# Patient Record
Sex: FEMALE | Race: WHITE | NOT HISPANIC OR LATINO | Employment: OTHER | URBAN - NONMETROPOLITAN AREA
[De-identification: names, ages, dates, MRNs, and addresses within clinical notes are randomized per-mention and may not be internally consistent; named-entity substitution may affect disease eponyms.]

---

## 2017-01-01 DIAGNOSIS — E78.5 HYPERLIPIDEMIA LDL GOAL <100: Primary | ICD-10-CM

## 2017-01-03 NOTE — TELEPHONE ENCOUNTER
Zocor     Last Written Prescription Date: 05/10/16  Last Fill Quantity: 90, # refills: 1  Last Office Visit with [facility], [facility] or [facility] prescribing provider: 12/12/16   Next 5 appointments (look out 90 days)     Jan 16, 2017  8:45 AM   (Arrive by 8:30 AM)   SHORT with Magaly Sosa MD   [facility] ([facility] [place] [facility])    [address] [place] [address]  [place] [address]   [phone]                   CHOL      182   10/19/2015  HDL       46   10/19/2015  LDL       96   10/19/2015  TRIG      200   10/19/2015  CHOLHDLRATIO      4.0   10/19/2015

## 2017-01-04 RX ORDER — SIMVASTATIN 20 MG
TABLET ORAL
Qty: 90 TABLET | Refills: 0 | Status: SHIPPED | OUTPATIENT
Start: 2017-01-04 | End: 2017-04-25

## 2017-01-05 ENCOUNTER — OFFICE VISIT (OUTPATIENT)
Dept: SURGERY | Facility: OTHER | Age: 77
End: 2017-01-05
Attending: FAMILY MEDICINE
Payer: MEDICARE

## 2017-01-05 VITALS
TEMPERATURE: 98.2 F | HEART RATE: 66 BPM | DIASTOLIC BLOOD PRESSURE: 70 MMHG | OXYGEN SATURATION: 95 % | SYSTOLIC BLOOD PRESSURE: 120 MMHG

## 2017-01-05 DIAGNOSIS — L98.9 SKIN LESION: Primary | ICD-10-CM

## 2017-01-05 PROCEDURE — 88305 TISSUE EXAM BY PATHOLOGIST: CPT | Mod: TC | Performed by: SURGERY

## 2017-01-05 PROCEDURE — 99214 OFFICE O/P EST MOD 30 MIN: CPT

## 2017-01-05 PROCEDURE — 11403 EXC TR-EXT B9+MARG 2.1-3CM: CPT | Performed by: SURGERY

## 2017-01-05 PROCEDURE — 11403 EXC TR-EXT B9+MARG 2.1-3CM: CPT

## 2017-01-05 ASSESSMENT — PAIN SCALES - GENERAL: PAINLEVEL: MILD PAIN (2)

## 2017-01-05 NOTE — MR AVS SNAPSHOT
"              After Visit Summary   1/5/2017    Dinorah Lim    MRN: [id number]           Patient Information     Date Of Birth          1940        Visit Information        Provider Department      1/5/2017 1:00 PM Tolu Negron, DO [facility]        Today's Diagnoses     Skin lesion    -  1       Care Instructions    Thank you for allowing Dr. Negron and the surgical team to participate in your care today.Please call with any scheduling questions to our Unit Health Coordinator Nena at [phone] or any nursing questions to Rosi at  [phone].       POST PROCEDURE INSTRUCTIONS      Apply ice to the surgical area to reduce swelling. (no longer than 20 minutes at a time)    Remove your dressing in 24 hours.    Wash incision with soap and water twice daily.    Keep incision clean and dry   Do NOT soak in water such as a tub bath or swimming   Do NOT do dishes or \"dirty work\"   Do NOT put make-up, deodorant, powders, hairspray, lotions, etc on the incision    Apply antibiotic ointment twice daily    Cover with a clean dressing daily or when wet/soiled    If you have steri-strips, these will fall off on their own in 7 days. If they are still adhered after 7 days, you may remove them by pulling gently.     Do NOT use aspirin/NSAIDS (Motrin, Ibuprofen, Aleve, etc..) for 7 days    You can use acetaminophen(Tylenol) or the prescription you received for pain.       If you have any bleeding, cover the wound with clean gauze and hold pressure for 10 Minutes. If the bleeding does not stop or is heavy and profuse, call the clinic or go to the Urgent Care/Emergency Department.      SIGNS OF INFECTION ARE:      Redness, swelling, red streaks, pus, drainage, warmth, fever, increased pain, foul smell.     Contact your primary health care provider if you notice any of the warning signs.     FOLLOW - UP      Follow-up in clinic with Dr. Negron in 7-10 days for suture removal.     Pathology " "results will also be discussed at that time.                 Follow-ups after your visit        Your next 10 appointments already scheduled     2017  8:45 AM   (Arrive by 8:30 AM)   SHORT with Magaly Sosa MD   [facility] ([facility])    [place] [address]   [phone]              Who to contact     If you have questions or need follow up information about today's clinic visit or your schedule please contact [facility] directly at [phone].  Normal or non-critical lab and imaging results will be communicated to you by [employer], letter or phone within 4 business days after the clinic has received the results. If you do not hear from us within 7 days, please contact the clinic through [email or web address] or phone. If you have a critical or abnormal lab result, we will notify you by phone as soon as possible.  Submit refill requests through [facility] or call your pharmacy and they will forward the refill request to us. Please allow 3 business days for your refill to be completed.          Additional Information About Your Visit        [facility] Information     [facility] lets you send messages to your doctor, view your test results, renew your prescriptions, schedule appointments and more. To sign up, go to [email or web address] . Click on \"Log in\" on the left side of the screen, which will take you to the Welcome page. Then click on \"Sign up Now\" on the right side of the page.     You will be asked to enter the access code listed below, as well as some personal information. Please follow the directions to create your username and password.     Your access code is: [email or web address]  Expires: 2017  1:26 PM     Your access code will  in 90 days. If you need help or a new code, please call your [facility] or [phone].        Care EveryWhere ID     This is your Care EveryWhere ID. This could be used by other organizations to access your [facility] medical " records  [id number]        Your Vitals Were     Pulse Temperature Pulse Oximetry             66 98.2  F (36.8  C) (Tympanic) 95%          Blood Pressure from Last 3 Encounters:   01/05/17 120/70   12/12/16 126/84   12/01/16 122/80    Weight from Last 3 Encounters:   12/12/16 230 lb (104.327 kg)   12/01/16 231 lb (104.781 kg)   10/18/16 230 lb (104.327 kg)              We Performed the Following     Surgical pathology exam     SURGICAL TRAY-- MINOR        Primary Care Provider Office Phone # Fax #    Magaly Sosa [phone]       [facility] [place] [address]  [place] [address]        Thank you!     [facility] you for [facility]  for your care. Our goal is always to provide you with excellent care. Hearing back from our patients is one way we can continue to improve our services. Please take a few minutes to complete the written survey that you may receive in the mail after your visit with us. Thank you!             Your Updated Medication List - Protect others around you: Learn how to safely use, store and throw away your medicines at www.disposemymeds.org.          This list is accurate as of: 1/5/17  1:26 PM.  Always use your most recent med list.                   Brand Name Dispense Instructions for use    albuterol (2.5 MG/3ML) 0.083% neb solution     1 Box    Take 1 vial (2.5 mg) by nebulization every 4 hours as needed for shortness of breath / dyspnea or wheezing       BENADRYL ALLERGY PO      Take 25 mg by mouth nightly as needed       clonazePAM 1 MG tablet    klonoPIN    45 tablet    TAKE ONE-FOURTH TO ONE-HALF TABLET BY MOUTH EVERY 8 HOURS AS NEEDED       CRANBERRY      1 capsule daily       esomeprazole 40 MG CR capsule    nexIUM    30 capsule    Take 1 capsule (40 mg) by mouth every morning (before breakfast) Take 30-60 minutes before a eating.       FISH OIL      Take 1 capsule by mouth daily       fluticasone 50 MCG/ACT spray    FLONASE    1 Bottle     Spray 1 spray into both nostrils 2 times daily       Garlic 10 MG Caps      Take 1 capsule by mouth as needed       HYDROcodone-acetaminophen 5-325 MG per tablet    NORCO    60 tablet    TAKE ONE TABLET BY MOUTH EVERY 4 TO 6 HOURS AS NEEDED FOR PAIN       PARoxetine 40 MG tablet    PAXIL    90 tablet    Take 1 tablet (40 mg) by mouth daily       simvastatin 20 MG tablet    ZOCOR    90 tablet    TAKE ONE TABLET BY MOUTH ONCE DAILY AT BEDTIME       VITAMIN D (CHOLECALCIFEROL) PO      Take 2,000 Units by mouth daily       VITAMIN E      Take 1 capsule by mouth daily

## 2017-01-05 NOTE — PROGRESS NOTES
"Surgery Consult Clinic Note      RE: Dinorah Lim  : 1940  RIOS: 2017      Chief Complaint:  Skin lesion    History of Present Illness:  Mrs. Lim is a very pleasant 76 year old year old female who I am seeing at the request of Dr. Magaly Sosa MD for evaluation of skin lesions.  Had for 2 months.  On her back.  Causes pain while wearing bra. Daily 6/10 burning pain.  Has had \"pre-cancerous\" skin lesions removed from her face years ago.  Family history of skin cancer.  Doesn't smoke, doesn't drink alcohol regularly.  Didn't spend a lot of time in the sun as a child or adult.  Doesn't wear sun screen.    Medical history:  Past Medical History   Diagnosis Date     Hyperlipidemia 2001     Idiopathic hives since age 8 2004     Osteoarthritis of right hip 10/07/2004     Cough 2001     Anxiety 2011     Prediabetes 2011     Major depression 10/04/2011     Otitis externa 10/04/2011     Osteoarthrosis 2012     Chronic kidney disease (CKD), stage III (moderate)      Early,unknown eitiology, Dr Vilchis     Cholecystolithiasis 2015     noted on US 2015 --> cholecystectomy     Neoplasm of uncertain behavior of liver 2015     Anterior Segment V 4 cm nodule abutting liver surface by US 2015      Obesity, Class II, BMI 35-39.9 (H) 2015     BMI 35.9 with comorbidities = MORBID obesity     Hiatal hernia 2014     Seen on chest CT     Chronic kidney disease (CKD), stage III (moderate) 2015     Early,unknown eitiology, Dr Vilchis        Surgical history:  Past Surgical History   Procedure Laterality Date     Tonsillectomy       Hc inj epidural lumbar/sacral w/wo contrast Bilateral 2013     facet injections; prev      Closed rx elbow dislocation Right      CR/long cast of fracture     Closed reduction wrist Right 1952 x 2     long arm cast (same time as elbow fx)     Laparoscopic cholecystectomy N/A 2015     Procedure: LAPAROSCOPIC " CHOLECYSTECTOMY;  Surgeon: Brittney العلي MD;  Location: [facility] OR       Family history:  Family History   Problem Relation Age of Onset     HEART DISEASE Brother      atrial fibrillation     Other - See Comments Mother      emphysema     HEART DISEASE Father 92     CHF     CANCER Paternal Grandfather      lung cancer     CANCER Maternal Uncle      lung cancer       Medications:  Current Outpatient Prescriptions   Medication Sig Dispense Refill     simvastatin (ZOCOR) 20 MG tablet TAKE ONE TABLET BY MOUTH ONCE DAILY AT BEDTIME 90 tablet 0     esomeprazole (NEXIUM) 40 MG CR capsule Take 1 capsule (40 mg) by mouth every morning (before breakfast) Take 30-60 minutes before a eating. 30 capsule 3     clonazePAM (KLONOPIN) 1 MG tablet TAKE ONE-FOURTH TO ONE-HALF TABLET BY MOUTH EVERY 8 HOURS AS NEEDED 45 tablet 0     HYDROcodone-acetaminophen (NORCO) 5-325 MG per tablet TAKE ONE TABLET BY MOUTH EVERY 4 TO 6 HOURS AS NEEDED FOR PAIN 60 tablet 0     fluticasone (FLONASE) 50 MCG/ACT nasal spray Spray 1 spray into both nostrils 2 times daily 1 Bottle 0     PARoxetine (PAXIL) 40 MG tablet Take 1 tablet (40 mg) by mouth daily 90 tablet 1     VITAMIN D, CHOLECALCIFEROL, PO Take 2,000 Units by mouth daily       albuterol (2.5 MG/3ML) 0.083% nebulizer solution Take 1 vial (2.5 mg) by nebulization every 4 hours as needed for shortness of breath / dyspnea or wheezing 1 Box 3     Garlic 10 MG CAPS Take 1 capsule by mouth as needed       DiphenhydrAMINE HCl (BENADRYL ALLERGY PO) Take 25 mg by mouth nightly as needed        VITAMIN E Take 1 capsule by mouth daily        CRANBERRY 1 capsule daily        FISH OIL Take 1 capsule by mouth daily        Allergies:  The patientis allergic to chocolate; lemon flavor; lime; orange fruit; strawberry; adhesive tape; codeine; erythromycin; grapefruit; penicillins; sulfa drugs; and tomato.  .  Social history:  Social History   Substance Use Topics     Smoking status: Never Smoker      Smokeless  tobacco: Never Used     Alcohol Use: No     Marital status: single.    Review of Systems:    Constitutional: Negative for fever, chills and weight loss.   HENT: Negative for ear pain, nosebleeds, congestion, sore throat, tinnitus and ear discharge.    Eyes: Negative for blurred vision, double vision, photophobia and pain.   Respiratory: Negative for cough, hemoptysis, shortness of breath, wheezing and stridor.    Cardiovascular: Negative for chest pain, palpitations and orthopnea.   Gastrointestinal: Negative for heartburn, nausea, vomiting, abdominal pain and blood in stool.   Genitourinary: Negative for urgency, frequency and hematuria.   Musculoskeletal: Negative for myalgias, back pain and joint pain.   Neurological: Negative for tingling, speech change and headaches.   Endo/Heme/Allergies: Does not bruise/bleed easily.   Psychiatric/Behavioral: Negative for depression, suicidal ideas and hallucinations. The patient is not nervous/anxious.    Physical Examination:  /70 mmHg  Pulse 66  Temp(Src) 98.2  F (36.8  C) (Tympanic)  SpO2 95%  General: AAOx4, NAD, WN/WD, ambulating without assistance  HEENT:NCAT, EOMI, PERRL Sclerae anicteric; Trachea mideline, no JVD  Chest:   Clear to auscultation bilaterally.  Cardiac: S1S2 , regular rate and rhythm without additional sounds  Abdomen: Obese, soft, ND/NT no rebound, no guarding  Extremities: Cursory exam unremarkable.  Skin: Warm, dry, < 2 sec cap refill, Right paraspinal region 1cm oval hyperpigmented slightly raised patch.  Neuro: CN 2-12 grossly intact, no focal deficit, GCS 15  Psych: happy, calm, asks appropriate questions      Assessment/Plan:  #1 Skin lesion  #2 Family history of skin cancer  #3 History of pre-malignant skin lesions    This is new, symptomatic and concerning history.   I don't  think its unreasonable to remove this mass and confirm diagnosis.  The risks, including but not limited to, bleeding, infection, recurrence, need for further  resection, wound healing, scar formation and chronic pain have all been discussed with the patient. He voiced understanding of all these risks and there were no barriers to patient education.          Dr Negron  [facility]  [address], [address]  [place]    [address]    Referring Provider:  Magaly Sosa MD  [place] [phone]     Primary Care Provider:  Magaly Sosa

## 2017-01-05 NOTE — PATIENT INSTRUCTIONS
"Thank you for allowing Dr. Negron and the surgical team to participate in your care today.Please call with any scheduling questions to our Unit Health Coordinator Nena at [phone] or any nursing questions to Rosi at  [phone].       POST PROCEDURE INSTRUCTIONS      Apply ice to the surgical area to reduce swelling. (no longer than 20 minutes at a time)    Remove your dressing in 24 hours.    Wash incision with soap and water twice daily.    Keep incision clean and dry   Do NOT soak in water such as a tub bath or swimming   Do NOT do dishes or \"dirty work\"   Do NOT put make-up, deodorant, powders, hairspray, lotions, etc on the incision    Apply antibiotic ointment twice daily    Cover with a clean dressing daily or when wet/soiled    If you have steri-strips, these will fall off on their own in 7 days. If they are still adhered after 7 days, you may remove them by pulling gently.     Do NOT use aspirin/NSAIDS (Motrin, Ibuprofen, Aleve, etc..) for 7 days    You can use acetaminophen(Tylenol) or the prescription you received for pain.       If you have any bleeding, cover the wound with clean gauze and hold pressure for 10 Minutes. If the bleeding does not stop or is heavy and profuse, call the clinic or go to the Urgent Care/Emergency Department.      SIGNS OF INFECTION ARE:      Redness, swelling, red streaks, pus, drainage, warmth, fever, increased pain, foul smell.     Contact your primary health care provider if you notice any of the warning signs.     FOLLOW - UP      Follow-up in clinic with Dr. Negron in 7-10 days for suture removal.     Pathology results will also be discussed at that time.           "

## 2017-01-05 NOTE — PROCEDURES
[facility] - Brief Operative Note    Pre-operative diagnosis: Symptomatic skin lesion   Post-operative diagnosis Same   Procedure: Excisional biopsy   Surgeon: Tolu Negron DO   Anesthesia: Local anesthetic    Estimated blood loss: none   Blood transfusion: No transfusion was given during surgery   Drains: 0   Specimens: Right flank skin lesion   Findings: Superficial skin lesion, grossly excised    Complications: None   Condition: Stable   Comments: After a procedural pause was conducted, the area was prepped and draped in the usual sterile fashion.  Local anesthetic was infiltrated around the right paraspinal skin lesion.  After the anesthetic had taken affect, an ellipse measuring 1cm x 3cm was made sharply and the lesion was removed from the subcutaneous fat and sent the pathology.  Hemostasis was ensured using direct pressure and the wound was closed with interrupted 3-0 nylon sutures.  Patient tolerated the procedure well and a sterile dressing was applied.

## 2017-01-05 NOTE — NURSING NOTE
"Chief Complaint   Patient presents with     Derm Problem     Lesion on right upper back.  Patient referred by Dr Magaly Sosa.         Initial /70 mmHg  Pulse 66  Temp(Src) 98.2  F (36.8  C) (Tympanic)  SpO2 95% Estimated body mass index is 39.46 kg/(m^2) as calculated from the following:    Height as of 12/12/16: 5' 4\" (1.626 m).    Weight as of 12/12/16: 230 lb (104.327 kg).  BP completed using cuff size: vaibhav GRULLON      "

## 2017-01-09 LAB — COPATH REPORT: NORMAL

## 2017-01-11 ENCOUNTER — ALLIED HEALTH/NURSE VISIT (OUTPATIENT)
Dept: SURGERY | Facility: OTHER | Age: 77
End: 2017-01-11
Attending: SURGERY
Payer: MEDICARE

## 2017-01-11 DIAGNOSIS — Z48.02 ENCOUNTER FOR REMOVAL OF SUTURES: Primary | ICD-10-CM

## 2017-01-11 NOTE — NURSING NOTE
Patient presented to the clinic to have her sutures removed from her back wound.  Wound looked clean, slightly red around the top two sutures.  Patient complained of burning at the site, which she says has been present for 2 months previous to her biopsy.    Four black sutures were removed easily from her incision site.  No complaints from patient.  One piece of steri-strip was placed over the site and the patient was informed that these fall off within 7-10 days.  Patient left without complaint but wanted the nurse to ask Dr Negron why she would be feeling burning at the site for the past two months.  A note was sent to Dr Negron.

## 2017-01-16 ENCOUNTER — OFFICE VISIT (OUTPATIENT)
Dept: FAMILY MEDICINE | Facility: OTHER | Age: 77
End: 2017-01-16
Attending: FAMILY MEDICINE
Payer: COMMERCIAL

## 2017-01-16 VITALS
OXYGEN SATURATION: 97 % | DIASTOLIC BLOOD PRESSURE: 74 MMHG | RESPIRATION RATE: 19 BRPM | BODY MASS INDEX: 38.6 KG/M2 | WEIGHT: 225 LBS | HEART RATE: 73 BPM | SYSTOLIC BLOOD PRESSURE: 128 MMHG

## 2017-01-16 DIAGNOSIS — M70.61 TROCHANTERIC BURSITIS OF RIGHT HIP: Primary | ICD-10-CM

## 2017-01-16 DIAGNOSIS — Z12.11 ENCOUNTER FOR SCREENING FOR MALIGNANT NEOPLASM OF COLON: ICD-10-CM

## 2017-01-16 DIAGNOSIS — M70.71 BURSITIS OF HIP, RIGHT: Primary | ICD-10-CM

## 2017-01-16 PROCEDURE — 99213 OFFICE O/P EST LOW 20 MIN: CPT | Performed by: FAMILY MEDICINE

## 2017-01-16 PROCEDURE — 73502 X-RAY EXAM HIP UNI 2-3 VIEWS: CPT | Mod: TC,RT

## 2017-01-16 PROCEDURE — 99212 OFFICE O/P EST SF 10 MIN: CPT

## 2017-01-16 RX ORDER — NABUMETONE 500 MG/1
500-1000 TABLET, FILM COATED ORAL 2 TIMES DAILY PRN
Qty: 60 TABLET | Refills: 1 | Status: SHIPPED | OUTPATIENT
Start: 2017-01-16 | End: 2017-06-26

## 2017-01-16 ASSESSMENT — PAIN SCALES - GENERAL: PAINLEVEL: SEVERE PAIN (6)

## 2017-01-16 NOTE — PROGRESS NOTES
[facility]    Dinorah Lim, 76 year old, female presents with   Chief Complaint   Patient presents with     Musculoskeletal Problem     right hip pain, rates pain 6/10 over the last several months. Pain with standing from sitting, lying on this side. Walking helps the pain. No trauma     Derm Problem     had skin lesion removed on 1/5/17 by Dr Negron states it still burns, this was a benign lesion, sutures were removed and glue placed. 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     Anterior Segment V 4 cm nodule abutting liver surface by US 1/4/2015      Obesity, Class II, BMI 35-39.9 (H) 1/4/2015     BMI 35.9 with comorbidities = MORBID obesity     Hiatal hernia 12/28/2014     Seen on chest CT     Chronic kidney disease (CKD), stage III (moderate) 1/4/2015     Early,unknown eitiology, Dr Vilchis        PAST SURGICAL HISTORY:  Past Surgical History   Procedure Laterality Date     Tonsillectomy       Hc inj epidural lumbar/sacral w/wo contrast Bilateral 5/2013     facet injections; prev 2012     Closed rx elbow dislocation Right 1952     CR/long cast of fracture     Closed reduction wrist Right 1952 x 2     long arm cast (same time as elbow fx)     Laparoscopic cholecystectomy N/A 1/4/2015     Procedure: LAPAROSCOPIC CHOLECYSTECTOMY;  Surgeon: Brittney العلي MD;  Location: [facility]       SOCIAL HISTORY  Social History      Social History     Marital Status: Single     Spouse Name: N/A     Number of Children: 4     Years of Education: 12     Occupational History     personal care attendant      Social History Main Topics     Smoking status: Never Smoker      Smokeless tobacco: Never Used     Alcohol Use: No     Drug Use: No     Sexual Activity: No     Other Topics Concern     Blood Transfusions Yes     Caffeine Concern Yes     >32 oz soda/day     Sleep Concern Yes     insomnia     Parent/Sibling W/ Cabg, Mi Or Angioplasty Before 65f 55m? No     Social History Narrative       MEDICATIONS:  Prior to Admission medications    Medication Sig Start Date End Date Taking? Authorizing Provider   nabumetone (RELAFEN) 500 MG tablet Take 1-2 tablets (500-1,000 mg) by mouth 2 times daily as needed for moderate pain 1/16/17  Yes Magaly Sosa MD   simvastatin (ZOCOR) 20 MG tablet TAKE ONE TABLET BY MOUTH ONCE DAILY AT BEDTIME 1/4/17  Yes Magaly Sosa MD   esomeprazole (NEXIUM) 40 MG CR capsule Take 1 capsule (40 mg) by mouth every morning (before breakfast) Take 30-60 minutes before a eating. 12/12/16  Yes Magaly Sosa MD   clonazePAM (KLONOPIN) 1 MG tablet TAKE ONE-FOURTH TO ONE-HALF TABLET BY MOUTH EVERY 8 HOURS AS NEEDED 12/1/16  Yes Magaly Sosa MD   HYDROcodone-acetaminophen (NORCO) 5-325 MG per tablet TAKE ONE TABLET BY MOUTH EVERY 4 TO 6 HOURS AS NEEDED FOR PAIN 12/1/16  Yes Magaly Sosa MD   PARoxetine (PAXIL) 40 MG tablet Take 1 tablet (40 mg) by mouth daily 10/4/16  Yes Magaly Sosa MD   VITAMIN D, CHOLECALCIFEROL, PO Take 2,000 Units by mouth daily   Yes Reported, Patient   albuterol (2.5 MG/3ML) 0.083% nebulizer solution Take 1 vial (2.5 mg) by nebulization every 4 hours as needed for shortness of breath / dyspnea or wheezing 5/12/15  Yes Magaly Sosa MD   Garlic 10 MG CAPS Take 1 capsule by mouth as needed   Yes Reported, Patient   DiphenhydrAMINE HCl (BENADRYL ALLERGY PO) Take 25 mg by mouth nightly as needed     Yes Reported, Patient   VITAMIN E Take 1 capsule by mouth daily    Yes Reported, Patient   CRANBERRY 1 capsule daily    Yes Reported, Patient   FISH OIL Take 1 capsule by mouth daily    Yes Reported, Patient   fluticasone (FLONASE) 50 MCG/ACT nasal spray Spray 1 spray into both nostrils 2 times daily 10/26/16   Oz Ospina, NP       ALLERGIES:     Allergies   Allergen Reactions     Chocolate Hives     Lemon Flavor Hives     Lime [Calcium Oxysulfide] Hives     Orange Fruit [Citrus] Hives     Strawberry Hives     Adhesive Tape      Band-aids     Codeine Hives     Patient can tolerate oxycodone & dilaudid     Erythromycin Hives     ERYTHROMYCIN BASE     Grapefruit [Extra Strength Grapefruit]      GRAPEFRUIT     Penicillins Hives     Sulfa Drugs      SULFONAMIDE ANTIBIOTICS      Tomato        ROS:  C: NEGATIVE for fever, chills, change in weight  I: NEGATIVE for worrisome rashes, moles or lesions  N: NEGATIVE for weakness, dizziness or paresthesias  P: NEGATIVE for changes in mood or affect    EXAM:  /74 mmHg  Pulse 73  Resp 19  Wt 225 lb (102.059 kg)  SpO2 97% Body mass index is 38.6 kg/(m^2).   GENERAL APPEARANCE: healthy, alert and no distress  ORTHO: Hip Exam: Palpation: Tender:   right greater trochanter  Non-tender:  right iliac crest  Range of Motion:  Full ROM, without pain  Strength:  full strength      SKIN: no suspicious lesions or rashes and incision site of the upper mid back is healing well without sign of infection, steri strip in place  NEURO: Normal strength and tone, mentation intact and speech normal  PSYCH: mentation appears normal and affect normal/bright    LABS AND IMAGING:     Results for orders placed or performed in visit on 01/05/17   Surgical pathology exam   Result Value Ref Range    Copath Report       Patient Name: ANAHY BROWNE  MR#: [id number]  Specimen #: [id number]  Collected: 1/5/2017  Received: 1/6/2017  Reported: 1/9/2017 15:40  Ordering Phy(s): RADHA BRISCOE  YADY  Additional Phy(s): MAGALY PARKER    For improved result formatting, select 'View Enhanced Report Format'  under Linked Documents section.    SPECIMEN(S):  Skin, right flank    FINAL DIAGNOSIS:  Skin, right flank, excision  - Intradermal nevus, margins positive    Electronically signed out by:    Jann Ndiaye M.D.    CLINICAL HISTORY:  Skin lesion, nonhealing    GROSS:  The specimen is a 1.5 x 0.6 x 0.5 cm piece of tan skin with a raised  light brown lesion.  After the margin is inked it is serially sectioned.  (5 TE in 1 block). (Dictated by: Jann Ndiaye MD 1/6/2017 02:06 PM)    MICROSCOPIC:  Microscopic sections show an intradermal nevus which extends to the  peripheral margins.    CPT Codes  A: 70538-KG4    TESTING LAB LOCATION:  [address] [address]  [phone]    COLLECTION SITE:  Client: [facility]  Location: [facility] ([facility]           ASSESSMENT/PLAN:  (M70.71) Bursitis of hip, right  (primary encounter diagnosis)  Comment: new  Plan: nabumetone (RELAFEN) 500 MG tablet, ORTHOPEDICS        ADULT REFERRAL, XR HIP LT G/E 2 VW (Clinic         Performed)        Will check x ray today, start Nabumtone and refer to DR Wills as she is interested in an injection. She is working 3 jobs and would like to be more mobile    Colon cancer screening, she was given an IFOB to use at home but hasn't. She is given another to take home    Magaly Parker MD  January 16, 2017

## 2017-01-16 NOTE — NURSING NOTE
"Chief Complaint   Patient presents with     Musculoskeletal Problem     right hip pain, rates pain 6/10.      Derm Problem     had skin lesion removed on 1/5/17 by Dr Negron states it still burns       Initial /74 mmHg  Pulse 73  Resp 19  Wt 225 lb (102.059 kg)  SpO2 97% Estimated body mass index is 38.6 kg/(m^2) as calculated from the following:    Height as of 12/12/16: 5' 4\" (1.626 m).    Weight as of this encounter: 225 lb (102.059 kg).  BP completed using cuff size: galen Gandhi      "

## 2017-01-16 NOTE — MR AVS SNAPSHOT
After Visit Summary   1/16/2017    Dinorah Lim    MRN: [id number]           Patient Information     Date Of Birth          1940        Visit Information        Provider Department      1/16/2017 8:45 AM Magaly Sosa MD [facility]        Today's Diagnoses     Bursitis of hip, right    -  1        Follow-ups after your visit        Additional Services     ORTHOPEDICS ADULT REFERRAL       Your provider has referred you to: Dr Wills for consideration of injection    Please be aware that coverage of these services is subject to the terms and limitations of your health insurance plan.  Call member services at your health plan with any benefit or coverage questions.      Please bring the following to your appointment:    >>   Any x-rays, CTs or MRIs which have been performed.  Contact the facility where they were done to arrange for  prior to your scheduled appointment.    >>   List of current medications   >>   This referral request   >>   Any documents/labs given to you for this referral                  Your next 10 appointments already scheduled     Jan 27, 2017  8:00 AM   (Arrive by 7:45 AM)   New Visit with Esteban Wills MD    ORTHOPEDICS ([facility])    [address] [facility] [address] [phone]              Who to contact     If you have questions or need follow up information about today's clinic visit or your schedule please contact [facility] directly at [phone].  Normal or non-critical lab and imaging results will be communicated to you by MyChart, letter or phone within 4 business days after the clinic has received the results. If you do not hear from us within 7 days, please contact the clinic through MyChart or phone. If you have a critical or abnormal lab result, we will notify you by phone as soon as possible.  Submit refill requests through [employer] or call your pharmacy and they will forward the refill  "request to us. Please allow 3 business days for your refill to be completed.          Additional Information About Your Visit        [employer] Information     [facility] lets you send messages to your doctor, view your test results, renew your prescriptions, schedule appointments and more. To sign up, go to [email or web address] . Click on \"Log in\" on the left side of the screen, which will take you to the Welcome page. Then click on \"Sign up Now\" on the right side of the page.     You will be asked to enter the access code listed below, as well as some personal information. Please follow the directions to create your username and password.     Your access code is: [email or web address]  Expires: 2017  1:26 PM     Your access code will  in 90 days. If you need help or a new code, please call your [facility] clinic or [phone].        Care EveryWhere ID     This is your Care EveryWhere ID. This could be used by other organizations to access your [facility] medical records  [id number]        Your Vitals Were     Pulse Respirations Pulse Oximetry             73 19 97%          Blood Pressure from Last 3 Encounters:   17 128/74   17 120/70   16 126/84    Weight from Last 3 Encounters:   17 225 lb (102.059 kg)   16 230 lb (104.327 kg)   16 231 lb (104.781 kg)              We Performed the Following     ORTHOPEDICS ADULT REFERRAL     XR HIP LT G/E 2 VW (Clinic Performed)          Today's Medication Changes          These changes are accurate as of: 17  9:30 AM.  If you have any questions, ask your nurse or doctor.               Start taking these medicines.        Dose/Directions    nabumetone 500 MG tablet   Commonly known as:  RELAFEN   Used for:  Bursitis of hip, right   Started by:  Magaly Sosa MD        Dose:  500-1000 mg   Take 1-2 tablets (500-1,000 mg) by mouth 2 times daily as needed for moderate pain   Quantity:  60 tablet   Refills:  1            Where to get your " medicines      These medications were sent to [id number] - [place], [place] - [address]   [id number] , [place] [address]     Phone:  [phone]    - nabumetone 500 MG tablet             Primary Care Provider Office Phone # Fax #    Magaly Sosa [phone]       [facility] [place] [address]  \[facility]\""[place] [address]        Thank you!     Thank you for choosing [facility]  for your care. Our goal is always to provide you with excellent care. Hearing back from our patients is one way we can continue to improve our services. Please take a few minutes to complete the written survey that you may receive in the mail after your visit with us. Thank you!             Your Updated Medication List - Protect others around you: Learn how to safely use, store and throw away your medicines at www.disposemymeds.org.          This list is accurate as of: 1/16/17  9:30 AM.  Always use your most recent med list.                   Brand Name Dispense Instructions for use    albuterol (2.5 MG/3ML) 0.083% neb solution     1 Box    Take 1 vial (2.5 mg) by nebulization every 4 hours as needed for shortness of breath / dyspnea or wheezing       BENADRYL ALLERGY PO      Take 25 mg by mouth nightly as needed       clonazePAM 1 MG tablet    klonoPIN    45 tablet    TAKE ONE-FOURTH TO ONE-HALF TABLET BY MOUTH EVERY 8 HOURS AS NEEDED       CRANBERRY      1 capsule daily       esomeprazole 40 MG CR capsule    nexIUM    30 capsule    Take 1 capsule (40 mg) by mouth every morning (before breakfast) Take 30-60 minutes before a eating.       FISH OIL      Take 1 capsule by mouth daily       fluticasone 50 MCG/ACT spray    FLONASE    1 Bottle    Spray 1 spray into both nostrils 2 times daily       Garlic 10 MG Caps      Take 1 capsule by mouth as needed       HYDROcodone-acetaminophen 5-325 MG per tablet    NORCO    60 tablet    TAKE ONE TABLET BY MOUTH EVERY 4 TO 6 HOURS AS NEEDED FOR PAIN        nabumetone 500 MG tablet    RELAFEN    60 tablet    Take 1-2 tablets (500-1,000 mg) by mouth 2 times daily as needed for moderate pain       PARoxetine 40 MG tablet    PAXIL    90 tablet    Take 1 tablet (40 mg) by mouth daily       simvastatin 20 MG tablet    ZOCOR    90 tablet    TAKE ONE TABLET BY MOUTH ONCE DAILY AT BEDTIME       VITAMIN D (CHOLECALCIFEROL) PO      Take 2,000 Units by mouth daily       VITAMIN E      Take 1 capsule by mouth daily

## 2017-01-27 ENCOUNTER — OFFICE VISIT (OUTPATIENT)
Dept: ORTHOPEDICS | Facility: OTHER | Age: 77
End: 2017-01-27
Attending: ORTHOPAEDIC SURGERY
Payer: MEDICARE

## 2017-01-27 VITALS
OXYGEN SATURATION: 95 % | SYSTOLIC BLOOD PRESSURE: 128 MMHG | WEIGHT: 230 LBS | BODY MASS INDEX: 39.27 KG/M2 | HEIGHT: 64 IN | TEMPERATURE: 97.4 F | HEART RATE: 75 BPM | DIASTOLIC BLOOD PRESSURE: 68 MMHG

## 2017-01-27 DIAGNOSIS — M16.11 PRIMARY OSTEOARTHRITIS OF RIGHT HIP: Primary | ICD-10-CM

## 2017-01-27 PROCEDURE — 99214 OFFICE O/P EST MOD 30 MIN: CPT

## 2017-01-27 PROCEDURE — 99213 OFFICE O/P EST LOW 20 MIN: CPT | Performed by: ORTHOPAEDIC SURGERY

## 2017-01-27 ASSESSMENT — PAIN SCALES - GENERAL: PAINLEVEL: SEVERE PAIN (6)

## 2017-01-27 NOTE — NURSING NOTE
"Chief Complaint   Patient presents with     Musculoskeletal Problem     New patient with complaints of right hip pain. Would like injection       Initial /68 mmHg  Pulse 75  Temp(Src) 97.4  F (36.3  C) (Tympanic)  Ht 5' 4\" (1.626 m)  Wt 230 lb (104.327 kg)  BMI 39.46 kg/m2  SpO2 95% Estimated body mass index is 39.46 kg/(m^2) as calculated from the following:    Height as of this encounter: 5' 4\" (1.626 m).    Weight as of this encounter: 230 lb (104.327 kg).  BP completed using cuff size: regular  Stacy Lofton LPN      "

## 2017-01-27 NOTE — MR AVS SNAPSHOT
"              After Visit Summary   2017    Dinorah Lim    MRN: [id number]           Patient Information     Date Of Birth          1940        Visit Information        Provider Department      2017 8:00 AM Esteban Wills MD  ORTHOPEDICS        Today's Diagnoses     Primary osteoarthritis of right hip    -  1        Follow-ups after your visit        Follow-up notes from your care team     Return if symptoms worsen or fail to improve.      Who to contact     If you have questions or need follow up information about today's clinic visit or your schedule please contact  ORTHOPEDICS directly at [phone].  Normal or non-critical lab and imaging results will be communicated to you by MyChart, letter or phone within 4 business days after the clinic has received the results. If you do not hear from us within 7 days, please contact the clinic through [employer] or phone. If you have a critical or abnormal lab result, we will notify you by phone as soon as possible.  Submit refill requests through [employer] or call your pharmacy and they will forward the refill request to us. Please allow 3 business days for your refill to be completed.          Additional Information About Your Visit        MyChart Information     [employer] lets you send messages to your doctor, view your test results, renew your prescriptions, schedule appointments and more. To sign up, go to [email or web address] . Click on \"Log in\" on the left side of the screen, which will take you to the Welcome page. Then click on \"Sign up Now\" on the right side of the page.     You will be asked to enter the access code listed below, as well as some personal information. Please follow the directions to create your username and password.     Your access code is: [email or web address]  Expires: 2017  1:26 PM     Your access code will  in 90 days. If you need help or a new code, please call your [place] clinic or [phone].        Care " "EveryWhere ID     This is your Care EveryWhere ID. This could be used by other organizations to access your [facility] medical records  [id number]        Your Vitals Were     Pulse Temperature Height BMI (Body Mass Index) Pulse Oximetry       75 97.4  F (36.3  C) (Tympanic) 5' 4\" (1.626 m) 39.46 kg/m2 95%        Blood Pressure from Last 3 Encounters:   01/27/17 128/68   01/16/17 128/74   01/05/17 120/70    Weight from Last 3 Encounters:   01/27/17 230 lb (104.327 kg)   01/16/17 225 lb (102.059 kg)   12/12/16 230 lb (104.327 kg)              We Performed the Following     XR Joint Injection Major Right        Primary Care Provider Office Phone # Fax #    Magaly Sosa [phone]       [facility] [place] [address] [facility]  [place] [address]        Thank you!     Thank you for choosing  ORTHOPEDICS  for your care. Our goal is always to provide you with excellent care. Hearing back from our patients is one way we can continue to improve our services. Please take a few minutes to complete the written survey that you may receive in the mail after your visit with us. Thank you!             Your Updated Medication List - Protect others around you: Learn how to safely use, store and throw away your medicines at www.disposemymeds.org.          This list is accurate as of: 1/27/17  8:23 AM.  Always use your most recent med list.                   Brand Name Dispense Instructions for use    albuterol (2.5 MG/3ML) 0.083% neb solution     1 Box    Take 1 vial (2.5 mg) by nebulization every 4 hours as needed for shortness of breath / dyspnea or wheezing       BENADRYL ALLERGY PO      Take 25 mg by mouth nightly as needed       clonazePAM 1 MG tablet    klonoPIN    45 tablet    TAKE ONE-FOURTH TO ONE-HALF TABLET BY MOUTH EVERY 8 HOURS AS NEEDED       CRANBERRY      1 capsule daily       esomeprazole 40 MG CR capsule    nexIUM    30 capsule    Take 1 capsule (40 mg) by mouth every morning (before breakfast) " Take 30-60 minutes before a eating.       FISH OIL      Take 1 capsule by mouth daily       fluticasone 50 MCG/ACT spray    FLONASE    1 Bottle    Spray 1 spray into both nostrils 2 times daily       Garlic 10 MG Caps      Take 1 capsule by mouth as needed       HYDROcodone-acetaminophen 5-325 MG per tablet    NORCO    60 tablet    TAKE ONE TABLET BY MOUTH EVERY 4 TO 6 HOURS AS NEEDED FOR PAIN       nabumetone 500 MG tablet    RELAFEN    60 tablet    Take 1-2 tablets (500-1,000 mg) by mouth 2 times daily as needed for moderate pain       PARoxetine 40 MG tablet    PAXIL    90 tablet    Take 1 tablet (40 mg) by mouth daily       simvastatin 20 MG tablet    ZOCOR    90 tablet    TAKE ONE TABLET BY MOUTH ONCE DAILY AT BEDTIME       VITAMIN D (CHOLECALCIFEROL) PO      Take 2,000 Units by mouth daily       VITAMIN E      Take 1 capsule by mouth daily

## 2017-01-27 NOTE — PROGRESS NOTES
Established Patient, New Problem  Chief Complaint: Right hip pain  PCP: Dr. 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  The pain is located over the posterior and lateral aspects of the right hip and radiates down the lateral aspect of the right thigh and calf to the ankle.  Sometimes she experiences numbness and tingling in the lateral aspect of her right calf.  At present her pain is intermittent sharp grade 8.  It is aggravated by sitting or standing and it is lessened by applying heat, cold, or BenGay.  Right hip X-rays were recently  taken  and she was told she has arthritis.  She sometimes uses a cane for ambulation.  She requests a steroid injection into her right hip, and a handicapped placard.    She has a history of low back pain.  She has had a steroid injection into her back by interventional radiology in the past.  She has had no low back pain recently.    A review of the patient's complete medical/family/social  history, medications, allergies and a two (neurological and musculoskeletal) system review of systems are noncontributory for the presenting problem   other than the information listed above.    Examination: Obese female in no obvious distress.  Her gait is antalgic on the right.  Her right hip is tender to palpation laterally but not anteriorly.  Leg lengths are equal.  Passive range of motion of the right hip is 120  of flexion, 10  of internal rotation at 90  of flexion with pain.  External rotation in flexion is normal as is abduction.  Internal rotation and extension is significantly less on the right than it is on the left.  The Stinchfield test is positive as is the impingement sign.  The neurovascular status of her right foot is intact.    X-ray; 2 views of the right hip taken on 1/16/17 show coxa vera, joint space narrowing,  a surceal sign,  degenerative cysts on both side of the joint,, and a femoral head osteophyte.    Impression:   #1. Femoral acetabular impingement, pincer type  #2.  Primary osteoarthritis right hip    Plan:  #1.  A handicapped placard application was signed.  #2.  She was referred to interventional radiology for a right hip steroid injection. If it helps it can be repeated up to 4 times per year.  Eventually she may need a hip replacement. She should not have a steroid injection into that hip within 6 months of a hip replacement.  #3.  I explained to her that it is possible that some of her leg pain and numbness could be caused by a back problem.  If that is the case her hip injection may only partially relieve her right lower extremity discomfort.  If she has significant discomfort after her hip is injected, she might need to see someone about her back.  In that case she should see her PCP for a referral to a chiropractor, [facility], or to interventional radiology for possible lumbar injections.  I explained to her that I am not a neck or back orthopedist.  #4.  She'll return as needed.

## 2017-01-30 ENCOUNTER — HOSPITAL ENCOUNTER (OUTPATIENT)
Dept: INTERVENTIONAL RADIOLOGY/VASCULAR | Facility: HOSPITAL | Age: 77
Discharge: HOME | End: 2017-01-30
Attending: ORTHOPAEDIC SURGERY | Admitting: ORTHOPAEDIC SURGERY
Payer: MEDICARE

## 2017-01-30 DIAGNOSIS — M16.11 PRIMARY OSTEOARTHRITIS OF RIGHT HIP: Primary | ICD-10-CM

## 2017-01-30 PROCEDURE — 25000125 ZZHC RX 250: Performed by: RADIOLOGY

## 2017-01-30 PROCEDURE — 20610 DRAIN/INJ JOINT/BURSA W/O US: CPT | Mod: TC,RT

## 2017-01-30 PROCEDURE — 77002 NEEDLE LOCALIZATION BY XRAY: CPT | Mod: TC

## 2017-01-30 RX ORDER — HYDROCODONE BITARTRATE AND ACETAMINOPHEN 5; 325 MG/1; MG/1
TABLET ORAL
Qty: 60 TABLET | Refills: 0 | Status: SHIPPED | OUTPATIENT
Start: 2017-01-30 | End: 2017-03-28

## 2017-01-30 RX ORDER — DEXAMETHASONE SODIUM PHOSPHATE 10 MG/ML
INJECTION, SOLUTION INTRAMUSCULAR; INTRAVENOUS
Status: DISPENSED
Start: 2017-01-30 | End: 2017-01-30

## 2017-01-30 RX ORDER — DEXAMETHASONE SODIUM PHOSPHATE 10 MG/ML
10 INJECTION, SOLUTION INTRAMUSCULAR; INTRAVENOUS ONCE
Status: COMPLETED | OUTPATIENT
Start: 2017-01-30 | End: 2017-01-30

## 2017-01-30 RX ORDER — METHYLPREDNISOLONE ACETATE 80 MG/ML
80 INJECTION, SUSPENSION INTRA-ARTICULAR; INTRALESIONAL; INTRAMUSCULAR; SOFT TISSUE ONCE
Status: DISCONTINUED | OUTPATIENT
Start: 2017-01-30 | End: 2017-01-30 | Stop reason: CLARIF

## 2017-01-30 RX ORDER — LIDOCAINE HYDROCHLORIDE 10 MG/ML
INJECTION, SOLUTION EPIDURAL; INFILTRATION; INTRACAUDAL; PERINEURAL
Status: DISPENSED
Start: 2017-01-30 | End: 2017-01-30

## 2017-01-30 RX ORDER — IOPAMIDOL 612 MG/ML
50 INJECTION, SOLUTION INTRAVASCULAR ONCE
Status: COMPLETED | OUTPATIENT
Start: 2017-01-30 | End: 2017-01-30

## 2017-01-30 RX ADMIN — IOPAMIDOL 2 ML: 612 INJECTION, SOLUTION INTRAVASCULAR at 08:56

## 2017-01-30 RX ADMIN — DEXAMETHASONE SODIUM PHOSPHATE 4 MG: 10 INJECTION, SOLUTION INTRAMUSCULAR; INTRAVENOUS at 08:57

## 2017-01-30 RX ADMIN — LIDOCAINE HYDROCHLORIDE 4 ML: 10 INJECTION, SOLUTION EPIDURAL; INFILTRATION; INTRACAUDAL; PERINEURAL at 08:55

## 2017-01-30 NOTE — DISCHARGE INSTRUCTIONS
Cell number on file:    Telephone Information:   Mobile [phone]     Is it ok to leave a message if you are not home no vm    Dr. Baig completed your rt hip injection procedure on 1/30/2017.    Current Pain Level (0-10 Scale): 2/10  Post Pain Level (0-10):  0/10    Patient will be contacted by a diagnostic imaging nurse via telephone for follow up on pain levels in 2 weeks.    Radiology Discharge instructions for Steroid Injection    Activity Level:     Do not do any heavy activity or exercise for 24 hours.   Do not drive for 4 hours after your injection.  Diet:   Return to your normal diet.  Medications:   If you have stopped taking your Aspirin, Coumadin/Warfarin, Ibuprofen, or any   other blood thinner for this procedure you may resume in the morning unless   your primary care provider has given you other instructions.    Diabetics may see an increase in blood sugar after steroid injections. If you are concerned about your blood sugar, please contact your family doctor.    Site Care:  Remove the bandage and bathe or shower the morning after the procedure.      Call your Primary Care Provider if you have the following (if your primary care provider is not available please seek emergency care):   Nausea with vomiting   Severe headache   Drowsiness or confusion   Redness or drainage at the injection or puncture site   Temperature over 101 degrees F   Other concerns   Worsening back pain   Stiff neck    If you have any questions or concerns, please call [phone].

## 2017-01-30 NOTE — PROGRESS NOTES
Fluoro time for this case was 9 seconds, less than 5 min  Was patient held? NO  If yes, by whom? NA

## 2017-01-30 NOTE — TELEPHONE ENCOUNTER
Pt notified that the written RX is ready at the [facility]  registration to be picked up.

## 2017-01-30 NOTE — TELEPHONE ENCOUNTER
norco      Last Written Prescription Date: 12/1/16  Last Fill Quantity: 60,  # refills: 0   Last Office Visit with G, P or [facility] prescribing provider: 1/16/17

## 2017-01-30 NOTE — IP AVS SNAPSHOT
[facility]    [address] [place] [id number]    Phone:  [phone]    Fax:  [phone]                                       After Visit Summary   1/30/2017    Dinorah Lim    MRN: [id number]           After Visit Summary Signature Page     I have received my discharge instructions, and my questions have been answered. I have discussed any challenges I see with this plan with the nurse or doctor.    ..........................................................................................................................................  Patient/Patient Representative Signature      ..........................................................................................................................................  Patient Representative Print Name and Relationship to Patient    ..................................................               ................................................  Date                                            Time    ..........................................................................................................................................  Reviewed by Signature/Title    ...................................................              ..............................................  Date                                                            Time

## 2017-01-30 NOTE — IP AVS SNAPSHOT
MRN:[id number]                      After Visit Summary   1/30/2017    Dinorah Lim    MRN: [id number]           Visit Information        Provider Department      1/30/2017  8:30 AM Radiologist, Need Interventional; [facility]           Review of your medicines      UNREVIEWED medicines. Ask your doctor about these medicines        Dose / Directions    albuterol (2.5 MG/3ML) 0.083% neb solution   Used for:  Acute bronchospasm        Dose:  1 vial   Take 1 vial (2.5 mg) by nebulization every 4 hours as needed for shortness of breath / dyspnea or wheezing   Quantity:  1 Box   Refills:  3       BENADRYL ALLERGY PO   Indication:  takes with simvastatin   Used for:  Hives        Dose:  25 mg   Take 25 mg by mouth nightly as needed   Refills:  0       clonazePAM 1 MG tablet   Commonly known as:  klonoPIN   Used for:  Anxiety        TAKE ONE-FOURTH TO ONE-HALF TABLET BY MOUTH EVERY 8 HOURS AS NEEDED   Quantity:  45 tablet   Refills:  0       CRANBERRY        Dose:  1 capsule   1 capsule daily   Refills:  0       esomeprazole 40 MG CR capsule   Commonly known as:  nexIUM   Used for:  Acute gastritis without hemorrhage, unspecified gastritis type        Dose:  40 mg   Take 1 capsule (40 mg) by mouth every morning (before breakfast) Take 30-60 minutes before a eating.   Quantity:  30 capsule   Refills:  3       FISH OIL        Dose:  1 capsule   Take 1 capsule by mouth daily   Refills:  0       fluticasone 50 MCG/ACT spray   Commonly known as:  FLONASE        Dose:  1 spray   Spray 1 spray into both nostrils 2 times daily   Quantity:  1 Bottle   Refills:  0       Garlic 10 MG Caps        Dose:  1 capsule   Take 1 capsule by mouth as needed   Refills:  0       HYDROcodone-acetaminophen 5-325 MG per tablet   Commonly known as:  NORCO   Used for:  Primary osteoarthritis of right hip        TAKE ONE TABLET BY MOUTH EVERY 4 TO 6 HOURS AS NEEDED FOR PAIN   Quantity:  60  tablet   Refills:  0       nabumetone 500 MG tablet   Commonly known as:  RELAFEN   Used for:  Bursitis of hip, right        Dose:  500-1000 mg   Take 1-2 tablets (500-1,000 mg) by mouth 2 times daily as needed for moderate pain   Quantity:  60 tablet   Refills:  1       PARoxetine 40 MG tablet   Commonly known as:  PAXIL   Used for:  Anxiety        Dose:  40 mg   Take 1 tablet (40 mg) by mouth daily   Quantity:  90 tablet   Refills:  1       simvastatin 20 MG tablet   Commonly known as:  ZOCOR   Used for:  Hyperlipidemia LDL goal <100        TAKE ONE TABLET BY MOUTH ONCE DAILY AT BEDTIME   Quantity:  90 tablet   Refills:  0       VITAMIN D (CHOLECALCIFEROL) PO        Dose:  2000 Units   Take 2,000 Units by mouth daily   Refills:  0       VITAMIN E        Dose:  1 capsule   Take 1 capsule by mouth daily   Refills:  0                Protect others around you: Learn how to safely use, store and throw away your medicines at www.disposemymeds.org.         Follow-ups after your visit         Care Instructions        Further instructions from your care team       Cell number on file:    Telephone Information:   Mobile [phone]     Is it ok to leave a message if you are not home no     Dr. Baig completed your rt hip injection procedure on 1/30/2017.    Current Pain Level (0-10 Scale): 2/10  Post Pain Level (0-10):  0/10    Patient will be contacted by a diagnostic imaging nurse via telephone for follow up on pain levels in 2 weeks.    Radiology Discharge instructions for Steroid Injection    Activity Level:     Do not do any heavy activity or exercise for 24 hours.   Do not drive for 4 hours after your injection.  Diet:   Return to your normal diet.  Medications:   If you have stopped taking your Aspirin, Coumadin/Warfarin, Ibuprofen, or any   other blood thinner for this procedure you may resume in the morning unless   your primary care provider has given you other instructions.    Diabetics may see an increase  "in blood sugar after steroid injections. If you are concerned about your blood sugar, please contact your family doctor.    Site Care:  Remove the bandage and bathe or shower the morning after the procedure.      Call your Primary Care Provider if you have the following (if your primary care provider is not available please seek emergency care):   Nausea with vomiting   Severe headache   Drowsiness or confusion   Redness or drainage at the injection or puncture site   Temperature over 101 degrees F   Other concerns   Worsening back pain   Stiff neck    If you have any questions or concerns, please call [phone].        Additional Information About Your Visit        [employer] Information     [employer] lets you send messages to your doctor, view your test results, renew your prescriptions, schedule appointments and more. To sign up, go to [email or web address] . Click on \"Log in\" on the left side of the screen, which will take you to the Welcome page. Then click on \"Sign up Now\" on the right side of the page.     You will be asked to enter the access code listed below, as well as some personal information. Please follow the directions to create your username and password.     Your access code is: [email or web address]  Expires: 2017  1:26 PM     Your access code will  in 90 days. If you need help or a new code, please call your [facility] clinic or [phone].        Care EveryWhere ID     This is your Care EveryWhere ID. This could be used by other organizations to access your [facility] medical records  [id number]         Primary Care Provider Office Phone # Fax #    Magaly Sosa [phone]      Thank you!     Thank you for choosing [facility] for your care. Our goal is always to provide you with excellent care. Hearing back from our patients is one way we can continue to improve our services. Please take a few minutes to complete the written survey that you may receive in the mail after you " visit with us. Thank you!             Medication List: This is a list of all your medications and when to take them. Check marks below indicate your daily home schedule. Keep this list as a reference.      Medications           Morning Afternoon Evening Bedtime As Needed    albuterol (2.5 MG/3ML) 0.083% neb solution   Take 1 vial (2.5 mg) by nebulization every 4 hours as needed for shortness of breath / dyspnea or wheezing                                BENADRYL ALLERGY PO   Take 25 mg by mouth nightly as needed                                clonazePAM 1 MG tablet   Commonly known as:  klonoPIN   TAKE ONE-FOURTH TO ONE-HALF TABLET BY MOUTH EVERY 8 HOURS AS NEEDED                                CRANBERRY   1 capsule daily                                esomeprazole 40 MG CR capsule   Commonly known as:  nexIUM   Take 1 capsule (40 mg) by mouth every morning (before breakfast) Take 30-60 minutes before a eating.                                FISH OIL   Take 1 capsule by mouth daily                                fluticasone 50 MCG/ACT spray   Commonly known as:  FLONASE   Spray 1 spray into both nostrils 2 times daily                                Garlic 10 MG Caps   Take 1 capsule by mouth as needed                                HYDROcodone-acetaminophen 5-325 MG per tablet   Commonly known as:  NORCO   TAKE ONE TABLET BY MOUTH EVERY 4 TO 6 HOURS AS NEEDED FOR PAIN                                nabumetone 500 MG tablet   Commonly known as:  RELAFEN   Take 1-2 tablets (500-1,000 mg) by mouth 2 times daily as needed for moderate pain                                PARoxetine 40 MG tablet   Commonly known as:  PAXIL   Take 1 tablet (40 mg) by mouth daily                                simvastatin 20 MG tablet   Commonly known as:  ZOCOR   TAKE ONE TABLET BY MOUTH ONCE DAILY AT BEDTIME                                VITAMIN D (CHOLECALCIFEROL) PO   Take 2,000 Units by mouth daily                                 VITAMIN E   Take 1 capsule by mouth daily

## 2017-02-13 ENCOUNTER — TELEPHONE (OUTPATIENT)
Dept: INTERVENTIONAL RADIOLOGY/VASCULAR | Facility: HOSPITAL | Age: 77
End: 2017-02-13

## 2017-02-13 NOTE — TELEPHONE ENCOUNTER
IR Management Follow up      Patient Anatomy/region of concern:  Right hip injection    Is there continued regional pain complaint? Yes    Evaluation and description of pain   o Severity:  Pain Scale:2/10  o Patient states injection went well but she slipped on the ice the next day so she only got 30% better and still hurts. Will call her PCP to see what the next step will be. TALA IBARRA

## 2017-02-15 DIAGNOSIS — J98.01 ACUTE BRONCHOSPASM: ICD-10-CM

## 2017-02-15 RX ORDER — ALBUTEROL SULFATE 0.83 MG/ML
1 SOLUTION RESPIRATORY (INHALATION) EVERY 4 HOURS PRN
Qty: 1 BOX | Refills: 0 | Status: SHIPPED | OUTPATIENT
Start: 2017-02-15 | End: 2018-10-01

## 2017-02-16 ENCOUNTER — HOSPITAL ENCOUNTER (EMERGENCY)
Facility: HOSPITAL | Age: 77
Discharge: HOME | End: 2017-02-16
Attending: PHYSICIAN ASSISTANT | Admitting: PHYSICIAN ASSISTANT
Payer: MEDICARE

## 2017-02-16 VITALS
SYSTOLIC BLOOD PRESSURE: 116 MMHG | TEMPERATURE: 98.3 F | RESPIRATION RATE: 18 BRPM | DIASTOLIC BLOOD PRESSURE: 62 MMHG | HEART RATE: 74 BPM | OXYGEN SATURATION: 95 %

## 2017-02-16 DIAGNOSIS — J20.8 ACUTE BRONCHITIS DUE TO OTHER SPECIFIED ORGANISMS: ICD-10-CM

## 2017-02-16 PROCEDURE — 71020 ZZHC CHEST TWO VIEWS, FRONT/LAT: CPT | Mod: TC

## 2017-02-16 PROCEDURE — 99214 OFFICE O/P EST MOD 30 MIN: CPT | Performed by: PHYSICIAN ASSISTANT

## 2017-02-16 PROCEDURE — 99213 OFFICE O/P EST LOW 20 MIN: CPT | Mod: 25

## 2017-02-16 RX ORDER — AZITHROMYCIN 250 MG/1
TABLET, FILM COATED ORAL
Qty: 6 TABLET | Refills: 0 | Status: SHIPPED | OUTPATIENT
Start: 2017-02-16 | End: 2017-03-02

## 2017-02-16 ASSESSMENT — ENCOUNTER SYMPTOMS
TROUBLE SWALLOWING: 0
SHORTNESS OF BREATH: 1
EYE REDNESS: 0
SORE THROAT: 0
CARDIOVASCULAR NEGATIVE: 1
DIZZINESS: 0
NAUSEA: 0
EYE DISCHARGE: 0
DIARRHEA: 0
SINUS PRESSURE: 0
APPETITE CHANGE: 0
PSYCHIATRIC NEGATIVE: 1
HEADACHES: 0
FEVER: 0
FATIGUE: 1
LIGHT-HEADEDNESS: 0
NECK PAIN: 0
ABDOMINAL PAIN: 0
VOICE CHANGE: 0
NECK STIFFNESS: 0
COUGH: 0
VOMITING: 0

## 2017-02-16 NOTE — ED AVS SNAPSHOT
[facility]    [address] [place] [address]    Phone:  [phone]                                       Dinorah Lim   MRN: [id number]    Department:  [facility]   Date of Visit:  2/16/2017           After Visit Summary Signature Page     I have received my discharge instructions, and my questions have been answered. I have discussed any challenges I see with this plan with the nurse or doctor.    ..........................................................................................................................................  Patient/Patient Representative Signature      ..........................................................................................................................................  Patient Representative Print Name and Relationship to Patient    ..................................................               ................................................  Date                                            Time    ..........................................................................................................................................  Reviewed by Signature/Title    ...................................................              ..............................................  Date                                                            Time

## 2017-02-16 NOTE — ED AVS SNAPSHOT
[facility]    [address]    [place] [address]    Phone:  [phone]                                       Dinorah Lim   MRN: [id number]    Department:  [facility]   Date of Visit:  2/16/2017           Patient Information     Date Of Birth          1940        Your diagnoses for this visit were:     Acute bronchitis due to other specified organisms        You were seen by Arminda Huddleston PA.      Follow-up Information     Follow up with Magaly Sosa MD.    Specialty:  Family Practice    Why:  If symptoms worsen    Contact information:    [facility] [place]  [address]  [place] [address] [phone]          Follow up with [facility].    Specialty:  EMERGENCY MEDICINE    Why:  If further concerns develop    Contact information:    [address]  [place] [address] [phone]    Additional information:    From [facility]: Take [address]. Turn left at [address]. Turn left at the first stoplight on [address] [place] [address]. At the first stop sign, take a right onto [address]. Take a left into the parking lot and continue through until you reach the North enterance of the building.       From [place]: Take [address]. Take the [address] ramp towards [place]. Turn left onto [address]. Take a slight right onto [address]. Turn left at the first stoplight on [address]. At the first stop sign, take a right onto [address]. Take a left into the parking lot and continue through until you reach the North enterance of the building.       From [place]: Take [address]. Take a right at [address]. At the first stop sign, take a right onto [address]. Take a left into the parking lot and continue through until you reach the North enterance of the building.       Discharge References/Attachments     BRONCHITIS, ANTIOBIOTIC TREATMENT (ADULT) (ENGLISH)       Future Appointments        Provider Department Dept Phone Center    2/20/2017 8:15 AM Magaly Sosa MD [facility] [phone] [place]         Review of your medicines      START taking        Dose / Directions Last dose taken    azithromycin 250 MG tablet   Commonly known as:  ZITHROMAX   Quantity:  6 tablet        Take 2 tablets today then one daily for the next 4 days   Refills:  0          Our records show that you are taking the medicines listed below. If these are incorrect, please call your family doctor or clinic.        Dose / Directions Last dose taken    albuterol (2.5 MG/3ML) 0.083% neb solution   Dose:  1 vial   Quantity:  1 Box        Take 1 vial (2.5 mg) by nebulization every 4 hours as needed for shortness of breath / dyspnea or wheezing   Refills:  0        BENADRYL ALLERGY PO   Dose:  25 mg   Indication:  takes with simvastatin        Take 25 mg by mouth nightly as needed   Refills:  0        clonazePAM 1 MG tablet   Commonly known as:  klonoPIN   Quantity:  45 tablet        TAKE ONE-FOURTH TO ONE-HALF TABLET BY MOUTH EVERY 8 HOURS AS NEEDED   Refills:  0        esomeprazole 40 MG CR capsule   Commonly known as:  nexIUM   Dose:  40 mg   Quantity:  30 capsule        Take 1 capsule (40 mg) by mouth every morning (before breakfast) Take 30-60 minutes before a eating.   Refills:  3        FISH OIL   Dose:  1 capsule        Take 1 capsule by mouth daily   Refills:  0        Garlic 10 MG Caps   Dose:  1 capsule        Take 1 capsule by mouth as needed   Refills:  0        HYDROcodone-acetaminophen 5-325 MG per tablet   Commonly known as:  NORCO   Quantity:  60 tablet        TAKE ONE TABLET BY MOUTH EVERY 4 TO 6 HOURS AS NEEDED FOR PAIN   Refills:  0        nabumetone 500 MG tablet   Commonly known as:  RELAFEN   Dose:  500-1000 mg   Quantity:  60 tablet        Take 1-2 tablets (500-1,000 mg) by mouth 2 times daily as needed for moderate pain   Refills:  1        PARoxetine 40 MG  "tablet   Commonly known as:  PAXIL   Dose:  40 mg   Quantity:  90 tablet        Take 1 tablet (40 mg) by mouth daily   Refills:  1        simvastatin 20 MG tablet   Commonly known as:  ZOCOR   Quantity:  90 tablet        TAKE ONE TABLET BY MOUTH ONCE DAILY AT BEDTIME   Refills:  0        VITAMIN D (CHOLECALCIFEROL) PO   Dose:  2000 Units        Take 2,000 Units by mouth daily   Refills:  0        VITAMIN E   Dose:  1 capsule        Take 1 capsule by mouth daily   Refills:  0                Prescriptions were sent or printed at these locations (1 Prescription)                   [facility] [address] [facility] [place], [place] - [address] [facility] [address]   [address] [facility] [address], [place] [address]    Telephone:  [phone]   Fax:  [phone]   Hours:                  E-Prescribed (1 of 1)         azithromycin (ZITHROMAX) 250 MG tablet                Procedures and tests performed during your visit     Chest XR,  PA & LAT      Orders Needing Specimen Collection     None      Pending Results     Date and Time Order Name Status Description    2/16/2017 1856 Chest XR,  PA & LAT In process             Pending Culture Results     No orders found from 2/14/2017 to 2/17/2017.            Thank you for choosing [facility]       Thank you for choosing [facility] for your care. Our goal is always to provide you with excellent care. Hearing back from our patients is one way we can continue to improve our services. Please take a few minutes to complete the written survey that you may receive in the mail after you visit with us. Thank you!        [employer] Information     [employer] lets you send messages to your doctor, view your test results, renew your prescriptions, schedule appointments and more. To sign up, go to [email or web address] . Click on \"Log in\" on the left side of the screen, which will take you to the Welcome page. Then click on \"Sign up Now\" on the right side of the page.     You will be asked to enter the access code listed below, as well as some " personal information. Please follow the directions to create your username and password.     Your access code is: [email or web address]  Expires: 2017  1:26 PM     Your access code will  in 90 days. If you need help or a new code, please call your [facility] clinic or [phone].        Care EveryWhere ID     This is your Care EveryWhere ID. This could be used by other organizations to access your [facility] medical records  [id number]        After Visit Summary       This is your record. Keep this with you and show to your community pharmacist(s) and doctor(s) at your next visit.

## 2017-02-17 NOTE — ED PROVIDER NOTES
"  History     Chief Complaint   Patient presents with     Shortness of Breath     for 3-4 days     Cough     for 3-4 days     The history is provided by the patient. No  was used.     Dinorah Lim is a 76 year old female who has 3 days of developing SOB and cough, \"Like my lungs aren't filling all the way.\" no chest pain or pressure.  Has decreased energy. No n/v/d/f/c. No ear pain.   Pt called her PCP yesterday and got a refill of her albuterol nebulizer medication.    Allergies as of 02/16/2017 - Review Complete 02/16/2017   Allergen Reaction Noted     Chocolate Hives 09/11/2014     Lemon flavor Hives 09/11/2014     Lime [calcium oxysulfide] Hives 09/11/2014     Orange fruit [citrus] Hives 09/11/2014     Edgar Hives 09/11/2014     Adhesive tape       Codeine Hives      Erythromycin Hives      Grapefruit [extra strength grapefruit]       Penicillins Hives      Sulfa drugs       Tomato       Discharge Medication List as of 2/16/2017  7:11 PM      START taking these medications    Details   azithromycin (ZITHROMAX) 250 MG tablet Take 2 tablets today then one daily for the next 4 days, Disp-6 tablet, R-0, E-Prescribe         CONTINUE these medications which have NOT CHANGED    Details   albuterol (2.5 MG/3ML) 0.083% neb solution Take 1 vial (2.5 mg) by nebulization every 4 hours as needed for shortness of breath / dyspnea or wheezing, Disp-1 Box, R-0, E-Prescribe      clonazePAM (KLONOPIN) 1 MG tablet TAKE ONE-FOURTH TO ONE-HALF TABLET BY MOUTH EVERY 8 HOURS AS NEEDED, Disp-45 tablet, R-0, Local Print      HYDROcodone-acetaminophen (NORCO) 5-325 MG per tablet TAKE ONE TABLET BY MOUTH EVERY 4 TO 6 HOURS AS NEEDED FOR PAIN, Disp-60 tablet, R-0, Local Print      simvastatin (ZOCOR) 20 MG tablet TAKE ONE TABLET BY MOUTH ONCE DAILY AT BEDTIME, Disp-90 tablet, R-0, E-Prescribe      PARoxetine (PAXIL) 40 MG tablet Take 1 tablet (40 mg) by mouth daily, Disp-90 tablet, R-1, E-Prescribe      VITAMIN " D, CHOLECALCIFEROL, PO Take 2,000 Units by mouth daily, Historical      Garlic 10 MG CAPS Take 1 capsule by mouth as needed, Historical      DiphenhydrAMINE HCl (BENADRYL ALLERGY PO) Take 25 mg by mouth nightly as needed , Historical      VITAMIN E Take 1 capsule by mouth daily , Historical      FISH OIL Take 1 capsule by mouth daily , Historical      nabumetone (RELAFEN) 500 MG tablet Take 1-2 tablets (500-1,000 mg) by mouth 2 times daily as needed for moderate pain, Disp-60 tablet, R-1, E-Prescribe      esomeprazole (NEXIUM) 40 MG CR capsule Take 1 capsule (40 mg) by mouth every morning (before breakfast) Take 30-60 minutes before a eating., Disp-30 capsule, R-3, E-Prescribe           Past Medical History   Diagnosis Date     Anxiety 08/01/2011     Cholecystolithiasis 1/4/2015     noted on US 1/4/2015 --> cholecystectomy     Chronic kidney disease (CKD), stage III (moderate) 2013     Early,unknown eitiology, Dr Vilchis     Chronic kidney disease (CKD), stage III (moderate) 1/4/2015     Early,unknown eitiology, Dr Vilchis      Cough 07/02/2001     Hiatal hernia 12/28/2014     Seen on chest CT     Hyperlipidemia 02/23/2001     Idiopathic hives since age 8 06/01/2004     Major depression 10/04/2011     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     Anterior Segment V 4 cm nodule abutting liver surface by US 1/4/2015      Obesity, Class II, BMI 35-39.9 (H) 1/4/2015     BMI 35.9 with comorbidities = MORBID obesity     Osteoarthritis of right hip 10/07/2004     Osteoarthrosis 06/14/2012     Otitis externa 10/04/2011     Prediabetes 08/01/2011     Past Surgical History   Procedure Laterality Date     Tonsillectomy       Hc inj epidural lumbar/sacral w/wo contrast Bilateral 5/2013     facet injections; prev 2012     Closed rx elbow dislocation Right 1952     CR/long cast of fracture     Closed reduction wrist Right 1952 x 2     long arm cast (same time as elbow fx)     Laparoscopic cholecystectomy N/A 1/4/2015      Procedure: LAPAROSCOPIC CHOLECYSTECTOMY;  Surgeon: Brittney العلي MD;  Location: [facility] OR     Family History   Problem Relation Age of Onset     HEART DISEASE Brother      atrial fibrillation     Other - See Comments Mother      emphysema     HEART DISEASE Father 92     CHF     CANCER Paternal Grandfather      lung cancer     CANCER Maternal Uncle      lung cancer     Social History     Social History     Marital status: Single     Spouse name: N/A     Number of children: 4     Years of education: 12     Occupational History     personal care attendant      Social History Main Topics     Smoking status: Never Smoker     Smokeless tobacco: Never Used     Alcohol use No     Drug use: No     Sexual activity: No     Other Topics Concern     Blood Transfusions Yes     Caffeine Concern Yes     >32 oz soda/day     Sleep Concern Yes     insomnia     Parent/Sibling W/ Cabg, Mi Or Angioplasty Before 65f 55m? No     Social History Narrative         I have reviewed the Medications, Allergies, Past Medical and Surgical History, and Social History in the Epic system.    Review of Systems   Constitutional: Positive for fatigue. Negative for appetite change and fever.   HENT: Negative for congestion, ear pain, sinus pressure, sore throat, trouble swallowing and voice change.    Eyes: Negative for discharge and redness.   Respiratory: Positive for shortness of breath. Negative for cough.    Cardiovascular: Negative.    Gastrointestinal: Negative for abdominal pain, diarrhea, nausea and vomiting.   Genitourinary: Negative.    Musculoskeletal: Negative for neck pain and neck stiffness.   Skin: Negative for rash.   Neurological: Negative for dizziness, light-headedness and headaches.   Psychiatric/Behavioral: Negative.        Physical Exam   BP: 116/62  Pulse: 74  Temp: 98.3  F (36.8  C)  Resp: 18  SpO2: 95 % 97%  Physical Exam   Constitutional: She is oriented to person, place, and time. She appears well-developed and  well-nourished. No distress.   HENT:   Head: Normocephalic and atraumatic.   Right Ear: External ear normal.   Left Ear: External ear normal.   Mouth/Throat: Oropharynx is clear and moist.   Bilateral TMs/canals clear/wnl  No sinus TTP     Eyes: Conjunctivae and EOM are normal. Right eye exhibits no discharge. Left eye exhibits no discharge.   Neck: Normal range of motion. Neck supple.   Cardiovascular: Normal rate, regular rhythm and normal heart sounds.    Pulmonary/Chest: Effort normal. No respiratory distress.   Mild Distant BS throughout   Abdominal: Soft. Bowel sounds are normal. She exhibits no distension. There is no tenderness.   Neurological: She is alert and oriented to person, place, and time.   Skin: Skin is warm and dry. No rash noted. She is not diaphoretic.   Psychiatric: She has a normal mood and affect.   Nursing note and vitals reviewed.      ED Course     ED Course     Procedures      CXR: possible RML early infiltrate, pending official rad results    Assessments & Plan (with Medical Decision Making)     I have reviewed the nursing notes.    I have reviewed the findings, diagnosis, plan and need for follow up with the patient.    Discharge Medication List as of 2/16/2017  7:11 PM      START taking these medications    Details   azithromycin (ZITHROMAX) 250 MG tablet Take 2 tablets today then one daily for the next 4 days, Disp-6 tablet, R-0, E-Prescribe             Final diagnoses:   Acute bronchitis due to other specified organisms         Patient verbally educated and given appropriate education sheets for each of the diagnoses and has no questions.  Take OTC motrin or tylenol as directed on the bottle as needed.  Take prescription medications as directed.  Increase fluids, wash hands often.  Sleep in a recliner or with multiple pillows until this has resolved.  Follow up with your provider if symptoms increase or if concerns develop, return to the ER.  Arminda Rainey   Physician  Assistant  2/16/2017  8:32 PM  URGENT CARE CLINIC    2/16/2017   [facility]     Arminda Huddleston PA  02/16/17 [id number]

## 2017-03-02 ENCOUNTER — OFFICE VISIT (OUTPATIENT)
Dept: FAMILY MEDICINE | Facility: OTHER | Age: 77
End: 2017-03-02
Attending: FAMILY MEDICINE
Payer: COMMERCIAL

## 2017-03-02 VITALS
HEIGHT: 64 IN | OXYGEN SATURATION: 97 % | RESPIRATION RATE: 16 BRPM | TEMPERATURE: 97.5 F | HEART RATE: 68 BPM | SYSTOLIC BLOOD PRESSURE: 124 MMHG | BODY MASS INDEX: 37.56 KG/M2 | DIASTOLIC BLOOD PRESSURE: 64 MMHG | WEIGHT: 220 LBS

## 2017-03-02 DIAGNOSIS — Z12.11 SCREENING FOR MALIGNANT NEOPLASM OF COLON: ICD-10-CM

## 2017-03-02 DIAGNOSIS — M25.522 LEFT ELBOW PAIN: ICD-10-CM

## 2017-03-02 DIAGNOSIS — J20.9 ACUTE BRONCHITIS, UNSPECIFIED ORGANISM: Primary | ICD-10-CM

## 2017-03-02 PROCEDURE — 99213 OFFICE O/P EST LOW 20 MIN: CPT | Performed by: FAMILY MEDICINE

## 2017-03-02 PROCEDURE — 73070 X-RAY EXAM OF ELBOW: CPT | Mod: TC,LT

## 2017-03-02 PROCEDURE — 99212 OFFICE O/P EST SF 10 MIN: CPT

## 2017-03-02 ASSESSMENT — PAIN SCALES - GENERAL: PAINLEVEL: NO PAIN (0)

## 2017-03-02 NOTE — NURSING NOTE
"Chief Complaint   Patient presents with     Cough     follow up pneumonia     Referral     to orthopedics for left elbow pain       Initial /64 (BP Location: Left arm, Patient Position: Chair, Cuff Size: Adult Regular)  Pulse 68  Temp 97.5  F (36.4  C)  Resp 16  Ht 5' 4\" (1.626 m)  Wt 220 lb (99.8 kg)  SpO2 97%  BMI 37.76 kg/m2 Estimated body mass index is 37.76 kg/(m^2) as calculated from the following:    Height as of this encounter: 5' 4\" (1.626 m).    Weight as of this encounter: 220 lb (99.8 kg).  Medication Reconciliation: complete     Erin Leija      "

## 2017-03-02 NOTE — MR AVS SNAPSHOT
After Visit Summary   3/2/2017    Dinorah Lim    MRN: [id number]           Patient Information     Date Of Birth          1940        Visit Information        Provider Department      3/2/2017 8:45 AM Magaly Sosa MD [facility]        Today's Diagnoses     Acute bronchitis, unspecified organism    -  1    Left elbow pain           Follow-ups after your visit        Additional Services     ORTHOPEDICS ADULT REFERRAL       Your provider has referred you to: Dr Wills      Please be aware that coverage of these services is subject to the terms and limitations of your health insurance plan.  Call member services at your health plan with any benefit or coverage questions.      Please bring the following to your appointment:    >>   Any x-rays, CTs or MRIs which have been performed.  Contact the facility where they were done to arrange for  prior to your scheduled appointment.    >>   List of current medications   >>   This referral request   >>   Any documents/labs given to you for this referral                  Who to contact     If you have questions or need follow up information about today's clinic visit or your schedule please contact [facility] directly at [phone].  Normal or non-critical lab and imaging results will be communicated to you by MyChart, letter or phone within 4 business days after the clinic has received the results. If you do not hear from us within 7 days, please contact the clinic through [employer] or phone. If you have a critical or abnormal lab result, we will notify you by phone as soon as possible.  Submit refill requests through [employer] or call your pharmacy and they will forward the refill request to us. Please allow 3 business days for your refill to be completed.          Additional Information About Your Visit        MyChart Information     SDI lets you send messages to your doctor, view your test results, renew your  "prescriptions, schedule appointments and more. To sign up, go to [email or web address] . Click on \"Log in\" on the left side of the screen, which will take you to the Welcome page. Then click on \"Sign up Now\" on the right side of the page.     You will be asked to enter the access code listed below, as well as some personal information. Please follow the directions to create your username and password.     Your access code is: [email or web address]  Expires: 2017  1:26 PM     Your access code will  in 90 days. If you need help or a new code, please call your [place] clinic or [phone].        Care EveryWhere ID     This is your Care EveryWhere ID. This could be used by other organizations to access your [place] medical records  [id number]        Your Vitals Were     Pulse Temperature Respirations Height Pulse Oximetry BMI (Body Mass Index)    68 97.5  F (36.4  C) 16 5' 4\" (1.626 m) 97% 37.76 kg/m2       Blood Pressure from Last 3 Encounters:   17 124/64   17 116/62   17 128/68    Weight from Last 3 Encounters:   17 220 lb (99.8 kg)   17 230 lb (104.3 kg)   17 225 lb (102.1 kg)              We Performed the Following     ORTHOPEDICS ADULT REFERRAL     XR ELBOW LT 2 VW (Clinic Performed)          Today's Medication Changes          These changes are accurate as of: 3/2/17  9:03 AM.  If you have any questions, ask your nurse or doctor.               Stop taking these medicines if you haven't already. Please contact your care team if you have questions.     azithromycin 250 MG tablet   Commonly known as:  ZITHROMAX   Stopped by:  Magaly Sosa MD                    Primary Care Provider Office Phone # Fax #    Magaly Sosa [phone]       [facility] [place] [address]  [place] [address]        Thank you!     [facility] you [facility]  [facility]. Our goal is always to provide you with excellent care. Hearing back from " our patients is one way we can continue to improve our services. Please take a few minutes to complete the written survey that you may receive in the mail after your visit with us. Thank you!             Your Updated Medication List - Protect others around you: Learn how to safely use, store and throw away your medicines at www.disposemymeds.org.          This list is accurate as of: 3/2/17  9:03 AM.  Always use your most recent med list.                   Brand Name Dispense Instructions for use    albuterol (2.5 MG/3ML) 0.083% neb solution     1 Box    Take 1 vial (2.5 mg) by nebulization every 4 hours as needed for shortness of breath / dyspnea or wheezing       BENADRYL ALLERGY PO      Take 25 mg by mouth nightly as needed       clonazePAM 1 MG tablet    klonoPIN    45 tablet    TAKE ONE-FOURTH TO ONE-HALF TABLET BY MOUTH EVERY 8 HOURS AS NEEDED       esomeprazole 40 MG CR capsule    nexIUM    30 capsule    Take 1 capsule (40 mg) by mouth every morning (before breakfast) Take 30-60 minutes before a eating.       FISH OIL      Take 1 capsule by mouth daily       Garlic 10 MG Caps      Take 1 capsule by mouth as needed       HYDROcodone-acetaminophen 5-325 MG per tablet    NORCO    60 tablet    TAKE ONE TABLET BY MOUTH EVERY 4 TO 6 HOURS AS NEEDED FOR PAIN       nabumetone 500 MG tablet    RELAFEN    60 tablet    Take 1-2 tablets (500-1,000 mg) by mouth 2 times daily as needed for moderate pain       PARoxetine 40 MG tablet    PAXIL    90 tablet    Take 1 tablet (40 mg) by mouth daily       simvastatin 20 MG tablet    ZOCOR    90 tablet    TAKE ONE TABLET BY MOUTH ONCE DAILY AT BEDTIME       VITAMIN D (CHOLECALCIFEROL) PO      Take 2,000 Units by mouth daily       VITAMIN E      Take 1 capsule by mouth daily

## 2017-03-02 NOTE — PROGRESS NOTES
[facility]    Dinorah Lim, 76 year old, female presents with   Chief Complaint   Patient presents with     Cough     follow up bronchtitis, treated with Zithromax, feeling much better. She also had vomiting and diarrhea and lost 10 pounds. She is back to normal. Chest x ray was read as normal     Referral     to orthopedics for left elbow pain. She would like an injection for this. Pain is aching and of the medial epicondyle. 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     Anterior Segment V 4 cm nodule abutting liver surface by US 1/4/2015      Obesity, Class II, BMI 35-39.9 (H) 1/4/2015     BMI 35.9 with comorbidities = MORBID obesity     Osteoarthritis of right hip 10/07/2004     Osteoarthrosis 06/14/2012     Otitis externa 10/04/2011     Prediabetes 08/01/2011       PAST SURGICAL HISTORY:  Past Surgical History   Procedure Laterality Date     Tonsillectomy       Hc inj epidural lumbar/sacral w/wo contrast Bilateral 5/2013     facet injections; prev 2012     Closed rx elbow dislocation Right 1952     CR/long cast of fracture     Closed reduction wrist Right 1952 x 2     long arm cast (same time as elbow fx)     Laparoscopic cholecystectomy N/A 1/4/2015     Procedure: LAPAROSCOPIC CHOLECYSTECTOMY;  Surgeon: Brittney العلي MD;  Location: [facility] OR       SOCIAL HISTORY  Social History     Social History     Marital status: Single      Spouse name: N/A     Number of children: 4     Years of education: 12     Occupational History     personal care attendant      Social History Main Topics     Smoking status: Never Smoker     Smokeless tobacco: Never Used     Alcohol use No     Drug use: No     Sexual activity: No     Other Topics Concern     Blood Transfusions Yes     Caffeine Concern Yes     >32 oz soda/day     Sleep Concern Yes     insomnia     Parent/Sibling W/ Cabg, Mi Or Angioplasty Before 65f 55m? No     Social History Narrative       MEDICATIONS:  Prior to Admission medications    Medication Sig Start Date End Date Taking? Authorizing Provider   albuterol (2.5 MG/3ML) 0.083% neb solution Take 1 vial (2.5 mg) by nebulization every 4 hours as needed for shortness of breath / dyspnea or wheezing 2/15/17  Yes Magaly Sosa MD   clonazePAM (KLONOPIN) 1 MG tablet TAKE ONE-FOURTH TO ONE-HALF TABLET BY MOUTH EVERY 8 HOURS AS NEEDED 1/31/17  Yes Magaly Sosa MD   HYDROcodone-acetaminophen (NORCO) 5-325 MG per tablet TAKE ONE TABLET BY MOUTH EVERY 4 TO 6 HOURS AS NEEDED FOR PAIN 1/30/17  Yes Magaly Sosa MD   nabumetone (RELAFEN) 500 MG tablet Take 1-2 tablets (500-1,000 mg) by mouth 2 times daily as needed for moderate pain 1/16/17  Yes Magaly Sosa MD   simvastatin (ZOCOR) 20 MG tablet TAKE ONE TABLET BY MOUTH ONCE DAILY AT BEDTIME 1/4/17  Yes Magaly Sosa MD   esomeprazole (NEXIUM) 40 MG CR capsule Take 1 capsule (40 mg) by mouth every morning (before breakfast) Take 30-60 minutes before a eating. 12/12/16  Yes Magaly Sosa MD   PARoxetine (PAXIL) 40 MG tablet Take 1 tablet (40 mg) by mouth daily 10/4/16  Yes Magaly Sosa MD   VITAMIN D, CHOLECALCIFEROL, PO Take 2,000 Units by mouth daily   Yes Reported, Patient   Garlic 10 MG CAPS Take 1 capsule by mouth as needed   Yes Reported, Patient   DiphenhydrAMINE HCl (BENADRYL ALLERGY PO) Take 25 mg by mouth nightly as needed    Yes Reported, Patient   VITAMIN E Take 1 capsule by mouth  "daily    Yes Reported, Patient   FISH OIL Take 1 capsule by mouth daily    Yes Reported, Patient       ALLERGIES:     Allergies   Allergen Reactions     Chocolate Hives     Lemon Flavor Hives     Lime [Calcium Oxysulfide] Hives     Orange Fruit [Citrus] Hives     Strawberry Hives     Adhesive Tape      Band-aids     Codeine Hives     Patient can tolerate oxycodone & dilaudid     Erythromycin Hives     ERYTHROMYCIN BASE     Grapefruit [Extra Strength Grapefruit]      GRAPEFRUIT     Penicillins Hives     Sulfa Drugs      SULFONAMIDE ANTIBIOTICS      Tomato        ROS:  C: NEGATIVE for fever, chills, change in weight  R: NEGATIVE for significant cough or SOB  CV: NEGATIVE for chest pain, palpitations or peripheral edema  N: NEGATIVE for weakness, dizziness or paresthesias  P: NEGATIVE for changes in mood or affect    EXAM:  /64 (BP Location: Left arm, Patient Position: Chair, Cuff Size: Adult Regular)  Pulse 68  Temp 97.5  F (36.4  C)  Resp 16  Ht 5' 4\" (1.626 m)  Wt 220 lb (99.8 kg)  SpO2 97%  BMI 37.76 kg/m2 Body mass index is 37.76 kg/(m^2).   GENERAL APPEARANCE: healthy, alert and no distress  RESP: lungs clear to auscultation - no rales, rhonchi or wheezes  CV: regular rates and rhythm, normal S1 S2, no S3 or S4 and no murmur, click or rub  MS: extremities normal- no gross deformities noted and tenderness over the medical epycondyle,full ROM  SKIN: no suspicious lesions or rashes  NEURO: Normal strength and tone, mentation intact and speech normal  PSYCH: mentation appears normal and affect normal/bright    LABS AND IMAGING:     Results for orders placed or performed during the hospital encounter of 02/16/17   Chest XR,  PA & LAT    Narrative    CHEST TWO VIEWS    REPORT: There is scoliosis of the thoracic spine.  Lungs are clear.  Heart and pulmonary vessels are within normal limits.    IMPRESSION: THORACIC SCOLIOSIS.  NO EVIDENCE OF ACTIVE PULMONARY  DISEASE.  Exam Date: Feb 16, 2017 07:04:00 PM  Author: " RAFIA SIEGEL  This report is final and signed           ASSESSMENT/PLAN:  (J20.9) Acute bronchitis, unspecified organism  (primary encounter diagnosis)  Comment:   Plan: resolved. Follow up as needed    (M25.522) Left elbow pain  Comment:   Plan: XR ELBOW LT 2 VW (Clinic Performed),         ORTHOPEDICS ADULT REFERRAL        Epicondylitis. She would like to see Dr Wills, so will obtain x ray and refer    (Z12.11) Screening for malignant neoplasm of colon  Comment:   Plan: reminded to complete IFOB testing      Magaly Sosa MD  March 2, 2017

## 2017-03-07 ENCOUNTER — OFFICE VISIT (OUTPATIENT)
Dept: ORTHOPEDICS | Facility: OTHER | Age: 77
End: 2017-03-07
Attending: FAMILY MEDICINE
Payer: COMMERCIAL

## 2017-03-07 VITALS
HEART RATE: 64 BPM | TEMPERATURE: 97.7 F | WEIGHT: 226 LBS | HEIGHT: 64 IN | BODY MASS INDEX: 38.58 KG/M2 | OXYGEN SATURATION: 96 % | DIASTOLIC BLOOD PRESSURE: 60 MMHG | SYSTOLIC BLOOD PRESSURE: 120 MMHG

## 2017-03-07 DIAGNOSIS — M25.551 HIP PAIN, RIGHT: ICD-10-CM

## 2017-03-07 DIAGNOSIS — M25.522 LEFT ELBOW PAIN: ICD-10-CM

## 2017-03-07 DIAGNOSIS — M16.11 PRIMARY OSTEOARTHRITIS OF RIGHT HIP: Primary | ICD-10-CM

## 2017-03-07 PROCEDURE — 99212 OFFICE O/P EST SF 10 MIN: CPT

## 2017-03-07 PROCEDURE — 99214 OFFICE O/P EST MOD 30 MIN: CPT | Performed by: PHYSICIAN ASSISTANT

## 2017-03-07 ASSESSMENT — PAIN SCALES - GENERAL: PAINLEVEL: EXTREME PAIN (9)

## 2017-03-07 NOTE — NURSING NOTE
"Chief Complaint   Patient presents with     Musculoskeletal Problem     Patient complaints of left elbow pain. Would like injection. Also would like to have another hip injection at the hospital.       Initial /60 (BP Location: Left arm, Patient Position: Chair, Cuff Size: Adult Large)  Pulse 64  Temp 97.7  F (36.5  C) (Tympanic)  Ht 5' 4\" (1.626 m)  Wt 226 lb (102.5 kg)  SpO2 96%  BMI 38.79 kg/m2 Estimated body mass index is 38.79 kg/(m^2) as calculated from the following:    Height as of this encounter: 5' 4\" (1.626 m).    Weight as of this encounter: 226 lb (102.5 kg).  Medication Reconciliation: complete   Stacy Lofton LPN      "

## 2017-03-07 NOTE — PATIENT INSTRUCTIONS
Begin PT for the elbow.  Someone will contact you within the next 1-2 business days to schedule your first appointment with physical therapy.  If you have not heard from someone within 2 days, please contact our office.    Also, obtain an elbow pad at pharmacy.  Use as needed.    For left elbow as well as right hip, begin using Relafen as previously prescribed by Dr. Sosa.      Follow up in 6 weeks with Dr. Wills for right hip.      Follow up as needed for left elbow.

## 2017-03-07 NOTE — PROGRESS NOTES
Chief complaint:  1.  Left elbow pain  2.  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 Magaly Sosa.  She also complains today of recurrent right hip pain.  With regard to the left elbow, she describes insidious onset of pain approximately 2-3 months back.  She denies specific injury or event.  She locates her pain to be directly over the tip of the olecranon.  Her pain is worse with leaning on the left elbow or bumping it, or pushing off with left arm.  Her pain is improved with a half tab of hydrocodone daily.  She denies any associated swelling or warmth.  She denies any associated numbness or tingling.    With regard to her right hip, she has a history of underlying moderate degenerative joint disease.  She was seen by Dr. Wills in mid January and an intra-articular corticosteroid injection was ordered and then performed by IR in late January.  She is less than 6 weeks out from that injection.  Immediately following the injection, she had complete resolution of her pain.  The relief lasted in to the next day as well, but unfortunately she then experienced a fall on some ice landing on her right hip.  Her pain then recurred.  She describes her pain as exactly the same as it was prior to the injection.  She locates the pain to be primarily over the lateral aspect of her hip with radiation into her groin.  Her pain is worse with prolonged sitting or increased standing and weightbearing activity.  Her pain is improved with the hydrocodone.  She was also prescribed Relafen which she has not been taking.  Since it was prescribed, she states she is taking only 1 pill.  She was not sure that she could take that along with the hydrocodone.  She is wondering if a repeat injection at this time is indicated.  She denies associated  "numbness, tingling, or radicular type symptoms today.    Past medical, past surgical, social history, family history, medications, and allergies reviewed and updated as appropriate today in EPIC.    ROS:  See HPI    Current Outpatient Prescriptions   Medication Sig Dispense Refill     albuterol (2.5 MG/3ML) 0.083% neb solution Take 1 vial (2.5 mg) by nebulization every 4 hours as needed for shortness of breath / dyspnea or wheezing 1 Box 0     clonazePAM (KLONOPIN) 1 MG tablet TAKE ONE-FOURTH TO ONE-HALF TABLET BY MOUTH EVERY 8 HOURS AS NEEDED 45 tablet 0     HYDROcodone-acetaminophen (NORCO) 5-325 MG per tablet TAKE ONE TABLET BY MOUTH EVERY 4 TO 6 HOURS AS NEEDED FOR PAIN 60 tablet 0     nabumetone (RELAFEN) 500 MG tablet Take 1-2 tablets (500-1,000 mg) by mouth 2 times daily as needed for moderate pain 60 tablet 1     simvastatin (ZOCOR) 20 MG tablet TAKE ONE TABLET BY MOUTH ONCE DAILY AT BEDTIME 90 tablet 0     esomeprazole (NEXIUM) 40 MG CR capsule Take 1 capsule (40 mg) by mouth every morning (before breakfast) Take 30-60 minutes before a eating. 30 capsule 3     PARoxetine (PAXIL) 40 MG tablet Take 1 tablet (40 mg) by mouth daily 90 tablet 1     VITAMIN D, CHOLECALCIFEROL, PO Take 2,000 Units by mouth daily       Garlic 10 MG CAPS Take 1 capsule by mouth as needed       DiphenhydrAMINE HCl (BENADRYL ALLERGY PO) Take 25 mg by mouth nightly as needed        VITAMIN E Take 1 capsule by mouth daily        FISH OIL Take 1 capsule by mouth daily          Physical exam:   /60 (BP Location: Left arm, Patient Position: Chair, Cuff Size: Adult Large)  Pulse 64  Temp 97.7  F (36.5  C) (Tympanic)  Ht 5' 4\" (1.626 m)  Wt 226 lb (102.5 kg)  SpO2 96%  BMI 38.79 kg/m2  Elderly, otherwise healthy-appearing 76-year-old female in no acute distress.  She is alert and oriented ×3.  She is pleasant and cooperative.      Exam the left elbow today reveals no gross deformity.  Skin appears clean dry and intact throughout the " left elbow and upper extremity.  There is no appreciable erythema, edema, ecchymosis, or warmth.  On palpation, she is mildly tender directly over the tip of the olecranon and triceps insertion.  She is nontender over the medial and lateral epicondyles.  She is nontender along the cubital tunnel and ulnar nerve.  She is nontender elsewhere about elbow.  She has negative Tinel's.  She demonstrates full elbow flexion and extension, forearm supination and pronation.  She has increased discomfort with resisted elbow extension.  No pain with resisted wrist flexion or forearm pronation.  She does demonstrate good strength of her biceps and triceps in general.  She has intact sensation to soft touch in the radial, ulnar, and median nerve distribution distally.  She has 2+ radial pulse and good capillary refill.    She ambulates in the room today with slight antalgic gait.  Leg lengths appear grossly equal.  No gross deformity of the right hip.  On palpation, she has generalized tenderness along the lateral aspect of the hip.  No real focal point of tenderness.  She is nontender elsewhere about the right hip.  Hip range of motion today reveals flexion to 100, internal rotation to 10 with pain, and external rotation within normal limits.  She has positive FADIR with lateral and groin pain.  Positive Stinchfield exam with lateral and groin pain.  Strength of her hip flexors, abductor's, and adductor's within normal limits.  Compartment soft and nontender throughout the right lower extremity.  Distal neurovascular status grossly intact right lower extremity.    Imaging studies: X-rays of the left elbow dated 3/2/17 were interpreted and reviewed.  No evidence of fracture or dislocation.  No evidence of a fat pad sign.  Joint spaces appear well preserved.  No other obvious bony or soft tissue abnormalities.    No new x-rays of the right hip were obtained today despite her history of fall.  She has been weightbearing on the right  leg essentially without troubles since her fall and her pain is no worse than what it was prior to the injection.    Assessment:  1.  Left elbow pain and triceps enthesopathy.  Possible minor element of olecranon bursitis.  2.  Recurrent right hip pain and underlying moderate primary hip osteoarthritis    Plan: I had a lengthy discussion today with the patient in regards to her diagnoses as well as her prognoses.  We also discussed treatment options.  With regard to the left elbow, I recommended some physical therapy along with use of an elbow pad as needed.  I offered a prescription for the elbow pad but she indicated she will obtain one at the pharmacy OTC.  We also discussed increasing her use of the Relafen as prescribed previously by Dr. Sosa.  As mentioned above, today's discussion revealed she has not been using it and was concerned she could not use it if she was also using the hydrocodone.  She did have her pills with her today and we discussed the dosing.  I felt as long as we were going to increase the Relafen, we will see how her hip responds to that as well.  I felt it was too early to repeat injection.  I will have her follow-up with Dr. Wills in 6 weeks for recheck of her right hip.  With regard to the left elbow, she may follow up as needed.  She appears to understand and is in agreement with this plan.  All questions were answered.    Alireza Jacques PA-C

## 2017-03-07 NOTE — MR AVS SNAPSHOT
After Visit Summary   3/7/2017    Dinorah Lim    MRN: [id number]           Patient Information     Date Of Birth          1940        Visit Information        Provider Department      3/7/2017 8:00 AM Alireza Jacques PA-C  ORTHOPEDICS        Today's Diagnoses     Primary osteoarthritis of right hip    -  1    Left elbow pain        Hip pain, right          Care Instructions    Begin PT for the elbow.  Someone will contact you within the next 1-2 business days to schedule your first appointment with physical therapy.  If you have not heard from someone within 2 days, please contact our office.    Also, obtain an elbow pad at pharmacy.  Use as needed.    For left elbow as well as right hip, begin using Relafen as previously prescribed by Dr. Sosa.      Follow up in 6 weeks with Dr. Wills for right hip.      Follow up as needed for left elbow.        Follow-ups after your visit        Additional Services     PHYSICAL THERAPY REFERRAL       *This therapy referral will be filtered to a centralized scheduling office at [facility] and the patient will receive a call to schedule an appointment at a [place] location most convenient for them. *     [facility] provides Physical Therapy evaluation and treatment and many specialty services across the [place] system.  If requesting a specialty program, please choose from the list below.    If you have not heard from the scheduling office within 2 business days, please call [phone] for all locations, with the exception of [place], please call [phone].  Treatment: Evaluation & Treatment  Special Instructions/Modalities: Eval and treat left elbow.  Appears to be possible triceps enthesopathy.  Modalities as indicated at therapist's discretion.  1-2 visits/wk x 4 weeks.  Call with questions or concerns.  Special Programs: None    Please be aware that coverage of these services is subject to  "the terms and limitations of your health insurance plan.  Call member services at your health plan with any benefit or coverage questions.      **Note to Provider:  If you are referring outside of [place] for the therapy appointment, please list the name of the location in the  special instructions  above, print the referral and give to the patient to schedule the appointment.                  Follow-up notes from your care team     Return in about 6 weeks (around 2017) for recheck right hip with Dr. Wills.      Who to contact     If you have questions or need follow up information about today's clinic visit or your schedule please contact  ORTHOPEDICS directly at [phone].  Normal or non-critical lab and imaging results will be communicated to you by [employer], letter or phone within 4 business days after the clinic has received the results. If you do not hear from us within 7 days, please contact the clinic through [employer] or phone. If you have a critical or abnormal lab result, we will notify you by phone as soon as possible.  Submit refill requests through [employer] or call your pharmacy and they will forward the refill request to us. Please allow 3 business days for your refill to be completed.          Additional Information About Your Visit        [employer] Information     [employer] lets you send messages to your doctor, view your test results, renew your prescriptions, schedule appointments and more. To sign up, go to [email or web address] . Click on \"Log in\" on the left side of the screen, which will take you to the Welcome page. Then click on \"Sign up Now\" on the right side of the page.     You will be asked to enter the access code listed below, as well as some personal information. Please follow the directions to create your username and password.     Your access code is: [email or web address]  Expires: 2017  1:26 PM     Your access code will  in 90 days. If you need help or a new code, " "please call your [facility] or [phone].        Care EveryWhere ID     This is your Care EveryWhere ID. This could be used by other organizations to access your [place] medical records  [id number]        Your Vitals Were     Pulse Temperature Height Pulse Oximetry BMI (Body Mass Index)       64 97.7  F (36.5  C) (Tympanic) 5' 4\" (1.626 m) 96% 38.79 kg/m2        Blood Pressure from Last 3 Encounters:   03/07/17 120/60   03/02/17 124/64   02/16/17 116/62    Weight from Last 3 Encounters:   03/07/17 226 lb (102.5 kg)   03/02/17 220 lb (99.8 kg)   01/27/17 230 lb (104.3 kg)              We Performed the Following     PHYSICAL THERAPY REFERRAL        Primary Care Provider Office Phone # Fax #    Magaly Sosa [phone]       [facility] [address] [place] [address]        Thank you!     Thank you for choosing  ORTHOPEDICS  for your care. Our goal is always to provide you with excellent care. Hearing back from our patients is one way we can continue to improve our services. Please take a few minutes to complete the written survey that you may receive in the mail after your visit with us. Thank you!             Your Updated Medication List - Protect others around you: Learn how to safely use, store and throw away your medicines at www.disposemymeds.org.          This list is accurate as of: 3/7/17  8:40 AM.  Always use your most recent med list.                   Brand Name Dispense Instructions for use    albuterol (2.5 MG/3ML) 0.083% neb solution     1 Box    Take 1 vial (2.5 mg) by nebulization every 4 hours as needed for shortness of breath / dyspnea or wheezing       BENADRYL ALLERGY PO      Take 25 mg by mouth nightly as needed       clonazePAM 1 MG tablet    klonoPIN    45 tablet    TAKE ONE-FOURTH TO ONE-HALF TABLET BY MOUTH EVERY 8 HOURS AS NEEDED       esomeprazole 40 MG CR capsule    nexIUM    30 capsule    Take 1 capsule (40 mg) by mouth every morning (before " breakfast) Take 30-60 minutes before a eating.       FISH OIL      Take 1 capsule by mouth daily       Garlic 10 MG Caps      Take 1 capsule by mouth as needed       HYDROcodone-acetaminophen 5-325 MG per tablet    NORCO    60 tablet    TAKE ONE TABLET BY MOUTH EVERY 4 TO 6 HOURS AS NEEDED FOR PAIN       nabumetone 500 MG tablet    RELAFEN    60 tablet    Take 1-2 tablets (500-1,000 mg) by mouth 2 times daily as needed for moderate pain       PARoxetine 40 MG tablet    PAXIL    90 tablet    Take 1 tablet (40 mg) by mouth daily       simvastatin 20 MG tablet    ZOCOR    90 tablet    TAKE ONE TABLET BY MOUTH ONCE DAILY AT BEDTIME       VITAMIN D (CHOLECALCIFEROL) PO      Take 2,000 Units by mouth daily       VITAMIN E      Take 1 capsule by mouth daily

## 2017-03-10 ENCOUNTER — HOSPITAL ENCOUNTER (OUTPATIENT)
Dept: PHYSICAL THERAPY | Facility: HOSPITAL | Age: 77
Setting detail: THERAPIES SERIES
End: 2017-03-10
Attending: PHYSICIAN ASSISTANT
Payer: MEDICARE

## 2017-03-10 PROCEDURE — G8984 CARRY CURRENT STATUS: HCPCS | Mod: GP,CI | Performed by: PHYSICAL THERAPIST

## 2017-03-10 PROCEDURE — 97161 PT EVAL LOW COMPLEX 20 MIN: CPT | Mod: GP | Performed by: PHYSICAL THERAPIST

## 2017-03-10 PROCEDURE — 97140 MANUAL THERAPY 1/> REGIONS: CPT | Mod: GP | Performed by: PHYSICAL THERAPIST

## 2017-03-10 PROCEDURE — 97035 APP MDLTY 1+ULTRASOUND EA 15: CPT | Mod: GP | Performed by: PHYSICAL THERAPIST

## 2017-03-10 PROCEDURE — G8985 CARRY GOAL STATUS: HCPCS | Mod: GP,CH | Performed by: PHYSICAL THERAPIST

## 2017-03-10 NOTE — PROGRESS NOTES
03/10/17 0700   General Information   Type of Visit Initial OP Ortho PT Evaluation   Start of Care Date 03/10/17   Referring Physician Alireza Jacques PA-C   Orders Evaluate and Treat   Date of Order 03/07/17   Insurance Type Medicare   Medical Diagnosis L elbow pain   Surgical/Medical history reviewed Yes   Precautions/Limitations no known precautions/limitations   General Information Comments PMH: depression, OA   Body Part(s)   Body Part(s) Elbow/Wrist   Presentation and Etiology   Pertinent history of current problem (include personal factors and/or comorbidities that impact the POC) Patient presents to PT with complaints of L elbow pain that is right on the tip of her elbow. She gets pain when she leans on her elbow and when she straightens it all the way out.  Reports a history of arthritis throughout her body and she had an xray of her elbow that was normal, so she is thinking thisis more soft tissue related. Reports no specific injury, but has had pain for 3-4 months.  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 Pain;F. Decreased strength and endurance   Functional Limitations perform required work activities   Symptom Location L elbow, olecranon   How/Where did it occur From insidious onset   Onset date of current episode/exacerbation 03/07/17  (physician order date)   Chronicity Chronic   Pain rating (0-10 point scale) Best (/10);Worst (/10)   Best (/10) 2   Worst (/10) 7   Pain quality C. Aching   Frequency of pain/symptoms B. Intermittent   Pain/symptoms are: The same all the time   Pain/symptoms exacerbated by (putting weight on elbow, straightening arm)   Pain/symptoms eased by E. Changing positions;G. Heat  (Bengay)   Progression of symptoms since onset: Worsened   Current / Previous Interventions   Diagnostic Tests: X-ray   X-ray  Results unremarkable   Prior Level of Function   Prior Level of Function-Mobility IND   Prior Level of Function-ADLs IND   Current Level of Function   Patient role/employment history A. Employed   Employment Comments PCA   Fall Risk Screen   Fall screen completed by PT   Per patient - Fall 2 or more times in past year? No   Per patient - Fall with injury in past year? No   Is patient a fall risk? No   Elbow/Wrist Objective Findings   Side (if bilateral, select both right and left) Left   Observation no acute distress   Integumentary  WNL   Posture rounded shoulders, slightly forward head   Cervical Screen ROM WNL   Lateral Epicondylitis Test neg   Medial Epicondylitis Test neg   Varus Stress Test neg   Valgus Stress Test neg   Palpation L TTP tricep insertion, olecranon, bursa, sfot tissue extensor bundle through getting to radial head   Accessory Motion/Joint Mobility L hypomobile radial head with associated tenderness   Left Elbow Flexion/Extension AROM WNL painful at EROM EXT on L   Left Wrist Flexion AROM WNL   Left Wrist Extension AROM WNL   Left Wrist Radial Deviation AROM WNL   Left Wrist Ulnar Deviation AROM WNL   Left Wrist Supination AROM WNL   Left Wrist Pronation AROM WNL   Left Biceps Brachii Strength 5/5    Left Brachioradialis Strength 5-/5   Left Triceps Brachii Strength 4/5 + pain   Left  Strength (lbs) 29 L, 28 R   Planned Therapy Interventions   Planned Therapy Interventions joint mobilization;manual therapy;ROM;strengthening;stretching;neuromuscular re-education   Planned Modality Interventions   Planned Modality Interventions Electrical stimulation;Iontophoresis;Ultrasound   Clinical Impression   Criteria for Skilled Therapeutic Interventions Met yes, treatment indicated   PT Diagnosis L tricep insertion tendinopathy, possible olecranon bursitis   Influenced by the following impairments pain and weakness   Functional limitations due to impairments difficulty resting on L elbow and extending  arm   Clinical Presentation Stable/Uncomplicated   Clinical Presentation Rationale due to lack of additional confounding factors/variables   Clinical Decision Making (Complexity) Low complexity   Therapy Frequency 2 times/Week   Predicted Duration of Therapy Intervention (days/wks) up to 8 visits   Risk & Benefits of therapy have been explained Yes   Patient, Family & other staff in agreement with plan of care Yes   Clinical Impression Comments Presentation is consistent with L elbow tricep tendinopathy and possible olecranon bursitis   Education Assessment   Preferred Learning Style Demonstration   Barriers to Learning No barriers   ORTHO GOALS   PT Ortho Eval Goals 1;2;3   Ortho Goal 1   Goal Identifier STG 1   Goal Description Patient will report decreased worst L elbow PL to 5/10 or less in order to perform work tasks with L arm   Target Date 03/31/17   Ortho Goal 2   Goal Identifier LTG 1   Goal Description Patient will report decreased worst PL to 3/10 or less in L elbow in order to perform work tasks without difficulty   Target Date 04/21/17   Ortho Goal 3   Goal Identifier LTG 2   Goal Description Patient will demonstrate full L elbow ROM without pain in order to perform all work tasks without difficulty   Target Date 04/21/17   Total Evaluation Time   Total Evaluation Time    Certification dates: 22 eval    3/10/2017 - 5/7/2017, L elbow pain, tricep tendinopathy with possible bursitis     Patient was evaluated and treated under the direct supervision and guidance of Jessenia RAPHAELT, OCS    I certify the need for these services furnished under this plan of treatment and while under my care. (Physician co-signature of this document indicates review and certification of the therapy plan).

## 2017-03-13 ENCOUNTER — HOSPITAL ENCOUNTER (OUTPATIENT)
Dept: PHYSICAL THERAPY | Facility: HOSPITAL | Age: 77
Setting detail: THERAPIES SERIES
End: 2017-03-13
Attending: FAMILY MEDICINE
Payer: MEDICARE

## 2017-03-13 PROCEDURE — 97035 APP MDLTY 1+ULTRASOUND EA 15: CPT | Mod: GP | Performed by: PHYSICAL THERAPIST

## 2017-03-13 PROCEDURE — 97140 MANUAL THERAPY 1/> REGIONS: CPT | Mod: GP | Performed by: PHYSICAL THERAPIST

## 2017-03-15 ENCOUNTER — HOSPITAL ENCOUNTER (OUTPATIENT)
Dept: PHYSICAL THERAPY | Facility: HOSPITAL | Age: 77
Setting detail: THERAPIES SERIES
End: 2017-03-15
Attending: FAMILY MEDICINE
Payer: MEDICARE

## 2017-03-15 PROCEDURE — 97140 MANUAL THERAPY 1/> REGIONS: CPT | Mod: GP

## 2017-03-15 PROCEDURE — 40000718 ZZHC STATISTIC PT DEPARTMENT ORTHO VISIT

## 2017-03-15 PROCEDURE — 97035 APP MDLTY 1+ULTRASOUND EA 15: CPT | Mod: GP

## 2017-03-18 ENCOUNTER — HOSPITAL ENCOUNTER (EMERGENCY)
Facility: HOSPITAL | Age: 77
Discharge: HOME | End: 2017-03-18
Attending: NURSE PRACTITIONER | Admitting: NURSE PRACTITIONER
Payer: MEDICARE

## 2017-03-18 VITALS
RESPIRATION RATE: 18 BRPM | HEART RATE: 66 BPM | OXYGEN SATURATION: 96 % | TEMPERATURE: 98.5 F | SYSTOLIC BLOOD PRESSURE: 142 MMHG | DIASTOLIC BLOOD PRESSURE: 75 MMHG

## 2017-03-18 DIAGNOSIS — R06.02 SHORTNESS OF BREATH: ICD-10-CM

## 2017-03-18 DIAGNOSIS — J45.20 REACTIVE AIRWAY DISEASE, MILD INTERMITTENT, UNCOMPLICATED: ICD-10-CM

## 2017-03-18 LAB
ANION GAP SERPL CALCULATED.3IONS-SCNC: 6 MMOL/L (ref 3–14)
BASOPHILS # BLD AUTO: 0 10E9/L (ref 0–0.2)
BASOPHILS NFR BLD AUTO: 0.5 %
BUN SERPL-MCNC: 15 MG/DL (ref 7–30)
CALCIUM SERPL-MCNC: 9.1 MG/DL (ref 8.5–10.1)
CHLORIDE SERPL-SCNC: 108 MMOL/L (ref 94–109)
CO2 SERPL-SCNC: 27 MMOL/L (ref 20–32)
CREAT SERPL-MCNC: 1.04 MG/DL (ref 0.52–1.04)
D DIMER PPP DDU-MCNC: <200 NG/ML D-DU (ref 0–300)
DIFFERENTIAL METHOD BLD: ABNORMAL
EOSINOPHIL # BLD AUTO: 0.1 10E9/L (ref 0–0.7)
EOSINOPHIL NFR BLD AUTO: 1.3 %
ERYTHROCYTE [DISTWIDTH] IN BLOOD BY AUTOMATED COUNT: 13.6 % (ref 10–15)
GFR SERPL CREATININE-BSD FRML MDRD: 52 ML/MIN/1.7M2
GLUCOSE SERPL-MCNC: 94 MG/DL (ref 70–99)
HCT VFR BLD AUTO: 38.5 % (ref 35–47)
HGB BLD-MCNC: 12.7 G/DL (ref 11.7–15.7)
IMM GRANULOCYTES # BLD: 0 10E9/L (ref 0–0.4)
IMM GRANULOCYTES NFR BLD: 0 %
LYMPHOCYTES # BLD AUTO: 1.6 10E9/L (ref 0.8–5.3)
LYMPHOCYTES NFR BLD AUTO: 39.9 %
MCH RBC QN AUTO: 29.3 PG (ref 26.5–33)
MCHC RBC AUTO-ENTMCNC: 33 G/DL (ref 31.5–36.5)
MCV RBC AUTO: 89 FL (ref 78–100)
MONOCYTES # BLD AUTO: 0.3 10E9/L (ref 0–1.3)
MONOCYTES NFR BLD AUTO: 8 %
NEUTROPHILS # BLD AUTO: 2 10E9/L (ref 1.6–8.3)
NEUTROPHILS NFR BLD AUTO: 50.3 %
NRBC # BLD AUTO: 0 10*3/UL
NRBC BLD AUTO-RTO: 0 /100
NT-PROBNP SERPL-MCNC: 233 PG/ML (ref 0–900)
PLATELET # BLD AUTO: 166 10E9/L (ref 150–450)
POTASSIUM SERPL-SCNC: 4.1 MMOL/L (ref 3.4–5.3)
RBC # BLD AUTO: 4.33 10E12/L (ref 3.8–5.2)
SODIUM SERPL-SCNC: 141 MMOL/L (ref 133–144)
TROPONIN I SERPL-MCNC: NORMAL UG/L (ref 0–0.04)
WBC # BLD AUTO: 3.9 10E9/L (ref 4–11)

## 2017-03-18 PROCEDURE — 94640 AIRWAY INHALATION TREATMENT: CPT

## 2017-03-18 PROCEDURE — 84484 ASSAY OF TROPONIN QUANT: CPT | Performed by: NURSE PRACTITIONER

## 2017-03-18 PROCEDURE — 80048 BASIC METABOLIC PNL TOTAL CA: CPT | Performed by: NURSE PRACTITIONER

## 2017-03-18 PROCEDURE — 71020 ZZHC CHEST TWO VIEWS, FRONT/LAT: CPT | Mod: TC

## 2017-03-18 PROCEDURE — 36415 COLL VENOUS BLD VENIPUNCTURE: CPT | Performed by: NURSE PRACTITIONER

## 2017-03-18 PROCEDURE — 25000125 ZZHC RX 250: Performed by: NURSE PRACTITIONER

## 2017-03-18 PROCEDURE — 99214 OFFICE O/P EST MOD 30 MIN: CPT | Mod: 25

## 2017-03-18 PROCEDURE — 99215 OFFICE O/P EST HI 40 MIN: CPT | Performed by: NURSE PRACTITIONER

## 2017-03-18 PROCEDURE — 83880 ASSAY OF NATRIURETIC PEPTIDE: CPT | Performed by: NURSE PRACTITIONER

## 2017-03-18 PROCEDURE — 93005 ELECTROCARDIOGRAM TRACING: CPT

## 2017-03-18 PROCEDURE — 85025 COMPLETE CBC W/AUTO DIFF WBC: CPT | Performed by: NURSE PRACTITIONER

## 2017-03-18 PROCEDURE — 85379 FIBRIN DEGRADATION QUANT: CPT | Performed by: NURSE PRACTITIONER

## 2017-03-18 RX ORDER — ALBUTEROL SULFATE 0.83 MG/ML
2.5 SOLUTION RESPIRATORY (INHALATION) ONCE
Status: COMPLETED | OUTPATIENT
Start: 2017-03-18 | End: 2017-03-18

## 2017-03-18 RX ADMIN — ALBUTEROL SULFATE 2.5 MG: 2.5 SOLUTION RESPIRATORY (INHALATION) at 21:43

## 2017-03-18 NOTE — ED AVS SNAPSHOT
[facility]    [address] [address]    Phone:  [phone]                                       Dinorah Lim   MRN: [id number]    Department:  [facility]   Date of Visit:  3/18/2017           Patient Information     Date Of Birth          1940        Your diagnoses for this visit were:     Reactive airway disease, mild intermittent, uncomplicated     Shortness of breath improved with albuterol neb treatment       You were seen by Alma Magana NP.      Follow-up Information     Follow up with [facility].    Specialty:  EMERGENCY MEDICINE    Why:  As needed, If symptoms worsen, or concerns develop    Contact information:    [address] [address] [phone]    Additional information:    From [facility]: Take [address]. Turn left at [address]. Turn left at the first stoplight on [address]. At the first stop sign, take a right onto [address]. Take a left into the parking lot and continue through until you reach the North enterance of the building.       From [place]: Take [address]. Take the [address] ramp towards [place]. Turn left onto [address]. Take a slight right onto [address]. Turn left at the first stoplight on [address]. At the first stop sign, take a right onto [address]. Take a left into the parking lot and continue through until you reach the North enterance of the building.       From [place]: Take [address]. Take a right at [address]. At the first stop sign, take a right onto [address]. Take a left into the parking lot and continue through until you reach the North enterance of the building.         Follow up with Magaly Sosa MD. Call on 3/20/2017.    Specialty:  Family Practice    Why:  to schedule an appointment for follow next week    Contact information:    [facility]  [address]  [facility] [address]  [phone]        Future Appointments        Provider Department Dept Phone Center    3/20/2017 7:30 AM Dinorah Pena PTA [facility] [phone] [facility]    3/27/2017 7:30 AM Dinorah Pena PTA [facility] [phone] [facility]    4/19/2017 8:00 AM Esteban Wills MD  ORTHOPEDICS [phone] [facility]         Review of your medicines      Our records show that you are taking the medicines listed below. If these are incorrect, please call your family doctor or clinic.        Dose / Directions Last dose taken    albuterol (2.5 MG/3ML) 0.083% neb solution   Dose:  1 vial   Quantity:  1 Box        Take 1 vial (2.5 mg) by nebulization every 4 hours as needed for shortness of breath / dyspnea or wheezing   Refills:  0        BENADRYL ALLERGY PO   Dose:  25 mg   Indication:  takes with simvastatin        Take 25 mg by mouth nightly as needed   Refills:  0        clonazePAM 1 MG tablet   Commonly known as:  klonoPIN   Quantity:  45 tablet        TAKE ONE-FOURTH TO ONE-HALF TABLET BY MOUTH EVERY 8 HOURS AS NEEDED   Refills:  0        esomeprazole 40 MG CR capsule   Commonly known as:  nexIUM   Dose:  40 mg   Quantity:  30 capsule        Take 1 capsule (40 mg) by mouth every morning (before breakfast) Take 30-60 minutes before a eating.   Refills:  3        FISH OIL   Dose:  1 capsule        Take 1 capsule by mouth daily   Refills:  0        Garlic 10 MG Caps   Dose:  1 capsule        Take 1 capsule by mouth as needed   Refills:  0        HYDROcodone-acetaminophen 5-325 MG per tablet   Commonly known as:  NORCO   Quantity:  60 tablet        TAKE ONE TABLET BY MOUTH EVERY 4 TO 6 HOURS AS NEEDED FOR PAIN   Refills:  0        nabumetone 500 MG tablet   Commonly known as:  RELAFEN   Dose:  500-1000 mg   Quantity:  60 tablet        Take 1-2 tablets (500-1,000 mg) by mouth 2 times daily as needed for moderate pain   Refills:  1        PARoxetine 40 MG tablet  "  Commonly known as:  PAXIL   Dose:  40 mg   Quantity:  90 tablet        Take 1 tablet (40 mg) by mouth daily   Refills:  1        simvastatin 20 MG tablet   Commonly known as:  ZOCOR   Quantity:  90 tablet        TAKE ONE TABLET BY MOUTH ONCE DAILY AT BEDTIME   Refills:  0        VITAMIN D (CHOLECALCIFEROL) PO   Dose:  2000 Units        Take 2,000 Units by mouth daily   Refills:  0        VITAMIN E   Dose:  1 capsule        Take 1 capsule by mouth daily   Refills:  0                Procedures and tests performed during your visit     Basic metabolic panel    CBC with platelets differential    Chest XR,  PA & LAT    D-Dimer (FV Range)    NT pro BNP    Troponin I      Orders Needing Specimen Collection     None      Pending Results     Date and Time Order Name Status Description    3/18/2017 2026 Chest XR,  PA & LAT In process             Pending Culture Results     No orders found from 3/16/2017 to 3/19/2017.            Thank you for choosing [place]       Thank you for choosing [place] for your care. Our goal is always to provide you with excellent care. Hearing back from our patients is one way we can continue to improve our services. Please take a few minutes to complete the written survey that you may receive in the mail after you visit with us. Thank you!        [facility] Information     [facility] lets you send messages to your doctor, view your test results, renew your prescriptions, schedule appointments and more. To sign up, go to [email or web address] . Click on \"Log in\" on the left side of the screen, which will take you to the Welcome page. Then click on \"Sign up Now\" on the right side of the page.     You will be asked to enter the access code listed below, as well as some personal information. Please follow the directions to create your username and password.     Your access code is: [email or web address]  Expires: 2017  2:26 PM     Your access code will  in 90 days. If you need help or a new code, " please call your [place] clinic or [phone].        Care EveryWhere ID     This is your Care EveryWhere ID. This could be used by other organizations to access your [place] medical records  [id number]        After Visit Summary       This is your record. Keep this with you and show to your community pharmacist(s) and doctor(s) at your next visit.

## 2017-03-18 NOTE — ED AVS SNAPSHOT
[facility]    [address]    [place] [address]    Phone:  [phone]                                       Dinorah Lim   MRN: [id number]    Department:  [facility]   Date of Visit:  3/18/2017           After Visit Summary Signature Page     I have received my discharge instructions, and my questions have been answered. I have discussed any challenges I see with this plan with the nurse or doctor.    ..........................................................................................................................................  Patient/Patient Representative Signature      ..........................................................................................................................................  Patient Representative Print Name and Relationship to Patient    ..................................................               ................................................  Date                                            Time    ..........................................................................................................................................  Reviewed by Signature/Title    ...................................................              ..............................................  Date                                                            Time

## 2017-03-19 NOTE — ED PROVIDER NOTES
History     Chief Complaint   Patient presents with     URI     HPI  Dinorah Lim is a 76 year old female who presents today with a CC of chest tightness and a feeling that it is difficult to take a deep breath.  She notes that this has been going on for about 1 week.  She denies any chest pain but does report intermittent right sided back pain.  She denies exposures.  She denies history of cardiac disease.  She has a history of reactive airway disease and uses nebs occasionally.  She has not used the nebs since she has been feeling these symptoms.  No fevers or cough.  No nasal congestion.  She does report right sided ear pain.  She was treated for a URI about 1 month ago, chest x-ray was normal.      I have reviewed the Medications, Allergies, Past Medical and Surgical History, and Social History in the Epic system.    Review of Systems   Constitutional: Negative for appetite change, chills, fatigue and fever.   HENT: Positive for ear pain.    Respiratory: Positive for chest tightness and shortness of breath. Negative for cough and wheezing.    Cardiovascular: Negative for chest pain, palpitations and leg swelling.   Gastrointestinal: Negative for abdominal pain, diarrhea, nausea and vomiting.   Musculoskeletal: Negative for arthralgias and myalgias.   Skin: Negative for rash.   Neurological: Negative for weakness and numbness.       Physical Exam   BP: 142/75  Pulse: 66  Temp: 98.5  F (36.9  C)  Resp: 18  SpO2: 96 %    Physical Exam   Constitutional: She is oriented to person, place, and time. She appears well-developed and well-nourished. She is cooperative.  Non-toxic appearance. She does not appear ill.   Sitting in exam room chair, calm, no obvious respiratory distress or facial grimace   HENT:   Head: Normocephalic and atraumatic.   Right Ear: Tympanic membrane, external ear and ear canal normal.   Left Ear: Tympanic membrane, external ear and ear canal normal.   Nose: Right sinus exhibits no maxillary  sinus tenderness and no frontal sinus tenderness. Left sinus exhibits no maxillary sinus tenderness and no frontal sinus tenderness.   Mouth/Throat: Uvula is midline and oropharynx is clear and moist.   Eyes: Conjunctivae are normal.   Neck: Normal range of motion. Neck supple.   Cardiovascular: Normal rate and regular rhythm.    Pulmonary/Chest: Effort normal and breath sounds normal.   Musculoskeletal:   Able to move self on and off exam room table independently   Lymphadenopathy:     She has no cervical adenopathy.   Neurological: She is alert and oriented to person, place, and time.   Skin: Skin is warm and dry.   Psychiatric: She has a normal mood and affect. Her behavior is normal.   Nursing note and vitals reviewed.      ED Course     ED Course     Procedures     Medications   albuterol neb solution 2.5 mg (2.5 mg Nebulization Given 3/18/17 2143)     Tolerated medications well, no adverse efects noted, reports chest tightness is resolved post nebs    Results for orders placed or performed during the hospital encounter of 03/18/17   Chest XR,  PA & LAT    Narrative    CHEST TWO VIEWS    FINDINGS:  Lungs are clear.  Heart and pulmonary vessels are within  normal limits.    IMPRESSION:  NO EVIDENCE OF ACTIVE CARDIOPULMONARY DISEASE.  Exam Date: Mar 18, 2017 08:45:00 PM  Author: RAFIA SIEGEL  This report is final and signed     CBC with platelets differential   Result Value Ref Range    WBC 3.9 (L) 4.0 - 11.0 10e9/L    RBC Count 4.33 3.8 - 5.2 10e12/L    Hemoglobin 12.7 11.7 - 15.7 g/dL    Hematocrit 38.5 35.0 - 47.0 %    MCV 89 78 - 100 fl    MCH 29.3 26.5 - 33.0 pg    MCHC 33.0 31.5 - 36.5 g/dL    RDW 13.6 10.0 - 15.0 %    Platelet Count 166 150 - 450 10e9/L    Diff Method Automated Method     % Neutrophils 50.3 %    % Lymphocytes 39.9 %    % Monocytes 8.0 %    % Eosinophils 1.3 %    % Basophils 0.5 %    % Immature Granulocytes 0.0 %    Nucleated RBCs 0 0 /100    Absolute Neutrophil 2.0 1.6 - 8.3 10e9/L     Absolute Lymphocytes 1.6 0.8 - 5.3 10e9/L    Absolute Monocytes 0.3 0.0 - 1.3 10e9/L    Absolute Eosinophils 0.1 0.0 - 0.7 10e9/L    Absolute Basophils 0.0 0.0 - 0.2 10e9/L    Abs Immature Granulocytes 0.0 0 - 0.4 10e9/L    Absolute Nucleated RBC 0.0    Troponin I   Result Value Ref Range    Troponin I ES  0.000 - 0.045 ug/L     <0.015  The 99th percentile for upper reference range is 0.045 ug/L.  Troponin values in   the range of 0.045 - 0.120 ug/L may be associated with risks of adverse   clinical events.     Basic metabolic panel   Result Value Ref Range    Sodium 141 133 - 144 mmol/L    Potassium 4.1 3.4 - 5.3 mmol/L    Chloride 108 94 - 109 mmol/L    Carbon Dioxide 27 20 - 32 mmol/L    Anion Gap 6 3 - 14 mmol/L    Glucose 94 70 - 99 mg/dL    Urea Nitrogen 15 7 - 30 mg/dL    Creatinine 1.04 0.52 - 1.04 mg/dL    GFR Estimate 52 (L) >60 mL/min/1.7m2    GFR Estimate If Black 63 >60 mL/min/1.7m2    Calcium 9.1 8.5 - 10.1 mg/dL   NT pro BNP   Result Value Ref Range    N-Terminal Pro BNP Inpatient 233 0 - 900 pg/mL   D-Dimer (FV Range)   Result Value Ref Range    D-Dimer ng/mL <200 0 - 300 ng/ml D-DU         EKG - NSR, no changes from 12/12/16 EKG    PERC Rule for risk stratifying PE to low risk (calculator)  Background  Risk stratifies patients to low risk of PE if all 8 criteria are present including age <50, heart rate <100, O2 Sat >94%, no unilateral leg edema, no hemoptysis, no recent surgery or trauma, no prior VTE, and no hormone use.  Data  76 year old  has Osteoarthritis of right hip; Abnormal glucose; Major depressive disorder, single episode; Osteoarthritis; Lung density on x-ray; Hyperlipidemia LDL goal <130; Advanced care planning/counseling discussion; Anxiety; COPD (chronic obstructive pulmonary disease) (H); Urticaria; and ACP (advance care planning) on her problem list.   has a past surgical history that includes tonsillectomy; INJ EPIDURAL LUMBAR/SACRAL W/WO CONTRAST (Bilateral, 5/2013); closed rx  elbow dislocation (Right, 1952); Closed reduction wrist (Right, 1952 x 2); and Laparoscopic cholecystectomy (N/A, 1/4/2015).  Pulse: 66  SpO2: 96 %  Criteria   Of  8 possible items (all criteria must be present):  Heart rate <100 bpm  Oxygen Saturation >94%  No unilateral leg swelling  No hemoptysis  No surgery or trauma within 4 weeks  No prior DVT or PE  No hormone use (oral, transdermal and intravaginal estrogens)  Interpretation  NOT all criteria are met (or PE suspicion remains): further PE evaluation may be warranted    D-Dimer WNL, no further testing warranted    Assessments & Plan (with Medical Decision Making)     I have reviewed the nursing notes.    I have reviewed the findings, diagnosis, plan and need for follow up with the patient.  ASSESSMENT / PLAN:  (J45.20) Reactive airway disease, mild intermittent, uncomplicated  Comment: acute on chronic  Plan:  See below    (R06.02) Shortness of breath  Comment: improved with albuterol neb treatment, D-dimer WNL, EKG NSR, no acute changes, troponin WNL - symptoms have been present 1 week  Plan:  Patient has nebs at home   Albuterol neb every 4 hours prn shortness of breath, wheezing, difficulty taking deep breath   Patient verbally educated and given appropriate education sheets for their diagnoses and has no questions.   Take medications as directed.    Return to ED/UC if symptoms increase or concerns develop: red flag symptoms as discussed and per discharge instructions   Follow up with your Primary Care provider if symptoms do not improve      Discharge Medication List as of 3/18/2017 10:08 PM          Final diagnoses:   Reactive airway disease, mild intermittent, uncomplicated   Shortness of breath - improved with albuterol neb treatment       3/18/2017   [facility]     Alma Magana NP  03/22/17 0837

## 2017-03-19 NOTE — ED NOTES
Presents today with feelings of SOB states she was seen and treated for a URI 2-3 weeks ago and never got better, states she feels she may need a chest xray

## 2017-03-22 ASSESSMENT — ENCOUNTER SYMPTOMS
APPETITE CHANGE: 0
CHILLS: 0
ARTHRALGIAS: 0
NUMBNESS: 0
NAUSEA: 0
MYALGIAS: 0
WEAKNESS: 0
PALPITATIONS: 0
ABDOMINAL PAIN: 0
SHORTNESS OF BREATH: 1
DIARRHEA: 0
FATIGUE: 0
COUGH: 0
WHEEZING: 0
VOMITING: 0
CHEST TIGHTNESS: 1
FEVER: 0

## 2017-03-27 ENCOUNTER — HOSPITAL ENCOUNTER (OUTPATIENT)
Dept: PHYSICAL THERAPY | Facility: HOSPITAL | Age: 77
Setting detail: THERAPIES SERIES
End: 2017-03-27
Attending: PHYSICIAN ASSISTANT
Payer: MEDICARE

## 2017-03-27 PROCEDURE — G8986 CARRY D/C STATUS: HCPCS | Mod: GP,CH | Performed by: PHYSICAL THERAPIST

## 2017-03-27 PROCEDURE — 97140 MANUAL THERAPY 1/> REGIONS: CPT | Mod: GP

## 2017-03-27 PROCEDURE — 40000718 ZZHC STATISTIC PT DEPARTMENT ORTHO VISIT

## 2017-03-27 PROCEDURE — 97110 THERAPEUTIC EXERCISES: CPT | Mod: GP

## 2017-03-27 PROCEDURE — G8985 CARRY GOAL STATUS: HCPCS | Mod: GP,CH | Performed by: PHYSICAL THERAPIST

## 2017-03-28 ENCOUNTER — OFFICE VISIT (OUTPATIENT)
Dept: FAMILY MEDICINE | Facility: OTHER | Age: 77
End: 2017-03-28
Attending: FAMILY MEDICINE
Payer: COMMERCIAL

## 2017-03-28 VITALS
BODY MASS INDEX: 38.79 KG/M2 | WEIGHT: 226 LBS | DIASTOLIC BLOOD PRESSURE: 72 MMHG | OXYGEN SATURATION: 98 % | SYSTOLIC BLOOD PRESSURE: 128 MMHG | RESPIRATION RATE: 20 BRPM | HEART RATE: 74 BPM

## 2017-03-28 DIAGNOSIS — M25.551 HIP PAIN, RIGHT: ICD-10-CM

## 2017-03-28 DIAGNOSIS — J43.9 PULMONARY EMPHYSEMA, UNSPECIFIED EMPHYSEMA TYPE (H): Primary | ICD-10-CM

## 2017-03-28 DIAGNOSIS — F41.9 ANXIETY: Chronic | ICD-10-CM

## 2017-03-28 DIAGNOSIS — M16.11 PRIMARY OSTEOARTHRITIS OF RIGHT HIP: ICD-10-CM

## 2017-03-28 PROCEDURE — 99212 OFFICE O/P EST SF 10 MIN: CPT

## 2017-03-28 PROCEDURE — 99213 OFFICE O/P EST LOW 20 MIN: CPT | Performed by: FAMILY MEDICINE

## 2017-03-28 RX ORDER — HYDROCODONE BITARTRATE AND ACETAMINOPHEN 5; 325 MG/1; MG/1
TABLET ORAL
Qty: 60 TABLET | Refills: 0 | Status: SHIPPED | OUTPATIENT
Start: 2017-03-28 | End: 2017-08-31

## 2017-03-28 RX ORDER — CLONAZEPAM 1 MG/1
TABLET ORAL
Qty: 45 TABLET | Refills: 0 | Status: SHIPPED | OUTPATIENT
Start: 2017-03-28 | End: 2017-06-20

## 2017-03-28 ASSESSMENT — PAIN SCALES - GENERAL: PAINLEVEL: NO PAIN (0)

## 2017-03-28 NOTE — MR AVS SNAPSHOT
After Visit Summary   3/28/2017    Dinorah Lim    MRN: [id number]           Patient Information     Date Of Birth          1940        Visit Information        Provider Department      3/28/2017 2:00 PM Magaly Sosa MD [facility]        Today's Diagnoses     Pulmonary emphysema, unspecified emphysema type (H)    -  1    Primary osteoarthritis of right hip        Acute gastritis without hemorrhage, unspecified gastritis type        Anxiety        Hip pain, right           Follow-ups after your visit        Additional Services     ORTHOPEDICS ADULT REFERRAL       Your provider has referred you to: Alireza Jacques or Dr iWlls for injection for hip pain    Please be aware that coverage of these services is subject to the terms and limitations of your health insurance plan.  Call member services at your health plan with any benefit or coverage questions.      Please bring the following to your appointment:    >>   Any x-rays, CTs or MRIs which have been performed.  Contact the facility where they were done to arrange for  prior to your scheduled appointment.    >>   List of current medications   >>   This referral request   >>   Any documents/labs given to you for this referral                  Your next 10 appointments already scheduled     Apr 19, 2017  8:00 AM CDT   (Arrive by 7:45 AM)   Return Visit with Esteban Wills MD    ORTHOPEDICS ([facility])    [address] [facility] [address] [phone]              Who to contact     If you have questions or need follow up information about today's clinic visit or your schedule please contact [facility] directly at [phone].  Normal or non-critical lab and imaging results will be communicated to you by MyChart, letter or phone within 4 business days after the clinic has received the results. If you do not hear from us within 7 days, please contact the clinic through MyChart or phone.  "If you have a critical or abnormal lab result, we will notify you by phone as soon as possible.  Submit refill requests through [employer] or call your pharmacy and they will forward the refill request to us. Please allow 3 business days for your refill to be completed.          Additional Information About Your Visit        [employer] Information     [employer] lets you send messages to your doctor, view your test results, renew your prescriptions, schedule appointments and more. To sign up, go to [email or web address] . Click on \"Log in\" on the left side of the screen, which will take you to the Welcome page. Then click on \"Sign up Now\" on the right side of the page.     You will be asked to enter the access code listed below, as well as some personal information. Please follow the directions to create your username and password.     Your access code is: [email or web address]  Expires: 2017  2:26 PM     Your access code will  in 90 days. If you need help or a new code, please call your [place] clinic or [phone].        Care EveryWhere ID     This is your Care EveryWhere ID. This could be used by other organizations to access your [place] medical records  [id number]        Your Vitals Were     Pulse Respirations Pulse Oximetry BMI (Body Mass Index)          74 20 98% 38.79 kg/m2         Blood Pressure from Last 3 Encounters:   17 128/72   17 142/75   17 120/60    Weight from Last 3 Encounters:   17 226 lb (102.5 kg)   17 226 lb (102.5 kg)   17 220 lb (99.8 kg)              We Performed the Following     ORTHOPEDICS ADULT REFERRAL          Today's Medication Changes          These changes are accurate as of: 3/28/17  3:35 PM.  If you have any questions, ask your nurse or doctor.               These medicines have changed or have updated prescriptions.        Dose/Directions    clonazePAM 1 MG tablet   Commonly known as:  klonoPIN   This may have changed:  See the new " instructions.   Used for:  Anxiety   Changed by:  Magaly Sosa MD        TAKE ONE-FOURTH TO ONE-HALF TABLET BY MOUTH EVERY 8 HOURS AS NEEDED   Quantity:  45 tablet   Refills:  0            Where to get your medicines      Some of these will need a paper prescription and others can be bought over the counter.  Ask your nurse if you have questions.     Bring a paper prescription for each of these medications     clonazePAM 1 MG tablet    HYDROcodone-acetaminophen 5-325 MG per tablet                Primary Care Provider Office Phone # Fax #    Magaly Sosa [phone]       [facility] [place] [address] [facility]  [place] [address]        Thank you!     Thank you for choosing [facility] [place]  for your care. Our goal is always to provide you with excellent care. Hearing back from our patients is one way we can continue to improve our services. Please take a few minutes to complete the written survey that you may receive in the mail after your visit with us. Thank you!             Your Updated Medication List - Protect others around you: Learn how to safely use, store and throw away your medicines at www.disposemymeds.org.          This list is accurate as of: 3/28/17  3:35 PM.  Always use your most recent med list.                   Brand Name Dispense Instructions for use    albuterol (2.5 MG/3ML) 0.083% neb solution     1 Box    Take 1 vial (2.5 mg) by nebulization every 4 hours as needed for shortness of breath / dyspnea or wheezing       BENADRYL ALLERGY PO      Take 25 mg by mouth nightly as needed       clonazePAM 1 MG tablet    klonoPIN    45 tablet    TAKE ONE-FOURTH TO ONE-HALF TABLET BY MOUTH EVERY 8 HOURS AS NEEDED       esomeprazole 40 MG CR capsule    nexIUM    30 capsule    Take 1 capsule (40 mg) by mouth every morning (before breakfast) Take 30-60 minutes before a eating.       FISH OIL      Take 1 capsule by mouth daily       Garlic 10 MG Caps      Take 1 capsule by mouth  as needed       HYDROcodone-acetaminophen 5-325 MG per tablet    NORCO    60 tablet    TAKE ONE TABLET BY MOUTH EVERY 4 TO 6 HOURS AS NEEDED FOR PAIN       nabumetone 500 MG tablet    RELAFEN    60 tablet    Take 1-2 tablets (500-1,000 mg) by mouth 2 times daily as needed for moderate pain       PARoxetine 40 MG tablet    PAXIL    90 tablet    Take 1 tablet (40 mg) by mouth daily       simvastatin 20 MG tablet    ZOCOR    90 tablet    TAKE ONE TABLET BY MOUTH ONCE DAILY AT BEDTIME       VITAMIN D (CHOLECALCIFEROL) PO      Take 2,000 Units by mouth daily       VITAMIN E      Take 1 capsule by mouth daily

## 2017-03-28 NOTE — NURSING NOTE
"Chief Complaint   Patient presents with     Urgent Care     follow up 3/18/17 for reactive airway disease. Has great improvement       Initial /72  Pulse 74  Resp 20  Wt 226 lb (102.5 kg)  SpO2 98%  BMI 38.79 kg/m2 Estimated body mass index is 38.79 kg/(m^2) as calculated from the following:    Height as of 3/7/17: 5' 4\" (1.626 m).    Weight as of this encounter: 226 lb (102.5 kg).  Medication Reconciliation: complete   Aditi Saleh      "

## 2017-03-29 NOTE — PROGRESS NOTES
[facility]    Dinorah Lim, 76 year old, female presents with   Chief Complaint   Patient presents with     Urgent Care     follow up 3/18/17 for reactive airway disease. Has great improvement treated with nebs. She has underlying COPD based on PFTs done in 2014. She is back to baseline and feeling well.      Colonoscopy     due for colonoscopy, or I fob test. She was given the IFOB 2 weeks ago and is reminded to complete it.        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    Anterior Segment V 4 cm nodule abutting liver surface by US 1/4/2015      Obesity, Class II, BMI 35-39.9 (H) 1/4/2015    BMI 35.9 with comorbidities = MORBID obesity     Osteoarthritis of right hip 10/07/2004     Osteoarthrosis 06/14/2012     Otitis externa 10/04/2011     Prediabetes 08/01/2011       PAST SURGICAL HISTORY:  Past Surgical History:   Procedure Laterality Date     CLOSED REDUCTION WRIST Right 1952 x 2    long arm cast (same time as elbow fx)     CLOSED RX ELBOW DISLOCATION Right 1952    CR/long cast of fracture     HC INJ EPIDURAL LUMBAR/SACRAL W/WO CONTRAST Bilateral 5/2013    facet injections; prev 2012     LAPAROSCOPIC CHOLECYSTECTOMY N/A 1/4/2015    Procedure: LAPAROSCOPIC CHOLECYSTECTOMY;  Surgeon: Brittney العلي MD;  Location: [facility]     TONSILLECTOMY         SOCIAL HISTORY  Social History     Social History     Marital status: Single     Spouse name: N/A      Number of children: 4     Years of education: 12     Occupational History     personal care attendant      Social History Main Topics     Smoking status: Never Smoker     Smokeless tobacco: Never Used     Alcohol use No     Drug use: No     Sexual activity: No     Other Topics Concern     Blood Transfusions Yes     Caffeine Concern Yes     >32 oz soda/day     Sleep Concern Yes     insomnia     Parent/Sibling W/ Cabg, Mi Or Angioplasty Before 65f 55m? No     Social History Narrative       MEDICATIONS:  Prior to Admission medications    Medication Sig Start Date End Date Taking? Authorizing Provider   HYDROcodone-acetaminophen (NORCO) 5-325 MG per tablet TAKE ONE TABLET BY MOUTH EVERY 4 TO 6 HOURS AS NEEDED FOR PAIN 3/28/17  Yes Magaly Sosa MD   clonazePAM (KLONOPIN) 1 MG tablet TAKE ONE-FOURTH TO ONE-HALF TABLET BY MOUTH EVERY 8 HOURS AS NEEDED 3/28/17  Yes Magaly Sosa MD   albuterol (2.5 MG/3ML) 0.083% neb solution Take 1 vial (2.5 mg) by nebulization every 4 hours as needed for shortness of breath / dyspnea or wheezing 2/15/17  Yes Magaly Sosa MD   nabumetone (RELAFEN) 500 MG tablet Take 1-2 tablets (500-1,000 mg) by mouth 2 times daily as needed for moderate pain 1/16/17  Yes Magaly Sosa MD   simvastatin (ZOCOR) 20 MG tablet TAKE ONE TABLET BY MOUTH ONCE DAILY AT BEDTIME 1/4/17  Yes Magaly Sosa MD   esomeprazole (NEXIUM) 40 MG CR capsule Take 1 capsule (40 mg) by mouth every morning (before breakfast) Take 30-60 minutes before a eating. 12/12/16  Yes Magaly Sosa MD   PARoxetine (PAXIL) 40 MG tablet Take 1 tablet (40 mg) by mouth daily 10/4/16  Yes Magaly Sosa MD   VITAMIN D, CHOLECALCIFEROL, PO Take 2,000 Units by mouth daily   Yes Reported, Patient   Garlic 10 MG CAPS Take 1 capsule by mouth as needed   Yes Reported, Patient   DiphenhydrAMINE HCl (BENADRYL ALLERGY PO) Take 25 mg by mouth nightly as needed    Yes Reported, Patient   VITAMIN E Take 1 capsule by mouth daily    Yes  Reported, Patient   FISH OIL Take 1 capsule by mouth daily    Yes Reported, Patient       ALLERGIES:     Allergies   Allergen Reactions     Chocolate Hives     Lemon Flavor Hives     Lime [Calcium Oxysulfide] Hives     Orange Fruit [Citrus] Hives     Strawberry Hives     Adhesive Tape      Band-aids     Codeine Hives     Patient can tolerate oxycodone & dilaudid     Erythromycin Hives     ERYTHROMYCIN BASE     Grapefruit [Extra Strength Grapefruit]      GRAPEFRUIT     Penicillins Hives     Sulfa Drugs      SULFONAMIDE ANTIBIOTICS      Tomato        ROS:  C: NEGATIVE for fever, chills, change in weight  E/M: NEGATIVE for ear, mouth and throat problems  R: NEGATIVE for significant cough or SOB  CV: NEGATIVE for chest pain, palpitations or peripheral edema  GI: NEGATIVE for nausea, abdominal pain, heartburn, or change in bowel habits  N: NEGATIVE for weakness, dizziness or paresthesias  P: NEGATIVE for changes in mood or affect    EXAM:  /72  Pulse 74  Resp 20  Wt 226 lb (102.5 kg)  SpO2 98%  BMI 38.79 kg/m2 Body mass index is 38.79 kg/(m^2).   GENERAL APPEARANCE: healthy, alert and no distress  NECK: no adenopathy, no asymmetry, masses, or scars and thyroid normal to palpation  RESP: lungs clear to auscultation - no rales, rhonchi or wheezes  CV: regular rates and rhythm, normal S1 S2, no S3 or S4 and no murmur, click or rub  NEURO: Normal strength and tone, mentation intact and speech normal  PSYCH: mentation appears normal and affect normal/bright    LABS AND IMAGING:     Results for orders placed or performed during the hospital encounter of 03/18/17   Chest XR,  PA & LAT    Narrative    CHEST TWO VIEWS    FINDINGS:  Lungs are clear.  Heart and pulmonary vessels are within  normal limits.    IMPRESSION:  NO EVIDENCE OF ACTIVE CARDIOPULMONARY DISEASE.  Exam Date: Mar 18, 2017 08:45:00 PM  Author: ARFIA SIEGEL  This report is final and signed     CBC with platelets differential   Result Value Ref Range     WBC 3.9 (L) 4.0 - 11.0 10e9/L    RBC Count 4.33 3.8 - 5.2 10e12/L    Hemoglobin 12.7 11.7 - 15.7 g/dL    Hematocrit 38.5 35.0 - 47.0 %    MCV 89 78 - 100 fl    MCH 29.3 26.5 - 33.0 pg    MCHC 33.0 31.5 - 36.5 g/dL    RDW 13.6 10.0 - 15.0 %    Platelet Count 166 150 - 450 10e9/L    Diff Method Automated Method     % Neutrophils 50.3 %    % Lymphocytes 39.9 %    % Monocytes 8.0 %    % Eosinophils 1.3 %    % Basophils 0.5 %    % Immature Granulocytes 0.0 %    Nucleated RBCs 0 0 /100    Absolute Neutrophil 2.0 1.6 - 8.3 10e9/L    Absolute Lymphocytes 1.6 0.8 - 5.3 10e9/L    Absolute Monocytes 0.3 0.0 - 1.3 10e9/L    Absolute Eosinophils 0.1 0.0 - 0.7 10e9/L    Absolute Basophils 0.0 0.0 - 0.2 10e9/L    Abs Immature Granulocytes 0.0 0 - 0.4 10e9/L    Absolute Nucleated RBC 0.0    Troponin I   Result Value Ref Range    Troponin I ES  0.000 - 0.045 ug/L     <0.015  The 99th percentile for upper reference range is 0.045 ug/L.  Troponin values in   the range of 0.045 - 0.120 ug/L may be associated with risks of adverse   clinical events.     Basic metabolic panel   Result Value Ref Range    Sodium 141 133 - 144 mmol/L    Potassium 4.1 3.4 - 5.3 mmol/L    Chloride 108 94 - 109 mmol/L    Carbon Dioxide 27 20 - 32 mmol/L    Anion Gap 6 3 - 14 mmol/L    Glucose 94 70 - 99 mg/dL    Urea Nitrogen 15 7 - 30 mg/dL    Creatinine 1.04 0.52 - 1.04 mg/dL    GFR Estimate 52 (L) >60 mL/min/1.7m2    GFR Estimate If Black 63 >60 mL/min/1.7m2    Calcium 9.1 8.5 - 10.1 mg/dL   NT pro BNP   Result Value Ref Range    N-Terminal Pro BNP Inpatient 233 0 - 900 pg/mL   D-Dimer (FV Range)   Result Value Ref Range    D-Dimer ng/mL <200 0 - 300 ng/ml D-DU         ASSESSMENT/PLAN:  (J43.9) Pulmonary emphysema, unspecified emphysema type (H)  (primary encounter diagnosis)  Comment: with acute exacerbation  Plan: resolved    (M16.11) Primary osteoarthritis of right hip  Comment:   Plan: HYDROcodone-acetaminophen (NORCO) 5-325 MG per         tablet         Renewed for her today        (F41.9) Anxiety  Comment:   Plan: clonazePAM (KLONOPIN) 1 MG tablet        Renewed, she uses this rarely    (M25.551) Hip pain, right  Comment:   Plan: ORTHOPEDICS ADULT REFERRAL        She is due for an injection and will be referred for this. She is having recurrent right hip pain  Follow up as needed    Magaly Sosa MD  March 29, 2017

## 2017-04-03 ENCOUNTER — HOSPITAL ENCOUNTER (EMERGENCY)
Facility: HOSPITAL | Age: 77
Discharge: HOME | End: 2017-04-03
Attending: PHYSICIAN ASSISTANT | Admitting: PHYSICIAN ASSISTANT
Payer: MEDICARE

## 2017-04-03 VITALS
DIASTOLIC BLOOD PRESSURE: 69 MMHG | SYSTOLIC BLOOD PRESSURE: 153 MMHG | TEMPERATURE: 97.4 F | RESPIRATION RATE: 16 BRPM | OXYGEN SATURATION: 96 %

## 2017-04-03 DIAGNOSIS — N30.01 ACUTE CYSTITIS WITH HEMATURIA: ICD-10-CM

## 2017-04-03 DIAGNOSIS — A59.9 TRICHIMONIASIS: ICD-10-CM

## 2017-04-03 DIAGNOSIS — Z11.3 SCREENING EXAMINATION FOR VENEREAL DISEASE: ICD-10-CM

## 2017-04-03 LAB
ALBUMIN UR-MCNC: 30 MG/DL
APPEARANCE UR: ABNORMAL
BACTERIA #/AREA URNS HPF: ABNORMAL /HPF
BILIRUB UR QL STRIP: NEGATIVE
COLOR UR AUTO: YELLOW
GLUCOSE UR STRIP-MCNC: NEGATIVE MG/DL
HGB UR QL STRIP: ABNORMAL
KETONES UR STRIP-MCNC: NEGATIVE MG/DL
LEUKOCYTE ESTERASE UR QL STRIP: ABNORMAL
MICRO REPORT STATUS: ABNORMAL
MUCOUS THREADS #/AREA URNS LPF: PRESENT /LPF
NITRATE UR QL: NEGATIVE
PH UR STRIP: 5 PH (ref 4.7–8)
RBC #/AREA URNS AUTO: 44 /HPF (ref 0–2)
SP GR UR STRIP: 1.01 (ref 1–1.03)
SPECIMEN SOURCE: ABNORMAL
SQUAMOUS #/AREA URNS AUTO: 5 /HPF (ref 0–1)
TRANS CELLS #/AREA URNS HPF: <1 /HPF (ref 0–1)
TRICHOMONAS #/AREA URNS HPF: PRESENT /HPF
URN SPEC COLLECT METH UR: ABNORMAL
UROBILINOGEN UR STRIP-MCNC: NORMAL MG/DL (ref 0–2)
WBC #/AREA URNS AUTO: >182 /HPF (ref 0–2)
WBC CLUMPS #/AREA URNS HPF: PRESENT /HPF
WET PREP SPEC: ABNORMAL

## 2017-04-03 PROCEDURE — 87086 URINE CULTURE/COLONY COUNT: CPT | Performed by: PHYSICIAN ASSISTANT

## 2017-04-03 PROCEDURE — 87491 CHLMYD TRACH DNA AMP PROBE: CPT | Performed by: PHYSICIAN ASSISTANT

## 2017-04-03 PROCEDURE — 99214 OFFICE O/P EST MOD 30 MIN: CPT

## 2017-04-03 PROCEDURE — 25000132 ZZH RX MED GY IP 250 OP 250 PS 637: Mod: GY | Performed by: PHYSICIAN ASSISTANT

## 2017-04-03 PROCEDURE — A9270 NON-COVERED ITEM OR SERVICE: HCPCS | Mod: GY | Performed by: PHYSICIAN ASSISTANT

## 2017-04-03 PROCEDURE — 99213 OFFICE O/P EST LOW 20 MIN: CPT | Performed by: PHYSICIAN ASSISTANT

## 2017-04-03 PROCEDURE — 87210 SMEAR WET MOUNT SALINE/INK: CPT | Performed by: PHYSICIAN ASSISTANT

## 2017-04-03 PROCEDURE — 81001 URINALYSIS AUTO W/SCOPE: CPT | Performed by: PHYSICIAN ASSISTANT

## 2017-04-03 PROCEDURE — 87591 N.GONORRHOEAE DNA AMP PROB: CPT | Performed by: PHYSICIAN ASSISTANT

## 2017-04-03 RX ORDER — CIPROFLOXACIN 500 MG/1
500 TABLET, FILM COATED ORAL ONCE
Status: DISCONTINUED | OUTPATIENT
Start: 2017-04-03 | End: 2017-04-04 | Stop reason: HOSPADM

## 2017-04-03 RX ORDER — CIPROFLOXACIN 500 MG/1
500 TABLET, FILM COATED ORAL 2 TIMES DAILY
Qty: 10 TABLET | Refills: 0 | Status: SHIPPED | OUTPATIENT
Start: 2017-04-03 | End: 2017-04-08

## 2017-04-03 RX ORDER — METRONIDAZOLE 500 MG/1
2000 TABLET ORAL ONCE
Status: COMPLETED | OUTPATIENT
Start: 2017-04-03 | End: 2017-04-03

## 2017-04-03 RX ADMIN — METRONIDAZOLE 2000 MG: 500 TABLET ORAL at 22:53

## 2017-04-03 ASSESSMENT — ENCOUNTER SYMPTOMS
RESPIRATORY NEGATIVE: 1
FLANK PAIN: 0
FATIGUE: 0
DYSURIA: 0
FEVER: 0
FREQUENCY: 0

## 2017-04-03 NOTE — ED AVS SNAPSHOT
[facility]    [address] [place] [address]    Phone:  [phone]                                       Dinorah Lim   MRN: [id number]    Department:  [facility]   Date of Visit:  4/3/2017           After Visit Summary Signature Page     I have received my discharge instructions, and my questions have been answered. I have discussed any challenges I see with this plan with the nurse or doctor.    ..........................................................................................................................................  Patient/Patient Representative Signature      ..........................................................................................................................................  Patient Representative Print Name and Relationship to Patient    ..................................................               ................................................  Date                                            Time    ..........................................................................................................................................  Reviewed by Signature/Title    ...................................................              ..............................................  Date                                                            Time

## 2017-04-03 NOTE — ED AVS SNAPSHOT
[facility]    [address]    [place] [address]    Phone:  [phone]                                       Dinorah Lim   MRN: [id number]    Department:  [facility]   Date of Visit:  4/3/2017           Patient Information     Date Of Birth          1940        Your diagnoses for this visit were:     Trichimoniasis     Acute cystitis with hematuria     Screening examination for venereal disease G/C pending       You were seen by Arminda Huddleston PA.      Follow-up Information     Follow up with Magaly Sosa MD.    Specialty:  Family Practice    Why:  In 3-4 days for reevaluation. Sooner,, If symptoms worsen    Contact information:    [facility] [place]  [address]  [place] [address]  [phone]          Follow up with [facility].    Specialty:  EMERGENCY MEDICINE    Why:  If fever/concerns develop    Contact information:    [address]  [place] [address] [phone]    Additional information:    From [place]: Take [address]. Turn left at [address]. Turn left at the first stoplight on [address]. At the first stop sign, take a right onto [address]. Take a left into the parking lot and continue through until you reach the North enterance of the building.       From [place]: Take [address]. Take the [address] ramp towards [place]. Turn left onto [address]. Take a slight right onto [address]. Turn left at the first stoplight on [address]. At the first stop sign, take a right onto [address]. Take a left into the parking lot and continue through until you reach the North enterance of the building.       From [place]: Take [address]. Take a right at [address]. At the first stop sign, take a right onto [address]. Take a left into the parking lot and continue through until you reach the North enterance of the building.        Discharge References/Attachments     STDS, OLDER ADULTS AND  (ENGLISH)    URINARY TRACT INFECTIONS (UTIS), UNDERSTANDING (ENGLISH)    HEMATURIA (ENGLISH)    TRICHOMONAS VAGINAL INFECTION (TRICHOMONIASIS) (ENGLISH)      Future Appointments        Provider Department Dept Phone Center    4/19/2017 8:00 AM Esteban Wills MD  ORTHOPEDICS [phone] [place]         Review of your medicines      START taking        Dose / Directions Last dose taken    ciprofloxacin 500 MG tablet   Commonly known as:  CIPRO   Dose:  500 mg   Quantity:  10 tablet        Take 1 tablet (500 mg) by mouth 2 times daily for 5 days   Refills:  0          Our records show that you are taking the medicines listed below. If these are incorrect, please call your family doctor or clinic.        Dose / Directions Last dose taken    albuterol (2.5 MG/3ML) 0.083% neb solution   Dose:  1 vial   Quantity:  1 Box        Take 1 vial (2.5 mg) by nebulization every 4 hours as needed for shortness of breath / dyspnea or wheezing   Refills:  0        BENADRYL ALLERGY PO   Dose:  25 mg   Indication:  takes with simvastatin        Take 25 mg by mouth nightly as needed   Refills:  0        clonazePAM 1 MG tablet   Commonly known as:  klonoPIN   Quantity:  45 tablet        TAKE ONE-FOURTH TO ONE-HALF TABLET BY MOUTH EVERY 8 HOURS AS NEEDED   Refills:  0        FISH OIL   Dose:  1 capsule        Take 1 capsule by mouth daily   Refills:  0        Garlic 10 MG Caps   Dose:  1 capsule        Take 1 capsule by mouth as needed   Refills:  0        HYDROcodone-acetaminophen 5-325 MG per tablet   Commonly known as:  NORCO   Quantity:  60 tablet        TAKE ONE TABLET BY MOUTH EVERY 4 TO 6 HOURS AS NEEDED FOR PAIN   Refills:  0        nabumetone 500 MG tablet   Commonly known as:  RELAFEN   Dose:  500-1000 mg   Quantity:  60 tablet        Take 1-2 tablets (500-1,000 mg) by mouth 2 times daily as needed for moderate pain   Refills:  1        PARoxetine 40 MG  "tablet   Commonly known as:  PAXIL   Dose:  40 mg   Quantity:  90 tablet        Take 1 tablet (40 mg) by mouth daily   Refills:  1        simvastatin 20 MG tablet   Commonly known as:  ZOCOR   Quantity:  90 tablet        TAKE ONE TABLET BY MOUTH ONCE DAILY AT BEDTIME   Refills:  0        VITAMIN D (CHOLECALCIFEROL) PO   Dose:  2000 Units        Take 2,000 Units by mouth daily   Refills:  0        VITAMIN E   Dose:  1 capsule        Take 1 capsule by mouth daily   Refills:  0                Prescriptions were sent or printed at these locations (1 Prescription)                   [facility] [address] [place], [place] - [address] [address]   [address] [address], [place] [address]    Telephone:  [phone]   Fax:  [phone]   Hours:                  E-Prescribed (1 of 1)         ciprofloxacin (CIPRO) 500 MG tablet                Procedures and tests performed during your visit     CHLAMYDIA TRACHOMATIS PCR    NEISSERIA GONORRHOEA PCR    UA reflex to Microscopic and Culture    Wet prep      Orders Needing Specimen Collection     None      Pending Results     No orders found from 4/1/2017 to 4/4/2017.            Pending Culture Results     No orders found from 4/1/2017 to 4/4/2017.            Thank you for choosing [facility]       Thank you for choosing [facility] for your care. Our goal is always to provide you with excellent care. Hearing back from our patients is one way we can continue to improve our services. Please take a few minutes to complete the written survey that you may receive in the mail after you visit with us. Thank you!        [employer] Information     [email or web address] lets you send messages to your doctor, view your test results, renew your prescriptions, schedule appointments and more. To sign up, go to [email or web address] . Click on \"Log in\" on the left side of the screen, which will take you to the Welcome page. Then click on \"Sign up Now\" on the right side of the page.     You will be asked to enter the access code " listed below, as well as some personal information. Please follow the directions to create your username and password.     Your access code is: [email or web address]  Expires: 2017  2:26 PM     Your access code will  in 90 days. If you need help or a new code, please call your [facility] clinic or [phone].        Care EveryWhere ID     This is your Care EveryWhere ID. This could be used by other organizations to access your [facility] medical records  [id number]        After Visit Summary       This is your record. Keep this with you and show to your community pharmacist(s) and doctor(s) at your next visit.

## 2017-04-04 ASSESSMENT — ENCOUNTER SYMPTOMS
CONSTIPATION: 0
NAUSEA: 0
ABDOMINAL PAIN: 0
VOMITING: 0
DIARRHEA: 0
ABDOMINAL DISTENTION: 0

## 2017-04-04 NOTE — ED PROVIDER NOTES
History     Chief Complaint   Patient presents with     Vaginitis     c/o possible yeast infection, notes burning and itchy     The history is provided by the patient. No  was used.     Dinorah Lim is a 76 year old female who has 2 days of vaginal irritation/itching. Denies sexual contact for over 20 years.  States she has a brown yellow discharge.No burning with urination. No abdominal pain.  Pt states she has never had a vaginal yeast infection and has never had issues with driness    Allergies as of 04/03/2017 - Terry as Reviewed 04/03/2017   Allergen Reaction Noted     Chocolate Hives 09/11/2014     Lemon flavor Hives 09/11/2014     Lime [calcium oxysulfide] Hives 09/11/2014     Orange fruit [citrus] Hives 09/11/2014     Lucan Hives 09/11/2014     Adhesive tape       Codeine Hives      Erythromycin Hives      Grapefruit [extra strength grapefruit]       Penicillins Hives      Sulfa drugs       Tomato       Discharge Medication List as of 4/3/2017 10:53 PM      START taking these medications    Details   ciprofloxacin (CIPRO) 500 MG tablet Take 1 tablet (500 mg) by mouth 2 times daily for 5 days, Disp-10 tablet, R-0, E-Prescribe         CONTINUE these medications which have NOT CHANGED    Details   HYDROcodone-acetaminophen (NORCO) 5-325 MG per tablet TAKE ONE TABLET BY MOUTH EVERY 4 TO 6 HOURS AS NEEDED FOR PAIN, Disp-60 tablet, R-0, Local Print      clonazePAM (KLONOPIN) 1 MG tablet TAKE ONE-FOURTH TO ONE-HALF TABLET BY MOUTH EVERY 8 HOURS AS NEEDED, Disp-45 tablet, R-0, Local Print      albuterol (2.5 MG/3ML) 0.083% neb solution Take 1 vial (2.5 mg) by nebulization every 4 hours as needed for shortness of breath / dyspnea or wheezing, Disp-1 Box, R-0, E-Prescribe      simvastatin (ZOCOR) 20 MG tablet TAKE ONE TABLET BY MOUTH ONCE DAILY AT BEDTIME, Disp-90 tablet, R-0, E-Prescribe      PARoxetine (PAXIL) 40 MG tablet Take 1 tablet (40 mg) by mouth daily, Disp-90 tablet, R-1,  E-Prescribe      nabumetone (RELAFEN) 500 MG tablet Take 1-2 tablets (500-1,000 mg) by mouth 2 times daily as needed for moderate pain, Disp-60 tablet, R-1, E-Prescribe      VITAMIN D, CHOLECALCIFEROL, PO Take 2,000 Units by mouth daily, Historical      Garlic 10 MG CAPS Take 1 capsule by mouth as needed, Historical      DiphenhydrAMINE HCl (BENADRYL ALLERGY PO) Take 25 mg by mouth nightly as needed , Historical      VITAMIN E Take 1 capsule by mouth daily , Historical      FISH OIL Take 1 capsule by mouth daily , Historical           Past Medical History:   Diagnosis Date     Anxiety 08/01/2011     Cholecystolithiasis 1/4/2015    noted on US 1/4/2015 --> cholecystectomy     Chronic kidney disease (CKD), stage III (moderate) 2013    Early,unknown eitiology, Dr Vilchis     Chronic kidney disease (CKD), stage III (moderate) 1/4/2015    Early,unknown eitiology, Dr Vilchis      Cough 07/02/2001     Hiatal hernia 12/28/2014    Seen on chest CT     Hyperlipidemia 02/23/2001     Idiopathic hives since age 8 06/01/2004     Major depression 10/04/2011     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    Anterior Segment V 4 cm nodule abutting liver surface by US 1/4/2015      Obesity, Class II, BMI 35-39.9 (H) 1/4/2015    BMI 35.9 with comorbidities = MORBID obesity     Osteoarthritis of right hip 10/07/2004     Osteoarthrosis 06/14/2012     Otitis externa 10/04/2011     Prediabetes 08/01/2011     Past Surgical History:   Procedure Laterality Date     CLOSED REDUCTION WRIST Right 1952 x 2    long arm cast (same time as elbow fx)     CLOSED RX ELBOW DISLOCATION Right 1952    CR/long cast of fracture     HC INJ EPIDURAL LUMBAR/SACRAL W/WO CONTRAST Bilateral 5/2013    facet injections; prev 2012     LAPAROSCOPIC CHOLECYSTECTOMY N/A 1/4/2015    Procedure: LAPAROSCOPIC CHOLECYSTECTOMY;  Surgeon: Brittney العلي MD;  Location: [facility]     TONSILLECTOMY       Family History   Problem Relation Age of Onset     HEART DISEASE Brother       atrial fibrillation     Other - See Comments Mother      emphysema     HEART DISEASE Father 92     CHF     CANCER Paternal Grandfather      lung cancer     CANCER Maternal Uncle      lung cancer     Social History     Social History     Marital status: Single     Spouse name: N/A     Number of children: 4     Years of education: 12     Occupational History     personal care attendant      Social History Main Topics     Smoking status: Never Smoker     Smokeless tobacco: Never Used     Alcohol use No     Drug use: No     Sexual activity: No     Other Topics Concern     Blood Transfusions Yes     Caffeine Concern Yes     >32 oz soda/day     Sleep Concern Yes     insomnia     Parent/Sibling W/ Cabg, Mi Or Angioplasty Before 65f 55m? No     Social History Narrative         I have reviewed the Medications, Allergies, Past Medical and Surgical History, and Social History in the Epic system.    Review of Systems   Constitutional: Negative for fatigue and fever.   HENT: Negative.    Respiratory: Negative.    Gastrointestinal: Negative for abdominal distention, abdominal pain, constipation, diarrhea, nausea and vomiting.   Genitourinary: Positive for vaginal discharge. Negative for dysuria, flank pain, frequency, genital sores, vaginal bleeding and vaginal pain.   Skin: Negative for rash.       Physical Exam   BP: 153/69  Heart Rate: 68  Temp: 97.4  F (36.3  C)  Resp: 16  SpO2: 96 %  Physical Exam   Constitutional: She is oriented to person, place, and time. She appears well-developed and well-nourished. No distress.   Cardiovascular: Normal rate.    Pulmonary/Chest: Effort normal.   Genitourinary: There is no tenderness, lesion or injury on the right labia. There is no tenderness, lesion or injury on the left labia. No tenderness or bleeding in the vagina. Vaginal discharge found.   Neurological: She is alert and oriented to person, place, and time.   Skin: She is not diaphoretic.   Psychiatric: She has a normal mood  and affect.   Nursing note and vitals reviewed.      ED Course     ED Course     Procedures        12/12/16 12:12 PM [id number]    Component Results   Component Value Flag Ref Range Units Status Collected Lab   Sodium 141  133 - 144 mmol/L Final 12/12/2016 12:12 PM [facility]   Potassium 4.4  3.4 - 5.3 mmol/L Final 12/12/2016 12:12 PM [facility]   Chloride 107  94 - 109 mmol/L Final 12/12/2016 12:12 PM [facility]   Carbon Dioxide 30  20 - 32 mmol/L Final 12/12/2016 12:12 PM [facility]   Anion Gap 4  3 - 14 mmol/L Final 12/12/2016 12:12 PM [facility]   Glucose 99  70 - 99 mg/dL Final 12/12/2016 12:12 PM [facility]   Urea Nitrogen 14  7 - 30 mg/dL Final 12/12/2016 12:12 PM [facility]   Creatinine 0.91  0.52 - 1.04 mg/dL Final 12/12/2016 12:12 PM [facility]   GFR Estimate 60 (L) >60 mL/min/1.7m2 Final 12/12/2016 12:12 PM [facility]   Comment:   Non  GFR Calc   GFR Estimate If Black 73  >60 mL/min/1.7m2 Final 12/12/2016 12:12 PM [facility]   Comment:   African American GFR Calc   Calcium 9.0  8.5 - 10.1 mg/dL Final 12/12/2016 12:12 PM [facility]   Bilirubin Total 0.5  0.2 - 1.3 mg/dL Final 12/12/2016 12:12 PM [facility]   Albumin 3.6  3.4 - 5.0 g/dL Final 12/12/2016 12:12 PM [facility]   Protein Total 7.1  6.8 - 8.8 g/dL Final 12/12/2016 12:12 PM [facility]   Alkaline Phosphatase 110  40 - 150 U/L Final 12/12/2016 12:12 PM [facility]   ALT 22  0 - 50 U/L Final 12/12/2016 12:12 PM [facility]   AST 14  0 - 45 U/L Final 12/12/2016 12:12 PM [facility]            Labs Ordered and Resulted from Time of ED Arrival Up to the Time of Departure from the ED   UA MACROSCOPIC WITH REFLEX TO MICRO AND CULTURE - Abnormal; Notable for the following:        Result Value    Blood Urine Moderate (*)     Protein Albumin Urine 30 (*)     Leukocyte Esterase Urine Large (*)     RBC Urine 44 (*)     WBC Urine >182 (*)     WBC Clumps Present (*)     Bacteria Urine Few (*)     Squamous Epithelial /HPF Urine 5 (*)     Mucous Urine Present (*)     Trichomonas Present (*)     All other components within normal limits   WET PREP - Abnormal; Notable for the following:      Wet Prep   (*)     Value: Many WBC'S seen  Trichomonas seen  No clue cells seen  No yeast seen      All other components within normal limits       Medications   metroNIDAZOLE (FLAGYL) tablet 2,000 mg (2,000 mg Oral Given 4/3/17 7832)   TH Cipro 500 mg 1 tab po BID 2 tabs 0RF    Assessments & Plan (with Medical Decision Making)     I have reviewed the nursing notes.    I have reviewed the findings, diagnosis, plan and need for follow up with the patient.    Discharge Medication List as of 4/3/2017 10:53 PM      START taking these medications    Details   ciprofloxacin (CIPRO) 500 MG tablet Take 1 tablet (500 mg) by mouth 2 times daily for 5 days, Disp-10 tablet, R-0, E-Prescribe             Final diagnoses:   Trichimoniasis   Acute cystitis with hematuria   Screening examination for venereal disease - G/C pending         After discussing the Wet Prep results with the pt, she then said her last sexual contact was 9 years ago, which later changed to approx 1 year ago.    F/U with PCP in 3-4 days for reevaluation and to go over further test results.  Pt thinks at this f/u she will have further blood STD labs done.    Patient verbally educated and given appropriate education sheets for the diagnoses and has no questions.  Take medications as directed.   if fever concerns develop, return to the ER  Use condoms for all sexual contact!    Arminda Huddleston Certified  Physician Assistant  4/4/2017  10:57 AM  URGENT CARE CLINIC      4/3/2017   [facility]     Arminda Huddleston PA  04/04/17 [id number]

## 2017-04-05 LAB
BACTERIA SPEC CULT: ABNORMAL
C TRACH DNA SPEC QL NAA+PROBE: NORMAL
MICRO REPORT STATUS: ABNORMAL
N GONORRHOEA DNA SPEC QL NAA+PROBE: NORMAL
SPECIMEN SOURCE: ABNORMAL
SPECIMEN SOURCE: NORMAL
SPECIMEN SOURCE: NORMAL

## 2017-04-07 ENCOUNTER — HOSPITAL ENCOUNTER (EMERGENCY)
Facility: HOSPITAL | Age: 77
Discharge: HOME | End: 2017-04-07
Attending: NURSE PRACTITIONER | Admitting: NURSE PRACTITIONER
Payer: MEDICARE

## 2017-04-07 VITALS — OXYGEN SATURATION: 96 % | TEMPERATURE: 97.9 F | SYSTOLIC BLOOD PRESSURE: 126 MMHG | DIASTOLIC BLOOD PRESSURE: 63 MMHG

## 2017-04-07 DIAGNOSIS — Z11.3 SCREEN FOR STD (SEXUALLY TRANSMITTED DISEASE): ICD-10-CM

## 2017-04-07 LAB
ALBUMIN UR-MCNC: NEGATIVE MG/DL
APPEARANCE UR: ABNORMAL
BACTERIA #/AREA URNS HPF: ABNORMAL /HPF
BILIRUB UR QL STRIP: NEGATIVE
COLOR UR AUTO: ABNORMAL
GLUCOSE UR STRIP-MCNC: NEGATIVE MG/DL
HGB UR QL STRIP: NEGATIVE
KETONES UR STRIP-MCNC: NEGATIVE MG/DL
LEUKOCYTE ESTERASE UR QL STRIP: ABNORMAL
MUCOUS THREADS #/AREA URNS LPF: PRESENT /LPF
NITRATE UR QL: NEGATIVE
PH UR STRIP: 5.5 PH (ref 4.7–8)
RBC #/AREA URNS AUTO: 0 /HPF (ref 0–2)
SP GR UR STRIP: 1.01 (ref 1–1.03)
SQUAMOUS #/AREA URNS AUTO: 14 /HPF (ref 0–1)
URN SPEC COLLECT METH UR: ABNORMAL
UROBILINOGEN UR STRIP-MCNC: NORMAL MG/DL (ref 0–2)
WBC #/AREA URNS AUTO: 2 /HPF (ref 0–2)

## 2017-04-07 PROCEDURE — 99212 OFFICE O/P EST SF 10 MIN: CPT

## 2017-04-07 PROCEDURE — 81001 URINALYSIS AUTO W/SCOPE: CPT | Performed by: FAMILY MEDICINE

## 2017-04-07 PROCEDURE — 99212 OFFICE O/P EST SF 10 MIN: CPT | Performed by: NURSE PRACTITIONER

## 2017-04-07 NOTE — ED NOTES
Pt presents today alone for f/u after a UTI and is upset that she was checked for STD's previously andsays she hasn't been sexually active in at least 20 years.

## 2017-04-07 NOTE — ED AVS SNAPSHOT
[facility]    [address] [address]    Phone:  [phone]                                       Dinorah Lim   MRN: [id number]    Department:  [facility]   Date of Visit:  4/7/2017           Patient Information     Date Of Birth          1940        Your diagnoses for this visit were:     Screen for STD (sexually transmitted disease)        You were seen by Alma Magana NP.      Follow-up Information     Follow up with [facility].    Specialty:  EMERGENCY MEDICINE    Why:  As needed, If symptoms worsen, or concerns develop    Contact information:    [address]  [place] [id number] [phone]    Additional information:    From [facility]: Take [address]. Turn left at [address]. Turn left at the first stoplight on [address] [place] [address]. At the first stop sign, take a right onto [address]. Take a left into the parking lot and continue through until you reach the North enterance of the building.       From [place]: Take [address]. Take the [address] ramp towards [place]. Turn left onto [address]. Take a slight right onto [address]. Turn left at the first stoplight on [address]. At the first stop sign, take a right onto [address]. Take a left into the parking lot and continue through until you reach the North enterance of the building.       From [place]: Take [address]. Take a right at [address]. At the first stop sign, take a right onto [address]. Take a left into the parking lot and continue through until you reach the North enterance of the building.         Follow up with Magaly Sosa MD On 4/17/2017.    Specialty:  Family Practice    Why:  for follow up trichomonas testing    Contact information:    [facility] [place]  [address]  [place] [address]  [phone]        Future Appointments        Provider Department  [facility] Phone [place]    4/19/2017 8:00 AM Esteban Wills MD  ORTHOPEDICS [phone] [place]         Review of your medicines      Our records show that you are taking the medicines listed below. If these are incorrect, please call your family doctor or clinic.        Dose / Directions Last dose taken    albuterol (2.5 MG/3ML) 0.083% neb solution   Dose:  1 vial   Quantity:  1 Box        Take 1 vial (2.5 mg) by nebulization every 4 hours as needed for shortness of breath / dyspnea or wheezing   Refills:  0        BENADRYL ALLERGY PO   Dose:  25 mg   Indication:  takes with simvastatin        Take 25 mg by mouth nightly as needed   Refills:  0        ciprofloxacin 500 MG tablet   Commonly known as:  CIPRO   Dose:  500 mg   Quantity:  10 tablet        Take 1 tablet (500 mg) by mouth 2 times daily for 5 days   Refills:  0        clonazePAM 1 MG tablet   Commonly known as:  klonoPIN   Quantity:  45 tablet        TAKE ONE-FOURTH TO ONE-HALF TABLET BY MOUTH EVERY 8 HOURS AS NEEDED   Refills:  0        FISH OIL   Dose:  1 capsule        Take 1 capsule by mouth daily   Refills:  0        Garlic 10 MG Caps   Dose:  1 capsule        Take 1 capsule by mouth as needed   Refills:  0        HYDROcodone-acetaminophen 5-325 MG per tablet   Commonly known as:  NORCO   Quantity:  60 tablet        TAKE ONE TABLET BY MOUTH EVERY 4 TO 6 HOURS AS NEEDED FOR PAIN   Refills:  0        nabumetone 500 MG tablet   Commonly known as:  RELAFEN   Dose:  500-1000 mg   Quantity:  60 tablet        Take 1-2 tablets (500-1,000 mg) by mouth 2 times daily as needed for moderate pain   Refills:  1        PARoxetine 40 MG tablet   Commonly known as:  PAXIL   Dose:  40 mg   Quantity:  90 tablet        Take 1 tablet (40 mg) by mouth daily   Refills:  1        simvastatin 20 MG tablet   Commonly known as:  ZOCOR   Quantity:  90 tablet        TAKE ONE TABLET BY MOUTH ONCE DAILY AT BEDTIME   Refills:  0        VITAMIN D (CHOLECALCIFEROL) PO   Dose:   "2000 Units        Take 2,000 Units by mouth daily   Refills:  0        VITAMIN E   Dose:  1 capsule        Take 1 capsule by mouth daily   Refills:  0                Procedures and tests performed during your visit     UA with Microscopic reflex to Culture      Orders Needing Specimen Collection     None      Pending Results     No orders found from 2017 to 2017.            Pending Culture Results     No orders found from 2017 to 2017.            Thank you for choosing [facility]       Thank you for choosing [facility] for your care. Our goal is always to provide you with excellent care. Hearing back from our patients is one way we can continue to improve our services. Please take a few minutes to complete the written survey that you may receive in the mail after you visit with us. Thank you!        [facility] Information     [facility] lets you send messages to your doctor, view your test results, renew your prescriptions, schedule appointments and more. To sign up, go to [email or web address] . Click on \"Log in\" on the left side of the screen, which will take you to the Welcome page. Then click on \"Sign up Now\" on the right side of the page.     You will be asked to enter the access code listed below, as well as some personal information. Please follow the directions to create your username and password.     Your access code is: [email or web address]  Expires: 2017  8:54 PM     Your access code will  in 90 days. If you need help or a new code, please call your [facility] clinic or [phone].        Care EveryWhere ID     This is your Care EveryWhere ID. This could be used by other organizations to access your [facility] medical records  [id number]        After Visit Summary       This is your record. Keep this with you and show to your community pharmacist(s) and doctor(s) at your next visit.                  "

## 2017-04-07 NOTE — ED AVS SNAPSHOT
[facility]    [address] [place] [address]    Phone:  [phone]                                       Dinorah Lim   MRN: [id number]    Department:  [facility]   Date of Visit:  4/7/2017           After Visit Summary Signature Page     I have received my discharge instructions, and my questions have been answered. I have discussed any challenges I see with this plan with the nurse or doctor.    ..........................................................................................................................................  Patient/Patient Representative Signature      ..........................................................................................................................................  Patient Representative Print Name and Relationship to Patient    ..................................................               ................................................  Date                                            Time    ..........................................................................................................................................  Reviewed by Signature/Title    ...................................................              ..............................................  Date                                                            Time

## 2017-04-08 ASSESSMENT — ENCOUNTER SYMPTOMS
APPETITE CHANGE: 0
CHILLS: 0
DYSURIA: 0
FREQUENCY: 0
FEVER: 0
HEMATURIA: 0
FATIGUE: 0
NERVOUS/ANXIOUS: 1

## 2017-04-08 NOTE — ED PROVIDER NOTES
History     Chief Complaint   Patient presents with     UTI     Advised by Arminda to come and be followed up on UTI, symptoms resolved.     HPI  Dinorah Lim is a 76 year old female who presents today for a follow up from a visit on 4/3/17 where she was diagnosed with a UTI and trichomoniasis. She states that she never really had UTI symptoms, she just had vaginal discharge.  The vaginal discharge has resolved.  She notes that she was appalled and is still overwhelmed that she was diagnosed with a STI as she has not had any kind of sexual activity since 1999.  After questioning this includes all types of sexual activity.  We had a lengthy discussion about trichomonas.  She was advised to return to test of cure.  Up to Date recommends test of cure to be done 2 weeks after treatment and should be done with a Nucleic Acid Amplification Test.  This is a send out lab.       I have reviewed the Medications, Allergies, Past Medical and Surgical History, and Social History in the Epic system.    Review of Systems   Constitutional: Negative for appetite change, chills, fatigue and fever.   Genitourinary: Negative for dysuria, frequency, genital sores, hematuria, pelvic pain, urgency, vaginal bleeding, vaginal discharge and vaginal pain.   Psychiatric/Behavioral: The patient is nervous/anxious.        Physical Exam   BP: 126/63  Heart Rate: 80  Temp: 97.9  F (36.6  C)  SpO2: 96 %    Physical Exam   Constitutional: She is oriented to person, place, and time. She appears well-developed and well-nourished.   Cardiovascular: Normal rate.    Pulmonary/Chest: Effort normal.   Neurological: She is alert and oriented to person, place, and time.   Skin: Skin is warm and dry.   Psychiatric: Her mood appears anxious.   Upset about diagnosis of trichomoniasis.     Nursing note and vitals reviewed.      ED Course     ED Course     Procedures      Labs Ordered and Resulted from Time of ED Arrival Up to the Time of Departure from the  ED   ROUTINE UA WITH MICROSCOPIC REFLEX TO CULTURE - Abnormal; Notable for the following:        Result Value    Leukocyte Esterase Urine Small (*)     Bacteria Urine Few (*)     Squamous Epithelial /HPF Urine 14 (*)     Mucous Urine Present (*)     All other components within normal limits       Assessments & Plan (with Medical Decision Making)     I have reviewed the nursing notes.    I have reviewed the findings, diagnosis, plan and need for follow up with the patient.  ASSESSMENT / PLAN:  (Z11.3) Screen for STD (sexually transmitted disease)  Comment: advised patient that Up to Date recommends repeat testing for test of cure to be done 2 weeks post treatment with Nucleic Acid Amplification Test - this is a send out test and requires a specific culture swab to be obtained from our Microbiology department in lab  Plan: return to PCP or here 2 weeks post treatment (on 4/17/17 or after) for test of cure   UTI is resolving, patient has 2 more days of abx    May recheck ua at when returns for test of cure if symptoms continue or concerns      Discharge Medication List as of 4/7/2017  8:54 PM          Final diagnoses:   Screen for STD (sexually transmitted disease)       4/7/2017   [facility]     Alma Magana NP  04/08/17 0959

## 2017-04-19 ENCOUNTER — OFFICE VISIT (OUTPATIENT)
Dept: ORTHOPEDICS | Facility: OTHER | Age: 77
End: 2017-04-19
Attending: ORTHOPAEDIC SURGERY
Payer: MEDICARE

## 2017-04-19 ENCOUNTER — HOSPITAL ENCOUNTER (EMERGENCY)
Facility: HOSPITAL | Age: 77
Discharge: HOME | End: 2017-04-19
Attending: NURSE PRACTITIONER | Admitting: NURSE PRACTITIONER
Payer: MEDICARE

## 2017-04-19 VITALS
SYSTOLIC BLOOD PRESSURE: 119 MMHG | TEMPERATURE: 97.8 F | DIASTOLIC BLOOD PRESSURE: 61 MMHG | HEART RATE: 67 BPM | RESPIRATION RATE: 20 BRPM | OXYGEN SATURATION: 97 %

## 2017-04-19 VITALS
TEMPERATURE: 97.4 F | OXYGEN SATURATION: 95 % | WEIGHT: 230 LBS | BODY MASS INDEX: 39.27 KG/M2 | HEART RATE: 79 BPM | HEIGHT: 64 IN | SYSTOLIC BLOOD PRESSURE: 120 MMHG | DIASTOLIC BLOOD PRESSURE: 80 MMHG

## 2017-04-19 DIAGNOSIS — I89.0 LYMPHEDEMA OF RIGHT LOWER EXTREMITY: ICD-10-CM

## 2017-04-19 DIAGNOSIS — Z11.3 SCREEN FOR STD (SEXUALLY TRANSMITTED DISEASE): ICD-10-CM

## 2017-04-19 DIAGNOSIS — M77.8 LEFT ELBOW TENDINITIS: ICD-10-CM

## 2017-04-19 DIAGNOSIS — M16.11 PRIMARY OSTEOARTHRITIS OF RIGHT HIP: Primary | ICD-10-CM

## 2017-04-19 LAB
ALBUMIN UR-MCNC: 10 MG/DL
APPEARANCE UR: CLEAR
BACTERIA #/AREA URNS HPF: ABNORMAL /HPF
BILIRUB UR QL STRIP: NEGATIVE
COLOR UR AUTO: YELLOW
GLUCOSE UR STRIP-MCNC: NEGATIVE MG/DL
HGB UR QL STRIP: NEGATIVE
KETONES UR STRIP-MCNC: NEGATIVE MG/DL
LEUKOCYTE ESTERASE UR QL STRIP: ABNORMAL
MUCOUS THREADS #/AREA URNS LPF: PRESENT /LPF
NITRATE UR QL: NEGATIVE
PH UR STRIP: 5 PH (ref 4.7–8)
RBC #/AREA URNS AUTO: 1 /HPF (ref 0–2)
SP GR UR STRIP: 1.02 (ref 1–1.03)
SQUAMOUS #/AREA URNS AUTO: 4 /HPF (ref 0–1)
URN SPEC COLLECT METH UR: ABNORMAL
UROBILINOGEN UR STRIP-MCNC: NORMAL MG/DL (ref 0–2)
WBC #/AREA URNS AUTO: 3 /HPF (ref 0–2)

## 2017-04-19 PROCEDURE — 99213 OFFICE O/P EST LOW 20 MIN: CPT | Performed by: NURSE PRACTITIONER

## 2017-04-19 PROCEDURE — 87661 TRICHOMONAS VAGINALIS AMPLIF: CPT | Performed by: NURSE PRACTITIONER

## 2017-04-19 PROCEDURE — 87086 URINE CULTURE/COLONY COUNT: CPT | Performed by: NURSE PRACTITIONER

## 2017-04-19 PROCEDURE — 99213 OFFICE O/P EST LOW 20 MIN: CPT | Performed by: ORTHOPAEDIC SURGERY

## 2017-04-19 PROCEDURE — 81001 URINALYSIS AUTO W/SCOPE: CPT | Performed by: NURSE PRACTITIONER

## 2017-04-19 RX ORDER — SULINDAC 200 MG/1
200 TABLET ORAL 2 TIMES DAILY WITH MEALS
Qty: 60 TABLET | Refills: 2 | Status: SHIPPED | OUTPATIENT
Start: 2017-04-19 | End: 2017-05-24

## 2017-04-19 ASSESSMENT — ENCOUNTER SYMPTOMS
FREQUENCY: 0
PSYCHIATRIC NEGATIVE: 1
DIARRHEA: 0
HEMATURIA: 0
APPETITE CHANGE: 0
NAUSEA: 0
ACTIVITY CHANGE: 0
VOMITING: 0
CHILLS: 0
FEVER: 0
ABDOMINAL PAIN: 0
FLANK PAIN: 0
DYSURIA: 0
TROUBLE SWALLOWING: 0

## 2017-04-19 ASSESSMENT — PAIN SCALES - GENERAL: PAINLEVEL: SEVERE PAIN (7)

## 2017-04-19 NOTE — ED AVS SNAPSHOT
[facility]    [address] [address]    Phone:  [phone]                                       Dinorah Lim   MRN: [id number]    Department:  [facility]   Date of Visit:  4/19/2017           After Visit Summary Signature Page     I have received my discharge instructions, and my questions have been answered. I have discussed any challenges I see with this plan with the nurse or doctor.    ..........................................................................................................................................  Patient/Patient Representative Signature      ..........................................................................................................................................  Patient Representative Print Name and Relationship to Patient    ..................................................               ................................................  Date                                            Time    ..........................................................................................................................................  Reviewed by Signature/Title    ...................................................              ..............................................  Date                                                            Time

## 2017-04-19 NOTE — ED PROVIDER NOTES
History     Chief Complaint   Patient presents with     UTI     The history is provided by the patient. No  was used.     Dinorah Lim is a 76 year old female who presents for follow up from  on 4-7-17. She reports she was directed to report to urgent care in 2 weeks for a swab to be sure trichomoniasis has cleared. She is not currently having vaginal discharge or urine symptoms. She was positive for trichomoniasis on wet prep on 4-3-17 and treated with Metronidazole 2 grams orally. She reports that she hasn't been sexually active for 10 years since her  passed away. She feels this is a false finding. Denies fever, chills, or night sweats. Eating and drinking well. Bowel and bladder are working well.     I have reviewed the Medications, Allergies, Past Medical and Surgical History, and Social History in the Epic system.    Review of Systems   Constitutional: Negative for activity change, appetite change, chills and fever.   HENT: Negative for trouble swallowing.    Gastrointestinal: Negative for abdominal pain, diarrhea, nausea and vomiting.   Genitourinary: Negative for dysuria, flank pain, frequency, hematuria, urgency, vaginal bleeding, vaginal discharge and vaginal pain.   Skin: Negative for rash.   Psychiatric/Behavioral: Negative.        Physical Exam   BP: 119/61  Pulse: 67  Temp: 97.8  F (36.6  C)  Resp: 20  SpO2: 97 %  Physical Exam   Constitutional: She is oriented to person, place, and time. She appears well-developed and well-nourished. No distress.   HENT:   Head: Normocephalic.   Mouth/Throat: Oropharynx is clear and moist. No oropharyngeal exudate.   Neck: Normal range of motion. Neck supple.   Cardiovascular: Normal rate, regular rhythm and normal heart sounds.    No murmur heard.  Pulmonary/Chest: Effort normal. No respiratory distress. She has no wheezes. She has no rales.   Abdominal: Soft. She exhibits no distension. There is no tenderness. There is no rebound  and no guarding.   Genitourinary: Vagina normal. No vaginal discharge found.   Genitourinary Comments: Vaginal exam completed with a swab obtained per lab for nucleic acid amplification testing. No cervical wall tenderness. Observer Belem SINCLAIR LPN in room. Negative bilateral CVA tenderness.    Musculoskeletal: Normal range of motion.   Lymphadenopathy:     She has no cervical adenopathy.   Neurological: She is alert and oriented to person, place, and time.   Skin: Skin is warm and dry. No rash noted. She is not diaphoretic.   Psychiatric: She has a normal mood and affect. Her behavior is normal.   She expresses frustration as she doesn't feel previous testing with positive trichomoniasis result is right.    Nursing note and vitals reviewed.      ED Course     ED Course     Procedures  Results for orders placed or performed during the hospital encounter of 04/19/17   UA reflex to Microscopic and Culture   Result Value Ref Range    Color Urine Yellow     Appearance Urine Clear     Glucose Urine Negative NEG mg/dL    Bilirubin Urine Negative NEG    Ketones Urine Negative NEG mg/dL    Specific Gravity Urine 1.020 1.003 - 1.035    Blood Urine Negative NEG    pH Urine 5.0 4.7 - 8.0 pH    Protein Albumin Urine 10 (A) NEG mg/dL    Urobilinogen mg/dL Normal 0.0 - 2.0 mg/dL    Nitrite Urine Negative NEG    Leukocyte Esterase Urine Small (A) NEG    Source Midstream Urine     RBC Urine 1 0 - 2 /HPF    WBC Urine 3 (H) 0 - 2 /HPF    Bacteria Urine None (A) NEG /HPF    Squamous Epithelial /HPF Urine 4 (H) 0 - 1 /HPF    Mucous Urine Present (A) NEG /LPF   Urine Culture Aerobic Bacterial   Result Value Ref Range    Specimen Description Midstream Urine     Culture Micro (A)      50,000 to 100,000 colonies/mL Mixed bacterial melissa No further identification or   sensitivity done      Micro Report Status FINAL 04/20/2017          Assessments & Plan (with Medical Decision Making)     Per chart review from 4-7-17, advised patient that Up  to Date recommends repeat testing for test of cure to be done 2 weeks post treatment with Nucleic Acid Amplification Test - this is a send out test and requires a specific culture swab to be obtained from our Microbiology department in lab.    Discussed plan of care. She is aware that swab testing completed today is a send out and she will be notified when results are available. She verbalized understanding. All questions answered.     I have reviewed the nursing notes.    I have reviewed the findings, diagnosis, plan and need for follow up with the patient.  Discharged in stable condition.     Discharge Medication List as of 4/19/2017 12:42 PM          Final diagnoses:   Screen for STD (sexually transmitted disease)     Swab obtain for 2 weeks post treatment for trichomoniasis.   Swab is a send out and we will notify you of results.   Take tylenol and or ibuprofen for discomfort. Follow dosing on package.   Increase fluid intake.   Follow up with PCP with any increase in symptoms or concerns.   Return to urgent care or emergency department with any increase in symptoms or concerns.     JEFERSON Mcleod  4/19/2017  10:44 AM  [facility]       Kenia Goodrich NP  04/20/17 [id number]

## 2017-04-19 NOTE — ED AVS SNAPSHOT
[facility]    [address]    [place] [address]    Phone:  [phone]                                       Dinorah Lim   MRN: [id number]    Department:  [facility]   Date of Visit:  4/19/2017           Patient Information     Date Of Birth          1940        Your diagnoses for this visit were:     Screen for STD (sexually transmitted disease)        You were seen by Kenia Goodrich NP.      Follow-up Information     Follow up with Magaly Sosa MD.    Specialty:  Family Practice    Why:  As needed, If symptoms worsen    Contact information:    [facility] [place]  [address]  [place] [address] [phone]          Follow up with [facility].    Specialty:  EMERGENCY MEDICINE    Why:  As needed, If symptoms worsen    Contact information:    [address]  [place] [address] [phone]    Additional information:    From [place]: Take [address]. Turn left at [address]. Turn left at the first stoplight on [address]. At the first stop sign, take a right onto [address]. Take a left into the parking lot and continue through until you reach the North enterance of the building.       From [place]: Take [address]. Take the [address] ramp towards [place]. Turn left onto [address]. Take a slight right onto [address]. Turn left at the first stoplight on [address]. At the first stop sign, take a right onto [address]. Take a left into the parking lot and continue through until you reach the North enterance of the building.       From [place]: Take [address]. Take a right at [address]. At the first stop sign, take a right onto [address]. Take a left into the parking lot and continue through until you reach the North enterance of the building.         Discharge Instructions       Swab obtain for 2 weeks post treatment for  trichomoniasis.   Swab is a send out and we will notify you of results.   Take tylenol and or ibuprofen for discomfort. Follow dosing on package.   Increase fluid intake.   Follow up with PCP with any increase in symptoms or concerns.   Return to urgent care or emergency department with any increase in symptoms or concerns.     Discharge References/Attachments     VAGINAL INFECTION: TRICHOMONIASIS (ENGLISH)      Future Appointments        Provider Department Dept Phone Center    4/28/2017 8:00 AM [facility] [phone] [facility]    4/28/2017 8:20 AM Esteban Wills MD  ORTHOPEDICS [phone] [place]    5/24/2017 9:00 AM Esteban Wills MD  ORTHOPEDICS [phone] [facility]         Review of your medicines      Our records show that you are taking the medicines listed below. If these are incorrect, please call your family doctor or clinic.        Dose / Directions Last dose taken    albuterol (2.5 MG/3ML) 0.083% neb solution   Dose:  1 vial   Quantity:  1 Box        Take 1 vial (2.5 mg) by nebulization every 4 hours as needed for shortness of breath / dyspnea or wheezing   Refills:  0        BENADRYL ALLERGY PO   Dose:  25 mg   Indication:  takes with simvastatin        Take 25 mg by mouth nightly as needed   Refills:  0        clonazePAM 1 MG tablet   Commonly known as:  klonoPIN   Quantity:  45 tablet        TAKE ONE-FOURTH TO ONE-HALF TABLET BY MOUTH EVERY 8 HOURS AS NEEDED   Refills:  0        FISH OIL   Dose:  1 capsule        Take 1 capsule by mouth daily   Refills:  0        Garlic 10 MG Caps   Dose:  1 capsule        Take 1 capsule by mouth as needed   Refills:  0        HYDROcodone-acetaminophen 5-325 MG per tablet   Commonly known as:  NORCO   Quantity:  60 tablet        TAKE ONE TABLET BY MOUTH EVERY 4 TO 6 HOURS AS NEEDED FOR PAIN   Refills:  0        nabumetone 500 MG tablet   Commonly known as:  RELAFEN   Dose:  500-1000 mg   Quantity:  60 tablet         "Take 1-2 tablets (500-1,000 mg) by mouth 2 times daily as needed for moderate pain   Refills:  1        PARoxetine 40 MG tablet   Commonly known as:  PAXIL   Dose:  40 mg   Quantity:  90 tablet        Take 1 tablet (40 mg) by mouth daily   Refills:  1        simvastatin 20 MG tablet   Commonly known as:  ZOCOR   Quantity:  90 tablet        TAKE ONE TABLET BY MOUTH ONCE DAILY AT BEDTIME   Refills:  0        sulindac 200 MG tablet   Commonly known as:  CLINORIL   Dose:  200 mg   Quantity:  60 tablet        Take 1 tablet (200 mg) by mouth 2 times daily (with meals)   Refills:  2        VITAMIN D (CHOLECALCIFEROL) PO   Dose:  2000 Units        Take 2,000 Units by mouth daily   Refills:  0        VITAMIN E   Dose:  1 capsule        Take 1 capsule by mouth daily   Refills:  0                Procedures and tests performed during your visit     Procedure/Test Number of Times Performed    Trichomonas vaginalis DNA PCR 1    UA reflex to Microscopic and Culture 2    Urine Culture Aerobic Bacterial 1      Orders Needing Specimen Collection     None      Pending Results     No orders found from 4/17/2017 to 4/20/2017.            Pending Culture Results     No orders found from 4/17/2017 to 4/20/2017.            Thank you for choosing [facility]       Thank you for choosing [facility] for your care. Our goal is always to provide you with excellent care. Hearing back from our patients is one way we can continue to improve our services. Please take a few minutes to complete the written survey that you may receive in the mail after you visit with us. Thank you!        [employer] Information     [employer] lets you send messages to your doctor, view your test results, renew your prescriptions, schedule appointments and more. To sign up, go to [email or web address]hart . Click on \"Log in\" on the left side of the screen, which will take you to the Welcome page. Then click on \"Sign up Now\" on the right side of the page.     You will be asked to " enter the access code listed below, as well as some personal information. Please follow the directions to create your username and password.     Your access code is: [email or web address]  Expires: 2017  8:54 PM     Your access code will  in 90 days. If you need help or a new code, please call your [facility] clinic or [phone].        Care EveryWhere ID     This is your Care EveryWhere ID. This could be used by other organizations to access your [facility] medical records  [id number]        After Visit Summary       This is your record. Keep this with you and show to your community pharmacist(s) and doctor(s) at your next visit.

## 2017-04-19 NOTE — PROGRESS NOTES
"Chief complaints:  #1.  DJD right hip  #2.  Left elbow triceps insertion tendinopathy    PCP: Magaly Sosa M.D.    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    Right hip: She began having right hip pain of insidious onset in very early 2015.  She presented here for the 1st time for this problem on 1/27/17.  At that time she was ambulating with a cane.  The Stinchfield and impingement tests were both positive on the right.  X-rays taken on 1/16/17 showed moderate DJD consisting of coxa vara, joint space narrowing, coxa profunda, a surceal sign, osteophytes on both sides of the joint, and degenerative cysts on both sides of the joint.  She was prescribed Relafen and referred to interventional radiology for an intra-articular steroid injection.  She had 100% pain relief for 24 hours after the injection, but then fell on ice landing on her right hip bringing her pain back.  In a follow-up visit on 3/7/17 it was discovered she was only taking the Relafen once a day when she could've been taking it twice a day.  She was therefore encouraged to take it twice a day.  Today she reports that the increased Relafen dose only lessen her pain \"a little\".  At present she has lateral and anterolateral right hip pain provoked by weightbearing activities and hip rotation, and lessened by inactivity.  She is supplementing her Relafen with hydrocodone prescribed by her PCP.  She states she is not interested in surgical intervention on her hip.  She states she would rather live with the pain than have surgery.  She also adds that she is beginning to have similar symptoms in her left hip.    Left elbow: She began having left posterior elbow pain of insidious onset at the end of 2016.  She was evaluated here on 3/7/17 by Alireza PINA.  He referred her to physical therapy and encouraged her to get an elbow pad.  She states that her elbow is much better but not cured. "  She states she can get by with it as it is.    She claims she has chronic lymphedema in her lower extremities.  She states the edema is worse in the right leg than it has been in quite a while.    She also has been having problems with right knee pain in giving way.  Time did not permit addressing this new problem.  We will schedule an appointment to do that in the future.    Nothing else has changed with respect to her nonspinal musculoskeletal and neurologic review of systems since seen last. She has a back condition being followed elsewhere.    Examination: Healthy-appearing obese female in no obvious distress. She was not using a cane today.  Her right hip was tender to palpation over the anterolateral aspect of the joint as well as over the tip of the greater trochanter.  Leg lengths were equal.  The right hip had a positive impingement test and positive Stinchfield test.  She had mild edema in the right lower extremity.  Otherwise the neurovascular status of that limb was intact.    Her left hip also had a positive impingement test and positive Stinchfield test.    Her right elbow had no deformity or effusion.  Its range of motion was full and pain-free.  The neurovascular status of that limb was intact.    X-rays: 3 views of the right hip taken today show no changes compared with the films taken on 1/16/17, before her fall on the ice.  The degenerative changes are consistent with femoral acetabular impingement, pincer type  The AP pelvis view shows that she has identical degenerative changes in the left hip.  Plain films of the left elbow taken on 3/2/17 were reviewed.  They are negative.    Impression:  #1.  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  Triceps insertional tendinopathy left elbow, now minimally symptomatic  #3.  Chronic edema lower extremities  #4.  Left knee pain and giving way    Plan:  #1.  Right and Left hips: I recommended changing her NSAID from Relafen to  Sulindac.  We Discussed the possible risks of the medication. She agreed.  She was also referred to physical therapy. She'll return in 5 weeks for recheck. If she is not improved she would be interested in another right hip steroid injection.  #2.  Left elbow: No further intervention is needed at this time.  #3.  Chronic lymphedema: She asked if she is going to therapy for her hips that they include an edema evaluation and management of her legs.  #4.  Left knee: An appointment was scheduled to address this new problem with x-rays on arrival.

## 2017-04-19 NOTE — PATIENT INSTRUCTIONS
Take Sulindac (Clinoril) as prescribed, with meals.  Discontinue any other NSAIDs that you may be taking such as Advil, Aleve, Ibuprofen, Naproxen, etc..  It is ok to take over the counter Tylenol in an alternating pattern with this medication if needed.    Potential side effects to taking Sulindac include upset stomach, stomach pain, and heartburn (reflux).  Discontinue this medication should you experience these side effects and contact Dr. Wills.  Also, if you should notice any blood in your stool or black, tarry stools or decreased urination, please discontinue medication and contact our office.      Someone will contact you within the next 1-2 business days to schedule your first appointment with physical therapy.  If you have not heard from someone within 2 days, please contact our office.

## 2017-04-19 NOTE — NURSING NOTE
"Chief Complaint   Patient presents with     RECHECK     Follow up right hip pain.       Initial /80 (BP Location: Right arm, Patient Position: Chair, Cuff Size: Adult Regular)  Pulse 79  Temp 97.4  F (36.3  C) (Tympanic)  Ht 5' 4\" (1.626 m)  Wt 230 lb (104.3 kg)  SpO2 95%  BMI 39.48 kg/m2 Estimated body mass index is 39.48 kg/(m^2) as calculated from the following:    Height as of this encounter: 5' 4\" (1.626 m).    Weight as of this encounter: 230 lb (104.3 kg).  Medication Reconciliation: complete   Stacy Lofton LPN      "

## 2017-04-19 NOTE — MR AVS SNAPSHOT
After Visit Summary   4/19/2017    Dinorah Lim    MRN: [id number]           Patient Information     Date Of Birth          1940        Visit Information        Provider Department      4/19/2017 8:00 AM Esteban Wills MD  ORTHOPEDICS        Today's Diagnoses     Primary osteoarthritis of right hip    -  1    Left elbow tendinitis        Lymphedema of right lower extremity          Care Instructions    Take Sulindac (Clinoril) as prescribed, with meals.  Discontinue any other NSAIDs that you may be taking such as Advil, Aleve, Ibuprofen, Naproxen, etc..  It is ok to take over the counter Tylenol in an alternating pattern with this medication if needed.    Potential side effects to taking Sulindac include upset stomach, stomach pain, and heartburn (reflux).  Discontinue this medication should you experience these side effects and contact Dr. Wills.  Also, if you should notice any blood in your stool or black, tarry stools or decreased urination, please discontinue medication and contact our office.      Someone will contact you within the next 1-2 business days to schedule your first appointment with physical therapy.  If you have not heard from someone within 2 days, please contact our office.          Follow-ups after your visit        Additional Services     PHYSICAL THERAPY REFERRAL       *This therapy referral will be filtered to a centralized scheduling office at [facility] and the patient will receive a call to schedule an appointment at a [facility] location most convenient for them. *     [facility] provides Physical Therapy evaluation and treatment and many specialty services across the [facility] system.  If requesting a specialty program, please choose from the list below.    If you have not heard from the scheduling office within 2 business days, please call [phone] for all locations, with the exception of [place], please call  [phone].  Treatment: Evaluation & Treatment  Special Instructions/Modalities: evaluate and treat both hips and lower extremity lymphedema  Special Programs:     Please be aware that coverage of these services is subject to the terms and limitations of your health insurance plan.  Call member services at your health plan with any benefit or coverage questions.      **Note to Provider:  If you are referring outside of [employer] for the therapy appointment, please list the name of the location in the  special instructions  above, print the referral and give to the patient to schedule the appointment.                  Follow-up notes from your care team     Return in about 5 weeks (around 5/24/2017).      Your next 10 appointments already scheduled     Apr 28, 2017  8:00 AM CDT   [facility]    [address] [facility] [address]   [phone]            Apr 28, 2017  8:20 AM CDT   (Arrive by 8:05 AM)   New Visit with Esteban Wills MD    ORTHOPEDICS ([facility])    [address]  [facility] [address] [phone]            May 24, 2017  9:00 AM CDT   (Arrive by 8:45 AM)   Return Visit with Esteban Wills MD    ORTHOPEDICS ([facility])    [address] [facility] [address] [phone]              Who to contact     If you have questions or need follow up information about today's clinic visit or your schedule please contact  ORTHOPEDICS directly at [phone].  Normal or non-critical lab and imaging results will be communicated to you by MyChart, letter or phone within 4 business days after the clinic has received the results. If you do not hear from us within 7 days, please contact the clinic through "[employer]hart or phone. If you have a critical or abnormal lab result, we will notify you by phone as soon as possible.  Submit refill requests through [employer] or call your pharmacy and they will forward the refill request  "to us. Please allow 3 business days for your refill to be completed.          Additional Information About Your Visit        aDeal[employer]hart Information     [employer] lets you send messages to your doctor, view your test results, renew your prescriptions, schedule appointments and more. To sign up, go to [email or web address] . Click on \"Log in\" on the left side of the screen, which will take you to the Welcome page. Then click on \"Sign up Now\" on the right side of the page.     You will be asked to enter the access code listed below, as well as some personal information. Please follow the directions to create your username and password.     Your access code is: [email or web address]  Expires: 2017  8:54 PM     Your access code will  in 90 days. If you need help or a new code, please call your [facility] clinic or [phone].        Care EveryWhere ID     This is your Care EveryWhere ID. This could be used by other organizations to access your [facility] medical records  [id number]        Your Vitals Were     Pulse Temperature Height Pulse Oximetry BMI (Body Mass Index)       79 97.4  F (36.3  C) (Tympanic) 5' 4\" (1.626 m) 95% 39.48 kg/m2        Blood Pressure from Last 3 Encounters:   17 120/80   17 126/63   17 153/69    Weight from Last 3 Encounters:   17 230 lb (104.3 kg)   17 226 lb (102.5 kg)   17 226 lb (102.5 kg)              We Performed the Following     PHYSICAL THERAPY REFERRAL     XR Hip Right G/E 2 Views     XR Pelvis 1/2 Views     XR Pelvis including Hip Right 2-3 Views(Clinic Performed)          Today's Medication Changes          These changes are accurate as of: 17  8:53 AM.  If you have any questions, ask your nurse or doctor.               Start taking these medicines.        Dose/Directions    sulindac 200 MG tablet   Commonly known as:  CLINORIL   Used for:  Primary osteoarthritis of right hip   Started by:  Esteban Wills MD        Dose:  200 mg   Take 1 " tablet (200 mg) by mouth 2 times daily (with meals)   Quantity:  60 tablet   Refills:  2            Where to get your medicines      These medications were sent to [id number] - [place], [place] - [address]   [address] , [place] [address]     Phone:  [phone]     sulindac 200 MG tablet                Primary Care Provider Office Phone # Fax #    Magaly Sosa [phone]       [facility] [place] [address] [place] [address]        Thank you!     Thank you for choosing  ORTHOPEDICS  for your care. Our goal is always to provide you with excellent care. Hearing back from our patients is one way we can continue to improve our services. Please take a few minutes to complete the written survey that you may receive in the mail after your visit with us. Thank you!             Your Updated Medication List - Protect others around you: Learn how to safely use, store and throw away your medicines at www.disposemymeds.org.          This list is accurate as of: 4/19/17  8:53 AM.  Always use your most recent med list.                   Brand Name Dispense Instructions for use    albuterol (2.5 MG/3ML) 0.083% neb solution     1 Box    Take 1 vial (2.5 mg) by nebulization every 4 hours as needed for shortness of breath / dyspnea or wheezing       BENADRYL ALLERGY PO      Take 25 mg by mouth nightly as needed       clonazePAM 1 MG tablet    klonoPIN    45 tablet    TAKE ONE-FOURTH TO ONE-HALF TABLET BY MOUTH EVERY 8 HOURS AS NEEDED       FISH OIL      Take 1 capsule by mouth daily       Garlic 10 MG Caps      Take 1 capsule by mouth as needed       HYDROcodone-acetaminophen 5-325 MG per tablet    NORCO    60 tablet    TAKE ONE TABLET BY MOUTH EVERY 4 TO 6 HOURS AS NEEDED FOR PAIN       nabumetone 500 MG tablet    RELAFEN    60 tablet    Take 1-2 tablets (500-1,000 mg) by mouth 2 times daily as needed for moderate pain       PARoxetine 40 MG tablet    PAXIL    90 tablet    Take 1 tablet  (40 mg) by mouth daily       simvastatin 20 MG tablet    ZOCOR    90 tablet    TAKE ONE TABLET BY MOUTH ONCE DAILY AT BEDTIME       sulindac 200 MG tablet    CLINORIL    60 tablet    Take 1 tablet (200 mg) by mouth 2 times daily (with meals)       VITAMIN D (CHOLECALCIFEROL) PO      Take 2,000 Units by mouth daily       VITAMIN E      Take 1 capsule by mouth daily

## 2017-04-19 NOTE — DISCHARGE INSTRUCTIONS
Swab obtain for 2 weeks post treatment for trichomoniasis.   Swab is a send out and we will notify you of results.   Take tylenol and or ibuprofen for discomfort. Follow dosing on package.   Increase fluid intake.   Follow up with PCP with any increase in symptoms or concerns.   Return to urgent care or emergency department with any increase in symptoms or concerns.

## 2017-04-20 LAB
BACTERIA SPEC CULT: ABNORMAL
MICRO REPORT STATUS: ABNORMAL
SPECIMEN SOURCE: ABNORMAL
SPECIMEN SOURCE: NORMAL
T VAGINALIS DNA SPEC QL NAA+PROBE: NORMAL

## 2017-04-24 ENCOUNTER — HOSPITAL ENCOUNTER (OUTPATIENT)
Dept: OCCUPATIONAL THERAPY | Facility: HOSPITAL | Age: 77
Setting detail: THERAPIES SERIES
End: 2017-04-24
Attending: ORTHOPAEDIC SURGERY
Payer: MEDICARE

## 2017-04-24 PROCEDURE — G8990 OTHER PT/OT CURRENT STATUS: HCPCS | Mod: GO,CH

## 2017-04-24 PROCEDURE — 40000118 ZZH STATISTIC OT DEPT VISIT

## 2017-04-24 PROCEDURE — G8991 OTHER PT/OT GOAL STATUS: HCPCS | Mod: GO,CH

## 2017-04-24 PROCEDURE — G8992 OTHER PT/OT  D/C STATUS: HCPCS | Mod: GO,CH

## 2017-04-24 PROCEDURE — 97165 OT EVAL LOW COMPLEX 30 MIN: CPT | Mod: GO

## 2017-04-24 NOTE — PROGRESS NOTES
04/24/17 0800   Quick Adds   Quick Adds Certification   Rehab Discipline   Discipline OT   Type of Visit   Type of visit Initial Edema Evaluation   General Information   Start of care 04/24/17   Referring physician Dr. Wills   Orders Evaluate and treat as indicated   Order date 04/20/17   Medical diagnosis Bilateral Lower Extremity primary Lymphedema   Edema onset 01/28/16   Affected body parts LLE;RLE   Edema etiology Congenital   Edema etiology comments Pt's mother had lymphedema she thinks but she is not sure.  She doesn't know why her own legs became swollen last year.     Pertinent history of current problem (PT: include personal factors and/or comorbidities that impact the POC; OT: include additional occupational profile info) Pt is unaware of why her swelling began.  She is unaware of any problems with her kidnies although remembers seeing Dr. Vilchis.  Pt came for OT evaluation  of lymphedmea last year, but did not return.  She reports she had purchased all the bandages, couldn't get into OT and returned the bandages.  Chart review shows she did have an appointment for follow up in OT but did not come.     Surgical / medical history reviewed Yes   Prior level of functional mobility independent   Prior treatment PAUL hose   Community support Family / friend caregiver  (lives with her grandson)   Patient role / employment history Employed   Psychosocial concerns Depression;Labile;Impaired body image   Living environment [facility]   Fall Screening   Fall screen completed by OT   Per patient, fall 2 or more times in past year? Yes   Per patient, fall with injury in past year? Yes  (fell on right hip and had pain, no fx)   Timed Up and Go score (seconds) 9   Is patient a fall risk? No   Subjective Report   Patient report of symptoms pain in legs, swelling   Precautions   Precautions comments no precautions   Patient / Family Goals   Patient / family goals statement To get the pain out of my legs,  especially in my hip   Pain   Patient currently in pain Yes   Pain location throughout legs   Pain rating 7/10   Pain description Ache   Pain comments Advised pt that her pain is more likely d/t her OA than lymphedmea, and the likelihood of reducing her pain with CDT is poor   Cognitive Status   Orientation Orientation to person, place and time   Level of consciousness Alert   Follows commands and answers questions 100% of the time   Cognitive status comments formal OT cognitive eval not completed, but no obvious problems   Edema Exam / Assessment   Skin condition Intact   Girth Measurements   Girth Measurements Refer to separate girth measurement flowsheet   Volume LE   Right LE (mL) 10,946,55,  improved 988.8 ml since 2016   Left LE (mL) 11,718.58, improved 381.92 ml since 2016   LE volume comparison LLE volume greater than RLE volume   Clinical Impression   Criteria for skilled therapeutic intervention met No problems identified which require skilled intervention   Therapy diagnosis pain in bilateral legs   Clinical impression comments Last year, pt had an 11# weight gain and c/o swelling in her legs.  This year she has had a 9# weight loss and a significan improvement in the girth of her right leg and a fair improvement in the girth of her left leg.  At this point, I no longer believe pt has lymphedema, but obsity.  She has not used any compression or done any treatment for lymphedema in the last year.  If it were lymphedema, would suspect her girth measurements would have worsened or remained the same.   1x Eval Only-Oupatient/Observation Medicare G-Code   G-code Other PT/OT Primary Functional Limitation   Other PT/OT Primary Functional Limitation   Other PT/OT Primary Functional Limitation: Current Status , Goal , Discharge -[place] Only- Modifier the same for all G-codes CH: 0% impairment   Other PT/OT Primary Functional Limitation: Current & Discharge Modifier Rationale- Eval Only measurement of  leg girth   Certification   Certification date from 04/24/17   Certification date to 04/24/17   Medical Diagnosis l/e pain   Certification I certify the need for these services furnished under this plan of treatment and while under my care.  (Physician co-signature of this document indicates review and certification of the therapy plan).

## 2017-04-28 ENCOUNTER — OFFICE VISIT (OUTPATIENT)
Dept: ORTHOPEDICS | Facility: OTHER | Age: 77
End: 2017-04-28
Attending: ORTHOPAEDIC SURGERY
Payer: MEDICARE

## 2017-04-28 ENCOUNTER — APPOINTMENT (OUTPATIENT)
Dept: GENERAL RADIOLOGY | Facility: OTHER | Age: 77
End: 2017-04-28
Attending: ORTHOPAEDIC SURGERY
Payer: MEDICARE

## 2017-04-28 VITALS
SYSTOLIC BLOOD PRESSURE: 120 MMHG | OXYGEN SATURATION: 97 % | DIASTOLIC BLOOD PRESSURE: 64 MMHG | BODY MASS INDEX: 38.65 KG/M2 | WEIGHT: 226.4 LBS | HEART RATE: 67 BPM | TEMPERATURE: 97.6 F | HEIGHT: 64 IN

## 2017-04-28 DIAGNOSIS — M23.91 LOCKING OF RIGHT KNEE: ICD-10-CM

## 2017-04-28 DIAGNOSIS — M25.561 ACUTE PAIN OF RIGHT KNEE: Primary | ICD-10-CM

## 2017-04-28 PROCEDURE — 73564 X-RAY EXAM KNEE 4 OR MORE: CPT | Mod: TC,RT

## 2017-04-28 PROCEDURE — 99213 OFFICE O/P EST LOW 20 MIN: CPT | Performed by: ORTHOPAEDIC SURGERY

## 2017-04-28 PROCEDURE — 99213 OFFICE O/P EST LOW 20 MIN: CPT

## 2017-04-28 ASSESSMENT — PAIN SCALES - GENERAL: PAINLEVEL: EXTREME PAIN (9)

## 2017-04-28 NOTE — NURSING NOTE
"Chief Complaint   Patient presents with     Musculoskeletal Problem     New problem of right knee pain.       Initial /64 (BP Location: Right arm, Patient Position: Chair, Cuff Size: Adult Regular)  Pulse 67  Temp 97.6  F (36.4  C) (Tympanic)  Ht 5' 4\" (1.626 m)  Wt 226 lb 6.4 oz (102.7 kg)  SpO2 97%  BMI 38.86 kg/m2 Estimated body mass index is 38.86 kg/(m^2) as calculated from the following:    Height as of this encounter: 5' 4\" (1.626 m).    Weight as of this encounter: 226 lb 6.4 oz (102.7 kg).  Medication Reconciliation: complete   Stacy Lofton LPN      "

## 2017-04-28 NOTE — PATIENT INSTRUCTIONS
The X-ray Department of this [facility] will call you within 2 business days to schedule an MRI.  If you do not hear from them by the 3rd business day, call our office at [phone].

## 2017-04-28 NOTE — MR AVS SNAPSHOT
After Visit Summary   4/28/2017    Dinorah Lim    MRN: [id number]           Patient Information     Date Of Birth          1940        Visit Information        Provider Department      4/28/2017 8:20 AM Esteban Wills MD  ORTHOPEDICS        Today's Diagnoses     Acute pain of right knee    -  1    Locking of right knee          Care Instructions    The X-ray Department of this [facility] will call you within 2 business days to schedule an MRI.  If you do not hear from them by the 3rd business day, call our office at [phone].          Follow-ups after your visit        Follow-up notes from your care team     Return in about 2 weeks (around 5/12/2017).      Your next 10 appointments already scheduled     May 01, 2017  7:00 AM CDT   Evaluation with Jessenia Garcia PT   [facility] ([facility] )    [address] [place] [id number] [phone]            May 10, 2017  8:00 AM CDT   (Arrive by 7:45 AM)   Return Visit with Esteban Wills MD    ORTHOPEDICS ([facility])    [address] [place] [address] [phone]            May 24, 2017  9:00 AM CDT   (Arrive by 8:45 AM)   Return Visit with Esteban Wills MD    ORTHOPEDICS ([facility])    [address] [place] [address]   [phone]              Who to contact     If you have questions or need follow up information about today's clinic visit or your schedule please contact  ORTHOPEDICS directly at [phone].  Normal or non-critical lab and imaging results will be communicated to you by MyChart, letter or phone within 4 business days after the clinic has received the results. If you do not hear from us within 7 days, please contact the clinic through [employer] or phone. If you have a critical or abnormal lab result, we will notify you by phone as soon as possible.  Submit refill requests through [employer] or call your pharmacy and they will forward the  "refill request to us. Please allow 3 business days for your refill to be completed.          Additional Information About Your Visit        MyChart Information     [employer] lets you send messages to your doctor, view your test results, renew your prescriptions, schedule appointments and more. To sign up, go to [email or web address] . Click on \"Log in\" on the left side of the screen, which will take you to the Welcome page. Then click on \"Sign up Now\" on the right side of the page.     You will be asked to enter the access code listed below, as well as some personal information. Please follow the directions to create your username and password.     Your access code is: [email or web address]  Expires: 2017  8:54 PM     Your access code will  in 90 days. If you need help or a new code, please call your [place] clinic or [phone].        Care EveryWhere ID     This is your [facility] EveryWhere ID. This could be used by other organizations to access your [place] medical records  [id number]        Your Vitals Were     Pulse Temperature Height Pulse Oximetry BMI (Body Mass Index)       67 97.6  F (36.4  C) (Tympanic) 5' 4\" (1.626 m) 97% 38.86 kg/m2        Blood Pressure from Last 3 Encounters:   17 120/64   17 119/61   17 120/80    Weight from Last 3 Encounters:   17 226 lb 6.4 oz (102.7 kg)   17 230 lb (104.3 kg)   17 226 lb (102.5 kg)              We Performed the Following     MR Knee Right w/o Contrast     XR KNEE RIGHT G/E 4 VIEWS (Clinic Performed)        Primary Care Provider Office Phone # Fax #    Magaly Sosa [phone]       [facility] [place] [address]  [place] [address]        Thank you!     Thank you for choosing  ORTHOPEDICS  for your care. Our goal is always to provide you with excellent care. Hearing back from our patients is one way we can continue to improve our services. Please take a few minutes to complete the written survey " that you may receive in the mail after your visit with us. Thank you!             Your Updated Medication List - Protect others around you: Learn how to safely use, store and throw away your medicines at www.disposemymeds.org.          This list is accurate as of: 4/28/17  8:51 AM.  Always use your most recent med list.                   Brand Name Dispense Instructions for use    albuterol (2.5 MG/3ML) 0.083% neb solution     1 Box    Take 1 vial (2.5 mg) by nebulization every 4 hours as needed for shortness of breath / dyspnea or wheezing       BENADRYL ALLERGY PO      Take 25 mg by mouth nightly as needed       clonazePAM 1 MG tablet    klonoPIN    45 tablet    TAKE ONE-FOURTH TO ONE-HALF TABLET BY MOUTH EVERY 8 HOURS AS NEEDED       FISH OIL      Take 1 capsule by mouth daily       Garlic 10 MG Caps      Take 1 capsule by mouth as needed       HYDROcodone-acetaminophen 5-325 MG per tablet    NORCO    60 tablet    TAKE ONE TABLET BY MOUTH EVERY 4 TO 6 HOURS AS NEEDED FOR PAIN       nabumetone 500 MG tablet    RELAFEN    60 tablet    Take 1-2 tablets (500-1,000 mg) by mouth 2 times daily as needed for moderate pain       PARoxetine 40 MG tablet    PAXIL    90 tablet    Take 1 tablet (40 mg) by mouth daily       simvastatin 20 MG tablet    ZOCOR    90 tablet    TAKE ONE TABLET BY MOUTH ONCE DAILY AT BEDTIME       sulindac 200 MG tablet    CLINORIL    60 tablet    Take 1 tablet (200 mg) by mouth 2 times daily (with meals)       VITAMIN D (CHOLECALCIFEROL) PO      Take 2,000 Units by mouth daily       VITAMIN E      Take 1 capsule by mouth daily

## 2017-04-28 NOTE — PROGRESS NOTES
Established Patient, New Problem  Chief Complaint: Right knee pain and locking  PCP: Magaly Sosa M.D.    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  She presents today with a one-year history of intermittent painful locking of the right knee.  She denies injury to the knee.  She is able to unlock the knee by applying a valgus stress to that joint.  After the locking episodes the knee will swell a little bit and will hurt (sharp grade 7) for a while.  The locking episodes are unpredictable and she has found nothing that prevents them.  She does not use an ambulatory appliance.    She has a history of osteoarthritis.  I have seen her in the past for that disease in the right hip.  She is not diabetic but is obese.  With respect to her musculoskeletal and neurologic review of systems since seen last, she admits to chronic burning discomfort and numbness in the plantar aspect of the right foot.  She has chronic right ankle pain.    Examination: Overweight female in no obvious distress.  Vital signs stable and afebrile. Normocephalic.  Eyes clear.  Respiratory effort normal.  Stance and gait normal without an appliance.  The skin around the right knee is intact and free of ecchymosis or erythema.  The knee has no effusion or deformity.  It is tender to palpation over the posterior lateral and mid medial joint lines.  Its range of motion is 0-125 .  It is stable to ligamentous stressing.  Laura's maneuvers are negative.  Extension strength is normal.  The dorsalis pedis and posterior tibial pulses on the right are barely palpable due to her obesity.  Light touch sensation is diminished in the plantar aspect of the right foot.    X-ray; plain films of the right knee taken today show mild to moderate lateral joint space narrowing with lateral osteophyte formation and a mild valgus deformity.    Impression: Degenerative meniscal tear right knee    Plan: We will get  an MRI of the right knee and I'll see her afterwards to discuss the results.

## 2017-05-01 ENCOUNTER — HOSPITAL ENCOUNTER (OUTPATIENT)
Dept: PHYSICAL THERAPY | Facility: HOSPITAL | Age: 77
Setting detail: THERAPIES SERIES
End: 2017-05-01
Attending: ORTHOPAEDIC SURGERY
Payer: MEDICARE

## 2017-05-01 DIAGNOSIS — M25.571 BILATERAL ANKLE PAIN, UNSPECIFIED CHRONICITY: Primary | ICD-10-CM

## 2017-05-01 DIAGNOSIS — M25.572 BILATERAL ANKLE PAIN, UNSPECIFIED CHRONICITY: Primary | ICD-10-CM

## 2017-05-01 PROCEDURE — 97110 THERAPEUTIC EXERCISES: CPT | Mod: GP | Performed by: PHYSICAL THERAPIST

## 2017-05-01 PROCEDURE — G8978 MOBILITY CURRENT STATUS: HCPCS | Mod: GP,CK | Performed by: PHYSICAL THERAPIST

## 2017-05-01 PROCEDURE — 97140 MANUAL THERAPY 1/> REGIONS: CPT | Mod: GP | Performed by: PHYSICAL THERAPIST

## 2017-05-01 PROCEDURE — 97161 PT EVAL LOW COMPLEX 20 MIN: CPT | Mod: GP | Performed by: PHYSICAL THERAPIST

## 2017-05-01 PROCEDURE — G8979 MOBILITY GOAL STATUS: HCPCS | Mod: GP,CI | Performed by: PHYSICAL THERAPIST

## 2017-05-01 ASSESSMENT — ACTIVITIES OF DAILY LIVING (ADL)
ROLLING_OVER_IN_BED: MODERATE DIFFICULTY
RECREATIONAL_ACTIVITIES: EXTREME DIFFICULTY
WALKING_UP_STEEP_HILLS: EXTREME DIFFICULTY
STEPPING_UP_AND_DOWN_CURBS: MODERATE DIFFICULTY
WALKING_INITIALLY: MODERATE DIFFICULTY
HOS_ADL_HIGHEST_POTENTIAL_SCORE: 64
DEEP_SQUATTING: EXTREME DIFFICULTY
HEAVY_WORK: MODERATE DIFFICULTY
GOING_DOWN_1_FLIGHT_OF_STAIRS: MODERATE DIFFICULTY
HOW_WOULD_YOU_RATE_YOUR_CURRENT_LEVEL_OF_FUNCTION_DURING_YOUR_USUAL_ACTIVITIES_OF_DAILY_LIVING_FROM_0_TO_100_WITH_100_BEING_YOUR_LEVEL_OF_FUNCTION_PRIOR_TO_YOUR_HIP_PROBLEM_AND_0_BEING_THE_INABILITY_TO_PERFORM_ANY_OF_YOUR_USUAL_DAILY_ACTIVITIES?: 50
HOS_ADL_SCORE(%): 45.31
PUTTING_ON_SOCKS_AND_SHOES: MODERATE DIFFICULTY
TWISTING/PIVOTING_ON_INVOLVED_LEG: MODERATE DIFFICULTY
STANDING_FOR_15_MINUTES: MODERATE DIFFICULTY
WALKING_APPROXIMATELY_10_MINUTES: MODERATE DIFFICULTY
GETTING_INTO_AND_OUT_OF_AN_AVERAGE_CAR: MODERATE DIFFICULTY
HOS_ADL_COUNT: 16
HOS_ADL_ITEM_SCORE_TOTAL: 29
GOING_UP_1_FLIGHT_OF_STAIRS: EXTREME DIFFICULTY
WALKING_15_MINUTES_OR_GREATER: MODERATE DIFFICULTY
WALKING_DOWN_STEEP_HILLS: SLIGHT DIFFICULTY
LIGHT_TO_MODERATE_WORK: MODERATE DIFFICULTY
SITTING_FOR_15_MINUTES: MODERATE DIFFICULTY

## 2017-05-01 NOTE — PROGRESS NOTES
05/01/17 1200   General Information   Type of Visit Initial OP Ortho PT Evaluation   Start of Care Date 05/01/17   Referring Physician Dr Wills   Patient/Family Goals Statement Decrease pain   Orders Evaluate and Treat   Date of Order 04/19/17   Insurance Type Medicare   Surgical/Medical history reviewed Yes   Precautions/Limitations no known precautions/limitations   Body Part(s)   Body Part(s) Hip   Presentation and Etiology   Pertinent history of current problem (include personal factors and/or comorbidities that impact the POC) Patient reports chronic history of B hip pain R>L that she attributes to arthritis. She also has B ankle/foot pain that she also attributes to arthritis. She was previously seen for elbow pain that has improved significantly   Impairments A. Pain;D. Decreased ROM;E. Decreased flexibility;F. Decreased strength and endurance;H. Impaired gait   Functional Limitations perform activities of daily living   Symptom Location B hips R>L, B ankles   How/Where did it occur From Degenerative Joint Disease;From insidious onset   Onset date of current episode/exacerbation 04/19/17   Chronicity Chronic   Pain rating (0-10 point scale) Best (/10);Worst (/10)   Best (/10) 1   Worst (/10) 8   Pain quality C. Aching   Frequency of pain/symptoms B. Intermittent   Pain/symptoms are: Worse during the day   Pain/symptoms exacerbated by A. Sitting;B. Walking  (stairs)   Pain/symptoms eased by C. Rest;G. Heat   Progression of symptoms since onset: Worsened   Current / Previous Interventions   Diagnostic Tests: X-ray   X-ray Results (normal DJD)   Prior Level of Function   Prior Level of Function-Mobility IND   Prior Level of Function-ADLs IND   Current Level of Function   Patient role/employment history A. Employed   Employment Comments [employer]   Fall Risk Screen   Fall screen completed by PT   Per patient - Fall 2 or more times in past year? No   Per patient - Fall with injury in past year? No   Is patient a  fall risk? No   System Outcome Measures   Outcome Measures (Hip)   Hip Objective Findings   Side (if bilateral, select both right and left) Right   Observation no acute distress, slight trendelenburg walking in   Integumentary  WNL   Posture Rounded shoulders, obese female   Gait/Locomotion slightly antalgic   Balance/Proprioception (Single Leg Stance) impaired B   Lumbar ROM WNL + stretch in all planes   Pelvic Screen WNL   Gluteal Tendopathy Test +   Resisted Hip Adduction Test neg   Straight Leg Raise Test neg   Scour Test neg   KATY Test + limited motion and pain   FADIR Test neg   Hip Special Tests Comments Also screened B ankles and joint stiffness noted with slightly decreased motion all planes B equally likely due to arthritis - will put in order with physicain to determine if we can address that pain as well   Palpation TTP B hip trochanteric bursae, itbands and tfls and piriformis   Accessory Motion/Joint Mobility Limited capsular mobility likely due to arthritic changes lateral and posterior most noteable B   Right Hip Flexion PROM WNL + pinch at top   Right Hip Abduction PROM limited to 28 degrees + pull   Right Hip Adduction PROM WNL   Right Hip ER PROM limited to 35 degrees + pull   Right Hip IR PROM min almaguer + pull   Right Hip Ext PROM lakcs 5 degrees   Right Hip Flexion Strength 5/5   Right Hip Abduction Strength 4-/5   Right Hip Extension Strength 4-/5   Right Hip IR Strength 5-/5   Right Hip ER Strength 5-/5   Right Knee Flexion Strength 5/5   Right Knee Extension Strength 5-/5   Obers/ITB Flexibility hypo   Right Hamstring Flexibility hypo   Right Piriformis Flexibility hypo   Right Prone Quad Flexibility hypo   Planned Therapy Interventions   Planned Therapy Interventions joint mobilization;manual therapy;neuromuscular re-education;ROM;strengthening;stretching   Planned Modality Interventions   Planned Modality Interventions Comments as needed   Clinical Impression   Criteria for Skilled  Therapeutic Interventions Met yes, treatment indicated   PT Diagnosis B hip pain, DJD and trochanteric bursitis   Influenced by the following impairments pain and weakness   Functional limitations due to impairments difficulty walking, performing stairs and sitting for prolonged periods of time   Clinical Presentation Stable/Uncomplicated   Clinical Presentation Rationale due to lack of additional confounding factors/variables   Clinical Decision Making (Complexity) Low complexity   Therapy Frequency 2 times/Week   Predicted Duration of Therapy Intervention (days/wks) up to 8 visits over the course of 8 weeks   Risk & Benefits of therapy have been explained Yes   Patient, Family & other staff in agreement with plan of care Yes   Clinical Impression Comments Presentation is conssitent with B hip pain R>L associated with DJD and trochanteric bursitis   Education Assessment   Preferred Learning Style Demonstration   Barriers to Learning No barriers   ORTHO GOALS   PT Ortho Eval Goals 1;2;3   Ortho Goal 1   Goal Identifier STG 1   Goal Description Patient will report decreased worst PL in R hip to 6/10 or less in order to do stairs more safely   Target Date 05/29/17   Ortho Goal 2   Goal Identifier LTG 1   Goal Description Patient will report decreased worst PL in R hip to 4/10 or less in order to improve her gait and mobility   Target Date 06/26/17   Ortho Goal 3   Goal Identifier LTG 2   Goal Description Patient will demonstrate proper form with HEP in order to continue making gains IND at home to prevent future injury   Target Date 06/26/17   Total Evaluation Time   Total Evaluation Time 20 eval, 25 treat   Therapy Certification   Certification date from 05/01/17   Certification date to 06/26/17   Medical Diagnosis B hip pain R>L   I certify the need for these services furnished under this plan of treatment and while under my care. (Physician co-signature of this document indicates review and certification of the  therapy plan).

## 2017-05-02 ENCOUNTER — TELEPHONE (OUTPATIENT)
Dept: MRI IMAGING | Facility: HOSPITAL | Age: 77
End: 2017-05-02

## 2017-05-04 ENCOUNTER — TELEPHONE (OUTPATIENT)
Dept: PHYSICAL THERAPY | Facility: HOSPITAL | Age: 77
End: 2017-05-04

## 2017-05-04 NOTE — TELEPHONE ENCOUNTER
Hi Dr Wills,   I evaluated Dinorah earlier this week for hip pain and she was wondering if we could work on her B ankle pain as well - I briefly screened her ankles and it appears to be due to arthritis - likely not serious underlying pathology. I put in a PT order for you to sign if you feel it would be appropriate.    Thank you,   Jessenia Garcia DPT, OCS

## 2017-05-19 ENCOUNTER — HOSPITAL ENCOUNTER (OUTPATIENT)
Dept: PHYSICAL THERAPY | Facility: HOSPITAL | Age: 77
Setting detail: THERAPIES SERIES
End: 2017-05-19
Attending: ORTHOPAEDIC SURGERY
Payer: MEDICARE

## 2017-05-19 PROCEDURE — 97140 MANUAL THERAPY 1/> REGIONS: CPT | Mod: GP | Performed by: PHYSICAL THERAPIST

## 2017-05-22 ENCOUNTER — HOSPITAL ENCOUNTER (OUTPATIENT)
Dept: PHYSICAL THERAPY | Facility: HOSPITAL | Age: 77
Setting detail: THERAPIES SERIES
End: 2017-05-22
Attending: ORTHOPAEDIC SURGERY
Payer: MEDICARE

## 2017-05-22 PROCEDURE — 97140 MANUAL THERAPY 1/> REGIONS: CPT | Mod: GP

## 2017-05-22 PROCEDURE — 40000718 ZZHC STATISTIC PT DEPARTMENT ORTHO VISIT

## 2017-05-22 PROCEDURE — 97110 THERAPEUTIC EXERCISES: CPT | Mod: GP

## 2017-05-23 NOTE — PROGRESS NOTES
Outpatient Physical Therapy Progress Note     Patient: Dinorah Lim  : 1940    Beginning/End Dates of Reporting Period:  2017 to 2017    Referring Provider: Dr Wills    Therapy Diagnosis: R hip pain, B feet/ankle pain     Client Self Report: Pt reports she is sore in the ankles, but the bottoms of her feet feel much better after her first treatment session focusing on her feet/ankles. She was seen from 2:30-3pm.  States that her hips are feeling better and that her main concern is her feet at this time.  She reports she has been doing her hip exercises and that they are going fine.  She follows back up with her physician about her hips on 2017.    Objective Measurements:  Objective Measure: Ankle joint mobility and soft tissue mobility  Details: Patient felt less tightness today in the joint of the great toe and the soft tissue was more mobile. Pain has improved in B hips, but still gets up to roughly 4/10.  Focus has shifted more towards working on her feet/ankles and hopefully as these issues are addressed her hip pain will continue to improve as we are addressing the whole functional chain.  Patient is performing hip strengthening and stretching exercises with emphasis on glutes and core and lengthening her hip flexors. She has also progressed in her ankle mobility and strengthening exercises today       Goals:  Goal Identifier STG 1   Goal Description Patient will report decreased worst PL in R hip to 6/10 or less in order to do stairs more safely   Target Date 17   Date Met  17   Progress:     Goal Identifier LTG 1   Goal Description Patient will report decreased worst PL in R hip to 4/10 or less in order to improve her gait and mobility   Target Date 17   Date Met   (Improving)   Progress:     Goal Identifier LTG 2   Goal Description Patient will demonstrate proper form with HEP in order to continue making gains IND at home to prevent future injury   Target Date  06/26/17   Date Met      Progress:       Progress Toward Goals:   Progress this reporting period: Patient is making progress towards goals      Plan:  Continue therapy per current plan of care. Continue working on B foot/ankle mobility and R hip strengthening and stretching    Discharge:  No

## 2017-05-24 ENCOUNTER — OFFICE VISIT (OUTPATIENT)
Dept: ORTHOPEDICS | Facility: OTHER | Age: 77
End: 2017-05-24
Attending: ORTHOPAEDIC SURGERY
Payer: COMMERCIAL

## 2017-05-24 VITALS
WEIGHT: 227 LBS | SYSTOLIC BLOOD PRESSURE: 134 MMHG | HEART RATE: 63 BPM | TEMPERATURE: 98.4 F | HEIGHT: 64 IN | BODY MASS INDEX: 38.76 KG/M2 | DIASTOLIC BLOOD PRESSURE: 86 MMHG | OXYGEN SATURATION: 97 %

## 2017-05-24 DIAGNOSIS — M16.0 PRIMARY OSTEOARTHRITIS OF BOTH HIPS: Primary | ICD-10-CM

## 2017-05-24 DIAGNOSIS — M25.361 INSTABILITY OF RIGHT KNEE JOINT: ICD-10-CM

## 2017-05-24 DIAGNOSIS — M17.11 PRIMARY OSTEOARTHRITIS OF RIGHT KNEE: ICD-10-CM

## 2017-05-24 PROCEDURE — 99212 OFFICE O/P EST SF 10 MIN: CPT

## 2017-05-24 PROCEDURE — 99213 OFFICE O/P EST LOW 20 MIN: CPT | Performed by: ORTHOPAEDIC SURGERY

## 2017-05-24 ASSESSMENT — PAIN SCALES - GENERAL: PAINLEVEL: MODERATE PAIN (4)

## 2017-05-24 NOTE — NURSING NOTE
"Chief Complaint   Patient presents with     Musculoskeletal Problem     Follow up bilateral hip pain, also states she got her chart records and it states she has a torn meniscus- patient was unaware of this.        Initial /86 (BP Location: Right arm, Patient Position: Chair, Cuff Size: Adult Large)  Pulse 63  Temp 98.4  F (36.9  C) (Tympanic)  Ht 5' 4\" (1.626 m)  Wt 227 lb (103 kg)  SpO2 97%  BMI 38.96 kg/m2 Estimated body mass index is 38.96 kg/(m^2) as calculated from the following:    Height as of this encounter: 5' 4\" (1.626 m).    Weight as of this encounter: 227 lb (103 kg).  Medication Reconciliation: complete   ABIDA HEAD      "

## 2017-05-24 NOTE — MR AVS SNAPSHOT
After Visit Summary   5/24/2017    Dinorah Lim    MRN: [id number]           Patient Information     Date Of Birth          1940        Visit Information        Provider Department      5/24/2017 9:00 AM Esteban Wills MD  ORTHOPEDICS        Today's Diagnoses     Primary osteoarthritis of both hips    -  1    Primary osteoarthritis of right knee        Instability of right knee joint          Care Instructions    The X-ray Department of this [facility] will call you within 2 business days to schedule an MRI.  If you do not hear from them by the 3rd business day, call our office at [phone].            Follow-ups after your visit        Follow-up notes from your care team     Return in about 2 weeks (around 6/7/2017).      Your next 10 appointments already scheduled     May 25, 2017  3:00 PM CDT   Treatment with Dinorah Pena PTA   [facility] ([facility] )    [address] [place] [id number] [phone]              Who to contact     If you have questions or need follow up information about today's clinic visit or your schedule please contact  ORTHOPEDICS directly at [phone].  Normal or non-critical lab and imaging results will be communicated to you by MyChart, letter or phone within 4 business days after the clinic has received the results. If you do not hear from us within 7 days, please contact the clinic through MyChart or phone. If you have a critical or abnormal lab result, we will notify you by phone as soon as possible.  Submit refill requests through [employer]led[employer] or call your pharmacy and they will forward the refill request to us. Please allow 3 business days for your refill to be completed.          Additional Information About Your Visit        MyChart Information     Aledia lets you send messages to your doctor, view your test results, renew your prescriptions, schedule appointments and more. To sign up, go to [email or web address] .  "Click on \"Log in\" on the left side of the screen, which will take you to the Welcome page. Then click on \"Sign up Now\" on the right side of the page.     You will be asked to enter the access code listed below, as well as some personal information. Please follow the directions to create your username and password.     Your access code is: [email or web address]  Expires: 2017  8:54 PM     Your access code will  in 90 days. If you need help or a new code, please call your [facility] or [phone].        Care EveryWhere ID     This is your [facility] EveryWhere ID. This could be used by other organizations to access your [facility] medical records  [id number]        Your Vitals Were     Pulse Temperature Height Pulse Oximetry BMI (Body Mass Index)       63 98.4  F (36.9  C) (Tympanic) 5' 4\" (1.626 m) 97% 38.96 kg/m2        Blood Pressure from Last 3 Encounters:   17 134/86   17 120/64   17 119/61    Weight from Last 3 Encounters:   17 227 lb (103 kg)   17 226 lb 6.4 oz (102.7 kg)   17 230 lb (104.3 kg)                 Today's Medication Changes          These changes are accurate as of: 17  9:20 AM.  If you have any questions, ask your nurse or doctor.               Stop taking these medicines if you haven't already. Please contact your care team if you have questions.     sulindac 200 MG tablet   Commonly known as:  CLINORIL   Stopped by:  Esteban Wills MD                    Primary Care Provider Office Phone # Fax #    Magaly Sosa [phone]       [facility] [place] [facility] [facility] [address]        Thank you!     Thank you for choosing  ORTHOPEDICS  for your care. Our goal is always to provide you with excellent care. Hearing back from our patients is one way we can continue to improve our services. Please take a few minutes to complete the written survey that you may receive in the mail after your visit with us. Thank you!      "        Your Updated Medication List - Protect others around you: Learn how to safely use, store and throw away your medicines at www.disposemymeds.org.          This list is accurate as of: 5/24/17  9:20 AM.  Always use your most recent med list.                   Brand Name Dispense Instructions for use    albuterol (2.5 MG/3ML) 0.083% neb solution     1 Box    Take 1 vial (2.5 mg) by nebulization every 4 hours as needed for shortness of breath / dyspnea or wheezing       BENADRYL ALLERGY PO      Take 25 mg by mouth nightly as needed       clonazePAM 1 MG tablet    klonoPIN    45 tablet    TAKE ONE-FOURTH TO ONE-HALF TABLET BY MOUTH EVERY 8 HOURS AS NEEDED       FISH OIL      Take 1 capsule by mouth daily       Garlic 10 MG Caps      Take 1 capsule by mouth as needed       HYDROcodone-acetaminophen 5-325 MG per tablet    NORCO    60 tablet    TAKE ONE TABLET BY MOUTH EVERY 4 TO 6 HOURS AS NEEDED FOR PAIN       nabumetone 500 MG tablet    RELAFEN    60 tablet    Take 1-2 tablets (500-1,000 mg) by mouth 2 times daily as needed for moderate pain       PARoxetine 40 MG tablet    PAXIL    90 tablet    Take 1 tablet (40 mg) by mouth daily       simvastatin 20 MG tablet    ZOCOR    90 tablet    TAKE ONE TABLET BY MOUTH ONCE DAILY AT BEDTIME       VITAMIN D (CHOLECALCIFEROL) PO      Take 2,000 Units by mouth daily       VITAMIN E      Take 1 capsule by mouth daily

## 2017-05-24 NOTE — PATIENT INSTRUCTIONS
The X-ray Department of this [facility] will call you within 2 business days to schedule an MRI.  If you do not hear from them by the 3rd business day, call our office at [phone].

## 2017-05-24 NOTE — PROGRESS NOTES
"Chief complaints:  #1.  DJD right knee with a locking, question meniscal tear  #2.  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  I ordered an MRI but for some reason it never got scheduled.  She reports no change in her symptoms since seen last.  It momentarily locked again a few days ago.    Today she also presents for evaluation of bilateral hip pain.  The pain has been present for over a year.  It is of equal severity: mild to moderate.  It is provoked by prolonged sitting but also by prolonged weightbearing.  It is located over the anterior and lateral aspects of both hips.  It does not radiate into the low back but does radiate down both legs, sometimes to her ankles.  She does not use an ambulatory appliance.  She has a history of lumbar degenerative disease and sciatica in the past.  She denies numbness or tingling in either leg at this time.  She is supposed to be on Relafen but she states she takes it \"very little\", not even daily. Her stomach tolerates it well.    Nothing else has changed with respect to her neurologic or musculoskeletal review of systems since seen last.    Examination: Obese female in no obvious distress.  Vital signs stable.  She ambulated without an appliance.  Respiration efforts were normal.  Both hips were tender to palpation anteriorly and laterally.  Her leg lengths are equal.  Both hips can be passively flexed to 110 .  At 90  of flexion both hips have excellent external rotation but only have about 20  of internal rotation.  Neither hip has a flexion contracture.  Posterior tibial ankle pulses are palpable bilaterally.  Light touch sensation is intact in both feet.    X-ray; I reviewed plain films of both hips taken on April 19, 2017.  They show bilateral hip DJD in the form of joint space narrowing, and osteophytes on both the femoral " head and the acetabulum.  The disease is moderate in severity bilaterally.    Impression:  #1.  DJD right knee with locking, question degenerative meniscal tear  #2.  Bilateral primary DJD of the hips    Plan:  #1.  MRI right knee and return afterwards to discuss results.  #2.  I suggest she take her Relafen more frequently: at least daily, taking it twice a day on bad days.

## 2017-05-25 ENCOUNTER — HOSPITAL ENCOUNTER (OUTPATIENT)
Dept: PHYSICAL THERAPY | Facility: HOSPITAL | Age: 77
Setting detail: THERAPIES SERIES
End: 2017-05-25
Attending: ORTHOPAEDIC SURGERY
Payer: MEDICARE

## 2017-05-25 PROCEDURE — G8980 MOBILITY D/C STATUS: HCPCS | Mod: GP,CJ | Performed by: PHYSICAL THERAPIST

## 2017-05-25 PROCEDURE — G8979 MOBILITY GOAL STATUS: HCPCS | Mod: GP,CI | Performed by: PHYSICAL THERAPIST

## 2017-05-25 PROCEDURE — 40000718 ZZHC STATISTIC PT DEPARTMENT ORTHO VISIT

## 2017-05-25 PROCEDURE — 97110 THERAPEUTIC EXERCISES: CPT | Mod: GP

## 2017-05-30 ENCOUNTER — HOSPITAL ENCOUNTER (OUTPATIENT)
Dept: MRI IMAGING | Facility: HOSPITAL | Age: 77
Discharge: HOME | End: 2017-05-30
Attending: ORTHOPAEDIC SURGERY | Admitting: ORTHOPAEDIC SURGERY
Payer: MEDICARE

## 2017-05-30 PROCEDURE — 73721 MRI JNT OF LWR EXTRE W/O DYE: CPT | Mod: TC,RT

## 2017-06-01 ENCOUNTER — OFFICE VISIT (OUTPATIENT)
Dept: FAMILY MEDICINE | Facility: OTHER | Age: 77
End: 2017-06-01
Attending: FAMILY MEDICINE
Payer: COMMERCIAL

## 2017-06-01 VITALS
BODY MASS INDEX: 39.31 KG/M2 | DIASTOLIC BLOOD PRESSURE: 74 MMHG | HEART RATE: 73 BPM | OXYGEN SATURATION: 96 % | RESPIRATION RATE: 20 BRPM | WEIGHT: 229 LBS | SYSTOLIC BLOOD PRESSURE: 128 MMHG

## 2017-06-01 DIAGNOSIS — R89.9 ABNORMAL LABORATORY TEST RESULT: Primary | ICD-10-CM

## 2017-06-01 PROBLEM — E66.01 MORBID OBESITY (H): Status: ACTIVE | Noted: 2017-06-01

## 2017-06-01 PROCEDURE — 99212 OFFICE O/P EST SF 10 MIN: CPT

## 2017-06-01 PROCEDURE — 99213 OFFICE O/P EST LOW 20 MIN: CPT | Performed by: FAMILY MEDICINE

## 2017-06-01 ASSESSMENT — PAIN SCALES - GENERAL: PAINLEVEL: NO PAIN (0)

## 2017-06-01 NOTE — MR AVS SNAPSHOT
"              After Visit Summary   6/1/2017    Dinorah Lim    MRN: [id number]           Patient Information     Date Of Birth          1940        Visit Information        Provider Department      6/1/2017 2:30 PM Magaly Sosa MD [facility]         Follow-ups after your visit        Your next 10 appointments already scheduled     Jun 05, 2017  2:30 PM CDT   Treatment with Jessenia Garcia, PT   [facility] ([facility] )    [address] [address]   [phone]            Jun 08, 2017  3:00 PM CDT   Treatment with Dinorah Ammoncarmelowandy, PTA   [facility] ([facility] )    [address] [address]   [phone]            Jun 09, 2017  2:40 PM CDT   (Arrive by 2:20 PM)   Return Visit with Esteban Wills MD    ORTHOPEDICS ([facility])    [address] [place] [phone]              Who to contact     If you have questions or need follow up information about today's clinic visit or your schedule please contact [facility] directly at [phone].  Normal or non-critical lab and imaging results will be communicated to you by MyChart, letter or phone within 4 business days after the clinic has received the results. If you do not hear from us within 7 days, please contact the clinic through MyChart or phone. If you have a critical or abnormal lab result, we will notify you by phone as soon as possible.  Submit refill requests through Sidecar or call your pharmacy and they will forward the refill request to us. Please allow 3 business days for your refill to be completed.          Additional Information About Your Visit        MyChart Information     Sidecar lets you send messages to your doctor, view your test results, renew your prescriptions, schedule appointments and more. To sign up, go to [email or web address] . Click on \"Log in\" on the left side of the screen, which will " "take you to the Welcome page. Then click on \"Sign up Now\" on the right side of the page.     You will be asked to enter the access code listed below, as well as some personal information. Please follow the directions to create your username and password.     Your access code is: [email or web address]  Expires: 2017  8:54 PM     Your access code will  in 90 days. If you need help or a new code, please call your [facility] clinic or [phone].        Care EveryWhere ID     This is your Care EveryWhere ID. This could be used by other organizations to access your [facility] medical records  [id number]        Your Vitals Were     Pulse Respirations Pulse Oximetry Breastfeeding? BMI (Body Mass Index)       73 20 96% No 39.31 kg/m2        Blood Pressure from Last 3 Encounters:   17 128/74   17 134/86   17 120/64    Weight from Last 3 Encounters:   17 229 lb (103.9 kg)   17 227 lb (103 kg)   17 226 lb 6.4 oz (102.7 kg)              Today, you had the following     No orders found for display       Primary Care Provider Office Phone # Fax #    Magaly Sosa [phone]       [facility] [place] [address] [place] [address]  [place] [address]        Thank you!     Thank you for choosing [facility] [place]  for your care. Our goal is always to provide you with excellent care. Hearing back from our patients is one way we can continue to improve our services. Please take a few minutes to complete the written survey that you may receive in the mail after your visit with us. Thank you!             Your Updated Medication List - Protect others around you: Learn how to safely use, store and throw away your medicines at www.disposemymeds.org.          This list is accurate as of: 17  3:23 PM.  Always use your most recent med list.                   Brand Name Dispense Instructions for use    albuterol (2.5 MG/3ML) 0.083% neb solution     1 Box    Take 1 vial (2.5 mg) by " nebulization every 4 hours as needed for shortness of breath / dyspnea or wheezing       BENADRYL ALLERGY PO      Take 25 mg by mouth nightly as needed       clonazePAM 1 MG tablet    klonoPIN    45 tablet    TAKE ONE-FOURTH TO ONE-HALF TABLET BY MOUTH EVERY 8 HOURS AS NEEDED       FISH OIL      Take 1 capsule by mouth daily       Garlic 10 MG Caps      Take 1 capsule by mouth as needed       HYDROcodone-acetaminophen 5-325 MG per tablet    NORCO    60 tablet    TAKE ONE TABLET BY MOUTH EVERY 4 TO 6 HOURS AS NEEDED FOR PAIN       nabumetone 500 MG tablet    RELAFEN    60 tablet    Take 1-2 tablets (500-1,000 mg) by mouth 2 times daily as needed for moderate pain       PARoxetine 40 MG tablet    PAXIL    90 tablet    Take 1 tablet (40 mg) by mouth daily       simvastatin 20 MG tablet    ZOCOR    90 tablet    TAKE ONE TABLET BY MOUTH ONCE DAILY AT BEDTIME       VITAMIN D (CHOLECALCIFEROL) PO      Take 2,000 Units by mouth daily       VITAMIN E      Take 1 capsule by mouth daily

## 2017-06-01 NOTE — NURSING NOTE
"Chief Complaint   Patient presents with     RECHECK     follow up from ER visit on 04/19/2017, very upset with the diagnosis of her wet prep result.        Initial /74  Pulse 73  Resp 20  Wt 229 lb (103.9 kg)  SpO2 96%  Breastfeeding? No  BMI 39.31 kg/m2 Estimated body mass index is 39.31 kg/(m^2) as calculated from the following:    Height as of 5/24/17: 5' 4\" (1.626 m).    Weight as of this encounter: 229 lb (103.9 kg).  Medication Reconciliation: complete   Aditi Saleh      "

## 2017-06-02 ASSESSMENT — PATIENT HEALTH QUESTIONNAIRE - PHQ9: SUM OF ALL RESPONSES TO PHQ QUESTIONS 1-9: 5

## 2017-06-02 NOTE — PROGRESS NOTES
[facility]    Dinorah Lim, 76 year old, female presents with   Chief Complaint   Patient presents with     RECHECK     follow up from ER visit on 04/19/2017, very upset with the diagnosis of her wet prep result. She had not had sexual relations for the last 9 years and prior to that was 1999. She was seen in the ER with vaginal discharge and wet prep showed trichomonas, she was treated and had follow up test of cure done by PCR that was negative. We discussed the possibility that the initial test may have been incorrect. She was treated and isn't having any further symptoms. She also noted that her notes from the ER state that she has had a blood transfusion which she hasn't had.        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    Anterior Segment V 4 cm nodule abutting liver surface by US 1/4/2015      Obesity, Class II, BMI 35-39.9 (H) 1/4/2015    BMI 35.9 with comorbidities = MORBID obesity     Osteoarthritis of right hip 10/07/2004     Osteoarthrosis 06/14/2012     Otitis externa 10/04/2011     Prediabetes 08/01/2011       PAST SURGICAL HISTORY:  Past Surgical History:   Procedure Laterality Date     CLOSED REDUCTION WRIST Right 1952 x 2    long arm cast (same time as elbow fx)     CLOSED RX ELBOW DISLOCATION Right 1952    CR/long cast of fracture     HC INJ EPIDURAL LUMBAR/SACRAL W/WO CONTRAST Bilateral 5/2013    facet injections; prev 2012      LAPAROSCOPIC CHOLECYSTECTOMY N/A 1/4/2015    Procedure: LAPAROSCOPIC CHOLECYSTECTOMY;  Surgeon: Brittney العلي MD;  Location: [facility] OR     TONSILLECTOMY         SOCIAL HISTORY  Social History     Social History     Marital status: Single     Spouse name: N/A     Number of children: 4     Years of education: 12     Occupational History     personal care attendant      Social History Main Topics     Smoking status: Never Smoker     Smokeless tobacco: Never Used     Alcohol use No     Drug use: No     Sexual activity: No     Other Topics Concern     Blood Transfusions Yes     Caffeine Concern Yes     >32 oz soda/day     Sleep Concern Yes     insomnia     Parent/Sibling W/ Cabg, Mi Or Angioplasty Before 65f 55m? No     Social History Narrative       MEDICATIONS:  Prior to Admission medications    Medication Sig Start Date End Date Taking? Authorizing Provider   simvastatin (ZOCOR) 20 MG tablet TAKE ONE TABLET BY MOUTH ONCE DAILY AT BEDTIME 4/27/17  Yes Magaly Sosa MD   HYDROcodone-acetaminophen (NORCO) 5-325 MG per tablet TAKE ONE TABLET BY MOUTH EVERY 4 TO 6 HOURS AS NEEDED FOR PAIN 3/28/17  Yes Magaly Sosa MD   clonazePAM (KLONOPIN) 1 MG tablet TAKE ONE-FOURTH TO ONE-HALF TABLET BY MOUTH EVERY 8 HOURS AS NEEDED 3/28/17  Yes Magaly Sosa MD   albuterol (2.5 MG/3ML) 0.083% neb solution Take 1 vial (2.5 mg) by nebulization every 4 hours as needed for shortness of breath / dyspnea or wheezing 2/15/17  Yes Magaly Sosa MD   nabumetone (RELAFEN) 500 MG tablet Take 1-2 tablets (500-1,000 mg) by mouth 2 times daily as needed for moderate pain 1/16/17  Yes Magaly Sosa MD   PARoxetine (PAXIL) 40 MG tablet Take 1 tablet (40 mg) by mouth daily 10/4/16  Yes Magaly Sosa MD   VITAMIN D, CHOLECALCIFEROL, PO Take 2,000 Units by mouth daily   Yes Reported, Patient   Garlic 10 MG CAPS Take 1 capsule by mouth as needed   Yes Reported, Patient   DiphenhydrAMINE HCl (BENADRYL ALLERGY PO) Take 25 mg by mouth nightly  as needed    Yes Reported, Patient   VITAMIN E Take 1 capsule by mouth daily    Yes Reported, Patient   FISH OIL Take 1 capsule by mouth daily    Yes Reported, Patient       ALLERGIES:     Allergies   Allergen Reactions     Chocolate Hives     Lemon Flavor Hives     Lime [Calcium Oxysulfide] Hives     Orange Fruit [Citrus] Hives     Strawberry Hives     Adhesive Tape      Band-aids     Codeine Hives     Patient can tolerate oxycodone & dilaudid     Erythromycin Hives     ERYTHROMYCIN BASE     Grapefruit [Extra Strength Grapefruit]      GRAPEFRUIT     Penicillins Hives     Sulfa Drugs      SULFONAMIDE ANTIBIOTICS      Tomato        ROS:  C: NEGATIVE for fever, chills, change in weight  : NEGATIVE for frequency, dysuria, or hematuria  N: NEGATIVE for weakness, dizziness or paresthesias  P: NEGATIVE for changes in mood or affect    EXAM:  /74  Pulse 73  Resp 20  Wt 229 lb (103.9 kg)  SpO2 96%  Breastfeeding? No  BMI 39.31 kg/m2 Body mass index is 39.31 kg/(m^2).   GENERAL APPEARANCE: healthy, alert and no distress  NEURO: Normal strength and tone, mentation intact and speech normal  PSYCH: mentation appears normal and affect normal/bright    LABS AND IMAGING:           ASSESSMENT/PLAN:  (R89.9) Abnormal laboratory test result  (primary encounter diagnosis)  Comment:   Plan: long discussion about this, hard to know for sure. She is reassured today. I am unable to find in her history a blood transfusion and wonder if this might have been a question if she ever had one, possibly related to a past preop questionnaire. It is noted for her that she doesn't have a history of blood transfusion noted    Follow up prn.     Magaly Sosa MD  June 1, 2017

## 2017-06-09 ENCOUNTER — OFFICE VISIT (OUTPATIENT)
Dept: ORTHOPEDICS | Facility: OTHER | Age: 77
End: 2017-06-09
Attending: ORTHOPAEDIC SURGERY
Payer: COMMERCIAL

## 2017-06-09 VITALS
DIASTOLIC BLOOD PRESSURE: 64 MMHG | WEIGHT: 227 LBS | HEIGHT: 64 IN | SYSTOLIC BLOOD PRESSURE: 124 MMHG | BODY MASS INDEX: 38.76 KG/M2 | HEART RATE: 70 BPM | OXYGEN SATURATION: 97 % | TEMPERATURE: 99 F

## 2017-06-09 DIAGNOSIS — M16.0 PRIMARY OSTEOARTHRITIS OF BOTH HIPS: Primary | ICD-10-CM

## 2017-06-09 DIAGNOSIS — M17.11 PRIMARY OSTEOARTHRITIS OF RIGHT KNEE: ICD-10-CM

## 2017-06-09 PROCEDURE — 99212 OFFICE O/P EST SF 10 MIN: CPT | Performed by: ORTHOPAEDIC SURGERY

## 2017-06-09 PROCEDURE — 99212 OFFICE O/P EST SF 10 MIN: CPT

## 2017-06-09 ASSESSMENT — PAIN SCALES - GENERAL: PAINLEVEL: NO PAIN (0)

## 2017-06-09 NOTE — MR AVS SNAPSHOT
"              After Visit Summary   2017    Dinorah Lim    MRN: [id number]           Patient Information     Date Of Birth          1940        Visit Information        Provider Department      2017 2:40 PM Esteban Wills MD  [facility]        Today's Diagnoses     Primary osteoarthritis of both hips    -  1    Primary osteoarthritis of right knee           Follow-ups after your visit        Follow-up notes from your care team     Return if symptoms worsen or fail to improve.      Who to contact     If you have questions or need follow up information about today's clinic visit or your schedule please contact  [facility] directly at [phone].  Normal or non-critical lab and imaging results will be communicated to you by REBIScanhart, letter or phone within 4 business days after the clinic has received the results. If you do not hear from us within 7 days, please contact the clinic through REBIScanhart or phone. If you have a critical or abnormal lab result, we will notify you by phone as soon as possible.  Submit refill requests through [employer] or call your pharmacy and they will forward the refill request to us. Please allow 3 business days for your refill to be completed.          Additional Information About Your Visit        MyChart Information     [employer] lets you send messages to your doctor, view your test results, renew your prescriptions, schedule appointments and more. To sign up, go to [email or web address] . Click on \"Log in\" on the left side of the screen, which will take you to the Welcome page. Then click on \"Sign up Now\" on the right side of the page.     You will be asked to enter the access code listed below, as well as some personal information. Please follow the directions to create your username and password.     Your access code is: [email or web address]  Expires: 2017  8:54 PM     Your access code will  in 90 days. If you need help or a new code, please call your " "[facility] or [phone].        Care EveryWhere ID     This is your Care EveryWhere ID. This could be used by other organizations to access your [facility] medical records  [id number]        Your Vitals Were     Pulse Temperature Height Pulse Oximetry BMI (Body Mass Index)       70 99  F (37.2  C) (Tympanic) 5' 4\" (1.626 m) 97% 38.96 kg/m2        Blood Pressure from Last 3 Encounters:   06/09/17 124/64   06/01/17 128/74   05/24/17 134/86    Weight from Last 3 Encounters:   06/09/17 227 lb (103 kg)   06/01/17 229 lb (103.9 kg)   05/24/17 227 lb (103 kg)              Today, you had the following     No orders found for display       Primary Care Provider Office Phone # Fax #    Magaly Sosa [phone]       [facility] [place] [address] [place] [address]        Thank you!     Thank you for choosing  [facility]  for your care. Our goal is always to provide you with excellent care. Hearing back from our patients is one way we can continue to improve our services. Please take a few minutes to complete the written survey that you may receive in the mail after your visit with us. Thank you!             Your Updated Medication List - Protect others around you: Learn how to safely use, store and throw away your medicines at www.disposemymeds.org.          This list is accurate as of: 6/9/17  2:52 PM.  Always use your most recent med list.                   Brand Name Dispense Instructions for use    albuterol (2.5 MG/3ML) 0.083% neb solution     1 Box    Take 1 vial (2.5 mg) by nebulization every 4 hours as needed for shortness of breath / dyspnea or wheezing       BENADRYL ALLERGY PO      Take 25 mg by mouth nightly as needed       clonazePAM 1 MG tablet    klonoPIN    45 tablet    TAKE ONE-FOURTH TO ONE-HALF TABLET BY MOUTH EVERY 8 HOURS AS NEEDED       FISH OIL      Take 1 capsule by mouth daily       Garlic 10 MG Caps      Take 1 capsule by mouth as needed       " HYDROcodone-acetaminophen 5-325 MG per tablet    NORCO    60 tablet    TAKE ONE TABLET BY MOUTH EVERY 4 TO 6 HOURS AS NEEDED FOR PAIN       nabumetone 500 MG tablet    RELAFEN    60 tablet    Take 1-2 tablets (500-1,000 mg) by mouth 2 times daily as needed for moderate pain       PARoxetine 40 MG tablet    PAXIL    90 tablet    Take 1 tablet (40 mg) by mouth daily       simvastatin 20 MG tablet    ZOCOR    90 tablet    TAKE ONE TABLET BY MOUTH ONCE DAILY AT BEDTIME       VITAMIN D (CHOLECALCIFEROL) PO      Take 2,000 Units by mouth daily       VITAMIN E      Take 1 capsule by mouth daily

## 2017-06-09 NOTE — PROGRESS NOTES
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  She presented on 4/28/17 with a one-year history of intermittent painful locking of the right knee.  She denied an injury.  I ordered an MRI and she is here today to discuss the results.  In the meantime, she went on Relafen 500 mg twice a day with food prescribed by Dr. Sosa.  Today she reports that since going on the Relafen, she has had no knee pain or locking.  In addition, the Relafen is helping a bilateral hip pain problem she also had.  Her stomach is tolerating the medication well.  She is pleased with her knee the way it is now and does not feel further intervention is necessary.    Nothing else has changed with respect to her musculoskeletal or neurologic review of systems since seen last.    Examination: Overweight female in no obvious distress.  Stance and gait normal.  No ambulatory appliance.    X-ray: The MRI of her right knee performed on 5/30/17 shows fraying of both menisci, and significant articular cartilage degenerative lesions involving all 3 compartments.    Impression: Moderately severe DJD right knee.  No significant meniscal tear.    Plan: She'll remain on the Relafen indefinitely.  She understands that when she is ready for a renewal of her prescription she may have to have blood work performed 1st.  She will return as needed.

## 2017-06-12 ENCOUNTER — HOSPITAL ENCOUNTER (EMERGENCY)
Facility: HOSPITAL | Age: 77
Discharge: HOME | End: 2017-06-12
Admitting: FAMILY MEDICINE
Payer: MEDICARE

## 2017-06-12 ENCOUNTER — RESULTS ONLY (OUTPATIENT)
Dept: EMERGENCY MEDICINE | Facility: HOSPITAL | Age: 77
End: 2017-06-12

## 2017-06-12 DIAGNOSIS — I95.1 ORTHOSTATIC HYPOTENSION: ICD-10-CM

## 2017-06-12 LAB
ALBUMIN SERPL-MCNC: 3.4 G/DL (ref 3.4–5)
ALBUMIN UR-MCNC: 10 MG/DL
ALP SERPL-CCNC: 100 U/L (ref 40–150)
ALT SERPL W P-5'-P-CCNC: 26 U/L (ref 0–50)
ANION GAP SERPL CALCULATED.3IONS-SCNC: 7 MMOL/L (ref 3–14)
APPEARANCE UR: ABNORMAL
AST SERPL W P-5'-P-CCNC: 12 U/L (ref 0–45)
BACTERIA #/AREA URNS HPF: ABNORMAL /HPF
BASOPHILS # BLD AUTO: 0 10E9/L (ref 0–0.2)
BASOPHILS NFR BLD AUTO: 0.3 %
BILIRUB SERPL-MCNC: 0.5 MG/DL (ref 0.2–1.3)
BILIRUB UR QL STRIP: NEGATIVE
BUN SERPL-MCNC: 15 MG/DL (ref 7–30)
CALCIUM SERPL-MCNC: 9.5 MG/DL (ref 8.5–10.1)
CHLORIDE SERPL-SCNC: 106 MMOL/L (ref 94–109)
CO2 SERPL-SCNC: 28 MMOL/L (ref 20–32)
COLOR UR AUTO: YELLOW
CREAT SERPL-MCNC: 0.94 MG/DL (ref 0.52–1.04)
DIFFERENTIAL METHOD BLD: ABNORMAL
EOSINOPHIL # BLD AUTO: 0 10E9/L (ref 0–0.7)
EOSINOPHIL NFR BLD AUTO: 0.9 %
ERYTHROCYTE [DISTWIDTH] IN BLOOD BY AUTOMATED COUNT: 13.3 % (ref 10–15)
GFR SERPL CREATININE-BSD FRML MDRD: 58 ML/MIN/1.7M2
GLUCOSE SERPL-MCNC: 133 MG/DL (ref 70–99)
GLUCOSE UR STRIP-MCNC: NEGATIVE MG/DL
HCT VFR BLD AUTO: 39.6 % (ref 35–47)
HGB BLD-MCNC: 13.2 G/DL (ref 11.7–15.7)
HGB UR QL STRIP: NEGATIVE
IMM GRANULOCYTES # BLD: 0 10E9/L (ref 0–0.4)
IMM GRANULOCYTES NFR BLD: 0.3 %
KETONES UR STRIP-MCNC: NEGATIVE MG/DL
LEUKOCYTE ESTERASE UR QL STRIP: ABNORMAL
LYMPHOCYTES # BLD AUTO: 1.1 10E9/L (ref 0.8–5.3)
LYMPHOCYTES NFR BLD AUTO: 31 %
MCH RBC QN AUTO: 29.7 PG (ref 26.5–33)
MCHC RBC AUTO-ENTMCNC: 33.3 G/DL (ref 31.5–36.5)
MCV RBC AUTO: 89 FL (ref 78–100)
MONOCYTES # BLD AUTO: 0.3 10E9/L (ref 0–1.3)
MONOCYTES NFR BLD AUTO: 7.5 %
MUCOUS THREADS #/AREA URNS LPF: PRESENT /LPF
NEUTROPHILS # BLD AUTO: 2.1 10E9/L (ref 1.6–8.3)
NEUTROPHILS NFR BLD AUTO: 60 %
NITRATE UR QL: NEGATIVE
NRBC # BLD AUTO: 0 10*3/UL
NRBC BLD AUTO-RTO: 0 /100
PH UR STRIP: 5.5 PH (ref 4.7–8)
PLATELET # BLD AUTO: 183 10E9/L (ref 150–450)
POTASSIUM SERPL-SCNC: 4 MMOL/L (ref 3.4–5.3)
PROT SERPL-MCNC: 6.8 G/DL (ref 6.8–8.8)
RBC # BLD AUTO: 4.44 10E12/L (ref 3.8–5.2)
RBC #/AREA URNS AUTO: 4 /HPF (ref 0–2)
SODIUM SERPL-SCNC: 141 MMOL/L (ref 133–144)
SP GR UR STRIP: 1.02 (ref 1–1.03)
SQUAMOUS #/AREA URNS AUTO: 13 /HPF (ref 0–1)
TROPONIN I SERPL-MCNC: NORMAL UG/L (ref 0–0.04)
URN SPEC COLLECT METH UR: ABNORMAL
UROBILINOGEN UR STRIP-MCNC: NORMAL MG/DL (ref 0–2)
WBC # BLD AUTO: 3.5 10E9/L (ref 4–11)
WBC #/AREA URNS AUTO: 17 /HPF (ref 0–2)

## 2017-06-12 PROCEDURE — 99284 EMERGENCY DEPT VISIT MOD MDM: CPT | Mod: 25

## 2017-06-12 PROCEDURE — 36415 COLL VENOUS BLD VENIPUNCTURE: CPT | Performed by: FAMILY MEDICINE

## 2017-06-12 PROCEDURE — 99285 EMERGENCY DEPT VISIT HI MDM: CPT | Performed by: FAMILY MEDICINE

## 2017-06-12 PROCEDURE — 84484 ASSAY OF TROPONIN QUANT: CPT | Performed by: FAMILY MEDICINE

## 2017-06-12 PROCEDURE — 81001 URINALYSIS AUTO W/SCOPE: CPT | Performed by: FAMILY MEDICINE

## 2017-06-12 PROCEDURE — 93005 ELECTROCARDIOGRAM TRACING: CPT

## 2017-06-12 PROCEDURE — 25000128 H RX IP 250 OP 636: Performed by: FAMILY MEDICINE

## 2017-06-12 PROCEDURE — 96361 HYDRATE IV INFUSION ADD-ON: CPT

## 2017-06-12 PROCEDURE — 96360 HYDRATION IV INFUSION INIT: CPT

## 2017-06-12 PROCEDURE — 80053 COMPREHEN METABOLIC PANEL: CPT | Performed by: FAMILY MEDICINE

## 2017-06-12 PROCEDURE — 85025 COMPLETE CBC W/AUTO DIFF WBC: CPT | Performed by: FAMILY MEDICINE

## 2017-06-12 RX ADMIN — SODIUM CHLORIDE 1000 ML: 9 INJECTION, SOLUTION INTRAVENOUS at 13:40

## 2017-06-12 NOTE — DOWNTIME EVENT NOTE
The EMR was down for 6 hours on 6/12/2017.    Candida NAVARRO  was responsible for completing the paper charting during this time period.     The following information was re-entered into the system by LUIS WASHINGTON: Allergies, Home meds and MAR    The following information will remain in the paper chart: notes, assessment, discharge instructions, triage, orders    LUIS WASHINGTON  6/12/2017

## 2017-06-13 ENCOUNTER — TELEPHONE (OUTPATIENT)
Dept: FAMILY MEDICINE | Facility: OTHER | Age: 77
End: 2017-06-13

## 2017-06-13 NOTE — TELEPHONE ENCOUNTER
Please schedule patient for date/time: unable to see this week, may wait until next available 6/26/17 at 2:45    Have patient go to ER/Urgent Care Center. Urgent Care hours are 9:30 am to 8 pm, open 7 days a week. No.    Provider will call patient.No.    Other:

## 2017-06-15 ASSESSMENT — ENCOUNTER SYMPTOMS
FEVER: 0
SPEECH DIFFICULTY: 0
MUSCULOSKELETAL NEGATIVE: 1
PSYCHIATRIC NEGATIVE: 1
ACTIVITY CHANGE: 1
FATIGUE: 0
WEAKNESS: 0
SHORTNESS OF BREATH: 0
DIAPHORESIS: 0
DIZZINESS: 1
DYSURIA: 0
ABDOMINAL PAIN: 0

## 2017-06-15 NOTE — ED PROVIDER NOTES
History   No chief complaint on file.    HPI  Dinorah Lim is a 76 year old female who came to the ED with complaint of dizziness.  She states she has had it for several days, but it is not constant.  She states it is unsteadiness, not spinning vertigo.  She denies any chest pain or dyspnea, has not had any blurred vision or difficulty speaking and no lateralizing weakness.    I have reviewed the Medications, Allergies, Past Medical and Surgical History, and Social History in the Epic system.    Allergies:   Allergies   Allergen Reactions     Chocolate Hives     Lemon Flavor Hives     Lime [Calcium Oxysulfide] Hives     Orange Fruit [Citrus] Hives     Strawberry Hives     Adhesive Tape      Band-aids     Codeine Hives     Patient can tolerate oxycodone & dilaudid     Erythromycin Hives     ERYTHROMYCIN BASE     Grapefruit [Extra Strength Grapefruit]      GRAPEFRUIT     Penicillins Hives     Sulfa Drugs      SULFONAMIDE ANTIBIOTICS      Tomato          No current facility-administered medications on file prior to encounter.   Current Outpatient Prescriptions on File Prior to Encounter:  HYDROcodone-acetaminophen (NORCO) 5-325 MG per tablet TAKE ONE TABLET BY MOUTH EVERY 4 TO 6 HOURS AS NEEDED FOR PAIN   clonazePAM (KLONOPIN) 1 MG tablet TAKE ONE-FOURTH TO ONE-HALF TABLET BY MOUTH EVERY 8 HOURS AS NEEDED   PARoxetine (PAXIL) 40 MG tablet Take 1 tablet (40 mg) by mouth daily   simvastatin (ZOCOR) 20 MG tablet TAKE ONE TABLET BY MOUTH ONCE DAILY AT BEDTIME   albuterol (2.5 MG/3ML) 0.083% neb solution Take 1 vial (2.5 mg) by nebulization every 4 hours as needed for shortness of breath / dyspnea or wheezing   nabumetone (RELAFEN) 500 MG tablet Take 1-2 tablets (500-1,000 mg) by mouth 2 times daily as needed for moderate pain   VITAMIN D, CHOLECALCIFEROL, PO Take 2,000 Units by mouth daily   Garlic 10 MG CAPS Take 1 capsule by mouth as needed   DiphenhydrAMINE HCl (BENADRYL ALLERGY PO) Take 25 mg by mouth nightly as  "needed    VITAMIN E Take 1 capsule by mouth daily    FISH OIL Take 1 capsule by mouth daily        Patient Active Problem List   Diagnosis     Osteoarthritis of right hip     Abnormal glucose     Major depressive disorder, single episode     Osteoarthritis     Lung density on x-ray     Hyperlipidemia LDL goal <130     Advanced care planning/counseling discussion     Anxiety     COPD (chronic obstructive pulmonary disease) (H)     Urticaria     ACP (advance care planning)     Morbid obesity (H)       Past Surgical History:   Procedure Laterality Date     CLOSED REDUCTION WRIST Right 1952 x 2    long arm cast (same time as elbow fx)     CLOSED RX ELBOW DISLOCATION Right 1952    CR/long cast of fracture     HC INJ EPIDURAL LUMBAR/SACRAL W/WO CONTRAST Bilateral 5/2013    facet injections; prev 2012     LAPAROSCOPIC CHOLECYSTECTOMY N/A 1/4/2015    Procedure: LAPAROSCOPIC CHOLECYSTECTOMY;  Surgeon: Brittney العلي MD;  Location: [facility]     TONSILLECTOMY         Social History   Substance Use Topics     Smoking status: Never Smoker     Smokeless tobacco: Never Used     Alcohol use No       Most Recent Immunizations   Administered Date(s) Administered     Pneumococcal 23 valent 06/02/2014     TD (ADULT, 7+) 08/10/1999     TDAP Vaccine (Boostrix) 12/14/2012       BMI: Estimated body mass index is 38.96 kg/(m^2) as calculated from the following:    Height as of 6/9/17: 1.626 m (5' 4\").    Weight as of 6/9/17: 103 kg (227 lb).      Review of Systems   Constitutional: Positive for activity change. Negative for diaphoresis, fatigue and fever.   HENT: Negative.    Eyes: Negative for visual disturbance.   Respiratory: Negative for shortness of breath.    Cardiovascular: Negative for chest pain.   Gastrointestinal: Negative for abdominal pain.   Genitourinary: Negative for dysuria.   Musculoskeletal: Negative.    Skin: Negative.    Neurological: Positive for dizziness. Negative for syncope, speech difficulty and weakness. "   Psychiatric/Behavioral: Negative.        Physical Exam      Physical Exam   Constitutional: She is oriented to person, place, and time. She appears well-developed and well-nourished. No distress.   HENT:   Head: Normocephalic and atraumatic.   Eyes: EOM are normal. Pupils are equal, round, and reactive to light.   Neck: Normal range of motion. Neck supple.   Pulmonary/Chest: Effort normal and breath sounds normal.   Abdominal: Soft. Bowel sounds are normal.   Musculoskeletal: Normal range of motion. She exhibits no edema.   Neurological: She is alert and oriented to person, place, and time.   Skin: Skin is warm and dry.   Psychiatric: She has a normal mood and affect.   Nursing note and vitals reviewed.      ED Course     ED Course     Procedures             EKG Interpretation:      Interpreted by Justa Salazar  Time reviewed: 1400  Symptoms at time of EKG: dizziness   Rhythm: normal sinus   Rate: normal  Axis: normal  Ectopy: none  Conduction: normal  ST Segments/ T Waves: Nonspecific ST-T wave changes  Q Waves: none  Comparison to prior: Unchanged    Clinical Impression: normal EKG    Labs Ordered and Resulted from Time of ED Arrival Up to the Time of Departure from the ED - No data to display    Assessments & Plan (with Medical Decision Making)   Patient had marked orthostasis on arrival in the ED.  Given 1 liter NS, then complained of a headache, but stated she wanted to go home rather than staying for treatment of the headache.  She was instructed to increase her fluids to 48-64 oz per day, and follow up with primary care if symptoms continue.    I have reviewed the nursing notes.    I have reviewed the findings, diagnosis, plan and need for follow up with the patient.       Discharge Medication List as of 6/12/2017  5:17 PM          Final diagnoses:   Orthostatic hypotension       6/12/2017   [facility]     Justa Rodriguez MD  06/15/17 0437

## 2017-06-20 DIAGNOSIS — F41.9 ANXIETY: Chronic | ICD-10-CM

## 2017-06-22 RX ORDER — CLONAZEPAM 1 MG/1
TABLET ORAL
Qty: 45 TABLET | Refills: 0 | Status: SHIPPED | OUTPATIENT
Start: 2017-06-22 | End: 2017-10-25

## 2017-06-22 NOTE — TELEPHONE ENCOUNTER
Klonopin  Last office visit: 6/11/17  Last refill: 3/28/17 #45, 0 R.  Medication pended.  Thank you.

## 2017-06-26 ENCOUNTER — OFFICE VISIT (OUTPATIENT)
Dept: FAMILY MEDICINE | Facility: OTHER | Age: 77
End: 2017-06-26
Attending: FAMILY MEDICINE
Payer: COMMERCIAL

## 2017-06-26 VITALS
SYSTOLIC BLOOD PRESSURE: 126 MMHG | HEIGHT: 64 IN | WEIGHT: 226 LBS | RESPIRATION RATE: 18 BRPM | BODY MASS INDEX: 38.58 KG/M2 | DIASTOLIC BLOOD PRESSURE: 62 MMHG | TEMPERATURE: 98.5 F | OXYGEN SATURATION: 98 % | HEART RATE: 65 BPM

## 2017-06-26 DIAGNOSIS — L70.0 ACNE VULGARIS: ICD-10-CM

## 2017-06-26 DIAGNOSIS — R73.09 ELEVATED GLUCOSE: ICD-10-CM

## 2017-06-26 DIAGNOSIS — E86.0 DEHYDRATION: Primary | ICD-10-CM

## 2017-06-26 LAB
EST. AVERAGE GLUCOSE BLD GHB EST-MCNC: 103 MG/DL
HBA1C MFR BLD: 5.2 % (ref 4.3–6)

## 2017-06-26 PROCEDURE — 99214 OFFICE O/P EST MOD 30 MIN: CPT | Performed by: FAMILY MEDICINE

## 2017-06-26 PROCEDURE — 99212 OFFICE O/P EST SF 10 MIN: CPT

## 2017-06-26 PROCEDURE — 83036 HEMOGLOBIN GLYCOSYLATED A1C: CPT | Mod: ZL | Performed by: FAMILY MEDICINE

## 2017-06-26 PROCEDURE — 36415 COLL VENOUS BLD VENIPUNCTURE: CPT | Mod: ZL | Performed by: FAMILY MEDICINE

## 2017-06-26 PROCEDURE — 40000788 ZZHCL STATISTIC ESTIMATED AVERAGE GLUCOSE: Mod: ZL | Performed by: FAMILY MEDICINE

## 2017-06-26 RX ORDER — AZITHROMYCIN 250 MG/1
TABLET, FILM COATED ORAL
Qty: 6 TABLET | Refills: 0 | Status: SHIPPED | OUTPATIENT
Start: 2017-06-26 | End: 2017-07-06

## 2017-06-26 ASSESSMENT — PAIN SCALES - GENERAL: PAINLEVEL: NO PAIN (0)

## 2017-06-26 NOTE — NURSING NOTE
"Chief Complaint   Patient presents with     RECHECK     Vertigo / Dehydration       Initial /62 (BP Location: Left arm, Patient Position: Chair, Cuff Size: Adult Large)  Pulse 65  Temp 98.5  F (36.9  C) (Tympanic)  Resp 18  Ht 5' 4\" (1.626 m)  Wt 226 lb (102.5 kg)  SpO2 98%  BMI 38.79 kg/m2 Estimated body mass index is 38.79 kg/(m^2) as calculated from the following:    Height as of this encounter: 5' 4\" (1.626 m).    Weight as of this encounter: 226 lb (102.5 kg).  Medication Reconciliation: unable or not appropriate to perform   Jesusita So LPN      "

## 2017-06-26 NOTE — PROGRESS NOTES
[facility]    Dinorah Lim, 76 year old, female presents with   Chief Complaint   Patient presents with     RECHECK     Vertigo / Dehydration. She was seen in the ER, felt to be dehydrated, given IV fluids and has been better since. Her blood sugar was 130 when she was in the ER and she would like to have this restested     Derm Problem     skin issue blisters under nose and chin which have been present for 2 weeks and are painful. No fluid from them, no exposures that she is aware of       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    Anterior Segment V 4 cm nodule abutting liver surface by US 1/4/2015      Obesity, Class II, BMI 35-39.9 1/4/2015    BMI 35.9 with comorbidities = MORBID obesity     Osteoarthritis of right hip 10/07/2004     Osteoarthrosis 06/14/2012     Otitis externa 10/04/2011     Prediabetes 08/01/2011       PAST SURGICAL HISTORY:  Past Surgical History:   Procedure Laterality Date     CLOSED REDUCTION WRIST Right 1952 x 2    long arm cast (same time as elbow fx)     CLOSED RX ELBOW DISLOCATION Right 1952    CR/long cast of fracture     HC INJ EPIDURAL LUMBAR/SACRAL W/WO CONTRAST Bilateral 5/2013    facet injections; prev 2012     LAPAROSCOPIC CHOLECYSTECTOMY N/A 1/4/2015    Procedure: LAPAROSCOPIC CHOLECYSTECTOMY;  Surgeon: Brittney العلي MD;  Location: [facility]     TONSILLECTOMY         SOCIAL HISTORY  Social History     Social  History     Marital status: Single     Spouse name: N/A     Number of children: 4     Years of education: 12     Occupational History     personal care attendant      Social History Main Topics     Smoking status: Never Smoker     Smokeless tobacco: Never Used     Alcohol use No     Drug use: No     Sexual activity: No     Other Topics Concern     Blood Transfusions Yes     Caffeine Concern Yes     >32 oz soda/day     Sleep Concern Yes     insomnia     Parent/Sibling W/ Cabg, Mi Or Angioplasty Before 65f 55m? No     Social History Narrative       MEDICATIONS:  Prior to Admission medications    Medication Sig Start Date End Date Taking? Authorizing Provider   azithromycin (ZITHROMAX) 250 MG tablet Two tablets first day, then one tablet daily for four days. 6/26/17  Yes Magaly Sosa MD   clonazePAM (KLONOPIN) 1 MG tablet TAKE ONE-FOURTH TO ONE-HALF TABLET BY MOUTH EVERY 8 HOURS AS NEEDED 6/22/17  Yes Magaly Sosa MD   simvastatin (ZOCOR) 20 MG tablet TAKE ONE TABLET BY MOUTH ONCE DAILY AT BEDTIME 4/27/17  Yes Magaly Ssoa MD   HYDROcodone-acetaminophen (NORCO) 5-325 MG per tablet TAKE ONE TABLET BY MOUTH EVERY 4 TO 6 HOURS AS NEEDED FOR PAIN 3/28/17  Yes Magaly Sosa MD   albuterol (2.5 MG/3ML) 0.083% neb solution Take 1 vial (2.5 mg) by nebulization every 4 hours as needed for shortness of breath / dyspnea or wheezing 2/15/17  Yes Magaly Sosa MD   PARoxetine (PAXIL) 40 MG tablet Take 1 tablet (40 mg) by mouth daily 10/4/16  Yes Magaly Sosa MD   VITAMIN D, CHOLECALCIFEROL, PO Take 2,000 Units by mouth daily   Yes Reported, Patient   Garlic 10 MG CAPS Take 1 capsule by mouth as needed   Yes Reported, Patient   DiphenhydrAMINE HCl (BENADRYL ALLERGY PO) Take 25 mg by mouth nightly as needed    Yes Reported, Patient   VITAMIN E Take 1 capsule by mouth daily    Yes Reported, Patient   FISH OIL Take 1 capsule by mouth daily    Yes Reported, Patient       ALLERGIES:     Allergies   Allergen Reactions      "Chocolate Hives     Lemon Flavor Hives     Lime [Calcium Oxysulfide] Hives     Orange Fruit [Citrus] Hives     Strawberry Hives     Adhesive Tape      Band-aids     Codeine Hives     Patient can tolerate oxycodone & dilaudid     Erythromycin Hives     ERYTHROMYCIN BASE     Grapefruit [Extra Strength Grapefruit]      GRAPEFRUIT     Penicillins Hives     Sulfa Drugs      SULFONAMIDE ANTIBIOTICS      Tomato        ROS:  C: NEGATIVE for fever, chills, change in weight  R: NEGATIVE for significant cough or SOB  CV: NEGATIVE for chest pain, palpitations or peripheral edema  GI: NEGATIVE for nausea, abdominal pain, heartburn, or change in bowel habits  N: NEGATIVE for weakness, dizziness or paresthesias  P: NEGATIVE for changes in mood or affect    EXAM:  /62 (BP Location: Left arm, Patient Position: Chair, Cuff Size: Adult Large)  Pulse 65  Temp 98.5  F (36.9  C) (Tympanic)  Resp 18  Ht 5' 4\" (1.626 m)  Wt 226 lb (102.5 kg)  SpO2 98%  BMI 38.79 kg/m2 Body mass index is 38.79 kg/(m^2).   GENERAL APPEARANCE: healthy, alert and no distress  RESP: lungs clear to auscultation - no rales, rhonchi or wheezes  CV: regular rates and rhythm, normal S1 S2, no S3 or S4 and no murmur, click or rub  SKIN: erythematous raised papules at the bilateral base of the nares and excoriated areas of the bilateral chin  NEURO: Normal strength and tone, mentation intact and speech normal  PSYCH: mentation appears normal and affect normal/bright    LABS AND IMAGING:           ASSESSMENT/PLAN:  (E86.0) Dehydration  (primary encounter diagnosis)  Comment:   Plan: resolved, she is drinking 3-4 bottles of water daily, continue this and follow up for concerns    (L70.0) Acne vulgaris  Comment:   Plan: azithromycin (ZITHROMAX) 250 MG tablet        She has used cortizone, peroxide, antibiotic ointment, and a relative's perscription cream without success. These are painful to touch. They don't appear to be consistent with shingles as they are " bilateral. Will try a course of Zithromax. Follow up if not improved or for concerns    (R73.09) Elevated glucose  Comment:   Plan: Hemoglobin A1c        Check lab today      Magaly Sosa MD  June 26, 2017

## 2017-06-26 NOTE — MR AVS SNAPSHOT
"              After Visit Summary   6/26/2017    Dinorah Lim    MRN: [id number]           Patient Information     Date Of Birth          1940        Visit Information        Provider Department      6/26/2017 2:45 PM Magaly Sosa MD [facility] [place]        Today's Diagnoses     Dehydration    -  1    Acne vulgaris        Elevated glucose           Follow-ups after your visit        Your next 10 appointments already scheduled     Jun 26, 2017  2:45 PM CDT   (Arrive by 2:30 PM)   SHORT with Magaly Sosa MD   [facility] [place] ([facility] [place] )    [address] [address]   [phone]              Who to contact     If you have questions or need follow up information about today's clinic visit or your schedule please contact [facility] directly at [phone].  Normal or non-critical lab and imaging results will be communicated to you by MyChart, letter or phone within 4 business days after the clinic has received the results. If you do not hear from us within 7 days, please contact the clinic through MyChart or phone. If you have a critical or abnormal lab result, we will notify you by phone as soon as possible.  Submit refill requests through [employer] or call your pharmacy and they will forward the refill request to us. Please allow 3 business days for your refill to be completed.          Additional Information About Your Visit        MyChart Information     [employer] lets you send messages to your doctor, view your test results, renew your prescriptions, schedule appointments and more. To sign up, go to [email or web address] . Click on \"Log in\" on the left side of the screen, which will take you to the Welcome page. Then click on \"Sign up Now\" on the right side of the page.     You will be asked to enter the access code listed below, as well as some personal information. Please follow the directions to create your username and password.   " "  Your access code is: [email or web address]  Expires: 2017  8:54 PM     Your access code will  in 90 days. If you need help or a new code, please call your [facility] or [phone].        Care EveryWhere ID     This is your Care EveryWhere ID. This could be used by other organizations to access your [facility] medical records  [id number]        Your Vitals Were     Pulse Temperature Respirations Height Pulse Oximetry BMI (Body Mass Index)    65 98.5  F (36.9  C) (Tympanic) 18 5' 4\" (1.626 m) 98% 38.79 kg/m2       Blood Pressure from Last 3 Encounters:   17 126/62   17 124/64   17 128/74    Weight from Last 3 Encounters:   17 226 lb (102.5 kg)   17 227 lb (103 kg)   17 229 lb (103.9 kg)              We Performed the Following     Hemoglobin A1c          Today's Medication Changes          These changes are accurate as of: 17  2:38 PM.  If you have any questions, ask your nurse or doctor.               Start taking these medicines.        Dose/Directions    azithromycin 250 MG tablet   Commonly known as:  ZITHROMAX   Used for:  Acne vulgaris   Started by:  Magaly Sosa MD        Two tablets first day, then one tablet daily for four days.   Quantity:  6 tablet   Refills:  0            Where to get your medicines      These medications were sent to [id number] [address] [id number]   [id number] , [place] [address]     Phone:  [phone]     azithromycin 250 MG tablet                Primary Care Provider Office Phone # Fax #    Magaly Sosa [phone]       [facility] [place] [address]  [place] [address]        Equal Access to Services     EDGARDO SOLANO AH: Jorge Talbot, joaquim overton, qatejata kaalmada grover, olga caballero. So [facility] [phone].    ATENCIÓN: Si habla español, tiene a lazaro disposición servicios gratuitos de asistencia lingüística. Llame al " [phone].    We comply with applicable federal civil rights laws and [place] laws. We do not discriminate on the basis of race, color, national origin, age, disability sex, sexual orientation or gender identity.            Thank you!     Thank you for choosing [facility]  for your care. Our goal is always to provide you with excellent care. Hearing back from our patients is one way we can continue to improve our services. Please take a few minutes to complete the written survey that you may receive in the mail after your visit with us. Thank you!             Your Updated Medication List - Protect others around you: Learn how to safely use, store and throw away your medicines at www.disposemymeds.org.          This list is accurate as of: 6/26/17  2:38 PM.  Always use your most recent med list.                   Brand Name Dispense Instructions for use Diagnosis    albuterol (2.5 MG/3ML) 0.083% neb solution     1 Box    Take 1 vial (2.5 mg) by nebulization every 4 hours as needed for shortness of breath / dyspnea or wheezing    Acute bronchospasm       azithromycin 250 MG tablet    ZITHROMAX    6 tablet    Two tablets first day, then one tablet daily for four days.    Acne vulgaris       BENADRYL ALLERGY PO      Take 25 mg by mouth nightly as needed    Hives       clonazePAM 1 MG tablet    klonoPIN    45 tablet    TAKE ONE-FOURTH TO ONE-HALF TABLET BY MOUTH EVERY 8 HOURS AS NEEDED    Anxiety       FISH OIL      Take 1 capsule by mouth daily        Garlic 10 MG Caps      Take 1 capsule by mouth as needed        HYDROcodone-acetaminophen 5-325 MG per tablet    NORCO    60 tablet    TAKE ONE TABLET BY MOUTH EVERY 4 TO 6 HOURS AS NEEDED FOR PAIN    Primary osteoarthritis of right hip       PARoxetine 40 MG tablet    PAXIL    90 tablet    Take 1 tablet (40 mg) by mouth daily    Anxiety       simvastatin 20 MG tablet    ZOCOR    90 tablet    TAKE ONE TABLET BY MOUTH ONCE DAILY AT BEDTIME     Hyperlipidemia LDL goal <100       VITAMIN D (CHOLECALCIFEROL) PO      Take 2,000 Units by mouth daily        VITAMIN E      Take 1 capsule by mouth daily

## 2017-07-06 ENCOUNTER — HOSPITAL ENCOUNTER (EMERGENCY)
Facility: HOSPITAL | Age: 77
Discharge: HOME | End: 2017-07-06
Attending: NURSE PRACTITIONER | Admitting: NURSE PRACTITIONER
Payer: MEDICARE

## 2017-07-06 VITALS
SYSTOLIC BLOOD PRESSURE: 137 MMHG | OXYGEN SATURATION: 95 % | RESPIRATION RATE: 18 BRPM | DIASTOLIC BLOOD PRESSURE: 80 MMHG | TEMPERATURE: 97.7 F

## 2017-07-06 DIAGNOSIS — T14.8XXA BRUISING: ICD-10-CM

## 2017-07-06 PROCEDURE — 99213 OFFICE O/P EST LOW 20 MIN: CPT | Performed by: NURSE PRACTITIONER

## 2017-07-06 PROCEDURE — 99211 OFF/OP EST MAY X REQ PHY/QHP: CPT

## 2017-07-06 ASSESSMENT — ENCOUNTER SYMPTOMS: CONSTITUTIONAL NEGATIVE: 1

## 2017-07-06 NOTE — ED AVS SNAPSHOT
[facility]    [address]    [place] [address]    Phone:  [phone]                                       Dinorah Lim   MRN: [id number]    Department:  [facility]   Date of Visit:  7/6/2017           Patient Information     Date Of Birth          1940        Your diagnoses for this visit were:     Bruising        You were seen by Ronda Craig NP.      Follow-up Information     Follow up with Magaly Sosa MD.    Specialty:  Family Practice    Why:  As needed, If symptoms worsen    Contact information:    [facility] [place]  [address]  [place] [address] [phone]          Follow up with [facility].    Specialty:  EMERGENCY MEDICINE    Why:  As needed, If symptoms worsen    Contact information:    [address]  [place] [address] [phone]    Additional information:    From [facility]: Take [address]. Turn left at [address]. Turn left at the first stoplight on [address]. At the first stop sign, take a right onto [address]. Take a left into the parking lot and continue through until you reach the North enterance of the building.       From [place]: Take [address]. Take the [address] ramp towards [place]. Turn left onto [address]. Take a slight right onto [address]. Turn left at the first stoplight on [address]. At the first stop sign, take a right onto [address]. Take a left into the parking lot and continue through until you reach the North enterance of the building.       From [place]: Take [address]. Take a right at [address]. At the first stop sign, take a right onto [address]. Take a left into the parking lot and continue through until you reach the North enterance of the building.       Discharge References/Attachments     SOFT TISSUE CONTUSION (ENGLISH)         Review of your medicines      Our records show that  you are taking the medicines listed below. If these are incorrect, please call your family doctor or clinic.        Dose / Directions Last dose taken    albuterol (2.5 MG/3ML) 0.083% neb solution   Dose:  1 vial   Quantity:  1 Box        Take 1 vial (2.5 mg) by nebulization every 4 hours as needed for shortness of breath / dyspnea or wheezing   Refills:  0        BENADRYL ALLERGY PO   Dose:  25 mg   Indication:  takes with simvastatin        Take 25 mg by mouth nightly as needed   Refills:  0        clonazePAM 1 MG tablet   Commonly known as:  klonoPIN   Quantity:  45 tablet        TAKE ONE-FOURTH TO ONE-HALF TABLET BY MOUTH EVERY 8 HOURS AS NEEDED   Refills:  0        FISH OIL   Dose:  1 capsule        Take 1 capsule by mouth daily   Refills:  0        Garlic 10 MG Caps   Dose:  1 capsule        Take 1 capsule by mouth as needed   Refills:  0        HYDROcodone-acetaminophen 5-325 MG per tablet   Commonly known as:  NORCO   Quantity:  60 tablet        TAKE ONE TABLET BY MOUTH EVERY 4 TO 6 HOURS AS NEEDED FOR PAIN   Refills:  0        PARoxetine 40 MG tablet   Commonly known as:  PAXIL   Dose:  40 mg   Quantity:  90 tablet        Take 1 tablet (40 mg) by mouth daily   Refills:  1        simvastatin 20 MG tablet   Commonly known as:  ZOCOR   Quantity:  90 tablet        TAKE ONE TABLET BY MOUTH ONCE DAILY AT BEDTIME   Refills:  0        VITAMIN D (CHOLECALCIFEROL) PO   Dose:  2000 Units        Take 2,000 Units by mouth daily   Refills:  0        VITAMIN E   Dose:  1 capsule        Take 1 capsule by mouth daily   Refills:  0                Orders Needing Specimen Collection     None      Pending Results     No orders found from 7/4/2017 to 7/7/2017.            Pending Culture Results     No orders found from 7/4/2017 to 7/7/2017.            Thank you for choosing [facility]       Thank you for choosing [facility] for your care. Our goal is always to provide you with excellent care. Hearing back from our patients is one way  "we can continue to improve our services. Please take a few minutes to complete the written survey that you may receive in the mail after you visit with us. Thank you!        [employer] Information     [employer] lets you send messages to your doctor, view your test results, renew your prescriptions, schedule appointments and more. To sign up, go to [email or web address] . Click on \"Log in\" on the left side of the screen, which will take you to the Welcome page. Then click on \"Sign up Now\" on the right side of the page.     You will be asked to enter the access code listed below, as well as some personal information. Please follow the directions to create your username and password.     Your access code is: [email or web address]  Expires: 2017  8:54 PM     Your access code will  in 90 days. If you need help or a new code, please call your [facility] clinic or [phone].        Care EveryWhere ID     This is your Care EveryWhere ID. This could be used by other organizations to access your [facility] medical records  [id number]        Equal Access to Services     [facility] Center[facility] : Jorge Talbot, joaquim overton, olga cano. So [facility] [phone].    ATENCIÓN: Si habla español, tiene a lazaro disposición servicios gratuitos de asistencia lingüística. Mario al [phone].    We comply with applicable federal civil rights laws and [place] laws. We do not discriminate on the basis of race, color, national origin, age, disability sex, sexual orientation or gender identity.            After Visit Summary       This is your record. Keep this with you and show to your community pharmacist(s) and doctor(s) at your next visit.                  "

## 2017-07-06 NOTE — ED AVS SNAPSHOT
[facility]    [address]    [place] [address]    Phone:  [phone]                                       Dinorah Lim   MRN: [id number]    Department:  [facility]   Date of Visit:  7/6/2017           After Visit Summary Signature Page     I have received my discharge instructions, and my questions have been answered. I have discussed any challenges I see with this plan with the nurse or doctor.    ..........................................................................................................................................  Patient/Patient Representative Signature      ..........................................................................................................................................  Patient Representative Print Name and Relationship to Patient    ..................................................               ................................................  Date                                            Time    ..........................................................................................................................................  Reviewed by Signature/Title    ...................................................              ..............................................  Date                                                            Time

## 2017-07-07 NOTE — ED NOTES
Pt presents today alone for c/o an area to her left lower leg where there is a bruise now, she did not come in when this happened as she says it didn't break the skin, she doesn't know the dog or the owner but is now having burning pain in the leg and insists its from the dog.

## 2017-07-07 NOTE — ED NOTES
75 y/o presents with c/o L leg pain. States she was bote by a dog a week ago, dog bite did not puncture skin, but patient concerned over pain. Bruising noted to L leg. States pain radiates from knee to ankle - rates 4/10.

## 2017-07-07 NOTE — ED PROVIDER NOTES
"  History     Chief Complaint   Patient presents with     Leg Pain     L leg pain, was bite by a dog a week ago     The history is provided by the patient. No  was used.     Dinorah Lim is a 76 year old female who presents with a bruise on the front of her left leg that is 7 cm x 4 cm she believes that it was from a chihuahua that latched on to her pants 10 days ago.. She states that \"it burns so bad\". She is concerned about a blood clot. She really just has a bruise on her anterior left shin now that is brown and fading yellow. There is no erythema. The pain is less now than it was 10 days ago .  She states there is no possibility of her having bumped into something and hit her anterior shin on something else. 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 She stays active in her [place].  She reports there are many dogs roaming around the neighborhood and that is typical.     I have reviewed the Medications, Allergies, Past Medical and Surgical History, and Social History in the Epic system.    Allergies:   Allergies   Allergen Reactions     Chocolate Hives     Lemon Flavor Hives     Lime [Calcium Oxysulfide] Hives     Orange Fruit [Citrus] Hives     Strawberry Hives     Adhesive Tape      Band-aids     Codeine Hives     Patient can tolerate oxycodone & dilaudid     Erythromycin Hives     ERYTHROMYCIN BASE     Grapefruit [Extra Strength Grapefruit]      GRAPEFRUIT     Penicillins Hives     Sulfa Drugs      SULFONAMIDE ANTIBIOTICS      Tomato          No current facility-administered medications on file prior to encounter.   Current Outpatient Prescriptions on File Prior to Encounter:  simvastatin (ZOCOR) 20 MG tablet TAKE ONE TABLET BY MOUTH ONCE DAILY AT BEDTIME   PARoxetine (PAXIL) 40 MG tablet Take 1 tablet (40 mg) by mouth daily " "  DiphenhydrAMINE HCl (BENADRYL ALLERGY PO) Take 25 mg by mouth nightly as needed    clonazePAM (KLONOPIN) 1 MG tablet TAKE ONE-FOURTH TO ONE-HALF TABLET BY MOUTH EVERY 8 HOURS AS NEEDED   HYDROcodone-acetaminophen (NORCO) 5-325 MG per tablet TAKE ONE TABLET BY MOUTH EVERY 4 TO 6 HOURS AS NEEDED FOR PAIN   albuterol (2.5 MG/3ML) 0.083% neb solution Take 1 vial (2.5 mg) by nebulization every 4 hours as needed for shortness of breath / dyspnea or wheezing   VITAMIN D, CHOLECALCIFEROL, PO Take 2,000 Units by mouth daily   Garlic 10 MG CAPS Take 1 capsule by mouth as needed   VITAMIN E Take 1 capsule by mouth daily    FISH OIL Take 1 capsule by mouth daily        Patient Active Problem List   Diagnosis     Osteoarthritis of right hip     Abnormal glucose     Osteoarthritis     Lung density on x-ray     Hyperlipidemia LDL goal <130     Advanced care planning/counseling discussion     Anxiety     COPD (chronic obstructive pulmonary disease) (H)     Urticaria     ACP (advance care planning)     Morbid obesity (H)       Past Surgical History:   Procedure Laterality Date     CLOSED REDUCTION WRIST Right 1952 x 2    long arm cast (same time as elbow fx)     CLOSED RX ELBOW DISLOCATION Right 1952    CR/long cast of fracture     HC INJ EPIDURAL LUMBAR/SACRAL W/WO CONTRAST Bilateral 5/2013    facet injections; prev 2012     LAPAROSCOPIC CHOLECYSTECTOMY N/A 1/4/2015    Procedure: LAPAROSCOPIC CHOLECYSTECTOMY;  Surgeon: Brittney العلي MD;  Location: [facility]     TONSILLECTOMY         Social History   Substance Use Topics     Smoking status: Never Smoker     Smokeless tobacco: Never Used     Alcohol use No       Most Recent Immunizations   Administered Date(s) Administered     Pneumococcal 23 valent 06/02/2014     TD (ADULT, 7+) 08/10/1999     TDAP Vaccine (Boostrix) 12/14/2012       BMI: Estimated body mass index is 38.79 kg/(m^2) as calculated from the following:    Height as of 6/26/17: 1.626 m (5' 4\").    Weight as of " 6/26/17: 102.5 kg (226 lb).      Review of Systems   Constitutional: Negative.    HENT: Negative.    Eyes: Negative.    Respiratory: Negative.    Cardiovascular: Negative.    Genitourinary: Negative.    Musculoskeletal: Negative.    Skin:        Bruise anterior shin area   Neurological: Negative.  Negative for headaches.   Hematological: Bruises/bleeds easily (she states she does bruise very easily).       Physical Exam   BP: 137/80  Heart Rate: 75  Temp: 97.7  F (36.5  C)  Resp: 18  SpO2: 95 %  Physical Exam   Constitutional: She is oriented to person, place, and time. No distress.   Obese , non ill appearing 75 YO female   HENT:   Head: Normocephalic and atraumatic.   Eyes: Conjunctivae are normal. Right eye exhibits no discharge. Left eye exhibits no discharge.   Neck: Normal range of motion.   Cardiovascular: Normal rate, regular rhythm and normal heart sounds.  Exam reveals no gallop and no friction rub.    No murmur heard.  Pulmonary/Chest: Effort normal and breath sounds normal. She has no wheezes. She has no rales. She exhibits no tenderness.   Musculoskeletal: Normal range of motion. She exhibits no edema or tenderness.   Neurological: She is alert and oriented to person, place, and time.   Skin: Skin is warm and dry. She is not diaphoretic. No erythema.   She has a 7 x 4 irregular circular bruise on her left anterior mid shin area that is fading yellow/ brown and appears to be resolving.  Appears fairly large to be from a chihuahua and there is no erythema or break in skin.  No redness or erythema on her calves .  Sensation and vascular intact distally. Trace peripheral edema bilaterally.     Nursing note and vitals reviewed.      ED Course     ED Course     Procedures               Labs Ordered and Resulted from Time of ED Arrival Up to the Time of Departure from the ED - No data to display    Assessments & Plan (with Medical Decision Making)     I have reviewed the nursing notes.  She was reassured that  she does not have classic sx of a DVT  Bruising should resolve without further intervention  F/u any worsening sx PCP or here  I have reviewed the findings, diagnosis, plan and need for follow up with the patient.      Discharge Medication List as of 7/6/2017  8:30 PM          Final diagnoses:   Bruising       7/6/2017   [facility]     Ronda Craig NP  07/09/17 7268

## 2017-07-08 NOTE — PROGRESS NOTES
DISCHARGE SUMMARY     03/27/17 0728   Signing Clinician's Name / Credentials   Signing clinician's name / credentials park wei PTA   Session Number   Session Number 4   Session Tracking and Supervision   PT Assistant Visit Number 2   Progress Note/Recertification   Progress Note Completed Date 07/08/17   PT Medicare Only G-code   G-code Carry: Carrying, Moving & Handling Objects   Carry: Carrying, Moving & Handling Objects   Carry Current Status,  (eval/re-eval & every progress note) CH: 0% impairment   Current Carry Modifier Rationale clinical judgment   Carry Goal,  (eval/re-eval, every progress note, & discharge) CH: 0% impairment   Carry Discharge Status,  (discharge) CH: 0% impairment   Discharge Carry Modifier Rationale clinical judgment   Adult Goals   PT Ortho Eval Goals 1;2;3   Ortho Goal 1   Goal Identifier STG 1   Goal Description Patient will report decreased worst L elbow PL to 5/10 or less in order to perform work tasks with L arm   Target Date 03/31/17   Date Met 03/27/17   Ortho Goal 2   Goal Identifier LTG 1   Goal Description Patient will report decreased worst PL to 3/10 or less in L elbow in order to perform work tasks without difficulty   Target Date 04/21/17   Date Met 03/27/17   Ortho Goal 3   Goal Identifier LTG 2   Goal Description Patient will demonstrate full L elbow ROM without pain in order to perform all work tasks without difficulty   Target Date 04/21/17   Date Met 03/27/17   Subjective Report   Subjective Report Per patient's last report on 3/27/2017: Pt reports the left tip of the elbow is sore the rest of the arm is fine. Pt states the elbow is  50-60% movement is better. Pt is going to wait to see the doctor about her legs to schedule more for her elbow. 7:30-8:00.   Objective Measure 1   Objective Measure None obtained today       Patient has met goals and is ready to DC with IND performance of HEP    Jessenia Garcia DPT, OCS

## 2017-07-08 NOTE — PROGRESS NOTES
05/25/17 1453   Signing Clinician's Name / Credentials   Signing clinician's name / credentials Landy Pena PTA   Session Number   Session Number 4   Session Tracking and Supervision   PT Assistant Visit Number 4   Progress Note/Recertification   Progress Note Completed Date 07/08/17   PT Medicare Only G-code   G-code Mobility: Walking & Moving Around   Mobility: Walking & Moving Around   Mobility Current Status,  (eval/re-eval & every progress note) CJ: 20-39% impairment   Current Mobility Modifier Rationale clinical judgment   Mobility Goal,  (eval/re-eval, every progress note, & discharge) CI: 1-19% impairment   Mobility Discharge Status,  (discharge) CJ: 20-39% impairment   Discharge Mobility Modifier Rationale clinical judgment   Adult Goals   PT Ortho Eval Goals 1;2;3   Ortho Goal 1   Goal Identifier STG 1   Goal Description Patient will report decreased worst PL in R hip to 6/10 or less in order to do stairs more safely   Target Date 05/29/17   Date Met 05/19/17   Ortho Goal 2   Goal Identifier LTG 1   Goal Description Patient will report decreased worst PL in R hip to 4/10 or less in order to improve her gait and mobility   Target Date 06/26/17   Date Met (DC)   Ortho Goal 3   Goal Identifier LTG 2   Goal Description Patient will demonstrate proper form with HEP in order to continue making gains IND at home to prevent future injury   Target Date 06/26/17   Date Met (DC)   Subjective Report   Subjective Report Patient was last seen on 5/25/2017. States her hip is better and she is ready to DC   Treatment Interventions   Interventions Manual Therapy;Therapeutic Procedure/Exercise   Therapeutic Procedure/exercise   Minutes 27   Skilled Intervention ther ex, stretching   Patient Response good   Treatment Detail Nustep level 4 5 minutes, // bars: 3 way calf raises, step ups on 4 inch step 10 reps bilateral, Airex NBOS SLS, Leg press 2pl 2x10 reps, calf raises toes up toes out 10 reps each,     Assessments Completed   Assessments Completed Pt is improving with strength and balance.   Plan   Homework Continue HEP as prescribed   Plan for next session DC   Total Session Time   Total Session Time 27     Discharge Summary    Patient was comfortable with DC from PT to continue on her own.    Jessenia Garcia DPT, OCS

## 2017-07-09 ASSESSMENT — ENCOUNTER SYMPTOMS
MUSCULOSKELETAL NEGATIVE: 1
CARDIOVASCULAR NEGATIVE: 1
EYES NEGATIVE: 1
NEUROLOGICAL NEGATIVE: 1
BRUISES/BLEEDS EASILY: 1
RESPIRATORY NEGATIVE: 1
HEADACHES: 0

## 2017-07-10 ENCOUNTER — OFFICE VISIT (OUTPATIENT)
Dept: DERMATOLOGY | Facility: OTHER | Age: 77
End: 2017-07-10
Attending: DERMATOLOGY
Payer: COMMERCIAL

## 2017-07-10 VITALS
BODY MASS INDEX: 38.24 KG/M2 | WEIGHT: 224 LBS | TEMPERATURE: 98.8 F | DIASTOLIC BLOOD PRESSURE: 68 MMHG | HEART RATE: 72 BPM | SYSTOLIC BLOOD PRESSURE: 116 MMHG | HEIGHT: 64 IN

## 2017-07-10 DIAGNOSIS — L28.0 LICHEN SIMPLEX CHRONICUS: Primary | ICD-10-CM

## 2017-07-10 PROCEDURE — 99212 OFFICE O/P EST SF 10 MIN: CPT

## 2017-07-10 PROCEDURE — 99213 OFFICE O/P EST LOW 20 MIN: CPT | Performed by: DERMATOLOGY

## 2017-07-10 RX ORDER — CLOBETASOL PROPIONATE 0.5 MG/G
OINTMENT TOPICAL
Qty: 60 G | Refills: 3 | Status: SHIPPED | OUTPATIENT
Start: 2017-07-10 | End: 2018-02-19

## 2017-07-10 NOTE — NURSING NOTE
"No chief complaint on file.      Initial /68  Pulse 72  Temp 98.8  F (37.1  C) (Tympanic)  Ht 1.626 m (5' 4\")  Wt 101.6 kg (224 lb)  BMI 38.45 kg/m2 Estimated body mass index is 38.45 kg/(m^2) as calculated from the following:    Height as of this encounter: 1.626 m (5' 4\").    Weight as of this encounter: 101.6 kg (224 lb).  Medication Reconciliation: complete     JAY HORVATH      "

## 2017-07-10 NOTE — MR AVS SNAPSHOT
"              After Visit Summary   7/10/2017    Dinorah Lim    MRN: [id number]           Patient Information     Date Of Birth          1940        Visit Information        Provider Department      7/10/2017 3:15 PM MALINI Vo MD [facility]        Today's Diagnoses     Lichen simplex chronicus    -  1      Care Instructions    Purchase some Acnomel cream for use on your face.  This should reduce the redness and allow the areas to heal.  Can get it from [employer] and from some [employer].     Use the clobetasol at bedtime on itchy areas - a tiny bit will suffice.     Today we injected your right elbow and nape of neck with triamcinolone suspension.           Follow-ups after your visit        Who to contact     If you have questions or need follow up information about today's clinic visit or your schedule please contact [facility] directly at [phone].  Normal or non-critical lab and imaging results will be communicated to you by Calpianhart, letter or phone within 4 business days after the clinic has received the results. If you do not hear from us within 7 days, please contact the clinic through Calpianhart or phone. If you have a critical or abnormal lab result, we will notify you by phone as soon as possible.  Submit refill requests through CELLFOR or call your pharmacy and they will forward the refill request to us. Please allow 3 business days for your refill to be completed.          Additional Information About Your Visit        MyChart Information     CELLFOR lets you send messages to your doctor, view your test results, renew your prescriptions, schedule appointments and more. To sign up, go to [email or web address] . Click on \"Log in\" on the left side of the screen, which will take you to the Welcome page. Then click on \"Sign up Now\" on the right side of the page.     You will be asked to enter the access code listed below, as well as some personal " "information. Please follow the directions to create your username and password.     Your access code is: [email or web address]  Expires: 10/8/2017  3:41 PM     Your access code will  in 90 days. If you need help or a new code, please call your [facility] or [phone].        Care EveryWhere ID     This is your Care EveryWhere ID. This could be used by other organizations to access your [place] medical records  [id number]        Your Vitals Were     Pulse Temperature Height BMI (Body Mass Index)          72 98.8  F (37.1  C) (Tympanic) 1.626 m (5' 4\") 38.45 kg/m2         Blood Pressure from Last 3 Encounters:   07/10/17 116/68   17 137/80   17 126/62    Weight from Last 3 Encounters:   07/10/17 101.6 kg (224 lb)   17 102.5 kg (226 lb)   17 103 kg (227 lb)              Today, you had the following     No orders found for display         Today's Medication Changes          These changes are accurate as of: 7/10/17  3:41 PM.  If you have any questions, ask your nurse or doctor.               Start taking these medicines.        Dose/Directions    clobetasol 0.05 % ointment   Commonly known as:  TEMOVATE   Used for:  Lichen simplex chronicus   Started by:  MALINI Vo MD        Apply at bedtime to sites of itch   Quantity:  60 g   Refills:  3            Where to get your medicines      These medications were sent to [id number] - [place], [place] - [address]   [id number] , [place] [address]     Phone:  [phone]     clobetasol 0.05 % ointment                Primary Care Provider Office Phone # Fax #    Magaly Sosa [phone]       [facility] [place] [address] [place] [address]        Equal Access to Services     [facility] AH: Jorge guajardo Sodarell, waaxda luqadaha, qaybta kaalmaolga mcginnis. So [facility] [phone].    ATENCIÓN: Si habla alexander, tiene a lazaro disposición servicios gratuitos " de asistencia lingüística. Mario leavitt [phone].    We comply with applicable federal civil rights laws and [place] laws. We do not discriminate on the basis of race, color, national origin, age, disability sex, sexual orientation or gender identity.            Thank you!     Thank you for choosing [facility]  for your care. Our goal is always to provide you with excellent care. Hearing back from our patients is one way we can continue to improve our services. Please take a few minutes to complete the written survey that you may receive in the mail after your visit with us. Thank you!             Your Updated Medication List - Protect others around you: Learn how to safely use, store and throw away your medicines at www.disposemymeds.org.          This list is accurate as of: 7/10/17  3:41 PM.  Always use your most recent med list.                   Brand Name Dispense Instructions for use Diagnosis    albuterol (2.5 MG/3ML) 0.083% neb solution     1 Box    Take 1 vial (2.5 mg) by nebulization every 4 hours as needed for shortness of breath / dyspnea or wheezing    Acute bronchospasm       BENADRYL ALLERGY PO      Take 25 mg by mouth nightly as needed    Hives       clobetasol 0.05 % ointment    TEMOVATE    60 g    Apply at bedtime to sites of itch    Lichen simplex chronicus       clonazePAM 1 MG tablet    klonoPIN    45 tablet    TAKE ONE-FOURTH TO ONE-HALF TABLET BY MOUTH EVERY 8 HOURS AS NEEDED    Anxiety       FISH OIL      Take 1 capsule by mouth daily        Garlic 10 MG Caps      Take 1 capsule by mouth as needed        HYDROcodone-acetaminophen 5-325 MG per tablet    NORCO    60 tablet    TAKE ONE TABLET BY MOUTH EVERY 4 TO 6 HOURS AS NEEDED FOR PAIN    Primary osteoarthritis of right hip       PARoxetine 40 MG tablet    PAXIL    90 tablet    Take 1 tablet (40 mg) by mouth daily    Anxiety       simvastatin 20 MG tablet    ZOCOR    90 tablet    TAKE ONE TABLET BY MOUTH ONCE DAILY AT BEDTIME     Hyperlipidemia LDL goal <100       VITAMIN D (CHOLECALCIFEROL) PO      Take 2,000 Units by mouth daily        VITAMIN E      Take 1 capsule by mouth daily

## 2017-07-10 NOTE — PATIENT INSTRUCTIONS
Purchase some Acnomel cream for use on your face.  This should reduce the redness and allow the areas to heal.  Can get it from [employer] and from some drugstores.     Use the clobetasol at bedtime on itchy areas - a tiny bit will suffice.     Today we injected your right elbow and nape of neck with triamcinolone suspension.

## 2017-07-13 ENCOUNTER — TELEPHONE (OUTPATIENT)
Dept: FAMILY MEDICINE | Facility: OTHER | Age: 77
End: 2017-07-13

## 2017-07-13 NOTE — TELEPHONE ENCOUNTER
Called pt to let her know she doesn't need an appointment with Dr Mckenzie she may just call scheduling to set up with Dr Wills since he has seen her 3 times this year. She was transferred up to scheduling to make a follow up with him for injection.

## 2017-07-13 NOTE — TELEPHONE ENCOUNTER
9:41 AM    Reason for Call: Phone Call    Description: Patient is inquiring if needs to see Dr Sosa or she can just place an order for another L hip injection? If you could call patient back at [phone]    Was an appointment offered for this call? NA would like to speak to nurse first    Preferred method for responding to this message: Telephone Call    If we cannot reach you directly, may we leave a detailed response at the number you provided? Yes    Can this message wait until your PCP/provider returns, if available today? YES      Lainey Nguyen

## 2017-07-13 NOTE — PROGRESS NOTES
DATE OF VISIT:  07/10/2017    SUBJECTIVE:  Dinorah was seen in 2016 for eczema, lichen simplex type.  She continues to have some trouble with her right elbow and nape of the neck with itchy eczema.  She also has developed a process under her nose and on her chin that is red, somewhat pustulated and does suggest some type of a perioral dermatitis.        OBJECTIVE:  Shows eczema changes of the elbows and of the nape of the neck.      ASSESSMENT:  Lichen simplex eczema and perioral dermatitis.    PLAN:  For the perioral dermatitis, Acnomel cream that she can buy over the counter, use that and that should help.  I suggested topical clobetasol for her lichen simplex issues.      Medications, allergies reviewed.      Return p.r.n. failure of the lichen simplex problem to respond to topical care.          MALIIN Vo MD    D:  07/10/2017 18:01 T:  07/13/2017 10:32  Document:  [id number] HH\DK\CB

## 2017-07-31 DIAGNOSIS — F41.9 ANXIETY: ICD-10-CM

## 2017-08-02 RX ORDER — PAROXETINE 40 MG/1
TABLET, FILM COATED ORAL
Qty: 90 TABLET | Refills: 2 | Status: SHIPPED | OUTPATIENT
Start: 2017-08-02 | End: 2018-05-15

## 2017-08-09 ENCOUNTER — OFFICE VISIT (OUTPATIENT)
Dept: ORTHOPEDICS | Facility: OTHER | Age: 77
End: 2017-08-09
Attending: ORTHOPAEDIC SURGERY
Payer: MEDICARE

## 2017-08-09 VITALS
WEIGHT: 225 LBS | RESPIRATION RATE: 18 BRPM | DIASTOLIC BLOOD PRESSURE: 54 MMHG | HEIGHT: 64 IN | SYSTOLIC BLOOD PRESSURE: 120 MMHG | TEMPERATURE: 98.4 F | HEART RATE: 67 BPM | OXYGEN SATURATION: 97 % | BODY MASS INDEX: 38.41 KG/M2

## 2017-08-09 DIAGNOSIS — M16.0 PRIMARY OSTEOARTHRITIS OF BOTH HIPS: ICD-10-CM

## 2017-08-09 DIAGNOSIS — M65.4 RADIAL STYLOID TENOSYNOVITIS: Primary | ICD-10-CM

## 2017-08-09 PROCEDURE — 73100 X-RAY EXAM OF WRIST: CPT | Mod: TC,RT

## 2017-08-09 PROCEDURE — 99213 OFFICE O/P EST LOW 20 MIN: CPT | Mod: 25 | Performed by: ORTHOPAEDIC SURGERY

## 2017-08-09 PROCEDURE — 20605 DRAIN/INJ JOINT/BURSA W/O US: CPT | Mod: RT

## 2017-08-09 PROCEDURE — 20605 DRAIN/INJ JOINT/BURSA W/O US: CPT | Mod: RT | Performed by: ORTHOPAEDIC SURGERY

## 2017-08-09 PROCEDURE — 99212 OFFICE O/P EST SF 10 MIN: CPT | Mod: 25

## 2017-08-09 RX ORDER — DEXAMETHASONE SODIUM PHOSPHATE 4 MG/ML
4 INJECTION, SOLUTION INTRA-ARTICULAR; INTRALESIONAL; INTRAMUSCULAR; INTRAVENOUS; SOFT TISSUE ONCE
Qty: 1 ML | Refills: 0 | OUTPATIENT
Start: 2017-08-09 | End: 2017-08-09

## 2017-08-09 ASSESSMENT — ANXIETY QUESTIONNAIRES
6. BECOMING EASILY ANNOYED OR IRRITABLE: NOT AT ALL
7. FEELING AFRAID AS IF SOMETHING AWFUL MIGHT HAPPEN: NOT AT ALL
5. BEING SO RESTLESS THAT IT IS HARD TO SIT STILL: NOT AT ALL
1. FEELING NERVOUS, ANXIOUS, OR ON EDGE: NOT AT ALL
2. NOT BEING ABLE TO STOP OR CONTROL WORRYING: SEVERAL DAYS
3. WORRYING TOO MUCH ABOUT DIFFERENT THINGS: SEVERAL DAYS
GAD7 TOTAL SCORE: 2
IF YOU CHECKED OFF ANY PROBLEMS ON THIS QUESTIONNAIRE, HOW DIFFICULT HAVE THESE PROBLEMS MADE IT FOR YOU TO DO YOUR WORK, TAKE CARE OF THINGS AT HOME, OR GET ALONG WITH OTHER PEOPLE: NOT DIFFICULT AT ALL

## 2017-08-09 ASSESSMENT — PATIENT HEALTH QUESTIONNAIRE - PHQ9
5. POOR APPETITE OR OVEREATING: NOT AT ALL
SUM OF ALL RESPONSES TO PHQ QUESTIONS 1-9: 1

## 2017-08-09 ASSESSMENT — PAIN SCALES - GENERAL: PAINLEVEL: WORST PAIN (10)

## 2017-08-09 NOTE — NURSING NOTE
Patient fitted with a small Rt. Wrist Brace and stated good comfort patient did request for small stating she had a Medium at home and it was to big, receipt given and signed.  Jesusita So LPN

## 2017-08-09 NOTE — PROGRESS NOTES
Chief complaints:  #1.  New problem: Right wrist pain  #2.  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  The pain is a sharp stabbing intermittent shooting pain that radiates from the radial styloid, to the radial mid forearm.  It is provoked by certain wrist motions that she cannot reproduce.  She denies numbness or tingling in the fingers.    She has a long history of DJD of both hips.  In the past her right has been more symptomatic and has been treated with intra-articular steroid injection and Relafen.  Today she presents with increased pain in the left anterior hip and groin of insidious nature.  She admits that she is no longer taking Relafen but does not remember why.  She did not have any problems while taking it.    Her musculoskeletal and neurologic review of systems were reviewed.  She is having increasing right knee pain for which she will schedule a separate appointment another day as time did not allow her to have 3 problems assessed today (she was only scheduled for one problem today).    Examination: Overweight female in no obvious distress.  Stance and gait are normal.  She did not use an ambulatory appliance.    Right wrist: Subtle swelling on the volar radial aspect.  No palpable tenderness.  Wrist range of motion full and pain-free.  The Finkelstein test was negative.  She identifies the source of her pain by pointing to a 0.1 cm proximal to the tip of the radial styloid on the radial side of the wrist.    Labs: On 6/12/17 her AST, ALT, BUN, and creatinine were all within normal limits.    X-rays: Plain films the right wrist were taken today.  They show no bony abnormalities.  Her last hip films were performed on April 19, 2017.  They showed bilateral joint space narrowing, femoral head and acetabular osteophytes, and coxa vara.    Impression:  #1.  DJD both hips, more  symptomatic on the left  #2.  Right wrist pain, probable early de Quervain's stenosing tenosynovium neuritis    Plan:   Left hip: I recommend she go back on Relafen. She agreed.   A new Relafen prescription was made: 750mg BID #60 2RF. If it does not help enough we will consider an intra-articular steroid injection.  Right wrist: I educated her on the natural history of de Quervain's tenosynovitis.  I provided with a brochure on the topic.  I told her the Relafen may help that but I also recommend a steroid injection and immobilization.  She agreed.    Procedure: After obtaining written informed consent and sterilely prepping the site a timeout was held.  Sterile technique was employed as the right wrist 1st dorsal compartment at the radial styloid was then injected with 4 mg of dexamethasone.  Topical ethyl chloride spray was used as a local anesthetic.  The patient tolerated the procedure well and there were no complications.  A wrist splint was applied which she will wear continuously for 3 days.    Follow-up: 2 weeks.  Extra time will be allotted as she wishes to have her right knee pain addressed at that time. In addition, if her wrist is not improved she wishes to discuss surgery.  If her hip is not improved she wishes to be referred for a steroid injection.

## 2017-08-09 NOTE — MR AVS SNAPSHOT
"              After Visit Summary   8/9/2017    Dinorah Lim    MRN: [id number]           Patient Information     Date Of Birth          1940        Visit Information        Provider Department      8/9/2017 1:00 PM Esteban Wills MD  ORTHOPEDICS        Today's Diagnoses     Radial styloid tenosynovitis    -  1    Primary osteoarthritis of both hips           Follow-ups after your visit        Follow-up notes from your care team     Return in about 2 weeks (around 8/23/2017).      Your next 10 appointments already scheduled     Aug 25, 2017  4:00 PM CDT   (Arrive by 3:45 PM)   Return Visit with Esteban Wills MD    ORTHOPEDICS ([facility] )    [address] [facility] [address] [phone]              Who to contact     If you have questions or need follow up information about today's clinic visit or your schedule please contact  ORTHOPEDICS directly at [phone].  Normal or non-critical lab and imaging results will be communicated to you by MyChart, letter or phone within 4 business days after the clinic has received the results. If you do not hear from us within 7 days, please contact the clinic through Desal[employer]ch[employer] or phone. If you have a critical or abnormal lab result, we will notify you by phone as soon as possible.  Submit refill requests through Aras or call your pharmacy and they will forward the refill request to us. Please allow 3 business days for your refill to be completed.          Additional Information About Your Visit        MyChart Information     Aras lets you send messages to your doctor, view your test results, renew your prescriptions, schedule appointments and more. To sign up, go to [email or web address] . Click on \"Log in\" on the left side of the screen, which will take you to the Welcome page. Then click on \"Sign up Now\" on the right side of the page.     You will be asked to enter the access code listed below, as well as some " "personal information. Please follow the directions to create your username and password.     Your access code is: [email or web address]  Expires: 10/8/2017  3:41 PM     Your access code will  in 90 days. If you need help or a new code, please call your [place] clinic or [phone].        Care EveryWhere ID     This is your Care EveryWhere ID. This could be used by other organizations to access your [place] medical records  [id number]        Your Vitals Were     Pulse Temperature Respirations Height Pulse Oximetry BMI (Body Mass Index)    67 98.4  F (36.9  C) (Tympanic) 18 5' 4\" (1.626 m) 97% 38.62 kg/m2       Blood Pressure from Last 3 Encounters:   17 120/54   07/10/17 116/68   17 137/80    Weight from Last 3 Encounters:   17 225 lb (102.1 kg)   07/10/17 224 lb (101.6 kg)   17 226 lb (102.5 kg)              We Performed the Following     DEXAMETHASONE SODIUM PHOS PER 1 MG     Medium Joint/Bursa injection and/or drainage (Elbow, TM, Wrist, Ankle)     XR WRIST RT 2 VW (Clinic Performed)          Today's Medication Changes          These changes are accurate as of: 17  1:53 PM.  If you have any questions, ask your nurse or doctor.               Start taking these medicines.        Dose/Directions    dexamethasone 4 MG/ML injection   Commonly known as:  DECADRON   Used for:  Radial styloid tenosynovitis   Started by:  Esteban Wills MD        Dose:  4 mg   Inject 1 mL (4 mg) as directed once for 1 dose Use 4 mg or dose determined by provider for iontophoresis.   Quantity:  1 mL   Refills:  0       nabumetone 750 MG tablet   Commonly known as:  RELAFEN   Used for:  Radial styloid tenosynovitis, Primary osteoarthritis of both hips   Started by:  Esteban Wills MD        Dose:  750 mg   Take 1 tablet (750 mg) by mouth 2 times daily as needed for moderate pain   Quantity:  60 tablet   Refills:  2            Where to get your medicines      These medications were sent to [id number] - [place], [place] - [address]   [id number] , [place] [address]     Phone:  [phone]     nabumetone 750 MG tablet         Some of these will need a paper prescription and others can be bought over the counter.  Ask your nurse if you have questions.     You don't need a prescription for these medications     dexamethasone 4 MG/ML injection                Primary Care Provider Office Phone # Fax #    Magaly Sosa [phone]       [facility] [place] [address]  [place] [address]        Equal Access to Services     [facility]: Hadii aad ku hadasho Soomaali, waaxda luqadaha, qaybta kaalmada adeegyadiane, olga juan . So [facility] [phone].    ATENCIÓN: Si habla español, tiene a lazaro disposición servicios gratuitos de asistencia lingüística. [facility] [phone].    We comply with applicable federal civil rights laws and [place] laws. We do not discriminate on the basis of race, color, national origin, age, disability sex, sexual orientation or gender identity.            Thank you!     Thank you for choosing  ORTHOPEDICS  for your care. Our goal is always to provide you with excellent care. Hearing back from our patients is one way we can continue to improve our services. Please take a few minutes to complete the written survey that you may receive in the mail after your visit with us. Thank you!             Your Updated Medication List - Protect others around you: Learn how to safely use, store and throw away your medicines at www.disposemymeds.org.          This list is accurate as of: 8/9/17  1:53 PM.  Always use your most recent med list.                   Brand Name Dispense Instructions for use Diagnosis    albuterol (2.5 MG/3ML) 0.083% neb solution     1 Box    Take 1 vial (2.5 mg) by nebulization every 4 hours as needed for shortness of breath / dyspnea or wheezing    Acute bronchospasm       BENADRYL ALLERGY PO      Take 25 mg by  mouth nightly as needed    Hives       clobetasol 0.05 % ointment    TEMOVATE    60 g    Apply at bedtime to sites of itch    Lichen simplex chronicus       clonazePAM 1 MG tablet    klonoPIN    45 tablet    TAKE ONE-FOURTH TO ONE-HALF TABLET BY MOUTH EVERY 8 HOURS AS NEEDED    Anxiety       dexamethasone 4 MG/ML injection    DECADRON    1 mL    Inject 1 mL (4 mg) as directed once for 1 dose Use 4 mg or dose determined by provider for iontophoresis.    Radial styloid tenosynovitis       FISH OIL      Take 1 capsule by mouth daily        Garlic 10 MG Caps      Take 1 capsule by mouth as needed        HYDROcodone-acetaminophen 5-325 MG per tablet    NORCO    60 tablet    TAKE ONE TABLET BY MOUTH EVERY 4 TO 6 HOURS AS NEEDED FOR PAIN    Primary osteoarthritis of right hip       nabumetone 750 MG tablet    RELAFEN    60 tablet    Take 1 tablet (750 mg) by mouth 2 times daily as needed for moderate pain    Radial styloid tenosynovitis, Primary osteoarthritis of both hips       * PARoxetine 40 MG tablet    PAXIL    90 tablet    Take 1 tablet (40 mg) by mouth daily    Anxiety       * PARoxetine 40 MG tablet    PAXIL    90 tablet    TAKE ONE TABLET BY MOUTH ONCE DAILY    Anxiety       simvastatin 20 MG tablet    ZOCOR    90 tablet    TAKE ONE TABLET BY MOUTH ONCE DAILY AT BEDTIME    Hyperlipidemia LDL goal <100       VITAMIN D (CHOLECALCIFEROL) PO      Take 2,000 Units by mouth daily        VITAMIN E      Take 1 capsule by mouth daily        * Notice:  This list has 2 medication(s) that are the same as other medications prescribed for you. Read the directions carefully, and ask your doctor or other care provider to review them with you.

## 2017-08-09 NOTE — NURSING NOTE
"Chief Complaint   Patient presents with     RECHECK     Complaints of  left hip pain.     Musculoskeletal Problem     right wrist pain     Mass     lump behind right knee       Initial /54 (BP Location: Right arm, Patient Position: Sitting, Cuff Size: Adult Regular)  Pulse 67  Temp 98.4  F (36.9  C) (Tympanic)  Resp 18  Ht 5' 4\" (1.626 m)  Wt 225 lb (102.1 kg)  SpO2 97%  BMI 38.62 kg/m2 Estimated body mass index is 38.62 kg/(m^2) as calculated from the following:    Height as of this encounter: 5' 4\" (1.626 m).    Weight as of this encounter: 225 lb (102.1 kg).  Medication Reconciliation: complete   Immunizations checked and up to date.  Stacy Lofton LPN      "

## 2017-08-10 ASSESSMENT — ANXIETY QUESTIONNAIRES: GAD7 TOTAL SCORE: 2

## 2017-08-25 ENCOUNTER — OFFICE VISIT (OUTPATIENT)
Dept: ORTHOPEDICS | Facility: OTHER | Age: 77
End: 2017-08-25
Attending: ORTHOPAEDIC SURGERY
Payer: MEDICARE

## 2017-08-25 VITALS
DIASTOLIC BLOOD PRESSURE: 58 MMHG | HEIGHT: 64 IN | TEMPERATURE: 98.3 F | OXYGEN SATURATION: 98 % | SYSTOLIC BLOOD PRESSURE: 126 MMHG | WEIGHT: 230 LBS | BODY MASS INDEX: 39.27 KG/M2 | HEART RATE: 69 BPM

## 2017-08-25 DIAGNOSIS — M17.11 PRIMARY OSTEOARTHRITIS OF RIGHT KNEE: ICD-10-CM

## 2017-08-25 DIAGNOSIS — M65.4 RADIAL STYLOID TENOSYNOVITIS: Primary | ICD-10-CM

## 2017-08-25 DIAGNOSIS — M16.0 PRIMARY OSTEOARTHRITIS OF BOTH HIPS: ICD-10-CM

## 2017-08-25 PROCEDURE — 99212 OFFICE O/P EST SF 10 MIN: CPT | Mod: 25

## 2017-08-25 PROCEDURE — 99213 OFFICE O/P EST LOW 20 MIN: CPT | Mod: 25 | Performed by: ORTHOPAEDIC SURGERY

## 2017-08-25 PROCEDURE — 20610 DRAIN/INJ JOINT/BURSA W/O US: CPT | Performed by: ORTHOPAEDIC SURGERY

## 2017-08-25 RX ORDER — DEXAMETHASONE SODIUM PHOSPHATE 4 MG/ML
4 INJECTION, SOLUTION INTRA-ARTICULAR; INTRALESIONAL; INTRAMUSCULAR; INTRAVENOUS; SOFT TISSUE ONCE
Qty: 1 ML | Refills: 0 | OUTPATIENT
Start: 2017-08-25 | End: 2017-09-22

## 2017-08-25 ASSESSMENT — PAIN SCALES - GENERAL: PAINLEVEL: SEVERE PAIN (6)

## 2017-08-25 NOTE — MR AVS SNAPSHOT
"              After Visit Summary   8/25/2017    Dinorah Lim    MRN: [id number]           Patient Information     Date Of Birth          1940        Visit Information        Provider Department      8/25/2017 4:00 PM Esteban Wills MD  ORTHOPEDICS        Today's Diagnoses     Radial styloid tenosynovitis    -  1    Primary osteoarthritis of both hips        Primary osteoarthritis of right knee           Follow-ups after your visit        Your next 10 appointments already scheduled     Sep 12, 2017  4:20 PM CDT   (Arrive by 4:05 PM)   New Visit with Esteban Wills MD    ORTHOPEDICS ([facility] )    [address] [facility] [address] [phone]            Nov 14, 2017  8:45 AM CST   (Arrive by 8:30 AM)   Return Visit with MALINI Vo MD   [facility] ([facility] )    [address] [facility] [id number] [phone]              Who to contact     If you have questions or need follow up information about today's clinic visit or your schedule please contact  ORTHOPEDICS directly at [phone].  Normal or non-critical lab and imaging results will be communicated to you by MyChart, letter or phone within 4 business days after the clinic has received the results. If you do not hear from us within 7 days, please contact the clinic through MyChart or phone. If you have a critical or abnormal lab result, we will notify you by phone as soon as possible.  Submit refill requests through Pathfire or call your pharmacy and they will forward the refill request to us. Please allow 3 business days for your refill to be completed.          Additional Information About Your Visit        MyChart Information     Pathfire lets you send messages to your doctor, view your test results, renew your prescriptions, schedule appointments and more. To sign up, go to [email or web address] . Click on \"Log in\" on the left side of the screen, " "which will take you to the Welcome page. Then click on \"Sign up Now\" on the right side of the page.     You will be asked to enter the access code listed below, as well as some personal information. Please follow the directions to create your username and password.     Your access code is: [email or web address]  Expires: 10/8/2017  3:41 PM     Your access code will  in 90 days. If you need help or a new code, please call your [facility] or [phone].        Care EveryWhere ID     This is your Care EveryWhere ID. This could be used by other organizations to access your [place] medical records  [id number]        Your Vitals Were     Pulse Temperature Height Pulse Oximetry BMI (Body Mass Index)       69 98.3  F (36.8  C) (Tympanic) 5' 4\" (1.626 m) 98% 39.48 kg/m2        Blood Pressure from Last 3 Encounters:   17 126/58   17 120/54   07/10/17 116/68    Weight from Last 3 Encounters:   17 230 lb (104.3 kg)   17 225 lb (102.1 kg)   07/10/17 224 lb (101.6 kg)              We Performed the Following     DEXAMETHASONE SODIUM PHOS PER 1 MG     Large Joint/Bursa injection and/or drainage (Shoulder, Knee)          Today's Medication Changes          These changes are accurate as of: 17  4:09 PM.  If you have any questions, ask your nurse or doctor.               These medicines have changed or have updated prescriptions.        Dose/Directions    * dexamethasone 4 MG/ML injection   Commonly known as:  DECADRON   This may have changed:  Another medication with the same name was added. Make sure you understand how and when to take each.   Used for:  Radial styloid tenosynovitis   Changed by:  Esteban Wills MD        Dose:  4 mg   Inject 1 mL (4 mg) as directed once for 1 dose Use 4 mg or dose determined by provider for iontophoresis.   Quantity:  1 mL   Refills:  0       * dexamethasone 4 MG/ML injection   Commonly known as:  DECADRON   This may have changed:  You were already taking a " medication with the same name, and this prescription was added. Make sure you understand how and when to take each.   Used for:  Primary osteoarthritis of right knee   Changed by:  Esteban Wills MD        Dose:  4 mg   Inject 1 mL (4 mg) as directed once for 1 dose Use 4 mg or dose determined by provider for iontophoresis.   Quantity:  1 mL   Refills:  0       * Notice:  This list has 2 medication(s) that are the same as other medications prescribed for you. Read the directions carefully, and ask your doctor or other care provider to review them with you.         Where to get your medicines      Some of these will need a paper prescription and others can be bought over the counter.  Ask your nurse if you have questions.     You don't need a prescription for these medications     dexamethasone 4 MG/ML injection                Primary Care Provider Office Phone # Fax #    Magaly Sosa [phone]       [facility] [place] [address]  [place] [address]        Equal Access to Services     HAKE[facility] SOLANO AH: Hadii lourdes ku hadasho Soomaali, waaxda luqadaha, qaybta kaalmada adeegyada, waxay elizabethin haylolis juan . So [facility] [phone].    ATENCIÓN: Si yeimila espwinston, tiene a lazaro disposición servicios gratuitos de asistencia lingüística. Llame al [phone].    We comply with applicable federal civil rights laws and [place] laws. We do not discriminate on the basis of race, color, national origin, age, disability sex, sexual orientation or gender identity.            Thank you!     Thank you for choosing  ORTHOPEDICS  for your care. Our goal is always to provide you with excellent care. Hearing back from our patients is one way we can continue to improve our services. Please take a few minutes to complete the written survey that you may receive in the mail after your visit with us. Thank you!             Your Updated Medication List - Protect others around you: Learn how to  safely use, store and throw away your medicines at www.disposemymeds.org.          This list is accurate as of: 8/25/17  4:09 PM.  Always use your most recent med list.                   Brand Name Dispense Instructions for use Diagnosis    albuterol (2.5 MG/3ML) 0.083% neb solution     1 Box    Take 1 vial (2.5 mg) by nebulization every 4 hours as needed for shortness of breath / dyspnea or wheezing    Acute bronchospasm       BENADRYL ALLERGY PO      Take 25 mg by mouth nightly as needed    Hives       clobetasol 0.05 % ointment    TEMOVATE    60 g    Apply at bedtime to sites of itch    Lichen simplex chronicus       clonazePAM 1 MG tablet    klonoPIN    45 tablet    TAKE ONE-FOURTH TO ONE-HALF TABLET BY MOUTH EVERY 8 HOURS AS NEEDED    Anxiety       * dexamethasone 4 MG/ML injection    DECADRON    1 mL    Inject 1 mL (4 mg) as directed once for 1 dose Use 4 mg or dose determined by provider for iontophoresis.    Radial styloid tenosynovitis       * dexamethasone 4 MG/ML injection    DECADRON    1 mL    Inject 1 mL (4 mg) as directed once for 1 dose Use 4 mg or dose determined by provider for iontophoresis.    Primary osteoarthritis of right knee       FISH OIL      Take 1 capsule by mouth daily        Garlic 10 MG Caps      Take 1 capsule by mouth as needed        HYDROcodone-acetaminophen 5-325 MG per tablet    NORCO    60 tablet    TAKE ONE TABLET BY MOUTH EVERY 4 TO 6 HOURS AS NEEDED FOR PAIN    Primary osteoarthritis of right hip       nabumetone 750 MG tablet    RELAFEN    60 tablet    Take 1 tablet (750 mg) by mouth 2 times daily as needed for moderate pain    Radial styloid tenosynovitis, Primary osteoarthritis of both hips       * PARoxetine 40 MG tablet    PAXIL    90 tablet    Take 1 tablet (40 mg) by mouth daily    Anxiety       * PARoxetine 40 MG tablet    PAXIL    90 tablet    TAKE ONE TABLET BY MOUTH ONCE DAILY    Anxiety       simvastatin 20 MG tablet    ZOCOR    90 tablet    TAKE ONE TABLET BY  MOUTH ONCE DAILY AT BEDTIME    Hyperlipidemia LDL goal <100       VITAMIN D (CHOLECALCIFEROL) PO      Take 2,000 Units by mouth daily        VITAMIN E      Take 1 capsule by mouth daily        * Notice:  This list has 4 medication(s) that are the same as other medications prescribed for you. Read the directions carefully, and ask your doctor or other care provider to review them with you.

## 2017-08-25 NOTE — PROGRESS NOTES
"Chief complaints:  #1.  DJD both hips  #2.  De Quervain's tenosynovitis right wrist  #3.  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  In April 2017 I saw her for right knee pain and \"locking\".  X-rays revealed mild DJD.  An MRI showed a moderate effusion, extensive fraying of meniscal and articular cartilage, and a small osteochondral lesion of the lateral trochlear groove.  Relafen was prescribed at that time and her pain and locking resolved.      Today she reports that her stomach tolerates being back on Relafen, well.  Her bilateral hip pain is significantly less on the Relafen.  She also reports that her wrist symptoms have disappeared.  Her right knee pain, however, has worsened over the last month and is now \"locking\" again.  Her definition of \"locking\" is acute sharp posterior pain making it momentarily painful to range the knee.  She denies a recent injury to that joint.    Nothing else has changed with respect to her musculoskeletal or neurologic review of systems since seen last.    Examination: Overweight female in no obvious distress.  The skin around the right knee is intact.  The knee has no deformity or effusion.  Range of motion passively is full and pain-free.  The knee is stable to ligamentous stressing.  Laura's maneuvers are unremarkable.  There is tenderness palpation only along the medial joint line.  Extension strength is normal.  The neurovascular status of that limb is intact.    Impression:  #1.  DJD both hips, symptoms controlled with Relafen  #2.  De Quervain's tenosynovitis right wrist, symptoms resolved with steroid injection  #3.  DJD right knee, possible new degenerative meniscal tear    Plan:  #1.  She will remain on the Relafen " indefinitely.  She has 2 refills yet to use.  #2.  I recommended an intra-articular steroid injection into the right knee.  She agreed.  #3.  She'll return in 3 weeks for recheck on her knee.  We will get new x-rays on arrival.    Procedure: After obtaining written informed consent and sterilely prepping the site a timeout was held.  Sterile technique was employed as the right knee was then injected with 4 mg of dexamethasone mixed with 3mL of Carbocaine.  Topical ethyl chloride spray was used as a local anesthetic.  The patient tolerated the procedure well and there were no complications.

## 2017-08-25 NOTE — NURSING NOTE
"Chief Complaint   Patient presents with     RECHECK     right wrist and left hip follow up.       Initial /58 (BP Location: Right arm, Patient Position: Sitting, Cuff Size: Adult Large)  Pulse 69  Temp 98.3  F (36.8  C) (Tympanic)  Ht 5' 4\" (1.626 m)  Wt 230 lb (104.3 kg)  SpO2 98%  BMI 39.48 kg/m2 Estimated body mass index is 39.48 kg/(m^2) as calculated from the following:    Height as of this encounter: 5' 4\" (1.626 m).    Weight as of this encounter: 230 lb (104.3 kg).  Medication Reconciliation: complete   Stacy Lofton LPN      "

## 2017-08-31 DIAGNOSIS — M16.11 PRIMARY OSTEOARTHRITIS OF RIGHT HIP: ICD-10-CM

## 2017-08-31 DIAGNOSIS — E78.5 HYPERLIPIDEMIA LDL GOAL <100: ICD-10-CM

## 2017-08-31 RX ORDER — HYDROCODONE BITARTRATE AND ACETAMINOPHEN 5; 325 MG/1; MG/1
TABLET ORAL
Qty: 60 TABLET | Refills: 0 | Status: SHIPPED | OUTPATIENT
Start: 2017-08-31 | End: 2017-11-28

## 2017-08-31 RX ORDER — SIMVASTATIN 20 MG
TABLET ORAL
Qty: 90 TABLET | Refills: 0 | Status: SHIPPED | OUTPATIENT
Start: 2017-08-31 | End: 2018-01-02

## 2017-08-31 NOTE — TELEPHONE ENCOUNTER
Controlled Substance Refill Request for Norco  Problem List Complete:  No     PROVIDER TO CONSIDER COMPLETION OF PROBLEM LIST AND OVERVIEW/CONTROLLED SUBSTANCE AGREEMENT    Last Written Prescription Date:  3.28.17  Last Fill Quantity: 60,   # refills: 0    Last Office Visit with [facility] primary care provider: 6.26.17    Future Office visit:   Next 5 appointments (look out 90 days)     Nov 14, 2017  8:45 AM CST   (Arrive by 8:30 AM)   Return Visit with MALINI Vo MD   [facility] [place] ([facility] )    [address] [place] [address] [place] [address]   [phone]                  Controlled substance agreement on file: No.     Processing:  Patient will  in clinic     checked in past 6 months?  No, route to RN

## 2017-08-31 NOTE — TELEPHONE ENCOUNTER
Maureen  Last office visit: 6/26/17.  I can't find any visits specific to Hyperlipidemia.  Last FLP: 10/19/15  Last refill: 4/27/17 #90, 0 R.  Medication pended.  Please advise.  Thank you.

## 2017-08-31 NOTE — TELEPHONE ENCOUNTER
I have attempted to contact this patient by phone with the following results: spoke with patient and let her know Rx for Norco ready to be picked up at [facility] .

## 2017-09-11 DIAGNOSIS — M25.561 CHRONIC PAIN OF RIGHT KNEE: Primary | ICD-10-CM

## 2017-09-11 DIAGNOSIS — G89.29 CHRONIC PAIN OF RIGHT KNEE: Primary | ICD-10-CM

## 2017-09-12 ENCOUNTER — OFFICE VISIT (OUTPATIENT)
Dept: ORTHOPEDICS | Facility: OTHER | Age: 77
End: 2017-09-12
Attending: ORTHOPAEDIC SURGERY
Payer: MEDICARE

## 2017-09-12 ENCOUNTER — APPOINTMENT (OUTPATIENT)
Dept: GENERAL RADIOLOGY | Facility: OTHER | Age: 77
End: 2017-09-12
Attending: ORTHOPAEDIC SURGERY
Payer: MEDICARE

## 2017-09-12 VITALS
SYSTOLIC BLOOD PRESSURE: 118 MMHG | WEIGHT: 227 LBS | BODY MASS INDEX: 44.57 KG/M2 | HEART RATE: 68 BPM | DIASTOLIC BLOOD PRESSURE: 58 MMHG | RESPIRATION RATE: 18 BRPM | HEIGHT: 60 IN | TEMPERATURE: 97.7 F

## 2017-09-12 DIAGNOSIS — M25.561 ACUTE PAIN OF RIGHT KNEE: ICD-10-CM

## 2017-09-12 DIAGNOSIS — M17.11 PRIMARY OSTEOARTHRITIS OF RIGHT KNEE: Primary | ICD-10-CM

## 2017-09-12 PROCEDURE — 99212 OFFICE O/P EST SF 10 MIN: CPT

## 2017-09-12 PROCEDURE — 99213 OFFICE O/P EST LOW 20 MIN: CPT | Performed by: ORTHOPAEDIC SURGERY

## 2017-09-12 PROCEDURE — 73564 X-RAY EXAM KNEE 4 OR MORE: CPT | Mod: TC,RT

## 2017-09-12 ASSESSMENT — PAIN SCALES - GENERAL: PAINLEVEL: NO PAIN (0)

## 2017-09-12 NOTE — PROGRESS NOTES
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  X-rays revealed mild DJD.  An MRI showed extensive meniscal fraying and a small osteochondral lesion of the lateral trochlear groove.  Relafen was prescribed.  Her pain and locking resolved.  She returned on 8/25/17 with a return of right knee pain and locking.  She received a steroid injection.  Today she reports that the injection resolved spell locking and the pain, which was mostly anterior pain.  A new pain has developed posteriorly however.    This new pain occurs when she is sitting in her recliner with her legs elevated.  She is thus nonweightbearing.  It has never occurred during weightbearing.  It is a sudden pain in the back of the knee and it feels like a muscle spasm.  When it occurs shooting pains are also felt from the hip to the knee in the anterior and lateral thigh.  The pains are momentary and probably occur about 5 times per week.  The knee has not swollen.  It does not give way.  She denies an injury to her right lower extremity.    Nothing else is new with respect to her musculoskeletal or neurologic review of systems since seen last.    Examination: Overweight female in no obvious distress.  She is alert and oriented.  She did not use an ambulatory appliance. The skin around the right knee is intact.  The knee has no deformity or effusion.  Range of motion is full.  There is no tenderness palpation, not even posteriorly.  The knee is stable to ligamentous stressing.  There is a palpable mass anteriorly within the lateral portion of the prepatellar bursa consistent with a bursal fibrous band or polyp. Her lymphedema is mild in that extremity today.    X-rays: New plain films the right knee were taken today.  They show tricompartmental osteophyte formation, lateral  compartment joint space narrowing, and mild progression of her degenerative changes since the last set of films taken in April of this year.    Impression:  #1.  DJD right knee  #2.  Intermittent posterior right knee pain, question spasm    Plan: I suggested she accept a referral to physical therapy and she agreed.  She will return in 6 weeks for recheck.  Future treatment might involve Visco supplementation.

## 2017-09-12 NOTE — NURSING NOTE
Chief Complaint   Patient presents with     Musculoskeletal Problem     right knee pain follow from injection       Initial /58 (BP Location: Left arm, Patient Position: Sitting, Cuff Size: Adult Large)  Pulse 68  Temp 97.7  F (36.5  C) (Tympanic)  Resp 18  Ht 5' (1.524 m)  Wt 227 lb (103 kg)  BMI 44.33 kg/m2 Estimated body mass index is 44.33 kg/(m^2) as calculated from the following:    Height as of this encounter: 5' (1.524 m).    Weight as of this encounter: 227 lb (103 kg).  Medication Reconciliation: unable or not appropriate to perform   Jesusita So LPN

## 2017-09-12 NOTE — MR AVS SNAPSHOT
"              After Visit Summary   9/12/2017    Dinorah Lim    MRN: [id number]           Patient Information     Date Of Birth          1940        Visit Information        Provider Department      9/12/2017 4:20 PM Esteban Wills MD  ORTHOPEDICS        Today's Diagnoses     Primary osteoarthritis of right knee    -  1    Acute pain of right knee          Care Instructions    Someone will contact you within the next 1-2 business days to schedule your first appointment with physical therapy.  If you have not heard from someone within 2 days, please contact our office.            Follow-ups after your visit        Additional Services     PHYSICAL THERAPY REFERRAL       *This therapy referral will be filtered to a centralized scheduling office at [facility] and the patient will receive a call to schedule an appointment at a [facility] location most convenient for them. *     [facility] provides Physical Therapy evaluation and treatment and many specialty services across the [facility] system.  If requesting a specialty program, please choose from the list below.    If you have not heard from the scheduling office within 2 business days, please call [phone] for all locations, with the exception of [place], please call [phone].  Treatment: Evaluation & Treatment  Special Instructions/Modalities: Evaluate and treat right knee  Special Programs:     Please be aware that coverage of these services is subject to the terms and limitations of your health insurance plan.  Call member services at your health plan with any benefit or coverage questions.      **Note to Provider:  If you are referring outside of [facility] for the therapy appointment, please list the name of the location in the \"special instructions\" above, print the referral and give to the patient to schedule the appointment.                  Follow-up notes from your care team     Return in about 5 " "weeks (around 10/17/2017).      Your next 10 appointments already scheduled     2017  8:45 AM CST   (Arrive by 8:30 AM)   Return Visit with MALINI Vo MD   [facility] [place] [facility] [place] )    [place] [id number] [phone]              Who to contact     If you have questions or need follow up information about today's clinic visit or your schedule please contact  ORTHOPEDICS directly at [phone].  Normal or non-critical lab and imaging results will be communicated to you by [employer], letter or phone within 4 business days after the clinic has received the results. If you do not hear from us within 7 days, please contact the clinic through [employer] or phone. If you have a critical or abnormal lab result, we will notify you by phone as soon as possible.  Submit refill requests through [employer] or call your pharmacy and they will forward the refill request to us. Please allow 3 business days for your refill to be completed.          Additional Information About Your Visit        [employer] Information     [employer] lets you send messages to your doctor, view your test results, renew your prescriptions, schedule appointments and more. To sign up, go to [email or web address] . Click on \"Log in\" on the left side of the screen, which will take you to the Welcome page. Then click on \"Sign up Now\" on the right side of the page.     You will be asked to enter the access code listed below, as well as some personal information. Please follow the directions to create your username and password.     Your access code is: [email or web address]  Expires: 10/8/2017  3:41 PM     Your access code will  in 90 days. If you need help or a new code, please call your [facility] clinic or [phone].        Care EveryWhere ID     This is your Care EveryWhere ID. This could be used by other organizations to access your [place] medical records  [id number]        Your Vitals " Were     Pulse Temperature Respirations Height BMI (Body Mass Index)       68 97.7  F (36.5  C) (Tympanic) 18 5' (1.524 m) 44.33 kg/m2        Blood Pressure from Last 3 Encounters:   09/12/17 118/58   08/25/17 126/58   08/09/17 120/54    Weight from Last 3 Encounters:   09/12/17 227 lb (103 kg)   08/25/17 230 lb (104.3 kg)   08/09/17 225 lb (102.1 kg)              We Performed the Following     PHYSICAL THERAPY REFERRAL        Primary Care Provider Office Phone # Fax #    Magaly Sosa [phone]       [facility] [place] [address]  [place] [address]        Equal Access to Services     EDGARDO SOLANO : Hadii aad ku hadasho Soomaali, waaxda luqadaha, qaybta kaalmada adeegyada, olga juan . So [facility] [phone].    ATENCIÓN: Si habla español, tiene a lazaro disposición servicios gratuitos de asistencia lingüística. Llame al [phone].    We comply with applicable federal civil rights laws and [place] laws. We do not discriminate on the basis of race, color, national origin, age, disability sex, sexual orientation or gender identity.            Thank you!     Thank you for choosing  ORTHOPEDICS  for your care. Our goal is always to provide you with excellent care. Hearing back from our patients is one way we can continue to improve our services. Please take a few minutes to complete the written survey that you may receive in the mail after your visit with us. Thank you!             Your Updated Medication List - Protect others around you: Learn how to safely use, store and throw away your medicines at www.disposemymeds.org.          This list is accurate as of: 9/12/17  4:48 PM.  Always use your most recent med list.                   Brand Name Dispense Instructions for use Diagnosis    albuterol (2.5 MG/3ML) 0.083% neb solution     1 Box    Take 1 vial (2.5 mg) by nebulization every 4 hours as needed for shortness of breath / dyspnea or wheezing    Acute  bronchospasm       BENADRYL ALLERGY PO      Take 25 mg by mouth nightly as needed    Hives       clobetasol 0.05 % ointment    TEMOVATE    60 g    Apply at bedtime to sites of itch    Lichen simplex chronicus       clonazePAM 1 MG tablet    klonoPIN    45 tablet    TAKE ONE-FOURTH TO ONE-HALF TABLET BY MOUTH EVERY 8 HOURS AS NEEDED    Anxiety       dexamethasone 4 MG/ML injection    DECADRON    1 mL    Inject 1 mL (4 mg) as directed once for 1 dose Use 4 mg or dose determined by provider for iontophoresis.    Primary osteoarthritis of right knee       FISH OIL      Take 1 capsule by mouth daily        Garlic 10 MG Caps      Take 1 capsule by mouth as needed        HYDROcodone-acetaminophen 5-325 MG per tablet    NORCO    60 tablet    TAKE ONE TABLET BY MOUTH EVERY 4 TO 6 HOURS AS NEEDED FOR PAIN    Primary osteoarthritis of right hip       nabumetone 750 MG tablet    RELAFEN    60 tablet    Take 1 tablet (750 mg) by mouth 2 times daily as needed for moderate pain    Radial styloid tenosynovitis, Primary osteoarthritis of both hips       * PARoxetine 40 MG tablet    PAXIL    90 tablet    Take 1 tablet (40 mg) by mouth daily    Anxiety       * PARoxetine 40 MG tablet    PAXIL    90 tablet    TAKE ONE TABLET BY MOUTH ONCE DAILY    Anxiety       simvastatin 20 MG tablet    ZOCOR    90 tablet    TAKE ONE TABLET BY MOUTH AT BEDTIME    Hyperlipidemia LDL goal <100       VITAMIN D (CHOLECALCIFEROL) PO      Take 2,000 Units by mouth daily        VITAMIN E      Take 1 capsule by mouth daily        * Notice:  This list has 2 medication(s) that are the same as other medications prescribed for you. Read the directions carefully, and ask your doctor or other care provider to review them with you.

## 2017-09-22 ENCOUNTER — HOSPITAL ENCOUNTER (EMERGENCY)
Facility: HOSPITAL | Age: 77
Discharge: HOME | End: 2017-09-22
Attending: NURSE PRACTITIONER | Admitting: NURSE PRACTITIONER
Payer: MEDICARE

## 2017-09-22 VITALS
RESPIRATION RATE: 18 BRPM | DIASTOLIC BLOOD PRESSURE: 55 MMHG | HEART RATE: 71 BPM | TEMPERATURE: 98.4 F | SYSTOLIC BLOOD PRESSURE: 138 MMHG | OXYGEN SATURATION: 95 %

## 2017-09-22 DIAGNOSIS — H69.91 DYSFUNCTION OF EUSTACHIAN TUBE, RIGHT: ICD-10-CM

## 2017-09-22 PROCEDURE — 99212 OFFICE O/P EST SF 10 MIN: CPT

## 2017-09-22 PROCEDURE — 99213 OFFICE O/P EST LOW 20 MIN: CPT | Performed by: NURSE PRACTITIONER

## 2017-09-22 ASSESSMENT — ENCOUNTER SYMPTOMS
ABDOMINAL PAIN: 0
FEVER: 0
COUGH: 0
RHINORRHEA: 0

## 2017-09-22 NOTE — ED AVS SNAPSHOT
[facility]    [address] [address]    Phone:  [phone]                                       Dinorah Lim   MRN: [id number]    Department:  [facility]   Date of Visit:  9/22/2017           Patient Information     Date Of Birth          1940        Your diagnoses for this visit were:     Dysfunction of eustachian tube, right        You were seen by Gaby Shore, NP.      Follow-up Information     Follow up with Magaly Sosa MD In 3 days.    Specialty:  Family Practice    Why:  for re-evaluation if still painful    Contact information:    [facility] [place]  [address] [facility] [id number] [phone]          Discharge Instructions       Take benadryl and ibuprofen for symptoms.   Apply warm packs to affected area.   Run a humidifier, drink warm liquids.     See PCP if not improving.     Discharge References/Attachments     EARACHE WITHOUT INFECTION (ADULT) (ENGLISH)      Future Appointments        Provider Department Dept Phone Center    10/17/2017 3:40 PM Esteban Wills MD  OR[facility]OPEDICS [phone] [facility]    11/14/2017 8:45 AM H Jace Vo MD [facility] [phone] [facility]         Review of your medicines      Our records show that you are taking the medicines listed below. If these are incorrect, please call your family doctor or clinic.        Dose / Directions Last dose taken    albuterol (2.5 MG/3ML) 0.083% neb solution   Dose:  1 vial   Quantity:  1 Box        Take 1 vial (2.5 mg) by nebulization every 4 hours as needed for shortness of breath / dyspnea or wheezing   Refills:  0        BENADRYL ALLERGY PO   Dose:  25 mg   Indication:  takes with simvastatin        Take 25 mg by mouth nightly as needed   Refills:  0        clobetasol 0.05 % ointment   Commonly known as:  TEMOVATE   Quantity:  60 g        Apply at bedtime to sites of itch   Refills:  3        clonazePAM 1 MG tablet  "  Commonly known as:  klonoPIN   Quantity:  45 tablet        TAKE ONE-FOURTH TO ONE-HALF TABLET BY MOUTH EVERY 8 HOURS AS NEEDED   Refills:  0        FISH OIL   Dose:  1 capsule        Take 1 capsule by mouth daily   Refills:  0        Garlic 10 MG Caps   Dose:  1 capsule        Take 1 capsule by mouth as needed   Refills:  0        HYDROcodone-acetaminophen 5-325 MG per tablet   Commonly known as:  NORCO   Quantity:  60 tablet        TAKE ONE TABLET BY MOUTH EVERY 4 TO 6 HOURS AS NEEDED FOR PAIN   Refills:  0        PARoxetine 40 MG tablet   Commonly known as:  PAXIL   Quantity:  90 tablet        TAKE ONE TABLET BY MOUTH ONCE DAILY   Refills:  2        simvastatin 20 MG tablet   Commonly known as:  ZOCOR   Quantity:  90 tablet        TAKE ONE TABLET BY MOUTH AT BEDTIME   Refills:  0        VITAMIN D (CHOLECALCIFEROL) PO   Dose:  2000 Units        Take 2,000 Units by mouth daily   Refills:  0        VITAMIN E   Dose:  1 capsule        Take 1 capsule by mouth daily   Refills:  0                Orders Needing Specimen Collection     None      Pending Results     No orders found from 9/20/2017 to 9/23/2017.            Pending Culture Results     No orders found from 9/20/2017 to 9/23/2017.            Thank you for choosing [facility]       Thank you for choosing [facility] for your care. Our goal is always to provide you with excellent care. Hearing back from our patients is one way we can continue to improve our services. Please take a few minutes to complete the written survey that you may receive in the mail after you visit with us. Thank you!        [employer] Information     [employer] lets you send messages to your doctor, view your test results, renew your prescriptions, schedule appointments and more. To sign up, go to [email or web address] . Click on \"Log in\" on the left side of the screen, which will take you to the Welcome page. Then click on \"Sign up Now\" on the right side of the page.     You will be asked to " enter the access code listed below, as well as some personal information. Please follow the directions to create your username and password.     Your access code is: [email or web address]  Expires: 10/8/2017  3:41 PM     Your access code will  in 90 days. If you need help or a new code, please call your [facility] clinic or [phone].        Care EveryWhere ID     This is your Care EveryWhere ID. This could be used by other organizations to access your [facility] medical records  [id number]        Equal Access to Services     [facility]: Jorge Talbot, joaquim overton, abiodun gonzalesalsrikanth ness, olga juan . So [facility] [phone].    ATENCIÓN: Si habla español, tiene a lazaro disposición servicios gratuitos de asistencia lingüística. Llame al [phone].    We comply with applicable federal civil rights laws and [place] laws. We do not discriminate on the basis of race, color, national origin, age, disability sex, sexual orientation or gender identity.            After Visit Summary       This is your record. Keep this with you and show to your community pharmacist(s) and doctor(s) at your next visit.

## 2017-09-22 NOTE — ED PROVIDER NOTES
History     Chief Complaint   Patient presents with     Otalgia     right sided ear pain started today     The history is provided by the patient. No  was used.     Dinorah Lim is a 76 year old female who presents with R ear pain, started today. No fever, cough or runny nose. No Tylenol or ibuprofen. History of allergies.     I have reviewed the Medications, Allergies, Past Medical and Surgical History, and Social History in the Epic system.      Review of Systems   Constitutional: Negative for fever.   HENT: Positive for ear pain. Negative for rhinorrhea.    Respiratory: Negative for cough.    Gastrointestinal: Negative for abdominal pain.       Physical Exam   BP: 138/55  Pulse: 71  Temp: 98.4  F (36.9  C)  Resp: 18  SpO2: 95 %  Physical Exam   Constitutional: She appears well-developed and well-nourished. No distress.   HENT:   Head: Normocephalic and atraumatic.   Right Ear: External ear normal.   Left Ear: External ear normal.   Nose: Nose normal.   Mouth/Throat: Oropharynx is clear and moist. No oropharyngeal exudate.   Eyes: Conjunctivae are normal. Right eye exhibits no discharge. No scleral icterus.   Neck: Normal range of motion. Neck supple.   Cardiovascular: Normal rate.    Pulmonary/Chest: Effort normal.   Musculoskeletal: Normal range of motion.   Lymphadenopathy:     She has no cervical adenopathy.   Neurological: She is alert.   Skin: Skin is warm and dry. She is not diaphoretic.   Nursing note and vitals reviewed.      ED Course     ED Course     Procedures               Labs Ordered and Resulted from Time of ED Arrival Up to the Time of Departure from the ED - No data to display    Assessments & Plan (with Medical Decision Making)     I have reviewed the nursing notes.  I have reviewed the findings, diagnosis, plan and need for follow up with the patient.  Take ibuprofen and benadryl.   Apply warm packs.   Drink warm liquids and rest.   F/u with PCP if not improving.      Final diagnoses:   Dysfunction of eustachian tube, right       9/22/2017   [facility]     Gaby Shore NP  09/22/17 [id number]

## 2017-09-22 NOTE — DISCHARGE INSTRUCTIONS
Take benadryl and ibuprofen for symptoms.   Apply warm packs to affected area.   Run a humidifier, drink warm liquids.     See PCP if not improving.

## 2017-09-22 NOTE — ED NOTES
Pt presents today alone for c/o right ear pain that hurts when she swallows which just started earlier today.

## 2017-09-22 NOTE — ED AVS SNAPSHOT
[facility]    [address] [place] [address]    Phone:  [phone]                                       Dinorah Lim   MRN: [id number]    Department:  [facility]   Date of Visit:  9/22/2017           After Visit Summary Signature Page     I have received my discharge instructions, and my questions have been answered. I have discussed any challenges I see with this plan with the nurse or doctor.    ..........................................................................................................................................  Patient/Patient Representative Signature      ..........................................................................................................................................  Patient Representative Print Name and Relationship to Patient    ..................................................               ................................................  Date                                            Time    ..........................................................................................................................................  Reviewed by Signature/Title    ...................................................              ..............................................  Date                                                            Time

## 2017-09-25 ENCOUNTER — HOSPITAL ENCOUNTER (EMERGENCY)
Facility: HOSPITAL | Age: 77
Discharge: HOME | End: 2017-09-25
Attending: NURSE PRACTITIONER | Admitting: NURSE PRACTITIONER
Payer: MEDICARE

## 2017-09-25 VITALS
TEMPERATURE: 99 F | DIASTOLIC BLOOD PRESSURE: 46 MMHG | RESPIRATION RATE: 20 BRPM | OXYGEN SATURATION: 98 % | HEART RATE: 69 BPM | SYSTOLIC BLOOD PRESSURE: 137 MMHG

## 2017-09-25 DIAGNOSIS — S51.859A OPEN WOUND OF FOREARM DUE TO DOG BITE: ICD-10-CM
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
## 2017-09-25 PROCEDURE — 25000128 H RX IP 250 OP 636: Performed by: NURSE PRACTITIONER

## 2017-09-25 PROCEDURE — 99212 OFFICE O/P EST SF 10 MIN: CPT | Mod: 25

## 2017-09-25 PROCEDURE — 90471 IMMUNIZATION ADMIN: CPT

## 2017-09-25 PROCEDURE — 99213 OFFICE O/P EST LOW 20 MIN: CPT | Performed by: NURSE PRACTITIONER

## 2017-09-25 PROCEDURE — 90715 TDAP VACCINE 7 YRS/> IM: CPT | Performed by: NURSE PRACTITIONER

## 2017-09-25 RX ORDER — CIPROFLOXACIN 500 MG/1
500 TABLET, FILM COATED ORAL 2 TIMES DAILY
Qty: 14 TABLET | Refills: 0 | Status: SHIPPED | OUTPATIENT
Start: 2017-09-25 | End: 2017-10-02

## 2017-09-25 RX ORDER — METRONIDAZOLE 500 MG/1
500 TABLET ORAL 2 TIMES DAILY
Qty: 14 TABLET | Refills: 0 | Status: SHIPPED | OUTPATIENT
Start: 2017-09-25 | End: 2017-10-02

## 2017-09-25 RX ADMIN — CLOSTRIDIUM TETANI TOXOID ANTIGEN (FORMALDEHYDE INACTIVATED), CORYNEBACTERIUM DIPHTHERIAE TOXOID ANTIGEN (FORMALDEHYDE INACTIVATED), BORDETELLA PERTUSSIS TOXOID ANTIGEN (GLUTARALDEHYDE INACTIVATED), BORDETELLA PERTUSSIS FILAMENTOUS HEMAGGLUTININ ANTIGEN (FORMALDEHYDE INACTIVATED), BORDETELLA PERTUSSIS PERTACTIN ANTIGEN, AND BORDETELLA PERTUSSIS FIMBRIAE 2/3 ANTIGEN 0.5 ML: 5; 2; 2.5; 5; 3; 5 INJECTION, SUSPENSION INTRAMUSCULAR at 14:55

## 2017-09-25 ASSESSMENT — ENCOUNTER SYMPTOMS
CONSTITUTIONAL NEGATIVE: 1
JOINT SWELLING: 0
WOUND: 1

## 2017-09-25 NOTE — DISCHARGE INSTRUCTIONS
Monitor closely for infection.   Change dressing at least 1 time daily, more often if dressing gets soiled.   Take antibiotics as ordered after meals.  You will likely need to take Tylenol for pain.   Elevate your arm to decrease swelling. You should expected bruising.   You may also need to apply cold compresses.    Follow up with Dr. Soas as scheduled for re-evaluation.  Return here if concerns develop.       Dog Bite  A dog bite can cause a wound deep enough to break the skin. In such cases, the wound is cleaned and sometimes closed. If the wound is closed, it is usually not completely closed. This is so that fluid can drain if the wound becomes infected. Often, wounds will be left open to heal. In addition to wound care, a tetanus shot may be given, if needed.    Home care    Wash your hands well with soap and warm water before and after caring for the wound. This helps lower the risk of infection.    Care for the wound as directed. If a dressing was applied to the wound, be sure to change it as directed.    If the wound bleeds, place a clean, soft cloth on the wound. Then firmly apply pressure until the bleeding stops. This may take up to 5 minutes. Do not release the pressure and look at the wound during this time.    Most wounds heal within 10 days. But an infection can occur even with proper treatment. So be sure to check the wound daily for signs of infection (see below).    Antibiotics may be prescribed. These help prevent or treat infection. If you re given antibiotics, take them as directed. Also be sure to complete the medicines.  Rabies prevention  Rabies is a virus that can be carried in certain animals. These can include domestic animals such as dogs and cats. Pets fully vaccinated against rabies (2 shots) are at very low risk of infection. But because human rabies is almost always fatal, any biting pet should be confined for 10 days as an extra precaution. In general, if there is a risk for  rabies, the following steps may need to be taken:    If someone s pet dog has bitten you, it should be kept in a secure area for the next 10 days to watch for signs of illness. (If the pet owner won t allow this, contact your local animal control center.) If the dog becomes ill or dies during that time, contact your local animal control center at once so the animal may be tested for rabies. If the dog stays healthy for the next 10 days, there is no danger of rabies in the animal or you.    If a stray dog bit you, contact your local animal control center. They can give information on capture, quarantine, and animal rabies testing.    If you can t find the animal that bit you in the next 2 days, and if rabies exists in your area, you may need to receive the rabies vaccine series. Call your healthcare provider right away. Or, return to the emergency department promptly.    All animal bites should be reported to the local animal control center. If you were not given a form to fill out, you can report this yourself.  Follow-up care  Follow up with your healthcare provider, or as directed.  When to seek medical advice  Call your healthcare provider right away if any of these occur:    Signs of infection:    Spreading redness or warmth from the wound    Increased pain or swelling    Fever of 100.4 F (38 C) or higher, or as directed by your healthcare provider    Colored fluid or pus draining from the wound    Signs of rabies infection:    Headache    Confusion    Strange behavior    Increased salivating and drooling    Seizure    Decreased ability to move any body part near the wound    Bleeding that can't be stopped after 5 minutes of firm pressure  Date Last Reviewed: 3/1/2017    2181-8296 [employer]. [address] [place] [address], [place] [address]. All rights reserved. This information is not intended as a substitute for professional medical care. Always follow your healthcare professional's  instructions.

## 2017-09-25 NOTE — ED PROVIDER NOTES
History     Chief Complaint   Patient presents with     Dog Bite     The history is provided by the patient and a relative. No  was used.     Dinorah Lim is a 76 year old female who presents with a dog bite to her left forearm shortly before arrival. Dog is unknown to her. Dog bit her as she walked into the yard to purchase mice for her snake from the owner. Patient had to hit the dog in the head repeatedly to release her. Patient thinks it was a pit bull or rottwieler mix. The owner confirmed vaccinations are UTD, she did not assist Dinorah in caring for her wound. Police are present here with patient. They returned to the residence and confirmed vaccines.     Bandage is in place at this time, bleeding controlled.     I have reviewed the Medications, Allergies, Past Medical and Surgical History, and Social History in the Epic system.    Allergies:   Allergies   Allergen Reactions     Chocolate Hives     Lemon Flavor Hives     Lime [Calcium Oxysulfide] Hives     Orange Fruit [Citrus] Hives     Strawberry Hives     Adhesive Tape      Band-aids     Codeine Hives     Patient can tolerate oxycodone & dilaudid     Erythromycin Hives     ERYTHROMYCIN BASE     Grapefruit [Extra Strength Grapefruit]      GRAPEFRUIT     Penicillins Hives     Sulfa Drugs      SULFONAMIDE ANTIBIOTICS      Tomato          No current facility-administered medications on file prior to encounter.   Current Outpatient Prescriptions on File Prior to Encounter:  simvastatin (ZOCOR) 20 MG tablet TAKE ONE TABLET BY MOUTH AT BEDTIME   HYDROcodone-acetaminophen (NORCO) 5-325 MG per tablet TAKE ONE TABLET BY MOUTH EVERY 4 TO 6 HOURS AS NEEDED FOR PAIN   PARoxetine (PAXIL) 40 MG tablet TAKE ONE TABLET BY MOUTH ONCE DAILY   clobetasol (TEMOVATE) 0.05 % ointment Apply at bedtime to sites of itch   clonazePAM (KLONOPIN) 1 MG tablet TAKE ONE-FOURTH TO ONE-HALF TABLET BY MOUTH EVERY 8 HOURS AS NEEDED   albuterol (2.5 MG/3ML) 0.083% neb  solution Take 1 vial (2.5 mg) by nebulization every 4 hours as needed for shortness of breath / dyspnea or wheezing   VITAMIN D, CHOLECALCIFEROL, PO Take 2,000 Units by mouth daily   Garlic 10 MG CAPS Take 1 capsule by mouth as needed   DiphenhydrAMINE HCl (BENADRYL ALLERGY PO) Take 25 mg by mouth nightly as needed    VITAMIN E Take 1 capsule by mouth daily    FISH OIL Take 1 capsule by mouth daily        Patient Active Problem List   Diagnosis     Osteoarthritis of right hip     Abnormal glucose     Osteoarthritis     Lung density on x-ray     Hyperlipidemia LDL goal <130     Advanced care planning/counseling discussion     Anxiety     COPD (chronic obstructive pulmonary disease) (H)     Urticaria     ACP (advance care planning)     Morbid obesity (H)       Past Surgical History:   Procedure Laterality Date     CLOSED REDUCTION WRIST Right 1952 x 2    long arm cast (same time as elbow fx)     CLOSED RX ELBOW DISLOCATION Right 1952    CR/long cast of fracture     HC INJ EPIDURAL LUMBAR/SACRAL W/WO CONTRAST Bilateral 5/2013    facet injections; prev 2012     LAPAROSCOPIC CHOLECYSTECTOMY N/A 1/4/2015    Procedure: LAPAROSCOPIC CHOLECYSTECTOMY;  Surgeon: Brittney العلي MD;  Location: [facility]     TONSILLECTOMY         Social History   Substance Use Topics     Smoking status: Never Smoker     Smokeless tobacco: Never Used     Alcohol use No       Most Recent Immunizations   Administered Date(s) Administered     Pneumococcal 23 valent 06/02/2014     TD (ADULT, 7+) 08/10/1999     TDAP Vaccine (Adacel) 09/25/2017     TDAP Vaccine (Boostrix) 12/14/2012       BMI: Estimated body mass index is 44.33 kg/(m^2) as calculated from the following:    Height as of 9/12/17: 1.524 m (5').    Weight as of 9/12/17: 103 kg (227 lb).      Review of Systems   Constitutional: Negative.    Musculoskeletal: Negative for joint swelling.        Puncture wounds to left forearm   Skin: Positive for wound.       Physical Exam   BP: (!)  137/46  Pulse: 69  Temp: 99  F (37.2  C)  Resp: 20  SpO2: 98 %  Physical Exam   Constitutional: She is oriented to person, place, and time. Vital signs are normal. She appears well-developed and well-nourished. She is active and cooperative. She does not appear ill. No distress.   HENT:   Head: Normocephalic and atraumatic.   Cardiovascular: Normal rate and regular rhythm.    Pulmonary/Chest: Effort normal and breath sounds normal.   Musculoskeletal:        Left forearm: She exhibits laceration.   Left forearm: 1 cm puncture wound on anterior wrist, 4 superficial abrasions, 3 x 2 cm open puncture wound with subcutaneous fat protruding out. Bleeding controlled. She has full AROM of the fingers, hand, wrist, forearm and elbow.     Neurological: She is alert and oriented to person, place, and time. Coordination normal.   Skin: She is not diaphoretic.   Nursing note and vitals reviewed.      ED Course     ED Course     Laceration repair  Performed by: JAY WELLS  Authorized by: JAY WELLS   Consent: Verbal consent obtained.  Risks and benefits: risks, benefits and alternatives were discussed  Consent given by: patient  Patient understanding: patient states understanding of the procedure being performed  Required items: required blood products, implants, devices, and special equipment available  Patient identity confirmed: verbally with patient and arm band  Body area: upper extremity  Location details: left lower arm  Wound length (cm): 3 x 2 cm.  Foreign bodies: no foreign bodies  Tendon involvement: none  Nerve involvement: none  Anesthesia: local infiltration    Anesthesia:  Local Anesthetic: lidocaine 1% with epinephrine  Anesthetic total: 4 mL  Preparation: Patient was prepped and draped in the usual sterile fashion (all wounds were extensively flushed with 500 mLs betadine and saline, outer areas were scrubbed with soaked gauze).  Irrigation solution: saline  Irrigation method:  syringe  Amount of cleaning: extensive  Debridement: minimal  Degree of undermining: none  Skin closure: 5-0 nylon  Number of sutures: 3  Technique: simple  Approximation: loose  Approximation difficulty: complex  Dressing: antibiotic ointment (Non-adhesive dressing to entire wrist, secured with coban. )  Patient tolerance: Patient tolerated the procedure well with no immediate complications            Labs Ordered and Resulted from Time of ED Arrival Up to the Time of Departure from the ED - No data to display    2:56 pm - Discussed with Dr. Negron over the phone, clean wound out well and tack wound closed. No debridement needed.   Start on antibiotics.   Consulted with pharmacy for antibiotics d/t patient allergy. See orders.     Medications   Tdap (tetanus-diphtheria-acell pertussis) (ADACEL) injection 0.5 mL (0.5 mLs Intramuscular Given 9/25/17 5475)     Assessments & Plan (with Medical Decision Making)     I have reviewed the nursing notes.  I have reviewed the findings, diagnosis, plan and need for follow up with the patient.  Scheduled f/u visit today with PCP. Written educational materials given to patient.   Advised patient and family:  Monitor closely for infection.   Change dressing at least 1 time daily, more often if dressing gets soiled.   Given directions for this.   Take antibiotics as ordered after meals.   You will likely need to take Tylenol for pain.   Elevate your arm to decrease swelling. You should expected bruising.   You may also need to apply cold compresses.    Follow up with Dr. Sosa as scheduled for re-evaluation.  Return here if concerns develop.       Discharge Medication List as of 9/25/2017  4:02 PM      START taking these medications    Details   ciprofloxacin (CIPRO) 500 MG tablet Take 1 tablet (500 mg) by mouth 2 times daily for 7 days, Disp-14 tablet, R-0, E-Prescribe      metroNIDAZOLE (FLAGYL) 500 MG tablet Take 1 tablet (500 mg) by mouth 2 times daily for 7 days, Disp-14  tablet, R-0, E-PrescribeEat yogurt or cottage cheese daily to prevent diarrhea that can be caused by taking this medication.             Final diagnoses:   Open wound of forearm due to dog bite       9/25/2017   [facility]     Gaby Shore NP  09/27/17 0564

## 2017-09-25 NOTE — ED AVS SNAPSHOT
[facility]    [address] [place] [address]    Phone:  [phone]                                       Dinorah Lim   MRN: [id number]    Department:  [facility]   Date of Visit:  9/25/2017           After Visit Summary Signature Page     I have received my discharge instructions, and my questions have been answered. I have discussed any challenges I see with this plan with the nurse or doctor.    ..........................................................................................................................................  Patient/Patient Representative Signature      ..........................................................................................................................................  Patient Representative Print Name and Relationship to Patient    ..................................................               ................................................  Date                                            Time    ..........................................................................................................................................  Reviewed by Signature/Title    ...................................................              ..............................................  Date                                                            Time

## 2017-09-25 NOTE — ED AVS SNAPSHOT
[facility]    [address]    [place] [address]    Phone:  [phone]                                       Dinorah Lim   MRN: [id number]    Department:  [facility]   Date of Visit:  9/25/2017           Patient Information     Date Of Birth          1940        Your diagnoses for this visit were:     Open wound of forearm due to dog bite        You were seen by Gaby Shroe NP.      Follow-up Information     Follow up with Magaly Sosa MD On 9/28/2017.    Specialty:  Family Practice    Why:  For wound re-check    Contact information:    [facility] [place]  [address]  [place] [address] [phone]          Follow up with [facility].    Specialty:  EMERGENCY MEDICINE    Why:  If symptoms worsen    Contact information:    [address]  [place] [address] [phone]    Additional information:    From [place]: Take [address]. Turn left at [address]. Turn left at the first stoplight on [address]. At the first stop sign, take a right onto [address]. Take a left into the parking lot and continue through until you reach the North enterance of the building.       From [place]: Take [address]. Take the [address] ramp towards [place]. Turn left onto [address]. Take a slight right onto [address]. Turn left at the first stoplight on [address]. At the first stop sign, take a right onto [address]. Take a left into the parking lot and continue through until you reach the North enterance of the building.       From [place]: Take [address]. Take a right at [address]. At the first stop sign, take a right onto [address]. Take a left into the parking lot and continue through until you reach the North enterance of the building.         Discharge Instructions       Monitor closely for infection.   Change dressing at least 1 time  daily, more often if dressing gets soiled.   Take antibiotics as ordered after meals.  You will likely need to take Tylenol for pain.   Elevate your arm to decrease swelling. You should expected bruising.   You may also need to apply cold compresses.    Follow up with Dr. Sosa as scheduled for re-evaluation.  Return here if concerns develop.       Dog Bite  A dog bite can cause a wound deep enough to break the skin. In such cases, the wound is cleaned and sometimes closed. If the wound is closed, it is usually not completely closed. This is so that fluid can drain if the wound becomes infected. Often, wounds will be left open to heal. In addition to wound care, a tetanus shot may be given, if needed.    Home care    Wash your hands well with soap and warm water before and after caring for the wound. This helps lower the risk of infection.    Care for the wound as directed. If a dressing was applied to the wound, be sure to change it as directed.    If the wound bleeds, place a clean, soft cloth on the wound. Then firmly apply pressure until the bleeding stops. This may take up to 5 minutes. Do not release the pressure and look at the wound during this time.    Most wounds heal within 10 days. But an infection can occur even with proper treatment. So be sure to check the wound daily for signs of infection (see below).    Antibiotics may be prescribed. These help prevent or treat infection. If you re given antibiotics, take them as directed. Also be sure to complete the medicines.  Rabies prevention  Rabies is a virus that can be carried in certain animals. These can include domestic animals such as dogs and cats. Pets fully vaccinated against rabies (2 shots) are at very low risk of infection. But because human rabies is almost always fatal, any biting pet should be confined for 10 days as an extra precaution. In general, if there is a risk for rabies, the following steps may need to be taken:    If someone s pet  dog has bitten you, it should be kept in a secure area for the next 10 days to watch for signs of illness. (If the pet owner won t allow this, contact your local animal control center.) If the dog becomes ill or dies during that time, contact your local animal control center at once so the animal may be tested for rabies. If the dog stays healthy for the next 10 days, there is no danger of rabies in the animal or you.    If a stray dog bit you, contact your local animal control center. They can give information on capture, quarantine, and animal rabies testing.    If you can t find the animal that bit you in the next 2 days, and if rabies exists in your area, you may need to receive the rabies vaccine series. Call your healthcare provider right away. Or, return to the emergency department promptly.    All animal bites should be reported to the local animal control center. If you were not given a form to fill out, you can report this yourself.  Follow-up care  Follow up with your healthcare provider, or as directed.  When to seek medical advice  Call your healthcare provider right away if any of these occur:    Signs of infection:    Spreading redness or warmth from the wound    Increased pain or swelling    Fever of 100.4 F (38 C) or higher, or as directed by your healthcare provider    Colored fluid or pus draining from the wound    Signs of rabies infection:    Headache    Confusion    Strange behavior    Increased salivating and drooling    Seizure    Decreased ability to move any body part near the wound    Bleeding that can't be stopped after 5 minutes of firm pressure  Date Last Reviewed: 3/1/2017    4837-9492 [employer]. [address] [place] [address], [place] [address]. All rights reserved. This information is not intended as a substitute for professional medical care. Always follow your healthcare professional's instructions.          Future Appointments        Provider Department Dept Phone Center     9/28/2017 2:00 PM Magaly Sosa MD [facility] [place] [phone] [facility]    10/17/2017 3:40 PM Esteban Wills MD  ORTHOPEDICS [phone] [facility] [place]    11/14/2017 8:45 AM H Jace Vo MD [facility] [phone] [facility]         Review of your medicines      START taking        Dose / Directions Last dose taken    ciprofloxacin 500 MG tablet   Commonly known as:  CIPRO   Dose:  500 mg   Quantity:  14 tablet        Take 1 tablet (500 mg) by mouth 2 times daily for 7 days   Refills:  0        metroNIDAZOLE 500 MG tablet   Commonly known as:  FLAGYL   Dose:  500 mg   Quantity:  14 tablet        Take 1 tablet (500 mg) by mouth 2 times daily for 7 days   Refills:  0          Our records show that you are taking the medicines listed below. If these are incorrect, please call your family doctor or clinic.        Dose / Directions Last dose taken    albuterol (2.5 MG/3ML) 0.083% neb solution   Dose:  1 vial   Quantity:  1 Box        Take 1 vial (2.5 mg) by nebulization every 4 hours as needed for shortness of breath / dyspnea or wheezing   Refills:  0        BENADRYL ALLERGY PO   Dose:  25 mg   Indication:  takes with simvastatin        Take 25 mg by mouth nightly as needed   Refills:  0        clobetasol 0.05 % ointment   Commonly known as:  TEMOVATE   Quantity:  60 g        Apply at bedtime to sites of itch   Refills:  3        clonazePAM 1 MG tablet   Commonly known as:  klonoPIN   Quantity:  45 tablet        TAKE ONE-FOURTH TO ONE-HALF TABLET BY MOUTH EVERY 8 HOURS AS NEEDED   Refills:  0        FISH OIL   Dose:  1 capsule        Take 1 capsule by mouth daily   Refills:  0        Garlic 10 MG Caps   Dose:  1 capsule        Take 1 capsule by mouth as needed   Refills:  0        HYDROcodone-acetaminophen 5-325 MG per tablet   Commonly known as:  NORCO   Quantity:  60 tablet        TAKE ONE TABLET BY MOUTH EVERY 4 TO 6 HOURS AS NEEDED FOR PAIN   Refills:  0        PARoxetine  "40 MG tablet   Commonly known as:  PAXIL   Quantity:  90 tablet        TAKE ONE TABLET BY MOUTH ONCE DAILY   Refills:  2        simvastatin 20 MG tablet   Commonly known as:  ZOCOR   Quantity:  90 tablet        TAKE ONE TABLET BY MOUTH AT BEDTIME   Refills:  0        VITAMIN D (CHOLECALCIFEROL) PO   Dose:  2000 Units        Take 2,000 Units by mouth daily   Refills:  0        VITAMIN E   Dose:  1 capsule        Take 1 capsule by mouth daily   Refills:  0                Prescriptions were sent or printed at these locations (2 Prescriptions)                   [facility] [address] [place], [place] - [address] [address]   [address] [address], [place] [address]    Telephone:  [phone]   Fax:  [phone]   Hours:                  E-Prescribed (2 of 2)         ciprofloxacin (CIPRO) 500 MG tablet               metroNIDAZOLE (FLAGYL) 500 MG tablet                Orders Needing Specimen Collection     None      Pending Results     No orders found from 9/23/2017 to 9/26/2017.            Pending Culture Results     No orders found from 9/23/2017 to 9/26/2017.            Thank you for choosing [facility]       Thank you for choosing [facility] for your care. Our goal is always to provide you with excellent care. Hearing back from our patients is one way we can continue to improve our services. Please take a few minutes to complete the written survey that you may receive in the mail after you visit with us. Thank you!        [facility] Information     [facility] lets you send messages to your doctor, view your test results, renew your prescriptions, schedule appointments and more. To sign up, go to [email or web address] . Click on \"Log in\" on the left side of the screen, which will take you to the Welcome page. Then click on \"Sign up Now\" on the right side of the page.     You will be asked to enter the access code listed below, as well as some personal information. Please follow the directions to create your username and password.     Your " access code is: [email or web address]  Expires: 10/8/2017  3:41 PM     Your access code will  in 90 days. If you need help or a new code, please call your [facility] clinic or [phone].        Care EveryWhere ID     This is your Care EveryWhere ID. This could be used by other organizations to access your [facility] medical records  [id number]        Equal Access to Services     [facility]ARDO [facility] Hospital[facility] : Hadii lourdes guajardo Sodarell, waaxda brooklynn, qatejata kaalmada grover, olga juan . So [facility] [phone].    ATENCIÓN: Si habla español, tiene a lazaro disposición servicios gratuitos de asistencia lingüística. Llame al [phone].    We comply with applicable federal civil rights laws and [place] laws. We do not discriminate on the basis of race, color, national origin, age, disability sex, sexual orientation or gender identity.            After Visit Summary       This is your record. Keep this with you and show to your community pharmacist(s) and doctor(s) at your next visit.

## 2017-09-28 ENCOUNTER — OFFICE VISIT (OUTPATIENT)
Dept: FAMILY MEDICINE | Facility: OTHER | Age: 77
End: 2017-09-28
Attending: FAMILY MEDICINE
Payer: COMMERCIAL

## 2017-09-28 VITALS
SYSTOLIC BLOOD PRESSURE: 128 MMHG | HEIGHT: 60 IN | TEMPERATURE: 98.7 F | OXYGEN SATURATION: 98 % | BODY MASS INDEX: 43.78 KG/M2 | WEIGHT: 223 LBS | DIASTOLIC BLOOD PRESSURE: 74 MMHG | RESPIRATION RATE: 20 BRPM | HEART RATE: 66 BPM

## 2017-09-28 DIAGNOSIS — S41.152D DOG BITE OF ARM, LEFT, SUBSEQUENT ENCOUNTER: Primary | ICD-10-CM
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
## 2017-09-28 PROCEDURE — 99212 OFFICE O/P EST SF 10 MIN: CPT | Performed by: FAMILY MEDICINE

## 2017-09-28 PROCEDURE — 99212 OFFICE O/P EST SF 10 MIN: CPT

## 2017-09-28 ASSESSMENT — ANXIETY QUESTIONNAIRES
5. BEING SO RESTLESS THAT IT IS HARD TO SIT STILL: NOT AT ALL
1. FEELING NERVOUS, ANXIOUS, OR ON EDGE: SEVERAL DAYS
GAD7 TOTAL SCORE: 3
4. TROUBLE RELAXING: NOT AT ALL
6. BECOMING EASILY ANNOYED OR IRRITABLE: NOT AT ALL
IF YOU CHECKED OFF ANY PROBLEMS ON THIS QUESTIONNAIRE, HOW DIFFICULT HAVE THESE PROBLEMS MADE IT FOR YOU TO DO YOUR WORK, TAKE CARE OF THINGS AT HOME, OR GET ALONG WITH OTHER PEOPLE: NOT DIFFICULT AT ALL
2. NOT BEING ABLE TO STOP OR CONTROL WORRYING: SEVERAL DAYS
3. WORRYING TOO MUCH ABOUT DIFFERENT THINGS: SEVERAL DAYS
7. FEELING AFRAID AS IF SOMETHING AWFUL MIGHT HAPPEN: NOT AT ALL

## 2017-09-28 ASSESSMENT — PAIN SCALES - GENERAL: PAINLEVEL: NO PAIN (0)

## 2017-09-28 ASSESSMENT — PATIENT HEALTH QUESTIONNAIRE - PHQ9: SUM OF ALL RESPONSES TO PHQ QUESTIONS 1-9: 1

## 2017-09-28 NOTE — MR AVS SNAPSHOT
After Visit Summary   9/28/2017    Dinorah Lim    MRN: [id number]           Patient Information     Date Of Birth          1940        Visit Information        Provider Department      9/28/2017 2:00 PM Magaly Sosa MD [facility]        Today's Diagnoses     Dog bite of arm, left, subsequent encounter    -  1       Follow-ups after your visit        Follow-up notes from your care team     Return in about 1 week (around 10/5/2017) for sutures out.      Your next 10 appointments already scheduled     Oct 05, 2017  9:45 AM CDT   (Arrive by 9:30 AM)   SHORT with Magaly Sosa MD   [facility] ([facility] )    [address]  [place] [address]   [phone]            Oct 17, 2017  3:40 PM CDT   (Arrive by 3:25 PM)   Return Visit with Esteban iWlls MD    ORTHOPEDICS ([facility] )    [address]  [place] [address] [phone]            Nov 14, 2017  8:45 AM CST   (Arrive by 8:30 AM)   Return Visit with MALINI Vo MD   [facility] ([facility] )    [address]  [place] [address] [phone]              Who to contact     If you have questions or need follow up information about today's clinic visit or your schedule please contact [facility] directly at [phone].  Normal or non-critical lab and imaging results will be communicated to you by MyChart, letter or phone within 4 business days after the clinic has received the results. If you do not hear from us within 7 days, please contact the clinic through MyChart or phone. If you have a critical or abnormal lab result, we will notify you by phone as soon as possible.  Submit refill requests through "[employer] or call your pharmacy and they will forward the refill request to us. Please allow 3 business days for your refill to be completed.          Additional Information About Your Visit    "     MyChart Information     [employer] lets you send messages to your doctor, view your test results, renew your prescriptions, schedule appointments and more. To sign up, go to [email or web address] . Click on \"Log in\" on the left side of the screen, which will take you to the Welcome page. Then click on \"Sign up Now\" on the right side of the page.     You will be asked to enter the access code listed below, as well as some personal information. Please follow the directions to create your username and password.     Your access code is: [email or web address]  Expires: 10/8/2017  3:41 PM     Your access code will  in 90 days. If you need help or a new code, please call your [place] clinic or [phone].        Care EveryWhere ID     This is your Care EveryWhere ID. This could be used by other organizations to access your [facility] medical records  [id number]        Your Vitals Were     Pulse Temperature Respirations Height Pulse Oximetry Breastfeeding?    66 98.7  F (37.1  C) (Tympanic) 20 5' (1.524 m) 98% No    BMI (Body Mass Index)                   43.55 kg/m2            Blood Pressure from Last 3 Encounters:   17 128/74   17 (!) 137/46   17 138/55    Weight from Last 3 Encounters:   17 223 lb (101.2 kg)   17 227 lb (103 kg)   17 230 lb (104.3 kg)              Today, you had the following     No orders found for display       Primary Care Provider Office Phone # Fax #    Magaly Sosa [phone]       [facility] [place] [address]  [place] [address]        Equal Access to Services     EDGARDO SOLANO : Jorge Talbot, joaquim overton, abiodun ness, olga caballero. So [facility] [phone].    ATENCIÓN: Si habla español, tiene a lazaro disposición servicios gratuitos de asistencia lingüística. Anibalame al [phone].    We comply with applicable federal civil rights laws and [place] laws. We do not " discriminate on the basis of race, color, national origin, age, disability sex, sexual orientation or gender identity.            Thank you!     [facility] you [facility]  [facility] your [facility]. Our goal is always to provide you with excellent care. Hearing back from our patients is one way we can continue to improve our services. Please take a few minutes to complete the written survey that you may receive in the mail after your visit with us. Thank you!             Your Updated Medication List - Protect others around you: Learn how to safely use, store and throw away your medicines at www.disposemymeds.org.          This list is accurate as of: 9/28/17  2:28 PM.  Always use your most recent med list.                   Brand Name Dispense Instructions for use Diagnosis    albuterol (2.5 MG/3ML) 0.083% neb solution     1 Box    Take 1 vial (2.5 mg) by nebulization every 4 hours as needed for shortness of breath / dyspnea or wheezing    Acute bronchospasm       BENADRYL ALLERGY PO      Take 25 mg by mouth nightly as needed    Hives       ciprofloxacin 500 MG tablet    CIPRO    14 tablet    Take 1 tablet (500 mg) by mouth 2 times daily for 7 days        clobetasol 0.05 % ointment    TEMOVATE    60 g    Apply at bedtime to sites of itch    Lichen simplex chronicus       clonazePAM 1 MG tablet    klonoPIN    45 tablet    TAKE ONE-FOURTH TO ONE-HALF TABLET BY MOUTH EVERY 8 HOURS AS NEEDED    Anxiety       FISH OIL      Take 1 capsule by mouth daily        Garlic 10 MG Caps      Take 1 capsule by mouth as needed        HYDROcodone-acetaminophen 5-325 MG per tablet    NORCO    60 tablet    TAKE ONE TABLET BY MOUTH EVERY 4 TO 6 HOURS AS NEEDED FOR PAIN    Primary osteoarthritis of right hip       metroNIDAZOLE 500 MG tablet    FLAGYL    14 tablet    Take 1 tablet (500 mg) by mouth 2 times daily for 7 days        PARoxetine 40 MG tablet    PAXIL    90 tablet    TAKE ONE TABLET BY MOUTH ONCE DAILY    Anxiety        simvastatin 20 MG tablet    ZOCOR    90 tablet    TAKE ONE TABLET BY MOUTH AT BEDTIME    Hyperlipidemia LDL goal <100       VITAMIN D (CHOLECALCIFEROL) PO      Take 2,000 Units by mouth daily        VITAMIN E      Take 1 capsule by mouth daily

## 2017-09-28 NOTE — PROGRESS NOTES
[facility]    Dinorah Lim, 76 year old, female presents with   Chief Complaint   Patient presents with     ER F/U     9/25/17 dog bite to left arm, currently on Cipro and Flagyl. She was seen in the ER, sutured and tetanus was updated. She is now 3 days out. No drainage. some pain and loss of sensation around the largest bite on the mid dorsal arm       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    Anterior Segment V 4 cm nodule abutting liver surface by US 1/4/2015      Obesity, Class II, BMI 35-39.9 1/4/2015    BMI 35.9 with comorbidities = MORBID obesity     Osteoarthritis of right hip 10/07/2004     Osteoarthrosis 06/14/2012     Otitis externa 10/04/2011     Prediabetes 08/01/2011       PAST SURGICAL HISTORY:  Past Surgical History:   Procedure Laterality Date     CLOSED REDUCTION WRIST Right 1952 x 2    long arm cast (same time as elbow fx)     CLOSED RX ELBOW DISLOCATION Right 1952    CR/long cast of fracture     HC INJ EPIDURAL LUMBAR/SACRAL W/WO CONTRAST Bilateral 5/2013    facet injections; prev 2012     LAPAROSCOPIC CHOLECYSTECTOMY N/A 1/4/2015    Procedure: LAPAROSCOPIC CHOLECYSTECTOMY;  Surgeon: Brittney العلي MD;  Location: [facility]     TONSILLECTOMY         SOCIAL HISTORY  Social History     Social History     Marital status: Single     Spouse name: N/A     Number of children: 4     Years of education: 12     Occupational History      personal care attendant      Social History Main Topics     Smoking status: Never Smoker     Smokeless tobacco: Never Used     Alcohol use No     Drug use: No     Sexual activity: No     Other Topics Concern     Blood Transfusions Yes     Caffeine Concern Yes     >32 oz soda/day     Sleep Concern Yes     insomnia     Parent/Sibling W/ Cabg, Mi Or Angioplasty Before 65f 55m? No     Social History Narrative       MEDICATIONS:  Prior to Admission medications    Medication Sig Start Date End Date Taking? Authorizing Provider   ciprofloxacin (CIPRO) 500 MG tablet Take 1 tablet (500 mg) by mouth 2 times daily for 7 days 9/25/17 10/2/17 Yes Gaby Shore NP   metroNIDAZOLE (FLAGYL) 500 MG tablet Take 1 tablet (500 mg) by mouth 2 times daily for 7 days 9/25/17 10/2/17 Yes Gaby Shore NP   simvastatin (ZOCOR) 20 MG tablet TAKE ONE TABLET BY MOUTH AT BEDTIME 8/31/17  Yes Ania Deleon PA   HYDROcodone-acetaminophen (NORCO) 5-325 MG per tablet TAKE ONE TABLET BY MOUTH EVERY 4 TO 6 HOURS AS NEEDED FOR PAIN 8/31/17  Yes ANTWAN Sosa MD   PARoxetine (PAXIL) 40 MG tablet TAKE ONE TABLET BY MOUTH ONCE DAILY 8/2/17  Yes Magaly Sosa MD   clobetasol (TEMOVATE) 0.05 % ointment Apply at bedtime to sites of itch 7/10/17  Yes MALINI Vo MD   clonazePAM (KLONOPIN) 1 MG tablet TAKE ONE-FOURTH TO ONE-HALF TABLET BY MOUTH EVERY 8 HOURS AS NEEDED 6/22/17  Yes Magaly Sosa MD   albuterol (2.5 MG/3ML) 0.083% neb solution Take 1 vial (2.5 mg) by nebulization every 4 hours as needed for shortness of breath / dyspnea or wheezing 2/15/17  Yes Magaly Sosa MD   VITAMIN D, CHOLECALCIFEROL, PO Take 2,000 Units by mouth daily   Yes Reported, Patient   Garlic 10 MG CAPS Take 1 capsule by mouth as needed   Yes Reported, Patient   DiphenhydrAMINE HCl (BENADRYL ALLERGY PO) Take 25 mg by mouth nightly as needed    Yes Reported, Patient   VITAMIN E Take 1 capsule by mouth daily    Yes Reported, Patient   FISH OIL  Take 1 capsule by mouth daily    Yes Reported, Patient       ALLERGIES:     Allergies   Allergen Reactions     Chocolate Hives     Lemon Flavor Hives     Lime [Calcium Oxysulfide] Hives     Orange Fruit [Citrus] Hives     Strawberry Hives     Adhesive Tape      Band-aids     Codeine Hives     Patient can tolerate oxycodone & dilaudid     Erythromycin Hives     ERYTHROMYCIN BASE     Grapefruit [Extra Strength Grapefruit]      GRAPEFRUIT     Penicillins Hives     Sulfa Drugs      SULFONAMIDE ANTIBIOTICS      Tomato        ROS:  C: NEGATIVE for fever, chills, change in weight  I: NEGATIVE for worrisome rashes, moles or lesions  N: NEGATIVE for weakness, dizziness   H: NEGATIVE for bleeding problems  P: NEGATIVE for changes in mood or affect    EXAM:  /74  Pulse 66  Temp 98.7  F (37.1  C) (Tympanic)  Resp 20  Ht 5' (1.524 m)  Wt 223 lb (101.2 kg)  SpO2 98%  Breastfeeding? No  BMI 43.55 kg/m2 Body mass index is 43.55 kg/(m^2).   GENERAL APPEARANCE: healthy, alert and no distress  SKIN: L shaped laceration of the mid left dorsal forearm with a puncture wound of the volar aspect of the distal forearm near the wrist and another puncture wound of the distal forearm dorsal aspect. All wounds are healing well without drainage or erythema  NEURO: Normal strength and tone, mentation intact, speech normal and ROM intact and neuro intact throughout the forearm and hands with the exception of numbness around the L shaped laceration  PSYCH: mentation appears normal and affect normal/bright    LABS AND IMAGING:     Results for orders placed or performed during the hospital encounter of 09/25/17   Laceration repair    Narrative    Gaby Wells NP     9/27/2017  2:04 PM  Laceration repair  Performed by: GABY WELLS  Authorized by: GABY WELLS   Consent: Verbal consent obtained.  Risks and benefits: risks, benefits and alternatives were discussed  Consent given by: patient  Patient  understanding: patient states understanding of the procedure being   performed  Required items: required blood products, implants, devices, and special   equipment available  Patient identity confirmed: verbally with patient and arm band  Body area: upper extremity  Location details: left lower arm  Wound length (cm): 3 x 2 cm.  Foreign bodies: no foreign bodies  Tendon involvement: none  Nerve involvement: none  Anesthesia: local infiltration    Anesthesia:  Local Anesthetic: lidocaine 1% with epinephrine  Anesthetic total: 4 mL  Preparation: Patient was prepped and draped in the usual sterile fashion   (all wounds were extensively flushed with 500 mLs betadine and saline,   outer areas were scrubbed with soaked gauze).  Irrigation solution: saline  Irrigation method: syringe  Amount of cleaning: extensive  Debridement: minimal  Degree of undermining: none  Skin closure: 5-0 nylon  Number of sutures: 3  Technique: simple  Approximation: loose  Approximation difficulty: complex  Dressing: antibiotic ointment (Non-adhesive dressing to entire wrist,   secured with coban. )  Patient tolerance: Patient tolerated the procedure well with no immediate   complications           ASSESSMENT/PLAN:  (S41.152D,  W54.0XXD) Dog bite of arm, left, subsequent encounter  (primary encounter diagnosis)  Comment:   Plan: healing well. Recheck in one week for suture removal,sooner for concerns      Magaly Sosa MD  September 28, 2017

## 2017-09-28 NOTE — NURSING NOTE
Chief Complaint   Patient presents with     ER F/U     9/25/17 dog bite to left arm, currently on Cipro       Initial /74  Pulse 66  Temp 98.7  F (37.1  C) (Tympanic)  Resp 20  Ht 5' (1.524 m)  Wt 223 lb (101.2 kg)  SpO2 98%  Breastfeeding? No  BMI 43.55 kg/m2 Estimated body mass index is 43.55 kg/(m^2) as calculated from the following:    Height as of this encounter: 5' (1.524 m).    Weight as of this encounter: 223 lb (101.2 kg).  Medication Reconciliation: complete   Aditi Saleh

## 2017-09-29 ASSESSMENT — ANXIETY QUESTIONNAIRES: GAD7 TOTAL SCORE: 3

## 2017-10-03 ENCOUNTER — TELEPHONE (OUTPATIENT)
Dept: FAMILY MEDICINE | Facility: OTHER | Age: 77
End: 2017-10-03

## 2017-10-03 DIAGNOSIS — R73.09 ELEVATED GLUCOSE: ICD-10-CM

## 2017-10-03 DIAGNOSIS — E78.5 HYPERLIPIDEMIA LDL GOAL <130: Primary | ICD-10-CM

## 2017-10-03 DIAGNOSIS — R73.09 ELEVATED GLUCOSE: Primary | ICD-10-CM

## 2017-10-03 DIAGNOSIS — R00.2 PALPITATIONS: ICD-10-CM

## 2017-10-03 DIAGNOSIS — R73.09 ABNORMAL GLUCOSE: Chronic | ICD-10-CM

## 2017-10-03 LAB
CHOLEST SERPL-MCNC: 171 MG/DL
EST. AVERAGE GLUCOSE BLD GHB EST-MCNC: 108 MG/DL
GLUCOSE SERPL-MCNC: 104 MG/DL (ref 70–99)
HBA1C MFR BLD: 5.4 % (ref 4.3–6)
HDLC SERPL-MCNC: 52 MG/DL
LDLC SERPL CALC-MCNC: 88 MG/DL
NONHDLC SERPL-MCNC: 119 MG/DL
TRIGL SERPL-MCNC: 155 MG/DL
TSH SERPL DL<=0.005 MIU/L-ACNC: 0.74 MU/L (ref 0.4–4)

## 2017-10-03 PROCEDURE — 84443 ASSAY THYROID STIM HORMONE: CPT | Mod: ZL | Performed by: FAMILY MEDICINE

## 2017-10-03 PROCEDURE — 40000788 ZZHCL STATISTIC ESTIMATED AVERAGE GLUCOSE: Mod: ZL | Performed by: FAMILY MEDICINE

## 2017-10-03 PROCEDURE — 80061 LIPID PANEL: CPT | Mod: ZL | Performed by: FAMILY MEDICINE

## 2017-10-03 PROCEDURE — 83036 HEMOGLOBIN GLYCOSYLATED A1C: CPT | Mod: ZL | Performed by: FAMILY MEDICINE

## 2017-10-03 PROCEDURE — 36415 COLL VENOUS BLD VENIPUNCTURE: CPT | Mod: ZL | Performed by: FAMILY MEDICINE

## 2017-10-03 PROCEDURE — 82947 ASSAY GLUCOSE BLOOD QUANT: CPT | Mod: ZL | Performed by: FAMILY MEDICINE

## 2017-10-05 ENCOUNTER — OFFICE VISIT (OUTPATIENT)
Dept: FAMILY MEDICINE | Facility: OTHER | Age: 77
End: 2017-10-05
Attending: FAMILY MEDICINE
Payer: COMMERCIAL

## 2017-10-05 VITALS
RESPIRATION RATE: 19 BRPM | SYSTOLIC BLOOD PRESSURE: 124 MMHG | DIASTOLIC BLOOD PRESSURE: 68 MMHG | WEIGHT: 220 LBS | OXYGEN SATURATION: 97 % | HEIGHT: 60 IN | HEART RATE: 78 BPM | BODY MASS INDEX: 43.19 KG/M2
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
## 2017-10-05 DIAGNOSIS — E78.5 HYPERLIPIDEMIA LDL GOAL <130: ICD-10-CM

## 2017-10-05 DIAGNOSIS — R73.09 ELEVATED GLUCOSE: ICD-10-CM

## 2017-10-05 PROCEDURE — 99212 OFFICE O/P EST SF 10 MIN: CPT

## 2017-10-05 PROCEDURE — 99213 OFFICE O/P EST LOW 20 MIN: CPT | Performed by: FAMILY MEDICINE

## 2017-10-05 RX ORDER — METRONIDAZOLE 500 MG/1
500 TABLET ORAL 3 TIMES DAILY
Qty: 9 TABLET | Refills: 0 | Status: SHIPPED | OUTPATIENT
Start: 2017-10-05 | End: 2017-10-08

## 2017-10-05 RX ORDER — CIPROFLOXACIN 500 MG/1
500 TABLET, FILM COATED ORAL 2 TIMES DAILY
Qty: 3 TABLET | Refills: 0 | Status: SHIPPED | OUTPATIENT
Start: 2017-10-05 | End: 2017-10-10

## 2017-10-05 ASSESSMENT — ANXIETY QUESTIONNAIRES
7. FEELING AFRAID AS IF SOMETHING AWFUL MIGHT HAPPEN: NOT AT ALL
5. BEING SO RESTLESS THAT IT IS HARD TO SIT STILL: NOT AT ALL
IF YOU CHECKED OFF ANY PROBLEMS ON THIS QUESTIONNAIRE, HOW DIFFICULT HAVE THESE PROBLEMS MADE IT FOR YOU TO DO YOUR WORK, TAKE CARE OF THINGS AT HOME, OR GET ALONG WITH OTHER PEOPLE: NOT DIFFICULT AT ALL
6. BECOMING EASILY ANNOYED OR IRRITABLE: NOT AT ALL
1. FEELING NERVOUS, ANXIOUS, OR ON EDGE: SEVERAL DAYS
4. TROUBLE RELAXING: NOT AT ALL
GAD7 TOTAL SCORE: 3
2. NOT BEING ABLE TO STOP OR CONTROL WORRYING: SEVERAL DAYS
3. WORRYING TOO MUCH ABOUT DIFFERENT THINGS: SEVERAL DAYS

## 2017-10-05 ASSESSMENT — PATIENT HEALTH QUESTIONNAIRE - PHQ9: SUM OF ALL RESPONSES TO PHQ QUESTIONS 1-9: 2

## 2017-10-05 ASSESSMENT — PAIN SCALES - GENERAL: PAINLEVEL: NO PAIN (0)

## 2017-10-05 NOTE — NURSING NOTE
Chief Complaint   Patient presents with     Suture Removal     suture removal from left arm      Lipids     had labs done       Initial /68 (BP Location: Right arm, Patient Position: Chair, Cuff Size: Adult Large)  Pulse 78  Resp 19  Ht 5' (1.524 m)  Wt 220 lb (99.8 kg)  SpO2 97%  Breastfeeding? No  BMI 42.97 kg/m2 Estimated body mass index is 42.97 kg/(m^2) as calculated from the following:    Height as of this encounter: 5' (1.524 m).    Weight as of this encounter: 220 lb (99.8 kg).  Medication Reconciliation: complete   Aditi Saleh

## 2017-10-05 NOTE — MR AVS SNAPSHOT
"              After Visit Summary   10/5/2017    Dinorah Lim    MRN: [id number]           Patient Information     Date Of Birth          1940        Visit Information        Provider Department      10/5/2017 9:45 AM Magaly Sosa MD [facility]        Today's Diagnoses     Dog bite, subsequent encounter    -  1    Hyperlipidemia LDL goal <130        Elevated glucose           Follow-ups after your visit        Your next 10 appointments already scheduled     Oct 17, 2017  3:40 PM CDT   (Arrive by 3:25 PM)   Return Visit with Esteban Wilsl MD    ORTHOPEDICS ([facility] )    [address]  [facility] [id number] [phone]            Nov 14, 2017  8:45 AM CST   (Arrive by 8:30 AM)   Return Visit with MALINI Vo MD   [facility] ([facility] )    [address]  [facility] [id number] [phone]              Who to contact     If you have questions or need follow up information about today's clinic visit or your schedule please contact [facility] directly at [phone].  Normal or non-critical lab and imaging results will be communicated to you by MyChart, letter or phone within 4 business days after the clinic has received the results. If you do not hear from us within 7 days, please contact the clinic through MyChart or phone. If you have a critical or abnormal lab result, we will notify you by phone as soon as possible.  Submit refill requests through [employer] or call your pharmacy and they will forward the refill request to us. Please allow 3 business days for your refill to be completed.          Additional Information About Your Visit        MyChart Information     [employer] lets you send messages to your doctor, view your test results, renew your prescriptions, schedule appointments and more. To sign up, go to [email or web address] . Click on \"Log in\" on the left side of the screen, which will " "take you to the Welcome page. Then click on \"Sign up Now\" on the right side of the page.     You will be asked to enter the access code listed below, as well as some personal information. Please follow the directions to create your username and password.     Your access code is: [email or web address]  Expires: 10/8/2017  3:41 PM     Your access code will  in 90 days. If you need help or a new code, please call your [facility] or [phone].        Care EveryWhere ID     This is your Care EveryWhere ID. This could be used by other organizations to access your [facility] medical records  [id number]        Your Vitals Were     Pulse Respirations Height Pulse Oximetry Breastfeeding? BMI (Body Mass Index)    78 19 5' (1.524 m) 97% No 42.97 kg/m2       Blood Pressure from Last 3 Encounters:   10/05/17 124/68   17 128/74   17 (!) 137/46    Weight from Last 3 Encounters:   10/05/17 220 lb (99.8 kg)   17 223 lb (101.2 kg)   17 227 lb (103 kg)              Today, you had the following     No orders found for display         Today's Medication Changes          These changes are accurate as of: 10/5/17 10:29 AM.  If you have any questions, ask your nurse or doctor.               Start taking these medicines.        Dose/Directions    ciprofloxacin 500 MG tablet   Commonly known as:  CIPRO   Used for:  Dog bite, subsequent encounter   Started by:  Magaly Sosa MD        Dose:  500 mg   Take 1 tablet (500 mg) by mouth 2 times daily   Quantity:  3 tablet   Refills:  0       metroNIDAZOLE 500 MG tablet   Commonly known as:  FLAGYL   Used for:  Dog bite, subsequent encounter   Started by:  Magaly Sosa MD        Dose:  500 mg   Take 1 tablet (500 mg) by mouth 3 times daily for 3 days   Quantity:  9 tablet   Refills:  0            Where to get your medicines      These medications were sent to [id number] - [place], [place] - [address]  [id number] , [place] [address]     Phone:  " [phone]     ciprofloxacin 500 MG tablet    metroNIDAZOLE 500 MG tablet                Primary Care Provider Office Phone # Fax #    Magaly Sosa [phone]       [facility] [place] [address]  [facility] [address]        Equal Access to Services     EDGARDO RUSS AH: Hadii aad ku hadasho Soomaali, waaxda luqadaha, qaybta kaalmada adeegyada, waxay elizabethin hayhakeemn aderissa castillo layanickbreezy caballero. So [facility] [phone].    ATENCIÓN: Si habla español, tiene a lazaro disposición servicios gratuitos de asistencia lingüística. Llame al [phone].    We comply with applicable federal civil rights laws and [place] laws. We do not discriminate on the basis of race, color, national origin, age, disability, sex, sexual orientation, or gender identity.            Thank you!     Thank you for choosing [facility]  for your care. Our goal is always to provide you with excellent care. Hearing back from our patients is one way we can continue to improve our services. Please take a few minutes to complete the written survey that you may receive in the mail after your visit with us. Thank you!             Your Updated Medication List - Protect others around you: Learn how to safely use, store and throw away your medicines at www.disposemymeds.org.          This list is accurate as of: 10/5/17 10:29 AM.  Always use your most recent med list.                   Brand Name Dispense Instructions for use Diagnosis    albuterol (2.5 MG/3ML) 0.083% neb solution     1 Box    Take 1 vial (2.5 mg) by nebulization every 4 hours as needed for shortness of breath / dyspnea or wheezing    Acute bronchospasm       BENADRYL ALLERGY PO      Take 25 mg by mouth nightly as needed    Hives       ciprofloxacin 500 MG tablet    CIPRO    3 tablet    Take 1 tablet (500 mg) by mouth 2 times daily    Dog bite, subsequent encounter       clobetasol 0.05 % ointment    TEMOVATE    60 g    Apply at bedtime to sites of itch    Lichen  simplex chronicus       clonazePAM 1 MG tablet    klonoPIN    45 tablet    TAKE ONE-FOURTH TO ONE-HALF TABLET BY MOUTH EVERY 8 HOURS AS NEEDED    Anxiety       FISH OIL      Take 1 capsule by mouth daily        Garlic 10 MG Caps      Take 1 capsule by mouth as needed        HYDROcodone-acetaminophen 5-325 MG per tablet    NORCO    60 tablet    TAKE ONE TABLET BY MOUTH EVERY 4 TO 6 HOURS AS NEEDED FOR PAIN    Primary osteoarthritis of right hip       metroNIDAZOLE 500 MG tablet    FLAGYL    9 tablet    Take 1 tablet (500 mg) by mouth 3 times daily for 3 days    Dog bite, subsequent encounter       PARoxetine 40 MG tablet    PAXIL    90 tablet    TAKE ONE TABLET BY MOUTH ONCE DAILY    Anxiety       simvastatin 20 MG tablet    ZOCOR    90 tablet    TAKE ONE TABLET BY MOUTH AT BEDTIME    Hyperlipidemia LDL goal <100       VITAMIN D (CHOLECALCIFEROL) PO      Take 2,000 Units by mouth daily        VITAMIN E      Take 1 capsule by mouth daily

## 2017-10-06 ASSESSMENT — ANXIETY QUESTIONNAIRES: GAD7 TOTAL SCORE: 3

## 2017-10-06 NOTE — PROGRESS NOTES
[facility]    Dinorah Lim, 76 year old, female presents with   Chief Complaint   Patient presents with     Suture Removal     suture removal from left arm after dog bite. The puncture site of the volar aspect of the the arm is more tender to the touch     Lipids     had labs done       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    Anterior Segment V 4 cm nodule abutting liver surface by US 1/4/2015      Obesity, Class II, BMI 35-39.9 1/4/2015    BMI 35.9 with comorbidities = MORBID obesity     Osteoarthritis of right hip 10/07/2004     Osteoarthrosis 06/14/2012     Otitis externa 10/04/2011     Prediabetes 08/01/2011       PAST SURGICAL HISTORY:  Past Surgical History:   Procedure Laterality Date     CLOSED REDUCTION WRIST Right 1952 x 2    long arm cast (same time as elbow fx)     CLOSED RX ELBOW DISLOCATION Right 1952    CR/long cast of fracture     HC INJ EPIDURAL LUMBAR/SACRAL W/WO CONTRAST Bilateral 5/2013    facet injections; prev 2012     LAPAROSCOPIC CHOLECYSTECTOMY N/A 1/4/2015    Procedure: LAPAROSCOPIC CHOLECYSTECTOMY;  Surgeon: Brittney العلي MD;  Location: [facility]     TONSILLECTOMY         SOCIAL HISTORY  Social History     Social History     Marital status: Single     Spouse name: N/A     Number of children: 4     Years of education: 12     Occupational History     personal care attendant      Social History Main Topics     Smoking status:  Never Smoker     Smokeless tobacco: Never Used     Alcohol use No     Drug use: No     Sexual activity: No     Other Topics Concern     Blood Transfusions Yes     Caffeine Concern Yes     >32 oz soda/day     Sleep Concern Yes     insomnia     Parent/Sibling W/ Cabg, Mi Or Angioplasty Before 65f 55m? No     Social History Narrative       MEDICATIONS:  Prior to Admission medications    Medication Sig Start Date End Date Taking? Authorizing Provider   ciprofloxacin (CIPRO) 500 MG tablet Take 1 tablet (500 mg) by mouth 2 times daily 10/5/17  Yes Magaly Sosa MD   metroNIDAZOLE (FLAGYL) 500 MG tablet Take 1 tablet (500 mg) by mouth 3 times daily for 3 days 10/5/17 10/8/17 Yes Magaly Sosa MD   simvastatin (ZOCOR) 20 MG tablet TAKE ONE TABLET BY MOUTH AT BEDTIME 8/31/17  Yes Ania Deleon PA   HYDROcodone-acetaminophen (NORCO) 5-325 MG per tablet TAKE ONE TABLET BY MOUTH EVERY 4 TO 6 HOURS AS NEEDED FOR PAIN 8/31/17  Yes ANTWAN Sosa MD   PARoxetine (PAXIL) 40 MG tablet TAKE ONE TABLET BY MOUTH ONCE DAILY 8/2/17  Yes Magaly Sosa MD   clobetasol (TEMOVATE) 0.05 % ointment Apply at bedtime to sites of itch 7/10/17  Yes MALINI Vo MD   clonazePAM (KLONOPIN) 1 MG tablet TAKE ONE-FOURTH TO ONE-HALF TABLET BY MOUTH EVERY 8 HOURS AS NEEDED 6/22/17  Yes Magaly Sosa MD   albuterol (2.5 MG/3ML) 0.083% neb solution Take 1 vial (2.5 mg) by nebulization every 4 hours as needed for shortness of breath / dyspnea or wheezing 2/15/17  Yes Magaly Sosa MD   VITAMIN D, CHOLECALCIFEROL, PO Take 2,000 Units by mouth daily   Yes Reported, Patient   Garlic 10 MG CAPS Take 1 capsule by mouth as needed   Yes Reported, Patient   DiphenhydrAMINE HCl (BENADRYL ALLERGY PO) Take 25 mg by mouth nightly as needed    Yes Reported, Patient   VITAMIN E Take 1 capsule by mouth daily    Yes Reported, Patient   FISH OIL Take 1 capsule by mouth daily    Yes Reported, Patient       ALLERGIES:     Allergies   Allergen Reactions      Chocolate Hives     Lemon Flavor Hives     Lime [Calcium Oxysulfide] Hives     Orange Fruit [Citrus] Hives     Strawberry Hives     Adhesive Tape      Band-aids     Codeine Hives     Patient can tolerate oxycodone & dilaudid     Erythromycin Hives     ERYTHROMYCIN BASE     Grapefruit [Extra Strength Grapefruit]      GRAPEFRUIT     Penicillins Hives     Sulfa Drugs      SULFONAMIDE ANTIBIOTICS      Tomato        ROS:  C: NEGATIVE for fever, chills, change in weight  I: NEGATIVE for worrisome rashes, moles or lesions  N: NEGATIVE for weakness, dizziness or paresthesias  P: NEGATIVE for changes in mood or affect    EXAM:  /68 (BP Location: Right arm, Patient Position: Chair, Cuff Size: Adult Large)  Pulse 78  Resp 19  Ht 5' (1.524 m)  Wt 220 lb (99.8 kg)  SpO2 97%  Breastfeeding? No  BMI 42.97 kg/m2 Body mass index is 42.97 kg/(m^2).   GENERAL APPEARANCE: healthy, alert and no distress  MS: extremities normal- no gross deformities noted and full ROM of hand and arm  SKIN: three sutures removed from the left forearm, L shaped wound, with slightly more erythema around this, about 1 cm. This is healing well. steristrip is removed from puncture wound of the lateral distal forearm which is healing well. The puncture wound of the medical inner forearm is slightly tender to the touch without erythema noted  NEURO: Normal strength and tone, mentation intact, speech normal and numbness around the L shaped wound, otherwise sensation is intact  PSYCH: mentation appears normal and affect normal/bright    LABS AND IMAGING:     Results for orders placed or performed in visit on 10/03/17   Lipid Profile (Chol, Trig, HDL, LDL calc)   Result Value Ref Range    Cholesterol 171 <200 mg/dL    Triglycerides 155 (H) <150 mg/dL    HDL Cholesterol 52 >49 mg/dL    LDL Cholesterol Calculated 88 <100 mg/dL    Non HDL Cholesterol 119 <130 mg/dL         ASSESSMENT/PLAN:  (W54.0XXD) Dog bite, subsequent encounter  (primary  encounter diagnosis)  Comment:   Plan: ciprofloxacin (CIPRO) 500 MG tablet,         metroNIDAZOLE (FLAGYL) 500 MG tablet        She was treated for 7 days, will extend this to 10 days. She is asked to follow up if this isn't improving    (E78.5) Hyperlipidemia LDL goal <130  Comment:   Plan: well controlled, continue statin    (R73.09) Elevated glucose  Comment:   Plan: A1C is 5.4, no diabetes at this time. Continue to watch diet      Magaly Sosa MD  October 6, 2017

## 2017-10-10 ENCOUNTER — TELEPHONE (OUTPATIENT)
Dept: FAMILY MEDICINE | Facility: OTHER | Age: 77
End: 2017-10-10

## 2017-10-10 ENCOUNTER — HOSPITAL ENCOUNTER (EMERGENCY)
Facility: HOSPITAL | Age: 77
Discharge: HOME | End: 2017-10-10
Attending: NURSE PRACTITIONER | Admitting: NURSE PRACTITIONER
Payer: MEDICARE

## 2017-10-10 VITALS
TEMPERATURE: 97 F | SYSTOLIC BLOOD PRESSURE: 134 MMHG | RESPIRATION RATE: 16 BRPM | DIASTOLIC BLOOD PRESSURE: 61 MMHG | OXYGEN SATURATION: 95 %

## 2017-10-10 DIAGNOSIS — Z51.89 ENCOUNTER FOR POST-TRAUMATIC WOUND CHECK: ICD-10-CM

## 2017-10-10 DIAGNOSIS — R07.9 ACUTE CHEST PAIN: ICD-10-CM

## 2017-10-10 LAB
ANION GAP SERPL CALCULATED.3IONS-SCNC: 5 MMOL/L (ref 3–14)
BASOPHILS # BLD AUTO: 0 10E9/L (ref 0–0.2)
BASOPHILS NFR BLD AUTO: 0.6 %
BUN SERPL-MCNC: 17 MG/DL (ref 7–30)
CALCIUM SERPL-MCNC: 9.1 MG/DL (ref 8.5–10.1)
CHLORIDE SERPL-SCNC: 106 MMOL/L (ref 94–109)
CO2 SERPL-SCNC: 28 MMOL/L (ref 20–32)
CREAT SERPL-MCNC: 0.94 MG/DL (ref 0.52–1.04)
DIFFERENTIAL METHOD BLD: NORMAL
EOSINOPHIL # BLD AUTO: 0.1 10E9/L (ref 0–0.7)
EOSINOPHIL NFR BLD AUTO: 1.5 %
ERYTHROCYTE [DISTWIDTH] IN BLOOD BY AUTOMATED COUNT: 13.4 % (ref 10–15)
GFR SERPL CREATININE-BSD FRML MDRD: 58 ML/MIN/1.7M2
GLUCOSE SERPL-MCNC: 89 MG/DL (ref 70–99)
HCT VFR BLD AUTO: 39.8 % (ref 35–47)
HGB BLD-MCNC: 13.1 G/DL (ref 11.7–15.7)
IMM GRANULOCYTES # BLD: 0 10E9/L (ref 0–0.4)
IMM GRANULOCYTES NFR BLD: 0.4 %
LYMPHOCYTES # BLD AUTO: 1.5 10E9/L (ref 0.8–5.3)
LYMPHOCYTES NFR BLD AUTO: 28.2 %
MCH RBC QN AUTO: 29.9 PG (ref 26.5–33)
MCHC RBC AUTO-ENTMCNC: 32.9 G/DL (ref 31.5–36.5)
MCV RBC AUTO: 91 FL (ref 78–100)
MONOCYTES # BLD AUTO: 0.5 10E9/L (ref 0–1.3)
MONOCYTES NFR BLD AUTO: 9.1 %
NEUTROPHILS # BLD AUTO: 3.1 10E9/L (ref 1.6–8.3)
NEUTROPHILS NFR BLD AUTO: 60.2 %
NRBC # BLD AUTO: 0 10*3/UL
NRBC BLD AUTO-RTO: 0 /100
PLATELET # BLD AUTO: 180 10E9/L (ref 150–450)
POTASSIUM SERPL-SCNC: 4 MMOL/L (ref 3.4–5.3)
RBC # BLD AUTO: 4.38 10E12/L (ref 3.8–5.2)
SODIUM SERPL-SCNC: 139 MMOL/L (ref 133–144)
TROPONIN I SERPL-MCNC: <0.015 UG/L (ref 0–0.04)
WBC # BLD AUTO: 5.2 10E9/L (ref 4–11)

## 2017-10-10 PROCEDURE — 93005 ELECTROCARDIOGRAM TRACING: CPT

## 2017-10-10 PROCEDURE — 99284 EMERGENCY DEPT VISIT MOD MDM: CPT | Performed by: INTERNAL MEDICINE

## 2017-10-10 PROCEDURE — 36415 COLL VENOUS BLD VENIPUNCTURE: CPT | Performed by: FAMILY MEDICINE

## 2017-10-10 PROCEDURE — 93010 ELECTROCARDIOGRAM REPORT: CPT | Performed by: INTERNAL MEDICINE

## 2017-10-10 PROCEDURE — 84484 ASSAY OF TROPONIN QUANT: CPT | Performed by: FAMILY MEDICINE

## 2017-10-10 PROCEDURE — 85025 COMPLETE CBC W/AUTO DIFF WBC: CPT | Performed by: FAMILY MEDICINE

## 2017-10-10 PROCEDURE — 80048 BASIC METABOLIC PNL TOTAL CA: CPT | Performed by: FAMILY MEDICINE

## 2017-10-10 PROCEDURE — 99284 EMERGENCY DEPT VISIT MOD MDM: CPT

## 2017-10-10 ASSESSMENT — ENCOUNTER SYMPTOMS
ABDOMINAL PAIN: 0
SHORTNESS OF BREATH: 0
CONSTITUTIONAL NEGATIVE: 1
COLOR CHANGE: 0
WOUND: 1
COUGH: 0

## 2017-10-10 NOTE — ED AVS SNAPSHOT
[facility]    [address]    [place] [address]    Phone:  [phone]                                       Dinorah Lmi   MRN: [id number]    Department:  [facility]   Date of Visit:  10/10/2017           Patient Information     Date Of Birth          1940        Your diagnoses for this visit were:     Acute chest pain     Encounter for post-traumatic wound check        You were seen by Gaby Shore NP.      Follow-up Information     Schedule an appointment as soon as possible for a visit with Magaly Sosa MD.    Specialty:  Family Practice    Contact information:    [facility] [place]  [address]  [place] [address] [phone]          Discharge Instructions          *CHEST PAIN, UNCERTAIN CAUSE    Based on your exam today, the exact cause of your chest pain is not certain. Your condition does not seem serious at this time, and your pain does not appear to be coming from your heart. However, sometimes the signs of a serious problem take more time to appear. Therefore, watch for the warning signs listed below.  HOME CARE:  1. Rest today and avoid strenuous activity.  2. Take any prescribed medicine as directed.  FOLLOW UP with your doctor in 1-3 days.   GET PROMPT MEDICAL ATTENTION if any of the following occur:    A change in the type of pain: if it feels different, becomes more severe, lasts longer, or begins to spread into your shoulder, arm, neck, jaw or back    Shortness of breath or increased pain with breathing    Weakness, dizziness, or fainting    Cough with blood or dark colored sputum (phlegm)    Fever over 101  F (38.3  C)    Swelling, pain or redness in one leg    0442-7025 [place] [address]. All rights reserved. This information is not intended as a substitute for professional medical care. Always follow your healthcare professional's instructions.      Future Appointments        Provider  Department Dept Phone Center    10/17/2017 3:40 PM Esteban Wills MD  ORTHOPEDICS [phone] [facility] [place]    11/14/2017 8:45 AM H Jace Vo MD [facility] [phone] [facility]         Review of your medicines      Our records show that you are taking the medicines listed below. If these are incorrect, please call your family doctor or clinic.        Dose / Directions Last dose taken    albuterol (2.5 MG/3ML) 0.083% neb solution   Dose:  1 vial   Quantity:  1 Box        Take 1 vial (2.5 mg) by nebulization every 4 hours as needed for shortness of breath / dyspnea or wheezing   Refills:  0        BENADRYL ALLERGY PO   Dose:  25 mg   Indication:  takes with simvastatin        Take 25 mg by mouth nightly as needed   Refills:  0        clobetasol 0.05 % ointment   Commonly known as:  TEMOVATE   Quantity:  60 g        Apply at bedtime to sites of itch   Refills:  3        clonazePAM 1 MG tablet   Commonly known as:  klonoPIN   Quantity:  45 tablet        TAKE ONE-FOURTH TO ONE-HALF TABLET BY MOUTH EVERY 8 HOURS AS NEEDED   Refills:  0        FISH OIL   Dose:  1 capsule        Take 1 capsule by mouth daily   Refills:  0        Garlic 10 MG Caps   Dose:  1 capsule        Take 1 capsule by mouth as needed   Refills:  0        HYDROcodone-acetaminophen 5-325 MG per tablet   Commonly known as:  NORCO   Quantity:  60 tablet        TAKE ONE TABLET BY MOUTH EVERY 4 TO 6 HOURS AS NEEDED FOR PAIN   Refills:  0        PARoxetine 40 MG tablet   Commonly known as:  PAXIL   Quantity:  90 tablet        TAKE ONE TABLET BY MOUTH ONCE DAILY   Refills:  2        simvastatin 20 MG tablet   Commonly known as:  ZOCOR   Quantity:  90 tablet        TAKE ONE TABLET BY MOUTH AT BEDTIME   Refills:  0        VITAMIN D (CHOLECALCIFEROL) PO   Dose:  2000 Units        Take 2,000 Units by mouth daily   Refills:  0        VITAMIN E   Dose:  1 capsule        Take 1 capsule by mouth daily   Refills:  0               "  Procedures and tests performed during your visit     Basic metabolic panel    CBC with platelets differential    Cardiac Continuous Monitoring    EKG 12-lead, tracing only    Pulse oximetry nursing    Troponin I      Orders Needing Specimen Collection     None      Pending Results     No orders found from 10/8/2017 to 10/11/2017.            Pending Culture Results     No orders found from 10/8/2017 to 10/11/2017.            Thank you for choosing [facility]       Thank you for choosing [facility] for your care. Our goal is always to provide you with excellent care. Hearing back from our patients is one way we can continue to improve our services. Please take a few minutes to complete the written survey that you may receive in the mail after you visit with us. Thank you!        [facility] Information     [facility] [employer] lets you send messages to your doctor, view your test results, renew your prescriptions, schedule appointments and more. To sign up, go to [email or web address] . Click on \"Log in\" on the left side of the screen, which will take you to the Welcome page. Then click on \"Sign up Now\" on the right side of the page.     You will be asked to enter the access code listed below, as well as some personal information. Please follow the directions to create your username and password.     Your access code is: [email or web address]  Expires: 2018 10:01 PM     Your access code will  in 90 days. If you need help or a new code, please call your [facility] clinic or [phone].        Care EveryWhere ID     This is your Care EveryWhere ID. This could be used by other organizations to access your [facility] medical records  [id number]        Equal Access to Services     EDGARDO SOLANO : Jorge Talbot, waaxda luqadaha, qaybta kaalmada olga ness. So [facility] [phone].    ATENCIÓN: Si habla español, tiene a lazaro disposición servicios gratuitos de asistencia lingüística. Llame " al [phone].    We comply with applicable federal civil rights laws and [place] laws. We do not discriminate on the basis of race, color, national origin, age, disability, sex, sexual orientation, or gender identity.            After Visit Summary       This is your record. Keep this with you and show to your community pharmacist(s) and doctor(s) at your next visit.

## 2017-10-10 NOTE — TELEPHONE ENCOUNTER
.9:07 AM    Reason for Call: OVERBOOK    Patient is having the following symptoms: dog bite pain is running up through her neck    The patient is requesting an appointment for today with RUDY Sosa.    Was an appointment offered for this call? No  If yes : Appointment type              Date    Preferred method for responding to this message: Telephone Call    What is your phone number [phone]    If we cannot reach you directly, may we leave a detailed response at the number you provided? Yes    Can this message wait until your PCP/provider returns, if unavailable today? Not applicable, provider in    Nazia Woodward

## 2017-10-10 NOTE — ED AVS SNAPSHOT
[facility]    [address]    [place] [address]    Phone:  [phone]                                       Dinorah Lim   MRN: [id number]    Department:  [facility]   Date of Visit:  10/10/2017           After Visit Summary Signature Page     I have received my discharge instructions, and my questions have been answered. I have discussed any challenges I see with this plan with the nurse or doctor.    ..........................................................................................................................................  Patient/Patient Representative Signature      ..........................................................................................................................................  Patient Representative Print Name and Relationship to Patient    ..................................................               ................................................  Date                                            Time    ..........................................................................................................................................  Reviewed by Signature/Title    ...................................................              ..............................................  Date                                                            Time

## 2017-10-10 NOTE — TELEPHONE ENCOUNTER
Please schedule patient for date/time: unable to see today, may go to urgent care for evaluation    Have patient go to ER/Urgent Care Center. Urgent Care hours are 9:30 am to 8 pm, open 7 days a week. No.    Provider will call patient.No.    Other:

## 2017-10-11 NOTE — ED PROVIDER NOTES
History     Chief Complaint   Patient presents with     Wound Check     wound check lt lower arm, notes dog bite. notes pain from site of bite travels up her arm to her chest off and on at times     The history is provided by the patient. No  was used.     Dinorah Lim is a 76 year old female who presents for a wound check, dog bite occurred on 9/25/17. PCP removed sutures and applied steri-strips. No redness or swelling, area is numb.   Also requesting an EKG, has had intermittent chest pain for 4 days, not related to the arm. Concerned she is having an heart problem.   Denies SOB, palpitations. No triggering factors. Recent labs have been normal.     I have reviewed the Medications, Allergies, Past Medical and Surgical History, and Social History in the Epic system.    Allergies:   Allergies   Allergen Reactions     Chocolate Hives     Lemon Flavor Hives     Lime [Calcium Oxysulfide] Hives     Orange Fruit [Citrus] Hives     Strawberry Hives     Adhesive Tape      Band-aids     Codeine Hives     Patient can tolerate oxycodone & dilaudid     Erythromycin Hives     ERYTHROMYCIN BASE     Grapefruit [Extra Strength Grapefruit]      GRAPEFRUIT     Penicillins Hives     Sulfa Drugs      SULFONAMIDE ANTIBIOTICS      Tomato          No current facility-administered medications on file prior to encounter.   Current Outpatient Prescriptions on File Prior to Encounter:  simvastatin (ZOCOR) 20 MG tablet TAKE ONE TABLET BY MOUTH AT BEDTIME   HYDROcodone-acetaminophen (NORCO) 5-325 MG per tablet TAKE ONE TABLET BY MOUTH EVERY 4 TO 6 HOURS AS NEEDED FOR PAIN   PARoxetine (PAXIL) 40 MG tablet TAKE ONE TABLET BY MOUTH ONCE DAILY   clonazePAM (KLONOPIN) 1 MG tablet TAKE ONE-FOURTH TO ONE-HALF TABLET BY MOUTH EVERY 8 HOURS AS NEEDED   albuterol (2.5 MG/3ML) 0.083% neb solution Take 1 vial (2.5 mg) by nebulization every 4 hours as needed for shortness of breath / dyspnea or wheezing   DiphenhydrAMINE HCl  (BENADRYL ALLERGY PO) Take 25 mg by mouth nightly as needed    clobetasol (TEMOVATE) 0.05 % ointment Apply at bedtime to sites of itch   VITAMIN D, CHOLECALCIFEROL, PO Take 2,000 Units by mouth daily   Garlic 10 MG CAPS Take 1 capsule by mouth as needed   VITAMIN E Take 1 capsule by mouth daily    FISH OIL Take 1 capsule by mouth daily        Patient Active Problem List   Diagnosis     Osteoarthritis of right hip     Abnormal glucose     Osteoarthritis     Lung density on x-ray     Hyperlipidemia LDL goal <130     Advanced care planning/counseling discussion     Anxiety     COPD (chronic obstructive pulmonary disease) (H)     Urticaria     ACP (advance care planning)     Morbid obesity (H)     Elevated glucose       Past Surgical History:   Procedure Laterality Date     CLOSED REDUCTION WRIST Right 1952 x 2    long arm cast (same time as elbow fx)     CLOSED RX ELBOW DISLOCATION Right 1952    CR/long cast of fracture     HC INJ EPIDURAL LUMBAR/SACRAL W/WO CONTRAST Bilateral 5/2013    facet injections; prev 2012     LAPAROSCOPIC CHOLECYSTECTOMY N/A 1/4/2015    Procedure: LAPAROSCOPIC CHOLECYSTECTOMY;  Surgeon: Brittney العلي MD;  Location: [facility]     TONSILLECTOMY         Social History   Substance Use Topics     Smoking status: Never Smoker     Smokeless tobacco: Never Used     Alcohol use No       Most Recent Immunizations   Administered Date(s) Administered     Pneumococcal 23 valent 06/02/2014     TD (ADULT, 7+) 08/10/1999     TDAP Vaccine (Adacel) 09/25/2017     TDAP Vaccine (Boostrix) 12/14/2012       BMI: Estimated body mass index is 42.97 kg/(m^2) as calculated from the following:    Height as of 10/5/17: 1.524 m (5').    Weight as of 10/5/17: 99.8 kg (220 lb).      Review of Systems   Constitutional: Negative.    HENT: Negative.    Respiratory: Negative for cough and shortness of breath.    Cardiovascular: Positive for chest pain.   Gastrointestinal: Negative for abdominal pain.   Musculoskeletal:         Chronic left hip pain, walks with a limp   Skin: Positive for wound. Negative for color change and rash.   Psychiatric/Behavioral:        Stress is high, this is her norm. 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        Physical Exam   BP: 146/64  Heart Rate: 70  Temp: 96.3  F (35.7  C)  Resp: 16  SpO2: 96 %       Physical Exam   Constitutional: She is oriented to person, place, and time. She appears well-developed and well-nourished. No distress.   HENT:   Head: Normocephalic and atraumatic.   Eyes: Conjunctivae are normal.   Neck: Normal range of motion.   Cardiovascular: Normal rate, regular rhythm and normal heart sounds.    Pulmonary/Chest: Effort normal and breath sounds normal. No respiratory distress.   Musculoskeletal: Normal range of motion.   Neurological: She is alert and oriented to person, place, and time.   Skin: Skin is warm and dry. She is not diaphoretic.   Dry, healed wound to left forearm. Nontender.Punture wound to left wrist is well healed, more painful, causes pain to shoot up her arm. AFROM of left upper extremity, wounds cause no restriction.    Psychiatric: She has a normal mood and affect. Her behavior is normal.   Nursing note and vitals reviewed.      ED Course     ED Course     Procedures      Labs Ordered and Resulted from Time of ED Arrival Up to the Time of Departure from the ED   CBC WITH PLATELETS DIFFERENTIAL   BASIC METABOLIC PANEL   TROPONIN I   CARDIAC CONTINUOUS MONITORING   PULSE OXIMETRY NURSING       Assessments & Plan (with Medical Decision Making)     I have reviewed the nursing notes.  I have reviewed the findings, diagnosis, plan and need for follow up with the patient.    Removed steri-strips.   Advised pt we would move her to ED to address her concerns regarding chest pain.   Discussed with Dr. Varghese. Pt transferred to ED room 6.     Final diagnoses:   Acute chest pain   Encounter for post-traumatic wound check       10/10/2017   [facility]      Gaby Shore NP  10/10/17 2049

## 2017-10-11 NOTE — ED NOTES
Pt brought over from  for pain that starts in her left arm puncture site and radiates up into arm and chest past 4 days since after the dog bite.  Pt states she's pretty sure it's not her heart it started after the dog bite.  In gown, call light in reach.

## 2017-10-11 NOTE — DISCHARGE INSTRUCTIONS
*CHEST PAIN, UNCERTAIN CAUSE    Based on your exam today, the exact cause of your chest pain is not certain. Your condition does not seem serious at this time, and your pain does not appear to be coming from your heart. However, sometimes the signs of a serious problem take more time to appear. Therefore, watch for the warning signs listed below.  HOME CARE:  1. Rest today and avoid strenuous activity.  2. Take any prescribed medicine as directed.  FOLLOW UP with your doctor in 1-3 days.   GET PROMPT MEDICAL ATTENTION if any of the following occur:    A change in the type of pain: if it feels different, becomes more severe, lasts longer, or begins to spread into your shoulder, arm, neck, jaw or back    Shortness of breath or increased pain with breathing    Weakness, dizziness, or fainting    Cough with blood or dark colored sputum (phlegm)    Fever over 101  F (38.3  C)    Swelling, pain or redness in one leg    6778-5503 [address], [place] [address]. All rights reserved. This information is not intended as a substitute for professional medical care. Always follow your healthcare professional's instructions.

## 2017-10-11 NOTE — ED NOTES
Pt presents with left forearm with steri strips covering  The wound with a scab. Has pain under left arm that goes all the way up her arm and across her chest. Bite khadijah noted under left forearm.

## 2017-10-25 DIAGNOSIS — F41.9 ANXIETY: Chronic | ICD-10-CM

## 2017-10-26 RX ORDER — CLONAZEPAM 1 MG/1
TABLET ORAL
Qty: 45 TABLET | Refills: 0 | Status: SHIPPED | OUTPATIENT
Start: 2017-10-26 | End: 2018-01-02

## 2017-10-26 NOTE — TELEPHONE ENCOUNTER
Klonopin      Last Written Prescription Date: 6/22/17  Last Fill Quantity: 45,  # refills: 0   Last Office Visit with G, [facility] or [facility] prescribing provider: 10/10/17

## 2017-11-09 ENCOUNTER — OFFICE VISIT (OUTPATIENT)
Dept: ORTHOPEDICS | Facility: OTHER | Age: 77
End: 2017-11-09
Attending: ORTHOPAEDIC SURGERY
Payer: COMMERCIAL

## 2017-11-09 VITALS
DIASTOLIC BLOOD PRESSURE: 72 MMHG | SYSTOLIC BLOOD PRESSURE: 138 MMHG | OXYGEN SATURATION: 97 % | BODY MASS INDEX: 44.53 KG/M2 | HEART RATE: 70 BPM | WEIGHT: 228 LBS | TEMPERATURE: 98.9 F

## 2017-11-09 DIAGNOSIS — M54.31 SCIATICA OF RIGHT SIDE: Primary | ICD-10-CM

## 2017-11-09 PROCEDURE — 99212 OFFICE O/P EST SF 10 MIN: CPT

## 2017-11-09 PROCEDURE — 99213 OFFICE O/P EST LOW 20 MIN: CPT | Performed by: ORTHOPAEDIC SURGERY

## 2017-11-09 ASSESSMENT — PAIN SCALES - GENERAL: PAINLEVEL: WORST PAIN (10)

## 2017-11-09 NOTE — PROGRESS NOTES
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  She presents today because of a several week history of pain from the lumbosacral junction of her back, across her right buttock, down the posterior lateral aspect of the right leg, to the right foot.  The pain came on insidiously.  It is not associated with paresthesias.  She has a history of lumbar degenerative disease.  She has never had sciatica in the past.  Her pain is grade 10 out of 10.  It is not aggravated by coughing or sneezing.  She has had no new bowel or bladder control issues.    Nothing else is new with respect to her musculoskeletal or neurologic review of systems since seen last.    Examination: Overweight female in no obvious distress.  Vital signs stable.  Afebrile.  Head atraumatic.  Respiratory efforts unlabored.  Stance and gait are normal.  Straight leg raising test is negative on the right.  Her knee and ankle reflexes are bilaterally absent.  Pinprick examination is intact in the entire right leg.  EHL, AT, and peroneal strengths are normal to gross manual testing.  The skin is intact in the right lower extremity.  She has varicose veins in the right leg.  Right ankle pulses are palpable.    X-ray; on 3/1/16 she had x-rays of her lumbar spine which showed severe facet degenerative disease at L4-5 and L5-S1 bilaterally.    Impression: Lumbar degenerative disease with sciatica    Plan: We discussed treatment options including NSAIDs, physical therapy, and steroid injections.  She chose physical therapy at [facility].  The referral was made.  She will return in 8 weeks for recheck.

## 2017-11-09 NOTE — NURSING NOTE
Chief Complaint   Patient presents with     Musculoskeletal Problem     Recheck Right Knee       Initial /72 (BP Location: Left arm, Patient Position: Sitting, Cuff Size: Adult Large)  Pulse 70  Temp 98.9  F (37.2  C) (Tympanic)  Wt 228 lb (103.4 kg)  SpO2 97%  BMI 44.53 kg/m2 Estimated body mass index is 44.53 kg/(m^2) as calculated from the following:    Height as of 10/5/17: 5' (1.524 m).    Weight as of this encounter: 228 lb (103.4 kg).  Medication Reconciliation: alex Ruth

## 2017-11-09 NOTE — MR AVS SNAPSHOT
"              After Visit Summary   11/9/2017    Dinorah Lim    MRN: [id number]           Patient Information     Date Of Birth          1940        Visit Information        Provider Department      11/9/2017 4:00 PM Esteban Wills MD  [facility]        Today's Diagnoses     Sciatica of right side    -  1       Follow-ups after your visit        Follow-up notes from your care team     Return in about 8 weeks (around 1/4/2018).      Your next 10 appointments already scheduled     Nov 14, 2017  2:15 PM CST   (Arrive by 2:00 PM)   Return Visit with MALINI Vo MD   [facility] ([facility] )    [address]  [place] [id number] [phone]            Jan 04, 2018  4:00 PM CST   (Arrive by 3:45 PM)   Return Visit with Esteban Wills MD    [facility] ([facility] )    [address]  [place] [address] [phone]              Who to contact     If you have questions or need follow up information about today's clinic visit or your schedule please contact  ORTH[facility]ED[facility] directly at [phone].  Normal or non-critical lab and imaging results will be communicated to you by MyChart, letter or phone within 4 business days after the clinic has received the results. If you do not hear from us within 7 days, please contact the clinic through MyChart or phone. If you have a critical or abnormal lab result, we will notify you by phone as soon as possible.  Submit refill requests through [employer] or call your pharmacy and they will forward the refill request to us. Please allow 3 business days for your refill to be completed.          Additional Information About Your Visit        MyChart Information     [employer] lets you send messages to your doctor, view your test results, renew your prescriptions, schedule appointments and more. To sign up, go to [email or web address] . Click on \"Log in\" on the left side of the screen, which " "will take you to the Welcome page. Then click on \"Sign up Now\" on the right side of the page.     You will be asked to enter the access code listed below, as well as some personal information. Please follow the directions to create your username and password.     Your access code is: [email or web address]  Expires: 2018  9:01 PM     Your access code will  in 90 days. If you need help or a new code, please call your [facility] or [phone].        Care EveryWhere ID     This is your [facility] EveryWhere ID. This could be used by other organizations to access your [facility] medical records  [id number]        Your Vitals Were     Pulse Temperature Pulse Oximetry BMI (Body Mass Index)          70 98.9  F (37.2  C) (Tympanic) 97% 44.53 kg/m2         Blood Pressure from Last 3 Encounters:   17 138/72   10/10/17 134/61   10/05/17 124/68    Weight from Last 3 Encounters:   17 228 lb (103.4 kg)   10/05/17 220 lb (99.8 kg)   17 223 lb (101.2 kg)              Today, you had the following     No orders found for display       Primary Care Provider Office Phone # Fax #    Magaly Sosa [phone]       [facility] [place] [address]  [place] [address]        Equal Access to Services     [facility]: Hadii aad ku hadasho Soomaali, waaxda luqadaha, qaybta kaalmada adeegyada, olga juan . So [facility] [phone].    ATENCIÓN: Si habla español, tiene a lazaro disposición servicios gratuitos de asistencia lingüística. Llame al [phone].    We comply with applicable federal civil rights laws and [place] laws. We do not discriminate on the basis of race, color, national origin, age, disability, sex, sexual orientation, or gender identity.            Thank you!     Thank you for choosing  [facility]  for your care. Our goal is always to provide you with excellent care. Hearing back from our patients is one way we can continue to improve our services. " Please take a few minutes to complete the written survey that you may receive in the mail after your visit with us. Thank you!             Your Updated Medication List - Protect others around you: Learn how to safely use, store and throw away your medicines at www.disposemymeds.org.          This list is accurate as of: 11/9/17  4:10 PM.  Always use your most recent med list.                   Brand Name Dispense Instructions for use Diagnosis    albuterol (2.5 MG/3ML) 0.083% neb solution     1 Box    Take 1 vial (2.5 mg) by nebulization every 4 hours as needed for shortness of breath / dyspnea or wheezing    Acute bronchospasm       BENADRYL ALLERGY PO      Take 25 mg by mouth nightly as needed    Hives       clobetasol 0.05 % ointment    TEMOVATE    60 g    Apply at bedtime to sites of itch    Lichen simplex chronicus       clonazePAM 1 MG tablet    klonoPIN    45 tablet    TAKE ONE-FOURTH TO ONE-HALF TABLET BY MOUTH EVERY 8 HOURS AS NEEDED    Anxiety       FISH OIL      Take 1 capsule by mouth daily        Garlic 10 MG Caps      Take 1 capsule by mouth as needed        HYDROcodone-acetaminophen 5-325 MG per tablet    NORCO    60 tablet    TAKE ONE TABLET BY MOUTH EVERY 4 TO 6 HOURS AS NEEDED FOR PAIN    Primary osteoarthritis of right hip       PARoxetine 40 MG tablet    PAXIL    90 tablet    TAKE ONE TABLET BY MOUTH ONCE DAILY    Anxiety       simvastatin 20 MG tablet    ZOCOR    90 tablet    TAKE ONE TABLET BY MOUTH AT BEDTIME    Hyperlipidemia LDL goal <100       VITAMIN D (CHOLECALCIFEROL) PO      Take 2,000 Units by mouth daily        VITAMIN E      Take 1 capsule by mouth daily

## 2017-11-15 ENCOUNTER — TRANSFERRED RECORDS (OUTPATIENT)
Dept: HEALTH INFORMATION MANAGEMENT | Facility: HOSPITAL | Age: 77
End: 2017-11-15

## 2017-11-28 ENCOUNTER — OFFICE VISIT (OUTPATIENT)
Dept: DERMATOLOGY | Facility: OTHER | Age: 77
End: 2017-11-28
Attending: DERMATOLOGY
Payer: COMMERCIAL

## 2017-11-28 VITALS
DIASTOLIC BLOOD PRESSURE: 70 MMHG | OXYGEN SATURATION: 96 % | HEART RATE: 77 BPM | TEMPERATURE: 97.9 F | HEIGHT: 64 IN | SYSTOLIC BLOOD PRESSURE: 124 MMHG | BODY MASS INDEX: 39.09 KG/M2 | WEIGHT: 229 LBS | RESPIRATION RATE: 20 BRPM

## 2017-11-28 DIAGNOSIS — L30.8 OTHER ECZEMA: Primary | ICD-10-CM

## 2017-11-28 DIAGNOSIS — D22.9 MULTIPLE BENIGN NEVI: ICD-10-CM

## 2017-11-28 DIAGNOSIS — M16.11 PRIMARY OSTEOARTHRITIS OF RIGHT HIP: ICD-10-CM

## 2017-11-28 PROCEDURE — 99213 OFFICE O/P EST LOW 20 MIN: CPT | Performed by: DERMATOLOGY

## 2017-11-28 PROCEDURE — 99212 OFFICE O/P EST SF 10 MIN: CPT

## 2017-11-28 RX ORDER — HYDROCODONE BITARTRATE AND ACETAMINOPHEN 5; 325 MG/1; MG/1
TABLET ORAL
Qty: 60 TABLET | Refills: 0 | Status: SHIPPED | OUTPATIENT
Start: 2017-11-28 | End: 2018-03-15

## 2017-11-28 ASSESSMENT — PAIN SCALES - GENERAL: PAINLEVEL: NO PAIN (0)

## 2017-11-28 NOTE — TELEPHONE ENCOUNTER
norco      Last Written Prescription Date: 8/31/17  Last Fill Quantity: 60,  # refills: 0   [place] [facility] [place] [facility] prescribing provider: 10/5/17                                         Next 5 appointments (look out 90 days)     Nov 28, 2017  2:15 PM CST   (Arrive by 2:00 PM)   Return Visit with MALINI Vo MD   [facility] [place] ([facility] )    [address]  [place] [address]   [phone]            Nov 29, 2017  2:40 PM CST   (Arrive by 2:20 PM)   Return Visit with Esteban Wills MD   [facility] ([facility] )    [address]  [place] [address] [phone]            Jan 04, 2018  4:00 PM CST   (Arrive by 3:45 PM)   Return Visit with Esteban Wills MD   [facility] ([facility] )    [address]  [place] [address]   [phone]

## 2017-11-28 NOTE — MR AVS SNAPSHOT
"              After Visit Summary   11/28/2017    Dinorah Lim    MRN: [id number]           Patient Information     Date Of Birth          1940        Visit Information        Provider Department      11/28/2017 2:15 PM MALINI Vo MD [facility]        Today's Diagnoses     Other eczema    -  1    Multiple benign nevi           Follow-ups after your visit        Your next 10 appointments already scheduled     Dec 26, 2017  2:20 PM CST   (Arrive by 2:00 PM)   New Visit with Esteban Wills MD    ORTHOPEDICS ([facility] )    [address] [facility] [address] [phone]              Who to contact     If you have questions or need follow up information about today's clinic visit or your schedule please contact [facility] directly at [phone].  Normal or non-critical lab and imaging results will be communicated to you by MyChart, letter or phone within 4 business days after the clinic has received the results. If you do not hear from us within 7 days, please contact the clinic through MyChart or phone. If you have a critical or abnormal lab result, we will notify you by phone as soon as possible.  Submit refill requests through [employer] or call your pharmacy and they will forward the refill request to us. Please allow 3 business days for your refill to be completed.          Additional Information About Your Visit        MyChart Information     [employer] lets you send messages to your doctor, view your test results, renew your prescriptions, schedule appointments and more. To sign up, go to [email or web address] . Click on \"Log in\" on the left side of the screen, which will take you to the Welcome page. Then click on \"Sign up Now\" on the right side of the page.     You will be asked to enter the access code listed below, as well as some personal information. Please follow the directions to create your username and password.     Your " "access code is: [email or web address]  Expires: 2018  9:01 PM     Your access code will  in 90 days. If you need help or a new code, please call your [facility] clinic or [phone].        Care EveryWhere ID     This is your Care EveryWhere ID. This could be used by other organizations to access your [facility] medical records  [id number]        Your Vitals Were     Pulse Temperature Respirations Height Pulse Oximetry BMI (Body Mass Index)    77 97.9  F (36.6  C) (Tympanic) 20 1.619 m (5' 3.75\") 96% 39.62 kg/m2       Blood Pressure from Last 3 Encounters:   17 162/69   17 115/68   17 124/70    Weight from Last 3 Encounters:   17 103.9 kg (229 lb)   17 103.9 kg (229 lb)   17 103.4 kg (228 lb)              Today, you had the following     No orders found for display         Where to get your medicines      Some of these will need a paper prescription and others can be bought over the counter.  Ask your nurse if you have questions.     Bring a paper prescription for each of these medications     HYDROcodone-acetaminophen 5-325 MG per tablet          Primary Care Provider Office Phone # Fax #    Magaly Sosa [phone]       [facility] [place] [address] [facility] [address]        Equal Access to Services     [facility]: Hadii aad ku hadasho Soomaali, waaxda luqadaha, qaybta kaalmada adeegyada, olga caballero. So [facility] [phone].    ATENCIÓN: Si habla español, tiene a lazaro disposición servicios gratuitos de asistencia lingüística. Llame al [phone].    We comply with applicable federal civil rights laws and [place] laws. We do not discriminate on the basis of race, color, national origin, age, disability, sex, sexual orientation, or gender identity.            Thank you!     Thank you for choosing [facility] [place]  for your care. Our goal is always to provide you with excellent care. Hearing back from our " patients is one way we can continue to improve our services. Please take a few minutes to complete the written survey that you may receive in the mail after your visit with us. Thank you!             Your Updated Medication List - Protect others around you: Learn how to safely use, store and throw away your medicines at www.disposemymeds.org.          This list is accurate as of: 11/28/17 11:59 PM.  Always use your most recent med list.                   Brand Name Dispense Instructions for use Diagnosis    albuterol (2.5 MG/3ML) 0.083% neb solution     1 Box    Take 1 vial (2.5 mg) by nebulization every 4 hours as needed for shortness of breath / dyspnea or wheezing    Acute bronchospasm       BENADRYL ALLERGY PO      Take 25 mg by mouth nightly as needed    Hives       clobetasol 0.05 % ointment    TEMOVATE    60 g    Apply at bedtime to sites of itch    Lichen simplex chronicus       clonazePAM 1 MG tablet    klonoPIN    45 tablet    TAKE ONE-FOURTH TO ONE-HALF TABLET BY MOUTH EVERY 8 HOURS AS NEEDED    Anxiety       FISH OIL      Take 1 capsule by mouth daily        Garlic 10 MG Caps      Take 1 capsule by mouth as needed        HYDROcodone-acetaminophen 5-325 MG per tablet    NORCO    60 tablet    TAKE ONE TABLET BY MOUTH EVERY 4 TO 6 HOURS AS NEEDED FOR PAIN    Primary osteoarthritis of right hip       PARoxetine 40 MG tablet    PAXIL    90 tablet    TAKE ONE TABLET BY MOUTH ONCE DAILY    Anxiety       simvastatin 20 MG tablet    ZOCOR    90 tablet    TAKE ONE TABLET BY MOUTH AT BEDTIME    Hyperlipidemia LDL goal <100       VITAMIN D (CHOLECALCIFEROL) PO      Take 2,000 Units by mouth daily        VITAMIN E      Take 1 capsule by mouth daily

## 2017-11-28 NOTE — NURSING NOTE
"Chief Complaint   Patient presents with     RECHECK     Follow up facial - lichen simplex chronicus        Initial /70  Pulse 77  Temp 97.9  F (36.6  C) (Tympanic)  Resp 20  Ht 1.619 m (5' 3.75\")  Wt 103.9 kg (229 lb)  SpO2 96%  BMI 39.62 kg/m2 Estimated body mass index is 39.62 kg/(m^2) as calculated from the following:    Height as of this encounter: 1.619 m (5' 3.75\").    Weight as of this encounter: 103.9 kg (229 lb).  Medication Reconciliation: complete   Ally White LPN      "

## 2017-11-28 NOTE — TELEPHONE ENCOUNTER
Pt notified that the written RX is ready at the [facility]  registration to be picked up.   Left message

## 2017-11-29 ENCOUNTER — OFFICE VISIT (OUTPATIENT)
Dept: ORTHOPEDICS | Facility: OTHER | Age: 77
End: 2017-11-29
Attending: ORTHOPAEDIC SURGERY
Payer: MEDICARE

## 2017-11-29 VITALS
WEIGHT: 229 LBS | TEMPERATURE: 98.4 F | RESPIRATION RATE: 18 BRPM | HEIGHT: 64 IN | SYSTOLIC BLOOD PRESSURE: 115 MMHG | DIASTOLIC BLOOD PRESSURE: 68 MMHG | HEART RATE: 75 BPM | BODY MASS INDEX: 39.09 KG/M2 | OXYGEN SATURATION: 96 %

## 2017-11-29 DIAGNOSIS — M17.11 PRIMARY OSTEOARTHRITIS OF RIGHT KNEE: ICD-10-CM

## 2017-11-29 DIAGNOSIS — M54.31 SCIATICA OF RIGHT SIDE: Primary | ICD-10-CM

## 2017-11-29 PROCEDURE — 20610 DRAIN/INJ JOINT/BURSA W/O US: CPT | Performed by: ORTHOPAEDIC SURGERY

## 2017-11-29 PROCEDURE — 99212 OFFICE O/P EST SF 10 MIN: CPT

## 2017-11-29 PROCEDURE — 99213 OFFICE O/P EST LOW 20 MIN: CPT | Mod: 25 | Performed by: ORTHOPAEDIC SURGERY

## 2017-11-29 RX ORDER — DEXAMETHASONE SODIUM PHOSPHATE 4 MG/ML
4 INJECTION, SOLUTION INTRA-ARTICULAR; INTRALESIONAL; INTRAMUSCULAR; INTRAVENOUS; SOFT TISSUE ONCE
Qty: 1 ML | Refills: 0 | OUTPATIENT
Start: 2017-11-29 | End: 2018-01-14

## 2017-11-29 ASSESSMENT — PAIN SCALES - GENERAL: PAINLEVEL: WORST PAIN (10)

## 2017-11-29 NOTE — PROGRESS NOTES
Chief complaints:   #1.  Lumbar DJD with sciatica  #2.  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  Plain films on 4/20/17 showed tricompartmental mild to moderate degenerative changes.  An MRI of 5/30/17 showed fraying of the lateral meniscus, a linear free edge tear of the posterior horn the medial meniscus, and an osteochondral defect in the lateral trochlear groove.  Symptoms disappeared with a course of Relafen.    Earlier this month I saw her for her right sided sciatica.  X-rays of her lumbar spine on 3/1/16 showed severe facet degenerative disease at L4-5 and L5-S1 bilaterally.  She is referred to physical therapy.    Subjective: Today she reports that she went to physical therapy 1 time.  At that session the therapist manipulated her right leg and she felt excruciating right medial knee pain.  She did not return to therapy.  The pain is persisted.  In the meantime her sciatica has resolved.    Nothing else has changed with respect to her musculoskeletal or neurologic review of systems since seen last.    Examination: Obese female in no obvious distress.  The right knee has no effusion or deformity.  It is exquisitely tender over the anterior half of the medial joint line.  It is stable to ligamentous stressing.  Laura's maneuvers are unremarkable.    Impression:  #1.  Resolution of right sided sciatica  #2.  Recurrent right knee pain associate with DJD and degenerative meniscal tears.    Plan: Since Relafen worked in the past, we will try it again.  To decrease the severity of her pain we'll also add a steroid injection.  She will return in 4 weeks for recheck.    Procedure: After obtaining written informed consent and sterilely prepping the site a timeout was held.  Sterile technique was employed as the right knee was then injected with 4 mg of dexamethasone mixed  with 3mL of Carbocaine.  Topical ethyl chloride spray was used as a local anesthetic.  The patient tolerated the procedure well and there were no complications.

## 2017-11-29 NOTE — NURSING NOTE
"Chief Complaint   Patient presents with     Musculoskeletal Problem     Right knee pain       Initial /68 (BP Location: Right arm, Patient Position: Sitting, Cuff Size: Adult Large)  Pulse 75  Temp 98.4  F (36.9  C) (Tympanic)  Resp 18  Ht 5' 4\" (1.626 m)  Wt 229 lb (103.9 kg)  SpO2 96%  BMI 39.31 kg/m2 Estimated body mass index is 39.31 kg/(m^2) as calculated from the following:    Height as of this encounter: 5' 4\" (1.626 m).    Weight as of this encounter: 229 lb (103.9 kg).  Medication Reconciliation: complete   Jesusita So LPN      "

## 2017-11-29 NOTE — PROGRESS NOTES
DATE OF SERVICE:  2017       HISTORY OF PRESENT ILLNESS:  Dinorah Browne returns for followup of her lichen simplex chronicus.  She states that with the use of topical steroids things have been clear.      She wished to have a few lesions checked today.  We checked her face, neck and back and there were no lesions there of any type of concern.  She had no other complaints and was pleased with the lichen simplex issue being taken gone.       ASSESSMENT:  No worrisome lesions, resolved lichen simplex type of eczema.      PLAN:  Reassured.  Return on a p.r.n. basis.          DAREN CARDENAS MD             D: 2017 16:00   T: 2017 21:15   MT: ANDREE      Name:     DINORAH BROWNE   MRN:      [id number]        Account:      [id number]   :      1940           Visit Date:   2017      Document: [id number]

## 2017-11-29 NOTE — MR AVS SNAPSHOT
"              After Visit Summary   11/29/2017    Dinorah Lim    MRN: [id number]           Patient Information     Date Of Birth          1940        Visit Information        Provider Department      11/29/2017 2:40 PM Esteban Wills MD  ORTHOPEDICS        Today's Diagnoses     Sciatica of right side    -  1    Primary osteoarthritis of right knee           Follow-ups after your visit        Follow-up notes from your care team     Return in about 4 weeks (around 12/27/2017).      Your next 10 appointments already scheduled     Dec 26, 2017  2:20 PM CST   (Arrive by 2:00 PM)   New Visit with Esteban Wills MD    ORTHOPEDICS ([facility] )    [address] [facility] [address] [phone]              Who to contact     If you have questions or need follow up information about today's clinic visit or your schedule please contact  ORTHOPEDICS directly at [phone].  Normal or non-critical lab and imaging results will be communicated to you by MyChart, letter or phone within 4 business days after the clinic has received the results. If you do not hear from us within 7 days, please contact the clinic through [employer] or phone. If you have a critical or abnormal lab result, we will notify you by phone as soon as possible.  Submit refill requests through ibeatyou or call your pharmacy and they will forward the refill request to us. Please allow 3 business days for your refill to be completed.          Additional Information About Your Visit        MyChart Information     ibeatyou lets you send messages to your doctor, view your test results, renew your prescriptions, schedule appointments and more. To sign up, go to [email or web address] . Click on \"Log in\" on the left side of the screen, which will take you to the Welcome page. Then click on \"Sign up Now\" on the right side of the page.     You will be asked to enter the access code listed below, as well as some " "personal information. Please follow the directions to create your username and password.     Your access code is: [email or web address]  Expires: 2018  9:01 PM     Your access code will  in 90 days. If you need help or a new code, please call your [facility] clinic or [phone].        Care EveryWhere ID     This is your Care EveryWhere ID. This could be used by other organizations to access your [facility] medical records  [id number]        Your Vitals Were     Pulse Temperature Respirations Height Pulse Oximetry BMI (Body Mass Index)    75 98.4  F (36.9  C) (Tympanic) 18 5' 4\" (1.626 m) 96% 39.31 kg/m2       Blood Pressure from Last 3 Encounters:   17 115/68   17 124/70   17 138/72    Weight from Last 3 Encounters:   17 229 lb (103.9 kg)   17 229 lb (103.9 kg)   17 228 lb (103.4 kg)              We Performed the Following     DEXAMETHASONE SODIUM PHOS PER 1 MG     Large Joint/Bursa injection and/or drainage (Shoulder, Knee)          Today's Medication Changes          These changes are accurate as of: 17  3:43 PM.  If you have any questions, ask your nurse or doctor.               Start taking these medicines.        Dose/Directions    dexamethasone 4 MG/ML injection   Commonly known as:  DECADRON   Used for:  Primary osteoarthritis of right knee   Started by:  Esteban Wills MD        Dose:  4 mg   Inject 1 mL (4 mg) as directed once for 1 dose Use 4 mg or dose determined by provider for iontophoresis.   Quantity:  1 mL   Refills:  0       nabumetone 750 MG tablet   Commonly known as:  RELAFEN   Used for:  Primary osteoarthritis of right knee   Started by:  Esteban Wills MD        Dose:  750 mg   Take 1 tablet (750 mg) by mouth 2 times daily as needed for moderate pain   Quantity:  60 tablet   Refills:  2            Where to get your medicines      These medications were sent to [id number] - [place] - [id number]   [id number] , [place] " [id number]     Phone:  [phone]     nabumetone 750 MG tablet         Some of these will need a paper prescription and others can be bought over the counter.  Ask your nurse if you have questions.     You don't need a prescription for these medications     dexamethasone 4 MG/ML injection                Primary Care Provider Office Phone # Fax #    Magaly Sosa [phone]       [facility] [place] [address]  [place] [address]        Equal Access to Services     HAKEEM SOLANO AH: Hadii aad ku hadasho Soomaali, waaxda luqadaha, qaybta kaalmada adeegyada, waxay idiin hayaan adeeg kharash lalyly . So [facility] [phone].    ATENCIÓN: Si habla español, tiene a lazaro disposición servicios gratuitos de asistencia lingüística. [facility] [phone].    We comply with applicable federal civil rights laws and [place] laws. We do not discriminate on the basis of race, color, national origin, age, disability, sex, sexual orientation, or gender identity.            Thank you!     Thank you for choosing  ORTHOPEDICS  for your care. Our goal is always to provide you with excellent care. Hearing back from our patients is one way we can continue to improve our services. Please take a few minutes to complete the written survey that you may receive in the mail after your visit with us. Thank you!             Your Updated Medication List - Protect others around you: Learn how to safely use, store and throw away your medicines at www.disposemymeds.org.          This list is accurate as of: 11/29/17  3:43 PM.  Always use your most recent med list.                   Brand Name Dispense Instructions for use Diagnosis    albuterol (2.5 MG/3ML) 0.083% neb solution     1 Box    Take 1 vial (2.5 mg) by nebulization every 4 hours as needed for shortness of breath / dyspnea or wheezing    Acute bronchospasm       BENADRYL ALLERGY PO      Take 25 mg by mouth nightly as needed    Hives       clobetasol 0.05 % ointment     TEMOVATE    60 g    Apply at bedtime to sites of itch    Lichen simplex chronicus       clonazePAM 1 MG tablet    klonoPIN    45 tablet    TAKE ONE-FOURTH TO ONE-HALF TABLET BY MOUTH EVERY 8 HOURS AS NEEDED    Anxiety       dexamethasone 4 MG/ML injection    DECADRON    1 mL    Inject 1 mL (4 mg) as directed once for 1 dose Use 4 mg or dose determined by provider for iontophoresis.    Primary osteoarthritis of right knee       FISH OIL      Take 1 capsule by mouth daily        Garlic 10 MG Caps      Take 1 capsule by mouth as needed        HYDROcodone-acetaminophen 5-325 MG per tablet    NORCO    60 tablet    TAKE ONE TABLET BY MOUTH EVERY 4 TO 6 HOURS AS NEEDED FOR PAIN    Primary osteoarthritis of right hip       nabumetone 750 MG tablet    RELAFEN    60 tablet    Take 1 tablet (750 mg) by mouth 2 times daily as needed for moderate pain    Primary osteoarthritis of right knee       PARoxetine 40 MG tablet    PAXIL    90 tablet    TAKE ONE TABLET BY MOUTH ONCE DAILY    Anxiety       simvastatin 20 MG tablet    ZOCOR    90 tablet    TAKE ONE TABLET BY MOUTH AT BEDTIME    Hyperlipidemia LDL goal <100       VITAMIN D (CHOLECALCIFEROL) PO      Take 2,000 Units by mouth daily        VITAMIN E      Take 1 capsule by mouth daily

## 2017-12-03 ENCOUNTER — HOSPITAL ENCOUNTER (EMERGENCY)
Facility: HOSPITAL | Age: 77
Discharge: HOME | End: 2017-12-03
Attending: NURSE PRACTITIONER | Admitting: NURSE PRACTITIONER
Payer: MEDICARE

## 2017-12-03 VITALS
RESPIRATION RATE: 18 BRPM | TEMPERATURE: 97.9 F | SYSTOLIC BLOOD PRESSURE: 162 MMHG | OXYGEN SATURATION: 97 % | HEART RATE: 69 BPM | DIASTOLIC BLOOD PRESSURE: 69 MMHG

## 2017-12-03 DIAGNOSIS — L50.9 HIVES: ICD-10-CM

## 2017-12-03 PROCEDURE — 99213 OFFICE O/P EST LOW 20 MIN: CPT | Performed by: NURSE PRACTITIONER

## 2017-12-03 PROCEDURE — 99213 OFFICE O/P EST LOW 20 MIN: CPT

## 2017-12-03 RX ORDER — PREDNISONE 20 MG/1
TABLET ORAL
Qty: 10 TABLET | Refills: 0 | Status: SHIPPED | OUTPATIENT
Start: 2017-12-03 | End: 2018-02-19

## 2017-12-03 RX ORDER — CETIRIZINE HYDROCHLORIDE 10 MG/1
10 TABLET ORAL ONCE
Status: DISCONTINUED | OUTPATIENT
Start: 2017-12-03 | End: 2017-12-03

## 2017-12-03 ASSESSMENT — ENCOUNTER SYMPTOMS
COUGH: 0
APPETITE CHANGE: 0
NUMBNESS: 0
TROUBLE SWALLOWING: 0
CHILLS: 0
DIARRHEA: 0
SORE THROAT: 0
FEVER: 0
DYSURIA: 0
VOMITING: 0
NAUSEA: 0
FACIAL SWELLING: 0
ACTIVITY CHANGE: 0
PSYCHIATRIC NEGATIVE: 1

## 2017-12-03 NOTE — ED AVS SNAPSHOT
[facility]    [address]    [place] [address]    Phone:  [phone]                                       Dinorah Lim   MRN: [id number]    Department:  [facility]   Date of Visit:  12/3/2017           Patient Information     Date Of Birth          1940        Your diagnoses for this visit were:     Hives        You were seen by Kenia Goodrich NP.      Follow-up Information     Follow up with Magaly Sosa MD.    Specialty:  Family Practice    Why:  As needed, If symptoms worsen    Contact information:    [facility] [place]  [address]  [place] [address] [phone]          Follow up with [facility].    Specialty:  EMERGENCY MEDICINE    Why:  As needed, If symptoms worsen    Contact information:    [address]  [place] [address] [phone]    Additional information:    From [facility]: Take [address]. Turn left at [address]. Turn left at the first stoplight on [address]. At the first stop sign, take a right onto [address]. Take a left into the parking lot and continue through until you reach the North enterance of the building.       From [place]: Take [address]. Take the [address] ramp towards [place]. Turn left onto [address]. Take a slight right onto [address]. Turn left at the first stoplight on [address]. At the first stop sign, take a right onto [address]. Take a left into the parking lot and continue through until you reach the North enterance of the building.       From [place]: Take [address]. Take a right at [address]. At the first stop sign, take a right onto [address]. Take a left into the parking lot and continue through until you reach the North enterance of the building.         Discharge Instructions       Take prednisone as directed. Take in the morning.   Take Zyrtec 10 mg daily. Over the  counter. OK to take Benadryl at night.    Take Zantac 300 mg daily for 10 days. Over the counter.   Follow up with PCP with any increase in symptoms or concerns.   Return to urgent care or emergency department with any increase in symptoms or concerns.     Discharge References/Attachments     HIVES (ADULT) (ENGLISH)      Future Appointments        Provider Department Dept Phone Center    12/26/2017 2:20 PM Esteban Wills MD  ORTHOPEDICS [phone] [place]         Review of your medicines      START taking        Dose / Directions Last dose taken    predniSONE 20 MG tablet   Commonly known as:  DELTASONE   Quantity:  10 tablet        Take two tablets (= 40mg) each day for 5 (five) days   Refills:  0          Our records show that you are taking the medicines listed below. If these are incorrect, please call your family doctor or clinic.        Dose / Directions Last dose taken    albuterol (2.5 MG/3ML) 0.083% neb solution   Dose:  1 vial   Quantity:  1 Box        Take 1 vial (2.5 mg) by nebulization every 4 hours as needed for shortness of breath / dyspnea or wheezing   Refills:  0        BENADRYL ALLERGY PO   Dose:  25 mg   Indication:  takes with simvastatin        Take 25 mg by mouth nightly as needed   Refills:  0        clobetasol 0.05 % ointment   Commonly known as:  TEMOVATE   Quantity:  60 g        Apply at bedtime to sites of itch   Refills:  3        clonazePAM 1 MG tablet   Commonly known as:  klonoPIN   Quantity:  45 tablet        TAKE ONE-FOURTH TO ONE-HALF TABLET BY MOUTH EVERY 8 HOURS AS NEEDED   Refills:  0        dexamethasone 4 MG/ML injection   Commonly known as:  DECADRON   Dose:  4 mg   Quantity:  1 mL        Inject 1 mL (4 mg) as directed once for 1 dose Use 4 mg or dose determined by provider for iontophoresis.   Refills:  0        FISH OIL   Dose:  1 capsule        Take 1 capsule by mouth daily   Refills:  0        Garlic 10 MG Caps   Dose:  1 capsule        Take 1 capsule by  "mouth as needed   Refills:  0        HYDROcodone-acetaminophen 5-325 MG per tablet   Commonly known as:  NORCO   Quantity:  60 tablet        TAKE ONE TABLET BY MOUTH EVERY 4 TO 6 HOURS AS NEEDED FOR PAIN   Refills:  0        PARoxetine 40 MG tablet   Commonly known as:  PAXIL   Quantity:  90 tablet        TAKE ONE TABLET BY MOUTH ONCE DAILY   Refills:  2        RELAFEN PO   Dose:  750 mg        Take 750 mg by mouth   Refills:  0        simvastatin 20 MG tablet   Commonly known as:  ZOCOR   Quantity:  90 tablet        TAKE ONE TABLET BY MOUTH AT BEDTIME   Refills:  0        VITAMIN D (CHOLECALCIFEROL) PO   Dose:  2000 Units        Take 2,000 Units by mouth daily   Refills:  0        VITAMIN E   Dose:  1 capsule        Take 1 capsule by mouth daily   Refills:  0                Prescriptions were sent or printed at these locations (1 Prescription)                   [facility]  [place], [place] - [address] [facility] [address]   [address] [facility] [address], [place] [address]    Telephone:  [phone]   Fax:  [phone]   Hours:                  E-Prescribed (1 of 1)         predniSONE (DELTASONE) 20 MG tablet                Orders Needing Specimen Collection     None      Pending Results     No orders found from 12/1/2017 to 12/4/2017.            Pending Culture Results     No orders found from 12/1/2017 to 12/4/2017.            Thank you for choosing [facility]       Thank you for choosing [facility] for your care. Our goal is always to provide you with excellent care. Hearing back from our patients is one way we can continue to improve our services. Please take a few minutes to complete the written survey that you may receive in the mail after you visit with us. Thank you!        Tyfonehart Information     Familio lets you send messages to your doctor, view your test results, renew your prescriptions, schedule appointments and more. To sign up, go to [email or web address] . Click on \"Log in\" on the left side of the screen, which will take " "you to the Welcome page. Then click on \"Sign up Now\" on the right side of the page.     You will be asked to enter the access code listed below, as well as some personal information. Please follow the directions to create your username and password.     Your access code is: [email or web address]  Expires: 2018  9:01 PM     Your access code will  in 90 days. If you need help or a new code, please call your [facility] clinic or [phone].        Care EveryWhere ID     This is your Care EveryWhere ID. This could be used by other organizations to access your [facility] medical records  [id number]        Equal Access to Services     HAKEEM SOLANO : Jorge Talbot, joaquim overton, abiodun ness, olga caballero. So [facility] [phone].    ATENCIÓN: Si habla español, tiene a lazaro disposición servicios gratuitos de asistencia lingüística. Llame al [phone].    We comply with applicable federal civil rights laws and [place] laws. We do not discriminate on the basis of race, color, national origin, age, disability, sex, sexual orientation, or gender identity.            After Visit Summary       This is your record. Keep this with you and show to your community pharmacist(s) and doctor(s) at your next visit.                  "

## 2017-12-03 NOTE — ED NOTES
Pt preseents with a reddened,raised rash to all extremities and torso and head since 11-29-17 when pt rec'd a decadron shot.

## 2017-12-03 NOTE — DISCHARGE INSTRUCTIONS
Take prednisone as directed. Take in the morning.   Take Zyrtec 10 mg daily. Over the counter. OK to take Benadryl at night.    Take Zantac 300 mg daily for 10 days. Over the counter.   Follow up with PCP with any increase in symptoms or concerns.   Return to urgent care or emergency department with any increase in symptoms or concerns.

## 2017-12-03 NOTE — ED PROVIDER NOTES
History     Chief Complaint   Patient presents with     Rash     Since Thursday night, head, legs, back, arms.     The history is provided by the patient. No  was used.     Dinorah Lim is a 77 year old female who presents with hives that started 5 days ago after she got a right knee injection. She hasn't gotten the type of injection medication before. She's taken benadryl with mild effectiveness. Denies respiratory symptoms. Denies new products, clothing, or medication. Denies fever, chills, or night sweats. Eating and drinking well. Bowel and bladder are working well. She usually gets hives once a year.       Problem List:    Patient Active Problem List    Diagnosis Date Noted     Elevated glucose 10/05/2017     Priority: Medium     Morbid obesity (H) 06/01/2017     Priority: Medium     ACP (advance care planning) 04/28/2016     Priority: Medium     Advance Care Planning 4/28/2016: ACP Review of Chart / Resources Provided:  Reviewed chart for advance care plan.  Dinorah Lim has no plan or code status on file. Discussed available resources and provided with information. Confirmed code status reflects current choices pending further ACP discussions.  Confirmed/documented legally designated decision makers.  Added by Aditi Gandhi             COPD (chronic obstructive pulmonary disease) (H) 09/11/2014     Priority: Medium     mild, on PFTs       Anxiety 06/23/2014     Priority: Medium     Lung density on x-ray 07/23/2013     Priority: Medium     Hyperlipidemia LDL goal <130 07/23/2013     Priority: Medium     Osteoarthritis 06/14/2012     Priority: Medium     Problem list name updated by automated process. Provider to review       Advanced care planning/counseling discussion 01/13/2012     Priority: Medium     Abnormal glucose 08/01/2011     Priority: Medium     Hgb A1c 5.7 on 1/4/2015  Problem list name updated by automated process. Provider to review       Osteoarthritis of right hip  10/07/2004     Priority: Medium     Urticaria 06/01/2004     Priority: Medium     Problem list name updated by automated process. 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     Obesity, Class II, BMI 35-39.9 1/4/2015     Osteoarthritis of right hip 10/07/2004     Osteoarthrosis 06/14/2012     Otitis externa 10/04/2011     Prediabetes 08/01/2011       Past Surgical History:    Past Surgical History:   Procedure Laterality Date     CLOSED REDUCTION WRIST Right 1952 x 2    long arm cast (same time as elbow fx)     CLOSED RX ELBOW DISLOCATION Right 1952    CR/long cast of fracture     HC INJ EPIDURAL LUMBAR/SACRAL W/WO CONTRAST Bilateral 5/2013    facet injections; prev 2012     LAPAROSCOPIC CHOLECYSTECTOMY N/A 1/4/2015    Procedure: LAPAROSCOPIC CHOLECYSTECTOMY;  Surgeon: Brittney العلي MD;  Location: [facility]     TONSILLECTOMY         Family History:    Family History   Problem Relation Age of Onset     HEART DISEASE Brother      atrial fibrillation     Other - See Comments Mother      emphysema     HEART DISEASE Father 92     CHF     CANCER Paternal Grandfather      lung cancer     CANCER Maternal Uncle      lung cancer       Social History:  Marital Status:  Single [1]  Social History   Substance Use Topics     Smoking status: Never Smoker     Smokeless tobacco: Never Used     Alcohol use No        Medications:      predniSONE (DELTASONE) 20 MG tablet   HYDROcodone-acetaminophen (NORCO) 5-325 MG per tablet   clonazePAM (KLONOPIN) 1 MG tablet   simvastatin (ZOCOR) 20 MG tablet    PARoxetine (PAXIL) 40 MG tablet   albuterol (2.5 MG/3ML) 0.083% neb solution   VITAMIN D, CHOLECALCIFEROL, PO   Garlic 10 MG CAPS   DiphenhydrAMINE HCl (BENADRYL ALLERGY PO)   VITAMIN E   FISH OIL   Nabumetone (RELAFEN PO)   dexamethasone (DECADRON) 4 MG/ML injection   clobetasol (TEMOVATE) 0.05 % ointment         Review of Systems   Constitutional: Negative for activity change, appetite change, chills and fever.   HENT: Negative for congestion, drooling, facial swelling, mouth sores, rhinorrhea, sore throat, trouble swallowing and voice change.         Denies tongue or throat tightness or swelling.   Respiratory: Negative for cough, shortness of breath, wheezing and stridor.    Cardiovascular: Negative for chest pain.   Gastrointestinal: Negative for diarrhea, nausea and vomiting.   Genitourinary: Negative for dysuria.   Skin: Positive for rash.        Hives to legs and back.    Neurological: Negative for numbness.   Psychiatric/Behavioral: Negative.        Physical Exam   BP: 162/69  Pulse: 69  Temp: 97.9  F (36.6  C)  Resp: 18  SpO2: 97 %      Physical Exam   Constitutional: She is oriented to person, place, and time. She appears well-developed and well-nourished. No distress.   HENT:   Head: Normocephalic.   Mouth/Throat: Oropharynx is clear and moist. No oropharyngeal exudate.   Neck: Normal range of motion. Neck supple.   Cardiovascular: Normal rate, normal heart sounds and intact distal pulses.    No murmur heard.  Pulmonary/Chest: Effort normal. No respiratory distress. She has no wheezes. She has no rales.   Abdominal: Soft. She exhibits no distension.   Musculoskeletal: Normal range of motion.   Lymphadenopathy:     She has no cervical adenopathy.   Neurological: She is alert and oriented to person, place, and time.   Skin: Skin is warm and dry. Rash noted. She is not diaphoretic.   Raised hives to anterior/posterior legs and posterior back. No drainage.    Psychiatric: She has a normal mood and affect.  Her behavior is normal.   Nursing note and vitals reviewed.      ED Course     ED Course     Procedures      Assessments & Plan (with Medical Decision Making)     Discussed plan of care. She verbalized understanding. All questions answered.     I have reviewed the nursing notes.    I have reviewed the findings, diagnosis, plan and need for follow up with the patient.  Discharged in stable condition.     New Prescriptions    PREDNISONE (DELTASONE) 20 MG TABLET    Take two tablets (= 40mg) each day for 5 (five) days       Final diagnoses:   Hives     Take prednisone as directed. Take in the morning.   Take Zyrtec 10 mg daily. Over the counter. OK to take Benadryl at night.    Take Zantac 300 mg daily for 10 days. Over the counter.   Follow up with PCP with any increase in symptoms or concerns.   Return to urgent care or emergency department with any increase in symptoms or concerns.     JEFERSON Mcleod  12/3/2017  2:21 PM  [facility]       Kenia Goodrich NP  12/04/17 [phone]

## 2017-12-03 NOTE — ED AVS SNAPSHOT
[facility]    [address]    [place] [address]    Phone:  [phone]                                       Dinorah Lim   MRN: [id number]    Department:  [facility]   Date of Visit:  12/3/2017           After Visit Summary Signature Page     I have received my discharge instructions, and my questions have been answered. I have discussed any challenges I see with this plan with the nurse or doctor.    ..........................................................................................................................................  Patient/Patient Representative Signature      ..........................................................................................................................................  Patient Representative Print Name and Relationship to Patient    ..................................................               ................................................  Date                                            Time    ..........................................................................................................................................  Reviewed by Signature/Title    ...................................................              ..............................................  Date                                                            Time

## 2017-12-04 ASSESSMENT — ENCOUNTER SYMPTOMS
WHEEZING: 0
SHORTNESS OF BREATH: 0
RHINORRHEA: 0
VOICE CHANGE: 0
STRIDOR: 0

## 2017-12-22 DIAGNOSIS — M25.562 ACUTE PAIN OF LEFT KNEE: Primary | ICD-10-CM

## 2018-01-02 DIAGNOSIS — F41.9 ANXIETY: Chronic | ICD-10-CM

## 2018-01-02 DIAGNOSIS — E78.5 HYPERLIPIDEMIA LDL GOAL <100: ICD-10-CM

## 2018-01-03 RX ORDER — SIMVASTATIN 20 MG
TABLET ORAL
Qty: 90 TABLET | Refills: 2 | Status: SHIPPED | OUTPATIENT
Start: 2018-01-03 | End: 2018-10-24

## 2018-01-05 RX ORDER — CLONAZEPAM 1 MG/1
TABLET ORAL
Qty: 45 TABLET | Refills: 0 | Status: SHIPPED | OUTPATIENT
Start: 2018-01-05 | End: 2018-04-08

## 2018-01-14 ENCOUNTER — HOSPITAL ENCOUNTER (EMERGENCY)
Facility: HOSPITAL | Age: 78
Discharge: HOME | End: 2018-01-14
Attending: NURSE PRACTITIONER | Admitting: NURSE PRACTITIONER
Payer: MEDICARE

## 2018-01-14 ENCOUNTER — APPOINTMENT (OUTPATIENT)
Dept: GENERAL RADIOLOGY | Facility: HOSPITAL | Age: 78
End: 2018-01-14
Attending: NURSE PRACTITIONER
Payer: MEDICARE

## 2018-01-14 VITALS
TEMPERATURE: 99.7 F | DIASTOLIC BLOOD PRESSURE: 69 MMHG | RESPIRATION RATE: 16 BRPM | SYSTOLIC BLOOD PRESSURE: 146 MMHG | OXYGEN SATURATION: 96 %

## 2018-01-14 DIAGNOSIS — D70.9 NEUTROPENIA, UNSPECIFIED TYPE (H): ICD-10-CM

## 2018-01-14 DIAGNOSIS — J06.9 UPPER RESPIRATORY TRACT INFECTION, UNSPECIFIED TYPE: ICD-10-CM

## 2018-01-14 LAB
ANION GAP SERPL CALCULATED.3IONS-SCNC: 7 MMOL/L (ref 3–14)
BASOPHILS # BLD AUTO: 0 10E9/L (ref 0–0.2)
BASOPHILS NFR BLD AUTO: 0.4 %
BUN SERPL-MCNC: 10 MG/DL (ref 7–30)
CALCIUM SERPL-MCNC: 9.2 MG/DL (ref 8.5–10.1)
CHLORIDE SERPL-SCNC: 106 MMOL/L (ref 94–109)
CO2 SERPL-SCNC: 26 MMOL/L (ref 20–32)
CREAT SERPL-MCNC: 0.96 MG/DL (ref 0.52–1.04)
DIFFERENTIAL METHOD BLD: ABNORMAL
EOSINOPHIL # BLD AUTO: 0 10E9/L (ref 0–0.7)
EOSINOPHIL NFR BLD AUTO: 0.4 %
ERYTHROCYTE [DISTWIDTH] IN BLOOD BY AUTOMATED COUNT: 14 % (ref 10–15)
FLUAV+FLUBV AG SPEC QL: NEGATIVE
FLUAV+FLUBV AG SPEC QL: NEGATIVE
GFR SERPL CREATININE-BSD FRML MDRD: 57 ML/MIN/1.7M2
GLUCOSE SERPL-MCNC: 95 MG/DL (ref 70–99)
HCT VFR BLD AUTO: 40.3 % (ref 35–47)
HGB BLD-MCNC: 13.1 G/DL (ref 11.7–15.7)
IMM GRANULOCYTES # BLD: 0 10E9/L (ref 0–0.4)
IMM GRANULOCYTES NFR BLD: 0.4 %
LYMPHOCYTES # BLD AUTO: 0.5 10E9/L (ref 0.8–5.3)
LYMPHOCYTES NFR BLD AUTO: 21.1 %
MCH RBC QN AUTO: 29.3 PG (ref 26.5–33)
MCHC RBC AUTO-ENTMCNC: 32.5 G/DL (ref 31.5–36.5)
MCV RBC AUTO: 90 FL (ref 78–100)
MONOCYTES # BLD AUTO: 0.4 10E9/L (ref 0–1.3)
MONOCYTES NFR BLD AUTO: 15.9 %
NEUTROPHILS # BLD AUTO: 1.4 10E9/L (ref 1.6–8.3)
NEUTROPHILS NFR BLD AUTO: 61.8 %
NRBC # BLD AUTO: 0 10*3/UL
NRBC BLD AUTO-RTO: 0 /100
PLATELET # BLD AUTO: 152 10E9/L (ref 150–450)
POTASSIUM SERPL-SCNC: 4.3 MMOL/L (ref 3.4–5.3)
RBC # BLD AUTO: 4.47 10E12/L (ref 3.8–5.2)
RETICS # AUTO: 80.5 10E9/L (ref 25–95)
RETICS/RBC NFR AUTO: 1.8 % (ref 0.5–2)
SODIUM SERPL-SCNC: 139 MMOL/L (ref 133–144)
SPECIMEN SOURCE: NORMAL
WBC # BLD AUTO: 2.3 10E9/L (ref 4–11)

## 2018-01-14 PROCEDURE — 40000847 ZZHCL STATISTIC MORPHOLOGY W/INTERP HISTOLOGY TC 85060: Performed by: NURSE PRACTITIONER

## 2018-01-14 PROCEDURE — 85045 AUTOMATED RETICULOCYTE COUNT: CPT | Performed by: NURSE PRACTITIONER

## 2018-01-14 PROCEDURE — 85025 COMPLETE CBC W/AUTO DIFF WBC: CPT | Performed by: NURSE PRACTITIONER

## 2018-01-14 PROCEDURE — G0463 HOSPITAL OUTPT CLINIC VISIT: HCPCS | Mod: 25

## 2018-01-14 PROCEDURE — 71046 X-RAY EXAM CHEST 2 VIEWS: CPT | Mod: TC

## 2018-01-14 PROCEDURE — 80048 BASIC METABOLIC PNL TOTAL CA: CPT | Performed by: NURSE PRACTITIONER

## 2018-01-14 PROCEDURE — 36415 COLL VENOUS BLD VENIPUNCTURE: CPT | Performed by: NURSE PRACTITIONER

## 2018-01-14 PROCEDURE — 99214 OFFICE O/P EST MOD 30 MIN: CPT | Performed by: NURSE PRACTITIONER

## 2018-01-14 PROCEDURE — 87804 INFLUENZA ASSAY W/OPTIC: CPT | Mod: 59 | Performed by: FAMILY MEDICINE

## 2018-01-14 ASSESSMENT — ENCOUNTER SYMPTOMS
CHEST TIGHTNESS: 1
DIARRHEA: 1
FEVER: 1
FATIGUE: 1
VOMITING: 0
APPETITE CHANGE: 0
PALPITATIONS: 0
SINUS PRESSURE: 0
COUGH: 1
SORE THROAT: 0
HEADACHES: 1
SINUS PAIN: 0
NAUSEA: 0
CHILLS: 0

## 2018-01-14 NOTE — ED NOTES
Pt presents with occasional productive cough with whitish/yellow phlegm for 3 days,fever of 101 at the highest,body aches since yesterday.

## 2018-01-14 NOTE — ED PROVIDER NOTES
History     Chief Complaint   Patient presents with     Cough     c/o cough, fever, and body aches     The history is provided by the patient. No  was used.     Dinorah Lim is a 77 year old female who presents today with a CC of productive cough, fever, body aches x 3 days.  She denies shortness of breath.  She has been using rebekah hess for symptoms.  She states she gets hives from so many OTC products that she avoid them.  She has a history of asthma and has had pneumonia in the past.  She has a nebulizer that she has been using with improvement.      Problem List:    Patient Active Problem List    Diagnosis Date Noted     Elevated glucose 10/05/2017     Priority: Medium     Morbid obesity (H) 06/01/2017     Priority: Medium     ACP (advance care planning) 04/28/2016     Priority: Medium     Advance Care Planning 4/28/2016: ACP Review of Chart / Resources Provided:  Reviewed chart for advance care plan.  Dinorah Lim has no plan or code status on file. Discussed available resources and provided with information. Confirmed code status reflects current choices pending further ACP discussions.  Confirmed/documented legally designated decision makers.  Added by Aditi Gandhi             COPD (chronic obstructive pulmonary disease) (H) 09/11/2014     Priority: Medium     mild, on PFTs       Anxiety 06/23/2014     Priority: Medium     Lung density on x-ray 07/23/2013     Priority: Medium     Hyperlipidemia LDL goal <130 07/23/2013     Priority: Medium     Osteoarthritis 06/14/2012     Priority: Medium     Problem list name updated by automated process. Provider to review       Advanced care planning/counseling discussion 01/13/2012     Priority: Medium     Abnormal glucose 08/01/2011     Priority: Medium     Hgb A1c 5.7 on 1/4/2015  Problem list name updated by automated process. Provider to review       Osteoarthritis of right hip 10/07/2004     Priority: Medium     Urticaria 06/01/2004      Priority: Medium     Problem list name updated by automated process. 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     Obesity, Class II, BMI 35-39.9 1/4/2015     Osteoarthritis of right hip 10/07/2004     Osteoarthrosis 06/14/2012     Otitis externa 10/04/2011     Prediabetes 08/01/2011       Past Surgical History:    Past Surgical History:   Procedure Laterality Date     CLOSED REDUCTION WRIST Right 1952 x 2    long arm cast (same time as elbow fx)     CLOSED RX ELBOW DISLOCATION Right 1952    CR/long cast of fracture     HC INJ EPIDURAL LUMBAR/SACRAL W/WO CONTRAST Bilateral 5/2013    facet injections; prev 2012     LAPAROSCOPIC CHOLECYSTECTOMY N/A 1/4/2015    Procedure: LAPAROSCOPIC CHOLECYSTECTOMY;  Surgeon: Brittney العلي MD;  Location: [facility]     TONSILLECTOMY         Family History:    Family History   Problem Relation Age of Onset     HEART DISEASE Brother      atrial fibrillation     Other - See Comments Mother      emphysema     HEART DISEASE Father 92     CHF     CANCER Paternal Grandfather      lung cancer     CANCER Maternal Uncle      lung cancer       Social History:  Marital Status:  Single [1]  Social History   Substance Use Topics     Smoking status: Never Smoker     Smokeless tobacco: Never Used     Alcohol use No        Medications:      clonazePAM (KLONOPIN) 1 MG tablet   simvastatin (ZOCOR) 20 MG tablet   HYDROcodone-acetaminophen (NORCO) 5-325 MG per tablet   PARoxetine (PAXIL) 40 MG tablet   albuterol (2.5 MG/3ML) 0.083% neb solution   DiphenhydrAMINE HCl  (BENADRYL ALLERGY PO)   FISH OIL   Nabumetone (RELAFEN PO)   predniSONE (DELTASONE) 20 MG tablet   clobetasol (TEMOVATE) 0.05 % ointment   VITAMIN D, CHOLECALCIFEROL, PO   Garlic 10 MG CAPS   VITAMIN E         Review of Systems   Constitutional: Positive for fatigue and fever. Negative for appetite change and chills.   HENT: Positive for congestion. Negative for ear pain, sinus pain, sinus pressure and sore throat.    Respiratory: Positive for cough and chest tightness.    Cardiovascular: Negative for chest pain and palpitations.   Gastrointestinal: Positive for diarrhea. Negative for nausea and vomiting.   Skin: Negative for rash.   Neurological: Positive for headaches.       Physical Exam   BP: 146/69  Heart Rate: 79  Temp: 99.7  F (37.6  C)  Resp: 16  SpO2: 96 %      Physical Exam   Constitutional: She is oriented to person, place, and time. She appears well-developed. She is cooperative.   HENT:   Head: Normocephalic and atraumatic.   Right Ear: Tympanic membrane, external ear and ear canal normal.   Left Ear: Tympanic membrane, external ear and ear canal normal.   Mouth/Throat: Uvula is midline and oropharynx is clear and moist.   Eyes: Conjunctivae are normal.   Neck: Normal range of motion. Neck supple.   Cardiovascular: Normal rate and regular rhythm.    Pulmonary/Chest: Effort normal and breath sounds normal.   Neurological: She is alert and oriented to person, place, and time.   Skin: Skin is warm and dry.   Psychiatric: She has a normal mood and affect. Her behavior is normal.   Nursing note and vitals reviewed.      ED Course     ED Course     Procedures    Results for orders placed or performed during the hospital encounter of 01/14/18   Chest XR,  PA & LAT    Narrative    PROCEDURE:  XR CHEST 2 VW    HISTORY:  cough, fever; .     COMPARISON:  March 2017    FINDINGS:   The cardiac silhouette is normal in size. The pulmonary vasculature is  normal. There Is some linear atelectasis or scarring seen at the  left  lung base. The right lung is clear. No pleural effusion or  pneumothorax. There is a moderate size hiatal hernia.      Impression    IMPRESSION:  Hiatal hernia. No pulmonary infiltrates are seen.      RAFIA SIEGEL MD   CBC with platelets differential   Result Value Ref Range    WBC 2.3 (L) 4.0 - 11.0 10e9/L    RBC Count 4.47 3.8 - 5.2 10e12/L    Hemoglobin 13.1 11.7 - 15.7 g/dL    Hematocrit 40.3 35.0 - 47.0 %    MCV 90 78 - 100 fl    MCH 29.3 26.5 - 33.0 pg    MCHC 32.5 31.5 - 36.5 g/dL    RDW 14.0 10.0 - 15.0 %    Platelet Count 152 150 - 450 10e9/L    Diff Method Automated Method     % Neutrophils 61.8 %    % Lymphocytes 21.1 %    % Monocytes 15.9 %    % Eosinophils 0.4 %    % Basophils 0.4 %    % Immature Granulocytes 0.4 %    Nucleated RBCs 0 0 /100    Absolute Neutrophil 1.4 (L) 1.6 - 8.3 10e9/L    Absolute Lymphocytes 0.5 (L) 0.8 - 5.3 10e9/L    Absolute Monocytes 0.4 0.0 - 1.3 10e9/L    Absolute Eosinophils 0.0 0.0 - 0.7 10e9/L    Absolute Basophils 0.0 0.0 - 0.2 10e9/L    Abs Immature Granulocytes 0.0 0 - 0.4 10e9/L    Absolute Nucleated RBC 0.0    Basic metabolic panel   Result Value Ref Range    Sodium 139 133 - 144 mmol/L    Potassium 4.3 3.4 - 5.3 mmol/L    Chloride 106 94 - 109 mmol/L    Carbon Dioxide 26 20 - 32 mmol/L    Anion Gap 7 3 - 14 mmol/L    Glucose 95 70 - 99 mg/dL    Urea Nitrogen 10 7 - 30 mg/dL    Creatinine 0.96 0.52 - 1.04 mg/dL    GFR Estimate 57 (L) >60 mL/min/1.7m2    GFR Estimate If Black 69 >60 mL/min/1.7m2    Calcium 9.2 8.5 - 10.1 mg/dL   Blood Morphology Pathologist Review   Result Value Ref Range    Copath Report       Patient Name: ANAHY BROWNE  MR#: [id number]  Specimen #:   Collected: 1/14/2018  Received: 1/15/2018  Reported: 1/15/2018 14:04  Ordering Phy(s): SUZI REEVES  Additional Phy(s): CARIN WOLFE    For improved result formatting, select 'View Enhanced Report Format' under   Linked Documents section.    TEST(S):  Peripheral Blood  Morphology    FINAL DIAGNOSIS:  Peripheral morphology  - Neutropenia, mild  - Reactive lymphocytes  - Toxic neutrophils    COMMENT:  Mild neutropenia is nonspecific and may have many etiologies including   viral disorders, autoimmune disorders,  and the use of drugs or chemotherapeutic agents.  Electronically signed out by:    Jann Ndiaye M.D.    PERIPHERAL BLOOD DATA:  Red cells: The red cells are normal in number and morphology.    Polychromasia is not increased.  Rouleaux  formation is within normal limits.  Basophilic stippling is not a feature.    Platelets: The platelets are normal in number and morphology.    Leukocytes: There is a mild  neutropenia and the neutrophils have toxic   granulation.  There are reactive  lymphocytes.    CLINICAL LAB RESULTS:  Battery Order No. Lab Test Code Clinical Result Ref. Range Units Result   Date  Hemogram/Diff/PLT F17393 [facility] WBC Count L 2.3 4.0-11.0 10e9/L 1/14/2018   14:15       RBC Count 4.47 3.8-5.2 10e12/L 1/14/2018 14:15       Hemoglobin 13.1 11.7-15.7 g/dL 1/14/2018 14:15       Hematocrit 40.3 35.0-47.0 % 1/14/2018 14:15       MCV 90  fl 1/14/2018 14:15       MCH 29.3 26.5-33.0 pg 1/14/2018 14:15       MCHC 32.5 31.5-36.5 g/dL 1/14/2018 14:15       RDW 14.0 10.0-15.0 % 1/14/2018 14:15       Platelet Count 152 150-450 10e9/L 1/14/2018 14:15        SEE TEXT   1/14/2018 14:15       Text/Comments:  Automated Method       % Neutrophils 61.8  % 1/14/2018 14:15       % Lymphocytes 21.1  % 1/14/2018 14:15       % Monocytes 15.9  % 1/14/2018 14:15       % Eosinophils 0.4  % 1/14/2018 14:15       % Basophils 0.4  % 1/14/2018 14:15       % Immature Grans 0.4  % 1/14/2018 14:15        Nucleated RBCs 0 0 /100 1/14/2018 14:15       abs Neutrophils L 1.4 1.6-8.3 10e9/L 1/14/2018 14:15       abs Lymphocytes L 0.5 0.8-5.3 10e9/L 1/14/2018 14:15       abs Monocytes 0.4 0.0-1.3 10e9/L 1/14/2018 14:15       abs Eosinophils 0.0 0.0-0.7 10e9/L 1/14/2018 14:15       abs Basophils  0.0 0.0-0.2 10e9/L 1/14/2018 14:15       abs Imm Granulocytes 0.0 0-0.4 10e9/L 1/14/2018 14:15       abs NRBC 0.0   1/14/2018 14:15    Retic Q22620 [facility] Retic % 1.8 0.5-2.0 % 1/14/2018 14:41    CPT Codes:  A: 81844-KQOM    TESTING LAB LOCATION:  [address] [address]  [phone]    COLLECTION SITE:  Client:  [facility]  Location:  [facility] (B)     Reticulocyte Count   Result Value Ref Range    % Retic 1.8 0.5 - 2.0 %    Absolute Retic 80.5 25 - 95 10e9/L   Influenza A/B antigen   Result Value Ref Range    Influenza A/B Agn Specimen Nares     Influenza A Negative NEG^Negative    Influenza B Negative NEG^Negative       Assessments & Plan (with Medical Decision Making)   Discussed case with Dr Katelyn Gonzalez, ED Physician who recommended a peripheral smear and close follow up with PCP in the next week    I have reviewed the nursing notes.    I have reviewed the findings, diagnosis, plan and need for follow up with the patient.  ASSESSMENT / PLAN:  (D70.9) Neutropenia, unspecified type (H)  Comment: peripheral smear was pending at the end of the visit  Plan:  Discussed with patient, unknown etiology, close follow up with PCP encouraged    (J06.9) Upper respiratory tract infection, unspecified type  Comment: Influenza negative  Plan:  Patient verbally educated and given appropriate education sheets for each of their diagnoses and has no questions.   Symptomatic treatments such as those listed below are recommended as needed:   Take OTC motrin or tylenol as directed on the bottle as needed.   Cool mist humidifier at bedside    May try safely elevating HOB or sleeping in recliner   Take OTC cold medicine as directed on bottle - check ingredients, many are multi symptom and may contain tylenol or motrin   Frequent sips of non-caffeine fluids, rest, wash hands often   Sinus rinses such as a netti pot may help with sinus symptoms   Return to ED/UC if symptoms worsen  or concerns develop: shortness of breath,     chest pain, unable to control fever < 103 with medications, persistent vomiting, signs/symptoms of dehydration.   If symptoms persist follow up with PCP for re-evaluation.    Discharge Medication List as of 1/14/2018  2:38 PM          Final diagnoses:   Neutropenia, unspecified type (H)   Upper respiratory tract infection, unspecified type       1/14/2018   [facility]     Alma Magana NP  01/16/18 1459

## 2018-01-14 NOTE — ED AVS SNAPSHOT
[facility]    [address] [address]    Phone:  [phone]                                       Dinorah Lim   MRN: [id number]    Department:  [facility]   Date of Visit:  1/14/2018           After Visit Summary Signature Page     I have received my discharge instructions, and my questions have been answered. I have discussed any challenges I see with this plan with the nurse or doctor.    ..........................................................................................................................................  Patient/Patient Representative Signature      ..........................................................................................................................................  Patient Representative Print Name and Relationship to Patient    ..................................................               ................................................  Date                                            Time    ..........................................................................................................................................  Reviewed by Signature/Title    ...................................................              ..............................................  Date                                                            Time

## 2018-01-14 NOTE — DISCHARGE INSTRUCTIONS
Viral Upper Respiratory Illness (Adult)  You have a viral upper respiratory illness (URI), which is another term for the common cold. This illness is contagious during the first few days. It is spread through the air by coughing and sneezing. It may also be spread by direct contact (touching the sick person and then touching your own eyes, nose, or mouth). Frequent handwashing will decrease risk of spread. Most viral illnesses go away within 7 to 10 days with rest and simple home remedies. Sometimes the illness may last for several weeks. Antibiotics will not kill a virus, and they are generally not prescribed for this condition.    Home care    If symptoms are severe, rest at home for the first 2 to 3 days. When you resume activity, don't let yourself get too tired.    Avoid being exposed to cigarette smoke (yours or others ).    You may use acetaminophen or ibuprofen to control pain and fever, unless another medicine was prescribed. (Note: If you have chronic liver or kidney disease, have ever had a stomach ulcer or gastrointestinal bleeding, or are taking blood-thinning medicines, talk with your healthcare provider before using these medicines.) Aspirin should never be given to anyone under 18 years of age who is ill with a viral infection or fever. It may cause severe liver or brain damage.    Your appetite may be poor, so a light diet is fine. Avoid dehydration by drinking 6 to 8 glasses of fluids per day (water, soft drinks, juices, tea, or soup). Extra fluids will help loosen secretions in the nose and lungs.    Over-the-counter cold medicines will not shorten the length of time you re sick, but they may be helpful for the following symptoms: cough, sore throat, and nasal and sinus congestion. (Note: Do not use decongestants if you have high blood pressure.)  Follow-up care  Follow up with your healthcare provider, or as advised.  When to seek medical advice  Call your healthcare provider right away if any  of these occur:    Cough with lots of colored sputum (mucus)    Severe headache; face, neck, or ear pain    Difficulty swallowing due to throat pain    Fever of 100.4 F (38 C)  Call 911, or get immediate medical care  Call emergency services right away if any of these occur:    Chest pain, shortness of breath, wheezing, or difficulty breathing    Coughing up blood    Inability to swallow due to throat pain  Date Last Reviewed: 9/13/2015 2000-2017 [employer]. [address] [place] [address]. All rights reserved. This information is not intended as a substitute for professional medical care. Always follow your healthcare professional's instructions.          Neutropenia  White blood cells (WBCs) help protect the body from infection. Neutrophils are a type of white blood cell. Their main job is to help the body fight bacterial and fungal infections. Neutropenia occurs when there are fewer neutrophils in the blood than normal. It can range from mild to severe. This depends on the number of neutrophils in the blood. Severe neutropenia puts a person at higher risk for having more infections. Bacterial and fungal infections are most common. Your doctor can tell you more about your condition and whether it needs to be treated.    What causes neutropenia?  There are 2 main types of neutropenia: congenital and acquired. Each type has many causes:    Congenital neutropenia. These are the types that are present at birth. They are caused by certain rare genetic conditions, such as Kostmann s syndrome. Most often the neurtropenia is mild and normal for certain ethnic groups.    Acquired neutropenia. This type is not present at birth. Causes include:    Certain medicines, such as antibiotics and chemotherapy drugs    Certain autoimmune conditions    Certain viral, bacterial, or parasitic infections    Too little folate or vitamin B12 in the diet    Underlying bone marrow problem, such as leukemia or  myelodysplastic syndrome (MDS)    Other causes  How is neutropenia diagnosed?  Your healthcare provider may check for neutropenia if you have frequent infections. Your provider may also check for neutropenia if you re having certain treatments, such as chemotherapy, which is known to cause a lower neutrophil count. Tests will be done to confirm the problem. These may include:    A complete blood cell count (CBC). This test measures the amounts of the different types of cells in your blood. This includes the WBCs. The WBC count can be broken down further to find the number of neutrophils and immature neutrophils (bands) in your blood. This is called an absolute neutrophil count (ANC).    A blood smear. This test checks for the different types of blood cells in your blood and how they appear. A sample of your blood is spread on a glass slide and viewed under a microscope. A stain is used so the blood cells can be seen.    A bone marrow aspiration and biopsy. This test checks for problems with how your bone marrow makes blood cells. A needle is used to remove a sample of the bone marrow in your hipbone. The sample is then sent to a lab to be tested for problems.  How is neutropenia treated?    If there is a clear cause of neutropenia, it is addressed. For instance, if a medicine is the cause, it may be stopped or changed.    For mild cases, often no treatment is needed.    For moderate to severe cases, treatment is likely needed. This may include:    G-CSF (granulocyte-colony stimulating factor). This is a special type of protein. It helps promote the growth and activity of neutrophils. G-CSF is given by injection.    Bone marrow transplant. This treatment replaces diseased bone marrow cells with healthy cells from a matched donor. This treatment is only done in specific severe cases.  What is the long-term outcome of neutropenia?  The outcome of neutropenia varies for each person. For some people, neutropenia may  resolve after a few weeks or months. For other people, it may be long-lasting. In these cases, ongoing care and treatment is needed. Your healthcare provider will talk to you more about what to expect from your condition.  When to call your healthcare provider  Call your healthcare provider right away if you have any of the following:    Fever of 100.4 F (38 C) or higher (call 911 or 24-hour urgent care -- this is especially important if you have severe neutropenia, which puts you at higher risk for life-threatening infection)    Cold sweat or chills    Chest pain or trouble breathing    Sore throat    Extreme tiredness or fatigue    Nausea and vomiting    Redness, warmth, or drainage from any open cuts or wounds    Pain or burning with urination; frequent urination    Pain, burning, or bleeding in the rectum    Severe constipation or diarrhea    Bloody stool or urine    How can I prevent infections?  With neutropenia, you need to take extra care to protect yourself from infection. Be sure to:    Wash your hands often, especially before eating and after using the bathroom. Use warm water and soap. Or use a hand gel that contains at least 60% alcohol.    Avoid close contact with others who may be ill.    Clean items you use often with disinfectant wipes. This includes phones and computer keyboards.    Avoid touching your eyes, nose, and mouth, especially if your hands are not clean.    Practice good oral hygiene. Use a soft toothbrush. Also, brush and floss your teeth gently.    Always wipe from front to back after a bowel movement.    Keep cuts and scrapes clean and covered until they heal.    Avoid sharing items such as towels, toothbrushes, razors, clothing, and sports equipment.    Store and handle foods safely to prevent food-borne illness.    Ask your healthcare provider if you need to take antibiotics before and after having any dental or medical procedures.    Ask your healthcare provider if you need to wear  a special mask near construction sites or farm areas.  Date Last Reviewed: 12/1/2016 2000-2017 [employer]. [address] [facility], [place] [address]. All rights reserved. This information is not intended as a substitute for professional medical care. Always follow your healthcare professional's instructions.

## 2018-01-14 NOTE — ED AVS SNAPSHOT
[facility]    [address] [address]    Phone:  [phone]                                       Dinorah Lim   MRN: [id number]    Department:  [facility]   Date of Visit:  1/14/2018           Patient Information     Date Of Birth          1940        Your diagnoses for this visit were:     Neutropenia, unspecified type (H)     Upper respiratory tract infection, unspecified type        You were seen by Alma Magana NP.      Follow-up Information     Follow up with [facility].    Specialty:  EMERGENCY MEDICINE    Why:  As needed, If symptoms worsen, or concerns develop    Contact information:    [address] [place] [address] [phone]    Additional information:    From [facility]: Take [address]. Turn left at [address]. Turn left at the first stoplight on [address]. At the first stop sign, take a right onto [address]. Take a left into the parking lot and continue through until you reach the North enterance of the building.       From [place]: Take [address]. Take the [address] ramp towards [place]. Turn left onto [address]. Take a slight right onto [address]. Turn left at the first stoplight on [address]. At the first stop sign, take a right onto [address]. Take a left into the parking lot and continue through until you reach the North enterance of the building.       From [place]: Take [address]. Take a right at [address]. At the first stop sign, take a right onto [address]. Take a left into the parking lot and continue through until you reach the North enterance of the building.         Follow up with Magaly Sosa MD. Call on 1/15/2018.    Specialty:  Family Practice    Why:  to schedule an appointment for follow up this week    Contact information:    [facility] [place]  [address]  [place]  [address]  [phone]          Discharge Instructions         Viral Upper Respiratory Illness (Adult)  You have a viral upper respiratory illness (URI), which is another term for the common cold. This illness is contagious during the first few days. It is spread through the air by coughing and sneezing. It may also be spread by direct contact (touching the sick person and then touching your own eyes, nose, or mouth). Frequent handwashing will decrease risk of spread. Most viral illnesses go away within 7 to 10 days with rest and simple home remedies. Sometimes the illness may last for several weeks. Antibiotics will not kill a virus, and they are generally not prescribed for this condition.    Home care    If symptoms are severe, rest at home for the first 2 to 3 days. When you resume activity, don't let yourself get too tired.    Avoid being exposed to cigarette smoke (yours or others ).    You may use acetaminophen or ibuprofen to control pain and fever, unless another medicine was prescribed. (Note: If you have chronic liver or kidney disease, have ever had a stomach ulcer or gastrointestinal bleeding, or are taking blood-thinning medicines, talk with your healthcare provider before using these medicines.) Aspirin should never be given to anyone under 18 years of age who is ill with a viral infection or fever. It may cause severe liver or brain damage.    Your appetite may be poor, so a light diet is fine. Avoid dehydration by drinking 6 to 8 glasses of fluids per day (water, soft drinks, juices, tea, or soup). Extra fluids will help loosen secretions in the nose and lungs.    Over-the-counter cold medicines will not shorten the length of time you re sick, but they may be helpful for the following symptoms: cough, sore throat, and nasal and sinus congestion. (Note: Do not use decongestants if you have high blood pressure.)  Follow-up care  Follow up with your healthcare provider, or as advised.  When to seek  medical advice  Call your healthcare provider right away if any of these occur:    Cough with lots of colored sputum (mucus)    Severe headache; face, neck, or ear pain    Difficulty swallowing due to throat pain    Fever of 100.4 F (38 C)  Call 911, or get immediate medical care  Call emergency services right away if any of these occur:    Chest pain, shortness of breath, wheezing, or difficulty breathing    Coughing up blood    Inability to swallow due to throat pain  Date Last Reviewed: 9/13/2015 2000-2017 [employer]. [address] [place] [address]. All rights reserved. This information is not intended as a substitute for professional medical care. Always follow your healthcare professional's instructions.          Neutropenia  White blood cells (WBCs) help protect the body from infection. Neutrophils are a type of white blood cell. Their main job is to help the body fight bacterial and fungal infections. Neutropenia occurs when there are fewer neutrophils in the blood than normal. It can range from mild to severe. This depends on the number of neutrophils in the blood. Severe neutropenia puts a person at higher risk for having more infections. Bacterial and fungal infections are most common. Your doctor can tell you more about your condition and whether it needs to be treated.    What causes neutropenia?  There are 2 main types of neutropenia: congenital and acquired. Each type has many causes:    Congenital neutropenia. These are the types that are present at birth. They are caused by certain rare genetic conditions, such as Kostmann s syndrome. Most often the neurtropenia is mild and normal for certain ethnic groups.    Acquired neutropenia. This type is not present at birth. Causes include:    Certain medicines, such as antibiotics and chemotherapy drugs    Certain autoimmune conditions    Certain viral, bacterial, or parasitic infections    Too little folate or vitamin B12 in the  diet    Underlying bone marrow problem, such as leukemia or myelodysplastic syndrome (MDS)    Other causes  How is neutropenia diagnosed?  Your healthcare provider may check for neutropenia if you have frequent infections. Your provider may also check for neutropenia if you re having certain treatments, such as chemotherapy, which is known to cause a lower neutrophil count. Tests will be done to confirm the problem. These may include:    A complete blood cell count (CBC). This test measures the amounts of the different types of cells in your blood. This includes the WBCs. The WBC count can be broken down further to find the number of neutrophils and immature neutrophils (bands) in your blood. This is called an absolute neutrophil count (ANC).    A blood smear. This test checks for the different types of blood cells in your blood and how they appear. A sample of your blood is spread on a glass slide and viewed under a microscope. A stain is used so the blood cells can be seen.    A bone marrow aspiration and biopsy. This test checks for problems with how your bone marrow makes blood cells. A needle is used to remove a sample of the bone marrow in your hipbone. The sample is then sent to a lab to be tested for problems.  How is neutropenia treated?    If there is a clear cause of neutropenia, it is addressed. For instance, if a medicine is the cause, it may be stopped or changed.    For mild cases, often no treatment is needed.    For moderate to severe cases, treatment is likely needed. This may include:    G-CSF (granulocyte-colony stimulating factor). This is a special type of protein. It helps promote the growth and activity of neutrophils. G-CSF is given by injection.    Bone marrow transplant. This treatment replaces diseased bone marrow cells with healthy cells from a matched donor. This treatment is only done in specific severe cases.  What is the long-term outcome of neutropenia?  The outcome of neutropenia  varies for each person. For some people, neutropenia may resolve after a few weeks or months. For other people, it may be long-lasting. In these cases, ongoing care and treatment is needed. Your healthcare provider will talk to you more about what to expect from your condition.  When to call your healthcare provider  Call your healthcare provider right away if you have any of the following:    Fever of 100.4 F (38 C) or higher (call 911 or 24-hour urgent care -- this is especially important if you have severe neutropenia, which puts you at higher risk for life-threatening infection)    Cold sweat or chills    Chest pain or trouble breathing    Sore throat    Extreme tiredness or fatigue    Nausea and vomiting    Redness, warmth, or drainage from any open cuts or wounds    Pain or burning with urination; frequent urination    Pain, burning, or bleeding in the rectum    Severe constipation or diarrhea    Bloody stool or urine    How can I prevent infections?  With neutropenia, you need to take extra care to protect yourself from infection. Be sure to:    Wash your hands often, especially before eating and after using the bathroom. Use warm water and soap. Or use a hand gel that contains at least 60% alcohol.    Avoid close contact with others who may be ill.    Clean items you use often with disinfectant wipes. This includes phones and computer keyboards.    Avoid touching your eyes, nose, and mouth, especially if your hands are not clean.    Practice good oral hygiene. Use a soft toothbrush. Also, brush and floss your teeth gently.    Always wipe from front to back after a bowel movement.    Keep cuts and scrapes clean and covered until they heal.    Avoid sharing items such as towels, toothbrushes, razors, clothing, and sports equipment.    Store and handle foods safely to prevent food-borne illness.    Ask your healthcare provider if you need to take antibiotics before and after having any dental or medical  procedures.    Ask your healthcare provider if you need to wear a special mask near construction sites or farm areas.  Date Last Reviewed: 12/1/2016 2000-2017 [employer]. [address] [place] [address], [place] [address]. All rights reserved. This information is not intended as a substitute for professional medical care. Always follow your healthcare professional's instructions.             Review of your medicines      Our records show that you are taking the medicines listed below. If these are incorrect, please call your family doctor or clinic.        Dose / Directions Last dose taken    albuterol (2.5 MG/3ML) 0.083% neb solution   Dose:  1 vial   Quantity:  1 Box        Take 1 vial (2.5 mg) by nebulization every 4 hours as needed for shortness of breath / dyspnea or wheezing   Refills:  0        BENADRYL ALLERGY PO   Dose:  25 mg   Indication:  takes with simvastatin        Take 25 mg by mouth nightly as needed   Refills:  0        clobetasol 0.05 % ointment   Commonly known as:  TEMOVATE   Quantity:  60 g        Apply at bedtime to sites of itch   Refills:  3        clonazePAM 1 MG tablet   Commonly known as:  klonoPIN   Quantity:  45 tablet        TAKE ONE-FOURTH TO ONE-HALF TABLET BY MOUTH EVERY 8 HOURS AS NEEDED   Refills:  0        FISH OIL   Dose:  1 capsule        Take 1 capsule by mouth daily   Refills:  0        Garlic 10 MG Caps   Dose:  1 capsule        Take 1 capsule by mouth as needed   Refills:  0        HYDROcodone-acetaminophen 5-325 MG per tablet   Commonly known as:  NORCO   Quantity:  60 tablet        TAKE ONE TABLET BY MOUTH EVERY 4 TO 6 HOURS AS NEEDED FOR PAIN   Refills:  0        PARoxetine 40 MG tablet   Commonly known as:  PAXIL   Quantity:  90 tablet        TAKE ONE TABLET BY MOUTH ONCE DAILY   Refills:  2        predniSONE 20 MG tablet   Commonly known as:  DELTASONE   Quantity:  10 tablet        Take two tablets (= 40mg) each day for 5 (five) days   Refills:  0         "RELAFEN PO   Dose:  750 mg        Take 750 mg by mouth   Refills:  0        simvastatin 20 MG tablet   Commonly known as:  ZOCOR   Quantity:  90 tablet        TAKE ONE TABLET BY MOUTH AT BEDTIME   Refills:  2        VITAMIN D (CHOLECALCIFEROL) PO   Dose:  2000 Units        Take 2,000 Units by mouth daily   Refills:  0        VITAMIN E   Dose:  1 capsule        Take 1 capsule by mouth daily   Refills:  0                Procedures and tests performed during your visit     Basic metabolic panel    CBC with platelets differential    Chest XR,  PA & LAT    Influenza A/B antigen      Orders Needing Specimen Collection     None      Pending Results     Date and Time Order Name Status Description    2018 1400 Chest XR,  PA & LAT In process             Pending Culture Results     No orders found from 2018 to 1/15/2018.            Thank you for choosing [facility]       Thank you for choosing [facility] for your care. Our goal is always to provide you with excellent care. Hearing back from our patients is one way we can continue to improve our services. Please take a few minutes to complete the written survey that you may receive in the mail after you visit with us. Thank you!        Oppa Information     Oppa lets you send messages to your doctor, view your test results, renew your prescriptions, schedule appointments and more. To sign up, go to [email or web address] . Click on \"Log in\" on the left side of the screen, which will take you to the Welcome page. Then click on \"Sign up Now\" on the right side of the page.     You will be asked to enter the access code listed below, as well as some personal information. Please follow the directions to create your username and password.     Your access code is: [email or web address]  Expires: 2018  2:37 PM     Your access code will  in 90 days. If you need help or a new code, please call your [facility] clinic or [phone].        Care EveryWhere ID     This is your " Care EveryWhere ID. This could be used by other organizations to access your [facility] medical records  [id number]        Equal Access to Services     HAKEEM SOLANO : Jorge Talbot, joaquim overton, abiodun ness, olga caballero. So [facility] [place] [phone].    ATENCIÓN: Si habla español, tiene a lazaro disposición servicios gratuitos de asistencia lingüística. Llame al [phone].    We comply with applicable federal civil rights laws and [place] laws. We do not discriminate on the basis of race, color, national origin, age, disability, sex, sexual orientation, or gender identity.            After Visit Summary       This is your record. Keep this with you and show to your community pharmacist(s) and doctor(s) at your next visit.

## 2018-01-15 ENCOUNTER — OFFICE VISIT (OUTPATIENT)
Dept: FAMILY MEDICINE | Facility: OTHER | Age: 78
End: 2018-01-15
Attending: FAMILY MEDICINE
Payer: MEDICARE

## 2018-01-15 VITALS
OXYGEN SATURATION: 97 % | BODY MASS INDEX: 39.09 KG/M2 | TEMPERATURE: 98.4 F | RESPIRATION RATE: 18 BRPM | HEIGHT: 64 IN | HEART RATE: 76 BPM | SYSTOLIC BLOOD PRESSURE: 128 MMHG | WEIGHT: 229 LBS | DIASTOLIC BLOOD PRESSURE: 74 MMHG

## 2018-01-15 DIAGNOSIS — B97.89 VIRAL RESPIRATORY ILLNESS: Primary | ICD-10-CM

## 2018-01-15 DIAGNOSIS — D70.3 NEUTROPENIA ASSOCIATED WITH INFECTION (H): ICD-10-CM

## 2018-01-15 DIAGNOSIS — J98.8 VIRAL RESPIRATORY ILLNESS: Primary | ICD-10-CM

## 2018-01-15 LAB
BASOPHILS # BLD AUTO: 0 10E9/L (ref 0–0.2)
BASOPHILS NFR BLD AUTO: 0 %
COPATH REPORT: NORMAL
DIFFERENTIAL METHOD BLD: ABNORMAL
EOSINOPHIL # BLD AUTO: 0 10E9/L (ref 0–0.7)
EOSINOPHIL NFR BLD AUTO: 0.9 %
ERYTHROCYTE [DISTWIDTH] IN BLOOD BY AUTOMATED COUNT: 14.1 % (ref 10–15)
HCT VFR BLD AUTO: 43.1 % (ref 35–47)
HGB BLD-MCNC: 13.7 G/DL (ref 11.7–15.7)
IMM GRANULOCYTES # BLD: 0 10E9/L (ref 0–0.4)
IMM GRANULOCYTES NFR BLD: 0.4 %
LYMPHOCYTES # BLD AUTO: 0.9 10E9/L (ref 0.8–5.3)
LYMPHOCYTES NFR BLD AUTO: 41.2 %
MCH RBC QN AUTO: 29 PG (ref 26.5–33)
MCHC RBC AUTO-ENTMCNC: 31.8 G/DL (ref 31.5–36.5)
MCV RBC AUTO: 91 FL (ref 78–100)
MONOCYTES # BLD AUTO: 0.4 10E9/L (ref 0–1.3)
MONOCYTES NFR BLD AUTO: 17.3 %
NEUTROPHILS # BLD AUTO: 0.9 10E9/L (ref 1.6–8.3)
NEUTROPHILS NFR BLD AUTO: 40.2 %
NRBC # BLD AUTO: 0 10*3/UL
NRBC BLD AUTO-RTO: 0 /100
PLATELET # BLD AUTO: 147 10E9/L (ref 150–450)
RBC # BLD AUTO: 4.72 10E12/L (ref 3.8–5.2)
WBC # BLD AUTO: 2.3 10E9/L (ref 4–11)

## 2018-01-15 PROCEDURE — 36415 COLL VENOUS BLD VENIPUNCTURE: CPT | Mod: ZL | Performed by: FAMILY MEDICINE

## 2018-01-15 PROCEDURE — G0463 HOSPITAL OUTPT CLINIC VISIT: HCPCS

## 2018-01-15 PROCEDURE — 99213 OFFICE O/P EST LOW 20 MIN: CPT | Performed by: FAMILY MEDICINE

## 2018-01-15 PROCEDURE — 85025 COMPLETE CBC W/AUTO DIFF WBC: CPT | Mod: ZL | Performed by: FAMILY MEDICINE

## 2018-01-15 ASSESSMENT — ANXIETY QUESTIONNAIRES
IF YOU CHECKED OFF ANY PROBLEMS ON THIS QUESTIONNAIRE, HOW DIFFICULT HAVE THESE PROBLEMS MADE IT FOR YOU TO DO YOUR WORK, TAKE CARE OF THINGS AT HOME, OR GET ALONG WITH OTHER PEOPLE: NOT DIFFICULT AT ALL
7. FEELING AFRAID AS IF SOMETHING AWFUL MIGHT HAPPEN: SEVERAL DAYS
2. NOT BEING ABLE TO STOP OR CONTROL WORRYING: SEVERAL DAYS
3. WORRYING TOO MUCH ABOUT DIFFERENT THINGS: SEVERAL DAYS
4. TROUBLE RELAXING: NOT AT ALL
1. FEELING NERVOUS, ANXIOUS, OR ON EDGE: SEVERAL DAYS
6. BECOMING EASILY ANNOYED OR IRRITABLE: SEVERAL DAYS
5. BEING SO RESTLESS THAT IT IS HARD TO SIT STILL: NOT AT ALL
GAD7 TOTAL SCORE: 5

## 2018-01-15 ASSESSMENT — PAIN SCALES - GENERAL: PAINLEVEL: NO PAIN (0)

## 2018-01-15 ASSESSMENT — PATIENT HEALTH QUESTIONNAIRE - PHQ9: SUM OF ALL RESPONSES TO PHQ QUESTIONS 1-9: 3

## 2018-01-15 NOTE — MR AVS SNAPSHOT
"              After Visit Summary   1/15/2018    Dinorah Lim    MRN: [id number]           Patient Information     Date Of Birth          1940        Visit Information        Provider Department      1/15/2018 2:30 PM Magaly Sosa MD [facility]        Today's Diagnoses     Viral respiratory illness    -  1    Neutropenia associated with infection (H)           Follow-ups after your visit        Who to contact     If you have questions or need follow up information about today's clinic visit or your schedule please contact [facility] directly at [phone].  Normal or non-critical lab and imaging results will be communicated to you by [employer], letter or phone within 4 business days after the clinic has received the results. If you do not hear from us within 7 days, please contact the clinic through [employer] or phone. If you have a critical or abnormal lab result, we will notify you by phone as soon as possible.  Submit refill requests through [employer] or call your pharmacy and they will forward the refill request to us. Please allow 3 business days for your refill to be completed.          Additional Information About Your Visit        MyChart Information     [employer] lets you send messages to your doctor, view your test results, renew your prescriptions, schedule appointments and more. To sign up, go to [email or web address] . Click on \"Log in\" on the left side of the screen, which will take you to the Welcome page. Then click on \"Sign up Now\" on the right side of the page.     You will be asked to enter the access code listed below, as well as some personal information. Please follow the directions to create your username and password.     Your access code is: [email or web address]  Expires: 2018  2:37 PM     Your access code will  in 90 days. If you need help or a new code, please call your [facility] or [phone].        Care EveryWhere ID     This is your Care " "EveryWhere ID. This could be used by other organizations to access your [facility] medical records  [id number]        Your Vitals Were     Pulse Temperature Respirations Height Pulse Oximetry BMI (Body Mass Index)    76 98.4  F (36.9  C) (Tympanic) 18 5' 4\" (1.626 m) 97% 39.31 kg/m2       Blood Pressure from Last 3 Encounters:   01/15/18 128/74   01/14/18 146/69   12/03/17 162/69    Weight from Last 3 Encounters:   01/15/18 229 lb (103.9 kg)   11/29/17 229 lb (103.9 kg)   11/28/17 229 lb (103.9 kg)              We Performed the Following     CBC with platelets and differential        Primary Care Provider Office Phone # Fax #    Magaly Sosa [phone]       [facility] [place] [address]  [place] [address]        Equal Access to Services     [facility] : Hadii lourdes ku hadasho Sojonnaali, waaxda luqadaha, qaybta kaalmada adeegyada, olga juan . So [facility] [phone].    ATENCIÓN: Si habla español, tiene a lazaro disposición servicios gratuitos de asistencia lingüística. Llame al [phone].    We comply with applicable federal civil rights laws and [place] laws. We do not discriminate on the basis of race, color, national origin, age, disability, sex, sexual orientation, or gender identity.            Thank you!     Thank you for choosing [facility]  for your care. Our goal is always to provide you with excellent care. Hearing back from our patients is one way we can continue to improve our services. Please take a few minutes to complete the written survey that you may receive in the mail after your visit with us. Thank you!             Your Updated Medication List - Protect others around you: Learn how to safely use, store and throw away your medicines at www.disposemymeds.org.          This list is accurate as of: 1/15/18  2:59 PM.  Always use your most recent med list.                   Brand Name Dispense Instructions for use Diagnosis    " albuterol (2.5 MG/3ML) 0.083% neb solution     1 Box    Take 1 vial (2.5 mg) by nebulization every 4 hours as needed for shortness of breath / dyspnea or wheezing    Acute bronchospasm       BENADRYL ALLERGY PO      Take 25 mg by mouth nightly as needed    Hives       clobetasol 0.05 % ointment    TEMOVATE    60 g    Apply at bedtime to sites of itch    Lichen simplex chronicus       clonazePAM 1 MG tablet    klonoPIN    45 tablet    TAKE ONE-FOURTH TO ONE-HALF TABLET BY MOUTH EVERY 8 HOURS AS NEEDED    Anxiety       FISH OIL      Take 1 capsule by mouth daily        Garlic 10 MG Caps      Take 1 capsule by mouth as needed        HYDROcodone-acetaminophen 5-325 MG per tablet    NORCO    60 tablet    TAKE ONE TABLET BY MOUTH EVERY 4 TO 6 HOURS AS NEEDED FOR PAIN    Primary osteoarthritis of right hip       PARoxetine 40 MG tablet    PAXIL    90 tablet    TAKE ONE TABLET BY MOUTH ONCE DAILY    Anxiety       predniSONE 20 MG tablet    DELTASONE    10 tablet    Take two tablets (= 40mg) each day for 5 (five) days        RELAFEN PO      Take 750 mg by mouth        simvastatin 20 MG tablet    ZOCOR    90 tablet    TAKE ONE TABLET BY MOUTH AT BEDTIME    Hyperlipidemia LDL goal <100       VITAMIN D (CHOLECALCIFEROL) PO      Take 2,000 Units by mouth daily        VITAMIN E      Take 1 capsule by mouth daily

## 2018-01-15 NOTE — NURSING NOTE
"Chief Complaint   Patient presents with     Urgent Care     was in  yesterday for a viral URI and low WBC count.        Initial /74  Pulse 76  Temp 98.4  F (36.9  C) (Tympanic)  Resp 18  Ht 5' 4\" (1.626 m)  Wt 229 lb (103.9 kg)  SpO2 97%  BMI 39.31 kg/m2 Estimated body mass index is 39.31 kg/(m^2) as calculated from the following:    Height as of this encounter: 5' 4\" (1.626 m).    Weight as of this encounter: 229 lb (103.9 kg).  Medication Reconciliation: complete   Aditi Saleh      "

## 2018-01-15 NOTE — PROGRESS NOTES
[facility]    Dinorah Lim, 77 year old, female presents with   Chief Complaint   Patient presents with     Urgent Care     was in UC yesterday for a viral URI and low WBC count. She tested negative for influenza and had a negative chest x ray but her white count was 2.3 with mildly low absolute neutrophil count and low lymphocyte count. Reticulocyte count was normal and peripheral smear was notable for toxic neutrophils and low lymphocyte count. She is feeling better today though continues with a dry cough.        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    Anterior Segment V 4 cm nodule abutting liver surface by US 1/4/2015      Obesity, Class II, BMI 35-39.9 1/4/2015    BMI 35.9 with comorbidities = MORBID obesity     Osteoarthritis of right hip 10/07/2004     Osteoarthrosis 06/14/2012     Otitis externa 10/04/2011     Prediabetes 08/01/2011       PAST SURGICAL HISTORY:  Past Surgical History:   Procedure Laterality Date     CLOSED REDUCTION WRIST Right 1952 x 2    long arm cast (same time as elbow fx)     CLOSED RX ELBOW DISLOCATION Right 1952    CR/long cast of fracture     HC INJ EPIDURAL LUMBAR/SACRAL W/WO CONTRAST Bilateral 5/2013    facet injections; prev 2012     LAPAROSCOPIC CHOLECYSTECTOMY N/A 1/4/2015    Procedure: LAPAROSCOPIC CHOLECYSTECTOMY;  Surgeon: Brittney العلي MD;  Location: [facility]     TONSILLECTOMY         SOCIAL HISTORY  Social  History     Social History     Marital status: Single     Spouse name: N/A     Number of children: 4     Years of education: 12     Occupational History     personal care attendant      Social History Main Topics     Smoking status: Never Smoker     Smokeless tobacco: Never Used     Alcohol use No     Drug use: No     Sexual activity: No     Other Topics Concern     Blood Transfusions Yes     Caffeine Concern Yes     >32 oz soda/day     Sleep Concern Yes     insomnia     Parent/Sibling W/ Cabg, Mi Or Angioplasty Before 65f 55m? No     Social History Narrative       MEDICATIONS:  Prior to Admission medications    Medication Sig Start Date End Date Taking? Authorizing Provider   clonazePAM (KLONOPIN) 1 MG tablet TAKE ONE-FOURTH TO ONE-HALF TABLET BY MOUTH EVERY 8 HOURS AS NEEDED 1/5/18  Yes Ania Deleon PA   simvastatin (ZOCOR) 20 MG tablet TAKE ONE TABLET BY MOUTH AT BEDTIME 1/3/18  Yes Ania Deleon PA   Nabumetone (RELAFEN PO) Take 750 mg by mouth   Yes Reported, Patient   predniSONE (DELTASONE) 20 MG tablet Take two tablets (= 40mg) each day for 5 (five) days 12/3/17  Yes Kenia Goodrich NP   HYDROcodone-acetaminophen (NORCO) 5-325 MG per tablet TAKE ONE TABLET BY MOUTH EVERY 4 TO 6 HOURS AS NEEDED FOR PAIN 11/28/17  Yes Magaly Sosa MD   PARoxetine (PAXIL) 40 MG tablet TAKE ONE TABLET BY MOUTH ONCE DAILY 8/2/17  Yes Magaly Sosa MD   clobetasol (TEMOVATE) 0.05 % ointment Apply at bedtime to sites of itch 7/10/17  Yes MALINI Vo MD   albuterol (2.5 MG/3ML) 0.083% neb solution Take 1 vial (2.5 mg) by nebulization every 4 hours as needed for shortness of breath / dyspnea or wheezing 2/15/17  Yes Magaly Sosa MD   VITAMIN D, CHOLECALCIFEROL, PO Take 2,000 Units by mouth daily   Yes Reported, Patient   Garlic 10 MG CAPS Take 1 capsule by mouth as needed   Yes Reported, Patient   DiphenhydrAMINE HCl (BENADRYL ALLERGY PO) Take 25 mg by mouth nightly as needed    Yes Reported, Patient  "  VITAMIN E Take 1 capsule by mouth daily    Yes Reported, Patient   FISH OIL Take 1 capsule by mouth daily    Yes Reported, Patient       ALLERGIES:     Allergies   Allergen Reactions     Chocolate Hives     Lemon Flavor Hives     Lime [Calcium Oxysulfide] Hives     Orange Fruit [Citrus] Hives     Strawberry Hives     Adhesive Tape      Band-aids     Codeine Hives     Patient can tolerate oxycodone & dilaudid     Erythromycin Hives     ERYTHROMYCIN BASE     Grapefruit [Extra Strength Grapefruit]      GRAPEFRUIT     Penicillins Hives     Sulfa Drugs      SULFONAMIDE ANTIBIOTICS      Tomato        ROS:  CONSTITUTIONAL:NEGATIVE for fever, chills, change in weight  RESP:POSITIVE for cough-non productive  CV: NEGATIVE for chest pain, palpitations or peripheral edema  NEURO: NEGATIVE for weakness, dizziness or paresthesias  PSYCHIATRIC: NEGATIVE for changes in mood or affect    EXAM:  /74  Pulse 76  Temp 98.4  F (36.9  C) (Tympanic)  Resp 18  Ht 5' 4\" (1.626 m)  Wt 229 lb (103.9 kg)  SpO2 97%  BMI 39.31 kg/m2 Body mass index is 39.31 kg/(m^2).   GENERAL APPEARANCE: healthy, alert and no distress  RESP: lungs clear to auscultation - no rales, rhonchi or wheezes  CV: regular rates and rhythm, normal S1 S2, no S3 or S4 and no murmur, click or rub  NEURO: Normal strength and tone, mentation intact and speech normal  PSYCH: mentation appears normal and affect normal/bright    LABS AND IMAGING:     Results for orders placed or performed during the hospital encounter of 01/14/18   Chest XR,  PA & LAT    Narrative    PROCEDURE:  XR CHEST 2 VW    HISTORY:  cough, fever; .     COMPARISON:  March 2017    FINDINGS:   The cardiac silhouette is normal in size. The pulmonary vasculature is  normal. There Is some linear atelectasis or scarring seen at the left  lung base. The right lung is clear. No pleural effusion or  pneumothorax. There is a moderate size hiatal hernia.      Impression    IMPRESSION:  Hiatal hernia. No " pulmonary infiltrates are seen.      RAFIA SIEGEL MD   CBC with platelets differential   Result Value Ref Range    WBC 2.3 (L) 4.0 - 11.0 10e9/L    RBC Count 4.47 3.8 - 5.2 10e12/L    Hemoglobin 13.1 11.7 - 15.7 g/dL    Hematocrit 40.3 35.0 - 47.0 %    MCV 90 78 - 100 fl    MCH 29.3 26.5 - 33.0 pg    MCHC 32.5 31.5 - 36.5 g/dL    RDW 14.0 10.0 - 15.0 %    Platelet Count 152 150 - 450 10e9/L    Diff Method Automated Method     % Neutrophils 61.8 %    % Lymphocytes 21.1 %    % Monocytes 15.9 %    % Eosinophils 0.4 %    % Basophils 0.4 %    % Immature Granulocytes 0.4 %    Nucleated RBCs 0 0 /100    Absolute Neutrophil 1.4 (L) 1.6 - 8.3 10e9/L    Absolute Lymphocytes 0.5 (L) 0.8 - 5.3 10e9/L    Absolute Monocytes 0.4 0.0 - 1.3 10e9/L    Absolute Eosinophils 0.0 0.0 - 0.7 10e9/L    Absolute Basophils 0.0 0.0 - 0.2 10e9/L    Abs Immature Granulocytes 0.0 0 - 0.4 10e9/L    Absolute Nucleated RBC 0.0    Basic metabolic panel   Result Value Ref Range    Sodium 139 133 - 144 mmol/L    Potassium 4.3 3.4 - 5.3 mmol/L    Chloride 106 94 - 109 mmol/L    Carbon Dioxide 26 20 - 32 mmol/L    Anion Gap 7 3 - 14 mmol/L    Glucose 95 70 - 99 mg/dL    Urea Nitrogen 10 7 - 30 mg/dL    Creatinine 0.96 0.52 - 1.04 mg/dL    GFR Estimate 57 (L) >60 mL/min/1.7m2    GFR Estimate If Black 69 >60 mL/min/1.7m2    Calcium 9.2 8.5 - 10.1 mg/dL   Blood Morphology Pathologist Review   Result Value Ref Range    Copath Report       Patient Name: ANAHY BROWNE  MR#: [id number]  Specimen #:   Collected: 1/14/2018  Received: 1/15/2018  Reported: 1/15/2018 14:04  Ordering Phy(s): SUZI E SALMINEN  Additional Phy(s): CARIN WOLFE    For improved result formatting, select 'View Enhanced Report Format' under   Linked Documents section.    TEST(S):  Peripheral Blood Morphology    FINAL DIAGNOSIS:  Peripheral morphology  - Neutropenia, mild  - Reactive lymphocytes  - Toxic neutrophils    COMMENT:  Mild neutropenia is nonspecific and may have  many etiologies including   viral disorders, autoimmune disorders,  and the use of drugs or chemotherapeutic agents.  Electronically signed out by:    Jann Ndiaye M.D.    PERIPHERAL BLOOD DATA:  Red cells: The red cells are normal in number and morphology.    Polychromasia is not increased.  Rouleaux  formation is within normal limits.  Basophilic stippling is not a feature.    Platelets: The platelets are normal in number and morphology.    Leukocytes: There is a mild  neutropenia and the neutrophils have toxic   granulation.  There are reactive  lymphocytes.    CLINICAL LAB RESULTS:  Battery Order No. Lab Test Code Clinical Result Ref. Range Units Result   Date  Hemogram/Diff/PLT I95295 [facility] WBC Count L 2.3 4.0-11.0 10e9/L 1/14/2018   14:15       RBC Count 4.47 3.8-5.2 10e12/L 1/14/2018 14:15       Hemoglobin 13.1 11.7-15.7 g/dL 1/14/2018 14:15       Hematocrit 40.3 35.0-47.0 % 1/14/2018 14:15       MCV 90  fl 1/14/2018 14:15       MCH 29.3 26.5-33.0 pg 1/14/2018 14:15       MCHC 32.5 31.5-36.5 g/dL 1/14/2018 14:15       RDW 14.0 10.0-15.0 % 1/14/2018 14:15       Platelet Count 152 150-450 10e9/L 1/14/2018 14:15        SEE TEXT   1/14/2018 14:15       Text/Comments:  Automated Method       % Neutrophils 61.8  % 1/14/2018 14:15       % Lymphocytes 21.1  % 1/14/2018 14:15       % Monocytes 15.9  % 1/14/2018 14:15       % Eosinophils 0.4  % 1/14/2018 14:15       % Basophils 0.4  % 1/14/2018 14:15       % Immature Grans 0.4  % 1/14/2018 14:15        Nucleated RBCs 0 0 /100 1/14/2018 14:15       abs Neutrophils L 1.4 1.6-8.3 10e9/L 1/14/2018 14:15       abs Lymphocytes L 0.5 0.8-5.3 10e9/L 1/14/2018 14:15       abs Monocytes 0.4 0.0-1.3 10e9/L 1/14/2018 14:15       abs Eosinophils 0.0 0.0-0.7 10e9/L 1/14/2018 14:15       abs Basophils 0.0 0.0-0.2 10e9/L 1/14/2018 14:15       abs Imm Granulocytes 0.0 0-0.4 10e9/L 1/14/2018 14:15       abs NRBC 0.0   1/14/2018 14:15    Retic A03957 [facility] Retic % 1.8 0.5-2.0 %  1/14/2018 14:41    CPT Codes:  A: 10204-EJQZ    TESTING LAB LOCATION:  [address] [address]  [phone]    COLLECTION SITE:  Client:  [facility]  Location:  [facility] (B)     Reticulocyte Count   Result Value Ref Range    % Retic 1.8 0.5 - 2.0 %    Absolute Retic 80.5 25 - 95 10e9/L   Influenza A/B antigen   Result Value Ref Range    Influenza A/B Agn Specimen Nares     Influenza A Negative NEG^Negative    Influenza B Negative NEG^Negative         ASSESSMENT/PLAN:  (J98.8,  B97.89) Viral respiratory illness  (primary encounter diagnosis)  Comment:   Plan: improving    (D70.3) Neutropenia associated with infection (H)  Comment:   Plan: CBC with platelets and differential        Will recheck today. Discussed the most likely cause as her viral infection and this then will increase. Will continue to follow this with her, timing depending on her results today      Magaly Sosa MD  January 15, 2018

## 2018-01-16 ASSESSMENT — ANXIETY QUESTIONNAIRES: GAD7 TOTAL SCORE: 5

## 2018-01-18 DIAGNOSIS — D70.3 NEUTROPENIA ASSOCIATED WITH INFECTION (H): ICD-10-CM

## 2018-01-18 LAB
BASOPHILS # BLD AUTO: 0 10E9/L (ref 0–0.2)
BASOPHILS NFR BLD AUTO: 0.4 %
DIFFERENTIAL METHOD BLD: ABNORMAL
EOSINOPHIL # BLD AUTO: 0 10E9/L (ref 0–0.7)
EOSINOPHIL NFR BLD AUTO: 1.2 %
ERYTHROCYTE [DISTWIDTH] IN BLOOD BY AUTOMATED COUNT: 13.8 % (ref 10–15)
HCT VFR BLD AUTO: 41.5 % (ref 35–47)
HGB BLD-MCNC: 13.6 G/DL (ref 11.7–15.7)
IMM GRANULOCYTES # BLD: 0 10E9/L (ref 0–0.4)
IMM GRANULOCYTES NFR BLD: 0 %
LYMPHOCYTES # BLD AUTO: 1.2 10E9/L (ref 0.8–5.3)
LYMPHOCYTES NFR BLD AUTO: 50.4 %
MCH RBC QN AUTO: 29.5 PG (ref 26.5–33)
MCHC RBC AUTO-ENTMCNC: 32.8 G/DL (ref 31.5–36.5)
MCV RBC AUTO: 90 FL (ref 78–100)
MONOCYTES # BLD AUTO: 0.3 10E9/L (ref 0–1.3)
MONOCYTES NFR BLD AUTO: 11.2 %
NEUTROPHILS # BLD AUTO: 0.9 10E9/L (ref 1.6–8.3)
NEUTROPHILS NFR BLD AUTO: 36.8 %
NRBC # BLD AUTO: 0 10*3/UL
NRBC BLD AUTO-RTO: 0 /100
PLATELET # BLD AUTO: 173 10E9/L (ref 150–450)
RBC # BLD AUTO: 4.61 10E12/L (ref 3.8–5.2)
WBC # BLD AUTO: 2.4 10E9/L (ref 4–11)

## 2018-01-18 PROCEDURE — 36415 COLL VENOUS BLD VENIPUNCTURE: CPT | Mod: ZL | Performed by: FAMILY MEDICINE

## 2018-01-18 PROCEDURE — 85025 COMPLETE CBC W/AUTO DIFF WBC: CPT | Mod: ZL | Performed by: FAMILY MEDICINE

## 2018-01-25 DIAGNOSIS — D70.3 NEUTROPENIA ASSOCIATED WITH INFECTION (H): ICD-10-CM

## 2018-01-25 LAB
BASOPHILS # BLD AUTO: 0 10E9/L (ref 0–0.2)
BASOPHILS NFR BLD AUTO: 0.2 %
DIFFERENTIAL METHOD BLD: NORMAL
EOSINOPHIL # BLD AUTO: 0 10E9/L (ref 0–0.7)
EOSINOPHIL NFR BLD AUTO: 0.9 %
ERYTHROCYTE [DISTWIDTH] IN BLOOD BY AUTOMATED COUNT: 13.5 % (ref 10–15)
HCT VFR BLD AUTO: 39.1 % (ref 35–47)
HGB BLD-MCNC: 12.8 G/DL (ref 11.7–15.7)
IMM GRANULOCYTES # BLD: 0 10E9/L (ref 0–0.4)
IMM GRANULOCYTES NFR BLD: 0.2 %
LYMPHOCYTES # BLD AUTO: 1.7 10E9/L (ref 0.8–5.3)
LYMPHOCYTES NFR BLD AUTO: 37 %
MCH RBC QN AUTO: 29.4 PG (ref 26.5–33)
MCHC RBC AUTO-ENTMCNC: 32.7 G/DL (ref 31.5–36.5)
MCV RBC AUTO: 90 FL (ref 78–100)
MONOCYTES # BLD AUTO: 0.4 10E9/L (ref 0–1.3)
MONOCYTES NFR BLD AUTO: 8.7 %
NEUTROPHILS # BLD AUTO: 2.4 10E9/L (ref 1.6–8.3)
NEUTROPHILS NFR BLD AUTO: 53 %
NRBC # BLD AUTO: 0 10*3/UL
NRBC BLD AUTO-RTO: 0 /100
PLATELET # BLD AUTO: 190 10E9/L (ref 150–450)
RBC # BLD AUTO: 4.35 10E12/L (ref 3.8–5.2)
WBC # BLD AUTO: 4.6 10E9/L (ref 4–11)

## 2018-01-25 PROCEDURE — 36415 COLL VENOUS BLD VENIPUNCTURE: CPT | Mod: ZL | Performed by: FAMILY MEDICINE

## 2018-01-25 PROCEDURE — 85025 COMPLETE CBC W/AUTO DIFF WBC: CPT | Mod: ZL | Performed by: FAMILY MEDICINE

## 2018-02-12 ENCOUNTER — TELEPHONE (OUTPATIENT)
Dept: FAMILY MEDICINE | Facility: OTHER | Age: 78
End: 2018-02-12

## 2018-02-12 DIAGNOSIS — E78.5 HYPERLIPIDEMIA LDL GOAL <130: Primary | ICD-10-CM

## 2018-02-12 NOTE — TELEPHONE ENCOUNTER
4:11 PM    Reason for Call: Phone Call    Description: Patient is requesting for an order for cholesterol labs prior to appointment     Was an appointment offered for this call? Yes, on 2-19-18  If yes : Appointment type              Date    Preferred method for responding to this message: Telephone Call  What is your phone number ? [phone]    If we cannot reach you directly, may we leave a detailed response at the number you provided? Yes    Can this message wait until your PCP/provider returns, if available today? YES    Lainey Nguyen

## 2018-02-15 ENCOUNTER — OFFICE VISIT (OUTPATIENT)
Dept: ORTHOPEDICS | Facility: OTHER | Age: 78
End: 2018-02-15
Attending: ORTHOPAEDIC SURGERY
Payer: MEDICARE

## 2018-02-15 ENCOUNTER — RADIANT APPOINTMENT (OUTPATIENT)
Dept: GENERAL RADIOLOGY | Facility: OTHER | Age: 78
End: 2018-02-15
Attending: ORTHOPAEDIC SURGERY
Payer: MEDICARE

## 2018-02-15 VITALS
HEART RATE: 67 BPM | WEIGHT: 225 LBS | DIASTOLIC BLOOD PRESSURE: 64 MMHG | BODY MASS INDEX: 38.41 KG/M2 | SYSTOLIC BLOOD PRESSURE: 130 MMHG | HEIGHT: 64 IN | OXYGEN SATURATION: 96 % | TEMPERATURE: 97.6 F

## 2018-02-15 DIAGNOSIS — M25.562 ACUTE PAIN OF LEFT KNEE: ICD-10-CM

## 2018-02-15 DIAGNOSIS — M17.12 PRIMARY OSTEOARTHRITIS OF LEFT KNEE: Primary | ICD-10-CM

## 2018-02-15 PROCEDURE — G0463 HOSPITAL OUTPT CLINIC VISIT: HCPCS

## 2018-02-15 PROCEDURE — 99213 OFFICE O/P EST LOW 20 MIN: CPT | Performed by: ORTHOPAEDIC SURGERY

## 2018-02-15 PROCEDURE — 73564 X-RAY EXAM KNEE 4 OR MORE: CPT | Mod: TC,LT

## 2018-02-15 ASSESSMENT — PAIN SCALES - GENERAL: PAINLEVEL: NO PAIN (0)

## 2018-02-15 NOTE — PROGRESS NOTES
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 Magaly Sosa    History of Present Illness/ Injury: I have treated Dinorah in the recent past for DJD of the right knee.  She had been on Relafen (but is not taking it anymore) and on 11/29/17 she had an intra-articular steroid injection into the right knee.  Unfortunately it gave her several days of hives thereafter.  At present her right knee is only mildly bothersome.     Her left knee had been minimally bothersome while we were paying attention to the right knee, but over the last several months the left knee has become increasingly painful medially for no apparent reason.  At present she feels intermittent sharp pains that occur with overuse or with pivoting on that leg.  The knee does not swell, give way or lock.  She uses a topical \"freeze spray\" with some relief.  In addition, perhaps twice a week, she takes one half of a 5 mg hydrocodone tablet originally prescribed by another physician for a back condition.  She does a lot of walking at work is concerned that the walking might not be good for the left knee.  She therefore presents today requesting a left knee evaluation.      Her musculoskeletal and neurologic review of systems were reviewed.  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  The sciatica is bothering her again.    Examination: Obese female in no obvious distress using no ambulatory appliance.  The left knee has no deformity or effusion.  It is tender palpation just proximal to the medial joint line in the sagittal midline.  It is stable to ligamentous stressing.  Range of motion is 0-112 .  Extension strength is normal.  The neurovascular status of the limb is intact.    X-ray; plain films of the " left knee taken today show mild to moderate joint space narrowing medially with small tricompartmental osteophytes.    Impression: Mild to moderate DJD left knee    Plan: We discussed treatment options including going back on Relafen and viscosupplementation injections.  She was given a brochure on a viscosupplementation product, Gel 1.  I reminded her that the hydrocodone she occasionally takes is an opioid and therefore potentially addicting.  She says she was gratified to learn that walking is not bad for her knee and wanted no additional intervention for the left knee at this time.  She will return as needed.

## 2018-02-15 NOTE — NURSING NOTE
"Chief Complaint   Patient presents with     Musculoskeletal Problem     Left Knee Pain       Initial /64  Pulse 67  Temp 97.6  F (36.4  C) (Tympanic)  Ht 5' 4\" (1.626 m)  Wt 225 lb (102.1 kg)  SpO2 96%  BMI 38.62 kg/m2 Estimated body mass index is 38.62 kg/(m^2) as calculated from the following:    Height as of this encounter: 5' 4\" (1.626 m).    Weight as of this encounter: 225 lb (102.1 kg).  Medication Reconciliation: alex Ruth      "

## 2018-02-15 NOTE — MR AVS SNAPSHOT
"              After Visit Summary   2/15/2018    Dinorah Lim    MRN: [id number]           Patient Information     Date Of Birth          1940        Visit Information        Provider Department      2/15/2018 3:40 PM Esteban Wills MD  ORTH[facility]ED[facility]        Today's Diagnoses     Primary osteoarthritis of left knee    -  1       Follow-ups after your visit        Follow-up notes from your care team     Return if symptoms worsen or fail to improve.      Your next 10 appointments already scheduled     Feb 19, 2018  8:30 AM CST   (Arrive by 8:15 AM)   SHORT with Magaly Sosa MD   [facility] [place] ([facility] [place] )    [address]  [place] [address]   [phone]              Who to contact     If you have questions or need follow up information about today's clinic visit or your schedule please contact  ORTHOPEDICS directly at [phone].  Normal or non-critical lab and imaging results will be communicated to you by MyChart, letter or phone within 4 business days after the clinic has received the results. If you do not hear from us within 7 days, please contact the clinic through MyChart or phone. If you have a critical or abnormal lab result, we will notify you by phone as soon as possible.  Submit refill requests through [employer] or call your pharmacy and they will forward the refill request to us. Please allow 3 business days for your refill to be completed.          Additional Information About Your Visit        MyChart Information     [employer] lets you send messages to your doctor, view your test results, renew your prescriptions, schedule appointments and more. To sign up, go to [email or web address] . Click on \"Log in\" on the left side of the screen, which will take you to the Welcome page. Then click on \"Sign up Now\" on the right side of the page.     You will be asked to enter the access code listed below, as well as some personal information. Please follow " "the directions to create your username and password.     Your access code is: [email or web address]  Expires: 2018  2:37 PM     Your access code will  in 90 days. If you need help or a new code, please call your [place] clinic or [phone].        Care EveryWhere ID     This is your Care EveryWhere ID. This could be used by other organizations to access your [place] medical records  [id number]        Your Vitals Were     Pulse Temperature Height Pulse Oximetry BMI (Body Mass Index)       67 97.6  F (36.4  C) (Tympanic) 5' 4\" (1.626 m) 96% 38.62 kg/m2        Blood Pressure from Last 3 Encounters:   02/15/18 130/64   01/15/18 128/74   18 146/69    Weight from Last 3 Encounters:   02/15/18 225 lb (102.1 kg)   01/15/18 229 lb (103.9 kg)   17 229 lb (103.9 kg)              Today, you had the following     No orders found for display       Primary Care Provider Office Phone # Fax #    Magaly Sosa [phone]       [address] [place], [place] [id number]        Equal Access to Services     HAKEEM SOLANO AH: Hadii lourdes aiken hadloano Sojonnaali, waaxda luqadaha, qaybta kaalmada adeegyada, olga caballero. So [facility] [phone].    ATENCIÓN: Si habla español, tiene a lazaro disposición servicios gratuitos de asistencia lingüística. Llame al [phone].    We comply with applicable federal civil rights laws and [place] laws. We do not discriminate on the basis of race, color, national origin, age, disability, sex, sexual orientation, or gender identity.            Thank you!     Thank you for choosing  ORTHOPEDICS  for your care. Our goal is always to provide you with excellent care. Hearing back from our patients is one way we can continue to improve our services. Please take a few minutes to complete the written survey that you may receive in the mail after your visit with us. Thank you!             Your Updated Medication List - Protect others around you: Learn how " to safely use, store and throw away your medicines at www.disposemymeds.org.          This list is accurate as of 2/15/18  5:06 PM.  Always use your most recent med list.                   Brand Name Dispense Instructions for use Diagnosis    albuterol (2.5 MG/3ML) 0.083% neb solution     1 Box    Take 1 vial (2.5 mg) by nebulization every 4 hours as needed for shortness of breath / dyspnea or wheezing    Acute bronchospasm       BENADRYL ALLERGY PO      Take 25 mg by mouth nightly as needed    Hives       clobetasol 0.05 % ointment    TEMOVATE    60 g    Apply at bedtime to sites of itch    Lichen simplex chronicus       clonazePAM 1 MG tablet    klonoPIN    45 tablet    TAKE ONE-FOURTH TO ONE-HALF TABLET BY MOUTH EVERY 8 HOURS AS NEEDED    Anxiety       FISH OIL      Take 1 capsule by mouth daily        Garlic 10 MG Caps      Take 1 capsule by mouth as needed        HYDROcodone-acetaminophen 5-325 MG per tablet    NORCO    60 tablet    TAKE ONE TABLET BY MOUTH EVERY 4 TO 6 HOURS AS NEEDED FOR PAIN    Primary osteoarthritis of right hip       PARoxetine 40 MG tablet    PAXIL    90 tablet    TAKE ONE TABLET BY MOUTH ONCE DAILY    Anxiety       predniSONE 20 MG tablet    DELTASONE    10 tablet    Take two tablets (= 40mg) each day for 5 (five) days        RELAFEN PO      Take 750 mg by mouth        simvastatin 20 MG tablet    ZOCOR    90 tablet    TAKE ONE TABLET BY MOUTH AT BEDTIME    Hyperlipidemia LDL goal <100       VITAMIN D (CHOLECALCIFEROL) PO      Take 2,000 Units by mouth daily        VITAMIN E      Take 1 capsule by mouth daily

## 2018-02-19 ENCOUNTER — OFFICE VISIT (OUTPATIENT)
Dept: FAMILY MEDICINE | Facility: OTHER | Age: 78
End: 2018-02-19
Attending: FAMILY MEDICINE
Payer: COMMERCIAL

## 2018-02-19 VITALS
HEIGHT: 64 IN | DIASTOLIC BLOOD PRESSURE: 74 MMHG | HEART RATE: 68 BPM | SYSTOLIC BLOOD PRESSURE: 128 MMHG | RESPIRATION RATE: 20 BRPM | OXYGEN SATURATION: 97 % | WEIGHT: 226 LBS | BODY MASS INDEX: 38.58 KG/M2

## 2018-02-19 DIAGNOSIS — L20.84 INTRINSIC ECZEMA: Primary | ICD-10-CM

## 2018-02-19 DIAGNOSIS — Z12.11 SPECIAL SCREENING FOR MALIGNANT NEOPLASMS, COLON: ICD-10-CM

## 2018-02-19 PROCEDURE — 99213 OFFICE O/P EST LOW 20 MIN: CPT | Performed by: FAMILY MEDICINE

## 2018-02-19 PROCEDURE — G0463 HOSPITAL OUTPT CLINIC VISIT: HCPCS

## 2018-02-19 RX ORDER — CLOBETASOL PROPIONATE 0.5 MG/G
OINTMENT TOPICAL
Qty: 60 G | Refills: 3 | Status: SHIPPED | OUTPATIENT
Start: 2018-02-19 | End: 2019-03-15

## 2018-02-19 ASSESSMENT — ANXIETY QUESTIONNAIRES
2. NOT BEING ABLE TO STOP OR CONTROL WORRYING: NOT AT ALL
7. FEELING AFRAID AS IF SOMETHING AWFUL MIGHT HAPPEN: NOT AT ALL
4. TROUBLE RELAXING: NOT AT ALL
IF YOU CHECKED OFF ANY PROBLEMS ON THIS QUESTIONNAIRE, HOW DIFFICULT HAVE THESE PROBLEMS MADE IT FOR YOU TO DO YOUR WORK, TAKE CARE OF THINGS AT HOME, OR GET ALONG WITH OTHER PEOPLE: NOT DIFFICULT AT ALL
GAD7 TOTAL SCORE: 2
5. BEING SO RESTLESS THAT IT IS HARD TO SIT STILL: NOT AT ALL
1. FEELING NERVOUS, ANXIOUS, OR ON EDGE: SEVERAL DAYS
3. WORRYING TOO MUCH ABOUT DIFFERENT THINGS: SEVERAL DAYS
6. BECOMING EASILY ANNOYED OR IRRITABLE: NOT AT ALL

## 2018-02-19 ASSESSMENT — PAIN SCALES - GENERAL: PAINLEVEL: NO PAIN (0)

## 2018-02-19 NOTE — NURSING NOTE
"Chief Complaint   Patient presents with     Derm Problem     eczema on the back of her head, itching all night, flare up started last night       Initial /74 (BP Location: Right arm, Patient Position: Chair, Cuff Size: Adult Large)  Pulse 68  Resp 20  Ht 5' 4\" (1.626 m)  Wt 226 lb (102.5 kg)  SpO2 97%  Breastfeeding? No  BMI 38.79 kg/m2 Estimated body mass index is 38.79 kg/(m^2) as calculated from the following:    Height as of this encounter: 5' 4\" (1.626 m).    Weight as of this encounter: 226 lb (102.5 kg).  Medication Reconciliation: complete   Aditi Saleh      "

## 2018-02-19 NOTE — MR AVS SNAPSHOT
"              After Visit Summary   2/19/2018    Dinorah Lim    MRN: [id number]           Patient Information     Date Of Birth          1940        Visit Information        Provider Department      2/19/2018 8:30 AM Magaly Sosa MD [facility]        Today's Diagnoses     Intrinsic eczema    -  1    Special screening for malignant neoplasms, colon           Follow-ups after your visit        Future tests that were ordered for you today     Open Future Orders        Priority Expected Expires Ordered    Immunos occult blood Routine  2/19/2019 2/19/2018            Who to contact     If you have questions or need follow up information about today's clinic visit or your schedule please contact [facility] directly at [phone].  Normal or non-critical lab and imaging results will be communicated to you by MyChart, letter or phone within 4 business days after the clinic has received the results. If you do not hear from us within 7 days, please contact the clinic through MyChart or phone. If you have a critical or abnormal lab result, we will notify you by phone as soon as possible.  Submit refill requests through [employer] or call your pharmacy and they will forward the refill request to us. Please allow 3 business days for your refill to be completed.          Additional Information About Your Visit        MyChart Information     [employer] lets you send messages to your doctor, view your test results, renew your prescriptions, schedule appointments and more. To sign up, go to [email or web address] . Click on \"Log in\" on the left side of the screen, which will take you to the Welcome page. Then click on \"Sign up Now\" on the right side of the page.     You will be asked to enter the access code listed below, as well as some personal information. Please follow the directions to create your username and password.     Your access code is: [email or web address]  Expires: 4/14/2018  2:37 PM   " "  Your access code will  in 90 days. If you need help or a new code, please call your [facility] clinic or [phone].        Care EveryWhere ID     This is your Care EveryWhere ID. This could be used by other organizations to access your [facility] medical records  [id number]        Your Vitals Were     Pulse Respirations Height Pulse Oximetry Breastfeeding? BMI (Body Mass Index)    68 20 5' 4\" (1.626 m) 97% No 38.79 kg/m2       Blood Pressure from Last 3 Encounters:   18 128/74   02/15/18 130/64   01/15/18 128/74    Weight from Last 3 Encounters:   18 226 lb (102.5 kg)   02/15/18 225 lb (102.1 kg)   01/15/18 229 lb (103.9 kg)                 Where to get your medicines      These medications were sent to [facility] - [place], [place] - [address]   [address] [facility] [place] [address]     Phone:  [phone]     clobetasol 0.05 % ointment          Primary Care Provider Office Phone # Fax #    Magaly Sosa [phone]       [facility] [address]        Equal Access to Services     HAKEEM SOLANO AH: Hadii lourdes aiken hadasho Soomaali, waaxda luqadaha, qaybta kaalmada adeegyada, olga caballero. So [facility] [phone].    ATENCIÓN: Si habla español, tiene a lazaro disposición servicios gratuitos de asistencia lingüística. Llame al [phone].    We comply with applicable federal civil rights laws and [place] laws. We do not discriminate on the basis of race, color, national origin, age, disability, sex, sexual orientation, or gender identity.            Thank you!     Thank you for choosing [facility]  for your care. Our goal is always to provide you with excellent care. Hearing back from our patients is one way we can continue to improve our services. Please take a few minutes to complete the written survey that you may receive in the mail after your visit with us. Thank you!             Your Updated Medication List - Protect " others around you: Learn how to safely use, store and throw away your medicines at www.disposemymeds.org.          This list is accurate as of 2/19/18  9:43 AM.  Always use your most recent med list.                   Brand Name Dispense Instructions for use Diagnosis    albuterol (2.5 MG/3ML) 0.083% neb solution     1 Box    Take 1 vial (2.5 mg) by nebulization every 4 hours as needed for shortness of breath / dyspnea or wheezing    Acute bronchospasm       BENADRYL ALLERGY PO      Take 25 mg by mouth nightly as needed    Hives       clobetasol 0.05 % ointment    TEMOVATE    60 g    Apply at bedtime to sites of itch    Intrinsic eczema       clonazePAM 1 MG tablet    klonoPIN    45 tablet    TAKE ONE-FOURTH TO ONE-HALF TABLET BY MOUTH EVERY 8 HOURS AS NEEDED    Anxiety       FISH OIL      Take 1 capsule by mouth daily        Garlic 10 MG Caps      Take 1 capsule by mouth as needed        HYDROcodone-acetaminophen 5-325 MG per tablet    NORCO    60 tablet    TAKE ONE TABLET BY MOUTH EVERY 4 TO 6 HOURS AS NEEDED FOR PAIN    Primary osteoarthritis of right hip       PARoxetine 40 MG tablet    PAXIL    90 tablet    TAKE ONE TABLET BY MOUTH ONCE DAILY    Anxiety       RELAFEN PO      Take 750 mg by mouth        simvastatin 20 MG tablet    ZOCOR    90 tablet    TAKE ONE TABLET BY MOUTH AT BEDTIME    Hyperlipidemia LDL goal <100       VITAMIN D (CHOLECALCIFEROL) PO      Take 2,000 Units by mouth daily        VITAMIN E      Take 1 capsule by mouth daily

## 2018-02-19 NOTE — PROGRESS NOTES
[facility]    Dinorah Lim, 77 year old, female presents with   Chief Complaint   Patient presents with     Derm Problem     eczema on the back of her head, itching all night, flare up started two weeks ago, now involving her chest as well starting last night. She has been using OTC liquid antiitch to the area. She works at [employer] and one other person has a similar rash over his body which she states wasn't found to be anything       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    Anterior Segment V 4 cm nodule abutting liver surface by US 1/4/2015      Obesity, Class II, BMI 35-39.9 1/4/2015    BMI 35.9 with comorbidities = MORBID obesity     Osteoarthritis of right hip 10/07/2004     Osteoarthrosis 06/14/2012     Otitis externa 10/04/2011     Prediabetes 08/01/2011       PAST SURGICAL HISTORY:  Past Surgical History:   Procedure Laterality Date     CLOSED REDUCTION WRIST Right 1952 x 2    long arm cast (same time as elbow fx)     CLOSED RX ELBOW DISLOCATION Right 1952    CR/long cast of fracture     HC INJ EPIDURAL LUMBAR/SACRAL W/WO CONTRAST Bilateral 5/2013    facet injections; prev 2012     LAPAROSCOPIC CHOLECYSTECTOMY N/A 1/4/2015    Procedure: LAPAROSCOPIC CHOLECYSTECTOMY;  Surgeon: Brittney العلي MD;  Location: [facility] OR     TONSILLECTOMY         SOCIAL HISTORY  Social History     Social History     Marital status: Single     Spouse name:  N/A     Number of children: 4     Years of education: 12     Occupational History     personal care attendant      Social History Main Topics     Smoking status: Never Smoker     Smokeless tobacco: Never Used     Alcohol use No     Drug use: No     Sexual activity: No     Other Topics Concern     Blood Transfusions Yes     Caffeine Concern Yes     >32 oz soda/day     Sleep Concern Yes     insomnia     Parent/Sibling W/ Cabg, Mi Or Angioplasty Before 65f 55m? No     Social History Narrative       MEDICATIONS:  Prior to Admission medications    Medication Sig Start Date End Date Taking? Authorizing Provider   clobetasol (TEMOVATE) 0.05 % ointment Apply at bedtime to sites of itch 2/19/18  Yes Magaly Sosa MD   clonazePAM (KLONOPIN) 1 MG tablet TAKE ONE-FOURTH TO ONE-HALF TABLET BY MOUTH EVERY 8 HOURS AS NEEDED 1/5/18  Yes Ania Deleon PA   simvastatin (ZOCOR) 20 MG tablet TAKE ONE TABLET BY MOUTH AT BEDTIME 1/3/18  Yes Ania Deleon PA   Nabumetone (RELAFEN PO) Take 750 mg by mouth   Yes Reported, Patient   HYDROcodone-acetaminophen (NORCO) 5-325 MG per tablet TAKE ONE TABLET BY MOUTH EVERY 4 TO 6 HOURS AS NEEDED FOR PAIN 11/28/17  Yes Magaly Sosa MD   PARoxetine (PAXIL) 40 MG tablet TAKE ONE TABLET BY MOUTH ONCE DAILY 8/2/17  Yes Magaly Sosa MD   albuterol (2.5 MG/3ML) 0.083% neb solution Take 1 vial (2.5 mg) by nebulization every 4 hours as needed for shortness of breath / dyspnea or wheezing 2/15/17  Yes Magaly Sosa MD   VITAMIN D, CHOLECALCIFEROL, PO Take 2,000 Units by mouth daily   Yes Reported, Patient   Garlic 10 MG CAPS Take 1 capsule by mouth as needed   Yes Reported, Patient   DiphenhydrAMINE HCl (BENADRYL ALLERGY PO) Take 25 mg by mouth nightly as needed    Yes Reported, Patient   VITAMIN E Take 1 capsule by mouth daily    Yes Reported, Patient   FISH OIL Take 1 capsule by mouth daily    Yes Reported, Patient       ALLERGIES:     Allergies   Allergen Reactions     Chocolate Hives  "    Lemon Flavor Hives     Lime [Calcium Oxysulfide] Hives     Orange Fruit [Citrus] Hives     Strawberry Hives     Adhesive Tape      Band-aids     Codeine Hives     Patient can tolerate oxycodone & dilaudid     Dexamethasone Hives     Erythromycin Hives     ERYTHROMYCIN BASE     Grapefruit [Extra Strength Grapefruit]      GRAPEFRUIT     Penicillins Hives     Sulfa Drugs      SULFONAMIDE ANTIBIOTICS      Tomato        ROS:  CONSTITUTIONAL:NEGATIVE for fever, chills, change in weight  NEURO: NEGATIVE for weakness, dizziness or paresthesias  PSYCHIATRIC: NEGATIVE for changes in mood or affect    EXAM:  /74 (BP Location: Right arm, Patient Position: Chair, Cuff Size: Adult Large)  Pulse 68  Resp 20  Ht 5' 4\" (1.626 m)  Wt 226 lb (102.5 kg)  SpO2 97%  Breastfeeding? No  BMI 38.79 kg/m2 Body mass index is 38.79 kg/(m^2).   GENERAL APPEARANCE: healthy, alert and no distress  SKIN: erythematous plaques 1 cm, two, scattered over the posterior scalp on the left, similar plaques of the chest with underlying confluent erythema  NEURO: Normal strength and tone, mentation intact and speech normal  PSYCH: mentation appears normal and affect normal/bright    LABS AND IMAGING:           ASSESSMENT/PLAN:  (L20.84) Intrinsic eczema  (primary encounter diagnosis)  Comment:   Plan: clobetasol (TEMOVATE) 0.05 % ointment        This is renewed for her to use on these areas. She may be reacting to the OTC topical medication she is using and is advised to stop this. Follow up if not improving    (Z12.11) Special screening for malignant neoplasms, colon  Comment:   Plan: Immunos occult blood        IFOB kit given today      Magaly Sosa MD  February 19, 2018          "

## 2018-02-20 ASSESSMENT — ANXIETY QUESTIONNAIRES: GAD7 TOTAL SCORE: 2

## 2018-02-20 ASSESSMENT — PATIENT HEALTH QUESTIONNAIRE - PHQ9: SUM OF ALL RESPONSES TO PHQ QUESTIONS 1-9: 2

## 2018-03-15 DIAGNOSIS — M16.11 PRIMARY OSTEOARTHRITIS OF RIGHT HIP: ICD-10-CM

## 2018-03-15 RX ORDER — HYDROCODONE BITARTRATE AND ACETAMINOPHEN 5; 325 MG/1; MG/1
TABLET ORAL
Qty: 60 TABLET | Refills: 0 | Status: SHIPPED | OUTPATIENT
Start: 2018-03-15 | End: 2018-07-03

## 2018-03-15 NOTE — TELEPHONE ENCOUNTER
norco      Last Written Prescription Date:  11/28/17  Last Fill Quantity: 60,   # refills: 0  Last Office Visit: 2/19/18  Future Office visit:    Next 5 appointments (look out 90 days)     Mar 22, 2018  4:00 PM CDT   (Arrive by 3:45 PM)   SHORT with Magaly Sosa MD   [facility]    [address]  [place] [address]   [phone]                   Routing refill request to provider for review/approval because:  Drug not on the [facility], [facility] or  Health refill protocol or controlled substance

## 2018-03-16 NOTE — TELEPHONE ENCOUNTER
Called patient to let her know that written RX for Norco is ready to be picked up at the [facility] front registration desk.  Patient verbalized understanding.

## 2018-03-19 ENCOUNTER — HOSPITAL ENCOUNTER (EMERGENCY)
Facility: HOSPITAL | Age: 78
Discharge: HOME | End: 2018-03-19
Attending: NURSE PRACTITIONER | Admitting: NURSE PRACTITIONER
Payer: MEDICARE

## 2018-03-19 ENCOUNTER — TELEPHONE (OUTPATIENT)
Dept: FAMILY MEDICINE | Facility: OTHER | Age: 78
End: 2018-03-19

## 2018-03-19 VITALS
HEART RATE: 67 BPM | WEIGHT: 228 LBS | TEMPERATURE: 97.6 F | RESPIRATION RATE: 20 BRPM | BODY MASS INDEX: 37.99 KG/M2 | HEIGHT: 65 IN | DIASTOLIC BLOOD PRESSURE: 61 MMHG | SYSTOLIC BLOOD PRESSURE: 134 MMHG | OXYGEN SATURATION: 98 %

## 2018-03-19 DIAGNOSIS — R07.0 THROAT PAIN: ICD-10-CM

## 2018-03-19 LAB
DEPRECATED S PYO AG THROAT QL EIA: NORMAL
SPECIMEN SOURCE: NORMAL

## 2018-03-19 PROCEDURE — G0463 HOSPITAL OUTPT CLINIC VISIT: HCPCS

## 2018-03-19 PROCEDURE — 87081 CULTURE SCREEN ONLY: CPT | Performed by: NURSE PRACTITIONER

## 2018-03-19 PROCEDURE — 99213 OFFICE O/P EST LOW 20 MIN: CPT | Performed by: NURSE PRACTITIONER

## 2018-03-19 PROCEDURE — 87880 STREP A ASSAY W/OPTIC: CPT | Performed by: NURSE PRACTITIONER

## 2018-03-19 ASSESSMENT — ENCOUNTER SYMPTOMS
RHINORRHEA: 0
ACTIVITY CHANGE: 0
SORE THROAT: 1
VOMITING: 0
CHILLS: 0
FEVER: 0
DIARRHEA: 0
TROUBLE SWALLOWING: 0
SINUS PRESSURE: 0
WEAKNESS: 0
PSYCHIATRIC NEGATIVE: 1
SINUS PAIN: 0
COUGH: 0
VOICE CHANGE: 0
NAUSEA: 0
DYSURIA: 0
APPETITE CHANGE: 0

## 2018-03-19 NOTE — DISCHARGE INSTRUCTIONS
Take zyrtec 10 mg daily for 10 days. Over the counter.   Take tylenol and/or ibuprofen for pain or fever. Follow dosing on package.   Use throat lozenges.   Gargle salt water.   Increase fluid intake.   Rapid strep is negative. If culture is positive, we will call you.   Work note.   Follow up with PCP with any increase in symptoms or concerns.   Return to urgent care or emergency department with any increase in symptoms or concerns.

## 2018-03-19 NOTE — ED AVS SNAPSHOT
[facility]    [address]    [facility] [place] [address]    Phone:  [phone]                                       Dinorah Lim   MRN: [id number]    Department:  [facility]   Date of Visit:  3/19/2018           Patient Information     Date Of Birth          1940        Your diagnoses for this visit were:     Throat pain        You were seen by Kenia Goodrich NP.      Follow-up Information     Follow up with Magaly Sosa MD.    Specialty:  Family Practice    Why:  As needed, If symptoms worsen    Contact information:    [address]  [place] [address] [phone]          Follow up with [facility].    Specialty:  EMERGENCY MEDICINE    Why:  As needed, If symptoms worsen    Contact information:    [address]  [place] [address] [phone]    Additional information:    From [facility] [place]: Take [address]. Turn left at [address]. Turn left at the first stoplight on [address]. At the first stop sign, take a right onto [address]. Take a left into the parking lot and continue through until you reach the North enterance of the building.       From [place]: Take [address]. Take the [address] ramp towards [place]. Turn left onto [address]. Take a slight right onto [address]. Turn left at the first stoplight on [address]. At the first stop sign, take a right onto [address]. Take a left into the parking lot and continue through until you reach the North enterance of the building.       From [place]: Take [address]. Take a right at [address]. At the first stop sign, take a right onto [address]. Take a left into the parking lot and continue through until you reach the North enterance of the building.         Discharge Instructions       Take zyrtec 10 mg daily for 10 days. Over the counter.   Take tylenol and/or ibuprofen for pain or fever.  Follow dosing on package.   Use throat lozenges.   Gargle salt water.   Increase fluid intake.   Rapid strep is negative. If culture is positive, we will call you.   Work note.   Follow up with PCP with any increase in symptoms or concerns.   Return to urgent care or emergency department with any increase in symptoms or concerns.     Discharge References/Attachments     SORE THROAT, WHEN YOU HAVE A (ENGLISH)    SORE THROATS, SELF-CARE FOR (ENGLISH)      Future Appointments        Provider Department Dept Phone Center    3/22/2018 4:00 PM Magaly Sosa MD [facility] [phone] [facility]         Review of your medicines      Our records show that you are taking the medicines listed below. If these are incorrect, please call your family doctor or clinic.        Dose / Directions Last dose taken    albuterol (2.5 MG/3ML) 0.083% neb solution   Dose:  1 vial   Quantity:  1 Box        Take 1 vial (2.5 mg) by nebulization every 4 hours as needed for shortness of breath / dyspnea or wheezing   Refills:  0        BENADRYL ALLERGY PO   Dose:  25 mg   Indication:  takes with simvastatin        Take 25 mg by mouth nightly as needed   Refills:  0        clobetasol 0.05 % ointment   Commonly known as:  TEMOVATE   Quantity:  60 g        Apply at bedtime to sites of itch   Refills:  3        clonazePAM 1 MG tablet   Commonly known as:  klonoPIN   Quantity:  45 tablet        TAKE ONE-FOURTH TO ONE-HALF TABLET BY MOUTH EVERY 8 HOURS AS NEEDED   Refills:  0        FISH OIL   Dose:  1 capsule        Take 1 capsule by mouth daily   Refills:  0        Garlic 10 MG Caps   Dose:  1 capsule        Take 1 capsule by mouth as needed   Refills:  0        HYDROcodone-acetaminophen 5-325 MG per tablet   Commonly known as:  NORCO   Quantity:  60 tablet        TAKE ONE TABLET BY MOUTH EVERY 4 TO 6 HOURS AS NEEDED FOR PAIN   Refills:  0        PARoxetine 40 MG tablet   Commonly known as:  PAXIL   Quantity:  90 tablet        TAKE  "ONE TABLET BY MOUTH ONCE DAILY   Refills:  2        simvastatin 20 MG tablet   Commonly known as:  ZOCOR   Quantity:  90 tablet        TAKE ONE TABLET BY MOUTH AT BEDTIME   Refills:  2        VITAMIN D (CHOLECALCIFEROL) PO   Dose:  2000 Units        Take 2,000 Units by mouth daily   Refills:  0        VITAMIN E   Dose:  1 capsule        Take 1 capsule by mouth daily   Refills:  0                Procedures and tests performed during your visit     Beta strep group A culture    Rapid strep screen      Orders Needing Specimen Collection     None      Pending Results     Date and Time Order Name Status Description    3/19/2018 1615 Beta strep group A culture In process             Pending Culture Results     Date and Time Order Name Status Description    3/19/2018 1615 Beta strep group A culture In process             Thank you for choosing [facility]       Thank you for choosing [facility] for your care. Our goal is always to provide you with excellent care. Hearing back from our patients is one way we can continue to improve our services. Please take a few minutes to complete the written survey that you may receive in the mail after you visit with us. Thank you!        [employer] Information     [employer] lets you send messages to your doctor, view your test results, renew your prescriptions, schedule appointments and more. To sign up, go to [email or web address] . Click on \"Log in\" on the left side of the screen, which will take you to the Welcome page. Then click on \"Sign up Now\" on the right side of the page.     You will be asked to enter the access code listed below, as well as some personal information. Please follow the directions to create your username and password.     Your access code is: [email or web address]  Expires: 2018  3:37 PM     Your access code will  in 90 days. If you need help or a new code, please call your [facility] clinic or [phone].        Care EveryWhere ID     This is your Care EveryWhere " ID. This could be used by other organizations to access your [facility] medical records  [id number]        Equal Access to Services     HAKEEM SOLANO : Jorge Talbot, joaquim [place], olga cano. So [facility] [phone].    ATENCIÓN: Si habla español, tiene a lazaro disposición servicios gratuitos de asistencia lingüística. Llame al [phone].    We comply with applicable federal civil rights laws and [place] laws. We do not discriminate on the basis of race, color, national origin, age, disability, sex, sexual orientation, or gender identity.            After Visit Summary       This is your record. Keep this with you and show to your community pharmacist(s) and doctor(s) at your next visit.

## 2018-03-19 NOTE — LETTER
[facility]  [address]  [place] [address]  Phone: [phone]    March 19, 2018        Dinorah Lim  [address]  [place] [address]          To whom it may concern:    RE: Dinorah Lim    Patient was seen and treated today at our clinic.    Please excuse from work on 3-19-18.      Sincerely,        JEFERSON Mcleod  3/19/2018  4:57 PM  URGENT CARE CLINIC

## 2018-03-19 NOTE — TELEPHONE ENCOUNTER
1:03 PM    Reason for Call: OVERBOOK    Patient is having the following symptoms: Blister in throat for 1 day    The patient is requesting an appointment for Today  with Dr. Magaly Sosa    Was an appointment offered for this call?  No    Preferred method for responding to this message: [phone]    If we cannot reach you directly, may we leave a detailed response at the number you provided?Yes      Verna Quarles

## 2018-03-19 NOTE — TELEPHONE ENCOUNTER
Please schedule patient for date/time: unable to see today, may go to urgent care if concerned otherwise we can see on Thursday at 9:30, arrival time 9:15.    Have patient go to ER/Urgent Care Center. Urgent Care hours are 9:30 am to 8 pm, open 7 days a week. Yes.    Provider will call patient.No.    Other:

## 2018-03-19 NOTE — ED PROVIDER NOTES
History     Chief Complaint   Patient presents with     Pharyngitis     Since this am, right-side with ear pain      The history is provided by the patient. No  was used.     Dinorah Lim is a 77 year old female who presents with a sore throat and right ear pain that started this am. No interventions for symptoms. Denies fever, chills, or night sweats. Eating and drinking well. Bowel and bladder are working well. No antibiotic use in the past 30 days.     She works at [employer] and wants to make sure she doesn't have strep throat.       Problem List:    Patient Active Problem List    Diagnosis Date Noted     Elevated glucose 10/05/2017     Priority: Medium     Morbid obesity (H) 06/01/2017     Priority: Medium     ACP (advance care planning) 04/28/2016     Priority: Medium     Advance Care Planning 4/28/2016: ACP Review of Chart / Resources Provided:  Reviewed chart for advance care plan.  Dinorah Lim has no plan or code status on file. Discussed available resources and provided with information. Confirmed code status reflects current choices pending further ACP discussions.  Confirmed/documented legally designated decision makers.  Added by Aditi Gandhi             COPD (chronic obstructive pulmonary disease) (H) 09/11/2014     Priority: Medium     mild, on PFTs       Anxiety 06/23/2014     Priority: Medium     Lung density on x-ray 07/23/2013     Priority: Medium     Hyperlipidemia LDL goal <130 07/23/2013     Priority: Medium     Osteoarthritis 06/14/2012     Priority: Medium     Problem list name updated by automated process. Provider to review       Advanced care planning/counseling discussion 01/13/2012     Priority: Medium     Abnormal glucose 08/01/2011     Priority: Medium     Hgb A1c 5.7 on 1/4/2015  Problem list name updated by automated process. Provider to review       Osteoarthritis of right hip 10/07/2004     Priority: Medium     Urticaria 06/01/2004     Priority:  Medium     Problem list name updated by automated process. 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     Obesity, Class II, BMI 35-39.9 1/4/2015     Osteoarthritis of right hip 10/07/2004     Osteoarthrosis 06/14/2012     Otitis externa 10/04/2011     Prediabetes 08/01/2011       Past Surgical History:    Past Surgical History:   Procedure Laterality Date     CLOSED REDUCTION WRIST Right 1952 x 2    long arm cast (same time as elbow fx)     CLOSED RX ELBOW DISLOCATION Right 1952    CR/long cast of fracture     HC INJ EPIDURAL LUMBAR/SACRAL W/WO CONTRAST Bilateral 5/2013    facet injections; prev 2012     LAPAROSCOPIC CHOLECYSTECTOMY N/A 1/4/2015    Procedure: LAPAROSCOPIC CHOLECYSTECTOMY;  Surgeon: Brittney العلي MD;  Location: [facility]     TONSILLECTOMY         Family History:    Family History   Problem Relation Age of Onset     HEART DISEASE Brother      atrial fibrillation     Other - See Comments Mother      emphysema     HEART DISEASE Father 92     CHF     CANCER Paternal Grandfather      lung cancer     CANCER Maternal Uncle      lung cancer       Social History:  Marital Status:  Single [1]  Social History   Substance Use Topics     Smoking status: Never Smoker     Smokeless tobacco: Never Used     Alcohol use No        Medications:      HYDROcodone-acetaminophen (NORCO) 5-325 MG per tablet   clobetasol (TEMOVATE) 0.05 % ointment   clonazePAM (KLONOPIN) 1 MG tablet   simvastatin (ZOCOR) 20 MG tablet   PARoxetine (PAXIL) 40 MG tablet   albuterol (2.5 MG/3ML) 0.083% neb  "solution   VITAMIN D, CHOLECALCIFEROL, PO   Garlic 10 MG CAPS   DiphenhydrAMINE HCl (BENADRYL ALLERGY PO)   VITAMIN E   FISH OIL         Review of Systems   Constitutional: Negative for activity change, appetite change, chills and fever.   HENT: Positive for ear pain and sore throat. Negative for congestion, ear discharge, postnasal drip, rhinorrhea, sinus pain, sinus pressure, trouble swallowing and voice change.         Right ear pain.    Respiratory: Negative for cough.    Gastrointestinal: Negative for diarrhea, nausea and vomiting.   Genitourinary: Negative for dysuria.   Skin: Negative for rash.   Neurological: Negative for weakness.   Psychiatric/Behavioral: Negative.        Physical Exam   BP: 134/61  Pulse: 67  Temp: 97.6  F (36.4  C)  Resp: 20  Height: 165.1 cm (5' 5\")  Weight: 103.4 kg (228 lb)  SpO2: 98 %      Physical Exam   Constitutional: She is oriented to person, place, and time. She appears well-developed and well-nourished. No distress.   HENT:   Head: Normocephalic.   Right Ear: External ear normal.   Left Ear: External ear normal.   Mouth/Throat: No oropharyngeal exudate.   Posterior oropharynx with erythema without exudate. Tonsils +2.    Neck: Normal range of motion. Neck supple.   Cardiovascular: Normal rate, regular rhythm and normal heart sounds.    No murmur heard.  Pulmonary/Chest: Effort normal. No respiratory distress. She has no wheezes. She has no rales.   Abdominal: Soft. She exhibits no distension. There is no tenderness. There is no rebound and no guarding.   Musculoskeletal: Normal range of motion.   Lymphadenopathy:     She has no cervical adenopathy.   Neurological: She is alert and oriented to person, place, and time. She exhibits normal muscle tone.   Skin: Skin is warm. No rash noted. She is not diaphoretic.   Psychiatric: She has a normal mood and affect. Her behavior is normal.   Nursing note and vitals reviewed.      ED Course     ED Course     Procedures    Results for " orders placed or performed during the hospital encounter of 03/19/18   Rapid strep screen   Result Value Ref Range    Specimen Description Throat     Rapid Strep A Screen       NEGATIVE: No Group A streptococcal antigen detected by immunoassay, await culture report.       Assessments & Plan (with Medical Decision Making)     Sore throat seems viral in nature. She will follow up with any increase in symptoms or concerns.     Discussed plan of care. She verbalized understanding. All questions answered.     I have reviewed the nursing notes.    I have reviewed the findings, diagnosis, plan and need for follow up with the patient.  Discharged in stable condition.     New Prescriptions    No medications on file       Final diagnoses:   Throat pain     Take zyrtec 10 mg daily for 10 days. Over the counter.   Take tylenol and/or ibuprofen for pain or fever. Follow dosing on package.   Use throat lozenges.   Gargle salt water.   Increase fluid intake.   Rapid strep is negative. If culture is positive, we will call you.   Work note.   Follow up with PCP with any increase in symptoms or concerns.   Return to urgent care or emergency department with any increase in symptoms or concerns.     JEFERSON Mcleod  3/19/2018  4:16 PM  [facility]       Kenia Goodrich NP  03/19/18 2000

## 2018-03-19 NOTE — ED AVS SNAPSHOT
[facility]    [address] [address]    Phone:  [phone]                                       Dinorah Lim   MRN: [id number]    Department:  [facility]   Date of Visit:  3/19/2018           After Visit Summary Signature Page     I have received my discharge instructions, and my questions have been answered. I have discussed any challenges I see with this plan with the nurse or doctor.    ..........................................................................................................................................  Patient/Patient Representative Signature      ..........................................................................................................................................  Patient Representative Print Name and Relationship to Patient    ..................................................               ................................................  Date                                            Time    ..........................................................................................................................................  Reviewed by Signature/Title    ...................................................              ..............................................  Date                                                            Time

## 2018-03-21 LAB
BACTERIA SPEC CULT: NORMAL
SPECIMEN SOURCE: NORMAL

## 2018-03-22 ENCOUNTER — OFFICE VISIT (OUTPATIENT)
Dept: FAMILY MEDICINE | Facility: OTHER | Age: 78
End: 2018-03-22
Attending: FAMILY MEDICINE
Payer: COMMERCIAL

## 2018-03-22 VITALS
HEART RATE: 80 BPM | BODY MASS INDEX: 37.99 KG/M2 | RESPIRATION RATE: 18 BRPM | TEMPERATURE: 98.4 F | HEIGHT: 65 IN | WEIGHT: 228 LBS | SYSTOLIC BLOOD PRESSURE: 128 MMHG | OXYGEN SATURATION: 98 % | DIASTOLIC BLOOD PRESSURE: 64 MMHG

## 2018-03-22 DIAGNOSIS — I83.90 VARICOSE VEIN OF LEG: Primary | ICD-10-CM

## 2018-03-22 PROCEDURE — G0463 HOSPITAL OUTPT CLINIC VISIT: HCPCS

## 2018-03-22 PROCEDURE — 99213 OFFICE O/P EST LOW 20 MIN: CPT | Performed by: FAMILY MEDICINE

## 2018-03-22 ASSESSMENT — PAIN SCALES - GENERAL: PAINLEVEL: NO PAIN (0)

## 2018-03-22 NOTE — PROGRESS NOTES
SUBJECTIVE:                                                    Dinorah Lim is a 77 year old female who presents to clinic today for the following health issues:        Swelling of the Rt leg      Duration: almost a year    Description (location/character/radiation): Swollen varicose veins of the right anterior lower leg and it feels hot to the patient    Intensity:  moderate    Accompanying signs and symptoms: none    History (similar episodes/previous evaluation): None    Precipitating or alleviating factors: None    Therapies tried and outcome: elevated them in the evening     Landy has had these varicose veins over the last year which are bothersome, She has some edema at the end of the day.  No calf pain or tenderness      Problem list and histories reviewed & adjusted, as indicated.  Additional history: as documented    Patient Active Problem List   Diagnosis     Osteoarthritis of right hip     Abnormal glucose     Osteoarthritis     Lung density on x-ray     Hyperlipidemia LDL goal <130     Advanced care planning/counseling discussion     Anxiety     COPD (chronic obstructive pulmonary disease) (H)     Urticaria     ACP (advance care planning)     Morbid obesity (H)     Elevated glucose     Past Surgical History:   Procedure Laterality Date     CLOSED REDUCTION WRIST Right 1952 x 2    long arm cast (same time as elbow fx)     CLOSED RX ELBOW DISLOCATION Right 1952    CR/long cast of fracture     HC INJ EPIDURAL LUMBAR/SACRAL W/WO CONTRAST Bilateral 5/2013    facet injections; prev 2012     LAPAROSCOPIC CHOLECYSTECTOMY N/A 1/4/2015    Procedure: LAPAROSCOPIC CHOLECYSTECTOMY;  Surgeon: Brittney العلي MD;  Location: [facility]     TONSILLECTOMY         Social History   Substance Use Topics     Smoking status: Never Smoker     Smokeless tobacco: Never Used     Alcohol use No     Family History   Problem Relation Age of Onset     HEART DISEASE Brother      atrial fibrillation     Other - See Comments Mother       emphysema     HEART DISEASE Father 92     CHF     CANCER Paternal Grandfather      lung cancer     CANCER Maternal Uncle      lung cancer         Current Outpatient Prescriptions   Medication Sig Dispense Refill     aspirin 81 MG EC tablet Take 1 tablet (81 mg) by mouth daily 90 tablet 3     HYDROcodone-acetaminophen (NORCO) 5-325 MG per tablet TAKE ONE TABLET BY MOUTH EVERY 4 TO 6 HOURS AS NEEDED FOR PAIN 60 tablet 0     clobetasol (TEMOVATE) 0.05 % ointment Apply at bedtime to sites of itch 60 g 3     clonazePAM (KLONOPIN) 1 MG tablet TAKE ONE-FOURTH TO ONE-HALF TABLET BY MOUTH EVERY 8 HOURS AS NEEDED 45 tablet 0     simvastatin (ZOCOR) 20 MG tablet TAKE ONE TABLET BY MOUTH AT BEDTIME 90 tablet 2     PARoxetine (PAXIL) 40 MG tablet TAKE ONE TABLET BY MOUTH ONCE DAILY 90 tablet 2     albuterol (2.5 MG/3ML) 0.083% neb solution Take 1 vial (2.5 mg) by nebulization every 4 hours as needed for shortness of breath / dyspnea or wheezing 1 Box 0     VITAMIN D, CHOLECALCIFEROL, PO Take 2,000 Units by mouth daily       Garlic 10 MG CAPS Take 1 capsule by mouth as needed       DiphenhydrAMINE HCl (BENADRYL ALLERGY PO) Take 25 mg by mouth nightly as needed        VITAMIN E Take 1 capsule by mouth daily        FISH OIL Take 1 capsule by mouth daily        Allergies   Allergen Reactions     Chocolate Hives     Lemon Flavor Hives     Lime [Calcium Oxysulfide] Hives     Orange Fruit [Citrus] Hives     Strawberry Hives     Adhesive Tape      Band-aids     Codeine Hives     Patient can tolerate oxycodone & dilaudid     Dexamethasone Hives     Erythromycin Hives     ERYTHROMYCIN BASE     Grapefruit [Extra Strength Grapefruit]      GRAPEFRUIT     Penicillins Hives     Sulfa Drugs      SULFONAMIDE ANTIBIOTICS      Tomato        ROS:  CONSTITUTIONAL:NEGATIVE for fever, chills, change in weight  INTEGUMENTARY/SKIN: NEGATIVE for worrisome rashes, moles or lesions  RESP:NEGATIVE for significant cough or SOB  CV: NEGATIVE for chest  "pain, palpitations or peripheral edema  PSYCHIATRIC: NEGATIVE for changes in mood or affect    OBJECTIVE:                                                    /64 (BP Location: Left arm, Patient Position: Sitting, Cuff Size: Adult Large)  Pulse 80  Temp 98.4  F (36.9  C) (Tympanic)  Resp 18  Ht 5' 5\" (1.651 m)  Wt 228 lb (103.4 kg)  SpO2 98%  BMI 37.94 kg/m2  Body mass index is 37.94 kg/(m^2).  GENERAL APPEARANCE: healthy, alert and no distress  MS: extremities normal- no gross deformities noted and no edema  SKIN: no suspicious lesions or rashes and varicose veins of the right lower extremity, nontender, no calf tenderness, Homans' sign is negative  NEURO: Normal strength and tone, mentation intact and speech normal  PSYCH: mentation appears normal and affect normal/bright         ASSESSMENT/PLAN:                                                    1. Varicose vein of leg  Right, discussed options. No sign of DVT or thrombophlebitis. Advised starting an aspirin daily and wearing Cristobal stockings daily. Discussed surgery but she isn't ready to proceed with this at this time  - aspirin 81 MG EC tablet; Take 1 tablet (81 mg) by mouth daily  Dispense: 90 tablet; Refill: 3      Follow up with Provider - rao Sosa MD  [facility] [place]      "

## 2018-03-22 NOTE — NURSING NOTE
"Chief Complaint   Patient presents with     Leg Swelling       Initial /64 (BP Location: Left arm, Patient Position: Sitting, Cuff Size: Adult Large)  Pulse 80  Temp 98.4  F (36.9  C) (Tympanic)  Resp 18  Ht 5' 5\" (1.651 m)  Wt 228 lb (103.4 kg)  SpO2 98%  BMI 37.94 kg/m2 Estimated body mass index is 37.94 kg/(m^2) as calculated from the following:    Height as of this encounter: 5' 5\" (1.651 m).    Weight as of this encounter: 228 lb (103.4 kg).  Medication Reconciliation: complete   Debbie Byrnes MA  "

## 2018-03-22 NOTE — MR AVS SNAPSHOT
"              After Visit Summary   3/22/2018    Dinorah Lim    MRN: [id number]           Patient Information     Date Of Birth          1940        Visit Information        Provider Department      3/22/2018 4:00 PM Magaly Sosa MD [facility] [place]        Today's Diagnoses     Varicose vein of leg    -  1       Follow-ups after your visit        Your next 10 appointments already scheduled     Mar 22, 2018  4:00 PM CDT   (Arrive by 3:45 PM)   SHORT with Magaly Sosa MD   [facility] [place] ([facility] [place] )    [address]  [place] [address]   [phone]              Who to contact     If you have questions or need follow up information about today's clinic visit or your schedule please contact [facility] directly at [phone].  Normal or non-critical lab and imaging results will be communicated to you by MyChart, letter or phone within 4 business days after the clinic has received the results. If you do not hear from us within 7 days, please contact the clinic through MyChart or phone. If you have a critical or abnormal lab result, we will notify you by phone as soon as possible.  Submit refill requests through [employer] or call your pharmacy and they will forward the refill request to us. Please allow 3 business days for your refill to be completed.          Additional Information About Your Visit        MyChart Information     [employer] lets you send messages to your doctor, view your test results, renew your prescriptions, schedule appointments and more. To sign up, go to [email or web address] . Click on \"Log in\" on the left side of the screen, which will take you to the Welcome page. Then click on \"Sign up Now\" on the right side of the page.     You will be asked to enter the access code listed below, as well as some personal information. Please follow the directions to create your username and password.     Your access code is: " "[email or web address]  Expires: 2018  3:37 PM     Your access code will  in 90 days. If you need help or a new code, please call your [facility] clinic or [phone].        Care EveryWhere ID     This is your Care EveryWhere ID. This could be used by other organizations to access your [facility] medical records  [id number]        Your Vitals Were     Pulse Temperature Respirations Height Pulse Oximetry BMI (Body Mass Index)    80 98.4  F (36.9  C) (Tympanic) 18 5' 5\" (1.651 m) 98% 37.94 kg/m2       Blood Pressure from Last 3 Encounters:   18 128/64   18 134/61   18 128/74    Weight from Last 3 Encounters:   18 228 lb (103.4 kg)   18 228 lb (103.4 kg)   18 226 lb (102.5 kg)              Today, you had the following     No orders found for display       Primary Care Provider Office Phone # Fax #    Magaly Sosa [phone] [address] [facility] [address]        Equal Access to Services     [facility]: Hadii lourdes pengo Sodarell, waaxda luqadaha, qaybta kaalmada aderissayada, olga caballero. So [facility] [phone].    ATENCIÓN: Si habla español, tiene a lazaro disposición servicios gratuitos de asistencia lingüística. [facility] [phone].    We comply with applicable federal civil rights laws and [place] laws. We do not discriminate on the basis of race, color, national origin, age, disability, sex, sexual orientation, or gender identity.            Thank you!     Thank you for choosing [facility]  for your care. Our goal is always to provide you with excellent care. Hearing back from our patients is one way we can continue to improve our services. Please take a few minutes to complete the written survey that you may receive in the mail after your visit with us. Thank you!             Your Updated Medication List - Protect others around you: Learn how to safely use, store and throw away your medicines at " www.disposemymeds.org.          This list is accurate as of 3/22/18  3:01 PM.  Always use your most recent med list.                   Brand Name Dispense Instructions for use Diagnosis    albuterol (2.5 MG/3ML) 0.083% neb solution     1 Box    Take 1 vial (2.5 mg) by nebulization every 4 hours as needed for shortness of breath / dyspnea or wheezing    Acute bronchospasm       aspirin 81 MG EC tablet     90 tablet    Take 1 tablet (81 mg) by mouth daily    Varicose vein of leg       BENADRYL ALLERGY PO      Take 25 mg by mouth nightly as needed    Hives       clobetasol 0.05 % ointment    TEMOVATE    60 g    Apply at bedtime to sites of itch    Intrinsic eczema       clonazePAM 1 MG tablet    klonoPIN    45 tablet    TAKE ONE-FOURTH TO ONE-HALF TABLET BY MOUTH EVERY 8 HOURS AS NEEDED    Anxiety       FISH OIL      Take 1 capsule by mouth daily        Garlic 10 MG Caps      Take 1 capsule by mouth as needed        HYDROcodone-acetaminophen 5-325 MG per tablet    NORCO    60 tablet    TAKE ONE TABLET BY MOUTH EVERY 4 TO 6 HOURS AS NEEDED FOR PAIN    Primary osteoarthritis of right hip       PARoxetine 40 MG tablet    PAXIL    90 tablet    TAKE ONE TABLET BY MOUTH ONCE DAILY    Anxiety       simvastatin 20 MG tablet    ZOCOR    90 tablet    TAKE ONE TABLET BY MOUTH AT BEDTIME    Hyperlipidemia LDL goal <100       VITAMIN D (CHOLECALCIFEROL) PO      Take 2,000 Units by mouth daily        VITAMIN E      Take 1 capsule by mouth daily

## 2018-03-29 ENCOUNTER — TELEPHONE (OUTPATIENT)
Dept: ORTHOPEDICS | Facility: OTHER | Age: 78
End: 2018-03-29

## 2018-03-29 NOTE — TELEPHONE ENCOUNTER
Patient called left voice mail that she needs a MRI and needs surgery. Not really sure what she was talking about. There is no order for a MRI?    Jesusita can you look into this for me. Please call patient     Thank you

## 2018-03-29 NOTE — TELEPHONE ENCOUNTER
Please call patient and make appointment with Dr. Wills as her Right knee is giving her problems and she want surgery that they have discussed in the past. Patient is aware you will be calling her. Thank you.  Jesusita So LPN

## 2018-04-08 DIAGNOSIS — F41.9 ANXIETY: Chronic | ICD-10-CM

## 2018-04-09 RX ORDER — CLONAZEPAM 1 MG/1
TABLET ORAL
Qty: 45 TABLET | Refills: 0 | Status: SHIPPED | OUTPATIENT
Start: 2018-04-09 | End: 2018-05-22

## 2018-04-09 NOTE — TELEPHONE ENCOUNTER
Klonopin      Last Written Prescription Date:  1/5/18  Last Fill Quantity: 45,   # refills: 0  Last Office Visit: 3/22/18  Future Office visit:    Next 5 appointments (look out 90 days)     Apr 11, 2018  9:40 AM CDT   (Arrive by 9:20 AM)   Return Visit with Esteban Wills MD    ORTHOPEDICS ([facility] )    [address] [facility] [address] [phone]

## 2018-04-11 ENCOUNTER — OFFICE VISIT (OUTPATIENT)
Dept: ORTHOPEDICS | Facility: OTHER | Age: 78
End: 2018-04-11
Attending: ORTHOPAEDIC SURGERY
Payer: MEDICARE

## 2018-04-11 ENCOUNTER — HOSPITAL ENCOUNTER (OUTPATIENT)
Dept: MRI IMAGING | Facility: HOSPITAL | Age: 78
Discharge: HOME | End: 2018-04-11
Attending: ORTHOPAEDIC SURGERY | Admitting: ORTHOPAEDIC SURGERY
Payer: MEDICARE

## 2018-04-11 VITALS
HEART RATE: 63 BPM | WEIGHT: 228 LBS | OXYGEN SATURATION: 96 % | SYSTOLIC BLOOD PRESSURE: 118 MMHG | TEMPERATURE: 98.1 F | HEIGHT: 65 IN | BODY MASS INDEX: 37.99 KG/M2 | DIASTOLIC BLOOD PRESSURE: 64 MMHG

## 2018-04-11 DIAGNOSIS — M17.11 PRIMARY OSTEOARTHRITIS OF RIGHT KNEE: ICD-10-CM

## 2018-04-11 DIAGNOSIS — M17.11 PRIMARY OSTEOARTHRITIS OF RIGHT KNEE: Primary | ICD-10-CM

## 2018-04-11 PROCEDURE — G0463 HOSPITAL OUTPT CLINIC VISIT: HCPCS

## 2018-04-11 PROCEDURE — G0463 HOSPITAL OUTPT CLINIC VISIT: HCPCS | Mod: 25

## 2018-04-11 PROCEDURE — 99213 OFFICE O/P EST LOW 20 MIN: CPT | Performed by: ORTHOPAEDIC SURGERY

## 2018-04-11 PROCEDURE — 73721 MRI JNT OF LWR EXTRE W/O DYE: CPT | Mod: TC,RT

## 2018-04-11 RX ORDER — SULINDAC 200 MG/1
200 TABLET ORAL 2 TIMES DAILY PRN
Qty: 60 TABLET | Refills: 1 | Status: SHIPPED | OUTPATIENT
Start: 2018-04-11 | End: 2019-02-13

## 2018-04-11 ASSESSMENT — PAIN SCALES - GENERAL: PAINLEVEL: EXTREME PAIN (8)

## 2018-04-11 NOTE — NURSING NOTE
"Chief Complaint   Patient presents with     RECHECK     Reoccuring Right Knee Pain       Initial /64  Pulse 63  Temp 98.1  F (36.7  C) (Tympanic)  Ht 5' 5\" (1.651 m)  Wt 228 lb (103.4 kg)  SpO2 96%  BMI 37.94 kg/m2 Estimated body mass index is 37.94 kg/(m^2) as calculated from the following:    Height as of this encounter: 5' 5\" (1.651 m).    Weight as of this encounter: 228 lb (103.4 kg).  Medication Reconciliation: complete   Radha Ruth      "

## 2018-04-11 NOTE — PATIENT INSTRUCTIONS
The X-ray Department of this [facility] will call you within 2 business days to schedule an MRI.  If you do not hear from them by the 3rd business day, call our office at [phone].

## 2018-04-11 NOTE — MR AVS SNAPSHOT
"              After Visit Summary   2018    Dinorah Lim    MRN: [id number]           Patient Information     Date Of Birth          1940        Visit Information        Provider Department      2018 9:40 AM Esteban Wills MD  ORTHOPEDICS        Today's Diagnoses     Primary osteoarthritis of right knee    -  1       Follow-ups after your visit        Future tests that were ordered for you today     Open Future Orders        Priority Expected Expires Ordered    MR Knee Right w/o Contrast Routine  2019            Who to contact     If you have questions or need follow up information about today's clinic visit or your schedule please contact  ORTHOPEDICS directly at [phone].  Normal or non-critical lab and imaging results will be communicated to you by MyChart, letter or phone within 4 business days after the clinic has received the results. If you do not hear from us within 7 days, please contact the clinic through MyChart or phone. If you have a critical or abnormal lab result, we will notify you by phone as soon as possible.  Submit refill requests through [employer] or call your pharmacy and they will forward the refill request to us. Please allow 3 business days for your refill to be completed.          Additional Information About Your Visit        MyChart Information     [employer] lets you send messages to your doctor, view your test results, renew your prescriptions, schedule appointments and more. To sign up, go to [email or web address] . Click on \"Log in\" on the left side of the screen, which will take you to the Welcome page. Then click on \"Sign up Now\" on the right side of the page.     You will be asked to enter the access code listed below, as well as some personal information. Please follow the directions to create your username and password.     Your access code is: [email or web address]  Expires: 2018  3:37 PM     Your access code will  in 90 days. If you " "need help or a new code, please call your [place] clinic or [phone].        Care EveryWhere ID     This is your Care EveryWhere ID. This could be used by other organizations to access your [place] medical records  [id number]        Your Vitals Were     Pulse Temperature Height Pulse Oximetry BMI (Body Mass Index)       63 98.1  F (36.7  C) (Tympanic) 5' 5\" (1.651 m) 96% 37.94 kg/m2        Blood Pressure from Last 3 Encounters:   04/11/18 118/64   03/22/18 128/64   03/19/18 134/61    Weight from Last 3 Encounters:   04/11/18 228 lb (103.4 kg)   03/22/18 228 lb (103.4 kg)   03/19/18 228 lb (103.4 kg)                 Today's Medication Changes          These changes are accurate as of 4/11/18  9:52 AM.  If you have any questions, ask your nurse or doctor.               Start taking these medicines.        Dose/Directions    sulindac 200 MG tablet   Commonly known as:  CLINORIL   Used for:  Primary osteoarthritis of right knee   Started by:  Esteban Wills MD        Dose:  200 mg   Take 1 tablet (200 mg) by mouth 2 times daily as needed   Quantity:  60 tablet   Refills:  1            Where to get your medicines      These medications were sent to [facility] [id number]  [place], [place] - [address] [facility] [address]  [address], [facility] [address]     Phone:  [phone]     sulindac 200 MG tablet                Primary Care Provider Office Phone # Fax #    Magaly Sosa [phone]       [facility] [address]        Equal Access to Services     [facility] AH: Hadii lourdes aiken hadasho Soomaali, waaxda luqadaha, qaybta kaalmaolga mcginnis. So [facility] [phone].    ATENCIÓN: Si habla español, tiene a lazaro disposición servicios gratuitos de asistencia lingüística. Llame al [phone].    We comply with applicable federal civil rights laws and [place] laws. We do not discriminate on the basis of race, color, national origin, age, disability, sex, " sexual orientation, or gender identity.            Thank you!     Thank you for choosing  ORTHOPEDICS  for your care. Our goal is always to provide you with excellent care. Hearing back from our patients is one way we can continue to improve our services. Please take a few minutes to complete the written survey that you may receive in the mail after your visit with us. Thank you!             Your Updated Medication List - Protect others around you: Learn how to safely use, store and throw away your medicines at www.disposemymeds.org.          This list is accurate as of 4/11/18  9:52 AM.  Always use your most recent med list.                   Brand Name Dispense Instructions for use Diagnosis    albuterol (2.5 MG/3ML) 0.083% neb solution     1 Box    Take 1 vial (2.5 mg) by nebulization every 4 hours as needed for shortness of breath / dyspnea or wheezing    Acute bronchospasm       aspirin 81 MG EC tablet     90 tablet    Take 1 tablet (81 mg) by mouth daily    Varicose vein of leg       BENADRYL ALLERGY PO      Take 25 mg by mouth nightly as needed    Hives       clobetasol 0.05 % ointment    TEMOVATE    60 g    Apply at bedtime to sites of itch    Intrinsic eczema       clonazePAM 1 MG tablet    klonoPIN    45 tablet    TAKE ONE-FOURTH TO ONE-HALF TABLET BY MOUTH EVERY 8 HOURS AS NEEDED    Anxiety       FISH OIL      Take 1 capsule by mouth daily        Garlic 10 MG Caps      Take 1 capsule by mouth as needed        HYDROcodone-acetaminophen 5-325 MG per tablet    NORCO    60 tablet    TAKE ONE TABLET BY MOUTH EVERY 4 TO 6 HOURS AS NEEDED FOR PAIN    Primary osteoarthritis of right hip       PARoxetine 40 MG tablet    PAXIL    90 tablet    TAKE ONE TABLET BY MOUTH ONCE DAILY    Anxiety       simvastatin 20 MG tablet    ZOCOR    90 tablet    TAKE ONE TABLET BY MOUTH AT BEDTIME    Hyperlipidemia LDL goal <100       sulindac 200 MG tablet    CLINORIL    60 tablet    Take 1 tablet (200 mg) by mouth 2 times daily  as needed    Primary osteoarthritis of right knee       VITAMIN D (CHOLECALCIFEROL) PO      Take 2,000 Units by mouth daily        VITAMIN E      Take 1 capsule by mouth daily

## 2018-04-11 NOTE — PROGRESS NOTES
Chief complaint:   #1.  Mild to moderate DJD left knee  #2.  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, patient of Dr. Magaly Sosa    HPI: She has a long history of DJD of the right knee.  It has been treated  with Relafen and with a dexamethasone injection (to which she reacted with hives).  When I saw her on 3/15/2018 the symptoms were minimal and she was off the Relafen.    Subjective: 2-3 weeks ago the right knee developed lateral joint line pain  precipitously.  The pain has persisted and forced her to stop working.  She is taking 2.5 mg of hydrocodone daily on average, uses a topical freeze spray, his employing a cane, and is still on the Relafen, but nothing seems to be helping.  The knee also gives her occasional sensations of impending instability.    At the same time her right sciatica is acting up.    Nothing has changed with respect to her musculoskeletal and neurologic review of systems since seen last.    Examination: Obese female in no obvious distress.  The skin around the left knee is intact.  The knee has no effusion or deformity.  It is tender along the lateral and medial joint lines.  Range of motion is 3-130 .  The knee is stable to ligamentous stressing.  The neurovascular status of the limb is intact.    X-rays: The last right knee films were taken last September.  At that time the DJD was worse in the lateral compartment, but only mild to moderate in severity.    Impression: DJD left knee, question acute meniscal tear    Plan: We discussed treatment options including a stronger NSAID, viscosupplementation, physical therapy, MRI to look for a meniscal tear that might be amenable to arthroscopy (understanding that arthroscopy does not having before the DJD), and TKA.  She chose the stronger NSAID and the MRI.  I prescribed sulindac 200 mg twice a day with food.  She will return after the MRI.  In the meantime she will remain out of  work. She will need left knee x-rays on arrival as her prior films are 7 months old.

## 2018-04-12 DIAGNOSIS — M25.561 CHRONIC PAIN OF RIGHT KNEE: Primary | ICD-10-CM

## 2018-04-12 DIAGNOSIS — G89.29 CHRONIC PAIN OF RIGHT KNEE: Primary | ICD-10-CM

## 2018-04-13 ENCOUNTER — OFFICE VISIT (OUTPATIENT)
Dept: ORTHOPEDICS | Facility: OTHER | Age: 78
End: 2018-04-13
Attending: ORTHOPAEDIC SURGERY
Payer: MEDICARE

## 2018-04-13 ENCOUNTER — RADIANT APPOINTMENT (OUTPATIENT)
Dept: GENERAL RADIOLOGY | Facility: OTHER | Age: 78
End: 2018-04-13
Attending: ORTHOPAEDIC SURGERY
Payer: MEDICARE

## 2018-04-13 VITALS
HEIGHT: 65 IN | HEART RATE: 62 BPM | SYSTOLIC BLOOD PRESSURE: 124 MMHG | DIASTOLIC BLOOD PRESSURE: 74 MMHG | OXYGEN SATURATION: 96 % | TEMPERATURE: 98.5 F | WEIGHT: 230 LBS | BODY MASS INDEX: 38.32 KG/M2

## 2018-04-13 DIAGNOSIS — S83.241D ACUTE MEDIAL MENISCUS TEAR OF RIGHT KNEE, SUBSEQUENT ENCOUNTER: ICD-10-CM

## 2018-04-13 DIAGNOSIS — M21.061 ACQUIRED VALGUS DEFORMITY KNEE, RIGHT: ICD-10-CM

## 2018-04-13 DIAGNOSIS — G89.29 CHRONIC PAIN OF RIGHT KNEE: ICD-10-CM

## 2018-04-13 DIAGNOSIS — M17.11 PRIMARY OSTEOARTHRITIS OF RIGHT KNEE: Primary | ICD-10-CM

## 2018-04-13 DIAGNOSIS — S83.281D ACUTE LATERAL MENISCUS TEAR OF RIGHT KNEE, SUBSEQUENT ENCOUNTER: ICD-10-CM

## 2018-04-13 DIAGNOSIS — M25.561 CHRONIC PAIN OF RIGHT KNEE: ICD-10-CM

## 2018-04-13 PROCEDURE — 99212 OFFICE O/P EST SF 10 MIN: CPT | Mod: 25 | Performed by: ORTHOPAEDIC SURGERY

## 2018-04-13 PROCEDURE — G0463 HOSPITAL OUTPT CLINIC VISIT: HCPCS | Mod: 25

## 2018-04-13 PROCEDURE — 73564 X-RAY EXAM KNEE 4 OR MORE: CPT | Mod: TC,RT

## 2018-04-13 PROCEDURE — 20610 DRAIN/INJ JOINT/BURSA W/O US: CPT | Mod: RT | Performed by: ORTHOPAEDIC SURGERY

## 2018-04-13 PROCEDURE — G0463 HOSPITAL OUTPT CLINIC VISIT: HCPCS

## 2018-04-13 RX ORDER — TRIAMCINOLONE ACETONIDE 40 MG/ML
40 INJECTION, SUSPENSION INTRA-ARTICULAR; INTRAMUSCULAR ONCE
Qty: 1 ML | Refills: 0 | OUTPATIENT
Start: 2018-04-13 | End: 2019-02-13

## 2018-04-13 ASSESSMENT — PAIN SCALES - GENERAL: PAINLEVEL: EXTREME PAIN (8)

## 2018-04-13 NOTE — MR AVS SNAPSHOT
"              After Visit Summary   4/13/2018    Dinorah Lim    MRN: [id number]           Patient Information     Date Of Birth          1940        Visit Information        Provider Department      4/13/2018 11:00 AM Esteban Wills MD  ORTHOPEDICS        Today's Diagnoses     Primary osteoarthritis of right knee    -  1    Acute medial meniscus tear of right knee, subsequent encounter        Acute lateral meniscus tear of right knee, subsequent encounter        Acquired valgus deformity knee, right           Follow-ups after your visit        Your next 10 appointments already scheduled     Jul 18, 2018  9:00 AM CDT   (Arrive by 8:45 AM)   Return Visit with Esteban Wills MD    ORTHOPEDICS ([facility] )    [address] [facility] [address] [phone]              Who to contact     If you have questions or need follow up information about today's clinic visit or your schedule please contact  ORTHOPEDICS directly at [phone].  Normal or non-critical lab and imaging results will be communicated to you by MyChart, letter or phone within 4 business days after the clinic has received the results. If you do not hear from us within 7 days, please contact the clinic through [employer] or phone. If you have a critical or abnormal lab result, we will notify you by phone as soon as possible.  Submit refill requests through [employer] or call your pharmacy and they will forward the refill request to us. Please allow 3 business days for your refill to be completed.          Additional Information About Your Visit        [employer] Information     [employer] lets you send messages to your doctor, view your test results, renew your prescriptions, schedule appointments and more. To sign up, go to [email or web address] . Click on \"Log in\" on the left side of the screen, which will take you to the Welcome page. Then click on \"Sign up Now\" on the right side of the page.     You will be " "asked to enter the access code listed below, as well as some personal information. Please follow the directions to create your username and password.     Your access code is: [email or web address]  Expires: 2018  3:37 PM     Your access code will  in 90 days. If you need help or a new code, please call your [place] clinic or [phone].        Care EveryWhere ID     This is your Care EveryWhere ID. This could be used by other organizations to access your [place] medical records  [id number]        Your Vitals Were     Pulse Temperature Height Pulse Oximetry BMI (Body Mass Index)       62 98.5  F (36.9  C) (Tympanic) 5' 5\" (1.651 m) 96% 38.27 kg/m2        Blood Pressure from Last 3 Encounters:   18 124/74   18 118/64   18 128/64    Weight from Last 3 Encounters:   18 230 lb (104.3 kg)   18 228 lb (103.4 kg)   18 228 lb (103.4 kg)              Today, you had the following     No orders found for display       Primary Care Provider Office Phone # Fax #    Magaly Sosa [phone]       [address] [place] [address]        Equal Access to Services     [facility]: Hadii lourdes aiken hadloano Sodarell, waaxda luqadaha, qaybta kaalmada adeegyada, olga juan . So [facility] [phone].    ATENCIÓN: Si habla español, tiene a lazaro disposición servicios gratuitos de asistencia lingüística. Llame al [phone].    We comply with applicable federal civil rights laws and [place] laws. We do not discriminate on the basis of race, color, national origin, age, disability, sex, sexual orientation, or gender identity.            Thank you!     Thank you for choosing  ORTHOPEDICS  for your care. Our goal is always to provide you with excellent care. Hearing back from our patients is one way we can continue to improve our services. Please take a few minutes to complete the written survey that you may receive in the mail after your visit with " us. Thank you!             Your Updated Medication List - Protect others around you: Learn how to safely use, store and throw away your medicines at www.disposemymeds.org.          This list is accurate as of 4/13/18 11:50 AM.  Always use your most recent med list.                   Brand Name Dispense Instructions for use Diagnosis    albuterol (2.5 MG/3ML) 0.083% neb solution     1 Box    Take 1 vial (2.5 mg) by nebulization every 4 hours as needed for shortness of breath / dyspnea or wheezing    Acute bronchospasm       aspirin 81 MG EC tablet     90 tablet    Take 1 tablet (81 mg) by mouth daily    Varicose vein of leg       BENADRYL ALLERGY PO      Take 25 mg by mouth nightly as needed    Hives       clobetasol 0.05 % ointment    TEMOVATE    60 g    Apply at bedtime to sites of itch    Intrinsic eczema       clonazePAM 1 MG tablet    klonoPIN    45 tablet    TAKE ONE-FOURTH TO ONE-HALF TABLET BY MOUTH EVERY 8 HOURS AS NEEDED    Anxiety       FISH OIL      Take 1 capsule by mouth daily        Garlic 10 MG Caps      Take 1 capsule by mouth as needed        HYDROcodone-acetaminophen 5-325 MG per tablet    NORCO    60 tablet    TAKE ONE TABLET BY MOUTH EVERY 4 TO 6 HOURS AS NEEDED FOR PAIN    Primary osteoarthritis of right hip       PARoxetine 40 MG tablet    PAXIL    90 tablet    TAKE ONE TABLET BY MOUTH ONCE DAILY    Anxiety       simvastatin 20 MG tablet    ZOCOR    90 tablet    TAKE ONE TABLET BY MOUTH AT BEDTIME    Hyperlipidemia LDL goal <100       sulindac 200 MG tablet    CLINORIL    60 tablet    Take 1 tablet (200 mg) by mouth 2 times daily as needed    Primary osteoarthritis of right knee       VITAMIN D (CHOLECALCIFEROL) PO      Take 2,000 Units by mouth daily        VITAMIN E      Take 1 capsule by mouth daily

## 2018-04-13 NOTE — PROGRESS NOTES
Chief complaint:  #1.  DJD Right knee  #2.  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  She had a significant allergic reaction to a right knee dexamethasone injection in the past.  Treatment has therefore been with NSAIDS (presently sulindac).  In late March of this year the symptoms significantly worsened insidiously.  The pain forced her to stop working, employing a cane, and take occasional hydrocodone.  She was seen on 4/11/18.  X-rays and MRI were ordered.    Subjective: Her right knee symptoms are unchange    Nothing has changed with respect to her musculoskeletal and neurologic review of systems since seen last.  She has chronic right sided sciatica which has been acting up lately.  In the past she has received a lumbar facet injection using Kenalog without an allergic reaction.    Examination: Obese female in no obvious distress.  Please see the office note of 4/11/18 for a detailed examination of the right knee.    X-ray; plain films the right knee taken today show a valgus deformity, lateral joint space narrowing, and tricompartmental osteophyte formation.  An MRI of 4/11/18 shows a small effusion, a complex tear of the lateral meniscus worse compared with a prior MRI, a tear of the medial meniscus posterior horn and body, and tricompartmental degenerative changes.    Impression: Tricompartmental DJD with valgus deformity and both medial and lateral meniscal tears    Plan: We discussed treatment options including an intra-articular injection of a different steroid, physical therapy, and TKA.  She absolutely does not want a knee replacement operation and does not believe therapy would help her.  She therefore opted for a steroid injection.  She understands that after such an injection she would not be able to change her mind  and have a knee replacement for at least 6 months.  She will return in 3 months.    Procedure: After obtaining written informed consent and sterilely prepping the site a timeout was held.  Sterile technique was employed as the right knee was then injected with 40 mg of Kenaolg mixed with 3mL of Carbocaine.  Topical ethyl chloride spray was used as a local anesthetic.  The patient tolerated the procedure well and there were no complications.

## 2018-04-20 DIAGNOSIS — Z12.11 SPECIAL SCREENING FOR MALIGNANT NEOPLASMS, COLON: ICD-10-CM

## 2018-04-20 PROCEDURE — 82274 ASSAY TEST FOR BLOOD FECAL: CPT | Performed by: FAMILY MEDICINE

## 2018-04-23 LAB — HEMOCCULT SP1 STL QL: NEGATIVE

## 2018-05-09 ENCOUNTER — TELEPHONE (OUTPATIENT)
Dept: ORTHOPEDICS | Facility: OTHER | Age: 78
End: 2018-05-09

## 2018-05-09 NOTE — TELEPHONE ENCOUNTER
Dinorah needs to make appointment to start Total Joint Replacement surgery she would like it soon as she has her daughters wedding in August and is walking her down the [place]. Confirmed Joint class she will attend May 16, 2018. Patient to visit with us June 8 to start TKA process.  Jesusita So LPN

## 2018-05-15 DIAGNOSIS — F41.9 ANXIETY: ICD-10-CM

## 2018-05-16 RX ORDER — PAROXETINE 40 MG/1
TABLET, FILM COATED ORAL
Qty: 90 TABLET | Refills: 2 | Status: SHIPPED | OUTPATIENT
Start: 2018-05-16 | End: 2019-04-01

## 2018-05-22 DIAGNOSIS — F41.9 ANXIETY: Chronic | ICD-10-CM

## 2018-05-22 RX ORDER — CLONAZEPAM 1 MG/1
TABLET ORAL
Qty: 45 TABLET | Refills: 0 | Status: SHIPPED | OUTPATIENT
Start: 2018-05-22 | End: 2018-07-20

## 2018-05-24 ENCOUNTER — TELEPHONE (OUTPATIENT)
Dept: ORTHOPEDICS | Facility: OTHER | Age: 78
End: 2018-05-24

## 2018-05-24 DIAGNOSIS — M19.90 DJD (DEGENERATIVE JOINT DISEASE): Primary | ICD-10-CM

## 2018-05-24 NOTE — TELEPHONE ENCOUNTER
Patient would like to have PT prior to total knee replacement, order placed.  Jesusita So, ALEXN

## 2018-05-25 ENCOUNTER — HOSPITAL ENCOUNTER (OUTPATIENT)
Dept: PHYSICAL THERAPY | Facility: HOSPITAL | Age: 78
Setting detail: THERAPIES SERIES
End: 2018-05-25
Attending: ORTHOPAEDIC SURGERY
Payer: MEDICARE

## 2018-05-25 PROCEDURE — G8979 MOBILITY GOAL STATUS: HCPCS | Mod: GP,CK

## 2018-05-25 PROCEDURE — 97110 THERAPEUTIC EXERCISES: CPT | Mod: GP

## 2018-05-25 PROCEDURE — 97161 PT EVAL LOW COMPLEX 20 MIN: CPT | Mod: GP

## 2018-05-25 PROCEDURE — 40000718 ZZHC STATISTIC PT DEPARTMENT ORTHO VISIT

## 2018-05-25 PROCEDURE — G8978 MOBILITY CURRENT STATUS: HCPCS | Mod: GP,CK

## 2018-05-25 ASSESSMENT — ACTIVITIES OF DAILY LIVING (ADL)
SWELLING: THE SYMPTOM AFFECTS MY ACTIVITY SEVERELY
SQUAT: ACTIVITY IS VERY DIFFICULT
SIT WITH YOUR KNEE BENT: ACTIVITY IS FAIRLY DIFFICULT
LIMPING: THE SYMPTOM AFFECTS MY ACTIVITY SEVERELY
GO DOWN STAIRS: ACTIVITY IS VERY DIFFICULT
GIVING WAY, BUCKLING OR SHIFTING OF KNEE: THE SYMPTOM AFFECTS MY ACTIVITY MODERATELY
HOW_WOULD_YOU_RATE_THE_OVERALL_FUNCTION_OF_YOUR_KNEE_DURING_YOUR_USUAL_DAILY_ACTIVITIES?: ABNORMAL
KNEE_ACTIVITY_OF_DAILY_LIVING_SCORE: 31.43
KNEEL ON THE FRONT OF YOUR KNEE: I AM UNABLE TO DO THE ACTIVITY
WALK: ACTIVITY IS FAIRLY DIFFICULT
GO UP STAIRS: ACTIVITY IS VERY DIFFICULT
KNEE_ACTIVITY_OF_DAILY_LIVING_SUM: 22
STIFFNESS: THE SYMPTOM AFFECTS MY ACTIVITY MODERATELY
STAND: ACTIVITY IS SOMEWHAT DIFFICULT
AS_A_RESULT_OF_YOUR_KNEE_INJURY,_HOW_WOULD_YOU_RATE_YOUR_CURRENT_LEVEL_OF_DAILY_ACTIVITY?: ABNORMAL
RAW_SCORE: 22
RISE FROM A CHAIR: ACTIVITY IS FAIRLY DIFFICULT
WEAKNESS: THE SYMPTOM AFFECTS MY ACTIVITY MODERATELY
PAIN: THE SYMPTOM AFFECTS MY ACTIVITY MODERATELY

## 2018-05-25 NOTE — PROGRESS NOTES
05/25/18 1433   General Information   Type of Visit Initial OP Ortho PT Evaluation   Start of Care Date 05/25/18   Referring Physician Dr. Wills   Orders Evaluate and Treat   Orders Comment Pre rehabiliation total knee replacement   Date of Order 05/24/18   Insurance Type Medicare   Medical Diagnosis DJD (degenerative joint disease) M19.90   Surgical/Medical history reviewed Yes   General Information Comments PMH: OA   Body Part(s)   Body Part(s) Knee   Presentation and Etiology   Pertinent history of current problem (include personal factors and/or comorbidities that impact the POC) Patient been dealing with knee pain for many years and is now is bone on bone with both knees, but R is more painful. Patient will see Dr. BRISCOE on 6/8/2018 and will decide the course. Patient's goals is to increase strength to have a faster more effective recovery after surgery. Patient thinks that surgery may be in August. Patient initially was not considering TKA, but did decide that symptoms have progressed far enough that it is needed. Patient has history of R sided sciatica that also sometimes interferes with mobility and activity tolerance.    Impairments A. Pain;B. Decreased WB tolerance;C. Swelling;F. Decreased strength and endurance;H. Impaired gait;G. Impaired balance   Functional Limitations perform activities of daily living;perform desired leisure / sports activities   Symptom Location Bilateral knees R > L   How/Where did it occur From Degenerative Joint Disease   Onset date of current episode/exacerbation (Increased pain past several months)   Chronicity Chronic   Pain rating (0-10 point scale) Best (/10);Worst (/10)   Best (/10) 4   Worst (/10) 9   Pain quality A. Sharp;C. Aching   Frequency of pain/symptoms A. Constant   Pain/symptoms exacerbated by B. Walking;C. Lifting;D. Carrying;G. Certain positions;I. Bending;K. Home tasks;L. Work tasks   Pain/symptoms eased by A. Sitting;C. Rest;E. Changing positions    Progression of symptoms since onset: Worsened   Current / Previous Interventions   Diagnostic Tests: MRI;X-ray   MRI Results Results   MRI results tricompartmental DJD with valgus, deformity and both medial and lateral mensical tears   Prior Level of Function   Prior Level of Function-Mobility Patient has noted increased difficulty with functional mobility (gait, transfers, stairs)   Current Level of Function   Patient role/employment history E. Unemployed   Current equipment-Gait/Locomotion Front wheeled walker  (occasionally uses)   Fall Risk Screen   Fall screen completed by PT   Have you fallen 2 or more times in the past year? Yes   Have you fallen and had an injury in the past year? No   Is patient a fall risk? No   Knee Objective Findings   Side (if bilateral, select both right and left) Right;Left   Posture Genu valgus positioning of R LE   Gait/Locomotion Antalgic, genu valgus, excessive trunk compensation   Knee ROM Comment Mild limitation in R knee extension compared to L   Knee/Hip Strength Comments Weakness in gluts and functional weakness and deconditioning is present.    Palpation Non tender to palpation today   Right Knee Extension PROM 0   Right Knee Flexion PROM 124   Right Knee Flexion Strength 4/5   Right Knee Extension Strength 4/5   Right Hip Abduction Strength 4-   Right Quad Set Strength Fair to good   R VMO Strength No significant delay present   Right Hamstring Flexibility WFL   Left Knee Extension PROM 3 degrees past 0   Left Knee Flexion PROM 121   Left Knee Flexion Strength 4/5   Left Knee Extension Strength 4/5   Left Hip Abduction Strength 4-   Left Quad Set Strength Fair to good   L VMO Strength No significant delay present   Left Hamstring Flexibility WFL   Planned Therapy Interventions   Planned Therapy Interventions balance training;strengthening;gait training;ROM;stretching   Planned Therapy Interventions Comment Pre-hap TKA program   Clinical Impression   Criteria for Skilled  Therapeutic Interventions Met yes, treatment indicated   PT Diagnosis Patient presents with deconditioning and functional weakness with R TKA in upcoming months.    Influenced by the following impairments Pain, functional weakness, deconditioning, genu valgus positioning of R LE.    Functional limitations due to impairments Antalgic gait, pain with in home and community distance ambulation, difficulty with transfers, difficulty with stairs.    Clinical Presentation Stable/Uncomplicated   Clinical Presentation Rationale clinical judgement   Clinical Decision Making (Complexity) Low complexity   Therapy Frequency 2 times/Week   Predicted Duration of Therapy Intervention (days/wks) 90 days   Risk & Benefits of therapy have been explained Yes   Patient, Family & other staff in agreement with plan of care Yes   Clinical Impression Comments Patient presents for pre rehabilitation for upcoming R TKA with deconditioning, functional weakness and pain. Patient would benefit from skilled PT services for conditioning, education and ther ex to promote effective TKA recovery.    Education Assessment   Barriers to Learning No barriers   ORTHO GOALS   PT Ortho Eval Goals 1;2;3;4   Ortho Goal 1   Goal Identifier STG 1   Goal Description Patient will be independent and compliant with HEP.    Target Date 06/08/18   Ortho Goal 2   Goal Identifier LTG 1    Goal Description Patient display good muscle activation and motion to facilitate an accelerated and effective recovery from upcoming TKA.     8/23/18   Total Evaluation Time   Total Evaluation Time 16   Therapy Certification   Certification date from 05/25/18   Certification date to 08/23/18   Medical Diagnosis DJD (degenerative joint disease) M19.90   I certify the need for these services furnished under this plan of treatment and while under my care. (Physician co-signature of this document indicates review and certification of the therapy plan).

## 2018-05-29 ENCOUNTER — HOSPITAL ENCOUNTER (OUTPATIENT)
Dept: PHYSICAL THERAPY | Facility: HOSPITAL | Age: 78
Setting detail: THERAPIES SERIES
End: 2018-05-29
Attending: ORTHOPAEDIC SURGERY
Payer: MEDICARE

## 2018-05-29 PROCEDURE — 40000718 ZZHC STATISTIC PT DEPARTMENT ORTHO VISIT

## 2018-05-29 PROCEDURE — 97110 THERAPEUTIC EXERCISES: CPT | Mod: GP

## 2018-05-31 ENCOUNTER — HOSPITAL ENCOUNTER (OUTPATIENT)
Dept: PHYSICAL THERAPY | Facility: HOSPITAL | Age: 78
Setting detail: THERAPIES SERIES
End: 2018-05-31
Attending: ORTHOPAEDIC SURGERY
Payer: MEDICARE

## 2018-05-31 PROCEDURE — 40000718 ZZHC STATISTIC PT DEPARTMENT ORTHO VISIT

## 2018-05-31 PROCEDURE — 97110 THERAPEUTIC EXERCISES: CPT | Mod: GP

## 2018-06-04 ENCOUNTER — HOSPITAL ENCOUNTER (OUTPATIENT)
Dept: PHYSICAL THERAPY | Facility: HOSPITAL | Age: 78
Setting detail: THERAPIES SERIES
End: 2018-06-04
Attending: ORTHOPAEDIC SURGERY
Payer: MEDICARE

## 2018-06-04 PROCEDURE — 40000718 ZZHC STATISTIC PT DEPARTMENT ORTHO VISIT

## 2018-06-04 PROCEDURE — 97110 THERAPEUTIC EXERCISES: CPT | Mod: GP

## 2018-06-05 ENCOUNTER — TELEPHONE (OUTPATIENT)
Dept: FAMILY MEDICINE | Facility: OTHER | Age: 78
End: 2018-06-05

## 2018-06-05 DIAGNOSIS — R30.0 DYSURIA: Primary | ICD-10-CM

## 2018-06-05 NOTE — TELEPHONE ENCOUNTER
12:48 PM    Reason for Call: Phone Call    Description: Pt called and stated she believes she has a UTI. She started having frequent urination yesterday. She is wondering if Dr RUDY Sosa would be willing to put an order in for labs. Please call her back at [phone]    Was an appointment offered for this call? Yes, but pt stated provider usually lets her do lab only  If yes : Appointment type              Date    Preferred method for responding to this message: Telephone Call  What is your phone number ?    If we cannot reach you directly, may we leave a detailed response at the number you provided? Yes    Can this message wait until your PCP/provider returns, if available today? Not applicable    Christin Jacobo

## 2018-06-06 DIAGNOSIS — R30.0 DYSURIA: ICD-10-CM

## 2018-06-06 DIAGNOSIS — R82.90 ABNORMAL URINE FINDINGS: Primary | ICD-10-CM

## 2018-06-06 DIAGNOSIS — N30.00 ACUTE CYSTITIS WITHOUT HEMATURIA: ICD-10-CM

## 2018-06-06 LAB
ALBUMIN UR-MCNC: NEGATIVE MG/DL
APPEARANCE UR: ABNORMAL
BACTERIA #/AREA URNS HPF: ABNORMAL /HPF
BILIRUB UR QL STRIP: NEGATIVE
COLOR UR AUTO: YELLOW
GLUCOSE UR STRIP-MCNC: NEGATIVE MG/DL
HGB UR QL STRIP: NEGATIVE
KETONES UR STRIP-MCNC: NEGATIVE MG/DL
LEUKOCYTE ESTERASE UR QL STRIP: ABNORMAL
MUCOUS THREADS #/AREA URNS LPF: PRESENT /LPF
NITRATE UR QL: POSITIVE
PH UR STRIP: 5.5 PH (ref 4.7–8)
RBC #/AREA URNS AUTO: 1 /HPF (ref 0–2)
SOURCE: ABNORMAL
SP GR UR STRIP: 1.01 (ref 1–1.03)
SQUAMOUS #/AREA URNS AUTO: 5 /HPF (ref 0–1)
UROBILINOGEN UR STRIP-MCNC: NORMAL MG/DL (ref 0–2)
WBC #/AREA URNS AUTO: 23 /HPF (ref 0–5)

## 2018-06-06 PROCEDURE — 87088 URINE BACTERIA CULTURE: CPT | Mod: ZL | Performed by: FAMILY MEDICINE

## 2018-06-06 PROCEDURE — 81001 URINALYSIS AUTO W/SCOPE: CPT | Mod: ZL | Performed by: FAMILY MEDICINE

## 2018-06-06 PROCEDURE — 87186 SC STD MICRODIL/AGAR DIL: CPT | Mod: ZL | Performed by: FAMILY MEDICINE

## 2018-06-06 PROCEDURE — 87086 URINE CULTURE/COLONY COUNT: CPT | Mod: ZL | Performed by: FAMILY MEDICINE

## 2018-06-06 RX ORDER — NITROFURANTOIN 25; 75 MG/1; MG/1
100 CAPSULE ORAL 2 TIMES DAILY
Qty: 10 CAPSULE | Refills: 0 | Status: SHIPPED | OUTPATIENT
Start: 2018-06-06 | End: 2019-02-13

## 2018-06-07 ENCOUNTER — HOSPITAL ENCOUNTER (OUTPATIENT)
Dept: PHYSICAL THERAPY | Facility: HOSPITAL | Age: 78
Setting detail: THERAPIES SERIES
End: 2018-06-07
Attending: ORTHOPAEDIC SURGERY
Payer: MEDICARE

## 2018-06-07 PROCEDURE — 97110 THERAPEUTIC EXERCISES: CPT | Mod: GP

## 2018-06-07 PROCEDURE — 40000718 ZZHC STATISTIC PT DEPARTMENT ORTHO VISIT

## 2018-06-08 ENCOUNTER — OFFICE VISIT (OUTPATIENT)
Dept: ORTHOPEDICS | Facility: OTHER | Age: 78
End: 2018-06-08
Attending: ORTHOPAEDIC SURGERY
Payer: COMMERCIAL

## 2018-06-08 VITALS
TEMPERATURE: 98 F | SYSTOLIC BLOOD PRESSURE: 100 MMHG | WEIGHT: 233 LBS | HEART RATE: 62 BPM | DIASTOLIC BLOOD PRESSURE: 70 MMHG | RESPIRATION RATE: 18 BRPM | BODY MASS INDEX: 38.82 KG/M2 | HEIGHT: 65 IN | OXYGEN SATURATION: 97 %

## 2018-06-08 DIAGNOSIS — M17.11 PRIMARY OSTEOARTHRITIS OF RIGHT KNEE: Primary | ICD-10-CM

## 2018-06-08 DIAGNOSIS — M16.11 PRIMARY OSTEOARTHRITIS OF RIGHT HIP: ICD-10-CM

## 2018-06-08 LAB
BACTERIA SPEC CULT: ABNORMAL
SPECIMEN SOURCE: ABNORMAL

## 2018-06-08 PROCEDURE — G0463 HOSPITAL OUTPT CLINIC VISIT: HCPCS

## 2018-06-08 PROCEDURE — 99213 OFFICE O/P EST LOW 20 MIN: CPT | Performed by: ORTHOPAEDIC SURGERY

## 2018-06-08 RX ORDER — SULINDAC 200 MG/1
200 TABLET ORAL 2 TIMES DAILY WITH MEALS
Qty: 60 TABLET | Refills: 3 | Status: SHIPPED | OUTPATIENT
Start: 2018-06-08 | End: 2018-08-13

## 2018-06-08 ASSESSMENT — PAIN SCALES - GENERAL: PAINLEVEL: NO PAIN (0)

## 2018-06-08 NOTE — MR AVS SNAPSHOT
After Visit Summary   6/8/2018    Dinorah Lim    MRN: [id number]           Patient Information     Date Of Birth          1940        Visit Information        Provider Department      6/8/2018 1:00 PM Esteban Wills MD [facility]        Today's Diagnoses     Primary osteoarthritis of right knee    -  1    Primary osteoarthritis of right hip           Follow-ups after your visit        Follow-up notes from your care team     Return in about 3 months (around 9/8/2018).      Your next 10 appointments already scheduled     Jun 11, 2018  8:00 AM CDT   Ortho Treatment with Aubree Burleson, PT   [facility] ([facility] )    [address] [address]   [phone]            Jun 14, 2018  8:30 AM CDT   Ortho Treatment with Ronda Angely, PTA   [facility] ([facility] )    [address] [address]   [phone]            Jun 22, 2018  9:00 AM CDT   Ortho Treatment with Aubree Burleson, PT   [facility] ([facility] )    [address] [address]   [phone]            Jun 28, 2018  8:30 AM CDT   Ortho Treatment with Ronda Angely, PTA   [facility] ([facility] )    [address] [address]   [phone]            Jul 03, 2018  8:30 AM CDT   Ortho Treatment with Ronda Angely, PTA   [facility] ([facility] )    [address] [address]   [phone]            Oct 01, 2018  9:00 AM CDT   (Arrive by 8:45 AM)   Pre-Op physical with Magaly Sosa MD   [facility] )    [address] [facility] [address]   [phone]            Oct 03, 2018  9:00 AM CDT   (Arrive by 8:45 AM)   Return Visit with Esteban Wills MD    [facility] ([facility] )    [address] [id number] [phone]              Who to contact     If you have questions or  "need follow up information about today's clinic visit or your schedule please contact  [facility]TH[facility] directly at [phone].  Normal or non-critical lab and imaging results will be communicated to you by MyChart, letter or phone within 4 business days after the clinic has received the results. If you do not hear from us within 7 days, please contact the clinic through MyChart or phone. If you have a critical or abnormal lab result, we will notify you by phone as soon as possible.  Submit refill requests through [employer] or call your pharmacy and they will forward the refill request to us. Please allow 3 business days for your refill to be completed.          Additional Information About Your Visit        Care EveryWhere ID     This is your Care EveryWhere ID. This could be used by other organizations to access your [facility] medical records  [id number]        Your Vitals Were     Pulse Temperature Respirations Height Pulse Oximetry BMI (Body Mass Index)    62 98  F (36.7  C) (Tympanic) 18 5' 5\" (1.651 m) 97% 38.77 kg/m2       Blood Pressure from Last 3 Encounters:   06/08/18 100/70   04/13/18 124/74   04/11/18 118/64    Weight from Last 3 Encounters:   06/08/18 233 lb (105.7 kg)   04/13/18 230 lb (104.3 kg)   04/11/18 228 lb (103.4 kg)              Today, you had the following     No orders found for display         Today's Medication Changes          These changes are accurate as of 6/8/18  1:56 PM.  If you have any questions, ask your nurse or doctor.               These medicines have changed or have updated prescriptions.        Dose/Directions    * sulindac 200 MG tablet   Commonly known as:  CLINORIL   This may have changed:  Another medication with the same name was added. Make sure you understand how and when to take each.   Used for:  Primary osteoarthritis of right knee   Changed by:  Esteban Wills MD        Dose:  200 mg   Take 1 tablet (200 mg) by mouth 2 times daily as needed   Quantity:  60 " tablet   Refills:  1       * sulindac 200 MG tablet   Commonly known as:  CLINORIL   This may have changed:  You were already taking a medication with the same name, and this prescription was added. Make sure you understand how and when to take each.   Used for:  Primary osteoarthritis of right knee, Primary osteoarthritis of right hip   Changed by:  Esteban Wills MD        Dose:  200 mg   Take 1 tablet (200 mg) by mouth 2 times daily (with meals)   Quantity:  60 tablet   Refills:  3       * Notice:  This list has 2 medication(s) that are the same as other medications prescribed for you. Read the directions carefully, and ask your doctor or other care provider to review them with you.         Where to get your medicines      These medications were sent to [facility] [address] [facility], [place] - [address] [facility] [address] [facility] [address]  [address] [address], [facility] [address]     Phone:  [phone]     sulindac 200 MG tablet                Primary Care Provider Office Phone # Fax #    Magaly Sosa [phone]       [address] [place] [address]        Equal Access to Services     : Hadii lourdes ku hadasho Soomaali, waaxda luqadaha, qaybta kaalmada adeegyada, waxay gulshan haylolis juan . So [facility] [phone].    ATENCIÓN: Si habla español, tiene a lazaro disposición servicios gratuitos de asistencia lingüística. Llame al [phone].    We comply with applicable federal civil rights laws and [place] laws. We do not discriminate on the basis of race, color, national origin, age, disability, sex, sexual orientation, or gender identity.            Thank you!     Thank you for choosing  [facility]  for your care. Our goal is always to provide you with excellent care. Hearing back from our patients is one way we can continue to improve our services. Please take a few minutes to complete the written survey that you may receive in the mail after your visit with us. Thank you!             Your  Updated Medication List - Protect others around you: Learn how to safely use, store and throw away your medicines at www.disposemymeds.org.          This list is accurate as of 6/8/18  1:56 PM.  Always use your most recent med list.                   Brand Name Dispense Instructions for use Diagnosis    albuterol (2.5 MG/3ML) 0.083% neb solution     1 Box    Take 1 vial (2.5 mg) by nebulization every 4 hours as needed for shortness of breath / dyspnea or wheezing    Acute bronchospasm       aspirin 81 MG EC tablet     90 tablet    Take 1 tablet (81 mg) by mouth daily    Varicose vein of leg       BENADRYL ALLERGY PO      Take 25 mg by mouth nightly as needed    Hives       clobetasol 0.05 % ointment    TEMOVATE    60 g    Apply at bedtime to sites of itch    Intrinsic eczema       clonazePAM 1 MG tablet    klonoPIN    45 tablet    TAKE 1/4 (ONE-FOURTH) TO 1/2 (ONE-HALF) TABLET BY MOUTH EVERY 8 HOURS AS NEEDED    Anxiety       FISH OIL      Take 1 capsule by mouth daily        Garlic 10 MG Caps      Take 1 capsule by mouth as needed        HYDROcodone-acetaminophen 5-325 MG per tablet    NORCO    60 tablet    TAKE ONE TABLET BY MOUTH EVERY 4 TO 6 HOURS AS NEEDED FOR PAIN    Primary osteoarthritis of right hip       nitroFURantoin (macrocrystal-monohydrate) 100 MG capsule    MACROBID    10 capsule    Take 1 capsule (100 mg) by mouth 2 times daily for 5 days    Acute cystitis without hematuria       PARoxetine 40 MG tablet    PAXIL    90 tablet    TAKE ONE TABLET BY MOUTH ONCE DAILY    Anxiety       simvastatin 20 MG tablet    ZOCOR    90 tablet    TAKE ONE TABLET BY MOUTH AT BEDTIME    Hyperlipidemia LDL goal <100       * sulindac 200 MG tablet    CLINORIL    60 tablet    Take 1 tablet (200 mg) by mouth 2 times daily as needed    Primary osteoarthritis of right knee       * sulindac 200 MG tablet    CLINORIL    60 tablet    Take 1 tablet (200 mg) by mouth 2 times daily (with meals)    Primary osteoarthritis of right  knee, Primary osteoarthritis of right hip       VITAMIN D (CHOLECALCIFEROL) PO      Take 2,000 Units by mouth daily        VITAMIN E      Take 1 capsule by mouth daily        * Notice:  This list has 2 medication(s) that are the same as other medications prescribed for you. Read the directions carefully, and ask your doctor or other care provider to review them with you.

## 2018-06-08 NOTE — PROGRESS NOTES
Chief complaints:  #1.  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  DJD right hip, mild to moderate  #3.  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 Magaly Sosa    HPI: She is a long history of DJD of her right knee (with a valgus deformity) as well as DJD of her hips.  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  Her right knee has been more severe with respect to symptoms.  She has been adamant that she did not want a TKA.  Treatment has therefore been conservative up to now consisting of a cane, sulindac, Tylenol, physical therapy, a Kenalog injection on 4/13/2018, and occasional use of hydrocodone.     Subjective: Today she reports that the injection she received 2 months ago gave her little if any benefit to the right knee.  Her right knee pain interferes with activities of daily living such as the distance she can walk in the number of stairs she can climb.  She also admits she is no longer taking sulindac, but she does not recall why.  She does not believe a physician has told her to stop.  She has gone back to using a cane on the right.  In addition, her right hip pain has increased.  It is a different pain from when she had right sciatica.  She is certain that it is from her arthritic right hip.  She denies a recent injury to the right lower extremity.    She has changed her mind about TKA.  She now desires a right TKA.  She attended the joint class in May and learned about its anatomy, risks, and benefits.  She understands that she will have to wait till October because she needs put 6 months between her last steroid injection and the procedure.    Nothing is changed with respect to her musculoskeletal or neurologic review of systems since seen last except what is listed above.  She does not smoke.    Examination: Obese (BMI 38) female in no  "obvious distress using a cane on the right.  The right knee has a mild valgus deformity.  It has no effusion.  It is stable to ligamentous stressing.  Its range of motion is 0-108 .  The neurovascular status of that limb is intact.    X-rays: The last films of the right knee were taken on 4/13/2018.  They showed Kellgren Faheem for changes with valgus deformity.  Her last hip films were taken in early 2017 and showed \"mild to moderate\" degenerative changes in both hips per the radiologist.    Impression:  #1.  Severe DJD right knee with valgus deformity, failure of conservative treatment  #2.  DJD right hip  #3.  Lumbar DJD with history of right sided sciatica    Plan:  #1.  We will begin the planning process for a right TKA in October.  This will require preoperative medical clearance from her PCP.  I will see her afterwards to answer final questions and sign consent.  She has for a prescription for hydrocodone and I declined saying it should be refilled by the original prescribing physician, presumably her PCP.  I will handled the postoperative narcotic prescriptions.  #2.  To help her knee get by until October, and hopefully to help her hip symptoms, I suggested she go back on sulindac.  She agreed.  The prescription was sent with enough refills to last her until surgery.  #3.  If the sulindac does not help her hip she can let us know and we can make arrangements for a right hip Kenalog injection at [facility].  If that does not work then a lumbar epidural injection may be warranted.      "

## 2018-06-08 NOTE — NURSING NOTE
"Chief Complaint   Patient presents with     Musculoskeletal Problem     Right Knee pain wants to talk about knee replacement surgery       Initial /70 (BP Location: Right arm, Patient Position: Sitting, Cuff Size: Adult Large)  Pulse 62  Temp 98  F (36.7  C) (Tympanic)  Resp 18  Ht 5' 5\" (1.651 m)  Wt 233 lb (105.7 kg)  SpO2 97%  BMI 38.77 kg/m2 Estimated body mass index is 38.77 kg/(m^2) as calculated from the following:    Height as of this encounter: 5' 5\" (1.651 m).    Weight as of this encounter: 233 lb (105.7 kg).  Medication Reconciliation: complete    Jesusita So LPN    "

## 2018-06-11 ENCOUNTER — HOSPITAL ENCOUNTER (OUTPATIENT)
Dept: PHYSICAL THERAPY | Facility: HOSPITAL | Age: 78
Setting detail: THERAPIES SERIES
End: 2018-06-11
Attending: ORTHOPAEDIC SURGERY
Payer: MEDICARE

## 2018-06-11 PROCEDURE — 40000718 ZZHC STATISTIC PT DEPARTMENT ORTHO VISIT

## 2018-06-11 PROCEDURE — 97110 THERAPEUTIC EXERCISES: CPT | Mod: GP

## 2018-06-14 ENCOUNTER — TELEPHONE (OUTPATIENT)
Dept: FAMILY MEDICINE | Facility: OTHER | Age: 78
End: 2018-06-14

## 2018-06-14 ENCOUNTER — HOSPITAL ENCOUNTER (OUTPATIENT)
Dept: PHYSICAL THERAPY | Facility: HOSPITAL | Age: 78
Setting detail: THERAPIES SERIES
End: 2018-06-14
Attending: ORTHOPAEDIC SURGERY
Payer: MEDICARE

## 2018-06-14 PROCEDURE — 40000718 ZZHC STATISTIC PT DEPARTMENT ORTHO VISIT

## 2018-06-14 PROCEDURE — 97110 THERAPEUTIC EXERCISES: CPT | Mod: GP

## 2018-06-14 NOTE — LETTER
Patient would benefit from swimming exercise classes due to upcoming knee surgery.    If you have any questions you may call us at [phone].               Dr Magaly Sosa

## 2018-06-14 NOTE — TELEPHONE ENCOUNTER
12:15 PM    Reason for Call: Phone Call    Description: Patient called in and stated she would like to get another letter stating she can take swimming for exercise due to needing a knee replacement.     Was an appointment offered for this call? No  If yes : Appointment type              Date    Preferred method for responding to this message: Telephone Call  What is your phone number ? [phone]    If we cannot reach you directly, may we leave a detailed response at the number you provided? Yes    Can this message wait until your PCP/provider returns, if available today? Not applicable,    Magaly Lora

## 2018-06-15 ENCOUNTER — TELEPHONE (OUTPATIENT)
Dept: ORTHOPEDICS | Facility: OTHER | Age: 78
End: 2018-06-15

## 2018-06-15 DIAGNOSIS — M16.11 DEGENERATIVE JOINT DISEASE OF RIGHT HIP: Primary | ICD-10-CM

## 2018-06-15 NOTE — TELEPHONE ENCOUNTER
Patient called saying her right hip is hurting more now than the right knee. She said if this happened was suppose to let Dr Wills know. Please call back at [phone]

## 2018-06-18 ENCOUNTER — TELEPHONE (OUTPATIENT)
Dept: FAMILY MEDICINE | Facility: OTHER | Age: 78
End: 2018-06-18

## 2018-06-18 DIAGNOSIS — R30.0 DYSURIA: ICD-10-CM

## 2018-06-18 DIAGNOSIS — R30.0 DYSURIA: Primary | ICD-10-CM

## 2018-06-18 DIAGNOSIS — N30.00 ACUTE CYSTITIS WITHOUT HEMATURIA: Primary | ICD-10-CM

## 2018-06-18 LAB
ALBUMIN UR-MCNC: 30 MG/DL
APPEARANCE UR: ABNORMAL
BACTERIA #/AREA URNS HPF: ABNORMAL /HPF
BILIRUB UR QL STRIP: NEGATIVE
COLOR UR AUTO: YELLOW
GLUCOSE UR STRIP-MCNC: NEGATIVE MG/DL
HGB UR QL STRIP: NEGATIVE
KETONES UR STRIP-MCNC: NEGATIVE MG/DL
LEUKOCYTE ESTERASE UR QL STRIP: ABNORMAL
MUCOUS THREADS #/AREA URNS LPF: PRESENT /LPF
NITRATE UR QL: NEGATIVE
PH UR STRIP: 5 PH (ref 4.7–8)
RBC #/AREA URNS AUTO: 8 /HPF (ref 0–2)
SOURCE: ABNORMAL
SP GR UR STRIP: 1.02 (ref 1–1.03)
SQUAMOUS #/AREA URNS AUTO: 16 /HPF (ref 0–1)
UROBILINOGEN UR STRIP-MCNC: 2 MG/DL (ref 0–2)
WBC #/AREA URNS AUTO: 49 /HPF (ref 0–5)

## 2018-06-18 PROCEDURE — 81001 URINALYSIS AUTO W/SCOPE: CPT | Mod: ZL | Performed by: FAMILY MEDICINE

## 2018-06-18 RX ORDER — CIPROFLOXACIN 250 MG/1
500 TABLET, FILM COATED ORAL 2 TIMES DAILY
Qty: 28 TABLET | Refills: 0 | Status: SHIPPED | OUTPATIENT
Start: 2018-06-18 | End: 2019-02-13

## 2018-06-18 NOTE — TELEPHONE ENCOUNTER
Before I call patient back, would you want another UA to determine if she still has UTI or should she see us?

## 2018-06-18 NOTE — TELEPHONE ENCOUNTER
2:27 PM    Reason for Call: Phone Call    Description: Pt called and states that she would like to see if she could get a call back regarding her getting a refill on her medication she got for her bladder infection she states that she thinks she still has this.    Was an appointment offered for this call? No  If yes : Appointment type              Date    Preferred method for responding to this message: Telephone Call  What is your phone number ?[phone]    If we cannot reach you directly, may we leave a detailed response at the number you provided? Yes    Can this message wait until your PCP/provider returns, if available today? Not applicable, PCP is in     Jenniffer Tabares

## 2018-06-20 NOTE — TELEPHONE ENCOUNTER
Writer called and will place order for radiology, and then will get her back in to see Dr. Wills after injection.   Jesusita So LPN

## 2018-07-03 ENCOUNTER — HOSPITAL ENCOUNTER (OUTPATIENT)
Dept: PHYSICAL THERAPY | Facility: HOSPITAL | Age: 78
Setting detail: THERAPIES SERIES
End: 2018-07-03
Attending: ORTHOPAEDIC SURGERY
Payer: MEDICARE

## 2018-07-03 DIAGNOSIS — M16.11 PRIMARY OSTEOARTHRITIS OF RIGHT HIP: ICD-10-CM

## 2018-07-03 PROCEDURE — 97110 THERAPEUTIC EXERCISES: CPT | Mod: GP

## 2018-07-03 PROCEDURE — 40000718 ZZHC STATISTIC PT DEPARTMENT ORTHO VISIT

## 2018-07-03 RX ORDER — HYDROCODONE BITARTRATE AND ACETAMINOPHEN 5; 325 MG/1; MG/1
TABLET ORAL
Qty: 60 TABLET | Refills: 0 | Status: SHIPPED | OUTPATIENT
Start: 2018-07-03 | End: 2018-08-06

## 2018-07-03 NOTE — TELEPHONE ENCOUNTER
Controlled Substance Refill Request for Norco  Problem List Complete:  No     PROVIDER TO CONSIDER COMPLETION OF PROBLEM LIST AND OVERVIEW/CONTROLLED SUBSTANCE AGREEMENT    Last Written Prescription Date:  3.15.18  Last Fill Quantity: 60,   # refills: 0    Last Office Visit with [facility] primary care provider: 3.22.18    Future Office visit:   Next 5 appointments (look out 90 days)     Oct 01, 2018  9:00 AM CDT   (Arrive by 8:45 AM)   Pre-Op physical with Magaly Sosa MD   [facility] ([facility] )    [address] [place] [address] [place] [address]   [phone]                  Controlled substance agreement on file: No.     Processing:  Patient will  in clinic    Patient called and prescription for Norco is placed at  for pickup.Rosetta Perry

## 2018-07-05 ENCOUNTER — TELEPHONE (OUTPATIENT)
Dept: ORTHOPEDICS | Facility: OTHER | Age: 78
End: 2018-07-05

## 2018-07-05 NOTE — TELEPHONE ENCOUNTER
Kary from [facility] called stating that patient is allergic to dexamethasone and is asking if there is another medication that he wants to inject patient with on July 10? Instructed Kary that Dr Wills in surgery this morning but that he will return to clinic in afternoon. Will get clarification from him at that time and call her with answer.

## 2018-07-05 NOTE — TELEPHONE ENCOUNTER
Clarified order with Dr Wills and in chart as well. Patient to receive 40 mg of kenalog and 3 mL of 1 % carbocaine for injection of right hip bursa. Kary was notified in DI and modifications were made for patient's injection on July 10th.

## 2018-07-06 ENCOUNTER — TELEPHONE (OUTPATIENT)
Dept: INTERVENTIONAL RADIOLOGY/VASCULAR | Facility: HOSPITAL | Age: 78
End: 2018-07-06

## 2018-07-06 NOTE — TELEPHONE ENCOUNTER
STEROID INJECTION REMINDER CALL    Called to remind patient of appointment on 07/10/2018 at 1530    Patient has not had a flu shot in the last two weeks.  Patient has not had immunizations in the last two weeks.  Patient has not had a steroid injection in the last two weeks.  Patient has not taken antibiotics in the past two weeks.    Discussed after care, and explained that a  needs to accompany patient to these appointments.  Patient verbalized an understanding of the  requirement.

## 2018-07-10 ENCOUNTER — HOSPITAL ENCOUNTER (OUTPATIENT)
Dept: INTERVENTIONAL RADIOLOGY/VASCULAR | Facility: HOSPITAL | Age: 78
Discharge: HOME | End: 2018-07-10
Attending: ORTHOPAEDIC SURGERY | Admitting: ORTHOPAEDIC SURGERY
Payer: MEDICARE

## 2018-07-10 DIAGNOSIS — M16.11 DEGENERATIVE JOINT DISEASE OF RIGHT HIP: ICD-10-CM

## 2018-07-10 PROCEDURE — 25000128 H RX IP 250 OP 636: Performed by: RADIOLOGY

## 2018-07-10 PROCEDURE — 77002 NEEDLE LOCALIZATION BY XRAY: CPT | Mod: TC

## 2018-07-10 PROCEDURE — 25000125 ZZHC RX 250: Performed by: RADIOLOGY

## 2018-07-10 RX ORDER — TRIAMCINOLONE ACETONIDE 40 MG/ML
INJECTION, SUSPENSION INTRA-ARTICULAR; INTRAMUSCULAR
Status: DISCONTINUED
Start: 2018-07-10 | End: 2018-07-11 | Stop reason: HOSPADM

## 2018-07-10 RX ORDER — LIDOCAINE HYDROCHLORIDE 10 MG/ML
5 INJECTION, SOLUTION EPIDURAL; INFILTRATION; INTRACAUDAL; PERINEURAL ONCE
Status: COMPLETED | OUTPATIENT
Start: 2018-07-10 | End: 2018-07-10

## 2018-07-10 RX ORDER — LIDOCAINE HYDROCHLORIDE 10 MG/ML
INJECTION, SOLUTION EPIDURAL; INFILTRATION; INTRACAUDAL; PERINEURAL
Status: DISCONTINUED
Start: 2018-07-10 | End: 2018-07-11 | Stop reason: HOSPADM

## 2018-07-10 RX ORDER — IOPAMIDOL 612 MG/ML
50 INJECTION, SOLUTION INTRAVASCULAR ONCE
Status: COMPLETED | OUTPATIENT
Start: 2018-07-10 | End: 2018-07-10

## 2018-07-10 RX ORDER — TRIAMCINOLONE ACETONIDE 40 MG/ML
40 INJECTION, SUSPENSION INTRA-ARTICULAR; INTRAMUSCULAR ONCE
Status: COMPLETED | OUTPATIENT
Start: 2018-07-10 | End: 2018-07-10

## 2018-07-10 RX ADMIN — MEPIVACAINE HYDROCHLORIDE 3 ML: 10 INJECTION, SOLUTION EPIDURAL; INFILTRATION at 15:57

## 2018-07-10 RX ADMIN — IOPAMIDOL 3 ML: 612 INJECTION, SOLUTION INTRAVENOUS at 15:55

## 2018-07-10 RX ADMIN — TRIAMCINOLONE ACETONIDE 40 MG: 40 INJECTION, SUSPENSION INTRA-ARTICULAR; INTRAMUSCULAR at 15:54

## 2018-07-10 RX ADMIN — LIDOCAINE HYDROCHLORIDE 2 ML: 10 INJECTION, SOLUTION EPIDURAL; INFILTRATION; INTRACAUDAL; PERINEURAL at 15:54

## 2018-07-10 NOTE — PROGRESS NOTES
Injection Documentation of Provider History    PER-PROVIDER HISTORY  Is this for treatment of acute herpes zoster, post herpetic neuralgia, post-decompressive radiculitis, or post-surgical scarring?   No  Does the patient have radiculopathy or sciatica?  No  How long has the patient had any physical therapy, home therapy or chiropractor care?  yes.  How long has the patient taken NSaids or muscle relaxants?  0 weeks.  Is there any history of systemic/local infection or unstable medical conditions?  No

## 2018-07-10 NOTE — IP AVS SNAPSHOT
[facility]    [address] [address]    Phone:  [phone]    Fax:  [phone]                                       After Visit Summary   7/10/2018    Dinorah Lim    MRN: [id number]           After Visit Summary Signature Page     I have received my discharge instructions, and my questions have been answered. I have discussed any challenges I see with this plan with the nurse or doctor.    ..........................................................................................................................................  Patient/Patient Representative Signature      ..........................................................................................................................................  Patient Representative Print Name and Relationship to Patient    ..................................................               ................................................  Date                                            Time    ..........................................................................................................................................  Reviewed by Signature/Title    ...................................................              ..............................................  Date                                                            Time

## 2018-07-10 NOTE — IP AVS SNAPSHOT
MRN:[id number]                      After Visit Summary   7/10/2018    Dinorah Lim    MRN: [id number]           Visit Information        Provider Department      7/10/2018  3:30 PM HIIRRAD; HIIR1 [facility] INTERVENTIONAL [facility]           Review of your medicines      UNREVIEWED medicines. Ask your doctor about these medicines        Dose / Directions    albuterol (2.5 MG/3ML) 0.083% neb solution   Used for:  Acute bronchospasm        Dose:  1 vial   Take 1 vial (2.5 mg) by nebulization every 4 hours as needed for shortness of breath / dyspnea or wheezing   Quantity:  1 Box   Refills:  0       aspirin 81 MG EC tablet   Used for:  Varicose vein of leg        Dose:  81 mg   Take 1 tablet (81 mg) by mouth daily   Quantity:  90 tablet   Refills:  3       BENADRYL ALLERGY PO   Indication:  takes with simvastatin   Used for:  Hives        Dose:  25 mg   Take 25 mg by mouth nightly as needed   Refills:  0       clobetasol 0.05 % ointment   Commonly known as:  TEMOVATE   Used for:  Intrinsic eczema        Apply at bedtime to sites of itch   Quantity:  60 g   Refills:  3       clonazePAM 1 MG tablet   Commonly known as:  klonoPIN   Used for:  Anxiety        TAKE 1/4 (ONE-FOURTH) TO 1/2 (ONE-HALF) TABLET BY MOUTH EVERY 8 HOURS AS NEEDED   Quantity:  45 tablet   Refills:  0       FISH OIL        Dose:  1 capsule   Take 1 capsule by mouth daily   Refills:  0       Garlic 10 MG Caps        Dose:  1 capsule   Take 1 capsule by mouth as needed   Refills:  0       HYDROcodone-acetaminophen 5-325 MG per tablet   Commonly known as:  NORCO   Used for:  Primary osteoarthritis of right hip        TAKE ONE TABLET BY MOUTH EVERY 4 TO 6 HOURS AS NEEDED FOR PAIN   Quantity:  60 tablet   Refills:  0       PARoxetine 40 MG tablet   Commonly known as:  PAXIL   Used for:  Anxiety        TAKE ONE TABLET BY MOUTH ONCE DAILY   Quantity:  90 tablet   Refills:  2       simvastatin 20 MG tablet   Commonly known as:  ZOCOR   Used for:   Hyperlipidemia LDL goal <100        TAKE ONE TABLET BY MOUTH AT BEDTIME   Quantity:  90 tablet   Refills:  2       * sulindac 200 MG tablet   Commonly known as:  CLINORIL   Used for:  Primary osteoarthritis of right knee        Dose:  200 mg   Take 1 tablet (200 mg) by mouth 2 times daily as needed   Quantity:  60 tablet   Refills:  1       * sulindac 200 MG tablet   Commonly known as:  CLINORIL   Used for:  Primary osteoarthritis of right knee, Primary osteoarthritis of right hip        Dose:  200 mg   Take 1 tablet (200 mg) by mouth 2 times daily (with meals)   Quantity:  60 tablet   Refills:  3       VITAMIN D (CHOLECALCIFEROL) PO        Dose:  2000 Units   Take 2,000 Units by mouth daily   Refills:  0       VITAMIN E        Dose:  1 capsule   Take 1 capsule by mouth daily   Refills:  0       * Notice:  This list has 2 medication(s) that are the same as other medications prescribed for you. Read the directions carefully, and ask your doctor or other care provider to review them with you.             Protect others around you: Learn how to safely use, store and throw away your medicines at www.disposemymeds.org.         Follow-ups after your visit        Your next 10 appointments already scheduled     Oct 01, 2018  9:00 AM CDT   (Arrive by 8:45 AM)   Pre-Op physical with Magaly Sosa MD   [facility] ([facility] )    [address] [place] [address]   [phone]            Oct 03, 2018  9:00 AM CDT   (Arrive by 8:45 AM)   Return Visit with Esteban Wills MD    ORTHOPEDICS ([facility] )    [address] [facility] [address] [phone]               Care Instructions        Further instructions from your care team       Home number on file [phone] (home)  Is it ok to leave a message at this number(s)? Yes    Dr. Baig completed your procedure on 7/10/2018.    Current Pain Level (0-10 Scale): 5/10  Post Pain Level (0-10):   0/10    Radiology Discharge instructions for Steroid Injection    Activity Level:     Do not do any heavy activity or exercise for 24 hours.   Do not drive for 4 hours after your injection.  Diet:   Return to your normal diet.  Medications:   If you have stopped taking your Aspirin, Coumadin/Warfarin, Ibuprofen, or any   other blood thinner for this procedure you may resume in the morning unless   your primary care provider has given you other instructions.    Diabetics may see an increase in blood sugar after steroid injections. If you are concerned about your blood sugar, please contact your family doctor.    Site Care:  Remove the bandage and bathe or shower the morning after the procedure.      Please allow two weeks to experience improvement in your pain.  If you have any further issues, please contact your provider.    Call your Primary Care Provider if you have the following (if your primary care provider is not available please seek emergency care):   Nausea with vomiting   Severe headache   Drowsiness or confusion   Redness or drainage at the injection or puncture site   Temperature over 101 degrees F   Other concerns   Worsening back pain   Stiff neck           Additional Information About Your Visit        Care EveryWhere ID     This is your Care EveryWhere ID. This could be used by other organizations to access your [facility] medical records  [id number]         Primary Care Provider Office Phone # Fax #    Magaly Sosa [phone]      Equal Access to Services     EDGARDO SOLANO : Jorge Talbot, wasilvana overton, qaybta kaalmada grover, olga caballero. So [facility] [phone].    ATENCIÓN: Si habla español, tiene a lazaro disposición servicios gratuitos de asistencia lingüística. Mario al [phone].    We comply with applicable federal civil rights laws and [place] laws. We do not discriminate on the basis of race, color, national origin,  age, disability, sex, sexual orientation, or gender identity.            Thank you!     Thank you for choosing [facility] for your care. Our goal is always to provide you with excellent care. Hearing back from our patients is one way we can continue to improve our services. Please take a few minutes to complete the written survey that you may receive in the mail after you visit with us. Thank you!             Medication List: This is a list of all your medications and when to take them. Check marks below indicate your daily home schedule. Keep this list as a reference.      Medications           Morning Afternoon Evening Bedtime As Needed    albuterol (2.5 MG/3ML) 0.083% neb solution   Take 1 vial (2.5 mg) by nebulization every 4 hours as needed for shortness of breath / dyspnea or wheezing                                aspirin 81 MG EC tablet   Take 1 tablet (81 mg) by mouth daily                                BENADRYL ALLERGY PO   Take 25 mg by mouth nightly as needed                                clobetasol 0.05 % ointment   Commonly known as:  TEMOVATE   Apply at bedtime to sites of itch                                clonazePAM 1 MG tablet   Commonly known as:  klonoPIN   TAKE 1/4 (ONE-FOURTH) TO 1/2 (ONE-HALF) TABLET BY MOUTH EVERY 8 HOURS AS NEEDED                                FISH OIL   Take 1 capsule by mouth daily                                Garlic 10 MG Caps   Take 1 capsule by mouth as needed                                HYDROcodone-acetaminophen 5-325 MG per tablet   Commonly known as:  NORCO   TAKE ONE TABLET BY MOUTH EVERY 4 TO 6 HOURS AS NEEDED FOR PAIN                                PARoxetine 40 MG tablet   Commonly known as:  PAXIL   TAKE ONE TABLET BY MOUTH ONCE DAILY                                simvastatin 20 MG tablet   Commonly known as:  ZOCOR   TAKE ONE TABLET BY MOUTH AT BEDTIME                                * sulindac 200 MG tablet   Commonly known as:  CLINORIL   Take 1 tablet  (200 mg) by mouth 2 times daily as needed                                * sulindac 200 MG tablet   Commonly known as:  CLINORIL   Take 1 tablet (200 mg) by mouth 2 times daily (with meals)                                VITAMIN D (CHOLECALCIFEROL) PO   Take 2,000 Units by mouth daily                                VITAMIN E   Take 1 capsule by mouth daily                                * Notice:  This list has 2 medication(s) that are the same as other medications prescribed for you. Read the directions carefully, and ask your doctor or other care provider to review them with you.

## 2018-07-10 NOTE — DISCHARGE INSTRUCTIONS
Home number on file [phone] (home)  Is it ok to leave a message at this number(s)? Yes    Dr. Baig completed your procedure on 7/10/2018.    Current Pain Level (0-10 Scale): 5/10  Post Pain Level (0-10):  0/10    Radiology Discharge instructions for Steroid Injection    Activity Level:     Do not do any heavy activity or exercise for 24 hours.   Do not drive for 4 hours after your injection.  Diet:   Return to your normal diet.  Medications:   If you have stopped taking your Aspirin, Coumadin/Warfarin, Ibuprofen, or any   other blood thinner for this procedure you may resume in the morning unless   your primary care provider has given you other instructions.    Diabetics may see an increase in blood sugar after steroid injections. If you are concerned about your blood sugar, please contact your family doctor.    Site Care:  Remove the bandage and bathe or shower the morning after the procedure.      Please allow two weeks to experience improvement in your pain.  If you have any further issues, please contact your provider.    Call your Primary Care Provider if you have the following (if your primary care provider is not available please seek emergency care):   Nausea with vomiting   Severe headache   Drowsiness or confusion   Redness or drainage at the injection or puncture site   Temperature over 101 degrees F   Other concerns   Worsening back pain   Stiff neck

## 2018-07-16 ENCOUNTER — TELEPHONE (OUTPATIENT)
Dept: FAMILY MEDICINE | Facility: OTHER | Age: 78
End: 2018-07-16

## 2018-07-16 NOTE — TELEPHONE ENCOUNTER
3:02 PM    Reason for Call: OVERBOOK    Patient is having the following symptoms: Not feeling well , face gets flush but no fever for  3 days    The patient is requesting an appointment for ASAP  with Dr. Magaly Sosa    Was an appointment offered for this call?  No    Preferred method for responding to this message: [phone]    If we cannot reach you directly, may we leave a detailed response at the number you provided?Letitia Quarles

## 2018-07-16 NOTE — TELEPHONE ENCOUNTER
Please schedule patient for date/time: unable may go to urgent care    Have patient go to ER/Urgent Care Center. Urgent Care hours are 9:30 am to 8 pm, open 7 days a week. Yes.    Provider will call patient.No.    Other:

## 2018-07-20 DIAGNOSIS — F41.9 ANXIETY: Chronic | ICD-10-CM

## 2018-07-23 RX ORDER — CLONAZEPAM 1 MG/1
TABLET ORAL
Qty: 45 TABLET | Refills: 0 | Status: SHIPPED | OUTPATIENT
Start: 2018-07-23 | End: 2018-10-28

## 2018-07-23 NOTE — TELEPHONE ENCOUNTER
Klonopin      Last Written Prescription Date:  5/22/18  Last Fill Quantity: 45,   # refills: 0  Last Office Visit: 3/22/18  Future Office visit:    Next 5 appointments (look out 90 days)     Oct 01, 2018  9:00 AM CDT   (Arrive by 8:45 AM)   Pre-Op physical with Magaly Sosa MD   [facility] ([facility] )    [address] [facility] [address]   [phone]            Oct 03, 2018  9:00 AM CDT   (Arrive by 8:45 AM)   Return Visit with Esteban Wills MD    ORTHOPEDICS ([facility] )    [address]  [facility] [id number] [phone]                   Routing refill request to provider for review/approval because:  Drug not on the [facility], [facility] or [facility] refill protocol or controlled substance

## 2018-08-06 ENCOUNTER — TELEPHONE (OUTPATIENT)
Dept: FAMILY MEDICINE | Facility: OTHER | Age: 78
End: 2018-08-06

## 2018-08-06 DIAGNOSIS — M16.11 PRIMARY OSTEOARTHRITIS OF RIGHT HIP: ICD-10-CM

## 2018-08-06 RX ORDER — HYDROCODONE BITARTRATE AND ACETAMINOPHEN 5; 325 MG/1; MG/1
TABLET ORAL
Qty: 60 TABLET | Refills: 0 | Status: SHIPPED | OUTPATIENT
Start: 2018-08-06 | End: 2018-11-29
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
## 2018-08-07 NOTE — TELEPHONE ENCOUNTER
Pt notified that the written RX is ready at the [facility]  registration to be picked up.

## 2018-08-09 NOTE — PROGRESS NOTES
SUBJECTIVE:   Dinorah Lim is a 77 year old female who presents to clinic today for the following health issues:      COPD Follow-Up    Symptoms are currently: stable    Current fatigue or dyspnea with ambulation: none    Shortness of breath: stable    Increased or change in Cough/Sputum: No    Fever(s): No    Baseline ambulation without stopping to rest:  100 feet. Able to walk up 2 flights of stairs without stopping to rest.    Any ER/UC or hospital admissions since your last visit? No     History   Smoking Status     Never Smoker   Smokeless Tobacco     Never Used     No results found for: FEV1, SAH9VBW    Amount of exercise or physical activity: None    Problems taking medications regularly: No    Medication side effects: none    Diet: regular (no restrictions)  She is not having issues at this time    Varicose Veins      Duration: couple weeks    Description (location/character/radiation): varicose veins in lower right leg    Intensity:  mild    Accompanying signs and symptoms: tender to touch, bulging at times, warm    History (similar episodes/previous evaluation): None    Precipitating or alleviating factors: None    Therapies tried and outcome: None     She is going to have knee arthroplasty done in a couple of weeks and wants to make sure this won't be an issue for her. No pain, no claudication      Problem list and histories reviewed & adjusted, as indicated.  Additional history: as documented    Patient Active Problem List   Diagnosis     Osteoarthritis of right hip     Abnormal glucose     Osteoarthritis     Lung density on x-ray     Hyperlipidemia LDL goal <130     Advanced care planning/counseling discussion     Anxiety     COPD (chronic obstructive pulmonary disease) (H)     Urticaria     ACP (advance care planning)     Morbid obesity (H)     Elevated glucose     Past Surgical History:   Procedure Laterality Date     CLOSED REDUCTION WRIST Right 1952 x 2    long arm cast (same time as elbow fx)      CLOSED RX ELBOW DISLOCATION Right 1952    CR/long cast of fracture     HC INJ EPIDURAL LUMBAR/SACRAL W/WO CONTRAST Bilateral 5/2013    facet injections; prev 2012     LAPAROSCOPIC CHOLECYSTECTOMY N/A 1/4/2015    Procedure: LAPAROSCOPIC CHOLECYSTECTOMY;  Surgeon: Brittney العلي MD;  Location: [facility] OR     TONSILLECTOMY         Social History   Substance Use Topics     Smoking status: Never Smoker     Smokeless tobacco: Never Used     Alcohol use No     Family History   Problem Relation Age of Onset     HEART DISEASE Brother      atrial fibrillation     Other - See Comments Mother      emphysema     HEART DISEASE Father 92     CHF     Cancer Paternal Grandfather      lung cancer     Cancer Maternal Uncle      lung cancer         Current Outpatient Prescriptions   Medication Sig Dispense Refill     clobetasol (TEMOVATE) 0.05 % ointment Apply at bedtime to sites of itch 60 g 3     clonazePAM (KLONOPIN) 1 MG tablet TAKE 1/4 TO 1/2 (ONE-FOURTH TO ONE-HALF) TABLET BY MOUTH EVERY 8 HOURS AS NEEDED 45 tablet 0     HYDROcodone-acetaminophen (NORCO) 5-325 MG per tablet TAKE ONE TABLET BY MOUTH EVERY 4 TO 6 HOURS AS NEEDED FOR PAIN 60 tablet 0     PARoxetine (PAXIL) 40 MG tablet TAKE ONE TABLET BY MOUTH ONCE DAILY 90 tablet 2     simvastatin (ZOCOR) 20 MG tablet TAKE ONE TABLET BY MOUTH AT BEDTIME 90 tablet 2     albuterol (2.5 MG/3ML) 0.083% neb solution Take 1 vial (2.5 mg) by nebulization every 4 hours as needed for shortness of breath / dyspnea or wheezing (Patient not taking: Reported on 8/13/2018) 1 Box 0     aspirin 81 MG EC tablet Take 1 tablet (81 mg) by mouth daily 90 tablet 3     FISH OIL Take 1 capsule by mouth daily        Garlic 10 MG CAPS Take 1 capsule by mouth as needed       sulindac (CLINORIL) 200 MG tablet Take 1 tablet (200 mg) by mouth 2 times daily as needed (Patient not taking: Reported on 8/13/2018) 60 tablet 1     VITAMIN D, CHOLECALCIFEROL, PO Take 2,000 Units by mouth daily       VITAMIN E Take  1 capsule by mouth daily        Allergies   Allergen Reactions     Chocolate Hives     Lemon Flavor Hives     Lime [Calcium Oxysulfide] Hives     Orange Fruit [Citrus] Hives     Strawberry Hives     Adhesive Tape      Band-aids     Codeine Hives     Patient can tolerate oxycodone & dilaudid     Dexamethasone Hives     Erythromycin Hives     ERYTHROMYCIN BASE     Grapefruit [Extra Strength Grapefruit]      GRAPEFRUIT     Penicillins Hives     Sulfa Drugs      SULFONAMIDE ANTIBIOTICS      Tomato      BP Readings from Last 3 Encounters:   08/13/18 124/64   06/08/18 100/70   04/13/18 124/74    Wt Readings from Last 3 Encounters:   08/13/18 233 lb (105.7 kg)   06/08/18 233 lb (105.7 kg)   04/13/18 230 lb (104.3 kg)                    Reviewed and updated as needed this visit by clinical staff       Reviewed and updated as needed this visit by Provider         ROS:  Constitutional, HEENT, cardiovascular, pulmonary, gi and gu systems are negative, except as otherwise noted.    OBJECTIVE:                                                    /64  Pulse 65  Resp 19  Wt 233 lb (105.7 kg)  SpO2 97%  BMI 38.77 kg/m2  Body mass index is 38.77 kg/(m^2).  GENERAL APPEARANCE: healthy, alert and no distress  RESP: lungs clear to auscultation - no rales, rhonchi or wheezes  CV: regular rates and rhythm, normal S1 S2, no S3 or S4 and no murmur, click or rub  MS: extremities normal- no gross deformities noted and varicose veins noted of the lower extremities. Right lower leg is non tender without edema, no calf tender ness bilaterally , Homen's sign is negative bilaterally  SKIN: no suspicious lesions or rashes  NEURO: Normal strength and tone, mentation intact and speech normal  PSYCH: mentation appears normal and affect normal/bright         ASSESSMENT/PLAN:                                                    1. Varicose veins of both lower extremities  Without DVT or infection. Advised to continue to wear compressive clothing  or hilda stockings for edema    2. Pulmonary emphysema, unspecified emphysema type (H)  stable    3. Morbid obesity (H)        Follow up with Provider - prn     Magaly Sosa MD  [facility]

## 2018-08-13 ENCOUNTER — OFFICE VISIT (OUTPATIENT)
Dept: FAMILY MEDICINE | Facility: OTHER | Age: 78
End: 2018-08-13
Attending: FAMILY MEDICINE
Payer: COMMERCIAL

## 2018-08-13 VITALS
RESPIRATION RATE: 19 BRPM | DIASTOLIC BLOOD PRESSURE: 64 MMHG | HEART RATE: 65 BPM | BODY MASS INDEX: 38.77 KG/M2 | OXYGEN SATURATION: 97 % | SYSTOLIC BLOOD PRESSURE: 124 MMHG | WEIGHT: 233 LBS

## 2018-08-13 DIAGNOSIS — I83.93 VARICOSE VEINS OF BOTH LOWER EXTREMITIES: Primary | ICD-10-CM

## 2018-08-13 DIAGNOSIS — J43.9 PULMONARY EMPHYSEMA, UNSPECIFIED EMPHYSEMA TYPE (H): ICD-10-CM

## 2018-08-13 DIAGNOSIS — E66.01 MORBID OBESITY (H): ICD-10-CM

## 2018-08-13 PROCEDURE — 99213 OFFICE O/P EST LOW 20 MIN: CPT | Performed by: FAMILY MEDICINE

## 2018-08-13 PROCEDURE — G0463 HOSPITAL OUTPT CLINIC VISIT: HCPCS

## 2018-08-13 ASSESSMENT — ANXIETY QUESTIONNAIRES
6. BECOMING EASILY ANNOYED OR IRRITABLE: NOT AT ALL
IF YOU CHECKED OFF ANY PROBLEMS ON THIS QUESTIONNAIRE, HOW DIFFICULT HAVE THESE PROBLEMS MADE IT FOR YOU TO DO YOUR WORK, TAKE CARE OF THINGS AT HOME, OR GET ALONG WITH OTHER PEOPLE: NOT DIFFICULT AT ALL
4. TROUBLE RELAXING: NOT AT ALL
GAD7 TOTAL SCORE: 3
1. FEELING NERVOUS, ANXIOUS, OR ON EDGE: SEVERAL DAYS
2. NOT BEING ABLE TO STOP OR CONTROL WORRYING: SEVERAL DAYS
3. WORRYING TOO MUCH ABOUT DIFFERENT THINGS: SEVERAL DAYS
7. FEELING AFRAID AS IF SOMETHING AWFUL MIGHT HAPPEN: NOT AT ALL
5. BEING SO RESTLESS THAT IT IS HARD TO SIT STILL: NOT AT ALL

## 2018-08-13 ASSESSMENT — PAIN SCALES - GENERAL: PAINLEVEL: NO PAIN (0)

## 2018-08-13 NOTE — MR AVS SNAPSHOT
After Visit Summary   8/13/2018    Dinorah Lim    MRN: [id number]           Patient Information     Date Of Birth          1940        Visit Information        Provider Department      8/13/2018 11:00 AM Magaly Sosa MD [facility]         Follow-ups after your visit        Your next 10 appointments already scheduled     Oct 01, 2018  9:00 AM CDT   (Arrive by 8:45 AM)   Pre-Op physical with Magaly Sosa MD   [facility] [place] ([facility] )    [address]  [place] [address]   [phone]            Oct 03, 2018  9:00 AM CDT   (Arrive by 8:45 AM)   Return Visit with Esteban Wills MD    ORTHOPEDICS ([facility] )    [address]  [place] [id number] [phone]              Who to contact     If you have questions or need follow up information about today's clinic visit or your schedule please contact [facility] directly at [phone].  Normal or non-critical lab and imaging results will be communicated to you by MyChart, letter or phone within 4 business days after the clinic has received the results. If you do not hear from us within 7 days, please contact the clinic through MyChart or phone. If you have a critical or abnormal lab result, we will notify you by phone as soon as possible.  Submit refill requests through [employer] or call your pharmacy and they will forward the refill request to us. Please allow 3 business days for your refill to be completed.          Additional Information About Your Visit        Care EveryWhere ID     This is your Care EveryWhere ID. This could be used by other organizations to access your [facility] medical records  [id number]        Your Vitals Were     Pulse Respirations Pulse Oximetry BMI (Body Mass Index)          65 19 97% 38.77 kg/m2         Blood Pressure from Last 3 Encounters:   08/13/18 124/64   06/08/18 100/70   04/13/18 124/74    Weight from Last  3 Encounters:   08/13/18 233 lb (105.7 kg)   06/08/18 233 lb (105.7 kg)   04/13/18 230 lb (104.3 kg)              Today, you had the following     No orders found for display       Primary Care Provider Office Phone # Fax #    Magaly Sosa [phone] [address] [address]        Equal Access to Services     EDGARDO SOLANO : Hadii aad ku hadasho Soomaali, waaxda luqadaha, qaybta kaalmada adeegyada, waxay elizabethin hayaan adeeg aleetirshiban juan . So [facility] [phone].    ATENCIÓN: Si yeimila espwinston, tiene a lazaro disposición servicios gratuitos de asistencia lingüística. Llame al [phone].    We comply with applicable federal civil rights laws and [place] laws. We do not discriminate on the basis of race, color, national origin, age, disability, sex, sexual orientation, or gender identity.            Thank you!     Thank you for choosing [facility]  for your care. Our goal is always to provide you with excellent care. Hearing back from our patients is one way we can continue to improve our services. Please take a few minutes to complete the written survey that you may receive in the mail after your visit with us. Thank you!             Your Updated Medication List - Protect others around you: Learn how to safely use, store and throw away your medicines at www.disposemymeds.org.          This list is accurate as of 8/13/18 11:54 AM.  Always use your most recent med list.                   Brand Name Dispense Instructions for use Diagnosis    albuterol (2.5 MG/3ML) 0.083% neb solution     1 Box    Take 1 vial (2.5 mg) by nebulization every 4 hours as needed for shortness of breath / dyspnea or wheezing    Acute bronchospasm       aspirin 81 MG EC tablet     90 tablet    Take 1 tablet (81 mg) by mouth daily    Varicose vein of leg       clobetasol 0.05 % ointment    TEMOVATE    60 g    Apply at bedtime to sites of itch    Intrinsic eczema       clonazePAM 1 MG tablet     klonoPIN    45 tablet    TAKE 1/4 TO 1/2 (ONE-FOURTH TO ONE-HALF) TABLET BY MOUTH EVERY 8 HOURS AS NEEDED    Anxiety       FISH OIL      Take 1 capsule by mouth daily        Garlic 10 MG Caps      Take 1 capsule by mouth as needed        HYDROcodone-acetaminophen 5-325 MG per tablet    NORCO    60 tablet    TAKE ONE TABLET BY MOUTH EVERY 4 TO 6 HOURS AS NEEDED FOR PAIN    Primary osteoarthritis of right hip       PARoxetine 40 MG tablet    PAXIL    90 tablet    TAKE ONE TABLET BY MOUTH ONCE DAILY    Anxiety       simvastatin 20 MG tablet    ZOCOR    90 tablet    TAKE ONE TABLET BY MOUTH AT BEDTIME    Hyperlipidemia LDL goal <100       sulindac 200 MG tablet    CLINORIL    60 tablet    Take 1 tablet (200 mg) by mouth 2 times daily as needed    Primary osteoarthritis of right knee       VITAMIN D (CHOLECALCIFEROL) PO      Take 2,000 Units by mouth daily        VITAMIN E      Take 1 capsule by mouth daily

## 2018-08-13 NOTE — NURSING NOTE
"Chief Complaint   Patient presents with     Varicose Vein     COPD       Initial /64  Pulse 65  Resp 19  Wt 233 lb (105.7 kg)  SpO2 97%  BMI 38.77 kg/m2 Estimated body mass index is 38.77 kg/(m^2) as calculated from the following:    Height as of 6/8/18: 5' 5\" (1.651 m).    Weight as of this encounter: 233 lb (105.7 kg).  Medication Reconciliation: complete    Aditi Saleh LPN    "

## 2018-08-13 NOTE — LETTER
My COPD Action Plan     Name: Dinorah Lim    YOB: 1940   Date: 8/9/2018    My doctor: Magaly Sosa MD   My clinic: [facility] [address]  [place] [address]  [phone]  My Controller Medicine: Albuterol (Proair/Ventolin/Proventolin)   Dose: nebulizer treatment prn     My Rescue Medicine: Levalbuterol (Xopenex) nebulizer solution   Dose: 2.5mg      My Flare Up Medicine: none   Dose: none       My COPD Severity: NA      Use of Oxygen: Oxygen Not Prescribed      Make sure you've had your pneumonia   vaccines.          GREEN ZONE       Doing well today      Usual level of activity and exercise    Usual amount of cough and mucus    No shortness of breath    Usual level of health (thinking clearly, sleeping well, feel like eating) Actions:      Take daily medicines    Use oxygen as prescribed    Follow regular exercise and diet plan    Avoid cigarette smoke and other irritants that harm the lungs           YELLOW ZONE          Having a bad day or flare up      Short of breath more than usual    A lot more sputum (mucus) than usual    Sputum looks yellow, green, tan, brown or bloody    More coughing or wheezing    Fever or chills    Less energy; trouble completing activities    Trouble thinking or focusing    Using quick relief inhaler or nebulizer more often    Poor sleep; symptoms wake me up    Do not feel like eating Actions:      Get plenty of rest    Take daily medicines    Use quick relief inhaler every 4-6 hours    If you use oxygen, call you doctor to see if you should adjust your oxygen    Do breathing exercises or other things to help you relax    Let a loved one, friend or neighbor know you are feeling worse    Call your care team if you have 2 or more symptoms.  Start taking steroids or antibiotics if directed by your care team           RED ZONE       Need medical care now      Severe shortness of breath (feel you can't breathe)    Fever, chills    Not enough  breath to do any activity    Trouble coughing up mucus, walking or talking    Blood in mucus    Frequent coughing   Rescue medicines are not working    Not able to sleep because of breathing    Feel confused or drowsy    Chest pain    Actions:      Call your health care team.  If you cannot reach your care team, call 911 or go to the emergency room.        Annual Reminders:  Meet with Care Team, Flu Shot every Fall  Pharmacy: [facility] [address] [facility] [address] [address]

## 2018-08-14 ASSESSMENT — ANXIETY QUESTIONNAIRES: GAD7 TOTAL SCORE: 3

## 2018-08-14 ASSESSMENT — PATIENT HEALTH QUESTIONNAIRE - PHQ9: SUM OF ALL RESPONSES TO PHQ QUESTIONS 1-9: 4

## 2018-09-24 NOTE — PATIENT INSTRUCTIONS
Before Your Surgery      Call your surgeon if there is any change in your health. This includes signs of a cold or flu (such as a sore throat, runny nose, cough, rash or fever).    Do not smoke, drink alcohol or take over the counter medicine (unless your surgeon or primary care doctor tells you to) for the 24 hours before and after surgery.    If you take prescribed drugs: Follow your doctor s orders about which medicines to take and which to stop until after surgery.    Eating and drinking prior to surgery: follow the instructions from your surgeon    Take a shower or bath the night before surgery. Use the soap your surgeon gave you to gently clean your skin. If you do not have soap from your surgeon, use your regular soap. Do not shave or scrub the surgery site.  Wear clean pajamas and have clean sheets on your bed.     Nothing to eat or drink after midnight the night prior to surgery

## 2018-09-24 NOTE — PROGRESS NOTES
[facility] [place]  [address]  [place] [address]  [phone]  Dept: [phone]    PRE-OP EVALUATION:  Today's date: 10/1/2018    Dinorah Lim (: 1940) presents for pre-operative evaluation assessment as requested by Dr. Wills.  She requires evaluation and anesthesia risk assessment prior to undergoing surgery/procedure for treatment of degenerative joint disease .    Proposed Surgery/ Procedure: Right Total Knee Arthroplasty   Date of Surgery/ Procedure: 10/15/18  Time of Surgery/ Procedure: TBD  Hospital/Surgical Facility: [facility] [place]  Fax number for surgical facility:   Primary Physician: Magaly Sosa  Type of Anesthesia Anticipated: to be determined    Patient has a Health Care Directive or Living Will:  YES -will bring in her will    1. NO - Do you have a history of heart attack, stroke, stent, bypass or surgery on an artery in the head, neck, heart or legs?  2. YES - DO YOU EVER HAVE ANY PAIN OR DISCOMFORT IN YOUR CHEST? Discomfort due to back pain that radiates to chest area  3. NO - Do you have a history of  Heart Failure?  4. YES - ARE YOUR TROUBLED BY SHORTNESS OF BREATH WHEN WALKING ON THE LEVEL, UP A SLIGHT HILL OR AT NIGHT? Some SOB  5. NO - Do you currently have a cold, bronchitis or other respiratory infection?  6. YES - DO YOU HAVE A COUGH, SHORTNESS OF BREATH OR WHEEZING? Shortness of breath  7. NO - Do you sometimes get pains in the calves of your legs when you walk?  8. NO - Do you or anyone in your family have previous history of blood clots?  9. NO - Do you or does anyone in your family have a serious bleeding problem such as prolonged bleeding following surgeries or cuts?  10. NO - Have you ever had problems with anemia or been told to take iron pills?  11. NO - Have you had any abnormal blood loss such as black, tarry or bloody stools, or abnormal vaginal bleeding?  12. NO - Have you ever had a blood transfusion?  13. NO - Have you or any of your relatives  ever had problems with anesthesia?  14. YES - DO YOU HAVE SLEEP APNEA, EXCESSIVE SNORING OR DAYTIME DROWSINESS? Daytime drowsiness  15. NO - Do you have any prosthetic heart valves?  16. NO - Do you have prosthetic joints?  17. NO - Is there any chance that you may be pregnant?    Chest pain was evaluated in the ER and felt to be epigastric, relieved with antacids  HPI:     HPI related to upcoming procedure: end stage OA of the right knee      COPD - Patient has a longstanding history of moderate-severe COPD . Patient has been doing well overall noting NO SYMPTOMS and continues on medication regimen consisting of nebs prn without adverse reactions or side effects.                                                                                                         .  HYPERLIPIDEMIA - Patient has a long history of significant Hyperlipidemia requiring medication for treatment with recent good control. Patient reports no problems or side effects with the medication.                                                                                                                                                       .    MEDICAL HISTORY:     Patient Active Problem List    Diagnosis Date Noted     Elevated glucose 10/05/2017     Priority: Medium     Morbid obesity (H) 06/01/2017     Priority: Medium     ACP (advance care planning) 04/28/2016     Priority: Medium     Advance Care Planning 4/28/2016: ACP Review of Chart / Resources Provided:  Reviewed chart for advance care plan.  Dinorah Lim has no plan or code status on file. Discussed available resources and provided with information. Confirmed code status reflects current choices pending further ACP discussions.  Confirmed/documented legally designated decision makers.  Added by Aditi Gandhi             COPD (chronic obstructive pulmonary disease) (H) 09/11/2014     Priority: Medium     mild, on PFTs       Anxiety 06/23/2014     Priority: Medium     Lung density  on x-ray 07/23/2013     Priority: Medium     Hyperlipidemia LDL goal <130 07/23/2013     Priority: Medium     Osteoarthritis 06/14/2012     Priority: Medium     Problem list name updated by automated process. Provider to review       Advanced care planning/counseling discussion 01/13/2012     Priority: Medium     Abnormal glucose 08/01/2011     Priority: Medium     Hgb A1c 5.7 on 1/4/2015         Osteoarthritis of right hip 10/07/2004     Priority: Medium     Urticaria 06/01/2004     Priority: Medium             Past Medical History:   Diagnosis Date     Anxiety 08/01/2011     Cholecystolithiasis 1/4/2015    noted on US 1/4/2015 --> cholecystectomy     Chronic kidney disease (CKD), stage III (moderate) 2013    Early,unknown eitiology, Dr Vilchis     Chronic kidney disease (CKD), stage III (moderate) 1/4/2015    Early,unknown eitiology, Dr Vilchis      Cough 07/02/2001     Hiatal hernia 12/28/2014    Seen on chest CT     Hyperlipidemia 02/23/2001     Idiopathic hives since age 8 06/01/2004     Major depression 10/04/2011     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    Anterior Segment V 4 cm nodule abutting liver surface by US 1/4/2015      Obesity, Class II, BMI 35-39.9 1/4/2015    BMI 35.9 with comorbidities = MORBID obesity     Osteoarthritis of right hip 10/07/2004     Osteoarthrosis 06/14/2012     Otitis externa 10/04/2011     Prediabetes 08/01/2011     Past Surgical History:   Procedure Laterality Date     CLOSED REDUCTION WRIST Right 1952 x 2    long arm cast (same time as elbow fx)     CLOSED RX ELBOW DISLOCATION Right 1952    CR/long cast of fracture     HC INJ EPIDURAL LUMBAR/SACRAL W/WO CONTRAST Bilateral 5/2013    facet injections; prev 2012     LAPAROSCOPIC CHOLECYSTECTOMY N/A 1/4/2015    Procedure: LAPAROSCOPIC CHOLECYSTECTOMY;  Surgeon: Brittney العلي MD;  Location: [facility] OR     TONSILLECTOMY       Current Outpatient Prescriptions   Medication Sig Dispense Refill     albuterol (2.5 MG/3ML)  0.083% neb solution Take 1 vial (2.5 mg) by nebulization every 4 hours as needed for shortness of breath / dyspnea or wheezing (Patient not taking: Reported on 8/13/2018) 1 Box 0     aspirin 81 MG EC tablet Take 1 tablet (81 mg) by mouth daily 90 tablet 3     clobetasol (TEMOVATE) 0.05 % ointment Apply at bedtime to sites of itch 60 g 3     clonazePAM (KLONOPIN) 1 MG tablet TAKE 1/4 TO 1/2 (ONE-FOURTH TO ONE-HALF) TABLET BY MOUTH EVERY 8 HOURS AS NEEDED 45 tablet 0     FISH OIL Take 1 capsule by mouth daily        Garlic 10 MG CAPS Take 1 capsule by mouth as needed       HYDROcodone-acetaminophen (NORCO) 5-325 MG per tablet TAKE ONE TABLET BY MOUTH EVERY 4 TO 6 HOURS AS NEEDED FOR PAIN 60 tablet 0     PARoxetine (PAXIL) 40 MG tablet TAKE ONE TABLET BY MOUTH ONCE DAILY 90 tablet 2     simvastatin (ZOCOR) 20 MG tablet TAKE ONE TABLET BY MOUTH AT BEDTIME 90 tablet 2     sulindac (CLINORIL) 200 MG tablet Take 1 tablet (200 mg) by mouth 2 times daily as needed (Patient not taking: Reported on 8/13/2018) 60 tablet 1     VITAMIN D, CHOLECALCIFEROL, PO Take 2,000 Units by mouth daily       VITAMIN E Take 1 capsule by mouth daily        OTC products: None, except as noted above    Allergies   Allergen Reactions     Chocolate Hives     Lemon Flavor Hives     Lime [Calcium Oxysulfide] Hives     Orange Fruit [Citrus] Hives     Strawberry Hives     Adhesive Tape      Band-aids     Codeine Hives     Patient can tolerate oxycodone & dilaudid     Dexamethasone Hives     Erythromycin Hives     ERYTHROMYCIN BASE     Grapefruit [Extra Strength Grapefruit]      GRAPEFRUIT     Penicillins Hives     Sulfa Drugs      SULFONAMIDE ANTIBIOTICS      Tomato       Latex Allergy: NO    Social History   Substance Use Topics     Smoking status: Never Smoker     Smokeless tobacco: Never Used     Alcohol use No     History   Drug Use No       REVIEW OF SYSTEMS:   CONSTITUTIONAL: NEGATIVE for fever, chills, change in weight  INTEGUMENTARY/SKIN:  NEGATIVE for worrisome rashes, moles or lesions  EYES: NEGATIVE for vision changes or irritation  ENT/MOUTH: NEGATIVE for ear, mouth and throat problems  RESP: NEGATIVE for significant cough or SOB  BREAST: NEGATIVE for masses, tenderness or discharge  CV: NEGATIVE for chest pain, palpitations or peripheral edema  GI: NEGATIVE for nausea, abdominal pain, heartburn, or change in bowel habits  : NEGATIVE for frequency, dysuria, or hematuria  MUSCULOSKELETAL: NEGATIVE for significant arthralgias or myalgia  NEURO: NEGATIVE for weakness, dizziness or paresthesias  ENDOCRINE: NEGATIVE for temperature intolerance, skin/hair changes  HEME: NEGATIVE for bleeding problems  PSYCHIATRIC: NEGATIVE for changes in mood or affect    EXAM:   There were no vitals taken for this visit.    GENERAL APPEARANCE: healthy, alert and no distress     EYES: EOMI, PERRL     HENT: ear canals and TM's normal and nose and mouth without ulcers or lesions     NECK: no adenopathy, no asymmetry, masses, or scars and thyroid normal to palpation     RESP: lungs clear to auscultation - no rales, rhonchi or wheezes     CV: regular rates and rhythm, normal S1 S2, no S3 or S4 and no murmur, click or rub     ABDOMEN:  soft, nontender, no HSM or masses and bowel sounds normal     MS: extremities normal- no gross deformities noted, no evidence of inflammation in joints, FROM in all extremities.     SKIN: no suspicious lesions or rashes     NEURO: Normal strength and tone, sensory exam grossly normal, mentation intact and speech normal     PSYCH: mentation appears normal. and affect normal/bright     LYMPHATICS: No cervical adenopathy    DIAGNOSTICS:     EKG: appears normal, NSR, normal axis, normal intervals, no acute ST/T changes c/w ischemia, no LVH by voltage criteria, unchanged from previous tracings  Labs Resulted Today:   Results for orders placed or performed in visit on 10/01/18   CBC with platelets and differential   Result Value Ref Range    WBC  4.1 4.0 - 11.0 10e9/L    RBC Count 4.59 3.8 - 5.2 10e12/L    Hemoglobin 13.6 11.7 - 15.7 g/dL    Hematocrit 41.4 35.0 - 47.0 %    MCV 90 78 - 100 fl    MCH 29.6 26.5 - 33.0 pg    MCHC 32.9 31.5 - 36.5 g/dL    RDW 13.2 10.0 - 15.0 %    Platelet Count 173 150 - 450 10e9/L    Diff Method Automated Method     % Neutrophils 55.9 %    % Lymphocytes 33.1 %    % Monocytes 9.6 %    % Eosinophils 0.5 %    % Basophils 0.7 %    % Immature Granulocytes 0.2 %    Nucleated RBCs 0 0 /100    Absolute Neutrophil 2.3 1.6 - 8.3 10e9/L    Absolute Lymphocytes 1.4 0.8 - 5.3 10e9/L    Absolute Monocytes 0.4 0.0 - 1.3 10e9/L    Absolute Eosinophils 0.0 0.0 - 0.7 10e9/L    Absolute Basophils 0.0 0.0 - 0.2 10e9/L    Abs Immature Granulocytes 0.0 0 - 0.4 10e9/L    Absolute Nucleated RBC 0.0    Comprehensive metabolic panel   Result Value Ref Range    Sodium 138 133 - 144 mmol/L    Potassium 4.3 3.4 - 5.3 mmol/L    Chloride 106 94 - 109 mmol/L    Carbon Dioxide 28 20 - 32 mmol/L    Anion Gap 4 3 - 14 mmol/L    Glucose 94 70 - 99 mg/dL    Urea Nitrogen 16 7 - 30 mg/dL    Creatinine 1.00 0.52 - 1.04 mg/dL    GFR Estimate 54 (L) >60 mL/min/1.7m2    GFR Estimate If Black 65 >60 mL/min/1.7m2    Calcium 9.2 8.5 - 10.1 mg/dL    Bilirubin Total 0.7 0.2 - 1.3 mg/dL    Albumin 3.5 3.4 - 5.0 g/dL    Protein Total 6.9 6.8 - 8.8 g/dL    Alkaline Phosphatase 100 40 - 150 U/L    ALT 23 0 - 50 U/L    AST 16 0 - 45 U/L   Lipid Profile   Result Value Ref Range    Cholesterol 178 <200 mg/dL    Triglycerides 156 (H) <150 mg/dL    HDL Cholesterol 45 (L) >49 mg/dL    LDL Cholesterol Calculated 102 (H) <100 mg/dL    Non HDL Cholesterol 133 (H) <130 mg/dL   ESR: Erythrocyte sedimentation rate   Result Value Ref Range    Sed Rate 13 0 - 30 mm/h   UA reflex to Microscopic and Culture   Result Value Ref Range    Color Urine Yellow     Appearance Urine Clear     Glucose Urine Negative NEG^Negative mg/dL    Bilirubin Urine Negative NEG^Negative    Ketones Urine Negative  NEG^Negative mg/dL    Specific Gravity Urine 1.012 1.003 - 1.035    Blood Urine Negative NEG^Negative    pH Urine 5.0 4.7 - 8.0 pH    Protein Albumin Urine Negative NEG^Negative mg/dL    Urobilinogen mg/dL Normal 0.0 - 2.0 mg/dL    Nitrite Urine Negative NEG^Negative    Leukocyte Esterase Urine Small (A) NEG^Negative    Source Midstream Urine     RBC Urine <1 0 - 2 /HPF    WBC Urine 1 0 - 5 /HPF    Bacteria Urine None (A) NEG^Negative /HPF    Squamous Epithelial /HPF Urine 5 (H) 0 - 1 /HPF    Mucous Urine Present (A) NEG^Negative /LPF       Recent Labs   Lab Test  01/25/18   1435  01/18/18   0959   01/14/18   1410  10/10/17   2039  10/03/17   0859  06/26/17   1447   06/01/14 2010   HGB  12.8  13.6   < >  13.1  13.1   --    --    < >  12.2   PLT  190  173   < >  152  180   --    --    < >  145*   INR   --    --    --    --    --    --    --    --   1.29*   NA   --    --    --   139  139   --    --    < >   --    POTASSIUM   --    --    --   4.3  4.0   --    --    < >   --    CR   --    --    --   0.96  0.94   --    --    < >   --    A1C   --    --    --    --    --   5.4  5.2   < >   --     < > = values in this interval not displayed.        IMPRESSION:   Reason for surgery/procedure: end stage OA of the right knee    The proposed surgical procedure is considered INTERMEDIATE risk.    REVISED CARDIAC RISK INDEX  The patient has the following serious cardiovascular risks for perioperative complications such as (MI, PE, VFib and 3  AV Block):  Given her above symptoms will proceed with stress testing prior to her procedure to further evaluate this  INTERPRETATION: 1 risks: Class II (low risk - 0.9% complication rate)    The patient has the following additional risks for perioperative complications:  No identified additional risks      ICD-10-CM    1. Preop general physical exam Z01.818 CBC with platelets and differential     Comprehensive metabolic panel     UA reflex to Microscopic and Culture   2. Primary  osteoarthritis of right knee M17.11    3. Pulmonary emphysema, unspecified emphysema type (H) J43.9 Mild, diagnosed 2014 with PFTs   4. Abnormal glucose R73.09    5. Atypical chest pain R07.89 NM Exercise stress test     ESR: Erythrocyte sedimentation rate   6. Screening for hyperlipidemia Z13.220 Lipid Profile   7. Acute bronchospasm J98.01 albuterol (2.5 MG/3ML) 0.083% neb solution  renewed       RECOMMENDATIONS:       Cardiovascular Risk  **Preop stress imaging recommended due to current serious symptoms/signs that would warrant stress testing regardless of preoperative status**      Pulmonary Risk  Maximize COPD treatment nebs prior and up to the day of surgery       --Patient is on no chronic am medications    Will proceed with stress testing prior to surgery to evaluate cardiac status     10-5-18    Nuclear stress testing is negative, cleared to proceed with surgery.    Signed Electronically by: Magaly Sosa MD    Copy of this evaluation report is provided to requesting physician.    [facility] Preop Guidelines    Revised Cardiac Risk Index

## 2018-09-27 ENCOUNTER — HOSPITAL ENCOUNTER (EMERGENCY)
Facility: HOSPITAL | Age: 78
Discharge: HOME | End: 2018-09-27
Attending: INTERNAL MEDICINE | Admitting: INTERNAL MEDICINE
Payer: MEDICARE

## 2018-09-27 ENCOUNTER — APPOINTMENT (OUTPATIENT)
Dept: GENERAL RADIOLOGY | Facility: HOSPITAL | Age: 78
End: 2018-09-27
Attending: INTERNAL MEDICINE
Payer: MEDICARE

## 2018-09-27 VITALS
RESPIRATION RATE: 13 BRPM | OXYGEN SATURATION: 94 % | DIASTOLIC BLOOD PRESSURE: 62 MMHG | TEMPERATURE: 97.9 F | HEART RATE: 61 BPM | SYSTOLIC BLOOD PRESSURE: 129 MMHG

## 2018-09-27 DIAGNOSIS — R07.89 ATYPICAL CHEST PAIN: ICD-10-CM

## 2018-09-27 DIAGNOSIS — R10.13 ABDOMINAL PAIN, EPIGASTRIC: ICD-10-CM

## 2018-09-27 LAB
ALBUMIN SERPL-MCNC: 3.4 G/DL (ref 3.4–5)
ALP SERPL-CCNC: 103 U/L (ref 40–150)
ALT SERPL W P-5'-P-CCNC: 36 U/L (ref 0–50)
ANION GAP SERPL CALCULATED.3IONS-SCNC: 6 MMOL/L (ref 3–14)
AST SERPL W P-5'-P-CCNC: 17 U/L (ref 0–45)
BASOPHILS # BLD AUTO: 0 10E9/L (ref 0–0.2)
BASOPHILS NFR BLD AUTO: 0.5 %
BILIRUB SERPL-MCNC: 0.4 MG/DL (ref 0.2–1.3)
BUN SERPL-MCNC: 23 MG/DL (ref 7–30)
CALCIUM SERPL-MCNC: 8.6 MG/DL (ref 8.5–10.1)
CHLORIDE SERPL-SCNC: 107 MMOL/L (ref 94–109)
CO2 SERPL-SCNC: 25 MMOL/L (ref 20–32)
CREAT SERPL-MCNC: 0.92 MG/DL (ref 0.52–1.04)
CRP SERPL-MCNC: <2.9 MG/L (ref 0–8)
DIFFERENTIAL METHOD BLD: NORMAL
EOSINOPHIL # BLD AUTO: 0.1 10E9/L (ref 0–0.7)
EOSINOPHIL NFR BLD AUTO: 1.5 %
ERYTHROCYTE [DISTWIDTH] IN BLOOD BY AUTOMATED COUNT: 13.3 % (ref 10–15)
GFR SERPL CREATININE-BSD FRML MDRD: 60 ML/MIN/1.7M2
GLUCOSE SERPL-MCNC: 112 MG/DL (ref 70–99)
HCT VFR BLD AUTO: 40.3 % (ref 35–47)
HGB BLD-MCNC: 13.5 G/DL (ref 11.7–15.7)
IMM GRANULOCYTES # BLD: 0 10E9/L (ref 0–0.4)
IMM GRANULOCYTES NFR BLD: 0.5 %
LIPASE SERPL-CCNC: 150 U/L (ref 73–393)
LYMPHOCYTES # BLD AUTO: 1.4 10E9/L (ref 0.8–5.3)
LYMPHOCYTES NFR BLD AUTO: 33.3 %
MCH RBC QN AUTO: 30.4 PG (ref 26.5–33)
MCHC RBC AUTO-ENTMCNC: 33.5 G/DL (ref 31.5–36.5)
MCV RBC AUTO: 91 FL (ref 78–100)
MONOCYTES # BLD AUTO: 0.4 10E9/L (ref 0–1.3)
MONOCYTES NFR BLD AUTO: 9 %
NEUTROPHILS # BLD AUTO: 2.3 10E9/L (ref 1.6–8.3)
NEUTROPHILS NFR BLD AUTO: 55.2 %
NRBC # BLD AUTO: 0 10*3/UL
NRBC BLD AUTO-RTO: 0 /100
PLATELET # BLD AUTO: 173 10E9/L (ref 150–450)
POTASSIUM SERPL-SCNC: 4 MMOL/L (ref 3.4–5.3)
PROT SERPL-MCNC: 6.8 G/DL (ref 6.8–8.8)
RBC # BLD AUTO: 4.44 10E12/L (ref 3.8–5.2)
SODIUM SERPL-SCNC: 138 MMOL/L (ref 133–144)
TROPONIN I SERPL-MCNC: <0.015 UG/L (ref 0–0.04)
WBC # BLD AUTO: 4.1 10E9/L (ref 4–11)

## 2018-09-27 PROCEDURE — 83690 ASSAY OF LIPASE: CPT | Performed by: INTERNAL MEDICINE

## 2018-09-27 PROCEDURE — 93005 ELECTROCARDIOGRAM TRACING: CPT

## 2018-09-27 PROCEDURE — 84484 ASSAY OF TROPONIN QUANT: CPT | Performed by: INTERNAL MEDICINE

## 2018-09-27 PROCEDURE — 71045 X-RAY EXAM CHEST 1 VIEW: CPT | Mod: TC

## 2018-09-27 PROCEDURE — 86140 C-REACTIVE PROTEIN: CPT | Performed by: INTERNAL MEDICINE

## 2018-09-27 PROCEDURE — 36415 COLL VENOUS BLD VENIPUNCTURE: CPT | Performed by: INTERNAL MEDICINE

## 2018-09-27 PROCEDURE — 85025 COMPLETE CBC W/AUTO DIFF WBC: CPT | Performed by: INTERNAL MEDICINE

## 2018-09-27 PROCEDURE — 99285 EMERGENCY DEPT VISIT HI MDM: CPT | Performed by: INTERNAL MEDICINE

## 2018-09-27 PROCEDURE — 80053 COMPREHEN METABOLIC PANEL: CPT | Performed by: INTERNAL MEDICINE

## 2018-09-27 PROCEDURE — 99285 EMERGENCY DEPT VISIT HI MDM: CPT | Mod: 25

## 2018-09-27 PROCEDURE — 93010 ELECTROCARDIOGRAM REPORT: CPT | Performed by: INTERNAL MEDICINE

## 2018-09-27 NOTE — DISCHARGE INSTRUCTIONS
*CHEST PAIN, UNCERTAIN CAUSE    Based on your exam today, the exact cause of your chest pain is not certain. Your condition does not seem serious at this time, and your pain does not appear to be coming from your heart. However, sometimes the signs of a serious problem take more time to appear. Therefore, watch for the warning signs listed below.  HOME CARE:    1. Rest today and avoid strenuous activity.  2. Take any prescribed medicine as directed.  FOLLOW UP with your doctor in 1-3 days.   GET PROMPT MEDICAL ATTENTION if any of the following occur:    A change in the type of pain: if it feels different, becomes more severe, lasts longer, or begins to spread into your shoulder, arm, neck, jaw or back    Shortness of breath or increased pain with breathing    Weakness, dizziness, or fainting    Cough with blood or dark colored sputum (phlegm)    Fever over 101  F (38.3  C)    Swelling, pain or redness in one leg    2124-4703 [employer]. [address] [place] [address]. All rights reserved. This information is not intended as a substitute for professional medical care. Always follow your healthcare professional's instructions.  This information has been modified by your health care provider with permission from the publisher.

## 2018-09-27 NOTE — ED AVS SNAPSHOT
[facility]    [address] [address]    Phone:  [phone]                                       Dinorah Lim   MRN: [id number]    Department:  [facility]   Date of Visit:  9/27/2018           Patient Information     Date Of Birth          1940        Your diagnoses for this visit were:     Abdominal pain, epigastric     Atypical chest pain        You were seen by Jamaal Varghese MD.      Follow-up Information     Schedule an appointment as soon as possible for a visit with Magaly Sosa MD.    Specialty:  Family Practice    Contact information:    [address] [place] [address]  [phone]          Discharge Instructions          *CHEST PAIN, UNCERTAIN CAUSE    Based on your exam today, the exact cause of your chest pain is not certain. Your condition does not seem serious at this time, and your pain does not appear to be coming from your heart. However, sometimes the signs of a serious problem take more time to appear. Therefore, watch for the warning signs listed below.  HOME CARE:    1. Rest today and avoid strenuous activity.  2. Take any prescribed medicine as directed.  FOLLOW UP with your doctor in 1-3 days.   GET PROMPT MEDICAL ATTENTION if any of the following occur:    A change in the type of pain: if it feels different, becomes more severe, lasts longer, or begins to spread into your shoulder, arm, neck, jaw or back    Shortness of breath or increased pain with breathing    Weakness, dizziness, or fainting    Cough with blood or dark colored sputum (phlegm)    Fever over 101  F (38.3  C)    Swelling, pain or redness in one leg    0731-4730 [employer]. [address] [place] [address]. All rights reserved. This information is not intended as a substitute for professional medical care. Always follow your healthcare professional's instructions.  This information has been modified by your health care provider with  permission from the publisher.      Your next 10 appointments already scheduled     Oct 01, 2018  9:00 AM CDT   (Arrive by 8:45 AM)   Pre-Op physical with Magaly Sosa MD   [facility] ([facility] )    [address]  [facility] [address]   [phone]            Oct 03, 2018  9:00 AM CDT   (Arrive by 8:45 AM)   Return Visit with Esteban Wills MD   [facility] ([facility] )    [address]  [facility] [id number] [phone]                 Review of your medicines      Our records show that you are taking the medicines listed below. If these are incorrect, please call your family doctor or clinic.        Dose / Directions Last dose taken    albuterol (2.5 MG/3ML) 0.083% neb solution   Dose:  1 vial   Quantity:  1 Box        Take 1 vial (2.5 mg) by nebulization every 4 hours as needed for shortness of breath / dyspnea or wheezing   Refills:  0        aspirin 81 MG EC tablet   Dose:  81 mg   Quantity:  90 tablet        Take 1 tablet (81 mg) by mouth daily   Refills:  3        clobetasol 0.05 % ointment   Commonly known as:  TEMOVATE   Quantity:  60 g        Apply at bedtime to sites of itch   Refills:  3        clonazePAM 1 MG tablet   Commonly known as:  klonoPIN   Quantity:  45 tablet        TAKE 1/4 TO 1/2 (ONE-FOURTH TO ONE-HALF) TABLET BY MOUTH EVERY 8 HOURS AS NEEDED   Refills:  0        FISH OIL   Dose:  1 capsule        Take 1 capsule by mouth daily   Refills:  0        Garlic 10 MG Caps   Dose:  1 capsule        Take 1 capsule by mouth as needed   Refills:  0        HYDROcodone-acetaminophen 5-325 MG per tablet   Commonly known as:  NORCO   Quantity:  60 tablet        TAKE ONE TABLET BY MOUTH EVERY 4 TO 6 HOURS AS NEEDED FOR PAIN   Refills:  0        PARoxetine 40 MG tablet   Commonly known as:  PAXIL   Quantity:  90 tablet        TAKE ONE TABLET BY MOUTH ONCE DAILY   Refills:  2        simvastatin 20 MG tablet  "  Commonly known as:  ZOCOR   Quantity:  90 tablet        TAKE ONE TABLET BY MOUTH AT BEDTIME   Refills:  2        sulindac 200 MG tablet   Commonly known as:  CLINORIL   Dose:  200 mg   Quantity:  60 tablet        Take 1 tablet (200 mg) by mouth 2 times daily as needed   Refills:  1        VITAMIN D (CHOLECALCIFEROL) PO   Dose:  2000 Units        Take 2,000 Units by mouth daily   Refills:  0        VITAMIN E   Dose:  1 capsule        Take 1 capsule by mouth daily   Refills:  0                Procedures and tests performed during your visit     CBC with platelets differential    CRP inflammation    Chest  XR, 1 view portable    Comprehensive metabolic panel    EKG 12 lead    Lipase    Troponin I      Orders Needing Specimen Collection     None      Pending Results     Date and Time Order Name Status Description    2018 0710 Chest  XR, 1 view portable In process             Pending Culture Results     No orders found from 2018 to 2018.            Thank you for choosing [facility]       Thank you for choosing [facility] for your care. Our goal is always to provide you with excellent care. Hearing back from our patients is one way we can continue to improve our services. Please take a few minutes to complete the written survey that you may receive in the mail after you visit with us. Thank you!        [employer] Information     [employer] lets you send messages to your doctor, view your test results, renew your prescriptions, schedule appointments and more. To sign up, go to [email or web address] . Click on \"Log in\" on the left side of the screen, which will take you to the Welcome page. Then click on \"Sign up Now\" on the right side of the page.     You will be asked to enter the access code listed below, as well as some personal information. Please follow the directions to create your username and password.     Your access code is: [email or web address]  Expires: 2018  8:16 AM     Your access code will  in " 90 days. If you need help or a new code, please call your [facility] clinic or [phone].        Care EveryWhere ID     This is your Care EveryWhere ID. This could be used by other organizations to access your [facility] medical records  [id number]        Equal Access to Services     HAKEEM SOLANO : Jorge Talbot, wasteffda luqadaha, qaybrhina kaalmadiane ness, olga caballero. So [facility] [phone].    ATENCIÓN: Si habla español, tiene a lazaro disposición servicios gratuitos de asistencia lingüística. Llame al [phone].    We comply with applicable federal civil rights laws and [place] laws. We do not discriminate on the basis of race, color, national origin, age, disability, sex, sexual orientation, or gender identity.            After Visit Summary       This is your record. Keep this with you and show to your community pharmacist(s) and doctor(s) at your next visit.

## 2018-09-27 NOTE — ED NOTES
Pt to the ED with c/o pain in back that radiates to chest.  Pt no longer in pain.  IV site is WDL.  Assessment is complete.  Monitors on pt.

## 2018-09-27 NOTE — ED AVS SNAPSHOT
[facility]    [address] [address]    Phone:  [phone]                                       Dinorah Lim   MRN: [id number]    Department:  [facility]   Date of Visit:  9/27/2018           After Visit Summary Signature Page     I have received my discharge instructions, and my questions have been answered. I have discussed any challenges I see with this plan with the nurse or doctor.    ..........................................................................................................................................  Patient/Patient Representative Signature      ..........................................................................................................................................  Patient Representative Print Name and Relationship to Patient    ..................................................               ................................................  Date                                   Time    ..........................................................................................................................................  Reviewed by Signature/Title    ...................................................              ..............................................  Date                                               Time          22EPIC Rev 08/18

## 2018-09-27 NOTE — ED NOTES
Discharge papers are given to pt. Verbalizes understanding of discharge dx, follow up with PCP.  Denies pain.  VSS as charted.  IV out and site is WDL.  Discharged ambulatory to home with dtr.

## 2018-09-28 ENCOUNTER — HOSPITAL ENCOUNTER (INPATIENT)
Facility: HOSPITAL | Age: 78
Setting detail: SURGERY ADMIT
End: 2018-09-28
Attending: ORTHOPAEDIC SURGERY | Admitting: ORTHOPAEDIC SURGERY
Payer: MEDICARE

## 2018-09-28 DIAGNOSIS — Z96.651 STATUS POST TOTAL RIGHT KNEE REPLACEMENT: ICD-10-CM

## 2018-09-28 DIAGNOSIS — R82.90 ABNORMAL URINE FINDINGS: ICD-10-CM

## 2018-09-28 DIAGNOSIS — Z01.810 PRE-OPERATIVE CARDIOVASCULAR EXAMINATION: ICD-10-CM

## 2018-09-28 DIAGNOSIS — M17.11 ARTHRITIS OF RIGHT KNEE: Primary | ICD-10-CM

## 2018-09-30 ASSESSMENT — ENCOUNTER SYMPTOMS
ARTHRALGIAS: 0
BACK PAIN: 0
ANAL BLEEDING: 0
CHILLS: 0
COLOR CHANGE: 0
ABDOMINAL PAIN: 0
NAUSEA: 0
BLOOD IN STOOL: 0
ABDOMINAL DISTENTION: 0
LIGHT-HEADEDNESS: 0
DYSURIA: 0
MYALGIAS: 0
DIZZINESS: 0
FLANK PAIN: 0
NECK PAIN: 0
PALPITATIONS: 0
HEADACHES: 0
VOMITING: 0
CHEST TIGHTNESS: 0
NUMBNESS: 0
NECK STIFFNESS: 0
DIAPHORESIS: 0
WHEEZING: 0
HEMATURIA: 0
SHORTNESS OF BREATH: 0
COUGH: 0
VOICE CHANGE: 0
WOUND: 0
CONFUSION: 0
FEVER: 0

## 2018-09-30 NOTE — ED PROVIDER NOTES
History     Chief Complaint   Patient presents with     Back Pain     Patient is a 77 year old female presenting with chest pain. The history is provided by the patient.   Chest Pain   Pain location:  Substernal area and epigastric  Pain quality: sharp    Pain radiates to:  Mid back  Onset quality:  Gradual  Timing:  Constant  Chronicity:  New  Context: breathing    Associated symptoms: no abdominal pain, no back pain, no cough, no diaphoresis, no dizziness, no fever, no headache, no nausea, no numbness, no palpitations, no shortness of breath and no vomiting        Problem List:    Patient Active Problem List    Diagnosis Date Noted     Elevated glucose 10/05/2017     Priority: Medium     Morbid obesity (H) 06/01/2017     Priority: Medium     ACP (advance care planning) 04/28/2016     Priority: Medium     Advance Care Planning 4/28/2016: ACP Review of Chart / Resources Provided:  Reviewed chart for advance care plan.  Dinorah THOMAS Lim has no plan or code status on file. Discussed available resources and provided with information. Confirmed code status reflects current choices pending further ACP discussions.  Confirmed/documented legally designated decision makers.  Added by Aditi Gandhi             COPD (chronic obstructive pulmonary disease) (H) 09/11/2014     Priority: Medium     mild, on PFTs       Anxiety 06/23/2014     Priority: Medium     Lung density on x-ray 07/23/2013     Priority: Medium     Hyperlipidemia LDL goal <130 07/23/2013     Priority: Medium     Osteoarthritis 06/14/2012     Priority: Medium     Problem list name updated by automated process. Provider to review       Advanced care planning/counseling discussion 01/13/2012     Priority: Medium     Abnormal glucose 08/01/2011     Priority: Medium     Hgb A1c 5.7 on 1/4/2015  Problem list name updated by automated process. Provider to review       Osteoarthritis of right hip 10/07/2004     Priority: Medium     Urticaria 06/01/2004      Priority: Medium     Problem list name updated by automated process. 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     Obesity, Class II, BMI 35-39.9 1/4/2015     Osteoarthritis of right hip 10/07/2004     Osteoarthrosis 06/14/2012     Otitis externa 10/04/2011     Prediabetes 08/01/2011       Past Surgical History:    Past Surgical History:   Procedure Laterality Date     CLOSED REDUCTION WRIST Right 1952 x 2    long arm cast (same time as elbow fx)     CLOSED RX ELBOW DISLOCATION Right 1952    CR/long cast of fracture     HC INJ EPIDURAL LUMBAR/SACRAL W/WO CONTRAST Bilateral 5/2013    facet injections; prev 2012     LAPAROSCOPIC CHOLECYSTECTOMY N/A 1/4/2015    Procedure: LAPAROSCOPIC CHOLECYSTECTOMY;  Surgeon: Brittney العلي MD;  Location: [facility]     TONSILLECTOMY         Family History:    Family History   Problem Relation Age of Onset     HEART DISEASE Brother      atrial fibrillation     Other - See Comments Mother      emphysema     HEART DISEASE Father 92     CHF     Cancer Paternal Grandfather      lung cancer     Cancer Maternal Uncle      lung cancer       Social History:  Marital Status:  Single [1]  Social History   Substance Use Topics     Smoking status: Never Smoker     Smokeless tobacco: Never Used     Alcohol use No        Medications:      clonazePAM (KLONOPIN) 1 MG tablet   HYDROcodone-acetaminophen (NORCO) 5-325 MG per tablet   PARoxetine (PAXIL) 40 MG tablet   simvastatin (ZOCOR) 20 MG tablet   albuterol (2.5 MG/3ML) 0.083% neb solution   aspirin 81 MG EC tablet    clobetasol (TEMOVATE) 0.05 % ointment   FISH OIL   Garlic 10 MG CAPS   sulindac (CLINORIL) 200 MG tablet   VITAMIN D, CHOLECALCIFEROL, PO   VITAMIN E         Review of Systems   Constitutional: Negative for chills, diaphoresis and fever.   HENT: Negative for voice change.    Eyes: Negative for visual disturbance.   Respiratory: Negative for cough, chest tightness, shortness of breath and wheezing.    Cardiovascular: Positive for chest pain. Negative for palpitations and leg swelling.   Gastrointestinal: Negative for abdominal distention, abdominal pain, anal bleeding, blood in stool, nausea and vomiting.   Genitourinary: Negative for decreased urine volume, dysuria, flank pain and hematuria.   Musculoskeletal: Negative for arthralgias, back pain, gait problem, myalgias, neck pain and neck stiffness.   Skin: Negative for color change, pallor, rash and wound.   Neurological: Negative for dizziness, syncope, light-headedness, numbness and headaches.   Psychiatric/Behavioral: Negative for confusion and suicidal ideas.       Physical Exam   BP: 140/59  Pulse: 61  Heart Rate: 63  Temp: 98.5  F (36.9  C)  Resp: 17  SpO2: 94 %      Physical Exam   Constitutional: She is oriented to person, place, and time. She appears well-developed and well-nourished.   HENT:   Head: Normocephalic and atraumatic.   Mouth/Throat: No oropharyngeal exudate.   Eyes: Conjunctivae are normal. Pupils are equal, round, and reactive to light.   Neck: Normal range of motion. Neck supple. No JVD present. No tracheal deviation present. No thyromegaly present.   Cardiovascular: Normal rate, regular rhythm, normal heart sounds and intact distal pulses.  Exam reveals no gallop and no friction rub.    No murmur heard.  Pulmonary/Chest: Effort normal and breath sounds normal. No stridor. No respiratory distress. She has no wheezes. She has no rales. She exhibits no tenderness.   Abdominal: Soft. Bowel sounds are normal. She exhibits no distension and no  mass. There is tenderness in the epigastric area. There is no rebound and no guarding.   Musculoskeletal: Normal range of motion. She exhibits no edema or tenderness.   Lymphadenopathy:     She has no cervical adenopathy.   Neurological: She is alert and oriented to person, place, and time.   Skin: Skin is warm and dry. No rash noted. No erythema. No pallor.   Psychiatric: Her behavior is normal.   Nursing note and vitals reviewed.      ED Course     ED Course     Procedures      No results found for this or any previous visit (from the past 24 hour(s)).    Medications - No data to display    Assessments & Plan (with Medical Decision Making)   Substernal chest pain with radiation to epigastrium and back  Mild tenderness in epigastrium  EKG: NSR  CXR; negative  Labs: WNL  Symptoms resolved totally after receiving Antiacid in ER  Follow-up with PCP  I have reviewed the nursing notes.    I have reviewed the findings, diagnosis, plan and need for follow up with the patient.      Discharge Medication List as of 9/27/2018  8:24 AM          Final diagnoses:   Abdominal pain, epigastric   Atypical chest pain       9/27/2018   [facility]     Jamaal Varghese MD  09/30/18 0527

## 2018-10-01 ENCOUNTER — OFFICE VISIT (OUTPATIENT)
Dept: FAMILY MEDICINE | Facility: OTHER | Age: 78
End: 2018-10-01
Attending: FAMILY MEDICINE
Payer: COMMERCIAL

## 2018-10-01 VITALS
BODY MASS INDEX: 38.32 KG/M2 | SYSTOLIC BLOOD PRESSURE: 100 MMHG | OXYGEN SATURATION: 98 % | HEIGHT: 65 IN | HEART RATE: 65 BPM | TEMPERATURE: 97.8 F | WEIGHT: 230 LBS | RESPIRATION RATE: 16 BRPM | DIASTOLIC BLOOD PRESSURE: 62 MMHG

## 2018-10-01 DIAGNOSIS — J98.01 ACUTE BRONCHOSPASM: ICD-10-CM

## 2018-10-01 DIAGNOSIS — J43.9 PULMONARY EMPHYSEMA, UNSPECIFIED EMPHYSEMA TYPE (H): ICD-10-CM

## 2018-10-01 DIAGNOSIS — M17.11 PRIMARY OSTEOARTHRITIS OF RIGHT KNEE: ICD-10-CM

## 2018-10-01 DIAGNOSIS — Z01.818 PREOP GENERAL PHYSICAL EXAM: Primary | ICD-10-CM

## 2018-10-01 DIAGNOSIS — R73.09 ABNORMAL GLUCOSE: ICD-10-CM

## 2018-10-01 DIAGNOSIS — Z13.220 SCREENING FOR HYPERLIPIDEMIA: ICD-10-CM

## 2018-10-01 DIAGNOSIS — R07.89 ATYPICAL CHEST PAIN: ICD-10-CM

## 2018-10-01 LAB
ALBUMIN SERPL-MCNC: 3.5 G/DL (ref 3.4–5)
ALBUMIN UR-MCNC: NEGATIVE MG/DL
ALP SERPL-CCNC: 100 U/L (ref 40–150)
ALT SERPL W P-5'-P-CCNC: 23 U/L (ref 0–50)
ANION GAP SERPL CALCULATED.3IONS-SCNC: 4 MMOL/L (ref 3–14)
APPEARANCE UR: CLEAR
AST SERPL W P-5'-P-CCNC: 16 U/L (ref 0–45)
BACTERIA #/AREA URNS HPF: ABNORMAL /HPF
BASOPHILS # BLD AUTO: 0 10E9/L (ref 0–0.2)
BASOPHILS NFR BLD AUTO: 0.7 %
BILIRUB SERPL-MCNC: 0.7 MG/DL (ref 0.2–1.3)
BILIRUB UR QL STRIP: NEGATIVE
BUN SERPL-MCNC: 16 MG/DL (ref 7–30)
CALCIUM SERPL-MCNC: 9.2 MG/DL (ref 8.5–10.1)
CHLORIDE SERPL-SCNC: 106 MMOL/L (ref 94–109)
CHOLEST SERPL-MCNC: 178 MG/DL
CO2 SERPL-SCNC: 28 MMOL/L (ref 20–32)
COLOR UR AUTO: YELLOW
CREAT SERPL-MCNC: 1 MG/DL (ref 0.52–1.04)
DIFFERENTIAL METHOD BLD: NORMAL
EOSINOPHIL # BLD AUTO: 0 10E9/L (ref 0–0.7)
EOSINOPHIL NFR BLD AUTO: 0.5 %
ERYTHROCYTE [DISTWIDTH] IN BLOOD BY AUTOMATED COUNT: 13.2 % (ref 10–15)
ERYTHROCYTE [SEDIMENTATION RATE] IN BLOOD BY WESTERGREN METHOD: 13 MM/H (ref 0–30)
GFR SERPL CREATININE-BSD FRML MDRD: 54 ML/MIN/1.7M2
GLUCOSE SERPL-MCNC: 94 MG/DL (ref 70–99)
GLUCOSE UR STRIP-MCNC: NEGATIVE MG/DL
HCT VFR BLD AUTO: 41.4 % (ref 35–47)
HDLC SERPL-MCNC: 45 MG/DL
HGB BLD-MCNC: 13.6 G/DL (ref 11.7–15.7)
HGB UR QL STRIP: NEGATIVE
IMM GRANULOCYTES # BLD: 0 10E9/L (ref 0–0.4)
IMM GRANULOCYTES NFR BLD: 0.2 %
KETONES UR STRIP-MCNC: NEGATIVE MG/DL
LDLC SERPL CALC-MCNC: 102 MG/DL
LEUKOCYTE ESTERASE UR QL STRIP: ABNORMAL
LYMPHOCYTES # BLD AUTO: 1.4 10E9/L (ref 0.8–5.3)
LYMPHOCYTES NFR BLD AUTO: 33.1 %
MCH RBC QN AUTO: 29.6 PG (ref 26.5–33)
MCHC RBC AUTO-ENTMCNC: 32.9 G/DL (ref 31.5–36.5)
MCV RBC AUTO: 90 FL (ref 78–100)
MONOCYTES # BLD AUTO: 0.4 10E9/L (ref 0–1.3)
MONOCYTES NFR BLD AUTO: 9.6 %
MUCOUS THREADS #/AREA URNS LPF: PRESENT /LPF
NEUTROPHILS # BLD AUTO: 2.3 10E9/L (ref 1.6–8.3)
NEUTROPHILS NFR BLD AUTO: 55.9 %
NITRATE UR QL: NEGATIVE
NONHDLC SERPL-MCNC: 133 MG/DL
NRBC # BLD AUTO: 0 10*3/UL
NRBC BLD AUTO-RTO: 0 /100
PH UR STRIP: 5 PH (ref 4.7–8)
PLATELET # BLD AUTO: 173 10E9/L (ref 150–450)
POTASSIUM SERPL-SCNC: 4.3 MMOL/L (ref 3.4–5.3)
PROT SERPL-MCNC: 6.9 G/DL (ref 6.8–8.8)
RBC # BLD AUTO: 4.59 10E12/L (ref 3.8–5.2)
RBC #/AREA URNS AUTO: <1 /HPF (ref 0–2)
SODIUM SERPL-SCNC: 138 MMOL/L (ref 133–144)
SOURCE: ABNORMAL
SP GR UR STRIP: 1.01 (ref 1–1.03)
SQUAMOUS #/AREA URNS AUTO: 5 /HPF (ref 0–1)
TRIGL SERPL-MCNC: 156 MG/DL
UROBILINOGEN UR STRIP-MCNC: NORMAL MG/DL (ref 0–2)
WBC # BLD AUTO: 4.1 10E9/L (ref 4–11)
WBC #/AREA URNS AUTO: 1 /HPF (ref 0–5)

## 2018-10-01 PROCEDURE — 81001 URINALYSIS AUTO W/SCOPE: CPT | Mod: ZL | Performed by: FAMILY MEDICINE

## 2018-10-01 PROCEDURE — 80053 COMPREHEN METABOLIC PANEL: CPT | Mod: ZL | Performed by: FAMILY MEDICINE

## 2018-10-01 PROCEDURE — 99215 OFFICE O/P EST HI 40 MIN: CPT | Performed by: FAMILY MEDICINE

## 2018-10-01 PROCEDURE — G0463 HOSPITAL OUTPT CLINIC VISIT: HCPCS

## 2018-10-01 PROCEDURE — 80061 LIPID PANEL: CPT | Mod: ZL | Performed by: FAMILY MEDICINE

## 2018-10-01 PROCEDURE — 85652 RBC SED RATE AUTOMATED: CPT | Mod: ZL | Performed by: FAMILY MEDICINE

## 2018-10-01 PROCEDURE — 36415 COLL VENOUS BLD VENIPUNCTURE: CPT | Mod: ZL | Performed by: FAMILY MEDICINE

## 2018-10-01 PROCEDURE — 85025 COMPLETE CBC W/AUTO DIFF WBC: CPT | Mod: ZL | Performed by: FAMILY MEDICINE

## 2018-10-01 RX ORDER — ALBUTEROL SULFATE 0.83 MG/ML
2.5 SOLUTION RESPIRATORY (INHALATION) EVERY 4 HOURS PRN
Qty: 1 BOX | Refills: 0 | Status: SHIPPED | OUTPATIENT
Start: 2018-10-01 | End: 2019-05-07

## 2018-10-01 ASSESSMENT — ANXIETY QUESTIONNAIRES
2. NOT BEING ABLE TO STOP OR CONTROL WORRYING: SEVERAL DAYS
GAD7 TOTAL SCORE: 3
5. BEING SO RESTLESS THAT IT IS HARD TO SIT STILL: NOT AT ALL
7. FEELING AFRAID AS IF SOMETHING AWFUL MIGHT HAPPEN: NOT AT ALL
6. BECOMING EASILY ANNOYED OR IRRITABLE: NOT AT ALL
IF YOU CHECKED OFF ANY PROBLEMS ON THIS QUESTIONNAIRE, HOW DIFFICULT HAVE THESE PROBLEMS MADE IT FOR YOU TO DO YOUR WORK, TAKE CARE OF THINGS AT HOME, OR GET ALONG WITH OTHER PEOPLE: NOT DIFFICULT AT ALL
1. FEELING NERVOUS, ANXIOUS, OR ON EDGE: SEVERAL DAYS
3. WORRYING TOO MUCH ABOUT DIFFERENT THINGS: SEVERAL DAYS

## 2018-10-01 ASSESSMENT — PAIN SCALES - GENERAL: PAINLEVEL: MODERATE PAIN (4)

## 2018-10-01 ASSESSMENT — PATIENT HEALTH QUESTIONNAIRE - PHQ9: 5. POOR APPETITE OR OVEREATING: NOT AT ALL

## 2018-10-01 NOTE — NURSING NOTE
"Chief Complaint   Patient presents with     Pre-Op Exam       Initial /62 (BP Location: Left arm, Patient Position: Sitting, Cuff Size: Adult Large)  Pulse 65  Temp 97.8  F (36.6  C) (Tympanic)  Resp 16  Ht 5' 5\" (1.651 m)  Wt 230 lb (104.3 kg)  SpO2 98%  BMI 38.27 kg/m2 Estimated body mass index is 38.27 kg/(m^2) as calculated from the following:    Height as of this encounter: 5' 5\" (1.651 m).    Weight as of this encounter: 230 lb (104.3 kg).  Medication Reconciliation: complete    Debbie Byrnes MA  "

## 2018-10-01 NOTE — MR AVS SNAPSHOT
After Visit Summary   10/1/2018    Dinorah Lim    MRN: [id number]           Patient Information     Date Of Birth          1940        Visit Information        Provider Department      10/1/2018 9:00 AM Magaly Sosa MD [facility]        Today's Diagnoses     Preop general physical exam    -  1    Primary osteoarthritis of right knee        Pulmonary emphysema, unspecified emphysema type (H)        Abnormal glucose        Atypical chest pain        Screening for hyperlipidemia        Acute bronchospasm          Care Instructions        Before Your Surgery      Call your surgeon if there is any change in your health. This includes signs of a cold or flu (such as a sore throat, runny nose, cough, rash or fever).    Do not smoke, drink alcohol or take over the counter medicine (unless your surgeon or primary care doctor tells you to) for the 24 hours before and after surgery.    If you take prescribed drugs: Follow your doctor s orders about which medicines to take and which to stop until after surgery.    Eating and drinking prior to surgery: follow the instructions from your surgeon    Take a shower or bath the night before surgery. Use the soap your surgeon gave you to gently clean your skin. If you do not have soap from your surgeon, use your regular soap. Do not shave or scrub the surgery site.  Wear clean pajamas and have clean sheets on your bed.     Nothing to eat or drink after midnight the night prior to surgery              Follow-ups after your visit        Your next 10 appointments already scheduled     Oct 03, 2018  9:00 AM CDT   (Arrive by 8:45 AM)   Return Visit with Esteban Wills MD   [facility]    [address] [place] [address]   [phone]            Oct 15, 2018   Procedure with Esteban Wills MD   [facility] )    [address] [place] [address]  "[place] [address]   [phone]            2018 10:00 AM CST   (Arrive by 9:45 AM)   Ortho Eval with Aubree Burleson, PT   [facility] ([facility] )    [address] [address]   [phone]              Future tests that were ordered for you today     Open Future Orders        Priority Expected Expires Ordered    NM Exercise stress test Routine  10/1/2019 10/1/2018            Who to contact     If you have questions or need follow up information about today's clinic visit or your schedule please contact [facility] directly at [phone].  Normal or non-critical lab and imaging results will be communicated to you by MyChart, letter or phone within 4 business days after the clinic has received the results. If you do not hear from us within 7 days, please contact the clinic through [employer] or phone. If you have a critical or abnormal lab result, we will notify you by phone as soon as possible.  Submit refill requests through [employer] or call your pharmacy and they will forward the refill request to us. Please allow 3 business days for your refill to be completed.          Additional Information About Your Visit        [employer] Information     [employer] lets you send messages to your doctor, view your test results, renew your prescriptions, schedule appointments and more. To sign up, go to [email or web address] . Click on \"Log in\" on the left side of the screen, which will take you to the Welcome page. Then click on \"Sign up Now\" on the right side of the page.     You will be asked to enter the access code listed below, as well as some personal information. Please follow the directions to create your username and password.     Your access code is: [email or web address]  Expires: 2018  8:16 AM     Your access code will  in 90 days. If you need help or a new code, please call your [facility] or [phone].        Care EveryWhere ID     This is your " "Care EveryWhere ID. This could be used by other organizations to access your [facility] medical records  [id number]        Your Vitals Were     Pulse Temperature Respirations Height Pulse Oximetry BMI (Body Mass Index)    65 97.8  F (36.6  C) (Tympanic) 16 5' 5\" (1.651 m) 98% 38.27 kg/m2       Blood Pressure from Last 3 Encounters:   10/01/18 100/62   09/27/18 129/62   08/13/18 124/64    Weight from Last 3 Encounters:   10/01/18 230 lb (104.3 kg)   08/13/18 233 lb (105.7 kg)   06/08/18 233 lb (105.7 kg)              We Performed the Following     CBC with platelets and differential     Comprehensive metabolic panel     ESR: Erythrocyte sedimentation rate     Lipid Profile     UA reflex to Microscopic and Culture          Where to get your medicines      These medications were sent to [id number] - [place], [place] - [address]   [address] [facility] [place] [address]     Phone:  [phone]     albuterol (2.5 MG/3ML) 0.083% neb solution          Primary Care Provider Office Phone # Fax #    Magaly Sosa [phone] [address] [facility] [address]        Equal Access to Services     HAKEEM SOLANO AH: Hadii lourdes aiken hadasho Sojonnaali, waaxda luqadaha, qaybta kaalmada adeegyada, olga juan . So [facility] [phone].    ATENCIÓN: Si habla español, tiene a lazaro disposición servicios gratuitos de asistencia lingüística. Llame al [phone].    We comply with applicable federal civil rights laws and [place] laws. We do not discriminate on the basis of race, color, national origin, age, disability, sex, sexual orientation, or gender identity.            Thank you!     Thank you for choosing [facility]  for your care. Our goal is always to provide you with excellent care. Hearing back from our patients is one way we can continue to improve our services. Please take a few minutes to complete the written survey that you may receive in the mail " after your visit with us. Thank you!             Your Updated Medication List - Protect others around you: Learn how to safely use, store and throw away your medicines at www.disposemymeds.org.          This list is accurate as of 10/1/18  9:39 AM.  Always use your most recent med list.                   Brand Name Dispense Instructions for use Diagnosis    albuterol (2.5 MG/3ML) 0.083% neb solution     1 Box    Take 1 vial (2.5 mg) by nebulization every 4 hours as needed for shortness of breath / dyspnea or wheezing    Acute bronchospasm       aspirin 81 MG EC tablet     90 tablet    Take 1 tablet (81 mg) by mouth daily    Varicose vein of leg       clobetasol 0.05 % ointment    TEMOVATE    60 g    Apply at bedtime to sites of itch    Intrinsic eczema       clonazePAM 1 MG tablet    klonoPIN    45 tablet    TAKE 1/4 TO 1/2 (ONE-FOURTH TO ONE-HALF) TABLET BY MOUTH EVERY 8 HOURS AS NEEDED    Anxiety       FISH OIL      Take 1 capsule by mouth daily        Garlic 10 MG Caps      Take 1 capsule by mouth as needed        HYDROcodone-acetaminophen 5-325 MG per tablet    NORCO    60 tablet    TAKE ONE TABLET BY MOUTH EVERY 4 TO 6 HOURS AS NEEDED FOR PAIN    Primary osteoarthritis of right hip       PARoxetine 40 MG tablet    PAXIL    90 tablet    TAKE ONE TABLET BY MOUTH ONCE DAILY    Anxiety       simvastatin 20 MG tablet    ZOCOR    90 tablet    TAKE ONE TABLET BY MOUTH AT BEDTIME    Hyperlipidemia LDL goal <100       sulindac 200 MG tablet    CLINORIL    60 tablet    Take 1 tablet (200 mg) by mouth 2 times daily as needed    Primary osteoarthritis of right knee       VITAMIN D (CHOLECALCIFEROL) PO      Take 2,000 Units by mouth daily        VITAMIN E      Take 1 capsule by mouth daily

## 2018-10-02 ASSESSMENT — ANXIETY QUESTIONNAIRES: GAD7 TOTAL SCORE: 3

## 2018-10-02 ASSESSMENT — PATIENT HEALTH QUESTIONNAIRE - PHQ9: SUM OF ALL RESPONSES TO PHQ QUESTIONS 1-9: 4

## 2018-10-03 ENCOUNTER — RADIANT APPOINTMENT (OUTPATIENT)
Dept: GENERAL RADIOLOGY | Facility: OTHER | Age: 78
End: 2018-10-03
Attending: ORTHOPAEDIC SURGERY
Payer: MEDICARE

## 2018-10-03 ENCOUNTER — OFFICE VISIT (OUTPATIENT)
Dept: ORTHOPEDICS | Facility: OTHER | Age: 78
End: 2018-10-03
Attending: ORTHOPAEDIC SURGERY
Payer: MEDICARE

## 2018-10-03 VITALS
HEIGHT: 65 IN | BODY MASS INDEX: 38.32 KG/M2 | HEART RATE: 56 BPM | SYSTOLIC BLOOD PRESSURE: 100 MMHG | TEMPERATURE: 98.2 F | OXYGEN SATURATION: 97 % | DIASTOLIC BLOOD PRESSURE: 60 MMHG | WEIGHT: 230 LBS

## 2018-10-03 DIAGNOSIS — M17.12 PRIMARY OSTEOARTHRITIS OF LEFT KNEE: ICD-10-CM

## 2018-10-03 DIAGNOSIS — M17.11 PRIMARY OSTEOARTHRITIS OF RIGHT KNEE: Primary | ICD-10-CM

## 2018-10-03 DIAGNOSIS — M17.11 ARTHRITIS OF RIGHT KNEE: ICD-10-CM

## 2018-10-03 PROCEDURE — G0463 HOSPITAL OUTPT CLINIC VISIT: HCPCS

## 2018-10-03 PROCEDURE — 99213 OFFICE O/P EST LOW 20 MIN: CPT | Performed by: ORTHOPAEDIC SURGERY

## 2018-10-03 PROCEDURE — 73564 X-RAY EXAM KNEE 4 OR MORE: CPT | Mod: TC,RT

## 2018-10-03 ASSESSMENT — PAIN SCALES - GENERAL: PAINLEVEL: NO PAIN (0)

## 2018-10-03 NOTE — PROGRESS NOTES
"Chief complaints:  #1.  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  DJD left knee, mild  #3.  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 Magaly Sosa     HPI: She is a long history of DJD of her both knees worse on the right where she has a mild valgus deformity.  Treatment has been conservative up to now consisting of a cane, sulindac, Tylenol, physical therapy, a right knee dexamethasone injection to which she had an allergic reaction, a right knee Kenalog injection which she tolerated well on 4/13/2018, and occasional use of hydrocodone.  Early this summer her right knee pain was severe and had failed conservative treatment. She decided on a TKA which was scheduled for later this month.  She attended the joint class and saw her PCP who cleared her for surgery.    Subjective: Over the last several weeks her bilateral knee symptoms significantly lessened with no new intervention.  She recently was able to dance at her daughter's wedding.  At present she has no right knee pain, and only minimal left knee pain.  She now questions whether she needs to have the surgery done.    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    She also has DJD of both hips.  Since seen last she has been diagnosed with COPD despite never having been a smoker.  She has lymphedema of both lower extremities.    Nothing is changed with respect to her musculoskeletal or neurologic review of systems since seen last.    Examination:/60  Pulse 56  Temp 98.2  F (36.8  C) (Tympanic)  Ht 5' 5\" (1.651 m)  Wt 230 lb (104.3 kg)  SpO2 97%  BMI 38.27 kg/m2  Obese female in no obvious distress.  She is alert and oriented.  The right knee has a mild valgus deformity and no effusion.  Its range of motion is 0-110 degrees.  There is edema of the right lower leg.  There is edema of " the right lower leg.  The neurovascular status of that limb is otherwise intact.    X-ray; plain films of the right knee taken today and compared with the last set of films taken in April show no interval change in her mild to moderate DJD.    Impression: Bilateral DJD of the knees, symptoms presently controlled with conservative treatment    Plan: We discussed whether or not to proceed with surgery.  Frankly her knee x-rays are not that bad; her right knee pain had always been out of proportion to the x-ray findings.  TKA is major surgery with the potential for major complications.  I suggest we hold off on surgery and try bilateral Visco supplementation injections instead.  She agreed.  She will return for those injections as the schedule permits.  Her right TKA surgery is canceled.

## 2018-10-03 NOTE — MR AVS SNAPSHOT
After Visit Summary   10/3/2018    Dinorah Lim    MRN: [id number]           Patient Information     Date Of Birth          1940        Visit Information        Provider Department      10/3/2018 9:00 AM Esteban Wills MD [facility]        Today's Diagnoses     Primary osteoarthritis of right knee    -  1    Primary osteoarthritis of left knee           Follow-ups after your visit        Follow-up notes from your care team     Return in about 2 weeks (around 10/17/2018).      Your next 10 appointments already scheduled     Oct 05, 2018  7:00 AM CDT   (Arrive by 6:45 AM)   NM INJECTION with [id number]   [facility] NUCLEAR MEDICINE ([facility] )    [address] [address]   [phone]            Oct 05, 2018  8:15 AM CDT   (Arrive by 8:00 AM)   NM SCAN3 with HINM1   [facility] NUCLEAR [facility]ICINE ([facility] )    [address] [address]   [phone]            Oct 05, 2018  9:00 AM CDT   Ekg Stress Nm Exercise 30m with HI STRESS RM1, HI STRESS PROVIDER   [facility] Electrocardiology ([facility] )    [address] [address]   [phone]            Oct 05, 2018 10:15 AM CDT   (Arrive by 10:00 AM)   NM MPI TREADMILL with HINM1   [facility]INE ([facility] )    [address] [address]   [phone]           How do I prepare for my exam? (Food and drink instructions) Day 1 & Day 2: Stop all caffeine 12 hours before the test. This includes coffee, tea, soda pop, chocolate and certain medicines (such as Anacin, Excedrin and NoDoz). Also avoid decaf coffee and tea, as these contain small amounts of caffeine. Stop eating 4 hours before the test. You may drink water.  How do I prepare for my exam? (Other instructions) You may need to stop some medicines before the test. Follow your doctor s orders. Day 1 & Day 2: *If you take a beta blocker: Do not take your beta-blocker on  the day before your test, unless specifically told to by your doctor. And do not take it on the day of your test. Bring it with you to take after the test.  *If you take Aggrenox or dipyridamole (Persantine, Permole), stop taking it 48 hours before your test. *If you take Viagra, Cialis or Levitra, stop taking it 48 hours before your test. *If you take theophylline or aminophylline, stop taking it 12 hours before your test.  For patients with diabetes: *If you take insulin, call your diabetes care team. Ask if you should take a 1/2 dose the morning of your test. *If you take diabetes medicine by mouth, don t take it on the morning of your test. Bring it with you to take after the test. (If you have questions, call your diabetes care team.)  *Do not take nitrates on the day of your test. Do not wear your Nitro-Patch. *No alcohol, smoking or other tobacco for 12 hours before the test.  What should I wear: Please wear a loose two-piece outfit. If you will have an exercise test, bring rubber-soled walking shoes.  How long does the exam take: *This test can take 1-2 days.* ONE day exam: Allow 3-4 hours for test. IF TWO day exam: Allow 30-60 minutes PER day for test.  What should I bring: Please bring a list of your medicines (including vitamins, minerals and over-the-counter drugs). Leave your valuables at home.  Do I need a :  No  is needed.  What do I need to tell my doctor? When you arrive, please tell us if you: * Have diabetes * Are breastfeeding * May be pregnant * Have a pacemaker of ICD (implantable defibrillator).  What should I do after the exam: No restrictions, You may resume normal activities.  What is this test: Your doctor has ordered a nuclear stress test to check how well blood is flowing through your heart. You will either exercise or take a medicine that mimics exercise; we will watch your heart.  Who should I call with questions: If you have any questions, please call the Imaging  "Department where you will have your exam. Directions, parking instructions, and other information is available on our website, [email or web address].            Oct 10, 2018  2:40 PM CDT   (Arrive by 2:25 PM)   Return Visit with Esteban Wills MD   [facility] ([facility] )    [address] [address] [phone]            Oct 15, 2018   Procedure with Esteban Wills MD   [facility] Periop Services ([facility] )    [address] [id number] [phone]            Nov 12, 2018 10:00 AM CST   (Arrive by 9:45 AM)   Ortho Eval with Aubree Burleson PT   [facility] Physical Therapy ([facility] )    [address] [place] [id number] [phone]              Who to contact     If you have questions or need follow up information about today's clinic visit or your schedule please contact [facility] directly at [phone].  Normal or non-critical lab and imaging results will be communicated to you by [employer], letter or phone within 4 business days after the clinic has received the results. If you do not hear from us within 7 days, please contact the clinic through [employer] or phone. If you have a critical or abnormal lab result, we will notify you by phone as soon as possible.  Submit refill requests through [employer] or call your pharmacy and they will forward the refill request to us. Please allow 3 business days for your refill to be completed.          Additional Information About Your Visit        [employer] Information     [employer] lets you send messages to your doctor, view your test results, renew your prescriptions, schedule appointments and more. To sign up, go to [email or web address] . Click on \"Log in\" on the left side of the screen, which will take you to the Welcome page. Then click on \"Sign up Now\" on the right side of the page.     You will be asked to enter the access code listed " "below, as well as some personal information. Please follow the directions to create your username and password.     Your access code is: [email or web address]  Expires: 2018  8:16 AM     Your access code will  in 90 days. If you need help or a new code, please call your [place] clinic or [phone].        Care EveryWhere ID     This is your Care EveryWhere ID. This could be used by other organizations to access your [place] medical records  [id number]        Your Vitals Were     Pulse Temperature Height Pulse Oximetry BMI (Body Mass Index)       56 98.2  F (36.8  C) (Tympanic) 5' 5\" (1.651 m) 97% 38.27 kg/m2        Blood Pressure from Last 3 Encounters:   10/03/18 100/60   10/01/18 100/62   18 129/62    Weight from Last 3 Encounters:   10/03/18 230 lb (104.3 kg)   10/01/18 230 lb (104.3 kg)   18 233 lb (105.7 kg)              Today, you had the following     No orders found for display       Primary Care Provider Office Phone # Fax #    Magaly Sosa [phone]       [facility] [place] [address]        Equal Access to Services     HAKEEM SOLANO AH: Hadii lourdes pengo Sodarell, waaxda luqadaha, qaybta kaalmada adeegyada, olga juan . So [place] [phone].    ATENCIÓN: Si habla español, tiene a lazaro disposición servicios gratuitos de asistencia lingüística. Ll[facility] [phone].    We comply with applicable federal civil rights laws and [place] laws. We do not discriminate on the basis of race, color, national origin, age, disability, sex, sexual orientation, or gender identity.            Thank you!     Thank you for choosing [facility]  for your care. Our goal is always to provide you with excellent care. Hearing back from our patients is one way we can continue to improve our services. Please take a few minutes to complete the written survey that you may receive in the mail after your visit with us. Thank you!   "           Your Updated Medication List - Protect others around you: Learn how to safely use, store and throw away your medicines at www.disposemymeds.org.          This list is accurate as of 10/3/18 10:02 AM.  Always use your most recent med list.                   Brand Name Dispense Instructions for use Diagnosis    albuterol (2.5 MG/3ML) 0.083% neb solution     1 Box    Take 1 vial (2.5 mg) by nebulization every 4 hours as needed for shortness of breath / dyspnea or wheezing    Acute bronchospasm       aspirin 81 MG EC tablet     90 tablet    Take 1 tablet (81 mg) by mouth daily    Varicose vein of leg       clobetasol 0.05 % ointment    TEMOVATE    60 g    Apply at bedtime to sites of itch    Intrinsic eczema       clonazePAM 1 MG tablet    klonoPIN    45 tablet    TAKE 1/4 TO 1/2 (ONE-FOURTH TO ONE-HALF) TABLET BY MOUTH EVERY 8 HOURS AS NEEDED    Anxiety       FISH OIL      Take 1 capsule by mouth daily        Garlic 10 MG Caps      Take 1 capsule by mouth as needed        HYDROcodone-acetaminophen 5-325 MG per tablet    NORCO    60 tablet    TAKE ONE TABLET BY MOUTH EVERY 4 TO 6 HOURS AS NEEDED FOR PAIN    Primary osteoarthritis of right hip       PARoxetine 40 MG tablet    PAXIL    90 tablet    TAKE ONE TABLET BY MOUTH ONCE DAILY    Anxiety       simvastatin 20 MG tablet    ZOCOR    90 tablet    TAKE ONE TABLET BY MOUTH AT BEDTIME    Hyperlipidemia LDL goal <100       sulindac 200 MG tablet    CLINORIL    60 tablet    Take 1 tablet (200 mg) by mouth 2 times daily as needed    Primary osteoarthritis of right knee       VITAMIN D (CHOLECALCIFEROL) PO      Take 2,000 Units by mouth daily        VITAMIN E      Take 1 capsule by mouth daily

## 2018-10-03 NOTE — NURSING NOTE
"Chief Complaint   Patient presents with     Consult For     Sign consent RTKA/ xrays first       Initial /60  Pulse 56  Temp 98.2  F (36.8  C) (Tympanic)  Ht 5' 5\" (1.651 m)  Wt 230 lb (104.3 kg)  SpO2 97%  BMI 38.27 kg/m2 Estimated body mass index is 38.27 kg/(m^2) as calculated from the following:    Height as of this encounter: 5' 5\" (1.651 m).    Weight as of this encounter: 230 lb (104.3 kg).  Medication Reconciliation: complete    Radha Ruth LPN    "

## 2018-10-05 ENCOUNTER — HOSPITAL ENCOUNTER (OUTPATIENT)
Dept: NUCLEAR MEDICINE | Facility: HOSPITAL | Age: 78
Setting detail: NUCLEAR MEDICINE
End: 2018-10-05
Attending: FAMILY MEDICINE
Payer: MEDICARE

## 2018-10-05 ENCOUNTER — HOSPITAL ENCOUNTER (OUTPATIENT)
Dept: NUCLEAR MEDICINE | Facility: HOSPITAL | Age: 78
Setting detail: NUCLEAR MEDICINE
Discharge: HOME | End: 2018-10-05
Attending: FAMILY MEDICINE | Admitting: FAMILY MEDICINE
Payer: MEDICARE

## 2018-10-05 ENCOUNTER — HOSPITAL ENCOUNTER (OUTPATIENT)
Dept: CARDIOLOGY | Facility: HOSPITAL | Age: 78
Setting detail: NUCLEAR MEDICINE
End: 2018-10-05
Attending: FAMILY MEDICINE
Payer: MEDICARE

## 2018-10-05 DIAGNOSIS — R07.89 ATYPICAL CHEST PAIN: ICD-10-CM

## 2018-10-05 PROCEDURE — 93016 CV STRESS TEST SUPVJ ONLY: CPT | Performed by: INTERNAL MEDICINE

## 2018-10-05 PROCEDURE — 78452 HT MUSCLE IMAGE SPECT MULT: CPT | Mod: TC

## 2018-10-05 PROCEDURE — A9500 TC99M SESTAMIBI: HCPCS | Performed by: RADIOLOGY

## 2018-10-05 PROCEDURE — 34300033 ZZH RX 343: Performed by: RADIOLOGY

## 2018-10-05 PROCEDURE — 25000128 H RX IP 250 OP 636: Performed by: INTERNAL MEDICINE

## 2018-10-05 PROCEDURE — 93017 CV STRESS TEST TRACING ONLY: CPT

## 2018-10-05 PROCEDURE — 93018 CV STRESS TEST I&R ONLY: CPT | Performed by: INTERNAL MEDICINE

## 2018-10-05 RX ORDER — SODIUM CHLORIDE 9 MG/ML
INJECTION, SOLUTION INTRAVENOUS ONCE
Status: COMPLETED | OUTPATIENT
Start: 2018-10-05 | End: 2018-10-05

## 2018-10-05 RX ORDER — SODIUM CHLORIDE 9 MG/ML
INJECTION, SOLUTION INTRAVENOUS ONCE
Status: DISCONTINUED | OUTPATIENT
Start: 2018-10-05 | End: 2018-10-05

## 2018-10-05 RX ADMIN — SODIUM CHLORIDE: 9 INJECTION, SOLUTION INTRAVENOUS at 09:19

## 2018-10-05 RX ADMIN — Medication 10 MILLICURIE: at 07:16

## 2018-10-05 RX ADMIN — Medication 30 MILLICURIE: at 09:25

## 2018-10-10 ENCOUNTER — OFFICE VISIT (OUTPATIENT)
Dept: ORTHOPEDICS | Facility: OTHER | Age: 78
End: 2018-10-10
Attending: ORTHOPAEDIC SURGERY
Payer: MEDICARE

## 2018-10-10 VITALS
HEART RATE: 65 BPM | HEIGHT: 65 IN | SYSTOLIC BLOOD PRESSURE: 106 MMHG | OXYGEN SATURATION: 98 % | DIASTOLIC BLOOD PRESSURE: 62 MMHG | BODY MASS INDEX: 38.32 KG/M2 | TEMPERATURE: 98.3 F | WEIGHT: 230 LBS

## 2018-10-10 DIAGNOSIS — M17.12 PRIMARY OSTEOARTHRITIS OF LEFT KNEE: ICD-10-CM

## 2018-10-10 DIAGNOSIS — M17.11 PRIMARY OSTEOARTHRITIS OF RIGHT KNEE: Primary | ICD-10-CM

## 2018-10-10 PROCEDURE — G0463 HOSPITAL OUTPT CLINIC VISIT: HCPCS

## 2018-10-10 PROCEDURE — 20610 DRAIN/INJ JOINT/BURSA W/O US: CPT | Mod: 50 | Performed by: ORTHOPAEDIC SURGERY

## 2018-10-10 ASSESSMENT — PAIN SCALES - GENERAL: PAINLEVEL: MILD PAIN (2)

## 2018-10-10 NOTE — PROGRESS NOTES
Chief complaints:  #1.  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  DJD left knee, mild  #3.  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 Magaly Sosa      HPI: She is a long history of DJD of her both knees worse on the right where she has a mild valgus deformity.  Treatment has been conservative up to now consisting of a cane, sulindac, Tylenol, physical therapy, a right knee dexamethasone injection to which she had an allergic reaction, a right knee Kenalog injection which she tolerated well on 4/13/2018, and occasional use of hydrocodone.  Early this summer her right knee pain was severe and had failed conservative treatment. She decided on a TKA but after attending the class and being cleared by her PCP the pain significantly lessened.  She therefore decided against surgery but wanted bilateral Visco supplementation injections to keep the pain minimal and thus keep her from needing surgery.    Subjective: Today she reports that the right knee continues to be minimally symptomatic.  Left knee gives her mild to moderate anteromedial joint line discomfort with weightbearing activities.    Objective: Healthy-appearing elderly female in no obvious distress.  Neither knee has an effusion.  The right knee has a mild valgus deformity.  Its range of motion is 0-110 degrees.  The left knee has no deformity.  Its range of motion is 0-120 degrees.  The neurovascular status of both limbs is intact.    After obtaining written informed consent and conducting a timeout, sterile technique was utilized as both knees were each injected with 3 mL of Gel 1 using a superolateral approach.  Topical ethyl chloride spray and subcutaneous 1% Xylocaine with epinephrine were used as local anesthetics.  Upon needle removal Band-Aids were  applied.  The patient tolerated the bilateral procedures well and there were no  complications.    Impression: Bilateral DJD of the knees    Plan: She will return in 6 months for repeat injections.  She will return sooner if her severe pain returns.

## 2018-10-10 NOTE — MR AVS SNAPSHOT
"              After Visit Summary   10/10/2018    Dinorah Lim    MRN: [id number]           Patient Information     Date Of Birth          1940        Visit Information        Provider Department      10/10/2018 2:40 PM Esteban Wills MD [facility]        Today's Diagnoses     Primary osteoarthritis of right knee    -  1    Primary osteoarthritis of left knee           Follow-ups after your visit        Follow-up notes from your care team     Return in about 6 months (around 4/10/2019).      Your next 10 appointments already scheduled     Apr 11, 2019  2:00 PM CDT   (Arrive by 1:45 PM)   Return Visit with Esteban Wills MD   [facility] ([facility] )    [address] [facility] [address] [phone]              Who to contact     If you have questions or need follow up information about today's clinic visit or your schedule please contact [facility] directly at [phone].  Normal or non-critical lab and imaging results will be communicated to you by [employer], letter or phone within 4 business days after the clinic has received the results. If you do not hear from us within 7 days, please contact the clinic through [employer] or phone. If you have a critical or abnormal lab result, we will notify you by phone as soon as possible.  Submit refill requests through [employer] or call your pharmacy and they will forward the refill request to us. Please allow 3 business days for your refill to be completed.          Additional Information About Your Visit        [employer] Information     [employer] lets you send messages to your doctor, view your test results, renew your prescriptions, schedule appointments and more. To sign up, go to [email or web address] . Click on \"Log in\" on the left side of the screen, which will take you to the Welcome page. Then click on \"Sign up Now\" on the right side of the page.     You " "will be asked to enter the access code listed below, as well as some personal information. Please follow the directions to create your username and password.     Your access code is: [email or web address]  Expires: 2018  8:16 AM     Your access code will  in 90 days. If you need help or a new code, please call your [place] clinic or [phone].        Care EveryWhere ID     This is your Care EveryWhere ID. This could be used by other organizations to access your [place] medical records  [id number]        Your Vitals Were     Pulse Temperature Height Pulse Oximetry BMI (Body Mass Index)       65 98.3  F (36.8  C) (Tympanic) 5' 5\" (1.651 m) 98% 38.27 kg/m2        Blood Pressure from Last 3 Encounters:   10/10/18 106/62   10/03/18 100/60   10/01/18 100/62    Weight from Last 3 Encounters:   10/10/18 230 lb (104.3 kg)   10/03/18 230 lb (104.3 kg)   10/01/18 230 lb (104.3 kg)              We Performed the Following     HC Hyaloronan (Gel-One), Intra-Artic INJ, PER DOSE [.001]     Large Joint/Bursa injection and/or drainage (Shoulder, Knee)          Today's Medication Changes          These changes are accurate as of 10/10/18  4:52 PM.  If you have any questions, ask your nurse or doctor.               Start taking these medicines.        Dose/Directions    hyaluronate 30 MG/3ML injection   Commonly known as:  GEL-ONE   Used for:  Primary osteoarthritis of right knee, Primary osteoarthritis of left knee   Started by:  Esteban Wills MD        Dose:  30 mg   3 mLs (30 mg) by INTRA-ARTICULAR route once for 1 dose   Quantity:  6 mL   Refills:  0            Where to get your medicines      Some of these will need a paper prescription and others can be bought over the counter.  Ask your nurse if you have questions.     You don't need a prescription for these medications     hyaluronate 30 MG/3ML injection                Primary Care Provider Office Phone # Fax #    Magaly Sosa [phone] " [phone]       [address] [facility] [address]        Equal Access to Services     HAKEEM SOLANO : Hadii lourdes aiken bennett Sodarell, waaxda luqadaha, qaybta kaalmada grover, olga elizabethin hayaabreezy colonrissa castillo yessica caballero. So [facility] [phone].    ATENCIÓN: Si habla español, tiene a lazaro disposición servicios gratuitos de asistencia lingüística. Mario al [phone].    We comply with applicable federal civil rights laws and [place] laws. We do not discriminate on the basis of race, color, national origin, age, disability, sex, sexual orientation, or gender identity.            Thank you!     Thank you for choosing [facility]  for your care. Our goal is always to provide you with excellent care. Hearing back from our patients is one way we can continue to improve our services. Please take a few minutes to complete the written survey that you may receive in the mail after your visit with us. Thank you!             Your Updated Medication List - Protect others around you: Learn how to safely use, store and throw away your medicines at www.disposemymeds.org.          This list is accurate as of 10/10/18  4:52 PM.  Always use your most recent med list.                   Brand Name Dispense Instructions for use Diagnosis    albuterol (2.5 MG/3ML) 0.083% neb solution     1 Box    Take 1 vial (2.5 mg) by nebulization every 4 hours as needed for shortness of breath / dyspnea or wheezing    Acute bronchospasm       aspirin 81 MG EC tablet     90 tablet    Take 1 tablet (81 mg) by mouth daily    Varicose vein of leg       clobetasol 0.05 % ointment    TEMOVATE    60 g    Apply at bedtime to sites of itch    Intrinsic eczema       clonazePAM 1 MG tablet    klonoPIN    45 tablet    TAKE 1/4 TO 1/2 (ONE-FOURTH TO ONE-HALF) TABLET BY MOUTH EVERY 8 HOURS AS NEEDED    Anxiety       FISH OIL      Take 1 capsule by mouth daily        Garlic 10 MG Caps      Take 1 capsule by mouth as needed         hyaluronate 30 MG/3ML injection    GEL-ONE    6 mL    3 mLs (30 mg) by INTRA-ARTICULAR route once for 1 dose    Primary osteoarthritis of right knee, Primary osteoarthritis of left knee       HYDROcodone-acetaminophen 5-325 MG per tablet    NORCO    60 tablet    TAKE ONE TABLET BY MOUTH EVERY 4 TO 6 HOURS AS NEEDED FOR PAIN    Primary osteoarthritis of right hip       PARoxetine 40 MG tablet    PAXIL    90 tablet    TAKE ONE TABLET BY MOUTH ONCE DAILY    Anxiety       simvastatin 20 MG tablet    ZOCOR    90 tablet    TAKE ONE TABLET BY MOUTH AT BEDTIME    Hyperlipidemia LDL goal <100       sulindac 200 MG tablet    CLINORIL    60 tablet    Take 1 tablet (200 mg) by mouth 2 times daily as needed    Primary osteoarthritis of right knee       VITAMIN D (CHOLECALCIFEROL) PO      Take 2,000 Units by mouth daily        VITAMIN E      Take 1 capsule by mouth daily

## 2018-10-10 NOTE — NURSING NOTE
"Chief Complaint   Patient presents with     RECHECK     Bilateral Knee Injections       Initial /62  Pulse 65  Temp 98.3  F (36.8  C) (Tympanic)  Ht 5' 5\" (1.651 m)  Wt 230 lb (104.3 kg)  SpO2 98%  BMI 38.27 kg/m2 Estimated body mass index is 38.27 kg/(m^2) as calculated from the following:    Height as of this encounter: 5' 5\" (1.651 m).    Weight as of this encounter: 230 lb (104.3 kg).  Medication Reconciliation: complete    Radha Ruth LPN    "

## 2018-10-18 ENCOUNTER — TELEPHONE (OUTPATIENT)
Dept: ORTHOPEDICS | Facility: OTHER | Age: 78
End: 2018-10-18

## 2018-10-18 NOTE — TELEPHONE ENCOUNTER
Continues left knee pain and still is not getting any better patient informed that gel injection take up to 3 months to get maximum  Benefit but if she feels she needs to see him she is welcome.  Jesusita So LPN

## 2018-10-24 ENCOUNTER — OFFICE VISIT (OUTPATIENT)
Dept: ORTHOPEDICS | Facility: OTHER | Age: 78
End: 2018-10-24
Attending: ORTHOPAEDIC SURGERY
Payer: COMMERCIAL

## 2018-10-24 VITALS
DIASTOLIC BLOOD PRESSURE: 60 MMHG | BODY MASS INDEX: 39.32 KG/M2 | OXYGEN SATURATION: 97 % | TEMPERATURE: 98.7 F | WEIGHT: 236 LBS | SYSTOLIC BLOOD PRESSURE: 120 MMHG | HEART RATE: 70 BPM | HEIGHT: 65 IN

## 2018-10-24 DIAGNOSIS — M17.12 PRIMARY OSTEOARTHRITIS OF LEFT KNEE: Primary | ICD-10-CM

## 2018-10-24 PROCEDURE — G0463 HOSPITAL OUTPT CLINIC VISIT: HCPCS

## 2018-10-24 PROCEDURE — 99213 OFFICE O/P EST LOW 20 MIN: CPT | Performed by: ORTHOPAEDIC SURGERY

## 2018-10-24 ASSESSMENT — PAIN SCALES - GENERAL: PAINLEVEL: EXTREME PAIN (9)

## 2018-10-24 NOTE — MR AVS SNAPSHOT
After Visit Summary   10/24/2018    Dinorah Lim    MRN: [id number]           Patient Information     Date Of Birth          1940        Visit Information        Provider Department      10/24/2018 2:00 PM Esteban Wills MD [facility] [place]        Today's Diagnoses     Primary osteoarthritis of left knee    -  1      Care Instructions    The X-ray Department of this [facility] will call you within 2 business days to schedule an MRI.  If you do not hear from them by the 3rd business day, call our office at [phone].            Follow-ups after your visit        Follow-up notes from your care team     Return in about 2 weeks (around 11/7/2018).      Your next 10 appointments already scheduled     Nov 07, 2018  8:00 AM CST   (Arrive by 7:45 AM)   Return Visit with MD Ame Calix [facility] ([facility] )    [address]  [place] [address] [phone]            Apr 11, 2019  2:00 PM CDT   (Arrive by 1:45 PM)   [facility]    [address]  [place] [address] [phone]              Future tests that were ordered for you today     Open Future Orders        Priority Expected Expires Ordered    MR Knee Left w/o Contrast Routine  10/24/2019 10/24/2018            Who to contact     If you have questions or need follow up information about today's clinic visit or your schedule please contact [facility] directly at [phone].  Normal or non-critical lab and imaging results will be communicated to you by MyChart, letter or phone within 4 business days after the clinic has received the results. If you do not hear from us within 7 days, please contact the clinic through MyChart or phone. If you have a critical or abnormal lab result, we will notify you by phone as soon as possible.  Submit  "refill requests through [employer] or call your pharmacy and they will forward the refill request to us. Please allow 3 business days for your refill to be completed.          Additional Information About Your Visit        [employer] Information     [employer] lets you send messages to your doctor, view your test results, renew your prescriptions, schedule appointments and more. To sign up, go to [email or web address] . Click on \"Log in\" on the left side of the screen, which will take you to the Welcome page. Then click on \"Sign up Now\" on the right side of the page.     You will be asked to enter the access code listed below, as well as some personal information. Please follow the directions to create your username and password.     Your access code is: [email or web address]  Expires: 2018  8:16 AM     Your access code will  in 90 days. If you need help or a new code, please call your [place] clinic or [phone].        Care EveryWhere ID     This is your Care EveryWhere ID. This could be used by other organizations to access your [place] medical records  [id number]        Your Vitals Were     Pulse Temperature Height Pulse Oximetry BMI (Body Mass Index)       70 98.7  F (37.1  C) (Tympanic) 5' 5\" (1.651 m) 97% 39.27 kg/m2        Blood Pressure from Last 3 Encounters:   10/24/18 120/60   10/10/18 106/62   10/03/18 100/60    Weight from Last 3 Encounters:   10/24/18 236 lb (107 kg)   10/10/18 230 lb (104.3 kg)   10/03/18 230 lb (104.3 kg)                 Where to get your medicines      These medications were sent to [id number] - [place] - [id number]   [id number] , [place] [address]     Phone:  [phone]     simvastatin 20 MG tablet          Primary Care Provider Office Phone # Fax #    Magaly Sosa [phone]       [address] [place] [address]  [place] [address]        Equal Access to Services     HAKEEM SOLANO AH: Jorge Talbot, waaxabiodun moody" olga nessrissa aleelorna archer'aan ah. So [facility] [phone].    ATENCIÓN: Si lucas munoz, tiene a lazaro disposición servicios gratuitos de asistencia lingüística. Mario al [phone].    We comply with applicable federal civil rights laws and [place] laws. We do not discriminate on the basis of race, color, national origin, age, disability, sex, sexual orientation, or gender identity.            Thank you!     Thank you for choosing [facility]  for your care. Our goal is always to provide you with excellent care. Hearing back from our patients is one way we can continue to improve our services. Please take a few minutes to complete the written survey that you may receive in the mail after your visit with us. Thank you!             Your Updated Medication List - Protect others around you: Learn how to safely use, store and throw away your medicines at www.disposemymeds.org.          This list is accurate as of 10/24/18  2:13 PM.  Always use your most recent med list.                   Brand Name Dispense Instructions for use Diagnosis    albuterol (2.5 MG/3ML) 0.083% neb solution     1 Box    Take 1 vial (2.5 mg) by nebulization every 4 hours as needed for shortness of breath / dyspnea or wheezing    Acute bronchospasm       aspirin 81 MG EC tablet     90 tablet    Take 1 tablet (81 mg) by mouth daily    Varicose vein of leg       clobetasol 0.05 % ointment    TEMOVATE    60 g    Apply at bedtime to sites of itch    Intrinsic eczema       clonazePAM 1 MG tablet    klonoPIN    45 tablet    TAKE 1/4 TO 1/2 (ONE-FOURTH TO ONE-HALF) TABLET BY MOUTH EVERY 8 HOURS AS NEEDED    Anxiety       FISH OIL      Take 1 capsule by mouth daily        Garlic 10 MG Caps      Take 1 capsule by mouth as needed        HYDROcodone-acetaminophen 5-325 MG per tablet    NORCO    60 tablet    TAKE ONE TABLET BY MOUTH EVERY 4 TO 6 HOURS AS NEEDED FOR PAIN    Primary osteoarthritis of right hip        PARoxetine 40 MG tablet    PAXIL    90 tablet    TAKE ONE TABLET BY MOUTH ONCE DAILY    Anxiety       simvastatin 20 MG tablet    ZOCOR    90 tablet    TAKE ONE TABLET BY MOUTH AT BEDTIME    Hyperlipidemia LDL goal <100       sulindac 200 MG tablet    CLINORIL    60 tablet    Take 1 tablet (200 mg) by mouth 2 times daily as needed    Primary osteoarthritis of right knee       VITAMIN D (CHOLECALCIFEROL) PO      Take 2,000 Units by mouth daily        VITAMIN E      Take 1 capsule by mouth daily

## 2018-10-24 NOTE — PATIENT INSTRUCTIONS
The X-ray Department of this [facility] will call you within 2 business days to schedule an MRI.  If you do not hear from them by the 3rd business day, call our office at [phone].

## 2018-10-24 NOTE — NURSING NOTE
"Chief Complaint   Patient presents with     RECHECK     Left Knee Pain       Initial /60  Pulse 70  Temp 98.7  F (37.1  C) (Tympanic)  Ht 5' 5\" (1.651 m)  Wt 236 lb (107 kg)  SpO2 97%  BMI 39.27 kg/m2 Estimated body mass index is 39.27 kg/(m^2) as calculated from the following:    Height as of this encounter: 5' 5\" (1.651 m).    Weight as of this encounter: 236 lb (107 kg).  Medication Reconciliation: complete    Radha Ruth LPN    "

## 2018-10-28 DIAGNOSIS — F41.9 ANXIETY: Chronic | ICD-10-CM

## 2018-10-29 RX ORDER — CLONAZEPAM 1 MG/1
TABLET ORAL
Qty: 45 TABLET | Refills: 0 | Status: SHIPPED | OUTPATIENT
Start: 2018-10-29 | End: 2019-02-02

## 2018-10-29 NOTE — TELEPHONE ENCOUNTER
Klonopin  Last Written Prescription Date:  7/23/18  Last Fill Qty:  45, # Refills:  0  Last Office Visit:  10/1/18

## 2018-11-06 ENCOUNTER — TELEPHONE (OUTPATIENT)
Dept: MRI IMAGING | Facility: HOSPITAL | Age: 78
End: 2018-11-06

## 2018-11-13 ENCOUNTER — HOSPITAL ENCOUNTER (OUTPATIENT)
Dept: MRI IMAGING | Facility: HOSPITAL | Age: 78
Discharge: HOME | End: 2018-11-13
Attending: ORTHOPAEDIC SURGERY | Admitting: ORTHOPAEDIC SURGERY
Payer: MEDICARE

## 2018-11-13 DIAGNOSIS — M17.12 PRIMARY OSTEOARTHRITIS OF LEFT KNEE: ICD-10-CM

## 2018-11-13 PROCEDURE — 73721 MRI JNT OF LWR EXTRE W/O DYE: CPT | Mod: TC,LT

## 2018-11-14 ENCOUNTER — OFFICE VISIT (OUTPATIENT)
Dept: ORTHOPEDICS | Facility: OTHER | Age: 78
End: 2018-11-14
Attending: ORTHOPAEDIC SURGERY
Payer: MEDICARE

## 2018-11-14 VITALS
HEIGHT: 65 IN | TEMPERATURE: 98.2 F | HEART RATE: 60 BPM | WEIGHT: 235 LBS | BODY MASS INDEX: 39.15 KG/M2 | OXYGEN SATURATION: 98 % | SYSTOLIC BLOOD PRESSURE: 120 MMHG | DIASTOLIC BLOOD PRESSURE: 66 MMHG

## 2018-11-14 DIAGNOSIS — M17.12 PRIMARY OSTEOARTHRITIS OF LEFT KNEE: Primary | ICD-10-CM

## 2018-11-14 PROCEDURE — 99212 OFFICE O/P EST SF 10 MIN: CPT | Performed by: ORTHOPAEDIC SURGERY

## 2018-11-14 PROCEDURE — G0463 HOSPITAL OUTPT CLINIC VISIT: HCPCS

## 2018-11-14 ASSESSMENT — PAIN SCALES - GENERAL: PAINLEVEL: WORST PAIN (10)

## 2018-11-14 NOTE — MR AVS SNAPSHOT
"              After Visit Summary   11/14/2018    Dinorah Lim    MRN: [id number]           Patient Information     Date Of Birth          1940        Visit Information        Provider Department      11/14/2018 8:20 AM Esteban Wills MD [facility]        Today's Diagnoses     Primary osteoarthritis of left knee    -  1       Follow-ups after your visit        Follow-up notes from your care team     Return if symptoms worsen or fail to improve.      Your next 10 appointments already scheduled     Apr 11, 2019  2:00 PM CDT   (Arrive by 1:45 PM)   Return Visit with Esteban Wills MD   [facility]    [address]  [place] [address] [phone]              Who to contact     If you have questions or need follow up information about today's clinic visit or your schedule please contact [facility] directly at [phone].  Normal or non-critical lab and imaging results will be communicated to you by MyChart, letter or phone within 4 business days after the clinic has received the results. If you do not hear from us within 7 days, please contact the clinic through MyChart or phone. If you have a critical or abnormal lab result, we will notify you by phone as soon as possible.  Submit refill requests through [employer] or call your pharmacy and they will forward the refill request to us. Please allow 3 business days for your refill to be completed.          Additional Information About Your Visit        Care EveryWhere ID     This is your Care EveryWhere ID. This could be used by other organizations to access your [facility] medical records  [id number]        Your Vitals Were     Pulse Temperature Height Pulse Oximetry BMI (Body Mass Index)       60 98.2  F (36.8  C) (Tympanic) 5' 5\" (1.651 m) 98% 39.11 kg/m2        Blood Pressure from Last 3 Encounters:   11/14/18 120/66   10/24/18 120/60 "   10/10/18 106/62    Weight from Last 3 Encounters:   11/14/18 235 lb (106.6 kg)   10/24/18 236 lb (107 kg)   10/10/18 230 lb (104.3 kg)              Today, you had the following     No orders found for display       Primary Care Provider Office Phone # Fax #    Magaly Sosa [phone] [address] [facility] [address]        Equal Access to Services     [facility] : Hadii aad ku hadasho Soomaali, waaxda luqadaha, qaybta kaalmada adeegyada, waxay idiin hayaan adeeg aleearaiban la'lolis . So [facility] [phone].    ATENCIÓN: Si habla español, tiene a lazaro disposición servicios gratuitos de asistencia lingüística. [facility] [phone].    We comply with applicable federal civil rights laws and [place] laws. We do not discriminate on the basis of race, color, national origin, age, disability, sex, sexual orientation, or gender identity.            Thank you!     Thank you for choosing [facility]  for your care. Our goal is always to provide you with excellent care. Hearing back from our patients is one way we can continue to improve our services. Please take a few minutes to complete the written survey that you may receive in the mail after your visit with us. Thank you!             Your Updated Medication List - Protect others around you: Learn how to safely use, store and throw away your medicines at www.disposemymeds.org.          This list is accurate as of 11/14/18  8:28 AM.  Always use your most recent med list.                   Brand Name Dispense Instructions for use Diagnosis    albuterol (2.5 MG/3ML) 0.083% neb solution     1 Box    Take 1 vial (2.5 mg) by nebulization every 4 hours as needed for shortness of breath / dyspnea or wheezing    Acute bronchospasm       aspirin 81 MG EC tablet     90 tablet    Take 1 tablet (81 mg) by mouth daily    Varicose vein of leg       clobetasol 0.05 % ointment    TEMOVATE    60 g    Apply at bedtime to sites of itch     Intrinsic eczema       clonazePAM 1 MG tablet    klonoPIN    45 tablet    TAKE 1/4 (ONE-FOURTH) TO 1/2 (ONE-HALF) TABLET BY MOUTH EVERY 8 HOURS AS NEEDED    Anxiety       FISH OIL      Take 1 capsule by mouth daily        Garlic 10 MG Caps      Take 1 capsule by mouth as needed        HYDROcodone-acetaminophen 5-325 MG per tablet    NORCO    60 tablet    TAKE ONE TABLET BY MOUTH EVERY 4 TO 6 HOURS AS NEEDED FOR PAIN    Primary osteoarthritis of right hip       PARoxetine 40 MG tablet    PAXIL    90 tablet    TAKE ONE TABLET BY MOUTH ONCE DAILY    Anxiety       simvastatin 20 MG tablet    ZOCOR    90 tablet    TAKE ONE TABLET BY MOUTH AT BEDTIME    Hyperlipidemia LDL goal <100       sulindac 200 MG tablet    CLINORIL    60 tablet    Take 1 tablet (200 mg) by mouth 2 times daily as needed    Primary osteoarthritis of right knee       VITAMIN D (CHOLECALCIFEROL) PO      Take 2,000 Units by mouth daily        VITAMIN E      Take 1 capsule by mouth daily

## 2018-11-14 NOTE — PROGRESS NOTES
"Chief complaints:  #1.  DJD left knee  #2.  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 Magaly Sosa     HPI: She is a long history of DJD of both knees, worse per x-ray on the right.  At present her right knee symptoms are under control with sulindac and occasional hydrocodone. The left knee has been bothering her much more than the right lately.  She denies any injury to it.  The pain is on the anteromedial joint line.  On 10/10/2018 she received a Gel 1 injection into the left knee.  On 10/24/2018 she returned to report that the knee pain was even worse.  She requested an MRI.    Subjective: Today she reports that the left knee pain continues as a frequent sharp stabbing momentary anteromedial pain that can occur with weightbearing or with nonweightbearing and interferes with sleep.    Objective:/66  Pulse 60  Temp 98.2  F (36.8  C) (Tympanic)  Ht 5' 5\" (1.651 m)  Wt 235 lb (106.6 kg)  SpO2 98%  BMI 39.11 kg/m2  Overweight female no obvious distress.  The left knee i  has no effusion.  It is tender only along the anteromedial joint line.  The knee is a mild valgus deformity.    X-ray; the MRI of the left knee performed on 11/13/2018 shows complete loss of articular cartilage in the lateral and patellofemoral compartments.  There is extensive degeneration of the medial meniscus but no full-thickness tear.  There is a degree of meniscal extrusion but no root tear is seen.    Impression: DJD left knee with medial meniscal degeneration but no josesito tear    Plan: These MRI findings do not show anything that an arthroscopy is likely to help.  I therefore recommend that she give the Visco supplementation injection more time to work.  To help the pain quiet down sooner she could have a steroid injection but she must remember that " if everything fails a steroid injection would preclude her from having a TKA for 6 months.  She was willing to give it more time realizing that it will take till January for the Visco supplimentation injection to work fully.  She will therefore return as needed.

## 2018-11-14 NOTE — NURSING NOTE
"Chief Complaint   Patient presents with     Results     MRI Results Left Knee       Initial /66  Pulse 60  Temp 98.2  F (36.8  C) (Tympanic)  Ht 5' 5\" (1.651 m)  Wt 235 lb (106.6 kg)  SpO2 98%  BMI 39.11 kg/m2 Estimated body mass index is 39.11 kg/(m^2) as calculated from the following:    Height as of this encounter: 5' 5\" (1.651 m).    Weight as of this encounter: 235 lb (106.6 kg).  Medication Reconciliation: complete    Radha Ruth LPN    "

## 2018-11-29 DIAGNOSIS — M16.11 PRIMARY OSTEOARTHRITIS OF RIGHT HIP: ICD-10-CM

## 2018-11-29 NOTE — TELEPHONE ENCOUNTER
ALEXISCO      Last Written Prescription Date:  8/6/18  Last Fill Quantity: 60,   # refills: 0  Last Office Visit: 10/1/18  Future Office visit:       Routing refill request to provider for review/approval because:  Drug not on the [facility], P or [facility] refill protocol or controlled substance

## 2018-11-30 RX ORDER — HYDROCODONE BITARTRATE AND ACETAMINOPHEN 5; 325 MG/1; MG/1
TABLET ORAL
Qty: 60 TABLET | Refills: 0 | Status: SHIPPED | OUTPATIENT
Start: 2018-11-30 | End: 2019-01-31

## 2018-11-30 NOTE — TELEPHONE ENCOUNTER
Pt notified that the written RX is ready at the [facility]  registration to be picked up.   Anne Linares RN

## 2018-12-18 NOTE — PROGRESS NOTES
SUBJECTIVE:   Dinorah Lim is a 78 year old female who presents to clinic today for the following health issues:      Headaches      Duration: 1 week or so    Description  Location: unilateral in the right occipital area, top of head    Character: dull pain  Frequency:  Comes and goes  Duration:  All day    Intensity:  moderate    Accompanying signs and symptoms:    Precipitating or Alleviating factors:  Nausea/vomiting: no  Dizziness: no  Weakness or numbness: no  Visual changes: none  Fever: no   Sinus or URI symptoms no     History  Head trauma: no   Family history of migraines: YES  Previous tests for headaches: no   Neurologist evaluations: no   Able to do daily activities when headache present: YES  Wake with headaches: no   Daily pain medication use: no   Any changes in: family Daughter lump in breast and varies other health issues    Precipitating or Alleviating factors (light/sound/sleep/caffeine): N/A    Therapies tried and outcome: narcotics ( hydrocodone )    Outcome - effective  Frequent/daily pain medication use: YES    She has had a lot of stress lately with her grandson and daughter which has improved. This area was tender to the touch but now has resolved    Irregular heart beat      Duration: 2 weeks for 3 days    Description (location/character/radiation): racing and irregular beat    Intensity:  severe    Accompanying signs and symptoms: none    History (similar episodes/previous evaluation): see medical record    Precipitating or alleviating factors: Stress onset    Therapies tried and outcome: None     Her mother and brother have had similar symptoms      Problem list and histories reviewed & adjusted, as indicated.  Additional history: as documented    Patient Active Problem List   Diagnosis     Osteoarthritis of right hip     Abnormal glucose     Osteoarthritis     Lung density on x-ray     Hyperlipidemia LDL goal <130     Advanced care planning/counseling discussion     Anxiety     COPD  (chronic obstructive pulmonary disease) (H)     Urticaria     ACP (advance care planning)     Morbid obesity (H)     Elevated glucose     Past Surgical History:   Procedure Laterality Date     CLOSED REDUCTION WRIST Right 1952 x 2    long arm cast (same time as elbow fx)     CLOSED RX ELBOW DISLOCATION Right 1952    CR/long cast of fracture     HC INJ EPIDURAL LUMBAR/SACRAL W/WO CONTRAST Bilateral 5/2013    facet injections; prev 2012     LAPAROSCOPIC CHOLECYSTECTOMY N/A 1/4/2015    Procedure: LAPAROSCOPIC CHOLECYSTECTOMY;  Surgeon: Brittney العلي MD;  Location: [facility]     TONSILLECTOMY         Social History     Tobacco Use     Smoking status: Never Smoker     Smokeless tobacco: Never Used   Substance Use Topics     Alcohol use: No     Family History   Problem Relation Age of Onset     Heart Disease Brother         atrial fibrillation     Other - See Comments Mother         emphysema     Heart Disease Father 92        CHF     Cancer Paternal Grandfather         lung cancer     Cancer Maternal Uncle         lung cancer         Current Outpatient Medications   Medication Sig Dispense Refill     albuterol (2.5 MG/3ML) 0.083% neb solution Take 1 vial (2.5 mg) by nebulization every 4 hours as needed for shortness of breath / dyspnea or wheezing 1 Box 0     aspirin 81 MG EC tablet Take 1 tablet (81 mg) by mouth daily 90 tablet 3     clobetasol (TEMOVATE) 0.05 % ointment Apply at bedtime to sites of itch 60 g 3     clonazePAM (KLONOPIN) 1 MG tablet TAKE 1/4 (ONE-FOURTH) TO 1/2 (ONE-HALF) TABLET BY MOUTH EVERY 8 HOURS AS NEEDED 45 tablet 0     FISH OIL Take 1 capsule by mouth daily        Garlic 10 MG CAPS Take 1 capsule by mouth as needed       HYDROcodone-acetaminophen (NORCO) 5-325 MG tablet TAKE ONE TABLET BY MOUTH EVERY 4 TO 6 HOURS AS NEEDED FOR PAIN 60 tablet 0     PARoxetine (PAXIL) 40 MG tablet TAKE ONE TABLET BY MOUTH ONCE DAILY 90 tablet 2     simvastatin (ZOCOR) 20 MG tablet TAKE ONE TABLET BY MOUTH AT  BEDTIME 90 tablet 2     sulindac (CLINORIL) 200 MG tablet Take 1 tablet (200 mg) by mouth 2 times daily as needed 60 tablet 1     VITAMIN D, CHOLECALCIFEROL, PO Take 2,000 Units by mouth daily       VITAMIN E Take 1 capsule by mouth daily        Allergies   Allergen Reactions     Chocolate Hives     Lemon Flavor Hives     Lime [Calcium Oxysulfide] Hives     Orange Fruit [Citrus] Hives     Strawberry Hives     Adhesive Tape      Band-aids     Codeine Hives     Patient can tolerate oxycodone & dilaudid     Dexamethasone Hives     Erythromycin Hives     ERYTHROMYCIN BASE     Grapefruit [Extra Strength Grapefruit]      GRAPEFRUIT     Penicillins Hives     Sulfa Drugs      SULFONAMIDE ANTIBIOTICS      Tomato      BP Readings from Last 3 Encounters:   12/24/18 110/68   11/14/18 120/66   10/24/18 120/60    Wt Readings from Last 3 Encounters:   12/24/18 108 kg (238 lb)   11/14/18 106.6 kg (235 lb)   10/24/18 107 kg (236 lb)                    Reviewed and updated as needed this visit by clinical staff  Tobacco  Allergies  Meds  Med Hx  Surg Hx  Fam Hx  Soc Hx      Reviewed and updated as needed this visit by Provider         ROS:  Constitutional, HEENT, cardiovascular, pulmonary, gi and gu systems are negative, except as otherwise noted.    OBJECTIVE:                                                    /68 (BP Location: Left arm, Patient Position: Sitting, Cuff Size: Adult Large)   Pulse 66   Temp 97.6  F (36.4  C) (Tympanic)   Wt 108 kg (238 lb)   SpO2 98%   BMI 39.61 kg/m     Body mass index is 39.61 kg/m .  GENERAL APPEARANCE: healthy, alert and no distress  EYES: Eyes grossly normal to inspection, PERRL and conjunctivae and sclerae normal  HENT: ear canals and TM's normal and nose and mouth without ulcers or lesions  NECK: no adenopathy, no asymmetry, masses, or scars and thyroid normal to palpation  RESP: lungs clear to auscultation - no rales, rhonchi or wheezes  CV: regular rates and rhythm, normal S1  S2, no S3 or S4 and no murmur, click or rub  NEURO: Normal strength and tone, mentation intact, speech normal, DTR symmetrically normal in lower extremities and cranial nerves 2-12 intact  PSYCH: mentation appears normal and affect normal/bright         ASSESSMENT/PLAN:                                                    1. Tension headache  Discussed heat, tylenol as needed    2. Palpitations  New, check thyroid, EKG and will set up a Zio patch to evaluate  - TSH with free T4 reflex  - EKG 12-lead complete w/read - (Clinic Performed)  - Zio Patch Holter Adult Pediatric Greater than 48 hrs; Future    3. Estrogen deficiency  Due for dexa scan, discussed, ordered  - DX Hip/Pelvis/Spine; Future      Follow up prn    Magaly Sosa MD  [facility]

## 2018-12-24 ENCOUNTER — OFFICE VISIT (OUTPATIENT)
Dept: FAMILY MEDICINE | Facility: OTHER | Age: 78
End: 2018-12-24
Attending: FAMILY MEDICINE
Payer: COMMERCIAL

## 2018-12-24 VITALS
DIASTOLIC BLOOD PRESSURE: 68 MMHG | SYSTOLIC BLOOD PRESSURE: 110 MMHG | OXYGEN SATURATION: 98 % | TEMPERATURE: 97.6 F | BODY MASS INDEX: 39.61 KG/M2 | WEIGHT: 238 LBS | HEART RATE: 66 BPM

## 2018-12-24 DIAGNOSIS — G44.209 TENSION HEADACHE: Primary | ICD-10-CM

## 2018-12-24 DIAGNOSIS — R00.2 PALPITATIONS: ICD-10-CM

## 2018-12-24 DIAGNOSIS — E28.39 ESTROGEN DEFICIENCY: ICD-10-CM

## 2018-12-24 LAB — TSH SERPL DL<=0.005 MIU/L-ACNC: 0.83 MU/L (ref 0.4–4)

## 2018-12-24 PROCEDURE — 93010 ELECTROCARDIOGRAM REPORT: CPT | Performed by: INTERNAL MEDICINE

## 2018-12-24 PROCEDURE — G0463 HOSPITAL OUTPT CLINIC VISIT: HCPCS

## 2018-12-24 PROCEDURE — 36415 COLL VENOUS BLD VENIPUNCTURE: CPT | Mod: ZL | Performed by: FAMILY MEDICINE

## 2018-12-24 PROCEDURE — 84443 ASSAY THYROID STIM HORMONE: CPT | Mod: ZL | Performed by: FAMILY MEDICINE

## 2018-12-24 PROCEDURE — 93005 ELECTROCARDIOGRAM TRACING: CPT

## 2018-12-24 PROCEDURE — 99214 OFFICE O/P EST MOD 30 MIN: CPT | Mod: 25 | Performed by: FAMILY MEDICINE

## 2018-12-24 ASSESSMENT — PAIN SCALES - GENERAL: PAINLEVEL: WORST PAIN (10)

## 2018-12-24 NOTE — NURSING NOTE
"Chief Complaint   Patient presents with     Headache     Irregular Heart Beat       Initial /68 (BP Location: Left arm, Patient Position: Sitting, Cuff Size: Adult Large)   Pulse 66   Temp 97.6  F (36.4  C) (Tympanic)   Wt 108 kg (238 lb)   SpO2 98%   BMI 39.61 kg/m   Estimated body mass index is 39.61 kg/m  as calculated from the following:    Height as of 11/14/18: 1.651 m (5' 5\").    Weight as of this encounter: 108 kg (238 lb).  Medication Reconciliation: complete    Yocasta Daniel LPN  "

## 2018-12-26 ENCOUNTER — OFFICE VISIT (OUTPATIENT)
Dept: ORTHOPEDICS | Facility: OTHER | Age: 78
End: 2018-12-26
Attending: FAMILY MEDICINE
Payer: COMMERCIAL

## 2018-12-26 VITALS
SYSTOLIC BLOOD PRESSURE: 106 MMHG | HEIGHT: 65 IN | HEART RATE: 67 BPM | TEMPERATURE: 98.6 F | OXYGEN SATURATION: 97 % | DIASTOLIC BLOOD PRESSURE: 60 MMHG | BODY MASS INDEX: 39.65 KG/M2 | WEIGHT: 238 LBS

## 2018-12-26 DIAGNOSIS — M23.204 OLD COMPLEX TEAR OF MEDIAL MENISCUS OF LEFT KNEE: Primary | ICD-10-CM

## 2018-12-26 DIAGNOSIS — M17.12 PRIMARY OSTEOARTHRITIS OF LEFT KNEE: ICD-10-CM

## 2018-12-26 PROCEDURE — G0463 HOSPITAL OUTPT CLINIC VISIT: HCPCS

## 2018-12-26 PROCEDURE — 99213 OFFICE O/P EST LOW 20 MIN: CPT | Performed by: ORTHOPAEDIC SURGERY

## 2018-12-26 ASSESSMENT — MIFFLIN-ST. JEOR: SCORE: 1570.44

## 2018-12-26 ASSESSMENT — PAIN SCALES - GENERAL: PAINLEVEL: EXTREME PAIN (8)

## 2018-12-26 NOTE — PROGRESS NOTES
"Chief Complaints:  #1.  DJD left knee  #2.  Dexamethasone allergy, but she tolerates Kenalog  #3.  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 Magaly Sosa     HPI: She is a long history of DJD of both knees, worse per x-ray on the right.  At present her right knee symptoms are mild.  She is no longer on sulindac.      The left knee has been bothering her much more than the right lately.  She denies an injury to it.  The pain is on the anteromedial joint line.  On 10/10/2018 she received a Gel 1 injection into the left knee.  On 10/24/2018 she returned to report that the knee pain was even worse.  An MRI was obtained which showed a degenerative  tear of the medial meniscus.  At her MRI follow-up appointment of 11/14/2018 I recommended conservative treatment, and reminded her it takes up to 12 weeks for Visco supplementation to fully kick in.    Subjective: Today she returns reporting that the left knee pain is even worse.  She can tolerate it no longer and wants surgery.  She wants to avoid a TKA, if possible, so she requests an arthroscopy.    Since seen last nothing is changed with respect to her musculoskeletal or neurologic review of systems other than what is above.  With respect to her her cardiac system she has been having palpitations lately and so is having a Holter monitor put on on 12/31/2018.  It will be on for 2 weeks.    Objective:/60   Pulse 67   Temp 98.6  F (37  C) (Tympanic)   Ht 1.651 m (5' 5\")   Wt 108 kg (238 lb)   SpO2 97%   BMI 39.61 kg/m    Obese female no obvious distress.  Her left knee has no effusion.  It is tender along the anteromedial joint line only.  Its range of motion is 0-115 degrees.  The neurovascular status of the limb is intact.    X-rays: The last films of the left knee were " "taken on 2/15/2018 and was read by the radiologist as normal.  A left knee MRI on 11/14/2018 showed \"severe cartilage thinning\" on both sides of the medial compartment, and a horizontal tear of the medial meniscus.    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  She accepted a referral to PT, but cannot get in until 1/11/2019.  I will see her back 4 weeks later.  If she is not improved we will consider arthroscopy at that time.    "

## 2018-12-26 NOTE — NURSING NOTE
"Chief Complaint   Patient presents with     Consult     Left Knee       Initial /60   Pulse 67   Temp 98.6  F (37  C) (Tympanic)   Ht 1.651 m (5' 5\")   Wt 108 kg (238 lb)   SpO2 97%   BMI 39.61 kg/m   Estimated body mass index is 39.61 kg/m  as calculated from the following:    Height as of this encounter: 1.651 m (5' 5\").    Weight as of this encounter: 108 kg (238 lb).  Medication Reconciliation: complete    Radha Ruth LPN  "

## 2018-12-31 ENCOUNTER — HOSPITAL ENCOUNTER (OUTPATIENT)
Dept: CARDIOLOGY | Facility: HOSPITAL | Age: 78
Discharge: HOME | End: 2018-12-31
Attending: FAMILY MEDICINE | Admitting: FAMILY MEDICINE
Payer: MEDICARE

## 2018-12-31 ENCOUNTER — HOSPITAL ENCOUNTER (OUTPATIENT)
Dept: BONE DENSITY | Facility: HOSPITAL | Age: 78
End: 2018-12-31
Attending: FAMILY MEDICINE
Payer: MEDICARE

## 2018-12-31 DIAGNOSIS — E28.39 ESTROGEN DEFICIENCY: ICD-10-CM

## 2018-12-31 DIAGNOSIS — R00.2 PALPITATIONS: ICD-10-CM

## 2018-12-31 PROCEDURE — 0296T ZIO PATCH HOLTER ADULT PEDIATRIC GREATER THAN 48 HRS: CPT | Mod: TC

## 2018-12-31 PROCEDURE — 77080 DXA BONE DENSITY AXIAL: CPT | Mod: TC

## 2018-12-31 PROCEDURE — 0298T ZZC EXT ECG > 48HR TO 21 DAY REVIEW AND INTERPRETATN: CPT | Performed by: INTERNAL MEDICINE

## 2019-01-02 ENCOUNTER — TELEPHONE (OUTPATIENT)
Dept: ORTHOPEDICS | Facility: OTHER | Age: 79
End: 2019-01-02

## 2019-01-02 NOTE — TELEPHONE ENCOUNTER
We will hold off on Surgery patient will call back and we will make a follow up with Dr. Wills when she is able to have had therpya good 4-6 weeks of Physical therapy. Follow up will depend on if she can start therapy due to Heart monitor.  Jesusita So LPN

## 2019-01-04 NOTE — TELEPHONE ENCOUNTER
Patient will cancel all physcial therapy at this time and will call when she is able to have PT, financial reasons , patient does want to go to [facility]. Patient advise when she is ready to go forward with PT to call our office and we can send out referral.   Jesusita So LPN

## 2019-01-07 ENCOUNTER — TELEPHONE (OUTPATIENT)
Dept: FAMILY MEDICINE | Facility: OTHER | Age: 79
End: 2019-01-07

## 2019-01-07 NOTE — TELEPHONE ENCOUNTER
Pt advised to call number listed on [employer] pamphlet per recommendation from cardiac rehab/DI. Pt verbalizes understanding.

## 2019-01-07 NOTE — TELEPHONE ENCOUNTER
Pt called, states that she had a cardiac montior placed on 12/31. Pt reports that she is aware monitor should stay in place for 14 days. Pt notes that she is allergic to tape and reports that the tape securing monitor is causing itchiness. Pt wondering if she can remove monitor now or if she needs to wait. Please advise. Thank you!

## 2019-01-21 ENCOUNTER — OFFICE VISIT (OUTPATIENT)
Dept: FAMILY MEDICINE | Facility: OTHER | Age: 79
End: 2019-01-21
Attending: FAMILY MEDICINE
Payer: COMMERCIAL

## 2019-01-21 VITALS
BODY MASS INDEX: 39.11 KG/M2 | DIASTOLIC BLOOD PRESSURE: 62 MMHG | WEIGHT: 235 LBS | TEMPERATURE: 98.2 F | HEART RATE: 74 BPM | RESPIRATION RATE: 19 BRPM | SYSTOLIC BLOOD PRESSURE: 130 MMHG | OXYGEN SATURATION: 94 %

## 2019-01-21 DIAGNOSIS — E66.01 MORBID OBESITY (H): ICD-10-CM

## 2019-01-21 DIAGNOSIS — M17.0 PRIMARY OSTEOARTHRITIS OF BOTH KNEES: ICD-10-CM

## 2019-01-21 DIAGNOSIS — J43.9 PULMONARY EMPHYSEMA, UNSPECIFIED EMPHYSEMA TYPE (H): ICD-10-CM

## 2019-01-21 DIAGNOSIS — R00.2 PALPITATIONS: Primary | ICD-10-CM

## 2019-01-21 PROCEDURE — 99213 OFFICE O/P EST LOW 20 MIN: CPT | Performed by: FAMILY MEDICINE

## 2019-01-21 PROCEDURE — G0463 HOSPITAL OUTPT CLINIC VISIT: HCPCS

## 2019-01-21 ASSESSMENT — PAIN SCALES - GENERAL: PAINLEVEL: NO PAIN (0)

## 2019-01-21 NOTE — PROGRESS NOTES
SUBJECTIVE:   Dinorah Lim is a 78 year old female who presents to clinic today for the following health issues:      Results of Zio patch      Duration: is waiting results from Zio patch. This test was rescheduled several times and after calling cardiology, results were just received on January 18 and haven't been read yet    Description (location/character/radiation): Zio patch results from 12/31/18    Intensity:  0/10    Accompanying signs and symptoms: Hasn't had any palpitations or pounding in her chest since then    History (similar episodes/previous evaluation): None    Precipitating or alleviating factors: None    Therapies tried and outcome: None   She has one caffienated pop daily        Problem list and histories reviewed & adjusted, as indicated.  Additional history: as documented    Patient Active Problem List   Diagnosis     Osteoarthritis of right hip     Abnormal glucose     Osteoarthritis     Lung density on x-ray     Hyperlipidemia LDL goal <130     Advanced care planning/counseling discussion     Anxiety     COPD (chronic obstructive pulmonary disease) (H)     Urticaria     ACP (advance care planning)     Morbid obesity (H)     Elevated glucose     Past Surgical History:   Procedure Laterality Date     CLOSED REDUCTION WRIST Right 1952 x 2    long arm cast (same time as elbow fx)     CLOSED RX ELBOW DISLOCATION Right 1952    CR/long cast of fracture     HC INJ EPIDURAL LUMBAR/SACRAL W/WO CONTRAST Bilateral 5/2013    facet injections; prev 2012     LAPAROSCOPIC CHOLECYSTECTOMY N/A 1/4/2015    Procedure: LAPAROSCOPIC CHOLECYSTECTOMY;  Surgeon: Brittney العلي MD;  Location: [facility]     TONSILLECTOMY         Social History     Tobacco Use     Smoking status: Never Smoker     Smokeless tobacco: Never Used   Substance Use Topics     Alcohol use: No     Family History   Problem Relation Age of Onset     Heart Disease Brother         atrial fibrillation     Other - See Comments Mother          emphysema     Heart Disease Father 92        CHF     Cancer Paternal Grandfather         lung cancer     Cancer Maternal Uncle         lung cancer         Current Outpatient Medications   Medication Sig Dispense Refill     albuterol (2.5 MG/3ML) 0.083% neb solution Take 1 vial (2.5 mg) by nebulization every 4 hours as needed for shortness of breath / dyspnea or wheezing 1 Box 0     aspirin (ASA) 81 MG EC tablet Take 1 tablet (81 mg) by mouth daily 90 tablet 3     aspirin 81 MG EC tablet Take 1 tablet (81 mg) by mouth daily 90 tablet 3     clobetasol (TEMOVATE) 0.05 % ointment Apply at bedtime to sites of itch 60 g 3     clonazePAM (KLONOPIN) 1 MG tablet TAKE 1/4 (ONE-FOURTH) TO 1/2 (ONE-HALF) TABLET BY MOUTH EVERY 8 HOURS AS NEEDED 45 tablet 0     FISH OIL Take 1 capsule by mouth daily        Garlic 10 MG CAPS Take 1 capsule by mouth as needed       HYDROcodone-acetaminophen (NORCO) 5-325 MG tablet TAKE ONE TABLET BY MOUTH EVERY 4 TO 6 HOURS AS NEEDED FOR PAIN 60 tablet 0     order for DME Equipment being ordered: Walker patient will need prior to surgery on 1/24/2019 1 Device 0     PARoxetine (PAXIL) 40 MG tablet TAKE ONE TABLET BY MOUTH ONCE DAILY 90 tablet 2     simvastatin (ZOCOR) 20 MG tablet TAKE ONE TABLET BY MOUTH AT BEDTIME 90 tablet 2     VITAMIN D, CHOLECALCIFEROL, PO Take 2,000 Units by mouth daily       sulindac (CLINORIL) 200 MG tablet Take 1 tablet (200 mg) by mouth 2 times daily as needed (Patient not taking: Reported on 1/21/2019) 60 tablet 1     Allergies   Allergen Reactions     Chocolate Hives     Lemon Flavor Hives     Lime [Calcium Oxysulfide] Hives     Orange Fruit [Citrus] Hives     Strawberry Hives     Adhesive Tape      Band-aids     Codeine Hives     Patient can tolerate oxycodone & dilaudid     Dexamethasone Hives     Erythromycin Hives     ERYTHROMYCIN BASE     Grapefruit [Extra Strength Grapefruit]      GRAPEFRUIT     Penicillins Hives     Sulfa Drugs      SULFONAMIDE ANTIBIOTICS       Tomato      BP Readings from Last 3 Encounters:   01/21/19 130/62   12/26/18 106/60   12/24/18 110/68    Wt Readings from Last 3 Encounters:   01/21/19 106.6 kg (235 lb)   12/26/18 108 kg (238 lb)   12/24/18 108 kg (238 lb)                    Reviewed and updated as needed this visit by clinical staff  Tobacco  Allergies  Meds       Reviewed and updated as needed this visit by Provider         ROS:  Constitutional, HEENT, cardiovascular, pulmonary, gi and gu systems are negative, except as otherwise noted.    OBJECTIVE:                                                    /62   Pulse 74   Temp 98.2  F (36.8  C) (Tympanic)   Resp 19   Wt 106.6 kg (235 lb)   SpO2 94%   BMI 39.11 kg/m     Body mass index is 39.11 kg/m .  GENERAL APPEARANCE: healthy, alert and no distress  RESP: lungs clear to auscultation - no rales, rhonchi or wheezes  CV: regular rates and rhythm, normal S1 S2, no S3 or S4 and no murmur, click or rub  NEURO: Normal strength and tone, mentation intact and speech normal  PSYCH: mentation appears normal and affect normal/bright         ASSESSMENT/PLAN:                                                    1. Palpitations  Improved. Continue with aspirin daily. Will await reading of results and contact her with those and recommendations. She if feeling better  - aspirin (ASA) 81 MG EC tablet; Take 1 tablet (81 mg) by mouth daily  Dispense: 90 tablet; Refill: 3    2. Primary osteoarthritis of both knees  She would like a referral to [facility] for her knees. The pain has been better since she restarted her calcium and vitamin D and she isn't going to proceed with a right knee replacement at this time  - PHYSICAL THERAPY REFERRAL; Future    3. Pulmonary emphysema  Stable at this time    Follow up with Provider - rao Sosa MD  [facility]

## 2019-01-21 NOTE — NURSING NOTE
"Chief Complaint   Patient presents with     Results       Initial /62   Pulse 74   Temp 98.2  F (36.8  C) (Tympanic)   Resp 19   Wt 106.6 kg (235 lb)   SpO2 94%   BMI 39.11 kg/m   Estimated body mass index is 39.11 kg/m  as calculated from the following:    Height as of 12/26/18: 1.651 m (5' 5\").    Weight as of this encounter: 106.6 kg (235 lb).  Medication Reconciliation: complete    Aditi Saleh LPN    "

## 2019-01-28 DIAGNOSIS — R00.2 PALPITATIONS: Primary | ICD-10-CM

## 2019-01-31 DIAGNOSIS — M16.11 PRIMARY OSTEOARTHRITIS OF RIGHT HIP: ICD-10-CM

## 2019-01-31 RX ORDER — HYDROCODONE BITARTRATE AND ACETAMINOPHEN 5; 325 MG/1; MG/1
TABLET ORAL
Qty: 60 TABLET | Refills: 0 | Status: SHIPPED | OUTPATIENT
Start: 2019-01-31 | End: 2019-05-07

## 2019-01-31 NOTE — TELEPHONE ENCOUNTER
HYDROcodone-acetaminophen (NORCO) 5-325 MG tablet  Last Written Prescription Date:  11/30/18  Last Fill Quantity: 60,   # refills: 0  Last Office Visit: 1/21/19  Future Office visit:    Next 5 appointments (look out 90 days)    Apr 11, 2019  2:00 PM CDT  (Arrive by 1:45 PM)  Return Visit with Esteban Wills MD  [facility] [address] [facility] [id number] [phone]           Routing refill request to provider for review/approval because:  Drug not on the [facility], [facility] or [facility] refill protocol or controlled substance

## 2019-02-02 DIAGNOSIS — F41.9 ANXIETY: Chronic | ICD-10-CM

## 2019-02-04 RX ORDER — CLONAZEPAM 1 MG/1
TABLET ORAL
Qty: 45 TABLET | Refills: 0 | Status: SHIPPED | OUTPATIENT
Start: 2019-02-04 | End: 2019-03-15

## 2019-02-04 NOTE — TELEPHONE ENCOUNTER
clonazePAM (KLONOPIN) 1 MG tablet      Last Written Prescription Date:  10/29/18  Last Fill Quantity: 45,   # refills: 0  Last Office Visit: 1/21/19  Future Office visit:    Next 5 appointments (look out 90 days)    Apr 11, 2019  2:00 PM CDT  (Arrive by 1:45 PM)  [facility] Visit with [facility]eb[facility]ills MD  [facility] [address] [facility] [address]  [phone]           Routing refill request to provider for review/approval because:  Drug not on the [facility], [facility] or [facility] refill protocol or controlled substance

## 2019-02-13 ENCOUNTER — TELEPHONE (OUTPATIENT)
Dept: CARDIOLOGY | Facility: OTHER | Age: 79
End: 2019-02-13
Payer: COMMERCIAL

## 2019-02-13 ENCOUNTER — OFFICE VISIT (OUTPATIENT)
Dept: CARDIOLOGY | Facility: OTHER | Age: 79
End: 2019-02-13
Attending: INTERNAL MEDICINE
Payer: COMMERCIAL

## 2019-02-13 ENCOUNTER — TRANSFERRED RECORDS (OUTPATIENT)
Dept: HEALTH INFORMATION MANAGEMENT | Facility: HOSPITAL | Age: 79
End: 2019-02-13

## 2019-02-13 VITALS
HEART RATE: 71 BPM | TEMPERATURE: 97.9 F | SYSTOLIC BLOOD PRESSURE: 126 MMHG | BODY MASS INDEX: 39.65 KG/M2 | DIASTOLIC BLOOD PRESSURE: 78 MMHG | HEIGHT: 65 IN | OXYGEN SATURATION: 97 % | RESPIRATION RATE: 16 BRPM | WEIGHT: 238 LBS

## 2019-02-13 DIAGNOSIS — I47.10 PSVT (PAROXYSMAL SUPRAVENTRICULAR TACHYCARDIA) (H): ICD-10-CM

## 2019-02-13 DIAGNOSIS — I65.23 BILATERAL CAROTID ARTERY STENOSIS: ICD-10-CM

## 2019-02-13 DIAGNOSIS — R01.1 HEART MURMUR: ICD-10-CM

## 2019-02-13 DIAGNOSIS — R00.2 PALPITATIONS: Primary | ICD-10-CM

## 2019-02-13 DIAGNOSIS — R00.2 PALPITATIONS: ICD-10-CM

## 2019-02-13 DIAGNOSIS — J44.9 COPD, MILD (H): ICD-10-CM

## 2019-02-13 DIAGNOSIS — Z79.899 ON STATIN THERAPY: ICD-10-CM

## 2019-02-13 DIAGNOSIS — I49.1 ATRIAL ECTOPY: ICD-10-CM

## 2019-02-13 DIAGNOSIS — E78.2 MIXED HYPERLIPIDEMIA: ICD-10-CM

## 2019-02-13 DIAGNOSIS — I49.3 PVC'S (PREMATURE VENTRICULAR CONTRACTIONS): Primary | ICD-10-CM

## 2019-02-13 PROCEDURE — 99204 OFFICE O/P NEW MOD 45 MIN: CPT | Performed by: INTERNAL MEDICINE

## 2019-02-13 PROCEDURE — G0463 HOSPITAL OUTPT CLINIC VISIT: HCPCS | Mod: 25

## 2019-02-13 PROCEDURE — 93005 ELECTROCARDIOGRAM TRACING: CPT

## 2019-02-13 PROCEDURE — 93010 ELECTROCARDIOGRAM REPORT: CPT | Performed by: INTERNAL MEDICINE

## 2019-02-13 PROCEDURE — G0463 HOSPITAL OUTPT CLINIC VISIT: HCPCS

## 2019-02-13 ASSESSMENT — MIFFLIN-ST. JEOR: SCORE: 1570.44

## 2019-02-13 ASSESSMENT — PAIN SCALES - GENERAL: PAINLEVEL: NO PAIN (1)

## 2019-02-13 NOTE — PROGRESS NOTES
[facility] HEART CARE   CARDIOLOGY CONSULT     Dinorah Lim   1940  [id number]    Magaly Sosa     Chief Complaint   Patient presents with     Consult     Referral Dr. Magaly Sosa/ Palpitations          HPI:   Mrs. Lim is a 78-year female who is being seen by cardiology for palpitations.  She has had a history of mild COPD on 9/9/14, bilateral carotid artery stenosis at 50% on 12/1/16, hyperlipidemia, and history of heart murmur without identification.    She has been having palpitations since her teen years.  Presently, she has palpitations every 1-2 weeks.  She states they only last seconds.  She feels there is a direct correlation to stress/anxiety related to her youngest daughter's health.  Her youngest daughter is 45 and is battling with breast cancer which is an aggressive form along with other health issues.  She describes herself as being an anxious person and her daughter's ill health is trying on her.  She is not too terribly bothered by her symptoms.    She was seen in the clinic on 12/24/18.  She described palpitations with a racing and irregular heart rate.  Labs were obtained which would include TSH as well as a Zio patch was ordered.  She was seen in follow-up on 1/21/19 for the Zio patch results.  Upon completion of results, a referral was placed to cardiology on 1/28/19.    Her Zio patch results showed rare PVC's, x1 episode of NSVT lasting 5 beats, rare PAC's, x26 episodes of PSVT lasting up to 20 beats, and 3 triggered events which represent PAC's/PSVT.    She also has a history of mild carotid artery disease at less than 50% with mild plaquing on 12/1/16.  She does not have a personal history of smoking but was around secondhand smoke in the past.    She previously had a stress test on 10/5/18 secondary to chest pain.  This was negative for ischemia with an ejection fraction of 75%.  The EKG portion was normal.    IMAGING RESULTS:   This is a Zio XT patch report on patient Dinorah  Rapahel ordered by Dr. Magaly Sosa for palpitations.      The enrollment period was from 12/31/18 through 1/7/19.  There were 6 days and 12 hours of analysis time available for review.     The minimum heart rate was 49, average heart rate 66 and maximum heart rate 200 bpm.     The patient has underlying sinus rhythm throughout.     There is no significant bradycardia pauses or heart block seen.     There were very rare isolated PVCs.  There was a 5 beat run of ventricular tachycardia.  With an average heart rate of 113 bpm.  This was the only run.     There are rare PAC's seen.  Less than 1% of total beats.  There were 26 episodes of a supraventricular tachycardia greater than 4 beats.  The longest was for 20 beats with an average heart rate of 108 bpm.  The fastest was for 11 beats with a maximum heart rate of 200 bpm but an average heart rate of 134 bpm.  Review of some of these tracings show that it likely is an atrial tachycardia.     There were 3 triggered events all 3 of these strips do have PAC's seen.  1 of them had a very slower run of supraventricular tachycardia average heart rate of 113 bpm.     There were 3 diary entries with the symptom of skipped irregular beats.  Review of the associated strips show all had sinus rhythm to them had supraventricular beats.      HISTORY:77 years Female,; Atypical chest pain     COMPARISON: None.     TECHNIQUE: Gated SPECT with wall motion and ejection fraction  calculation. Stress was performed via exercise. Please see the  accompanying internal medicine report for additional details.  DOSE (Stress/Rest): 30 mCi/10 mCi Tc-99m Sestamibi ?     FINDINGS: There is good uptake of activity by the left ventricle. The  left ventricular size is normal, with an EDV of 69 ml.     There are no areas of reversible myocardial perfusion deficit to  suggest ischemia. There are no areas of irreversible perfusion deficit  to suggest completed infarct.     Myocardial motility is  preserved. The ejection fraction is normal, at  75%.                                                                         IMPRESSION: No evidence of abnormal myocardial perfusion. Preserved  EDV and ejection fraction.    ALLERGIES:   Allergies   Allergen Reactions     Chocolate Hives     Lemon Flavor Hives     Lime [Calcium Oxysulfide] Hives     Orange Fruit [Citrus] Hives     Strawberry Hives     Adhesive Tape      Band-aids     Codeine Hives     Patient can tolerate oxycodone & dilaudid     Dexamethasone Hives     Erythromycin Hives     ERYTHROMYCIN BASE     Grapefruit [Extra Strength Grapefruit]      GRAPEFRUIT     Penicillins Hives     Sulfa Drugs      SULFONAMIDE ANTIBIOTICS      Tomato         PAST MEDICAL HISTORY:   Past Medical History:   Diagnosis Date     Anxiety 08/01/2011     Cholecystolithiasis 1/4/2015    noted on US 1/4/2015 --> cholecystectomy     Chronic kidney disease (CKD), stage III (moderate) (H) 2013    Early,unknown eitiology, Dr Vilchis     Chronic kidney disease (CKD), stage III (moderate) (H) 1/4/2015    Early,unknown eitiology, Dr Vilchis      Cough 07/02/2001     Hiatal hernia 12/28/2014    Seen on chest CT     Hyperlipidemia 02/23/2001     Idiopathic hives since age 8 06/01/2004     Major depression 10/04/2011     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    Anterior Segment V 4 cm nodule abutting liver surface by US 1/4/2015      Obesity, Class II, BMI 35-39.9 1/4/2015    BMI 35.9 with comorbidities = MORBID obesity     Osteoarthritis of right hip 10/07/2004     Osteoarthrosis 06/14/2012     Otitis externa 10/04/2011     Prediabetes 08/01/2011        PAST SURGICAL HISTORY:   Past Surgical History:   Procedure Laterality Date     CLOSED REDUCTION WRIST Right 1952 x 2    long arm cast (same time as elbow fx)     CLOSED RX ELBOW DISLOCATION Right 1952    CR/long cast of fracture     HC INJ EPIDURAL LUMBAR/SACRAL W/WO CONTRAST Bilateral 5/2013    facet injections; prev 2012      LAPAROSCOPIC CHOLECYSTECTOMY N/A 1/4/2015    Procedure: LAPAROSCOPIC CHOLECYSTECTOMY;  Surgeon: Brittney العلي MD;  Location: [facility] OR     TONSILLECTOMY          FAMILY HISTORY:   Family History   Problem Relation Age of Onset     Heart Disease Brother         atrial fibrillation     Atrial fibrillation Brother      Other - See Comments Mother         emphysema     Heart Disease Father 92        CHF     Cancer Paternal Grandfather         lung cancer     Cancer Maternal Uncle         lung cancer     Chronic Obstructive Pulmonary Disease Daughter      Emphysema Daughter      Other - See Comments Daughter         benign breast lesion     Esophagitis Daughter         SOCIAL HISTORY:   Social History     Socioeconomic History     Marital status: Single     Spouse name: None     Number of children: 4     Years of education: 12     Highest education level: None   Social Needs     Financial resource strain: None     Food insecurity - worry: None     Food insecurity - inability: None     Transportation needs - medical: None     Transportation needs - non-medical: None   Occupational History     Occupation: personal care attendant   Tobacco Use     Smoking status: Never Smoker     Smokeless tobacco: Never Used   Substance and Sexual Activity     Alcohol use: No     Drug use: No     Sexual activity: No   Other Topics Concern      Service Not Asked     Blood Transfusions Yes     Caffeine Concern Yes     Comment: >32 oz soda/day     Occupational Exposure Not Asked     Hobby Hazards Not Asked     Sleep Concern Yes     Comment: insomnia     Stress Concern Not Asked     Weight Concern Not Asked     Special Diet Not Asked     Back Care Not Asked     Exercise Not Asked     Bike Helmet Not Asked     Seat Belt Not Asked     Self-Exams Not Asked     Parent/sibling w/ CABG, MI or angioplasty before 65F 55M? No   Social History Narrative     None         CURRENT MEDICATIONS:   Prior to Admission medications    Medication Sig  Start Date End Date Taking? Authorizing Provider   Calcium-Magnesium-Vitamin D (CALCIUM 1200+D3 PO) Take by mouth daily   Yes Reported, Patient   clonazePAM (KLONOPIN) 1 MG tablet TAKE 1/4 (ONE-FOURTH) TO 1/2 (ONE-HALF) TABLET BY MOUTH EVERY 8 HOURS AS NEEDED 2/4/19  Yes Ania Deleon PA   FISH OIL Take 1 capsule by mouth daily    Yes Reported, Patient   HYDROcodone-acetaminophen (NORCO) 5-325 MG tablet TAKE ONE TABLET BY MOUTH EVERY 4 TO 6 HOURS AS NEEDED FOR PAIN 1/31/19  Yes Magaly Sosa MD   PARoxetine (PAXIL) 40 MG tablet TAKE ONE TABLET BY MOUTH ONCE DAILY 5/16/18  Yes Magaly Sosa MD   simvastatin (ZOCOR) 20 MG tablet TAKE ONE TABLET BY MOUTH AT BEDTIME 10/24/18  Yes Ania Deleon PA   VITAMIN D, CHOLECALCIFEROL, PO Take 2,000 Units by mouth daily   Yes Reported, Patient   albuterol (2.5 MG/3ML) 0.083% neb solution Take 1 vial (2.5 mg) by nebulization every 4 hours as needed for shortness of breath / dyspnea or wheezing  Patient not taking: Reported on 2/13/2019 10/1/18   Magaly Sosa MD   aspirin (ASA) 81 MG EC tablet Take 1 tablet (81 mg) by mouth daily 1/21/19   Magaly Sosa MD   clobetasol (TEMOVATE) 0.05 % ointment Apply at bedtime to sites of itch  Patient not taking: Reported on 2/13/2019 2/19/18   Magaly Sosa MD   Garlic 10 MG CAPS Take 1 capsule by mouth as needed    Reported, Patient   order for DME Equipment being ordered: Walker patient will need prior to surgery on 1/24/2019  Patient not taking: Reported on 2/13/2019 12/26/18 12/26/19  Esteban Wills MD          ROS:   CONSTITUTIONAL: No weight loss, fever, chills, weakness or fatigue.   HEENT: Eyes: No visual changes. Ears, Nose, Throat: No hearing loss, congestion or difficulty swallowing.   CARDIOVASCULAR: No chest pain, chest pressure or chest discomfort. (+) palpitations but no lower extremity edema.   RESPIRATORY: No shortness of breath, dyspnea upon exertion, cough or sputum production.   GASTROINTESTINAL: No  abdominal pain. No anorexia, nausea, vomiting or diarrhea.   NEUROLOGICAL: No headache, lightheadedness, dizziness, syncope, ataxia or weakness.   HEMATOLOGIC: No anemia, bleeding or bruising.   PSYCHIATRIC: No history of depression or anxiety.   ENDOCRINOLOGIC: No reports of sweating, cold or heat intolerance. No polyuria or polydipsia.   SKIN: No abnormal rashes or itching.       PHYSICAL EXAM:   GENERAL: The patient is a well-developed, well-nourished, in no apparent distress. Alert and oriented x3.   HEENT: Head is normocephalic and atraumatic. Eyes are symmetrical with normal visual tracking.  HEART: Regular rate and rhythm, S1S2 present with murmur, but no rub or gallop.   LUNGS: Respirations regular and unlabored. Clear to auscultation.   GI: Abdomen is soft and non distended.   EXTREMITIES: Scant peripheral edema present.   MUSCULOSKELETAL: No joint swelling.   NEUROLOGIC: Alert and oriented X3.    SKIN: No jaundice. No rashes or visible skin lesions present.         LAB RESULTS:   Office Visit on 12/24/2018   Component Date Value Ref Range Status     TSH 12/24/2018 0.83  0.40 - 4.00 mU/L Final            ASSESSMENT:       ICD-10-CM    1. PVC's (premature ventricular contractions) I49.3    2. Palpitations R00.2    3. Atrial ectopy I49.1    4. PSVT (paroxysmal supraventricular tachycardia) (H) I47.1    5. COPD, mild on 9/9/14 J44.9    6. Bilateral carotid artery stenosis at less than 50% on 12/1/16 I65.23 US Carotid Bilateral   7. Mixed hyperlipidemia E78.2    8. On statin therapy Z79.899    9. Heart murmur R01.1 Echocardiogram Complete         PLAN:   1.  We reviewed her Zio patch in its entirety.  She has palpitations every 1-2 weeks.  These only last seconds.  Her monitor showed rare PVCs, rare PAC's, an episode of NSVT lasting up to 5 beats as well as x26 episodes of PSVT.  Triggered events corresponded to PAC's and PSVT.  We discussed medications versus conservative measures.  Her symptoms appear to be  directly correlated to stress brought on by her youngest daughter's poor health.  This monitor shows no life-threatening rhythms.  The findings do not command treatment.  As result, Dinorah wishes to refrain from taking any medications and treatment.  She understands that if her symptoms accelerate, medications in the future could be readdressed.  2.  With a history of bilateral carotid artery stenosis at <50% on 12/1/16, she will have a repeat carotid ultrasound.  3.  She has had a lifelong murmur albeit mild, but has never had an echocardiogram.  An echo was suggested today and she was agreeable.  4.  She will be seen in the future on a as needed basis.      Thank you for allowing me to participate in the care of your patient. Please do not hesitate to contact me if you have any questions.     Ha Hughes, DO

## 2019-02-13 NOTE — NURSING NOTE
"Chief Complaint   Patient presents with     Consult     Referral Dr. Magaly Sosa/ Palpitations       Initial /78 (BP Location: Right arm, Patient Position: Chair, Cuff Size: Adult Large)   Pulse 71   Temp 97.9  F (36.6  C) (Tympanic)   Resp 16   Ht 1.651 m (5' 5\")   Wt 108 kg (238 lb)   SpO2 97%   BMI 39.61 kg/m   Estimated body mass index is 39.61 kg/m  as calculated from the following:    Height as of this encounter: 1.651 m (5' 5\").    Weight as of this encounter: 108 kg (238 lb).  Medication Reconciliation: complete    Magaly Phipps LPN    "

## 2019-02-13 NOTE — PATIENT INSTRUCTIONS
You were seen by Dr. Hughes, 2/13/2019.     1.  The results of the Zio Patch showed extra beats from both the upper and lower chambers of your heart.  These are early beats.  You also had some runs of these extra beats.  Most people have these, and some people notice them, and some do not.  Unless these are really bothersome for you, nothing needs to be done.  However, we can have you start a medication to help reduce these extra beats if you feel it is bothersome.        2.  Your EKG today is normal.     3.  You will have an ultrasound of your carotid arteries to check for any blockages.  You will be contacted to schedule this procedure.  You had this done the end of 2016, and at that time it showed less than 50% blockage.         4.  It is possible Paxil is causing the palpitations.  There is a 10% chance that it could be causing the palpitations.  Caffeine, stress, alcohol, and decongestants are also things that can contribute to palpitations.      5.  You will have an echocardiogram performed.  This is an ultrasound of the heart, that evaluates heart function and looks at heart valves.  The hospital scheduling department will call to schedule you for this test.     6.  We will call you with the results both ultrasounds, but will go over them in person in 1 month.      You will follow up with Dr. Hughes in 1 month.       Please call the cardiology office with problems, questions, or concerns at [phone].    If you experience chest pain, chest pressure, chest tightness, shortness of breath, fainting, lightheadedness, nausea, vomiting, or other concerning symptoms, please report to the Emergency Department or call 911. These symptoms may be emergent, and best treated in the Emergency Department.     Felipa SINCLAIR RN  Cardiology   [facility]  [phone]

## 2019-02-20 ENCOUNTER — TELEPHONE (OUTPATIENT)
Dept: FAMILY MEDICINE | Facility: OTHER | Age: 79
End: 2019-02-20

## 2019-02-20 ENCOUNTER — TELEPHONE (OUTPATIENT)
Dept: ORTHOPEDICS | Facility: OTHER | Age: 79
End: 2019-02-20

## 2019-02-21 ENCOUNTER — HOSPITAL ENCOUNTER (OUTPATIENT)
Dept: CARDIOLOGY | Facility: HOSPITAL | Age: 79
Discharge: HOME | End: 2019-02-21
Attending: INTERNAL MEDICINE | Admitting: INTERNAL MEDICINE
Payer: MEDICARE

## 2019-02-21 ENCOUNTER — HOSPITAL ENCOUNTER (OUTPATIENT)
Dept: ULTRASOUND IMAGING | Facility: HOSPITAL | Age: 79
End: 2019-02-21
Attending: INTERNAL MEDICINE
Payer: MEDICARE

## 2019-02-21 DIAGNOSIS — R01.1 HEART MURMUR: ICD-10-CM

## 2019-02-21 DIAGNOSIS — I65.23 BILATERAL CAROTID ARTERY STENOSIS: ICD-10-CM

## 2019-02-21 PROCEDURE — 93880 EXTRACRANIAL BILAT STUDY: CPT | Mod: TC

## 2019-02-21 PROCEDURE — 93306 TTE W/DOPPLER COMPLETE: CPT | Mod: TC

## 2019-02-21 PROCEDURE — 93306 TTE W/DOPPLER COMPLETE: CPT | Mod: 26 | Performed by: INTERNAL MEDICINE

## 2019-02-21 NOTE — TELEPHONE ENCOUNTER
Left message that we can see her today at 1:15 for a 1:30 appointment as we had a cancellation. I did state I scheduled her as this is the only availability we have. I left scheduling number in case this didn't work for her and advised her to call them if she can't make that time and she may have to see another provider.

## 2019-02-26 ENCOUNTER — TELEPHONE (OUTPATIENT)
Dept: ORTHOPEDICS | Facility: OTHER | Age: 79
End: 2019-02-26

## 2019-02-26 NOTE — TELEPHONE ENCOUNTER
Application  For Disabbility Parking certificate was dropped off today and filled out by Dr. Wills and signed placed up front for .   Jesusita So LPN

## 2019-03-01 ENCOUNTER — HOSPITAL ENCOUNTER (EMERGENCY)
Facility: HOSPITAL | Age: 79
Discharge: HOME | End: 2019-03-01
Attending: NURSE PRACTITIONER | Admitting: NURSE PRACTITIONER
Payer: MEDICARE

## 2019-03-01 VITALS
TEMPERATURE: 98.4 F | HEART RATE: 78 BPM | SYSTOLIC BLOOD PRESSURE: 154 MMHG | DIASTOLIC BLOOD PRESSURE: 78 MMHG | RESPIRATION RATE: 20 BRPM | OXYGEN SATURATION: 98 %

## 2019-03-01 DIAGNOSIS — L50.9 HIVES: ICD-10-CM

## 2019-03-01 PROCEDURE — G0463 HOSPITAL OUTPT CLINIC VISIT: HCPCS | Mod: 25

## 2019-03-01 PROCEDURE — 25000128 H RX IP 250 OP 636: Performed by: NURSE PRACTITIONER

## 2019-03-01 PROCEDURE — 96372 THER/PROPH/DIAG INJ SC/IM: CPT

## 2019-03-01 PROCEDURE — 99213 OFFICE O/P EST LOW 20 MIN: CPT | Performed by: NURSE PRACTITIONER

## 2019-03-01 RX ORDER — METHYLPREDNISOLONE SODIUM SUCCINATE 40 MG/ML
40 INJECTION, POWDER, LYOPHILIZED, FOR SOLUTION INTRAMUSCULAR; INTRAVENOUS ONCE
Status: COMPLETED | OUTPATIENT
Start: 2019-03-01 | End: 2019-03-01

## 2019-03-01 RX ORDER — METHYLPREDNISOLONE SODIUM SUCCINATE 125 MG/2ML
60 INJECTION, POWDER, LYOPHILIZED, FOR SOLUTION INTRAMUSCULAR; INTRAVENOUS ONCE
Status: DISCONTINUED | OUTPATIENT
Start: 2019-03-01 | End: 2019-03-01

## 2019-03-01 RX ORDER — PREDNISONE 20 MG/1
TABLET ORAL
Qty: 10 TABLET | Refills: 0 | Status: SHIPPED | OUTPATIENT
Start: 2019-03-01 | End: 2019-03-06

## 2019-03-01 RX ORDER — CETIRIZINE HYDROCHLORIDE 10 MG/1
10 TABLET ORAL DAILY
Qty: 10 TABLET | Refills: 0 | Status: SHIPPED | OUTPATIENT
Start: 2019-03-01 | End: 2019-03-06

## 2019-03-01 RX ADMIN — METHYLPREDNISOLONE SODIUM SUCCINATE 40 MG: 40 INJECTION, POWDER, FOR SOLUTION INTRAMUSCULAR; INTRAVENOUS at 09:53

## 2019-03-01 ASSESSMENT — ENCOUNTER SYMPTOMS
FACIAL SWELLING: 0
COUGH: 0
SHORTNESS OF BREATH: 0
TROUBLE SWALLOWING: 0
WEAKNESS: 0
PSYCHIATRIC NEGATIVE: 1
STRIDOR: 0
NAUSEA: 0
RHINORRHEA: 0
WHEEZING: 0
APPETITE CHANGE: 0
VOMITING: 0
FEVER: 0
ACTIVITY CHANGE: 0
ABDOMINAL PAIN: 0
DYSURIA: 0
CHILLS: 0

## 2019-03-01 NOTE — ED PROVIDER NOTES
History     Chief Complaint   Patient presents with     Rash     The history is provided by the patient and a relative (Daughter). No  was used.     Dinorah Lim is a 78 year old female who presents with hives that started last night. She took Benadryl without effectiveness. She usually gets hives once a year.  She is been under increased stress lately.  She denies any respiratory symptoms.  No tightness in throat or chest.  Denies fever, chills, or night sweats. Eating and drinking well. Bowel and bladder are working well.     Denies new products, medications, clothing, or food.      She is requesting an injection of steroids.    Allergies:  Allergies   Allergen Reactions     Chocolate Hives     Lemon Flavor Hives     Lime [Calcium Oxysulfide] Hives     Orange Fruit [Citrus] Hives     Strawberry Hives     Adhesive Tape      Band-aids     Codeine Hives     Patient can tolerate oxycodone & dilaudid     Dexamethasone Hives     Erythromycin Hives     ERYTHROMYCIN BASE     Grapefruit [Extra Strength Grapefruit]      GRAPEFRUIT     Penicillins Hives     Sulfa Drugs      SULFONAMIDE ANTIBIOTICS      Tomato        Problem List:    Patient Active Problem List    Diagnosis Date Noted     Palpitations 02/13/2019     Priority: Medium     PVC's (premature ventricular contractions) 02/13/2019     Priority: Medium     Atrial ectopy 02/13/2019     Priority: Medium     PSVT (paroxysmal supraventricular tachycardia) (H) 02/13/2019     Priority: Medium     COPD, mild on 9/9/14 02/13/2019     Priority: Medium     Bilateral carotid artery stenosis at less than 50% on 12/1/16 02/13/2019     Priority: Medium     Mixed hyperlipidemia 02/13/2019     Priority: Medium     On statin therapy 02/13/2019     Priority: Medium     Heart murmur 02/13/2019     Priority: Medium     Morbid obesity (H) 06/01/2017     Priority: Medium     ACP (advance care planning) 04/28/2016     Priority: Medium     Advance Care Planning  4/28/2016: ACP Review of Chart / Resources Provided:  Reviewed chart for advance care plan.  Dinorah Lim has no plan or code status on file. Discussed available resources and provided with information. Confirmed code status reflects current choices pending further ACP discussions.  Confirmed/documented legally designated decision makers.  Added by Aditi Gandhi             Anxiety 06/23/2014     Priority: Medium     Lung density on x-ray 07/23/2013     Priority: Medium     Osteoarthritis 06/14/2012     Priority: Medium     Problem list name updated by automated process. Provider to review       Advanced care planning/counseling discussion 01/13/2012     Priority: Medium     Abnormal glucose 08/01/2011     Priority: Medium     Hgb A1c 5.7 on 1/4/2015  Problem list name updated by automated process. Provider to review       Osteoarthritis of right hip 10/07/2004     Priority: Medium     Urticaria 06/01/2004     Priority: Medium     Problem list name updated by automated process. 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     Obesity, Class II, BMI 35-39.9 1/4/2015     Osteoarthritis of right hip 10/07/2004     Osteoarthrosis 06/14/2012     Otitis externa 10/04/2011     Prediabetes 08/01/2011       Past Surgical History:    Past Surgical History:   Procedure Laterality Date     CLOSED REDUCTION WRIST Right 1952 x 2    long arm cast (same time as elbow fx)     CLOSED RX ELBOW DISLOCATION Right 1952    CR/long cast of fracture     HC INJ EPIDURAL LUMBAR/SACRAL W/WO CONTRAST  Bilateral 5/2013    facet injections; prev 2012     LAPAROSCOPIC CHOLECYSTECTOMY N/A 1/4/2015    Procedure: LAPAROSCOPIC CHOLECYSTECTOMY;  Surgeon: Brittney العلي MD;  Location: [facility] OR     TONSILLECTOMY         Family History:    Family History   Problem Relation Age of Onset     Heart Disease Brother         atrial fibrillation     Atrial fibrillation Brother      Other - See Comments Mother         emphysema     Heart Disease Father 92        CHF     Cancer Paternal Grandfather         lung cancer     Cancer Maternal Uncle         lung cancer     Chronic Obstructive Pulmonary Disease Daughter      Emphysema Daughter      Other - See Comments Daughter         benign breast lesion     Esophagitis Daughter        Social History:  Marital Status:  Single [1]  Social History     Tobacco Use     Smoking status: Never Smoker     Smokeless tobacco: Never Used   Substance Use Topics     Alcohol use: No     Drug use: No        Medications:      cetirizine (ZYRTEC) 10 MG tablet   predniSONE (DELTASONE) 20 MG tablet   ranitidine (ZANTAC) 150 MG tablet   albuterol (2.5 MG/3ML) 0.083% neb solution   aspirin (ASA) 81 MG EC tablet   Calcium-Magnesium-Vitamin D (CALCIUM 1200+D3 PO)   clobetasol (TEMOVATE) 0.05 % ointment   clonazePAM (KLONOPIN) 1 MG tablet   FISH OIL   Garlic 10 MG CAPS   HYDROcodone-acetaminophen (NORCO) 5-325 MG tablet   order for DME   PARoxetine (PAXIL) 40 MG tablet   simvastatin (ZOCOR) 20 MG tablet   VITAMIN D, CHOLECALCIFEROL, PO         Review of Systems   Constitutional: Negative for activity change, appetite change, chills and fever.   HENT: Negative for congestion, ear discharge, ear pain, facial swelling, rhinorrhea, trouble swallowing and voice change.    Respiratory: Negative for cough, chest tightness, shortness of breath, wheezing and stridor.    Cardiovascular: Negative for chest pain.   Gastrointestinal: Negative for abdominal pain, nausea and vomiting.   Genitourinary: Negative for dysuria.    Skin: Positive for rash.        Hives to body.    Neurological: Negative for weakness.   Psychiatric/Behavioral: Negative.        Physical Exam   BP: 154/78  Pulse: 78  Temp: 98.4  F (36.9  C)  Resp: 20  SpO2: 98 %      Physical Exam   Constitutional: She is oriented to person, place, and time. She appears well-developed and well-nourished. No distress.   HENT:   Head: Normocephalic.   Right Ear: External ear normal.   Left Ear: External ear normal.   Mouth/Throat: Oropharynx is clear and moist. No oropharyngeal exudate.   No swelling to lips, tongue, or throat.   Neck: Normal range of motion. Neck supple.   Cardiovascular: Normal rate, regular rhythm and normal heart sounds.   No murmur heard.  Pulmonary/Chest: Effort normal. No stridor. No respiratory distress. She has no wheezes. She has no rales.   Abdominal: Soft. She exhibits no distension.   Musculoskeletal: Normal range of motion.   Lymphadenopathy:     She has no cervical adenopathy.   Neurological: She is alert and oriented to person, place, and time. She exhibits normal muscle tone.   Skin: Skin is warm and dry. Capillary refill takes less than 2 seconds. Rash noted. She is not diaphoretic. No erythema.   Urticaria rash to anterior chest, abdomen, and posterior back.  No drainage from rash.  No warmth to the touch to rash.  No erythema streaking from rash.   Psychiatric: She has a normal mood and affect. Her behavior is normal.   Vitals reviewed.      ED Course     Procedures    Medications   methylPREDNISolone sodium succinate (solu-MEDROL) injection 40 mg (40 mg Intramuscular Given 3/1/19 0953)     Monitored for 20 minutes and tolerated well.     Assessments & Plan (with Medical Decision Making)     Discussed plan of care. She verbalized understanding. All questions answered.     I have reviewed the nursing notes.    I have reviewed the findings, diagnosis, plan and need for follow up with the patient.  Discharged in stable condition.         Medication List      Started    cetirizine 10 MG tablet  Commonly known as:  zyrTEC  10 mg, Oral, DAILY     predniSONE 20 MG tablet  Commonly known as:  DELTASONE  Take two tablets (= 40mg) each day for 5 (five) days     ranitidine 150 MG tablet  Commonly known as:  ZANTAC  150 mg, Oral, 2 TIMES DAILY            Final diagnoses:   Hives     Take Zyrtec as ordered.   Take Zantac as ordered.   Take Prednisone as ordered. Take in the morning.   Follow up with PCP with any increase in symptoms or concerns.   Return to urgent care or emergency department with any increase in symptoms or concerns.     JEFERSON Mcleod  3/1/2019  9:18 AM  UR[facility]ARE CLINIC       Kenia Goodrich NP  03/05/19 [id number]

## 2019-03-01 NOTE — ED AVS SNAPSHOT
[facility]  [address]  [place] [address]  Phone:  [phone]                                    Dinorah Lim   MRN: [id number]    Department:  [facility]   Date of Visit:  3/1/2019           After Visit Summary Signature Page    I have received my discharge instructions, and my questions have been answered. I have discussed any challenges I see with this plan with the nurse or doctor.    ..........................................................................................................................................  Patient/Patient Representative Signature      ..........................................................................................................................................  Patient Representative Print Name and Relationship to Patient    ..................................................               ................................................  Date                                   Time    ..........................................................................................................................................  Reviewed by Signature/Title    ...................................................              ..............................................  Date                                               Time          22EPIC Rev 08/18

## 2019-03-01 NOTE — DISCHARGE INSTRUCTIONS
Take Zyrtec as ordered.   Take Zantac as ordered.   Take Prednisone as ordered. Take in the morning.   Follow up with PCP with any increase in symptoms or concerns.   Return to urgent care or emergency department with any increase in symptoms or concerns.

## 2019-03-01 NOTE — ED TRIAGE NOTES
"Pt presents today with c/o \"hives all over, I get them once a year and come in and get a shot of cortisone I think\". Hives started last night, states took benadryl with no relief.  "

## 2019-03-01 NOTE — ED TRIAGE NOTES
Pt in for complaints of a rash worsening since last night. Reports she took benadryl last night with no relief. State she gets hives/rash every year and usually comes into get a shot. No shortness of breath/oral swelling.

## 2019-03-04 ENCOUNTER — ALLIED HEALTH/NURSE VISIT (OUTPATIENT)
Dept: ORTHOPEDICS | Facility: OTHER | Age: 79
End: 2019-03-04
Attending: ORTHOPAEDIC SURGERY
Payer: COMMERCIAL

## 2019-03-04 DIAGNOSIS — Z71.9 COUNSELED BY NURSE: Primary | ICD-10-CM

## 2019-03-04 NOTE — PROGRESS NOTES
Patient in today looking for DMV application, writer placed in drawer last week for , and patient called this am on where application is. Patient personally given application of DMV parking our of drawer up front , patient not specific enough when asking for paperwork as she had a prescription  as well. From primary Dr. Sosa.  Jesusita So LPN

## 2019-03-05 ENCOUNTER — TELEPHONE (OUTPATIENT)
Dept: FAMILY MEDICINE | Facility: OTHER | Age: 79
End: 2019-03-05

## 2019-03-05 ASSESSMENT — ENCOUNTER SYMPTOMS
VOICE CHANGE: 0
CHEST TIGHTNESS: 0

## 2019-03-05 NOTE — TELEPHONE ENCOUNTER
"Situation- Pt called, reports increased anxiety and depression.    Background- Pt has been taking paxil but has not for the past three days because cardiology told her she did not need med.    Assessment- Pt reports increased stress lately including daughter's illness as well as grandson's illness. 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 Pt reports she has been taking PRN klonopin and this does help for a while. Pt reports \"I just can't seem to stop crying.\" Pt does note that these symptoms started prior to stopping the paxil.    Recommendation- Pt is wondering if she should restart paxil or if she needs to try a different med. Pt also was recently prescribed zantac for hives per pt report. Pt states that every time she takes this med it causes her to vomit. She is wondering what to do. Please advise. Thank you!    Queenie Briggs RN      "

## 2019-03-05 NOTE — LETTER
To Whom it may concern::      Dinorah may benefit from swimming exercises in regards to her upcoming surgery.             Sincerely,         Dr. Magaly Sosa

## 2019-03-05 NOTE — TELEPHONE ENCOUNTER
Patient left message requesting letter to be done for swimming class and hot tub use to help with her knees. One was previously done last May. Willing to do another?

## 2019-03-06 ENCOUNTER — OFFICE VISIT (OUTPATIENT)
Dept: CARDIOLOGY | Facility: OTHER | Age: 79
End: 2019-03-06
Attending: INTERNAL MEDICINE
Payer: COMMERCIAL

## 2019-03-06 VITALS
DIASTOLIC BLOOD PRESSURE: 75 MMHG | TEMPERATURE: 97.7 F | HEART RATE: 69 BPM | OXYGEN SATURATION: 97 % | BODY MASS INDEX: 38.99 KG/M2 | WEIGHT: 234 LBS | HEIGHT: 65 IN | SYSTOLIC BLOOD PRESSURE: 125 MMHG | RESPIRATION RATE: 16 BRPM

## 2019-03-06 DIAGNOSIS — I49.3 PVC'S (PREMATURE VENTRICULAR CONTRACTIONS): ICD-10-CM

## 2019-03-06 DIAGNOSIS — I65.23 BILATERAL CAROTID ARTERY STENOSIS: ICD-10-CM

## 2019-03-06 DIAGNOSIS — I35.1 NONRHEUMATIC AORTIC VALVE INSUFFICIENCY: Primary | ICD-10-CM

## 2019-03-06 DIAGNOSIS — J44.9 COPD, MILD (H): ICD-10-CM

## 2019-03-06 DIAGNOSIS — R01.1 HEART MURMUR: ICD-10-CM

## 2019-03-06 DIAGNOSIS — I36.1 NON-RHEUMATIC TRICUSPID VALVE INSUFFICIENCY: ICD-10-CM

## 2019-03-06 DIAGNOSIS — I49.1 ATRIAL ECTOPY: ICD-10-CM

## 2019-03-06 DIAGNOSIS — E78.2 MIXED HYPERLIPIDEMIA: ICD-10-CM

## 2019-03-06 DIAGNOSIS — I51.89 DIASTOLIC DYSFUNCTION: ICD-10-CM

## 2019-03-06 DIAGNOSIS — I34.0 NON-RHEUMATIC MITRAL REGURGITATION: ICD-10-CM

## 2019-03-06 DIAGNOSIS — E78.1 HYPERTRIGLYCERIDEMIA: ICD-10-CM

## 2019-03-06 DIAGNOSIS — Z79.899 ON STATIN THERAPY: ICD-10-CM

## 2019-03-06 DIAGNOSIS — I47.10 PSVT (PAROXYSMAL SUPRAVENTRICULAR TACHYCARDIA) (H): ICD-10-CM

## 2019-03-06 DIAGNOSIS — E66.01 MORBID OBESITY (H): ICD-10-CM

## 2019-03-06 DIAGNOSIS — R00.2 PALPITATIONS: ICD-10-CM

## 2019-03-06 PROCEDURE — G0463 HOSPITAL OUTPT CLINIC VISIT: HCPCS

## 2019-03-06 PROCEDURE — 99215 OFFICE O/P EST HI 40 MIN: CPT | Performed by: INTERNAL MEDICINE

## 2019-03-06 ASSESSMENT — PAIN SCALES - GENERAL: PAINLEVEL: NO PAIN (0)

## 2019-03-06 ASSESSMENT — MIFFLIN-ST. JEOR: SCORE: 1552.3

## 2019-03-06 NOTE — PATIENT INSTRUCTIONS
"You were seen by Dr. Hughes, 3/6/2019.     1.  Your echocardiogram has identified diastolic dysfunction.  This is a stiffening of the heart which may cause increased shortness of breath and swelling in the extremities.   You will have a repeat echocardiogram in 2 years. The only way to treat diastolic dysfunction is by exercise and activity.     2. Please continue with diet and exercise to lower your cholesterol and improve diastolic dysfunction.     3.  Please continue all medications as prescribed.     4. If you develop new or worsening symptoms please call the cardiology office to be seen sooner.       You will follow up with Dr. Hughes in 2 years, sooner with concerns.       Please call the cardiology office with problems, questions, or concerns at [phone].    If you experience chest pain, chest pressure, chest tightness, shortness of breath, fainting, lightheadedness, nausea, vomiting, or other concerning symptoms, please report to the Emergency Department or call 911. These symptoms may be emergent, and best treated in the Emergency Department.       Lizzy SMALL RN-BSN  Cardiology   [facility]  [phone]      Patient Education     Controlling Your Cholesterol  Cholesterol is a waxy substance. It travels in your blood through the blood vessels. When you have high cholesterol, it can build up along the walls of the blood vessels. This makes the vessels narrower and decreases blood flow. You are then at greater risk of having a heart attack or a stroke.  Good and bad cholesterol  Lipids are fats, and blood is mostly water. Fat and water don't mix. So our bodies need lipoproteins (lipids inside a protein shell) to carry the lipids. The protein shell carries its lipids through the bloodstream. There are two main kinds of lipoproteins:    LDL (low-density lipoprotein) is known as \"bad cholesterol.\" It mainly carries cholesterol. It delivers this cholesterol to body cells. Excess LDL cholesterol " "will build up in artery walls. This increases your risk for heart disease and stroke.    HDL (high-density lipoprotein) is known as \"good cholesterol.\" This protein shell collects excess cholesterol that LDLs have left behind on blood vessel walls. That's why high levels of HDL cholesterol can decrease your risk of heart disease and stroke.  Controlling cholesterol levels  Total cholesterol includes LDL and HDL cholesterol, as well as other fats in the bloodstream. If your total cholesterol is high, follow the steps below to help lower your total cholesterol level:  Eat less unhealthy fat    Cut back on saturated fats and trans fats (also called hydrogenated) by selecting lean cuts of meat, low-fat dairy, and using oils instead of solid fats. Limit baked goods, processed meats, and fried foods. A diet that s high in these fats increases your bad cholesterol. It's not enough to just cut back on foods containing cholesterol.    Eat about 2 servings of fish per week. Most fish contain omega-3 fatty acids. These help lower blood cholesterol.    Eat more whole grains and soluble fiber (such as oat bran). These lower overall cholesterol.  Be active    Choose an activity you enjoy. Walking, swimming, and riding a bike are some good ways to be active.    Start at a level where you feel comfortable. Increase your time and pace a little each week.    Work up to 30 to 40 minutes of moderate to high intensity physical activity at least 3 to 4 days per week.    Remember, some activity is better than none.    If you haven't been exercising regularly, start slowly. Check with your healthcare provider to make sure the exercise plan is right for you.  Quit smoking  Quitting smoking can improve your lipid levels. It also lowers your risk for heart disease and stroke.  Manage your weight  If you are overweight or obese, your healthcare provider will work with you to lose weight and lower your BMI (body mass index) to a normal or " near-normal level. Making diet changes and increasing physical activity can help.  Take medicine as directed  Many people need medicine to get their LDL levels to a safe level. Medicine to lower cholesterol levels is effective and safe. Taking medicine is not a substitute for exercise or watching your diet! Your healthcare provider can tell you whether you might benefit from a cholesterol-lowering medicine.  Date Last Reviewed: 6/1/2017 2000-2018 [employer]. [address] [place] [address], [place] [address]. All rights reserved. This information is not intended as a substitute for professional medical care. Always follow your healthcare professional's instructions.

## 2019-03-06 NOTE — PROGRESS NOTES
Cardiology Progress Note     Assessment & Plan   Dinorah Lim is a 78 year old female who is being seen in follow-up to a from 2/13/19.  She has been concerned with a heart murmur as well as bilateral carotid artery stenosis.  She had echocardiogram completed on 2/21/19 as well as an ultrasound of the carotids on 2/21/19.  She is here for those results.      Previously, we reviewed her Zio patch results from 12/31/18 which showed rare PVCs, x1 run of NSVT 5 beats, PACs, and x26 episodes of PSVT lasting up to 20 beats.    She also has a history of mild carotid artery disease at less than 50% with mild plaquing on 12/1/16.  She does not have a personal history of smoking but was around secondhand smoke in the past.  She had a ultrasound of her carotids on 2/20/19 that showed atherosclerotic plaquing in both carotid bifurcations.  There was no significant hemo-dynamic stenosis.    Her echocardiogram from 2/21/19 showed an EF of 65-70%, grade 1 diastolic dysfunction, mild left atrial enlargement, mild to moderate aortic insufficiency, trace to mild tricuspid insufficiency, and trace to mild mitral insufficiency.  She is concerned about her valvular insufficiency.  She will have an echocardiogram in approximately 2 years to follow-up on the mild to moderate aortic insufficiency.      Impression:  1.  Chronic grade 1 diastolic dysfunction on 2/21/19.  2.  Bilateral carotid artery stenosis less than 50% noted on notes on Cardizem 2/21/19.  3.  Mild to moderate aortic valve insufficiency.  4.  Trace to mild mitral valve insufficiency.  5.  Trace to mild tricuspid valve insufficiency.  6.  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  7.  Morbid obesity.  8.  Hyperlipidemia.  9.  Atrial ectopy.  10.  Palpitations.  11.  PSVT.  12.  PVCs.  13.  On statin therapy.  14.  Hypertriglyceridemia.    Plan:  1.  We reviewed her echocardiogram as she has reported history of a murmur most of her life.  She was found to have trace to mild tricuspid and  mitral insufficiency.  She was noted to have moderate to severe aortic valve insufficiency.  It was suggested that she have a repeat echocardiogram in approximately 2 years related to the mild to moderate aortic valve insufficiency.  She will be seen sooner if she starts having dyspnea on exertion or lower extremity edema.  2.  We have reviewed the ultrasound results carotid arteries.  She had ultrasound completed on 2/21/19 without significant disease.  3.  We reviewed her Zio patch from 31/18 with a potpourri of dysrhythmia.  She has palpitations infrequently and does not want to start a medication at this time.  4.  She will be seen in approximately 2 years follow-up after completion of echocardiogram related to mild to moderate aortic valve insufficiency.      Ha Hughes        Physical Exam   Temp: 97.7  F (36.5  C) Temp src: Tympanic BP: 125/75 Pulse: 69   Resp: 16 SpO2: 97 %      Vitals:    03/06/19 0902   Weight: 106.1 kg (234 lb)     Vital Signs with Ranges  Temp:  [97.7  F (36.5  C)] 97.7  F (36.5  C)  Pulse:  [69-75] 69  Resp:  [16] 16  BP: (125)/(75) 125/75  SpO2:  [97 %] 97 %  ROS negative except that which was noted in the HPI.         Constitutional: awake, alert, cooperative, no apparent distress, and appears stated age  Eyes: Lids and lashes normal, pupils equal, sclera clear, conjunctiva normal  ENT: Normocephalic, without obvious abnormality, atraumatic.  Respiratory: No increased work of breathing, good air exchange, clear to auscultation bilaterally, no crackles or wheezing  Cardiovascular: Normal apical impulse, regular rate and rhythm, normal S1 and S2, no S3 or S4, and (+) murmur noted  Musculoskeletal: 1/4 lower extremity edema.  Neurologic: Awake, alert, oriented to name, place and time.    Neuropsychiatric: General: normal, calm and normal eye contact    Medications         Data   No results found for this or any previous visit (from the past 24 hour(s)).  No results found for this or  any previous visit (from the past 24 hour(s)).

## 2019-03-06 NOTE — NURSING NOTE
"Chief Complaint   Patient presents with     RECHECK     2 week cardiology follow-up and follow-up test results;  Patient states that \"Dr Hughes advised her at her last appointment that she could try going off of her Paxil, but that she had to go back on it due to withdrawls\".        Initial /75 (BP Location: Right arm, Patient Position: Chair, Cuff Size: Adult Large)   Pulse 69   Temp 97.7  F (36.5  C) (Tympanic)   Resp 16   Ht 1.651 m (5' 5\")   Wt 106.1 kg (234 lb)   SpO2 97%   BMI 38.94 kg/m   Estimated body mass index is 38.94 kg/m  as calculated from the following:    Height as of this encounter: 1.651 m (5' 5\").    Weight as of this encounter: 106.1 kg (234 lb).  Medication Reconciliation: complete    Magaly Phipps LPN    "

## 2019-03-11 NOTE — PROGRESS NOTES
[facility] [place]  [address]  [place] [address]  [phone]  Dept: [phone]    PRE-OP EVALUATION:  Today's date: 3/15/2019    Dinorah Lim (: 1940) presents for pre-operative evaluation assessment as requested by Dr. Wills.  She requires evaluation and anesthesia risk assessment prior to undergoing surgery/procedure for treatment of DJD .    Proposed Surgery/ Procedure: Left knee arthroscopy   Date of Surgery/ Procedure: 3/15/19  Time of Surgery/ Procedure: TBD  Hospital/Surgical Facility: [facility] [place]  Fax number for surgical facility:   Primary Physician: Magaly Sosa  Type of Anesthesia Anticipated: to be determined    Patient has a Health Care Directive or Living Will:  NO    1. NO - Do you have a history of heart attack, stroke, stent, bypass or surgery on an artery in the head, neck, heart or legs?  2. NO - Do you ever have any pain or discomfort in your chest?  3. NO - Do you have a history of  Heart Failure?  4. NO - Are you troubled by shortness of breath when: walking on the level, up a slight hill or at night?  5. NO - Do you currently have a cold, bronchitis or other respiratory infection?  6. NO - Do you have a cough, shortness of breath or wheezing?  7. NO - Do you sometimes get pains in the calves of your legs when you walk?  8. YES - DO YOU OR ANYONE IN YOUR FAMILY HAVE PREVIOUS HISTORY OF BLOOD CLOTS? Uncle on fathers side  9. NO - Do you or does anyone in your family have a serious bleeding problem such as prolonged bleeding following surgeries or cuts?  10. NO - Have you ever had problems with anemia or been told to take iron pills?  11. NO - Have you had any abnormal blood loss such as black, tarry or bloody stools, or abnormal vaginal bleeding?  12. NO - Have you ever had a blood transfusion?  13. NO - Have you or any of your relatives ever had problems with anesthesia?  14. NO - Do you have sleep apnea, excessive snoring or daytime drowsiness?  15. NO - Do  you have any prosthetic heart valves?  16. NO - Do you have prosthetic joints?  17. NO - Is there any chance that you may be pregnant?      HPI:     HPI related to upcoming procedure: severe DJD and pain of the left knee      COPD - Patient has a longstanding history of mild COPD . Patient has been doing well overall noting NO SYMPTOMS and continues on medication regimen consisting of inhalers  without adverse reactions or side effects.                                                                                                         .    MEDICAL HISTORY:     Patient Active Problem List    Diagnosis Date Noted     Aortic valve insufficiency 03/06/2019     Priority: Medium     Mitral regurgitation 03/06/2019     Priority: Medium     Tricuspid valve insufficiency 03/06/2019     Priority: Medium     Diastolic dysfunction grade 1 on 2/21/19 03/06/2019     Priority: Medium     Hypertriglyceridemia 03/06/2019     Priority: Medium     Palpitations 02/13/2019     Priority: Medium     PVC's (premature ventricular contractions) 02/13/2019     Priority: Medium     Atrial ectopy 02/13/2019     Priority: Medium     PSVT (paroxysmal supraventricular tachycardia) (H) 02/13/2019     Priority: Medium     COPD, mild on 9/9/14 02/13/2019     Priority: Medium     Bilateral carotid artery stenosis at less than 50% on 12/1/16 02/13/2019     Priority: Medium     Mixed hyperlipidemia 02/13/2019     Priority: Medium     On statin therapy 02/13/2019     Priority: Medium     Heart murmur 02/13/2019     Priority: Medium     Morbid obesity (H) 06/01/2017     Priority: Medium     ACP (advance care planning) 04/28/2016     Priority: Medium     Advance Care Planning 4/28/2016: ACP Review of Chart / Resources Provided:  Reviewed chart for advance care plan.  Dinorah Lim has no plan or code status on file. Discussed available resources and provided with information. Confirmed code status reflects current choices pending further ACP  discussions.  Confirmed/documented legally designated decision makers.  Added by Aditi Gandhi             Anxiety 06/23/2014     Priority: Medium     Lung density on x-ray 07/23/2013     Priority: Medium     Osteoarthritis 06/14/2012     Priority: Medium     Problem list name updated by automated process. Provider to review       Advanced care planning/counseling discussion 01/13/2012     Priority: Medium     Abnormal glucose 08/01/2011     Priority: Medium     Hgb A1c 5.7 on 1/4/2015  Problem list name updated by automated process. Provider to review       Osteoarthritis of right hip 10/07/2004     Priority: Medium     Urticaria 06/01/2004     Priority: Medium     Problem list name updated by automated process. 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    Anterior Segment V 4 cm nodule abutting liver surface by US 1/4/2015      Obesity, Class II, BMI 35-39.9 1/4/2015    BMI 35.9 with comorbidities = MORBID obesity     Osteoarthritis of right hip 10/07/2004     Osteoarthrosis 06/14/2012     Otitis externa 10/04/2011     Prediabetes 08/01/2011     Past Surgical History:   Procedure Laterality Date     CLOSED REDUCTION WRIST Right 1952 x 2    long arm cast (same time as elbow fx)     CLOSED RX ELBOW DISLOCATION Right 1952    CR/long cast of fracture     HC INJ EPIDURAL LUMBAR/SACRAL W/WO CONTRAST Bilateral 5/2013    facet injections; prev 2012     LAPAROSCOPIC  CHOLECYSTECTOMY N/A 1/4/2015    Procedure: LAPAROSCOPIC CHOLECYSTECTOMY;  Surgeon: Brittney العلي MD;  Location: [facility]     TONSILLECTOMY       Current Outpatient Medications   Medication Sig Dispense Refill     albuterol (2.5 MG/3ML) 0.083% neb solution Take 1 vial (2.5 mg) by nebulization every 4 hours as needed for shortness of breath / dyspnea or wheezing (Patient not taking: Reported on 2/13/2019) 1 Box 0     aspirin (ASA) 81 MG EC tablet Take 1 tablet (81 mg) by mouth daily 90 tablet 3     Calcium-Magnesium-Vitamin D (CALCIUM 1200+D3 PO) Take by mouth daily       clobetasol (TEMOVATE) 0.05 % ointment Apply at bedtime to sites of itch (Patient not taking: Reported on 2/13/2019) 60 g 3     clonazePAM (KLONOPIN) 1 MG tablet TAKE 1/4 (ONE-FOURTH) TO 1/2 (ONE-HALF) TABLET BY MOUTH EVERY 8 HOURS AS NEEDED 45 tablet 0     FISH OIL Take 1 capsule by mouth daily        Garlic 10 MG CAPS Take 1 capsule by mouth as needed       HYDROcodone-acetaminophen (NORCO) 5-325 MG tablet TAKE ONE TABLET BY MOUTH EVERY 4 TO 6 HOURS AS NEEDED FOR PAIN 60 tablet 0     order for DME Equipment being ordered: Walker patient will need prior to surgery on 1/24/2019 (Patient not taking: Reported on 2/13/2019) 1 Device 0     PARoxetine (PAXIL) 40 MG tablet TAKE ONE TABLET BY MOUTH ONCE DAILY 90 tablet 2     simvastatin (ZOCOR) 20 MG tablet TAKE ONE TABLET BY MOUTH AT BEDTIME 90 tablet 2     VITAMIN D, CHOLECALCIFEROL, PO Take 2,000 Units by mouth daily       OTC products: Aspirin and fish oil    Allergies   Allergen Reactions     Chocolate Hives     Lemon Flavor Hives     Lime [Calcium Oxysulfide] Hives     Orange Fruit [Citrus] Hives     Strawberry Hives     Adhesive Tape      Band-aids     Codeine Hives     Patient can tolerate oxycodone & dilaudid     Dexamethasone Hives     Erythromycin Hives     ERYTHROMYCIN BASE     Grapefruit [Extra Strength Grapefruit]      GRAPEFRUIT     Penicillins Hives     Sulfa Drugs      SULFONAMIDE  "ANTIBIOTICS      Tomato       Latex Allergy: NO    Social History     Tobacco Use     Smoking status: Never Smoker     Smokeless tobacco: Never Used   Substance Use Topics     Alcohol use: No     History   Drug Use No       REVIEW OF SYSTEMS:   CONSTITUTIONAL: NEGATIVE for fever, chills, change in weight  INTEGUMENTARY/SKIN: NEGATIVE for worrisome rashes, moles or lesions  EYES: NEGATIVE for vision changes or irritation  ENT/MOUTH: NEGATIVE for ear, mouth and throat problems  RESP: NEGATIVE for significant cough or SOB  BREAST: NEGATIVE for masses, tenderness or discharge  CV: NEGATIVE for chest pain, palpitations or peripheral edema  GI: NEGATIVE for nausea, abdominal pain, heartburn, or change in bowel habits  : NEGATIVE for frequency, dysuria, or hematuria  MUSCULOSKELETAL: NEGATIVE for significant arthralgias or myalgia  NEURO: NEGATIVE for weakness, dizziness or paresthesias  ENDOCRINE: NEGATIVE for temperature intolerance, skin/hair changes  HEME: NEGATIVE for bleeding problems  PSYCHIATRIC: NEGATIVE for changes in mood or affect    EXAM:   /60   Pulse 70   Temp 97.8  F (36.6  C)   Ht 1.651 m (5' 5\")   Wt 108 kg (238 lb)   SpO2 96%   BMI 39.61 kg/m      GENERAL APPEARANCE: healthy, alert and no distress     EYES: EOMI, PERRL     HENT: ear canals and TM's normal and nose and mouth without ulcers or lesions     NECK: no adenopathy, no asymmetry, masses, or scars and thyroid normal to palpation, no bruits     RESP: lungs clear to auscultation - no rales, rhonchi or wheezes     CV: regular rates and rhythm, normal S1 S2, no S3 or S4 and no murmur, click or rub     ABDOMEN:  soft, nontender, no HSM or masses and bowel sounds normal, no bruits     MS: extremities normal- no gross deformities noted, no evidence of inflammation in joints, FROM in all extremities.     SKIN: no suspicious lesions or rashes     NEURO: Normal strength and tone, sensory exam grossly normal, mentation intact and speech " normal     PSYCH: mentation appears normal. and affect normal/bright     LYMPHATICS: No cervical adenopathy    DIAGNOSTICS:     EKG: appears normal, NSR, normal axis, normal intervals, no acute ST/T changes c/w ischemia, no LVH by voltage criteria, done 2-13-19  Labs Resulted Today:     Results for orders placed or performed in visit on 03/15/19   CBC with platelets and differential   Result Value Ref Range    WBC 4.6 4.0 - 11.0 10e9/L    RBC Count 4.36 3.8 - 5.2 10e12/L    Hemoglobin 12.7 11.7 - 15.7 g/dL    Hematocrit 39.1 35.0 - 47.0 %    MCV 90 78 - 100 fl    MCH 29.1 26.5 - 33.0 pg    MCHC 32.5 31.5 - 36.5 g/dL    RDW 13.5 10.0 - 15.0 %    Platelet Count 142 (L) 150 - 450 10e9/L    Diff Method Automated Method     % Neutrophils 69.1 %    % Lymphocytes 20.8 %    % Monocytes 8.9 %    % Eosinophils 0.6 %    % Basophils 0.4 %    % Immature Granulocytes 0.2 %    Nucleated RBCs 0 0 /100    Absolute Neutrophil 3.2 1.6 - 8.3 10e9/L    Absolute Lymphocytes 1.0 0.8 - 5.3 10e9/L    Absolute Monocytes 0.4 0.0 - 1.3 10e9/L    Absolute Eosinophils 0.0 0.0 - 0.7 10e9/L    Absolute Basophils 0.0 0.0 - 0.2 10e9/L    Abs Immature Granulocytes 0.0 0 - 0.4 10e9/L    Absolute Nucleated RBC 0.0    Comprehensive metabolic panel   Result Value Ref Range    Sodium 140 133 - 144 mmol/L    Potassium 4.1 3.4 - 5.3 mmol/L    Chloride 108 94 - 109 mmol/L    Carbon Dioxide 27 20 - 32 mmol/L    Anion Gap 5 3 - 14 mmol/L    Glucose 96 70 - 99 mg/dL    Urea Nitrogen 16 7 - 30 mg/dL    Creatinine 0.99 0.52 - 1.04 mg/dL    GFR Estimate 55 (L) >60 mL/min/[1.73_m2]    GFR Estimate If Black 64 >60 mL/min/[1.73_m2]    Calcium 9.3 8.5 - 10.1 mg/dL    Bilirubin Total 0.6 0.2 - 1.3 mg/dL    Albumin 3.5 3.4 - 5.0 g/dL    Protein Total 6.6 (L) 6.8 - 8.8 g/dL    Alkaline Phosphatase 92 40 - 150 U/L    ALT 24 0 - 50 U/L    AST 13 0 - 45 U/L   UA reflex to Microscopic and Culture   Result Value Ref Range    Color Urine Yellow     Appearance Urine Slightly  Cloudy     Glucose Urine Negative NEG^Negative mg/dL    Bilirubin Urine Negative NEG^Negative    Ketones Urine Negative NEG^Negative mg/dL    Specific Gravity Urine 1.027 1.003 - 1.035    Blood Urine Negative NEG^Negative    pH Urine 5.5 4.7 - 8.0 pH    Protein Albumin Urine 10 (A) NEG^Negative mg/dL    Urobilinogen mg/dL Normal 0.0 - 2.0 mg/dL    Nitrite Urine Negative NEG^Negative    Leukocyte Esterase Urine Large (A) NEG^Negative    Source Midstream Urine     RBC Urine 2 0 - 2 /HPF    WBC Urine 9 (H) 0 - 5 /HPF    Bacteria Urine Few (A) NEG^Negative /HPF    Squamous Epithelial /HPF Urine 10 (H) 0 - 1 /HPF    Mucous Urine Present (A) NEG^Negative /LPF   Hemoglobin A1c   Result Value Ref Range    Hemoglobin A1C 5.4 0 - 5.6 %   Urine Culture Aerobic Bacterial   Result Value Ref Range    Specimen Description Midstream Urine     Culture Micro (A)      >100,000 colonies/mL  Mixed bacterial melissa  No further identification or sensitivity done         Recent Labs   Lab Test 10/01/18  0953 09/27/18  0645  10/03/17  0859 06/26/17  1447  06/01/14 2010   HGB 13.6 13.5   < >  --   --    < > 12.2    173   < >  --   --    < > 145*   INR  --   --   --   --   --   --  1.29*    138   < >  --   --    < >  --    POTASSIUM 4.3 4.0   < >  --   --    < >  --    CR 1.00 0.92   < >  --   --    < >  --    A1C  --   --   --  5.4 5.2   < >  --     < > = values in this interval not displayed.        IMPRESSION:   Reason for surgery/procedure:severe DJD and pain of the left knee     The proposed surgical procedure is considered LOW risk.    REVISED CARDIAC RISK INDEX  The patient has the following serious cardiovascular risks for perioperative complications such as (MI, PE, VFib and 3  AV Block):  No serious cardiac risks  INTERPRETATION: 0 risks: Class I (very low risk - 0.4% complication rate)    The patient has the following additional risks for perioperative complications:  No identified additional risks      ICD-10-CM    1.  Preop general physical exam Z01.818 CBC with platelets and differential     Comprehensive metabolic panel     UA reflex to Microscopic and Culture   2. Primary osteoarthritis of left knee M17.12    3. Diastolic dysfunction grade 1 on 2/21/19 I51.9 stable   4. PSVT (paroxysmal supraventricular tachycardia) (H) I47.1 stable   5. COPD, mild on 9/9/14 J44.9    6. Anxiety F41.9 Severe. She is advised to take her 0.5 mg of Klonopin the morning of surgery    7. Abnormal glucose R73.09 Hemoglobin A1c   8. Tricuspid valve insufficiency I36.1 mild   9. Mitral regurgitation I34.0 mild   10. Aortic valve insufficiency I35.1 Mild, no murmurs auscultated today       RECOMMENDATIONS:       Cardiovascular Risk  Performs 4 METs exercise without symptoms (Light housework (dusting, washing dishes)) .       --Patient is to take all scheduled medications on the day of surgery EXCEPT for modifications listed below.  -- urine is borderline, will await culture results    3-17-19 Urine culture is negative    APPROVAL GIVEN to proceed with proposed procedure, without further diagnostic evaluation       Signed Electronically by: Magaly Sosa MD    Copy of this evaluation report is provided to requesting physician.    [facility] Preop Guidelines    Revised Cardiac Risk Index

## 2019-03-11 NOTE — PATIENT INSTRUCTIONS
Before Your Surgery      Call your surgeon if there is any change in your health. This includes signs of a cold or flu (such as a sore throat, runny nose, cough, rash or fever).    Do not smoke, drink alcohol or take over the counter medicine (unless your surgeon or primary care doctor tells you to) for the 24 hours before and after surgery.    If you take prescribed drugs: Follow your doctor s orders about which medicines to take and which to stop until after surgery.    Eating and drinking prior to surgery: follow the instructions from your surgeon    Take a shower or bath the night before surgery. Use the soap your surgeon gave you to gently clean your skin. If you do not have soap from your surgeon, use your regular soap. Do not shave or scrub the surgery site.  Wear clean pajamas and have clean sheets on your bed.     Take usual morning medications the morning of surgery with a small amount of water    Nothing to eat or drink after midnight the night before surgery

## 2019-03-12 DIAGNOSIS — M23.204 OTHER OLD TEAR OF MEDIAL MENISCUS OF LEFT KNEE: Primary | ICD-10-CM

## 2019-03-12 DIAGNOSIS — S83.249A ACUTE MEDIAL MENISCAL TEAR: ICD-10-CM

## 2019-03-15 ENCOUNTER — OFFICE VISIT (OUTPATIENT)
Dept: FAMILY MEDICINE | Facility: OTHER | Age: 79
End: 2019-03-15
Attending: FAMILY MEDICINE
Payer: MEDICARE

## 2019-03-15 VITALS
TEMPERATURE: 97.8 F | HEIGHT: 65 IN | WEIGHT: 238 LBS | SYSTOLIC BLOOD PRESSURE: 118 MMHG | BODY MASS INDEX: 39.65 KG/M2 | DIASTOLIC BLOOD PRESSURE: 60 MMHG | HEART RATE: 70 BPM | OXYGEN SATURATION: 96 %

## 2019-03-15 DIAGNOSIS — I51.89 DIASTOLIC DYSFUNCTION: ICD-10-CM

## 2019-03-15 DIAGNOSIS — I35.1 NONRHEUMATIC AORTIC VALVE INSUFFICIENCY: ICD-10-CM

## 2019-03-15 DIAGNOSIS — R82.90 ABNORMAL URINE FINDINGS: ICD-10-CM

## 2019-03-15 DIAGNOSIS — M17.12 PRIMARY OSTEOARTHRITIS OF LEFT KNEE: ICD-10-CM

## 2019-03-15 DIAGNOSIS — F41.9 ANXIETY: Chronic | ICD-10-CM

## 2019-03-15 DIAGNOSIS — I34.0 NON-RHEUMATIC MITRAL REGURGITATION: ICD-10-CM

## 2019-03-15 DIAGNOSIS — I47.10 PSVT (PAROXYSMAL SUPRAVENTRICULAR TACHYCARDIA) (H): ICD-10-CM

## 2019-03-15 DIAGNOSIS — F41.9 ANXIETY: ICD-10-CM

## 2019-03-15 DIAGNOSIS — J44.9 COPD, MILD (H): ICD-10-CM

## 2019-03-15 DIAGNOSIS — R73.09 ABNORMAL GLUCOSE: ICD-10-CM

## 2019-03-15 DIAGNOSIS — I36.1 NON-RHEUMATIC TRICUSPID VALVE INSUFFICIENCY: ICD-10-CM

## 2019-03-15 DIAGNOSIS — Z01.818 PREOP GENERAL PHYSICAL EXAM: Primary | ICD-10-CM

## 2019-03-15 LAB
ALBUMIN SERPL-MCNC: 3.5 G/DL (ref 3.4–5)
ALBUMIN UR-MCNC: 10 MG/DL
ALP SERPL-CCNC: 92 U/L (ref 40–150)
ALT SERPL W P-5'-P-CCNC: 24 U/L (ref 0–50)
ANION GAP SERPL CALCULATED.3IONS-SCNC: 5 MMOL/L (ref 3–14)
APPEARANCE UR: ABNORMAL
AST SERPL W P-5'-P-CCNC: 13 U/L (ref 0–45)
BACTERIA #/AREA URNS HPF: ABNORMAL /HPF
BASOPHILS # BLD AUTO: 0 10E9/L (ref 0–0.2)
BASOPHILS NFR BLD AUTO: 0.4 %
BILIRUB SERPL-MCNC: 0.6 MG/DL (ref 0.2–1.3)
BILIRUB UR QL STRIP: NEGATIVE
BUN SERPL-MCNC: 16 MG/DL (ref 7–30)
CALCIUM SERPL-MCNC: 9.3 MG/DL (ref 8.5–10.1)
CHLORIDE SERPL-SCNC: 108 MMOL/L (ref 94–109)
CO2 SERPL-SCNC: 27 MMOL/L (ref 20–32)
COLOR UR AUTO: YELLOW
CREAT SERPL-MCNC: 0.99 MG/DL (ref 0.52–1.04)
DIFFERENTIAL METHOD BLD: ABNORMAL
EOSINOPHIL # BLD AUTO: 0 10E9/L (ref 0–0.7)
EOSINOPHIL NFR BLD AUTO: 0.6 %
ERYTHROCYTE [DISTWIDTH] IN BLOOD BY AUTOMATED COUNT: 13.5 % (ref 10–15)
GFR SERPL CREATININE-BSD FRML MDRD: 55 ML/MIN/{1.73_M2}
GLUCOSE SERPL-MCNC: 96 MG/DL (ref 70–99)
GLUCOSE UR STRIP-MCNC: NEGATIVE MG/DL
HBA1C MFR BLD: 5.4 % (ref 0–5.6)
HCT VFR BLD AUTO: 39.1 % (ref 35–47)
HGB BLD-MCNC: 12.7 G/DL (ref 11.7–15.7)
HGB UR QL STRIP: NEGATIVE
IMM GRANULOCYTES # BLD: 0 10E9/L (ref 0–0.4)
IMM GRANULOCYTES NFR BLD: 0.2 %
KETONES UR STRIP-MCNC: NEGATIVE MG/DL
LEUKOCYTE ESTERASE UR QL STRIP: ABNORMAL
LYMPHOCYTES # BLD AUTO: 1 10E9/L (ref 0.8–5.3)
LYMPHOCYTES NFR BLD AUTO: 20.8 %
MCH RBC QN AUTO: 29.1 PG (ref 26.5–33)
MCHC RBC AUTO-ENTMCNC: 32.5 G/DL (ref 31.5–36.5)
MCV RBC AUTO: 90 FL (ref 78–100)
MONOCYTES # BLD AUTO: 0.4 10E9/L (ref 0–1.3)
MONOCYTES NFR BLD AUTO: 8.9 %
MUCOUS THREADS #/AREA URNS LPF: PRESENT /LPF
NEUTROPHILS # BLD AUTO: 3.2 10E9/L (ref 1.6–8.3)
NEUTROPHILS NFR BLD AUTO: 69.1 %
NITRATE UR QL: NEGATIVE
NRBC # BLD AUTO: 0 10*3/UL
NRBC BLD AUTO-RTO: 0 /100
PH UR STRIP: 5.5 PH (ref 4.7–8)
PLATELET # BLD AUTO: 142 10E9/L (ref 150–450)
POTASSIUM SERPL-SCNC: 4.1 MMOL/L (ref 3.4–5.3)
PROT SERPL-MCNC: 6.6 G/DL (ref 6.8–8.8)
RBC # BLD AUTO: 4.36 10E12/L (ref 3.8–5.2)
RBC #/AREA URNS AUTO: 2 /HPF (ref 0–2)
SODIUM SERPL-SCNC: 140 MMOL/L (ref 133–144)
SOURCE: ABNORMAL
SP GR UR STRIP: 1.03 (ref 1–1.03)
SQUAMOUS #/AREA URNS AUTO: 10 /HPF (ref 0–1)
UROBILINOGEN UR STRIP-MCNC: NORMAL MG/DL (ref 0–2)
WBC # BLD AUTO: 4.6 10E9/L (ref 4–11)
WBC #/AREA URNS AUTO: 9 /HPF (ref 0–5)

## 2019-03-15 PROCEDURE — 80053 COMPREHEN METABOLIC PANEL: CPT | Mod: ZL | Performed by: FAMILY MEDICINE

## 2019-03-15 PROCEDURE — 85025 COMPLETE CBC W/AUTO DIFF WBC: CPT | Mod: ZL | Performed by: FAMILY MEDICINE

## 2019-03-15 PROCEDURE — 81001 URINALYSIS AUTO W/SCOPE: CPT | Mod: ZL | Performed by: FAMILY MEDICINE

## 2019-03-15 PROCEDURE — 83036 HEMOGLOBIN GLYCOSYLATED A1C: CPT | Mod: ZL | Performed by: FAMILY MEDICINE

## 2019-03-15 PROCEDURE — 87086 URINE CULTURE/COLONY COUNT: CPT | Mod: ZL | Performed by: FAMILY MEDICINE

## 2019-03-15 PROCEDURE — 99214 OFFICE O/P EST MOD 30 MIN: CPT | Performed by: FAMILY MEDICINE

## 2019-03-15 PROCEDURE — 36415 COLL VENOUS BLD VENIPUNCTURE: CPT | Mod: ZL | Performed by: FAMILY MEDICINE

## 2019-03-15 PROCEDURE — G0463 HOSPITAL OUTPT CLINIC VISIT: HCPCS

## 2019-03-15 RX ORDER — CLONAZEPAM 1 MG/1
TABLET ORAL
Qty: 45 TABLET | Refills: 0 | Status: SHIPPED | OUTPATIENT
Start: 2019-03-15 | End: 2019-06-20

## 2019-03-15 ASSESSMENT — PATIENT HEALTH QUESTIONNAIRE - PHQ9: SUM OF ALL RESPONSES TO PHQ QUESTIONS 1-9: 3

## 2019-03-15 ASSESSMENT — PAIN SCALES - GENERAL: PAINLEVEL: NO PAIN (0)

## 2019-03-15 ASSESSMENT — MIFFLIN-ST. JEOR: SCORE: 1570.44

## 2019-03-15 NOTE — TELEPHONE ENCOUNTER
clonazePAM (KLONOPIN) 1 MG tablet  Last Written Prescription Date:  2/4/19  Last Fill Quantity: 45,   # refills: 0  Last Office Visit: 3/15/19  Future Office visit:    Next 5 appointments (look out 90 days)    Mar 19, 2019  8:20 AM CDT  (Arrive by 8:05 AM)  [facility] [address]  [place] [address]  [phone]   Apr 11, 2019  2:00 PM CDT  (Arrive by 1:45 PM)  Return Visit with Esteban Wills MD  [facility] [address]  [place] [address]  [phone]           Routing refill request to provider for review/approval because:  Drug not on the G, P or [facility] refill protocol or controlled substance

## 2019-03-15 NOTE — NURSING NOTE
"Chief Complaint   Patient presents with     Pre-Op Exam       Initial /60   Pulse 70   Temp 97.8  F (36.6  C)   Ht 1.651 m (5' 5\")   Wt 108 kg (238 lb)   SpO2 96%   BMI 39.61 kg/m   Estimated body mass index is 39.61 kg/m  as calculated from the following:    Height as of this encounter: 1.651 m (5' 5\").    Weight as of this encounter: 108 kg (238 lb).  Medication Reconciliation: complete    Arthur Nguyen LPN  "

## 2019-03-16 LAB
BACTERIA SPEC CULT: ABNORMAL
SPECIMEN SOURCE: ABNORMAL

## 2019-03-18 ENCOUNTER — TELEPHONE (OUTPATIENT)
Dept: FAMILY MEDICINE | Facility: OTHER | Age: 79
End: 2019-03-18

## 2019-03-18 ENCOUNTER — ALLIED HEALTH/NURSE VISIT (OUTPATIENT)
Dept: FAMILY MEDICINE | Facility: OTHER | Age: 79
End: 2019-03-18
Attending: FAMILY MEDICINE
Payer: COMMERCIAL

## 2019-03-18 DIAGNOSIS — M17.12 PRIMARY OSTEOARTHRITIS OF LEFT KNEE: Primary | ICD-10-CM

## 2019-03-18 DIAGNOSIS — Z71.9 COUNSELED BY NURSE: Primary | ICD-10-CM

## 2019-03-18 NOTE — TELEPHONE ENCOUNTER
Discussed with dr Mckenzie, per her lab results and UA culture, she has no UTI. So nothing was prescribed. Called and left message on her cell phone that UA culture is negative. Ok for surgery.

## 2019-03-18 NOTE — TELEPHONE ENCOUNTER
Patient came for a nurse only today, states she talked with her  Arsenio Floyd about getting a lift chair for her at home after her surgery on Thursday. This will be covered for her if we send in a RX. RX pended for the lift chair. This will need to be sent to [facility].

## 2019-03-18 NOTE — TELEPHONE ENCOUNTER
11:22 AM    Reason for Call: Phone Call    Description: Patient called back to clinic stating she got a call and was told she had a UTI.  She was never told about a prescription for an antibiotic.  There is nothing listed at this time.  Is she supposed to get one.      Was an appointment offered for this call? No  If yes : Appointment type              Date    Preferred method for responding to this message: Telephone Call  What is your phone number ?    If we cannot reach you directly, may we leave a detailed response at the number you provided? No    Can this message wait until your PCP/provider returns, if available today? YES,     Alma Luu LPN

## 2019-03-19 ENCOUNTER — OFFICE VISIT (OUTPATIENT)
Dept: ORTHOPEDICS | Facility: OTHER | Age: 79
End: 2019-03-19
Attending: ORTHOPAEDIC SURGERY
Payer: COMMERCIAL

## 2019-03-19 VITALS
DIASTOLIC BLOOD PRESSURE: 62 MMHG | OXYGEN SATURATION: 97 % | TEMPERATURE: 98.3 F | SYSTOLIC BLOOD PRESSURE: 102 MMHG | HEIGHT: 65 IN | HEART RATE: 72 BPM | BODY MASS INDEX: 39.65 KG/M2 | WEIGHT: 238 LBS

## 2019-03-19 DIAGNOSIS — M23.204 OLD COMPLEX TEAR OF MEDIAL MENISCUS OF LEFT KNEE: Primary | ICD-10-CM

## 2019-03-19 DIAGNOSIS — M17.12 PRIMARY OSTEOARTHRITIS OF LEFT KNEE: ICD-10-CM

## 2019-03-19 PROCEDURE — 99213 OFFICE O/P EST LOW 20 MIN: CPT | Performed by: ORTHOPAEDIC SURGERY

## 2019-03-19 PROCEDURE — G0463 HOSPITAL OUTPT CLINIC VISIT: HCPCS

## 2019-03-19 ASSESSMENT — PAIN SCALES - GENERAL: PAINLEVEL: NO PAIN (1)

## 2019-03-19 ASSESSMENT — MIFFLIN-ST. JEOR: SCORE: 1570.44

## 2019-03-19 NOTE — PROGRESS NOTES
"Chief Complaints:  #1.  Degenerative medial meniscal tear left knee  #2.  Dexamethasone allergy, but she tolerates Kenalog  #3.  Codeine allergy, but she tolerates oxycodone and Dilaudid    Patient Profile: 78-year-old right-handed non-smoking retired, patient of Dr. Magaly Sosa    HPI: She has a long history of DJD of both knees, worse on the right. She intends to have a total knee replacement on the right as the symptoms have failed conservative treatment.    In the meantime she also has less severe degenerative disease in the left knee.  She experiences intermittent pain over the anteromedial joint line.  On 10/10/2018 she received a Gel 1 injection which did not help.  An MRI was obtained on 11/14/2018 which showed a tear of the medial meniscus.  She was referred to PT at her last office visit of 12/26/18 but could not do it because she could not afford the co-pays.  She therefore requests arthroscopic intervention.  She is aware that arthroscopic intervention for degenerative meniscal tears has a low success rate however she states that she has no other option for this knee, which needs to be the \"good knee\" when she has her right TKA performed.    She saw her PCP who cleared her for surgery on 3/15/2019.    Since seen last there is been no other changes in her musculoskeletal or neurologic systems.    Examination: /62   Pulse 72   Temp 98.3  F (36.8  C) (Tympanic)   Ht 1.651 m (5' 5\")   Wt 108 kg (238 lb)   SpO2 97%   BMI 39.61 kg/m    Overweight elderly female in no obvious distress using no ambulatory appliance.    Patient Education: We discussed the anatomic rationale, alternatives, risks, benefits, and postoperative rehabilitation plan of a left knee arthroscopy.  I answered all her questions.  She consented to the procedure.      Impression: Degenerative medial meniscal tear left knee    Plan: Left knee arthroscopy 3/21/2019.    The surgery order was placed.  "

## 2019-03-19 NOTE — NURSING NOTE
"Chief Complaint   Patient presents with     Schedule Surgery     Review Results prior to surgery       Initial /62   Pulse 72   Temp 98.3  F (36.8  C) (Tympanic)   Ht 1.651 m (5' 5\")   Wt 108 kg (238 lb)   SpO2 97%   BMI 39.61 kg/m   Estimated body mass index is 39.61 kg/m  as calculated from the following:    Height as of this encounter: 1.651 m (5' 5\").    Weight as of this encounter: 108 kg (238 lb).  Medication Reconciliation: complete    Radha Ruth LPN  "

## 2019-03-21 ENCOUNTER — ANESTHESIA (OUTPATIENT)
Dept: SURGERY | Facility: HOSPITAL | Age: 79
End: 2019-03-21
Payer: MEDICARE

## 2019-03-21 ENCOUNTER — HOSPITAL ENCOUNTER (OUTPATIENT)
Facility: HOSPITAL | Age: 79
Discharge: HOME | End: 2019-03-21
Attending: ORTHOPAEDIC SURGERY | Admitting: ORTHOPAEDIC SURGERY
Payer: MEDICARE

## 2019-03-21 ENCOUNTER — ANESTHESIA EVENT (OUTPATIENT)
Dept: SURGERY | Facility: HOSPITAL | Age: 79
End: 2019-03-21
Payer: MEDICARE

## 2019-03-21 VITALS
HEIGHT: 65 IN | SYSTOLIC BLOOD PRESSURE: 131 MMHG | TEMPERATURE: 97.1 F | DIASTOLIC BLOOD PRESSURE: 78 MMHG | BODY MASS INDEX: 39.32 KG/M2 | WEIGHT: 236 LBS | OXYGEN SATURATION: 92 % | RESPIRATION RATE: 14 BRPM

## 2019-03-21 DIAGNOSIS — M23.204 OLD COMPLEX TEAR OF MEDIAL MENISCUS OF LEFT KNEE: Primary | ICD-10-CM

## 2019-03-21 PROCEDURE — 29881 ARTHRS KNE SRG MNISECTMY M/L: CPT | Mod: LT | Performed by: ORTHOPAEDIC SURGERY

## 2019-03-21 PROCEDURE — 27210794 ZZH OR GENERAL SUPPLY STERILE: Performed by: ORTHOPAEDIC SURGERY

## 2019-03-21 PROCEDURE — 29881 ARTHRS KNE SRG MNISECTMY M/L: CPT | Performed by: NURSE ANESTHETIST, CERTIFIED REGISTERED

## 2019-03-21 PROCEDURE — 37000009 ZZH ANESTHESIA TECHNICAL FEE, EACH ADDTL 15 MIN: Performed by: ORTHOPAEDIC SURGERY

## 2019-03-21 PROCEDURE — 29881 ARTHRS KNE SRG MNISECTMY M/L: CPT | Performed by: ANESTHESIOLOGY

## 2019-03-21 PROCEDURE — 36000056 ZZH SURGERY LEVEL 3 1ST 30 MIN: Performed by: ORTHOPAEDIC SURGERY

## 2019-03-21 PROCEDURE — 40000306 ZZH STATISTIC PRE PROC ASSESS II: Performed by: ORTHOPAEDIC SURGERY

## 2019-03-21 PROCEDURE — 25000128 H RX IP 250 OP 636: Performed by: NURSE ANESTHETIST, CERTIFIED REGISTERED

## 2019-03-21 PROCEDURE — 25000128 H RX IP 250 OP 636: Performed by: ORTHOPAEDIC SURGERY

## 2019-03-21 PROCEDURE — 36000058 ZZH SURGERY LEVEL 3 EA 15 ADDTL MIN: Performed by: ORTHOPAEDIC SURGERY

## 2019-03-21 PROCEDURE — 37000008 ZZH ANESTHESIA TECHNICAL FEE, 1ST 30 MIN: Performed by: ORTHOPAEDIC SURGERY

## 2019-03-21 PROCEDURE — 71000027 ZZH RECOVERY PHASE 2 EACH 15 MINS: Performed by: ORTHOPAEDIC SURGERY

## 2019-03-21 PROCEDURE — 25000125 ZZHC RX 250: Performed by: ORTHOPAEDIC SURGERY

## 2019-03-21 PROCEDURE — 99100 ANES PT EXTEME AGE<1 YR&>70: CPT | Performed by: NURSE ANESTHETIST, CERTIFIED REGISTERED

## 2019-03-21 PROCEDURE — 71000014 ZZH RECOVERY PHASE 1 LEVEL 2 FIRST HR: Performed by: ORTHOPAEDIC SURGERY

## 2019-03-21 PROCEDURE — 25800030 ZZH RX IP 258 OP 636: Performed by: ANESTHESIOLOGY

## 2019-03-21 PROCEDURE — 25000125 ZZHC RX 250: Performed by: NURSE ANESTHETIST, CERTIFIED REGISTERED

## 2019-03-21 RX ORDER — LIDOCAINE HYDROCHLORIDE 20 MG/ML
INJECTION, SOLUTION INFILTRATION; PERINEURAL PRN
Status: DISCONTINUED | OUTPATIENT
Start: 2019-03-21 | End: 2019-03-21

## 2019-03-21 RX ORDER — METOPROLOL TARTRATE 1 MG/ML
1-2 INJECTION, SOLUTION INTRAVENOUS EVERY 5 MIN PRN
Status: DISCONTINUED | OUTPATIENT
Start: 2019-03-21 | End: 2019-03-21 | Stop reason: HOSPADM

## 2019-03-21 RX ORDER — HYDROCODONE BITARTRATE AND ACETAMINOPHEN 5; 325 MG/1; MG/1
1 TABLET ORAL
Status: DISCONTINUED | OUTPATIENT
Start: 2019-03-21 | End: 2019-03-21

## 2019-03-21 RX ORDER — FENTANYL CITRATE 50 UG/ML
INJECTION, SOLUTION INTRAMUSCULAR; INTRAVENOUS PRN
Status: DISCONTINUED | OUTPATIENT
Start: 2019-03-21 | End: 2019-03-21

## 2019-03-21 RX ORDER — ONDANSETRON 2 MG/ML
4 INJECTION INTRAMUSCULAR; INTRAVENOUS EVERY 30 MIN PRN
Status: DISCONTINUED | OUTPATIENT
Start: 2019-03-21 | End: 2019-03-21 | Stop reason: HOSPADM

## 2019-03-21 RX ORDER — HYDROMORPHONE HYDROCHLORIDE 1 MG/ML
.3-.5 INJECTION, SOLUTION INTRAMUSCULAR; INTRAVENOUS; SUBCUTANEOUS EVERY 10 MIN PRN
Status: DISCONTINUED | OUTPATIENT
Start: 2019-03-21 | End: 2019-03-21 | Stop reason: HOSPADM

## 2019-03-21 RX ORDER — ACETAMINOPHEN 325 MG/1
650 TABLET ORAL
Status: DISCONTINUED | OUTPATIENT
Start: 2019-03-21 | End: 2019-03-21 | Stop reason: HOSPADM

## 2019-03-21 RX ORDER — OXYCODONE HYDROCHLORIDE 5 MG/1
5 TABLET ORAL EVERY 4 HOURS PRN
Status: DISCONTINUED | OUTPATIENT
Start: 2019-03-21 | End: 2019-03-21 | Stop reason: HOSPADM

## 2019-03-21 RX ORDER — NALOXONE HYDROCHLORIDE 0.4 MG/ML
.1-.4 INJECTION, SOLUTION INTRAMUSCULAR; INTRAVENOUS; SUBCUTANEOUS
Status: DISCONTINUED | OUTPATIENT
Start: 2019-03-21 | End: 2019-03-21 | Stop reason: HOSPADM

## 2019-03-21 RX ORDER — FENTANYL CITRATE 50 UG/ML
25-50 INJECTION, SOLUTION INTRAMUSCULAR; INTRAVENOUS
Status: DISCONTINUED | OUTPATIENT
Start: 2019-03-21 | End: 2019-03-21 | Stop reason: HOSPADM

## 2019-03-21 RX ORDER — HYDRALAZINE HYDROCHLORIDE 20 MG/ML
2.5-5 INJECTION INTRAMUSCULAR; INTRAVENOUS EVERY 10 MIN PRN
Status: DISCONTINUED | OUTPATIENT
Start: 2019-03-21 | End: 2019-03-21 | Stop reason: HOSPADM

## 2019-03-21 RX ORDER — ONDANSETRON 4 MG/1
4 TABLET, ORALLY DISINTEGRATING ORAL EVERY 30 MIN PRN
Status: DISCONTINUED | OUTPATIENT
Start: 2019-03-21 | End: 2019-03-21 | Stop reason: HOSPADM

## 2019-03-21 RX ORDER — SODIUM CHLORIDE, SODIUM LACTATE, POTASSIUM CHLORIDE, CALCIUM CHLORIDE 600; 310; 30; 20 MG/100ML; MG/100ML; MG/100ML; MG/100ML
INJECTION, SOLUTION INTRAVENOUS CONTINUOUS
Status: DISCONTINUED | OUTPATIENT
Start: 2019-03-21 | End: 2019-03-21 | Stop reason: HOSPADM

## 2019-03-21 RX ORDER — ALBUTEROL SULFATE 0.83 MG/ML
2.5 SOLUTION RESPIRATORY (INHALATION) EVERY 4 HOURS PRN
Status: DISCONTINUED | OUTPATIENT
Start: 2019-03-21 | End: 2019-03-21 | Stop reason: HOSPADM

## 2019-03-21 RX ORDER — DEXAMETHASONE SODIUM PHOSPHATE 4 MG/ML
4 INJECTION, SOLUTION INTRA-ARTICULAR; INTRALESIONAL; INTRAMUSCULAR; INTRAVENOUS; SOFT TISSUE EVERY 10 MIN PRN
Status: DISCONTINUED | OUTPATIENT
Start: 2019-03-21 | End: 2019-03-21 | Stop reason: HOSPADM

## 2019-03-21 RX ORDER — PROPOFOL 10 MG/ML
INJECTION, EMULSION INTRAVENOUS PRN
Status: DISCONTINUED | OUTPATIENT
Start: 2019-03-21 | End: 2019-03-21

## 2019-03-21 RX ORDER — ACETAMINOPHEN 325 MG/1
650 TABLET ORAL EVERY 4 HOURS PRN
Qty: 50 TABLET | Refills: 0 | Status: SHIPPED | OUTPATIENT
Start: 2019-03-21 | End: 2019-05-07

## 2019-03-21 RX ORDER — LIDOCAINE HYDROCHLORIDE AND EPINEPHRINE 10; 10 MG/ML; UG/ML
INJECTION, SOLUTION INFILTRATION; PERINEURAL PRN
Status: DISCONTINUED | OUTPATIENT
Start: 2019-03-21 | End: 2019-03-21 | Stop reason: HOSPADM

## 2019-03-21 RX ORDER — ONDANSETRON 2 MG/ML
INJECTION INTRAMUSCULAR; INTRAVENOUS PRN
Status: DISCONTINUED | OUTPATIENT
Start: 2019-03-21 | End: 2019-03-21

## 2019-03-21 RX ORDER — ACETAMINOPHEN 325 MG/1
975 TABLET ORAL
Status: DISCONTINUED | OUTPATIENT
Start: 2019-03-21 | End: 2019-03-21

## 2019-03-21 RX ORDER — MEPERIDINE HYDROCHLORIDE 50 MG/ML
12.5 INJECTION INTRAMUSCULAR; INTRAVENOUS; SUBCUTANEOUS
Status: DISCONTINUED | OUTPATIENT
Start: 2019-03-21 | End: 2019-03-21 | Stop reason: HOSPADM

## 2019-03-21 RX ADMIN — FENTANYL CITRATE 50 MCG: 50 INJECTION, SOLUTION INTRAMUSCULAR; INTRAVENOUS at 08:03

## 2019-03-21 RX ADMIN — SODIUM CHLORIDE, POTASSIUM CHLORIDE, SODIUM LACTATE AND CALCIUM CHLORIDE: 600; 310; 30; 20 INJECTION, SOLUTION INTRAVENOUS at 07:56

## 2019-03-21 RX ADMIN — FENTANYL CITRATE 25 MCG: 50 INJECTION, SOLUTION INTRAMUSCULAR; INTRAVENOUS at 08:43

## 2019-03-21 RX ADMIN — LIDOCAINE HYDROCHLORIDE 80 MG: 20 INJECTION, SOLUTION INFILTRATION; PERINEURAL at 08:03

## 2019-03-21 RX ADMIN — FENTANYL CITRATE 25 MCG: 50 INJECTION, SOLUTION INTRAMUSCULAR; INTRAVENOUS at 08:45

## 2019-03-21 RX ADMIN — PROPOFOL 200 MG: 10 INJECTION, EMULSION INTRAVENOUS at 08:03

## 2019-03-21 RX ADMIN — ONDANSETRON 4 MG: 2 INJECTION INTRAMUSCULAR; INTRAVENOUS at 08:10

## 2019-03-21 ASSESSMENT — COPD QUESTIONNAIRES: COPD: 1

## 2019-03-21 ASSESSMENT — MIFFLIN-ST. JEOR: SCORE: 1561.37

## 2019-03-21 ASSESSMENT — ENCOUNTER SYMPTOMS: DYSRHYTHMIAS: 1

## 2019-03-21 NOTE — OR NURSING
Patient and responsible adult given discharge instructions with no questions regarding instructions. Berny score 20. Pain level 0/10.  Discharged from unit via wheelchair. Patient discharged to home with granddaughter  .

## 2019-03-21 NOTE — ANESTHESIA POSTPROCEDURE EVALUATION
Patient: Dinorah Lim    Procedure(s):  LEFT  KNEE ARTHROSCOPY, partial medial menisectomy    Diagnosis:ACUTE MEDIAL MENISCAL TEAR  Diagnosis Additional Information: No value filed.    Anesthesia Type:  General, LMA    Note:  Anesthesia Post Evaluation    Patient location during evaluation: PACU, Phase 2 and Bedside  Patient participation: Able to fully participate in evaluation  Level of consciousness: awake and alert  Pain management: adequate  Airway patency: patent  Cardiovascular status: acceptable  Respiratory status: acceptable  Hydration status: stable  PONV: none     Anesthetic complications: None          Last vitals:  Vitals:    03/21/19 0950 03/21/19 0955 03/21/19 1000   BP: 131/78     Resp: 14     Temp:      SpO2: 94% 92% 92%         Electronically Signed By: Maxim Cabrera MD  March 21, 2019  10:38 AM

## 2019-03-21 NOTE — OR NURSING
Pt continues to deny pain, VSS, CMS to left leg intact, grandaughter at bedside and Dr. bone into see pt and explain how procedure went, tolerating PO

## 2019-03-21 NOTE — DISCHARGE INSTRUCTIONS
Post-Anesthesia Patient Instructions    IMMEDIATELY FOLLOWING SURGERY:  Do not drive or operate machinery for the first twenty four hours after surgery.  Do not make any important decisions for twenty four hours after surgery or while taking narcotic pain medications or sedatives.  If you develop intractable nausea and vomiting or a severe headache please notify your doctor immediately.    FOLLOW-UP:  Please make an appointment with your surgeon as instructed. You do not need to follow up with anesthesia unless specifically instructed to do so.    WOUND CARE INSTRUCTIONS (if applicable):  Keep a dry clean dressing on the anesthesia/puncture wound site if there is drainage.  Once the wound has quit draining you may leave it open to air.  Generally you should leave the bandage intact for twenty four hours unless there is drainage.  If the epidural site drains for more than 36-48 hours please call the anesthesia department.    QUESTIONS?:  Please feel free to call your physician or the hospital  if you have any questions, and they will be happy to assist you.      Orthopedic Post- Operative Instructions  Knee Arthroscopy    Goal: The goal of post-operative care is to:     Control pain    Minimize swelling    Regain range of motion    Prevent complications  Diet: Resume your usual diet as your stomach tolerates. Often the first meal at home needs to be small and bland.   Medications: Resume your usual medications.       o Try to use non-narcotic pain medications like Aleve, Advil or Tylenol first. Let the amount of pain be your guide. Do not take more than 3200mg of Advil, 3000mg of Tylenol or 800mg of Aleve in a day.    To prevent blood clots in your legs, take 325mg (one tablet) of aspirin once a day.   Dressings: You may re-wrap the Ace Bandage as needed. Keep it clean and dry. Elevate you knee and apply ice to it at least 5 times a day for 20 minutes at a time for several days, to reduce pain and  "swelling. Use the Ace wrap until the swelling in minimal.     In 2-3 days you may remove the bandage, wash your knee allowing water to run over the incision. Pat dry. Place 2x2 gauze over knee incisions and then rewrap with gauze.    DO NOT PROP A PILLOW UNDER YOUR KNEE TO SLEEP    DO NOT IMMERSE YOUR KNEE IN WATER UNTIL SEEN IN THE OFFICE.  Activity: Weight bear as tolerated using crutches or a walker until able to fully bear weight without them. This may take 5-7 days.   Exercises: Start range of motion exercises tomorrow.    Get the knee as straight as possible and get as much bend in your knee as you can.  o Exercising your knee after surgery is an essential part of regaining the normal range of motion and strength in your knee.   o See Exercise Instructions included in this packet.   o Preform the exercises slowly at first and only up to the point of pain.     Remember to breathe while you exercise.    Do these exercises 3 times a day. Each exercise may be done 10-15 times within the limits of pain and swelling.     Do simple walking activities only for the first 10 days. No sports, running, excessive stair climbing or exercise machines. Let pain be your guide to activity: too much pain means too much activity. If you choose to walk, limit walking to under   mile.   Problems: Call the clinic @ [phone] or come to the Emergency Department if you have any:      IMPORTANT OBSERVATIONS  Check C-M-S of operative leg every 4 hours while you are awake for the first 48 hours:    * C: color - should appear normal/pink   * M: motion - able to move foot, wiggle toes   * S: sensation - able to \"feel\": no numbness, tingling  If you experience changes in C-M-S and/or in severe pain, you may remove the elastic bandages and reapply ot - tight enough to provide support for the gauze dressing but loose enough to improve C-M-S. The gauze dressing should NOT be removed when rewrapping the elastic bandage.     KNEE EXERCISES  Start " leg exercises two days post-op (see below)    These exercises help maintain muscle tone in you leg and strengthen the muscles supporting the knee. Do them a minimum of three times a day; ten times each. If you have questions about the exercises or problems doing them please contact your doctor.     Quad Sets. Lying on your back, press the back side of your knee down against the bed, tightening the muscles on the of your thigh. Hold for a count of five. Relax and repeat.     Hamstring Isometrics. Push your heel into the bed as if attempting to bend your knee. You should feel the muscles on the back of your thigh tighten. Hold for a count of five. Relax and repeat.     CONTACT YOUR DOCTOR...if you have any problems, such as:    * Fever of 100 degrees or higher ( a fever below 100 degrees may occur as a  normal response to the surgical procedure)   * Changes in C-M-S and/or pain, unrelieved by pain medication or re-wrapping  the elastic bandage   * Pain or tenderness in the calf of either leg    If you have any questions, please call your surgeon or your local Hospital Emergency Room.        Knee Exercises       Quadriceps Sets    Lie on your back in bed, legs straight.    Tighten the muscle at the front of the thigh as you press the back of your knee down toward the bed. Hold for 5 to 10 seconds. Then relax the leg.  Straight Leg Raises    Lie in bed. Bend one leg. Keep your other leg straight on the bed.    Lift your straight leg as high as you         comfortably can, but not higher than        12 inches. Hold for 5 seconds. Then         slowly lower the leg.         Heel slides:      Lie down or sit with your legs stretched out in front of you. Put a plastic bag or cookie sheet under one foot to help it slide.    Slide the heel toward your buttocks while keeping it on the bed. Move it as far as you comfortably can. Hold for a few seconds, then slide your heel back.          [phone] [employer] [address]  [place] [address]. All rights reserved. This information is not intended as a substitute for professional medical care. Always follow your healthcare professional's instructions.        Significant bleeding    Fever    Calf or chest pain    If your knee becomes red and hot or you see pus coming from the operated site    If you have nausea or vomiting    You have signs of a blood clot like calf pain or shortness of breath  Precautions: Have a responsible adult with you for at least 24 hours following the procedure.

## 2019-03-21 NOTE — ANESTHESIA CARE TRANSFER NOTE
Patient: Dinorah Lim    Procedure(s):  LEFT  KNEE ARTHROSCOPY, partial medial menisectomy    Diagnosis: ACUTE MEDIAL MENISCAL TEAR  Diagnosis Additional Information: No value filed.    Anesthesia Type:   General, LMA     Note:  Airway :Nasal Cannula  Patient transferred to:PACU  Handoff Report: Identifed the Patient, Identified the Reponsible Provider, Reviewed the pertinent medical history, Discussed the surgical course, Reviewed Intra-OP anesthesia mangement and issues during anesthesia, Set expectations for post-procedure period and Allowed opportunity for questions and acknowledgement of understanding      Vitals: (Last set prior to Anesthesia Care Transfer)    CRNA VITALS  3/21/2019 0831 - 3/21/2019 0912      3/21/2019             Pulse:  60    SpO2:  99 %    Resp Rate (observed):  11                Electronically Signed By: RENA Novoa CRNA  March 21, 2019  9:12 AM

## 2019-03-22 ENCOUNTER — TELEPHONE (OUTPATIENT)
Dept: ORTHOPEDICS | Facility: OTHER | Age: 79
End: 2019-03-22

## 2019-03-22 NOTE — TELEPHONE ENCOUNTER
Returned patient call and gave her providers instructions. Patient stated that she wasn't taking the pain medication as ordered and actually was only taking medication every 8 hours prn pain instead of every 4 hours prn pain. Since her daughter is home with her she has been taking her medication as prescribed and feels much better. States that she is able to ambulate to the bathroom much better. She was instructed if pain becomes unbearable to go into urgent care. Patient states understands.

## 2019-03-22 NOTE — TELEPHONE ENCOUNTER
Patient called stating has never had such pain.Has a hard time walking even to the restroom. Said not a lot of pain when it is elevated but to step on is not good. Please call [phone]

## 2019-04-01 DIAGNOSIS — F41.9 ANXIETY: ICD-10-CM

## 2019-04-02 RX ORDER — PAROXETINE 40 MG/1
TABLET, FILM COATED ORAL
Qty: 90 TABLET | Refills: 2 | Status: SHIPPED | OUTPATIENT
Start: 2019-04-02 | End: 2020-02-07

## 2019-04-04 ENCOUNTER — OFFICE VISIT (OUTPATIENT)
Dept: ORTHOPEDICS | Facility: OTHER | Age: 79
End: 2019-04-04
Attending: ORTHOPAEDIC SURGERY
Payer: MEDICARE

## 2019-04-04 VITALS
HEIGHT: 65 IN | SYSTOLIC BLOOD PRESSURE: 100 MMHG | OXYGEN SATURATION: 95 % | WEIGHT: 236 LBS | HEART RATE: 63 BPM | BODY MASS INDEX: 39.32 KG/M2 | TEMPERATURE: 97.7 F | DIASTOLIC BLOOD PRESSURE: 60 MMHG

## 2019-04-04 DIAGNOSIS — M17.12 PRIMARY OSTEOARTHRITIS OF LEFT KNEE: ICD-10-CM

## 2019-04-04 DIAGNOSIS — Z98.890 S/P LEFT KNEE ARTHROSCOPY: Primary | ICD-10-CM

## 2019-04-04 PROCEDURE — 99024 POSTOP FOLLOW-UP VISIT: CPT | Performed by: ORTHOPAEDIC SURGERY

## 2019-04-04 PROCEDURE — G0463 HOSPITAL OUTPT CLINIC VISIT: HCPCS

## 2019-04-04 ASSESSMENT — MIFFLIN-ST. JEOR: SCORE: 1561.37

## 2019-04-04 ASSESSMENT — PAIN SCALES - GENERAL: PAINLEVEL: EXTREME PAIN (9)

## 2019-04-04 NOTE — NURSING NOTE
"Chief Complaint   Patient presents with     Surgical Followup     Left Knee   Possible Injection Right Knee       Initial /60   Pulse 63   Temp 97.7  F (36.5  C) (Tympanic)   Ht 1.651 m (5' 5\")   Wt 107 kg (236 lb)   SpO2 95%   BMI 39.27 kg/m   Estimated body mass index is 39.27 kg/m  as calculated from the following:    Height as of this encounter: 1.651 m (5' 5\").    Weight as of this encounter: 107 kg (236 lb).  Medication Reconciliation: complete    Radha Ruth LPN  "

## 2019-04-04 NOTE — PROGRESS NOTES
Chief Complaints:  #1.  Bilateral DJD of the knees  #2.  Degenerative medial meniscal tear left knee  #3.  Dexamethasone allergy, but she tolerates Kenalog  #4.  Codeine allergy, but she tolerates oxycodone and Dilaudid     Patient Profile: 78-year-old right-handed non-smoking retired, patient of Dr. Magaly Sosa     HPI: She has a long history of DJD of both knees, worse on the right. Treatment thus far has been conservative, most recently with bilateral Gel 1 injections.  Her PCP also has her on hydrocodone.  She intends to have a total knee replacement on the right as the symptoms have failed conservative treatment.  In the meantime her left knee pain increased in the fall 2018, insidiously.  An MRI obtained on 11/14/2018 showed a significant medial meniscus tear.  Physical therapy was recommended but she could not afford the co-pays.  She therefore requested arthroscopic intervention.  She was advised her that arthroscopic intervention for degenerative meniscal tears has a low success rate but she insisted arguing that this needs to be the good knee when she has her right TKA performed.      The surgery was performed on 3/21/2019. Grade 2 DJD was seen in the patellofemoral compartment.  The medial femoral condyle had grade 3 and grade 4 degeneration.  The medial meniscal tear was quite large.  An arthroscopic partial medial meniscectomy was performed.     Subjective: She states she has had a lot of left knee pain since surgery.  The pain is mostly posterior and in the proximal anterior tibia.    Examination: Her portals are well-healed with no evidence of infection.  The sutures were removed and Steri-Strips were applied.  The knee has no effusion.  There is not significant swelling in the left calf.  I cannot palpate a Baker's cyst behind the left knee.  She can flex and extend her ankle well without calf pain.  No palpable cords are felt in the calf.    Impression:  #1.  Bilateral DJD of the knees  #2.  S/P  APMM left knee 3/21/2019  #3.  Dexamethasone allergy, but she tolerates Kenalog  #4.  Codeine allergy, but she tolerates oxycodone and Dilaudid    Plan:  #1.  I recommended a short course of an oral NSAID to decrease inflammation in the left knee.  She resisted but finally agreed to take Aleve 2 tablets twice a day with food.  #2.  She accepted a referral to PT for the left knee.  #3.  She is due for bilateral Gel 1 injections in a week.  We will move that appointment out to early May to give the left knee time to quiet down from surgery.

## 2019-04-10 ENCOUNTER — HOSPITAL ENCOUNTER (OUTPATIENT)
Dept: PHYSICAL THERAPY | Facility: HOSPITAL | Age: 79
Setting detail: THERAPIES SERIES
End: 2019-04-10
Attending: ORTHOPAEDIC SURGERY
Payer: MEDICARE

## 2019-04-10 PROCEDURE — 97110 THERAPEUTIC EXERCISES: CPT | Mod: GP

## 2019-04-10 PROCEDURE — 97140 MANUAL THERAPY 1/> REGIONS: CPT | Mod: GP

## 2019-04-10 PROCEDURE — 97161 PT EVAL LOW COMPLEX 20 MIN: CPT | Mod: GP

## 2019-04-10 ASSESSMENT — ACTIVITIES OF DAILY LIVING (ADL)
STAND: ACTIVITY IS FAIRLY DIFFICULT
KNEE_ACTIVITY_OF_DAILY_LIVING_SUM: 13
KNEEL ON THE FRONT OF YOUR KNEE: I AM UNABLE TO DO THE ACTIVITY
SQUAT: I AM UNABLE TO DO THE ACTIVITY
RAW_SCORE: 13
PAIN: THE SYMPTOM AFFECTS MY ACTIVITY SEVERELY
LIMPING: THE SYMPTOM AFFECTS MY ACTIVITY SEVERELY
WALK: ACTIVITY IS VERY DIFFICULT
SWELLING: THE SYMPTOM AFFECTS MY ACTIVITY SEVERELY
RISE FROM A CHAIR: ACTIVITY IS VERY DIFFICULT
GIVING WAY, BUCKLING OR SHIFTING OF KNEE: THE SYMPTOM AFFECTS MY ACTIVITY SEVERELY
STIFFNESS: THE SYMPTOM AFFECTS MY ACTIVITY SEVERELY
WEAKNESS: THE SYMPTOM AFFECTS MY ACTIVITY SEVERELY
GO DOWN STAIRS: ACTIVITY IS VERY DIFFICULT
SIT WITH YOUR KNEE BENT: ACTIVITY IS VERY DIFFICULT
KNEE_ACTIVITY_OF_DAILY_LIVING_SCORE: 18.57
GO UP STAIRS: ACTIVITY IS VERY DIFFICULT

## 2019-04-10 NOTE — TELEPHONE ENCOUNTER
4:58 PM    Reason for Call: FUTURE OVERBOOK    Patient is having the following symptoms: patient would like to have surgery on her Left knee on the 25th of February. Any chance she could be see for a pre-op before then?  She does not want any other provider     The patient is requesting an appointment for (see above)    Was an appointment offered for this call? No  If yes : Appointment type              Date    Preferred method for responding to this message: Telephone Call     What is your phone number [phone],+++++ PLEASE try her after 10:00AM+++++    If we cannot reach you directly, may we leave a detailed response at the number you provided? Yes    Can this message wait until your PCP/provider returns, if unavailable today? Not applicable, provider will be in Thursday 2/21    Nazia Woodward     [facility]  Progress Note  Katelin Velasquez MD       Josiah George   YOB: 1930    Date of Visit:  4/10/2019    Assessment/Plan:  1. COPD, mild ([address]  PFT 2/18:  INTERPRETATION:  Spirometry attempts were acceptable and reproducible. FVC  was normal at 3.48 liters, 119% predicted and normal FEV1 of 2.02 liters,  101% predicted. FEV1/FVC ratio was decreased at 58%. Lung volumes showed  normal total lung capacity of 112% predicted. Diffusion capacity showed  decreased DLCO of 48% predicted.     IMPRESSION:  Mild obstructive defect on spirometry with normal lung volumes  but moderate-to-severe decrease in diffusion capacity. In comparison to  the test that was done in 01/2016, no significant change in FVC or FEV1. Total lung capacity has improved by 9% and DLCO decreased by 10%. Symbicort  Spiriva  HHN QAM and prn  Albuterol HFA in advance of activity  Mucinex  Prednisone 10 mg per day. Seems to be Prednisone dependent. Discussed long term complications of steroid therapy. Will continue level dose of Prednisone 10 per day. 2. Centrilobular emphysema (HCC)  CT Chest:8/18    Impression   No significant change in fibrosis which is inconsistent with UIP pattern due   to extensive ground-glass component.  Findings could represent NSIP.       Slight interval increase in size of the 8 mm nodule in the left lower lobe. This has been present for greater than 2 years, though consider continued   follow-up imaging given slight increase.       Unchanged severe emphysema. 3. Pulmonary fibrosis ([address]  CT Chest: 8/18:  Impression   No significant change in fibrosis which is inconsistent with UIP pattern due   to extensive ground-glass component.  Findings could represent NSIP.       Slight interval increase in size of the 8 mm nodule in the left lower lobe.    This has been present for greater than 2 years, though consider continued   follow-up imaging given slight increase.       Unchanged severe emphysema. 4. Chronic respiratory failure with hypoxia (HCC)  O2 sats are acceptable on supplemental O2. The patient benefits from the use of supplemental O2. He needs a new POC which goes to 5 LPM compared to his current unit which does not provide adequate flow. .    5. Obstructive sleep apnea  Wears BiPAP with O2 nightly and benefits from that    6. Pulmonary Nodule  I have independently reviewed radiographic images for this patient as part of this visit. CT shows slight increase in PN though it has been stable for 2 years  Repeat CT chest    FOLLOW UP: 3 months      Chief Complaint   Patient presents with    Shortness of Breath     F.U.       HPI  The patient present for follow up of severe COPD, bronchiectasis and pulmonary fibrosis. He has chronic hypoxemic respiratory failure. He has LORA and is on BiPAP as well. He feels better on Prednisone. HE is having more trouble with dyspnea on exertion. His O2 is up to 5 LPM on a POC. He has not had a recent infectious flare. No Chest pain, Nausea or vomiting reported    Review of Systems  As documented in HPI     Physical Exam:  Well developed, well nourished  Alert and oriented  Sclera is clear  No cervical adenopathy  No JVD. Chest examination is clear. Cardiac examination reveals regular rate and rhythm without murmur, gallop or rub. The abdomen is soft, nontender and nondistended. There is no clubbing, cyanosis or edema of the extremities. There is no obvious skin rash. No focal neuro deficicts  Normal mood and affect    Allergies   Allergen Reactions    Aspirin Hives and Swelling    Dilaudid [Hydromorphone Hcl] Other (See Comments)     Severe hallucination     Prior to Visit Medications    Medication Sig Taking?  Authorizing Provider   simvastatin (ZOCOR) 40 MG tablet Take 1 tablet by mouth nightly  Donovan Steward MD   allopurinol (ZYLOPRIM) 100 MG tablet TAKE ONE TABLET BY MOUTH DAILY  Jarad Denton Meryl Adorno MD   metoprolol succinate (TOPROL XL) 50 MG extended release tablet Take 1 tablet by mouth nightly  Shweta Gambino MD   clopidogrel (PLAVIX) 75 MG tablet Take 1 tablet by mouth daily  Shweta Gambino MD   albuterol sulfate HFA (PROAIR HFA) 108 (90 Base) MCG/ACT inhaler Inhale 2 puffs into the lungs daily as needed for Shortness of Breath  Shweta Gambino MD   tiotropium (Steffi Ape) 18 MCG inhalation capsule Inhale 1 capsule into the lungs daily  Shweta Gambino MD   lisinopril (PRINIVIL;ZESTRIL) 5 MG tablet Take 1 tablet by mouth daily  Shweta Gambino MD   budesonide-formoterol (SYMBICORT) 160-4.5 MCG/ACT AERO Inhale 2 puffs into the lungs 2 times daily  Urban Coello MD   predniSONE (DELTASONE) 10 MG tablet Take 10 mg by mouth daily  Historical Provider, MD   albuterol (PROVENTIL) (2.5 MG/3ML) 0.083% nebulizer solution Take 3 mLs by nebulization every 6 hours as needed for Wheezing  Shweta Gambino MD   glucose blood VI test strips (ONETOUCH VERIO) strip 1 each by In Vitro route daily E11.9  Shweta Gambino MD   [address] Use to check blood sugar once daily. E11.9  Shweta Gambino MD   Selenium 200 MCG CAPS Take  by mouth daily. Historical Provider, MD   Zinc 50 MG TABS Take 50 mg by mouth daily. Historical Provider, MD   Ascorbic Acid (VITAMIN C) 500 MG tablet Take 500 mg by mouth daily. Historical Provider, MD   vitamin D (ERGOCALCIFEROL) 23227 UNIT CAPS capsule Take 2,000 Units by mouth Daily. Historical Provider, MD   Cyanocobalamin (VITAMIN B 12 PO) Take 500 mcg by mouth daily at 1800. Historical Provider, MD       Vitals:    04/10/19 1053   BP: (!) 109/51   Pulse: 78   SpO2: 92%   Weight: 193 lb (87.5 kg)     Body mass index is 32.12 kg/m².      Wt Readings from Last 3 Encounters:   04/10/19 193 lb (87.5 kg)   12/10/18 190 lb (86.2 kg)   11/30/18 191 lb (86.6 kg)     BP Readings from Last 3 Encounters:   04/10/19 (!) 109/51   12/10/18 115/63 18 116/64        Social History     Tobacco Use   Smoking Status Former Smoker    Packs/day: 2.00    Years: 50.00    Pack years: 100.00    Last attempt to quit: 2001    Years since quittin.2   Smokeless Tobacco Never Used

## 2019-04-10 NOTE — PROGRESS NOTES
Initial Physical Therapy Evaluation      Name: Dinorah Lim MRN# [id number]   Age: 78 year old YOB: 1940     Date of Consultation: April 10, 2019  Primary care provider: Magaly Sosa    Referring Physician: Esteban Wu   Orders: Eval and Treat  Medical Diagnosis: s/p L Knee Arthroscopy  Onset of Illness/Injury: 3/20/19    Reason for PT Visit: Patient is a 77 y/o female who presents with severe osteoarthritis of the L Knee.  Patient walks in with significant antalgic gait using SEC for gait, notes she was using the cane prior to surgery 3 weeks prior.  She has had a series of injections that has been helpful.  Patient said she could have had TKA prior, but wants to wait until she really needs it despite being in pain everyday.  Patient notes sleeping is fine w/o knee pain.  Patient has a history of lymphedema and attempts to elevate her legs frequently.  Patient does not wear her PAUL socks due to all the pressure being pushed up to the knee.  Patient in public does not shop in public w/o using a cart, and walks short distances.     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     Pain: 6/10  Aching, Penetrating and Sharp 9/10     Community Support/Living Environment/Employment History: Patient lives at home, 5 stairs to get in the home, gets assistance from grand kids or daughter to help with patient laundry      Patient/Family Goal: To have less pain and walk more.  Would like to return to using recumbent bike     Fall Screen:   Have you fallen 2 or more times in the last year? No  Have you fallen and had an injury in the last year? No  Timed up & go: N/a  Is patient a fall risk? 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    Anterior Segment V 4 cm nodule abutting liver surface by US 1/4/2015      Obesity, Class II, BMI 35-39.9 1/4/2015    BMI 35.9 with comorbidities = MORBID obesity     Osteoarthritis of right hip 10/07/2004     Osteoarthrosis 06/14/2012     Otitis externa 10/04/2011     Prediabetes 08/01/2011       Past Surgical History:  Past Surgical History:   Procedure Laterality Date     ARTHROSCOPY KNEE Left 3/21/2019    Procedure: LEFT  KNEE ARTHROSCOPY, partial medial menisectomy;  Surgeon: Esteban Wills MD;  Location: [facility]     CLOSED REDUCTION WRIST Right 1952 x 2    long arm cast (same time as elbow fx)     CLOSED RX ELBOW DISLOCATION Right 1952    CR/long cast of fracture     HC INJ EPIDURAL LUMBAR/SACRAL W/WO CONTRAST Bilateral 5/2013    facet injections; prev 2012     LAPAROSCOPIC CHOLECYSTECTOMY N/A 1/4/2015    Procedure: LAPAROSCOPIC CHOLECYSTECTOMY;  Surgeon: Brittney العلي MD;  Location: [facility]     TONSILLECTOMY         Medications:   Current Outpatient Medications   Medication Sig     acetaminophen (TYLENOL) 325 MG tablet Take 2 tablets (650 mg) by mouth every 4 hours as needed for mild pain     albuterol (2.5 MG/3ML) 0.083% neb solution Take 1 vial (2.5 mg) by nebulization every 4 hours as needed for shortness of breath / dyspnea or wheezing     aspirin (ASA) 81 MG EC tablet Take 1 tablet (81 mg) by mouth daily     Calcium-Magnesium-Vitamin D (CALCIUM 1200+D3 PO) Take by mouth daily     clonazePAM (KLONOPIN) 1 MG tablet TAKE 1/4 (ONE-FOURTH) TO 1/2 (ONE-HALF) TABLET BY MOUTH EVERY 8 HOURS AS NEEDED     FISH OIL Take 1 capsule by mouth daily      Garlic 10 MG CAPS  Take 1 capsule by mouth as needed     HYDROcodone-acetaminophen (NORCO) 5-325 MG tablet TAKE ONE TABLET BY MOUTH EVERY 4 TO 6 HOURS AS NEEDED FOR PAIN     PARoxetine (PAXIL) 40 MG tablet TAKE 1 TABLET BY MOUTH ONCE DAILY     simvastatin (ZOCOR) 20 MG tablet TAKE ONE TABLET BY MOUTH AT BEDTIME     VITAMIN D, CHOLECALCIFEROL, PO Take 2,000 Units by mouth daily     No current facility-administered medications for this encounter.        Imaging:     Musculoskeletal Findings:     OBJECTIVE   Observation:   Patient appears to PT in no acute distress      Palpation: Tenderness around entire knee anterior/posterior w/ increased pain medial knee, palpable pitting edema L > R +1       Gait: Antalgic Gait w/ shorted stance on L LE       Assistive devices: single end cane       Balance:       Functional mobility: Ambulates independently      Posture: Slightly stooped posture.      Neurological Assessment:    Reflexes - Right:  Achilles -  2+  Patellar - 2+    Reflexes - Left:  Achilles - 2+  Patellar - 2+    Myotomes: neg    Dermatomes: Neg       Knee Range of Motion and Strength    ROM  Left: Extension - 0*            Flexion - 100*    Right: Extension - 0*              Flexion - 120*    STRENGTH  Left: Extension - 4/5           Flexion - 4/5    Right: Extension - 4/5             Flexion - 4/5    Hip Range of Motion and Strength  ROM - WNLs    Strength - 4/5 hip abduction, 4/5 hip extension bilaterally      Special Tests:  KNEE  Lachman's: neg  Anterior Drawer: neg  Posterior Drawer: neg  Varus: neg  Valgus: neg   Laura's:   Apley's Compression:   Thesally's:   End range flexion overpressure:  - Increase L knee pain and pressure, tolerable on R   End range extension overpressure:    HIP  Jann Test: NT  Ely's test: NT  Aurora's: NT  FADIR: Neg - limited hip ROM IR   KATY's: Neg - Limited hip ROM into ER       Outcome Measures:    Knee ADL's - 19 score     Prognosis/Plan of Care: Patient has a good prognosis for the  following goals   Appropriate for Physical Therapy Intervention: Yes       GOALS:   Short-term goals:  To be achieved in 2-4 weeks:    Instruct in home program  1.) Patient will understand biomechanical stressors of the knee joint in order to make modifications to activities to reduce further risk of injury.  2.) Patient will be independent with a short-term home exercise program.  3.) Improve L knee active range of motion 0-120 .      Long-term goals:  To be achieved in 8-10 weeks:    Return to previous level of function  Self management of symptoms.  Pain free activities of daily living.  1.) Patient will increase bilateral hip abduction strength to 4+/5 bilaterally  2.) Patient will be able to negotiate a flight of stairs with reciprocal pattern independently..  3.) Improve L knee strength 5/5 in flexion and extension.    Planned Interventions: Therapeutic exercise, manual therapy, therapeutic activity, patient education, Neuro Re-ed    Patient presents today with signs and symptoms consistent w/ S/p L knee arthroscopy to address symptoms of OA w/ severe arthritic changes w/ decreased L LE strength and ROM w/ swelling L > R. Therapy today consisted of evaluation, patient education, and therapeutic exercise. Patient would benefit from continued PT sessions addressing overall pain and dysfunction with use of appropriate interventions.    Treatment Rendered/Intervention:  Evaluation completed as described above followed by discussion of exam findings and plan of care.     Educated in posture principles and neutral spine positioning. Patient was instructed in home use of heat and/or ice for pain management      Clinical Impressions:  Criteria for Skilled Therapeutic Intervention Met: Yes  PT Diagnosis: L Knee Pain  Influenced by the following impairments: L Knee Pain, Impaired Knee ROM, Impaired Knee Strength  Functional limitations due to impairment: Walking, Bending, Squatting, transfers   Clinical presentation:  Stable/Uncomplicated  Clinical presentation rationale: Therapist Discretion   Clinical Decision making (complexity): Low Complexity  Predicted Duration of Therapy Intervention (days/wks): 2x for up to 8 weeks   Risks and Benefits of therapy have been explained: Yes  Patient, Family & other staff in agreement with plan of care: Yes  Comments: n/a      Total Evaluation Time: 25

## 2019-04-24 DIAGNOSIS — M25.562 ACUTE PAIN OF LEFT KNEE: Primary | ICD-10-CM

## 2019-04-26 ENCOUNTER — OFFICE VISIT (OUTPATIENT)
Dept: ORTHOPEDICS | Facility: OTHER | Age: 79
End: 2019-04-26
Attending: ORTHOPAEDIC SURGERY
Payer: MEDICARE

## 2019-04-26 ENCOUNTER — ANCILLARY PROCEDURE (OUTPATIENT)
Dept: GENERAL RADIOLOGY | Facility: OTHER | Age: 79
End: 2019-04-26
Attending: ORTHOPAEDIC SURGERY
Payer: MEDICARE

## 2019-04-26 VITALS
SYSTOLIC BLOOD PRESSURE: 100 MMHG | TEMPERATURE: 97.4 F | HEART RATE: 64 BPM | DIASTOLIC BLOOD PRESSURE: 60 MMHG | BODY MASS INDEX: 39.32 KG/M2 | HEIGHT: 65 IN | OXYGEN SATURATION: 98 % | WEIGHT: 236 LBS

## 2019-04-26 DIAGNOSIS — M25.562 CHRONIC PAIN OF LEFT KNEE: Primary | ICD-10-CM

## 2019-04-26 DIAGNOSIS — M17.12 PRIMARY OSTEOARTHRITIS OF LEFT KNEE: ICD-10-CM

## 2019-04-26 DIAGNOSIS — G89.29 CHRONIC PAIN OF LEFT KNEE: Primary | ICD-10-CM

## 2019-04-26 DIAGNOSIS — Z98.890 S/P LEFT KNEE ARTHROSCOPY: ICD-10-CM

## 2019-04-26 DIAGNOSIS — M25.562 ACUTE PAIN OF LEFT KNEE: ICD-10-CM

## 2019-04-26 PROCEDURE — G0463 HOSPITAL OUTPT CLINIC VISIT: HCPCS | Mod: 25

## 2019-04-26 PROCEDURE — G0463 HOSPITAL OUTPT CLINIC VISIT: HCPCS

## 2019-04-26 PROCEDURE — 99024 POSTOP FOLLOW-UP VISIT: CPT | Performed by: ORTHOPAEDIC SURGERY

## 2019-04-26 PROCEDURE — 73564 X-RAY EXAM KNEE 4 OR MORE: CPT | Mod: TC,LT

## 2019-04-26 PROCEDURE — 20610 DRAIN/INJ JOINT/BURSA W/O US: CPT | Performed by: ORTHOPAEDIC SURGERY

## 2019-04-26 RX ORDER — DEXAMETHASONE SODIUM PHOSPHATE 4 MG/ML
4 INJECTION, SOLUTION INTRA-ARTICULAR; INTRALESIONAL; INTRAMUSCULAR; INTRAVENOUS; SOFT TISSUE ONCE
Status: COMPLETED | OUTPATIENT
Start: 2019-04-26 | End: 2019-04-26

## 2019-04-26 RX ORDER — DEXAMETHASONE SODIUM PHOSPHATE 4 MG/ML
4 INJECTION, SOLUTION INTRA-ARTICULAR; INTRALESIONAL; INTRAMUSCULAR; INTRAVENOUS; SOFT TISSUE ONCE
Status: CANCELLED | OUTPATIENT
Start: 2019-04-26 | End: 2019-04-26

## 2019-04-26 RX ADMIN — DEXAMETHASONE SODIUM PHOSPHATE 4 MG: 4 INJECTION, SOLUTION INTRAMUSCULAR; INTRAVENOUS at 12:50

## 2019-04-26 ASSESSMENT — MIFFLIN-ST. JEOR: SCORE: 1561.37

## 2019-04-26 ASSESSMENT — PAIN SCALES - GENERAL: PAINLEVEL: WORST PAIN (10)

## 2019-04-26 NOTE — NURSING NOTE
"Chief Complaint   Patient presents with     RECHECK     Left Knee Pain   xrays first       Initial /60   Pulse 64   Temp 97.4  F (36.3  C) (Tympanic)   Ht 1.651 m (5' 5\")   Wt 107 kg (236 lb)   SpO2 98%   BMI 39.27 kg/m   Estimated body mass index is 39.27 kg/m  as calculated from the following:    Height as of this encounter: 1.651 m (5' 5\").    Weight as of this encounter: 107 kg (236 lb).  Medication Reconciliation: complete    Radha Ruth LPN  "

## 2019-04-26 NOTE — PROCEDURES
Prior to injection, verified patient identity using patient's name and date of birth.  Due to injection administration, patient instructed to remain in clinic for 15 minutes  afterwards, and to report any adverse reaction to me immediately.    Joint injection was performed.      Drug Amount Wasted:  None.  Vial/Syringe: Single dose vial  Expiration Date:  06/20  Jesusita So LPN

## 2019-04-26 NOTE — PROGRESS NOTES
"Chief Complaints:  #1.  Bilateral DJD of the knees  #2.  S/P APMM left knee  #3.  Hx of hives after a Dexamethasone injection, but she tolerates Kenalog  #4.  Codeine allergy, but she tolerates oxycodone and Dilaudid     Patient Profile: 78-year-old right-handed non-smoking retired, patient of Dr. Magaly Sosa     HPI: She has a long history of DJD of both knees, worse on the right. Treatment thus far has been conservative, most recently with bilateral Gel 1 injections.  Her PCP also has her on hydrocodone.  She intends to have a total knee replacement on the right as the symptoms have failed conservative treatment.      In the meantime her left knee pain increased in the fall 2018, insidiously.  An MRI obtained on 11/14/2018 showed a significant medial meniscus tear.  Physical therapy was recommended but she could not afford the co-pays.  She therefore requested arthroscopic intervention.  She was advised her that arthroscopic intervention for degenerative meniscal tears has a low success rate but she insisted arguing that this needs to be the good knee when she has her right TKA performed.       The surgery was performed on 3/21/2019. Grade 2 DJD was seen in the patellofemoral compartment.  The medial femoral condyle had grade 3 and grade 4 degeneration. The medial meniscal tear was quite large.  An arthroscopic partial medial meniscectomy was performed.    She returned on 4/4/2018 with increased pain surgically, we recommend Aleve and PT.  Her bilateral Gel 1 injections were delayed to allow her inflammation to settle down.    Subjective: She continues to have significant lateral left knee pain which she describes as feeling like \" a bone chip is moving around\".  She notes burning from her kneecap down the front of her shin.  She has been taking Aleve BID with minimal benefit.     There have been no other changes in regards to her musculoskeletal or neurological review of systems since the last.    Objective: BP " "100/60   Pulse 64   Temp 97.4  F (36.3  C) (Tympanic)   Ht 1.651 m (5' 5\")   Wt 107 kg (236 lb)   SpO2 98%   BMI 39.27 kg/m    Obese, pleasant 78-year-old female in no acute distress.  Her respiratory efforts are unlabored. There is no calf tenderness.  Her portal sites are well-healed with no signs of infection.  Her knee has a mild effusion.  There is no significant swelling in her left calf.  Active range of motion is approximately 0 to 105 degrees. The knee is stable to ligamentous stressing.  There is no palpable Baker's cyst.  Posterior tibial pulses are 2+. Light touch sensation is diminished in the distal tips of the toes.     X-ray; plain films of the left knee taken today reveal osteoarthritic changes most prominent medial compartment.  There is a small effusion present, there is no fracture or bony of normality noted.    Impression:  #1.  Bilateral DJD of the knees  #2.  S/P APMM left knee  #3.  Dexamethasone allergy, but she tolerates Kenalog  #4.  Codeine allergy, but she tolerates oxycodone and Dilaudid  #5.  Edema of Hoffa's fat pad, left knee    Plan: She is to continue her home exercise program and the Aleve.  After discussing additional treatment options she elected to undergo a corticosteroid injection into the left knee.  She will return to the office on 5/1/2019 for her bilateral Gel 1 injections.  All questions and concerns were answered to the patient's satisfaction.    Procedure: After obtaining written informed consent and sterilely prepping the site a timeout was held.  Sterile technique was employed as the left knee was then injected with 4 mg of dexamethasone mixed with 1mL of Carbocaine.  Topical ethyl chloride spray was used as a local anesthetic.  The patient tolerated the procedure well and there were no complications.  She says she has Benadryl at home if she reacts to the steroid.            "

## 2019-05-01 ENCOUNTER — OFFICE VISIT (OUTPATIENT)
Dept: ORTHOPEDICS | Facility: OTHER | Age: 79
End: 2019-05-01
Attending: ORTHOPAEDIC SURGERY
Payer: MEDICARE

## 2019-05-01 VITALS
HEIGHT: 65 IN | BODY MASS INDEX: 39.32 KG/M2 | HEART RATE: 69 BPM | WEIGHT: 236 LBS | OXYGEN SATURATION: 96 % | DIASTOLIC BLOOD PRESSURE: 60 MMHG | SYSTOLIC BLOOD PRESSURE: 100 MMHG | TEMPERATURE: 97.8 F

## 2019-05-01 DIAGNOSIS — M17.12 PRIMARY OSTEOARTHRITIS OF LEFT KNEE: Primary | ICD-10-CM

## 2019-05-01 DIAGNOSIS — M17.11 PRIMARY OSTEOARTHRITIS OF RIGHT KNEE: ICD-10-CM

## 2019-05-01 PROCEDURE — G0463 HOSPITAL OUTPT CLINIC VISIT: HCPCS

## 2019-05-01 PROCEDURE — 20610 DRAIN/INJ JOINT/BURSA W/O US: CPT | Performed by: ORTHOPAEDIC SURGERY

## 2019-05-01 PROCEDURE — G0463 HOSPITAL OUTPT CLINIC VISIT: HCPCS | Mod: 25

## 2019-05-01 RX ORDER — DEXAMETHASONE SODIUM PHOSPHATE 4 MG/ML
4 INJECTION, SOLUTION INTRA-ARTICULAR; INTRALESIONAL; INTRAMUSCULAR; INTRAVENOUS; SOFT TISSUE ONCE
Status: COMPLETED | OUTPATIENT
Start: 2019-05-01 | End: 2019-05-01

## 2019-05-01 RX ADMIN — DEXAMETHASONE SODIUM PHOSPHATE 4 MG: 4 INJECTION, SOLUTION INTRAMUSCULAR; INTRAVENOUS at 09:41

## 2019-05-01 RX ADMIN — Medication 30 MG: at 09:40

## 2019-05-01 ASSESSMENT — MIFFLIN-ST. JEOR: SCORE: 1561.37

## 2019-05-01 ASSESSMENT — PAIN SCALES - GENERAL: PAINLEVEL: WORST PAIN (10)

## 2019-05-01 NOTE — NURSING NOTE
"Chief Complaint   Patient presents with     RECHECK     Bilateral Knee Pain   Possible injections       Initial /60   Pulse 69   Temp 97.8  F (36.6  C) (Tympanic)   Ht 1.651 m (5' 5\")   Wt 107 kg (236 lb)   SpO2 96%   BMI 39.27 kg/m   Estimated body mass index is 39.27 kg/m  as calculated from the following:    Height as of this encounter: 1.651 m (5' 5\").    Weight as of this encounter: 107 kg (236 lb).  Medication Reconciliation: complete    Radha Ruth LPN  "

## 2019-05-01 NOTE — PROCEDURES
Prior to injection, verified patient identity using patient's name and date of birth.  Due to injection administration, patient instructed to remain in clinic for 15 minutes  afterwards, and to report any adverse reaction to me immediately.    Joint injection was performed.   Bilateral knees Gel One    Drug Amount Wasted:  None.  Vial/Syringe: Single dose vial  Expiration Date: 2020-01-06        Joint injection was performed.  Left knee Dexamethasone    Drug Amount Wasted:  None.  Vial/Syringe: Single dose vial  Expiration Date:06/20  Jesusita So LPN

## 2019-05-01 NOTE — PROGRESS NOTES
"Chief Complaints:  #1.  Bilateral DJD of the knees  #2.  S/P APMM left knee  #3.  Codeine allergy, but she tolerates oxycodone and Dilaudid  #4.  NSAID contraindication: Stage III renal disease     Patient Profile: 78-year-old right-handed non-smoking retired, patient of Dr. Magaly Sosa     HPI: She has a long history of DJD of both knees, worse on the right. Treatment thus far has been conservative, most recently with bilateral Gel 1 or steroid injections, as she cannot use NSAIDs.  Her PCP also has her on hydrocodone.  She is due for her next round of Gel 1 injections today.      In the fall 2018 her left knee pain suddenly increased significantly without any injury or overuse.  An MRI on 11/14/2018 showed a significant medial meniscus tear.  Physical therapy was recommended but she could not afford the co-pays.  She therefore requested arthroscopic intervention.  She was advised her that arthroscopy for degenerative meniscal tears has a low success rate but she insisted on having it done in the hopes of delaying a knee replacement.     The surgery was performed on 3/21/2019. Grade 2 DJD was seen in the patellofemoral compartment.  The medial femoral condyle had grade 3 and grade 4 degeneration. The medial meniscal tear was quite large; an arthroscopic partial medial meniscectomy was performed.  In the early postoperative period she had more pain in her left knee than she did before surgery.  She therefore had a steroid injection performed on 4/26/2019.     She has a history of having had \"hives\" after a dexamethasone injection in the past.  She therefore presumed she was allergic to that medication, although she has had hives at other times without having a steroid injection.  Her steroid injections have been Kenalog since then.  On 4/26/2019 we tried the dexamethasone again.  Today she reports she had no reaction to it.  The injection was performed through an anterolateral approach and relieved all her lateral " "compartment pain.  She still has medial compartment pain and requests a steroid injection into the medial side of her knee today.    Examination:/60   Pulse 69   Temp 97.8  F (36.6  C) (Tympanic)   Ht 1.651 m (5' 5\")   Wt 107 kg (236 lb)   SpO2 96%   BMI 39.27 kg/m    Overweight female no obvious distress.  She is alert and oriented.  Neither knee has an effusion.  The left knee is tender over the anteromedial soft spot.  There is mild edema of both lower legs.    Procedure: After obtaining written informed consent and conducting a timeout, sterile technique was utilized as both knees were each injected with 3 mL of Gel 1 using a superolateral approach.  Topical ethyl chloride spray and subcutaneous 1% Xylocaine with epinephrine were used as local anesthetics.  Upon needle removal Band-Aids were  applied.  The patient tolerated the bilateral procedures well and there were no complications.    Procedure: After obtaining written informed consent and sterilely prepping the site a timeout was held.  Sterile technique was employed as the medial compartment of the left knee was then injected with 4 mg of dexamethasone mixed with 1mL of Carbocaine.  Topical ethyl chloride spray was used as a local anesthetic.  The patient tolerated the procedure well and there were no complications.     Impression: Same as chief complaints    Plan: The patient is aware that I am retiring next week.  I gave her the name of Dr. Alonzo whom she can see in the future for additional Visco supplementation injections, or for TKA when the patient feels she is ready.        "

## 2019-05-07 ENCOUNTER — OFFICE VISIT (OUTPATIENT)
Dept: FAMILY MEDICINE | Facility: OTHER | Age: 79
End: 2019-05-07
Attending: FAMILY MEDICINE
Payer: COMMERCIAL

## 2019-05-07 VITALS
BODY MASS INDEX: 38.61 KG/M2 | SYSTOLIC BLOOD PRESSURE: 132 MMHG | WEIGHT: 232 LBS | HEART RATE: 73 BPM | OXYGEN SATURATION: 96 % | RESPIRATION RATE: 19 BRPM | DIASTOLIC BLOOD PRESSURE: 70 MMHG

## 2019-05-07 DIAGNOSIS — I47.10 PSVT (PAROXYSMAL SUPRAVENTRICULAR TACHYCARDIA) (H): ICD-10-CM

## 2019-05-07 DIAGNOSIS — G89.29 CHRONIC PAIN OF LEFT KNEE: Primary | ICD-10-CM

## 2019-05-07 DIAGNOSIS — Z12.11 SPECIAL SCREENING FOR MALIGNANT NEOPLASMS, COLON: ICD-10-CM

## 2019-05-07 DIAGNOSIS — M25.562 CHRONIC PAIN OF LEFT KNEE: Primary | ICD-10-CM

## 2019-05-07 PROCEDURE — 99213 OFFICE O/P EST LOW 20 MIN: CPT | Performed by: FAMILY MEDICINE

## 2019-05-07 PROCEDURE — G0463 HOSPITAL OUTPT CLINIC VISIT: HCPCS

## 2019-05-07 ASSESSMENT — ANXIETY QUESTIONNAIRES
GAD7 TOTAL SCORE: 0
5. BEING SO RESTLESS THAT IT IS HARD TO SIT STILL: NOT AT ALL
IF YOU CHECKED OFF ANY PROBLEMS ON THIS QUESTIONNAIRE, HOW DIFFICULT HAVE THESE PROBLEMS MADE IT FOR YOU TO DO YOUR WORK, TAKE CARE OF THINGS AT HOME, OR GET ALONG WITH OTHER PEOPLE: NOT DIFFICULT AT ALL
3. WORRYING TOO MUCH ABOUT DIFFERENT THINGS: NOT AT ALL
7. FEELING AFRAID AS IF SOMETHING AWFUL MIGHT HAPPEN: NOT AT ALL
1. FEELING NERVOUS, ANXIOUS, OR ON EDGE: NOT AT ALL
2. NOT BEING ABLE TO STOP OR CONTROL WORRYING: NOT AT ALL
4. TROUBLE RELAXING: NOT AT ALL
6. BECOMING EASILY ANNOYED OR IRRITABLE: NOT AT ALL

## 2019-05-07 ASSESSMENT — PAIN SCALES - GENERAL: PAINLEVEL: EXTREME PAIN (9)

## 2019-05-07 NOTE — NURSING NOTE
"Chief Complaint   Patient presents with     Knee Pain       Initial /70   Pulse 73   Resp 19   Wt 105.2 kg (232 lb)   SpO2 96%   BMI 38.61 kg/m   Estimated body mass index is 38.61 kg/m  as calculated from the following:    Height as of 5/1/19: 1.651 m (5' 5\").    Weight as of this encounter: 105.2 kg (232 lb).  Medication Reconciliation: complete    Aditi Saleh LPN    "

## 2019-05-07 NOTE — PROGRESS NOTES
SUBJECTIVE:   Dinorah Lim is a 78 year old female who presents to clinic today for the following   health issues:      Musculoskeletal problem/pain      Duration:     Description  Location: left knee    Intensity:  moderate, 9/10    Accompanying signs and symptoms: none    History  Previous similar problem: no   Previous evaluation:  Yes x ray on 4/26/19    Precipitating or alleviating factors:  Trauma or overuse: no   Aggravating factors include: standing, walking and climbing stairs    Therapies tried and outcome: ice and Ibuprofen  She had left knee arthroscopy on 3-21-19, and evaluation and steroid injection 4-26-19 and 5-1-19. She is concerned that she has fluid in the knee. X ray showed a small effusion on 4-26-19        Additional history: as documented    Reviewed  and updated as needed this visit by clinical staff  Tobacco  Allergies  Meds  Med Hx  Surg Hx  Fam Hx  Soc Hx        Reviewed and updated as needed this visit by Provider         Patient Active Problem List   Diagnosis     Osteoarthritis of right hip     Abnormal glucose     Osteoarthritis     Lung density on x-ray     Advanced care planning/counseling discussion     Anxiety     Urticaria     ACP (advance care planning)     Morbid obesity (H)     Palpitations     PVC's (premature ventricular contractions)     Atrial ectopy     PSVT (paroxysmal supraventricular tachycardia) (H)     COPD, mild on 9/9/14     Bilateral carotid artery stenosis at less than 50% on 12/1/16     Mixed hyperlipidemia     On statin therapy     Heart murmur     Aortic valve insufficiency     Mitral regurgitation     Tricuspid valve insufficiency     Diastolic dysfunction grade 1 on 2/21/19     Hypertriglyceridemia     Past Surgical History:   Procedure Laterality Date     ARTHROSCOPY KNEE Left 3/21/2019    Procedure: LEFT  KNEE ARTHROSCOPY, partial medial menisectomy;  Surgeon: Esteban Wills MD;  Location: [facility]     CLOSED REDUCTION WRIST Right 1952 x 2     long arm cast (same time as elbow fx)     CLOSED RX ELBOW DISLOCATION Right 1952    CR/long cast of fracture     HC INJ EPIDURAL LUMBAR/SACRAL W/WO CONTRAST Bilateral 5/2013    facet injections; prev 2012     LAPAROSCOPIC CHOLECYSTECTOMY N/A 1/4/2015    Procedure: LAPAROSCOPIC CHOLECYSTECTOMY;  Surgeon: Brittney العلي MD;  Location: [facility]     TONSILLECTOMY         Social History     Tobacco Use     Smoking status: Never Smoker     Smokeless tobacco: Never Used   Substance Use Topics     Alcohol use: No     Family History   Problem Relation Age of Onset     Heart Disease Brother         atrial fibrillation     Atrial fibrillation Brother      Other - See Comments Mother         emphysema     Heart Disease Father 92        CHF     Cancer Paternal Grandfather         lung cancer     Cancer Maternal Uncle         lung cancer     Chronic Obstructive Pulmonary Disease Daughter      Emphysema Daughter      Other - See Comments Daughter         benign breast lesion     Esophagitis Daughter          Current Outpatient Medications   Medication Sig Dispense Refill     aspirin (ASA) 81 MG EC tablet Take 1 tablet (81 mg) by mouth daily 90 tablet 0     aspirin (ASA) 81 MG EC tablet Take 1 tablet (81 mg) by mouth daily 90 tablet 3     Calcium-Magnesium-Vitamin D (CALCIUM 1200+D3 PO) Take by mouth daily       clonazePAM (KLONOPIN) 1 MG tablet TAKE 1/4 (ONE-FOURTH) TO 1/2 (ONE-HALF) TABLET BY MOUTH EVERY 8 HOURS AS NEEDED 45 tablet 0     FISH OIL Take 1 capsule by mouth daily        Garlic 10 MG CAPS Take 1 capsule by mouth as needed       PARoxetine (PAXIL) 40 MG tablet TAKE 1 TABLET BY MOUTH ONCE DAILY 90 tablet 2     simvastatin (ZOCOR) 20 MG tablet TAKE ONE TABLET BY MOUTH AT BEDTIME 90 tablet 2     VITAMIN D, CHOLECALCIFEROL, PO Take 2,000 Units by mouth daily       Allergies   Allergen Reactions     Chocolate Hives     Lemon Flavor Hives     Lime [Calcium Oxysulfide] Hives     Orange Fruit [Citrus] Hives     Strawberry  Hives     Adhesive Tape      Band-aids     Codeine Hives     Patient can tolerate oxycodone & dilaudid     Erythromycin Hives     ERYTHROMYCIN BASE     Grapefruit [Extra Strength Grapefruit]      GRAPEFRUIT     Penicillins Hives     Sulfa Drugs      SULFONAMIDE ANTIBIOTICS      Tomato      BP Readings from Last 3 Encounters:   05/07/19 132/70   05/01/19 100/60   04/26/19 100/60    Wt Readings from Last 3 Encounters:   05/07/19 105.2 kg (232 lb)   05/01/19 107 kg (236 lb)   04/26/19 107 kg (236 lb)                    ROS:  Constitutional, HEENT, cardiovascular, pulmonary, gi and gu systems are negative, except as otherwise noted.    OBJECTIVE:                                                    /70   Pulse 73   Resp 19   Wt 105.2 kg (232 lb)   SpO2 96%   BMI 38.61 kg/m    Body mass index is 38.61 kg/m .  GENERAL APPEARANCE: healthy, alert and no distress  MS: extremities normal- no gross deformities noted and small effusion left knee. She is concerned that she has fluid into her thigh, but we discussed that this is adipose tissue. Joint line is non tender. Full ROM  SKIN: no suspicious lesions or rashes  NEURO: Normal strength and tone, mentation intact and speech normal  PSYCH: mentation appears normal and affect normal/bright         ASSESSMENT/PLAN:                                                    1. Chronic pain of left knee    Minimal effusion noted.   She would like a knee replacement but will wait 6 months from her last steroid injection, done 5-1-19. Will follow up in 6 months; she would like to see Dr Alonzo    2. Special screening for malignant neoplasms, colon  Given today  - Immunos occult blood; Future    3. PSVT (paroxysmal supraventricular tachycardia) (H)  Advised continuing this.   - aspirin (ASA) 81 MG EC tablet; Take 1 tablet (81 mg) by mouth daily  Dispense: 90 tablet; Refill: 0      Follow up with Provider - 6 months     Magaly Sosa MD  [facility]

## 2019-05-08 ASSESSMENT — ANXIETY QUESTIONNAIRES: GAD7 TOTAL SCORE: 0

## 2019-05-23 ENCOUNTER — HOSPITAL ENCOUNTER (EMERGENCY)
Facility: HOSPITAL | Age: 79
Discharge: HOME | End: 2019-05-23
Attending: FAMILY MEDICINE | Admitting: INTERNAL MEDICINE
Payer: MEDICARE

## 2019-05-23 ENCOUNTER — TELEPHONE (OUTPATIENT)
Dept: FAMILY MEDICINE | Facility: OTHER | Age: 79
End: 2019-05-23

## 2019-05-23 ENCOUNTER — APPOINTMENT (OUTPATIENT)
Dept: GENERAL RADIOLOGY | Facility: HOSPITAL | Age: 79
End: 2019-05-23
Attending: FAMILY MEDICINE
Payer: MEDICARE

## 2019-05-23 VITALS
TEMPERATURE: 98.3 F | OXYGEN SATURATION: 97 % | RESPIRATION RATE: 16 BRPM | SYSTOLIC BLOOD PRESSURE: 141 MMHG | DIASTOLIC BLOOD PRESSURE: 67 MMHG

## 2019-05-23 DIAGNOSIS — K52.9 GASTROENTERITIS: ICD-10-CM

## 2019-05-23 LAB
ALBUMIN SERPL-MCNC: 3.6 G/DL (ref 3.4–5)
ALP SERPL-CCNC: 93 U/L (ref 40–150)
ALT SERPL W P-5'-P-CCNC: 26 U/L (ref 0–50)
ANION GAP SERPL CALCULATED.3IONS-SCNC: 6 MMOL/L (ref 3–14)
AST SERPL W P-5'-P-CCNC: 15 U/L (ref 0–45)
BASOPHILS # BLD AUTO: 0 10E9/L (ref 0–0.2)
BASOPHILS NFR BLD AUTO: 0.5 %
BILIRUB SERPL-MCNC: 0.6 MG/DL (ref 0.2–1.3)
BUN SERPL-MCNC: 16 MG/DL (ref 7–30)
CALCIUM SERPL-MCNC: 10.3 MG/DL (ref 8.5–10.1)
CHLORIDE SERPL-SCNC: 106 MMOL/L (ref 94–109)
CO2 SERPL-SCNC: 28 MMOL/L (ref 20–32)
CREAT SERPL-MCNC: 1 MG/DL (ref 0.52–1.04)
DIFFERENTIAL METHOD BLD: ABNORMAL
EOSINOPHIL # BLD AUTO: 0 10E9/L (ref 0–0.7)
EOSINOPHIL NFR BLD AUTO: 0.3 %
ERYTHROCYTE [DISTWIDTH] IN BLOOD BY AUTOMATED COUNT: 13.7 % (ref 10–15)
GFR SERPL CREATININE-BSD FRML MDRD: 54 ML/MIN/{1.73_M2}
GLUCOSE SERPL-MCNC: 113 MG/DL (ref 70–99)
HCT VFR BLD AUTO: 39.8 % (ref 35–47)
HGB BLD-MCNC: 13.1 G/DL (ref 11.7–15.7)
IMM GRANULOCYTES # BLD: 0 10E9/L (ref 0–0.4)
IMM GRANULOCYTES NFR BLD: 0.5 %
LACTATE BLD-SCNC: 1 MMOL/L (ref 0.7–2)
LYMPHOCYTES # BLD AUTO: 1.2 10E9/L (ref 0.8–5.3)
LYMPHOCYTES NFR BLD AUTO: 32.2 %
MCH RBC QN AUTO: 29.5 PG (ref 26.5–33)
MCHC RBC AUTO-ENTMCNC: 32.9 G/DL (ref 31.5–36.5)
MCV RBC AUTO: 90 FL (ref 78–100)
MONOCYTES # BLD AUTO: 0.4 10E9/L (ref 0–1.3)
MONOCYTES NFR BLD AUTO: 10.4 %
NEUTROPHILS # BLD AUTO: 2.1 10E9/L (ref 1.6–8.3)
NEUTROPHILS NFR BLD AUTO: 56.1 %
NRBC # BLD AUTO: 0 10*3/UL
NRBC BLD AUTO-RTO: 0 /100
PLATELET # BLD AUTO: 180 10E9/L (ref 150–450)
POTASSIUM SERPL-SCNC: 4.1 MMOL/L (ref 3.4–5.3)
PROT SERPL-MCNC: 6.9 G/DL (ref 6.8–8.8)
RBC # BLD AUTO: 4.44 10E12/L (ref 3.8–5.2)
SODIUM SERPL-SCNC: 140 MMOL/L (ref 133–144)
TROPONIN I SERPL-MCNC: <0.015 UG/L (ref 0–0.04)
WBC # BLD AUTO: 3.8 10E9/L (ref 4–11)

## 2019-05-23 PROCEDURE — 36415 COLL VENOUS BLD VENIPUNCTURE: CPT | Performed by: FAMILY MEDICINE

## 2019-05-23 PROCEDURE — 85025 COMPLETE CBC W/AUTO DIFF WBC: CPT | Performed by: FAMILY MEDICINE

## 2019-05-23 PROCEDURE — 99285 EMERGENCY DEPT VISIT HI MDM: CPT | Mod: 25

## 2019-05-23 PROCEDURE — 99285 EMERGENCY DEPT VISIT HI MDM: CPT | Mod: Z6 | Performed by: INTERNAL MEDICINE

## 2019-05-23 PROCEDURE — 25000131 ZZH RX MED GY IP 250 OP 636 PS 637: Performed by: INTERNAL MEDICINE

## 2019-05-23 PROCEDURE — 84484 ASSAY OF TROPONIN QUANT: CPT | Performed by: FAMILY MEDICINE

## 2019-05-23 PROCEDURE — 83605 ASSAY OF LACTIC ACID: CPT | Performed by: FAMILY MEDICINE

## 2019-05-23 PROCEDURE — 93005 ELECTROCARDIOGRAM TRACING: CPT

## 2019-05-23 PROCEDURE — 80053 COMPREHEN METABOLIC PANEL: CPT | Performed by: FAMILY MEDICINE

## 2019-05-23 PROCEDURE — 71045 X-RAY EXAM CHEST 1 VIEW: CPT | Mod: TC

## 2019-05-23 PROCEDURE — 93010 ELECTROCARDIOGRAM REPORT: CPT | Performed by: INTERNAL MEDICINE

## 2019-05-23 RX ORDER — ONDANSETRON 4 MG/1
4 TABLET, ORALLY DISINTEGRATING ORAL EVERY 8 HOURS PRN
Qty: 10 TABLET | Refills: 0 | Status: SHIPPED | OUTPATIENT
Start: 2019-05-23 | End: 2019-08-26

## 2019-05-23 RX ORDER — ONDANSETRON 4 MG/1
4 TABLET, ORALLY DISINTEGRATING ORAL ONCE
Status: COMPLETED | OUTPATIENT
Start: 2019-05-23 | End: 2019-05-23

## 2019-05-23 RX ADMIN — ONDANSETRON 4 MG: 4 TABLET, ORALLY DISINTEGRATING ORAL at 20:27

## 2019-05-23 NOTE — TELEPHONE ENCOUNTER
Triage Call    Chief complaint:  weakness and diarrhea     Duration (How long symptoms or problem have been present): 1 week    Have you been seen for this before: no     Are you wanting an appointment for this today: no patient is wanting to have blood test done     Primary care provider: Dr. Magaly Sosa                 If provider is out is patient fine with seeing covering provider: no    Phone number: [phone]    Expected time to hear from RN: Within 30 minutes

## 2019-05-23 NOTE — ED AVS SNAPSHOT
[facility]  [address]  [place] [address]  Phone:  [phone]                                    Dinorah Lim   MRN: [id number]    Department:  [facility]   Date of Visit:  5/23/2019           After Visit Summary Signature Page    I have received my discharge instructions, and my questions have been answered. I have discussed any challenges I see with this plan with the nurse or doctor.    ..........................................................................................................................................  Patient/Patient Representative Signature      ..........................................................................................................................................  Patient Representative Print Name and Relationship to Patient    ..................................................               ................................................  Date                                   Time    ..........................................................................................................................................  Reviewed by Signature/Title    ...................................................              ..............................................  Date                                               Time          22EPIC Rev 08/18

## 2019-05-23 NOTE — TELEPHONE ENCOUNTER
"Called pt back to assess further, pt states that she had \"a touch of the flu\" last week but is feeling much better now. Declines appt and denies any symptoms. Pt requesting a CBC/CMP. She was informed that she just had labs on 3/15/19. Pt was unaware of this. She requests a copy of labs be mailed to be. Please advise. Thank you!    "

## 2019-05-24 ASSESSMENT — ENCOUNTER SYMPTOMS
EYE REDNESS: 0
VOMITING: 1
COLOR CHANGE: 0
ABDOMINAL PAIN: 0
DIZZINESS: 1
SHORTNESS OF BREATH: 0
CONFUSION: 0
FEVER: 0
HEADACHES: 0
DIFFICULTY URINATING: 0
NECK STIFFNESS: 0
ARTHRALGIAS: 0

## 2019-05-24 NOTE — ED NOTES
Pt reports off and on dizziness over the last week. states nausea off and on over the last week. Vomited a week ago. Reports diarrhea a week ago. Reports grandson had some similar symptoms. Pt points to her upper abdomen when talking about the nausea, denies pain. States nausea is why she came to the ER.

## 2019-05-25 NOTE — ED PROVIDER NOTES
History     Chief Complaint   Patient presents with     Dizziness     c/o dizziness off and on, notes nausea. denies abd pain. notes symptoms off and on x 1 week     The history is provided by the patient.   Vomiting   Severity:  Mild  Timing:  Intermittent  Chronicity:  New  Recent urination:  Normal  Associated symptoms: no abdominal pain, no arthralgias, no fever and no headaches          Allergies:  Allergies   Allergen Reactions     Chocolate Hives     Lemon Flavor Hives     Lime [Calcium Oxysulfide] Hives     Orange Fruit [Citrus] Hives     Strawberry Hives     Adhesive Tape      Band-aids     Codeine Hives     Patient can tolerate oxycodone & dilaudid     Erythromycin Hives     ERYTHROMYCIN BASE     Grapefruit [Extra Strength Grapefruit]      GRAPEFRUIT     Penicillins Hives     Sulfa Drugs      SULFONAMIDE ANTIBIOTICS      Tomato        Problem List:    Patient Active Problem List    Diagnosis Date Noted     Aortic valve insufficiency 03/06/2019     Priority: Medium     Mitral regurgitation 03/06/2019     Priority: Medium     Tricuspid valve insufficiency 03/06/2019     Priority: Medium     Diastolic dysfunction grade 1 on 2/21/19 03/06/2019     Priority: Medium     Hypertriglyceridemia 03/06/2019     Priority: Medium     Palpitations 02/13/2019     Priority: Medium     PVC's (premature ventricular contractions) 02/13/2019     Priority: Medium     Atrial ectopy 02/13/2019     Priority: Medium     PSVT (paroxysmal supraventricular tachycardia) (H) 02/13/2019     Priority: Medium     COPD, mild on 9/9/14 02/13/2019     Priority: Medium     Bilateral carotid artery stenosis at less than 50% on 12/1/16 02/13/2019     Priority: Medium     Mixed hyperlipidemia 02/13/2019     Priority: Medium     On statin therapy 02/13/2019     Priority: Medium     Heart murmur 02/13/2019     Priority: Medium     Morbid obesity (H) 06/01/2017     Priority: Medium     ACP (advance care planning) 04/28/2016     Priority: Medium      Advance Care Planning 4/28/2016: ACP Review of Chart / Resources Provided:  Reviewed chart for advance care plan.  Dinorah Lim has no plan or code status on file. Discussed available resources and provided with information. Confirmed code status reflects current choices pending further ACP discussions.  Confirmed/documented legally designated decision makers.  Added by Aditi Gandhi             Anxiety 06/23/2014     Priority: Medium     Lung density on x-ray 07/23/2013     Priority: Medium     Osteoarthritis 06/14/2012     Priority: Medium     Problem list name updated by automated process. Provider to review       Advanced care planning/counseling discussion 01/13/2012     Priority: Medium     Abnormal glucose 08/01/2011     Priority: Medium     Hgb A1c 5.7 on 1/4/2015  Problem list name updated by automated process. Provider to review       Osteoarthritis of right hip 10/07/2004     Priority: Medium     Urticaria 06/01/2004     Priority: Medium     Problem list name updated by automated process. 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     Obesity, Class II, BMI 35-39.9 1/4/2015     Osteoarthritis of right hip 10/07/2004     Osteoarthrosis 06/14/2012     Otitis externa 10/04/2011     Prediabetes 08/01/2011       Past Surgical History:    Past Surgical History:   Procedure Laterality Date     ARTHROSCOPY KNEE Left 3/21/2019    Procedure: LEFT  KNEE ARTHROSCOPY, partial medial menisectomy;  Surgeon: Esteban Wills MD;  Location: [facility]     CLOSED REDUCTION WRIST  Right 1952 x 2    long arm cast (same time as elbow fx)     CLOSED RX ELBOW DISLOCATION Right 1952    CR/long cast of fracture     HC INJ EPIDURAL LUMBAR/SACRAL W/WO CONTRAST Bilateral 5/2013    facet injections; prev 2012     LAPAROSCOPIC CHOLECYSTECTOMY N/A 1/4/2015    Procedure: LAPAROSCOPIC CHOLECYSTECTOMY;  Surgeon: Brittney العلي MD;  Location: [facility]     TONSILLECTOMY         Family History:    Family History   Problem Relation Age of Onset     Heart Disease Brother         atrial fibrillation     Atrial fibrillation Brother      Other - See Comments Mother         emphysema     Heart Disease Father 92        CHF     Cancer Paternal Grandfather         lung cancer     Cancer Maternal Uncle         lung cancer     Chronic Obstructive Pulmonary Disease Daughter      Emphysema Daughter      Other - See Comments Daughter         benign breast lesion     Esophagitis Daughter        Social History:  Marital Status:  Single [1]  Social History     Tobacco Use     Smoking status: Never Smoker     Smokeless tobacco: Never Used   Substance Use Topics     Alcohol use: No     Drug use: No        Medications:      aspirin (ASA) 81 MG EC tablet   Calcium-Magnesium-Vitamin D (CALCIUM 1200+D3 PO)   clonazePAM (KLONOPIN) 1 MG tablet   FISH OIL   ondansetron (ZOFRAN ODT) 4 MG ODT tab   PARoxetine (PAXIL) 40 MG tablet   simvastatin (ZOCOR) 20 MG tablet   VITAMIN D, CHOLECALCIFEROL, PO   Garlic 10 MG CAPS         Review of Systems   Constitutional: Negative for fever.   HENT: Negative for congestion.    Eyes: Negative for redness.   Respiratory: Negative for shortness of breath.    Cardiovascular: Negative for chest pain.   Gastrointestinal: Positive for vomiting. Negative for abdominal pain.   Genitourinary: Negative for difficulty urinating.   Musculoskeletal: Negative for arthralgias and neck stiffness.   Skin: Negative for color change.   Neurological: Positive for dizziness. Negative for headaches.    Psychiatric/Behavioral: Negative for confusion.   All other systems reviewed and are negative.      Physical Exam   BP: 156/85  Heart Rate: 95  Temp: 97.4  F (36.3  C)  Resp: 16  SpO2: 93 %      Physical Exam   Constitutional: No distress.   HENT:   Head: Atraumatic.   Mouth/Throat: Oropharynx is clear and moist. No oropharyngeal exudate.   Eyes: Pupils are equal, round, and reactive to light. No scleral icterus.   Cardiovascular: Normal heart sounds and intact distal pulses.   Pulmonary/Chest: Breath sounds normal. No respiratory distress.   Abdominal: Soft. Bowel sounds are normal. There is no tenderness.   Musculoskeletal: She exhibits no edema or tenderness.   Skin: Skin is warm. No rash noted. She is not diaphoretic.       ED Course        Procedures            No results found for this or any previous visit (from the past 24 hour(s)).    Medications   ondansetron (ZOFRAN-ODT) ODT tab 4 mg (4 mg Oral Given 5/23/19 2027)       Assessments & Plan (with Medical Decision Making)   Feel nauseated , mild dizziness  Pt has had diarrhea with vomting over past couple of days, had 2-3 episode of watery diarrhea today, her family member had the same symptoms a couple of days ago  Labs reviewed WNL, slight leukopnea likely due viral etiology  Pt tolerated po intake in ER without problem,her symptoms compleltey resolved after receiving zofran  I advised oral hydration at home, D C home      I have reviewed the nursing notes.    I have reviewed the findings, diagnosis, plan and need for follow up with the patient.         Medication List      Started    ondansetron 4 MG ODT tab  Commonly known as:  ZOFRAN ODT  4 mg, Oral, EVERY 8 HOURS PRN            Final diagnoses:   Gastroenteritis       5/23/2019   [facility]     Jamaal Varghese MD  05/24/19 3539

## 2019-06-20 DIAGNOSIS — M16.11 PRIMARY OSTEOARTHRITIS OF RIGHT HIP: Primary | ICD-10-CM

## 2019-06-20 DIAGNOSIS — F41.9 ANXIETY: Chronic | ICD-10-CM

## 2019-06-20 RX ORDER — ALBUTEROL SULFATE 0.83 MG/ML
SOLUTION RESPIRATORY (INHALATION)
COMMUNITY
Start: 2018-10-01 | End: 2019-07-13

## 2019-06-20 RX ORDER — HYDROCODONE BITARTRATE AND ACETAMINOPHEN 5; 325 MG/1; MG/1
TABLET ORAL
COMMUNITY
Start: 2018-12-05 | End: 2019-06-20

## 2019-06-20 RX ORDER — CLONAZEPAM 1 MG/1
TABLET ORAL
Qty: 45 TABLET | Refills: 0 | Status: SHIPPED | OUTPATIENT
Start: 2019-06-20 | End: 2019-10-16

## 2019-06-20 RX ORDER — HYDROCODONE BITARTRATE AND ACETAMINOPHEN 5; 325 MG/1; MG/1
TABLET ORAL
Qty: 60 TABLET | Refills: 0 | Status: ON HOLD | OUTPATIENT
Start: 2019-06-20 | End: 2019-09-10

## 2019-06-20 NOTE — TELEPHONE ENCOUNTER
clonazePAM (KLONOPIN) 1 MG tablet      Last Written Prescription Date:  3-15-19  Last Fill Quantity: 45,   # refills: 0  Last Office Visit: 5-7-19  Future Office visit:       Routing refill request to provider for review/approval because:  Drug not on the [facility], [facility] or [facility] refill protocol or controlled substance

## 2019-06-20 NOTE — TELEPHONE ENCOUNTER
Hydrocodone      Last Written Prescription Date:  none  Last Fill Quantity: n/a,   # refills: n/a  Last Office Visit: 5/7/19  Future Office visit:       Routing refill request to provider for review/approval because:  Drug not on the [facility], [facility] or [facility] refill protocol or controlled substance

## 2019-06-23 DIAGNOSIS — R19.5 OCCULT BLOOD POSITIVE STOOL: Primary | ICD-10-CM

## 2019-06-23 DIAGNOSIS — Z12.11 SPECIAL SCREENING FOR MALIGNANT NEOPLASMS, COLON: ICD-10-CM

## 2019-06-23 PROCEDURE — 82274 ASSAY TEST FOR BLOOD FECAL: CPT | Mod: ZL | Performed by: FAMILY MEDICINE

## 2019-06-24 LAB — HEMOCCULT SP1 STL QL: POSITIVE

## 2019-07-03 ENCOUNTER — TRANSFERRED RECORDS (OUTPATIENT)
Dept: HEALTH INFORMATION MANAGEMENT | Facility: CLINIC | Age: 79
End: 2019-07-03

## 2019-07-03 ENCOUNTER — OFFICE VISIT (OUTPATIENT)
Dept: SURGERY | Facility: OTHER | Age: 79
End: 2019-07-03
Attending: FAMILY MEDICINE
Payer: COMMERCIAL

## 2019-07-03 VITALS
RESPIRATION RATE: 18 BRPM | HEIGHT: 66 IN | TEMPERATURE: 98 F | BODY MASS INDEX: 37.77 KG/M2 | OXYGEN SATURATION: 97 % | SYSTOLIC BLOOD PRESSURE: 116 MMHG | HEART RATE: 71 BPM | DIASTOLIC BLOOD PRESSURE: 74 MMHG | WEIGHT: 235 LBS

## 2019-07-03 DIAGNOSIS — R19.5 OCCULT BLOOD POSITIVE STOOL: ICD-10-CM

## 2019-07-03 PROCEDURE — G0463 HOSPITAL OUTPT CLINIC VISIT: HCPCS

## 2019-07-03 PROCEDURE — 99213 OFFICE O/P EST LOW 20 MIN: CPT | Performed by: SURGERY

## 2019-07-03 ASSESSMENT — PAIN SCALES - GENERAL: PAINLEVEL: NO PAIN (0)

## 2019-07-03 ASSESSMENT — MIFFLIN-ST. JEOR: SCORE: 1564.76

## 2019-07-03 NOTE — NURSING NOTE
"Chief Complaint   Patient presents with     Consult     Occult blood positive stool        Initial /74 (BP Location: Right arm, Patient Position: Sitting, Cuff Size: Adult Large)   Pulse 71   Temp 98  F (36.7  C) (Tympanic)   Resp 18   Ht 1.664 m (5' 5.5\")   Wt 106.6 kg (235 lb)   SpO2 97%   BMI 38.51 kg/m   Estimated body mass index is 38.51 kg/m  as calculated from the following:    Height as of this encounter: 1.664 m (5' 5.5\").    Weight as of this encounter: 106.6 kg (235 lb).  Medication Reconciliation: complete    "

## 2019-07-04 ENCOUNTER — HOSPITAL ENCOUNTER (EMERGENCY)
Facility: HOSPITAL | Age: 79
Discharge: HOME | End: 2019-07-04
Admitting: PHYSICIAN ASSISTANT
Payer: MEDICARE

## 2019-07-04 ENCOUNTER — APPOINTMENT (OUTPATIENT)
Dept: GENERAL RADIOLOGY | Facility: HOSPITAL | Age: 79
End: 2019-07-04
Attending: PHYSICIAN ASSISTANT
Payer: MEDICARE

## 2019-07-04 VITALS
TEMPERATURE: 98.5 F | OXYGEN SATURATION: 95 % | SYSTOLIC BLOOD PRESSURE: 122 MMHG | RESPIRATION RATE: 16 BRPM | DIASTOLIC BLOOD PRESSURE: 68 MMHG

## 2019-07-04 DIAGNOSIS — S92.301A CLOSED FRACTURE OF METATARSAL SHAFT, RIGHT, INITIAL ENCOUNTER: ICD-10-CM

## 2019-07-04 PROCEDURE — 99213 OFFICE O/P EST LOW 20 MIN: CPT | Mod: Z6 | Performed by: PHYSICIAN ASSISTANT

## 2019-07-04 PROCEDURE — G0463 HOSPITAL OUTPT CLINIC VISIT: HCPCS

## 2019-07-04 PROCEDURE — 73630 X-RAY EXAM OF FOOT: CPT | Mod: TC,RT

## 2019-07-04 ASSESSMENT — ENCOUNTER SYMPTOMS
NUMBNESS: 0
JOINT SWELLING: 1

## 2019-07-04 NOTE — ED TRIAGE NOTES
States accidentally put her cane down on her middle digit R foot last night. Pt is in wheelchair to room and has her toes amrik taped.

## 2019-07-04 NOTE — ED AVS SNAPSHOT
[facility]  [address]  [place] [address]  Phone:  [phone]                                    Dinorah Lim   MRN: [id number]    Department:  [facility]   Date of Visit:  7/4/2019           After Visit Summary Signature Page    I have received my discharge instructions, and my questions have been answered. I have discussed any challenges I see with this plan with the nurse or doctor.    ..........................................................................................................................................  Patient/Patient Representative Signature      ..........................................................................................................................................  Patient Representative Print Name and Relationship to Patient    ..................................................               ................................................  Date                                   Time    ..........................................................................................................................................  Reviewed by Signature/Title    ...................................................              ..............................................  Date                                               Time          22EPIC Rev 08/18

## 2019-07-04 NOTE — ED PROVIDER NOTES
"  History     Chief Complaint   Patient presents with     Toe Pain     rt middle toe pain since injury last night     HPI  Dinorah Lim is a 78 year old female who presents to urgent care today with complaints of right third toe pain.  Onset of symptoms last night.  She tells me that her cane was caught in between her toes while she was walking, causing her to sustain an injury to her third toe on her right foot.  She tells me that she knows that she \"broke\" her toe by doing this, as she was not able to ambulate following injury.  She has had constant, sharp pain in this toe since that time.  She notes associated swelling and bruising in that area.  She denies any numbness/tingling, other injury or falls.  She has been icing and elevating her right foot and taking Tylenol as needed for pain.    Allergies:  Allergies   Allergen Reactions     Chocolate Hives     Lemon Flavor Hives     Lime [Calcium Oxysulfide] Hives     Orange Fruit [Citrus] Hives     Strawberry Hives     Adhesive Tape      Band-aids     Codeine Hives     Patient can tolerate oxycodone & dilaudid     Erythromycin Hives     ERYTHROMYCIN BASE     Grapefruit [Extra Strength Grapefruit]      GRAPEFRUIT     Penicillins Hives     Sulfa Drugs      SULFONAMIDE ANTIBIOTICS      Tomato        Problem List:    Patient Active Problem List    Diagnosis Date Noted     Aortic valve insufficiency 03/06/2019     Priority: Medium     Mitral regurgitation 03/06/2019     Priority: Medium     Tricuspid valve insufficiency 03/06/2019     Priority: Medium     Diastolic dysfunction grade 1 on 2/21/19 03/06/2019     Priority: Medium     Hypertriglyceridemia 03/06/2019     Priority: Medium     Palpitations 02/13/2019     Priority: Medium     PVC's (premature ventricular contractions) 02/13/2019     Priority: Medium     Atrial ectopy 02/13/2019     Priority: Medium     PSVT (paroxysmal supraventricular tachycardia) (H) 02/13/2019     Priority: Medium     COPD, mild on 9/9/14 " 02/13/2019     Priority: Medium     Bilateral carotid artery stenosis at less than 50% on 12/1/16 02/13/2019     Priority: Medium     Mixed hyperlipidemia 02/13/2019     Priority: Medium     On statin therapy 02/13/2019     Priority: Medium     Heart murmur 02/13/2019     Priority: Medium     Morbid obesity (H) 06/01/2017     Priority: Medium     ACP (advance care planning) 04/28/2016     Priority: Medium     Advance Care Planning 4/28/2016: ACP Review of Chart / Resources Provided:  Reviewed chart for advance care plan.  Dinorah THOMAS Lim has no plan or code status on file. Discussed available resources and provided with information. Confirmed code status reflects current choices pending further ACP discussions.  Confirmed/documented legally designated decision makers.  Added by Aditi Gandhi             Anxiety 06/23/2014     Priority: Medium     Lung density on x-ray 07/23/2013     Priority: Medium     Osteoarthritis 06/14/2012     Priority: Medium     Problem list name updated by automated process. Provider to review       Advanced care planning/counseling discussion 01/13/2012     Priority: Medium     Abnormal glucose 08/01/2011     Priority: Medium     Hgb A1c 5.7 on 1/4/2015  Problem list name updated by automated process. Provider to review       Osteoarthritis of right hip 10/07/2004     Priority: Medium     Urticaria 06/01/2004     Priority: Medium     Problem list name updated by automated process. 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     Obesity, Class II,  BMI 35-39.9 1/4/2015     Osteoarthritis of right hip 10/07/2004     Osteoarthrosis 06/14/2012     Otitis externa 10/04/2011     Prediabetes 08/01/2011       Past Surgical History:    Past Surgical History:   Procedure Laterality Date     ARTHROSCOPY KNEE Left 3/21/2019    Procedure: LEFT  KNEE ARTHROSCOPY, partial medial menisectomy;  Surgeon: Esteban Wills MD;  Location: [facility]     CLOSED REDUCTION WRIST Right 1952 x 2    long arm cast (same time as elbow fx)     CLOSED RX ELBOW DISLOCATION Right 1952    CR/long cast of fracture     HC INJ EPIDURAL LUMBAR/SACRAL W/WO CONTRAST Bilateral 5/2013    facet injections; prev 2012     LAPAROSCOPIC CHOLECYSTECTOMY N/A 1/4/2015    Procedure: LAPAROSCOPIC CHOLECYSTECTOMY;  Surgeon: Brittney العلي MD;  Location: [facility]     TONSILLECTOMY         Family History:    Family History   Problem Relation Age of Onset     Heart Disease Brother         atrial fibrillation     Atrial fibrillation Brother      Other - See Comments Mother         emphysema     Heart Disease Father 92        CHF     Cancer Paternal Grandfather         lung cancer     Cancer Maternal Uncle         lung cancer     Chronic Obstructive Pulmonary Disease Daughter      Emphysema Daughter      Other - See Comments Daughter         benign breast lesion     Esophagitis Daughter        Social History:  Marital Status:  Single [1]  Social History     Tobacco Use     Smoking status: Never Smoker     Smokeless tobacco: Never Used   Substance Use Topics     Alcohol use: No     Drug use: No        Medications:      albuterol (PROVENTIL) (2.5 MG/3ML) 0.083% neb solution   aspirin (ASA) 81 MG EC tablet   Calcium-Magnesium-Vitamin D (CALCIUM 1200+D3 PO)   clonazePAM (KLONOPIN) 1 MG tablet   FISH OIL   Garlic 10 MG CAPS   HYDROcodone-acetaminophen (NORCO) 5-325 MG tablet   PARoxetine (PAXIL) 40 MG tablet   simvastatin (ZOCOR) 20 MG tablet   VITAMIN D, CHOLECALCIFEROL, PO         Review of Systems   Musculoskeletal:  Positive for gait problem and joint swelling.   Neurological: Negative for numbness.       Physical Exam   BP: 122/68  Heart Rate: 70  Temp: 98.5  F (36.9  C)  Resp: 16  SpO2: 95 %      Physical Exam   Constitutional: She is oriented to person, place, and time. She appears well-developed and well-nourished. No distress.   Eyes: Conjunctivae and EOM are normal.   Pulmonary/Chest: Effort normal.   Musculoskeletal:   Mild swelling and bruising noted on the dorsal aspect of the base of the right second and third metatarsals.  No erythema or deformity.  No tenderness to palpation at the base of the fifth metatarsal, to palpation of the first second fourth or fifth metatarsals, midfoot, lateral or medial malleoli.  Range of motion is limited due to pain and swelling.  Sensation is intact to light touch.  Capillary refill is less than 2 seconds.   Neurological: She is alert and oriented to person, place, and time.   Skin: Skin is warm and dry.   Psychiatric: She has a normal mood and affect. Her behavior is normal.       ED Course        Procedures              Critical Care time:               Results for orders placed or performed during the hospital encounter of 07/04/19 (from the past 24 hour(s))   XR Foot Right 3 Views    Narrative    XR FOOT RT G/E 3 VW    HISTORY: 78 years Female hit middle toe with cane    COMPARISON: None    TECHNIQUE: Right foot 3 views    FINDINGS: There is a nondisplaced oblique fracture of the proximal  phalanx of the third digit. Joint spaces are congruent. There is  normal displacement and no significant angulation.      Impression    IMPRESSION: Obliquely oriented fracture of the third proximal phalanx  with minimal displacement and no significant angulation.    ALMA GALLOWAY MD       Medications - No data to display    Assessments & Plan (with Medical Decision Making)   78-year-old female who presented to urgent care today with complaints of right toe pain after sustaining an injury at  home last night.  The goal examination revealed moderate dorsal swelling and bruising at the base of the second and third metatarsals of the right foot.  X-ray revealed a nondisplaced oblique fracture of the proximal phalanx third digit.  Patient was placed in a postop shoe, which significantly improved her pain and assisted her with ambulation.  She will continue to elevate, ice, and use her pain medication that she has at home for comfort.  She was advised to follow-up with her primary provider in 1 week for recheck and further treatment as needed.  Discussed signs and symptoms of worsening and when to return.  Patient verbalized understanding and is in agreement with plan.    I have reviewed the nursing notes.    I have reviewed the findings, diagnosis, plan and need for follow up with the patient.         Medication List      There are no discharge medications for this visit.         Final diagnoses:   Closed fracture of metatarsal shaft, right, initial encounter       7/4/2019   [facility]     Jeannette Cameron PA-C  07/04/19 [id number]

## 2019-07-04 NOTE — DISCHARGE INSTRUCTIONS
Elevate, ice and pain medication that you have at home for comfort.  Follow up with your primary provider in 1 week for recheck.  Please return sooner if you are experiencing increased pain, swelling, numbness/tingling or other concerns.

## 2019-07-05 ENCOUNTER — TELEPHONE (OUTPATIENT)
Dept: FAMILY MEDICINE | Facility: OTHER | Age: 79
End: 2019-07-05

## 2019-07-05 NOTE — PROGRESS NOTES
Surgery Consult Clinic Note      RE: Dinorah Lim  : 1940  RIOS: 7/3/2019      Chief Complaint:  Positive FIT test     History of Present Illness:  Dinorah Lim is a very pleasant 78 year old year old female who I am seeing at the request of Dr. Sosa for evaluation of screening colon malignant neoplasm and consideration for colonoscopy.  She denies family history of colon or rectal cancer, blood in stool, changes in bowel habits, weight loss, abdominal pain. She did recently have a positive Fit test  Surgical hx: Lap Cholecystectomy.   She specifically denies fever, chills, nausea, vomiting, chest pain, shortness of breath or palpitations. Having knee pain.       Medical history:  Past Medical History:   Diagnosis Date     Anxiety 2011     Cholecystolithiasis 2015    noted on US 2015 --> cholecystectomy     Chronic kidney disease (CKD), stage III (moderate) (H) 2013    Early,unknown eitiology, Dr Vilchis     Chronic kidney disease (CKD), stage III (moderate) (H) 2015    Early,unknown eitiology, Dr Vilchis      Cough 2001     Hiatal hernia 2014    Seen on chest CT     Hyperlipidemia 2001     Idiopathic hives since age 8 2004     Major depression 10/04/2011     Neoplasm of uncertain behavior of liver 2015    Anterior Segment V 4 cm nodule abutting liver surface by US 2015      Obesity, Class II, BMI 35-39.9 2015    BMI 35.9 with comorbidities = MORBID obesity     Osteoarthritis of right hip 10/07/2004     Osteoarthrosis 2012     Otitis externa 10/04/2011     Prediabetes 2011       Surgical history:  Past Surgical History:   Procedure Laterality Date     ARTHROSCOPY KNEE Left 3/21/2019    Procedure: LEFT  KNEE ARTHROSCOPY, partial medial menisectomy;  Surgeon: Esteban Wills MD;  Location: [facility]     CLOSED REDUCTION WRIST Right 1952 x 2    long arm cast (same time as elbow fx)     CLOSED RX ELBOW DISLOCATION Right     CR/long cast  of fracture     HC INJ EPIDURAL LUMBAR/SACRAL W/WO CONTRAST Bilateral 5/2013    facet injections; prev 2012     LAPAROSCOPIC CHOLECYSTECTOMY N/A 1/4/2015    Procedure: LAPAROSCOPIC CHOLECYSTECTOMY;  Surgeon: Brittney العلي MD;  Location: [facility]     TONSILLECTOMY         Family history:  Family History   Problem Relation Age of Onset     Heart Disease Brother         atrial fibrillation     Atrial fibrillation Brother      Other - See Comments Mother         emphysema     Heart Disease Father 92        CHF     Cancer Paternal Grandfather         lung cancer     Cancer Maternal Uncle         lung cancer     Chronic Obstructive Pulmonary Disease Daughter      Emphysema Daughter      Other - See Comments Daughter         benign breast lesion     Esophagitis Daughter        Medications:  Current Outpatient Medications   Medication Sig Dispense Refill     albuterol (PROVENTIL) (2.5 MG/3ML) 0.083% neb solution        aspirin (ASA) 81 MG EC tablet Take 1 tablet (81 mg) by mouth daily 90 tablet 3     Calcium-Magnesium-Vitamin D (CALCIUM 1200+D3 PO) Take by mouth daily       clonazePAM (KLONOPIN) 1 MG tablet TAKE 1/4 TO 1/2 (ONE-FOURTH TO ONE-HALF) TABLET BY MOUTH EVERY 8 HOURS AS NEEDED 45 tablet 0     FISH OIL Take 1 capsule by mouth daily        Garlic 10 MG CAPS Take 1 capsule by mouth as needed       HYDROcodone-acetaminophen (NORCO) 5-325 MG tablet Take 1 tablet by mouth every 4-6 hours as needed 60 tablet 0     PARoxetine (PAXIL) 40 MG tablet TAKE 1 TABLET BY MOUTH ONCE DAILY 90 tablet 2     simvastatin (ZOCOR) 20 MG tablet TAKE ONE TABLET BY MOUTH AT BEDTIME 90 tablet 2     VITAMIN D, CHOLECALCIFEROL, PO Take 2,000 Units by mouth daily       Allergies:  The patientis allergic to chocolate; lemon flavor; lime [calcium oxysulfide]; orange fruit [citrus]; strawberry; adhesive tape; codeine; erythromycin; grapefruit [extra strength grapefruit]; penicillins; sulfa drugs; and tomato.  .  Social history:  Social History  "    Tobacco Use     Smoking status: Never Smoker     Smokeless tobacco: Never Used   Substance Use Topics     Alcohol use: No     Marital status: single.      Review of Systems:  10 point review of systems was obtained and negative other than what previously mentioned in HPI    Physical Examination:  /74 (BP Location: Right arm, Patient Position: Sitting, Cuff Size: Adult Large)   Pulse 71   Temp 98  F (36.7  C) (Tympanic)   Resp 18   Ht 1.664 m (5' 5.5\")   Wt 106.6 kg (235 lb)   SpO2 97%   BMI 38.51 kg/m    General: AAOx4, NAD, WN/WD, ambulating without assistance  HEENT:NCAT, EOMI, PERRL Sclerae anicteric; Trachea mideline,   No exam performed       Assessment/Plan:  78 y.o. Female never had a colonoscopy, now with positive FIT test. Though she has some concerns about things she had eaten the night before the test.    Satisfactory candidate for colonoscopy though patient would prefer to try a colo-guard first. Discussed that this is a more specific test though said that if does return positive will need to do a colonoscopy. She has agreed to this.     Andrés Pulido    "

## 2019-07-05 NOTE — TELEPHONE ENCOUNTER
12:25 PM    Reason for Call: OVERBOOK    Patient is having the following symptoms: follow up UC for broken tow  for 1 day.    The patient is requesting an appointment for sometime next week with Dr.Susan Sosa.    Was an appointment offered for this call? No  If yes : Appointment type              Date    Preferred method for responding to this message: Telephone Call  What is your phone number ?[phone]    If we cannot reach you directly, may we leave a detailed response at the number you provided? Yes    Can this message wait until your PCP/provider returns, if unavailable today? Yes pcp is out     Jenniffer Tabares

## 2019-07-13 ENCOUNTER — HOSPITAL ENCOUNTER (EMERGENCY)
Facility: HOSPITAL | Age: 79
Discharge: HOME | End: 2019-07-13
Attending: NURSE PRACTITIONER | Admitting: NURSE PRACTITIONER
Payer: MEDICARE

## 2019-07-13 VITALS
HEIGHT: 66 IN | BODY MASS INDEX: 37.45 KG/M2 | SYSTOLIC BLOOD PRESSURE: 139 MMHG | DIASTOLIC BLOOD PRESSURE: 68 MMHG | TEMPERATURE: 98.2 F | WEIGHT: 233 LBS | RESPIRATION RATE: 20 BRPM | OXYGEN SATURATION: 96 %

## 2019-07-13 DIAGNOSIS — L50.9 HIVES: ICD-10-CM

## 2019-07-13 PROCEDURE — G0463 HOSPITAL OUTPT CLINIC VISIT: HCPCS | Mod: 25

## 2019-07-13 PROCEDURE — 96372 THER/PROPH/DIAG INJ SC/IM: CPT

## 2019-07-13 PROCEDURE — 99213 OFFICE O/P EST LOW 20 MIN: CPT | Mod: Z6 | Performed by: NURSE PRACTITIONER

## 2019-07-13 PROCEDURE — 25000128 H RX IP 250 OP 636: Performed by: NURSE PRACTITIONER

## 2019-07-13 PROCEDURE — 25000132 ZZH RX MED GY IP 250 OP 250 PS 637: Mod: GY | Performed by: NURSE PRACTITIONER

## 2019-07-13 RX ORDER — PREDNISONE 20 MG/1
TABLET ORAL
Qty: 10 TABLET | Refills: 0 | Status: SHIPPED | OUTPATIENT
Start: 2019-07-13 | End: 2019-08-26

## 2019-07-13 RX ORDER — METHYLPREDNISOLONE SODIUM SUCCINATE 40 MG/ML
40 INJECTION, POWDER, LYOPHILIZED, FOR SOLUTION INTRAMUSCULAR; INTRAVENOUS ONCE
Status: COMPLETED | OUTPATIENT
Start: 2019-07-13 | End: 2019-07-13

## 2019-07-13 RX ORDER — CETIRIZINE HYDROCHLORIDE 10 MG/1
10 TABLET ORAL ONCE
Status: COMPLETED | OUTPATIENT
Start: 2019-07-13 | End: 2019-07-13

## 2019-07-13 RX ADMIN — RANITIDINE 150 MG: 150 TABLET ORAL at 11:47

## 2019-07-13 RX ADMIN — CETIRIZINE HYDROCHLORIDE 10 MG: 10 TABLET ORAL at 11:47

## 2019-07-13 RX ADMIN — METHYLPREDNISOLONE SODIUM SUCCINATE 40 MG: 40 INJECTION, POWDER, FOR SOLUTION INTRAMUSCULAR; INTRAVENOUS at 11:47

## 2019-07-13 ASSESSMENT — ENCOUNTER SYMPTOMS
NAUSEA: 0
PALPITATIONS: 0
APPETITE CHANGE: 0
VOMITING: 0
FATIGUE: 0
WHEEZING: 0
CHILLS: 0
FEVER: 0
SHORTNESS OF BREATH: 0
COUGH: 0
ABDOMINAL PAIN: 0

## 2019-07-13 ASSESSMENT — MIFFLIN-ST. JEOR: SCORE: 1563.63

## 2019-07-13 NOTE — ED NOTES
Pt states she broke out in hives during the night. States ate a brat before bed which she has never eaten this specific type of brat before. States she has long history of getting hives and is usually given an injection here that helps.

## 2019-07-13 NOTE — ED PROVIDER NOTES
"  History     Chief Complaint   Patient presents with     Rash     scattered red rash t/o body, \"started yesterday, after eating a polish\"      HPI  Dinorah Lim is a 78 year old female who presents today with a CC hives on her trunk, lateral legs, groin, and behind her ears that started at about 3 am. She ate a spicy brat last night and feels this was what brought on the hives.  She has a significant history of hives in the past from multiple different triggers. She denies heart palpitations, nausea, shortness of breath, mouth swelling, headache, dizziness.  She took benadryl 50 mg at the onset of the hives without improvement.      No other new exposures, medications.      Allergies:  Allergies   Allergen Reactions     Chocolate Hives     Lemon Flavor Hives     Lime [Calcium Oxysulfide] Hives     Orange Fruit [Citrus] Hives     Strawberry Hives     Adhesive Tape      Band-aids     Codeine Hives     Patient can tolerate oxycodone & dilaudid     Erythromycin Hives     ERYTHROMYCIN BASE     Grapefruit [Extra Strength Grapefruit]      GRAPEFRUIT     Penicillins Hives     Sulfa Drugs      SULFONAMIDE ANTIBIOTICS      Tomato        Problem List:    Patient Active Problem List    Diagnosis Date Noted     Aortic valve insufficiency 03/06/2019     Priority: Medium     Mitral regurgitation 03/06/2019     Priority: Medium     Tricuspid valve insufficiency 03/06/2019     Priority: Medium     Diastolic dysfunction grade 1 on 2/21/19 03/06/2019     Priority: Medium     Hypertriglyceridemia 03/06/2019     Priority: Medium     Palpitations 02/13/2019     Priority: Medium     PVC's (premature ventricular contractions) 02/13/2019     Priority: Medium     Atrial ectopy 02/13/2019     Priority: Medium     PSVT (paroxysmal supraventricular tachycardia) (H) 02/13/2019     Priority: Medium     COPD, mild on 9/9/14 02/13/2019     Priority: Medium     Bilateral carotid artery stenosis at less than 50% on 12/1/16 02/13/2019     " Priority: Medium     Mixed hyperlipidemia 02/13/2019     Priority: Medium     On statin therapy 02/13/2019     Priority: Medium     Heart murmur 02/13/2019     Priority: Medium     Morbid obesity (H) 06/01/2017     Priority: Medium     ACP (advance care planning) 04/28/2016     Priority: Medium     Advance Care Planning 4/28/2016: ACP Review of Chart / Resources Provided:  Reviewed chart for advance care plan.  Dinorah Lim has no plan or code status on file. Discussed available resources and provided with information. Confirmed code status reflects current choices pending further ACP discussions.  Confirmed/documented legally designated decision makers.  Added by Aditi Gandhi             Anxiety 06/23/2014     Priority: Medium     Lung density on x-ray 07/23/2013     Priority: Medium     Osteoarthritis 06/14/2012     Priority: Medium     Problem list name updated by automated process. Provider to review       Advanced care planning/counseling discussion 01/13/2012     Priority: Medium     Abnormal glucose 08/01/2011     Priority: Medium     Hgb A1c 5.7 on 1/4/2015  Problem list name updated by automated process. Provider to review       Osteoarthritis of right hip 10/07/2004     Priority: Medium     Urticaria 06/01/2004     Priority: Medium     Problem list name updated by automated process. 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     Obesity, Class II, BMI 35-39.9 1/4/2015     Osteoarthritis of right hip 10/07/2004     Osteoarthrosis 06/14/2012     Otitis externa  10/04/2011     Prediabetes 08/01/2011       Past Surgical History:    Past Surgical History:   Procedure Laterality Date     ARTHROSCOPY KNEE Left 3/21/2019    Procedure: LEFT  KNEE ARTHROSCOPY, partial medial menisectomy;  Surgeon: Esteban Wills MD;  Location: [facility]     CLOSED REDUCTION WRIST Right 1952 x 2    long arm cast (same time as elbow fx)     CLOSED RX ELBOW DISLOCATION Right 1952    CR/long cast of fracture     HC INJ EPIDURAL LUMBAR/SACRAL W/WO CONTRAST Bilateral 5/2013    facet injections; prev 2012     LAPAROSCOPIC CHOLECYSTECTOMY N/A 1/4/2015    Procedure: LAPAROSCOPIC CHOLECYSTECTOMY;  Surgeon: Brittney العلي MD;  Location: [facility]     TONSILLECTOMY         Family History:    Family History   Problem Relation Age of Onset     Heart Disease Brother         atrial fibrillation     Atrial fibrillation Brother      Other - See Comments Mother         emphysema     Heart Disease Father 92        CHF     Cancer Paternal Grandfather         lung cancer     Cancer Maternal Uncle         lung cancer     Chronic Obstructive Pulmonary Disease Daughter      Emphysema Daughter      Other - See Comments Daughter         benign breast lesion     Esophagitis Daughter        Social History:  Marital Status:  Single [1]  Social History     Tobacco Use     Smoking status: Never Smoker     Smokeless tobacco: Never Used   Substance Use Topics     Alcohol use: No     Drug use: No        Medications:      Calcium-Magnesium-Vitamin D (CALCIUM 1200+D3 PO)   cetirizine HCl 10 MG CAPS   clonazePAM (KLONOPIN) 1 MG tablet   FISH OIL   HYDROcodone-acetaminophen (NORCO) 5-325 MG tablet   PARoxetine (PAXIL) 40 MG tablet   predniSONE (DELTASONE) 20 MG tablet   ranitidine (ZANTAC) 150 MG tablet   simvastatin (ZOCOR) 20 MG tablet   VITAMIN D, CHOLECALCIFEROL, PO         Review of Systems   Constitutional: Negative for appetite change, chills, fatigue and fever.   Respiratory: Negative for cough, shortness of breath and  "wheezing.    Cardiovascular: Negative for chest pain and palpitations.   Gastrointestinal: Negative for abdominal pain, nausea and vomiting.   Skin: Positive for rash.       Physical Exam   BP: 139/68  Heart Rate: 68  Temp: 98.2  F (36.8  C)  Resp: 20  Height: 167.6 cm (5' 6\")  Weight: 105.7 kg (233 lb)  SpO2: 96 %      Physical Exam   Constitutional: She is oriented to person, place, and time. She appears well-developed.   HENT:   Head: Normocephalic and atraumatic.   Right Ear: Tympanic membrane, external ear and ear canal normal.   Left Ear: Tympanic membrane, external ear and ear canal normal.   Mouth/Throat: Uvula is midline and oropharynx is clear and moist.   Neck: Normal range of motion. Neck supple.   Cardiovascular: Normal rate and regular rhythm.   Pulmonary/Chest: Effort normal and breath sounds normal.   Neurological: She is alert and oriented to person, place, and time.   Skin: Rash noted. Rash is urticarial (trunk, legs, groin, behind her ears).   Nursing note and vitals reviewed.      ED Course        Procedures    Medications   methylPREDNISolone sodium succinate (solu-MEDROL) injection 40 mg (40 mg Intramuscular Given 7/13/19 1147)   ranitidine (ZANTAC) tablet 150 mg (150 mg Oral Given 7/13/19 1147)   cetirizine (zyrTEC) tablet 10 mg (10 mg Oral Given 7/13/19 1147)       Assessments & Plan (with Medical Decision Making)     I have reviewed the nursing notes.    I have reviewed the findings, diagnosis, plan and need for follow up with the patient.  ASSESSMENT / PLAN:  (L50.9) Hives  Comment: she suspects it is due to food, no oral swelling, nausea, vomiting, chest pain, shortness of breath  Plan:  Prednisone as prescribed  Zyrtec as prescribed  Zantac as prescribed  Return to ED with worsening of symptoms or new concerns  Follow up with PCP this week if symptoms persist           Medication List      Started    cetirizine HCl 10 MG Caps  10 mg, Oral, AT BEDTIME     predniSONE 20 MG tablet  Commonly " known as:  DELTASONE  Take two tablets (= 40mg) each day for 5 (five) days     ranitidine 150 MG tablet  Commonly known as:  ZANTAC  150 mg, Oral, 2 TIMES DAILY            Final diagnoses:   Hives       7/13/2019   [facility]     Alma Magana, VICKIE  07/14/19 1129

## 2019-07-13 NOTE — ED AVS SNAPSHOT
[facility]  [address] [address]  Phone:  [phone]                                    Dinorah Lim   MRN: [id number]    Department:  [facility]   Date of Visit:  7/13/2019           After Visit Summary Signature Page    I have received my discharge instructions, and my questions have been answered. I have discussed any challenges I see with this plan with the nurse or doctor.    ..........................................................................................................................................  Patient/Patient Representative Signature      ..........................................................................................................................................  Patient Representative Print Name and Relationship to Patient    ..................................................               ................................................  Date                                   Time    ..........................................................................................................................................  Reviewed by Signature/Title    ...................................................              ..............................................  Date                                               Time          22EPIC Rev 08/18

## 2019-07-15 ENCOUNTER — HOSPITAL ENCOUNTER (EMERGENCY)
Facility: HOSPITAL | Age: 79
Discharge: HOME | End: 2019-07-15
Attending: NURSE PRACTITIONER | Admitting: NURSE PRACTITIONER
Payer: MEDICARE

## 2019-07-15 VITALS
RESPIRATION RATE: 18 BRPM | SYSTOLIC BLOOD PRESSURE: 148 MMHG | OXYGEN SATURATION: 97 % | TEMPERATURE: 98.3 F | DIASTOLIC BLOOD PRESSURE: 74 MMHG | HEART RATE: 65 BPM

## 2019-07-15 DIAGNOSIS — L50.9 HIVES: ICD-10-CM

## 2019-07-15 PROCEDURE — 99213 OFFICE O/P EST LOW 20 MIN: CPT | Mod: Z6 | Performed by: NURSE PRACTITIONER

## 2019-07-15 PROCEDURE — 25000132 ZZH RX MED GY IP 250 OP 250 PS 637: Performed by: NURSE PRACTITIONER

## 2019-07-15 PROCEDURE — 25000125 ZZHC RX 250: Performed by: NURSE PRACTITIONER

## 2019-07-15 PROCEDURE — G0463 HOSPITAL OUTPT CLINIC VISIT: HCPCS

## 2019-07-15 RX ORDER — LORATADINE 10 MG/1
10 TABLET ORAL ONCE
Status: COMPLETED | OUTPATIENT
Start: 2019-07-15 | End: 2019-07-15

## 2019-07-15 RX ORDER — LEVOCETIRIZINE DIHYDROCHLORIDE 5 MG/1
5 TABLET, FILM COATED ORAL 2 TIMES DAILY
Qty: 20 TABLET | Refills: 0 | Status: SHIPPED | OUTPATIENT
Start: 2019-07-15 | End: 2019-08-26

## 2019-07-15 RX ORDER — DEXAMETHASONE SODIUM PHOSPHATE 10 MG/ML
10 INJECTION INTRAMUSCULAR; INTRAVENOUS ONCE
Status: COMPLETED | OUTPATIENT
Start: 2019-07-15 | End: 2019-07-15

## 2019-07-15 RX ORDER — DEXAMETHASONE 2 MG/1
TABLET ORAL
Qty: 9 TABLET | Refills: 0 | Status: SHIPPED | OUTPATIENT
Start: 2019-07-15 | End: 2019-08-26

## 2019-07-15 RX ADMIN — RANITIDINE 150 MG: 150 TABLET ORAL at 14:09

## 2019-07-15 RX ADMIN — LORATADINE 10 MG: 10 TABLET ORAL at 14:10

## 2019-07-15 RX ADMIN — DEXAMETHASONE SODIUM PHOSPHATE 10 MG: 10 INJECTION INTRAMUSCULAR; INTRAVENOUS at 14:09

## 2019-07-15 ASSESSMENT — ENCOUNTER SYMPTOMS
FATIGUE: 0
LIGHT-HEADEDNESS: 0
APPETITE CHANGE: 0
DIZZINESS: 0
CHOKING: 0
HEADACHES: 0
CHEST TIGHTNESS: 0
COUGH: 0
NAUSEA: 0
WHEEZING: 0
PALPITATIONS: 0
VOMITING: 0
FEVER: 0
CHILLS: 0
ABDOMINAL PAIN: 0
SHORTNESS OF BREATH: 0

## 2019-07-15 NOTE — DISCHARGE INSTRUCTIONS
Stop cetirizine (Zyrtec)  Start levocetirizine (xyzal) as prescribed  Continue Ranitidine (Zantac)  Stop Prednisone  Start decadron as prescribed  Call today to schedule a follow up appointment with Dr Sosa this week  Return to ED with worsening of symptoms or new concerns

## 2019-07-15 NOTE — ED AVS SNAPSHOT
[facility]  [address]  [place] [address]  Phone:  [phone]                                    Dinorah Lim   MRN: [id number]    Department:  [facility]   Date of Visit:  7/15/2019           After Visit Summary Signature Page    I have received my discharge instructions, and my questions have been answered. I have discussed any challenges I see with this plan with the nurse or doctor.    ..........................................................................................................................................  Patient/Patient Representative Signature      ..........................................................................................................................................  Patient Representative Print Name and Relationship to Patient    ..................................................               ................................................  Date                                   Time    ..........................................................................................................................................  Reviewed by Signature/Title    ...................................................              ..............................................  Date                                               Time          22EPIC Rev 08/18

## 2019-07-15 NOTE — ED PROVIDER NOTES
History     Chief Complaint   Patient presents with     Hives     rash on legs since friday     HPI  Dinorah Lim is a 78 year old female who presents today for an evaluation of ongoing hives.  She was seen 2 days ago for the same hives.  She was given a shot of solumedrol and started on prednisone.  She had some initial improvement and now is worsening again.  The hives are on her legs, face and behind her ears.  The ears are the most bothersome, are burning.  She has a long history of hives since a baby, multiple triggers.  She feels that she ate a brat that had tomatoes in it and she is highly allergic to tomatoes.  She denies shortness of breath, chest pain, dizziness, chest tightness, oral swelling. She took prednisone yesterday, has not taken anything today.      Allergies:  Allergies   Allergen Reactions     Chocolate Hives     Lemon Flavor Hives     Lime [Calcium Oxysulfide] Hives     Orange Fruit [Citrus] Hives     Strawberry Hives     Adhesive Tape      Band-aids     Codeine Hives     Patient can tolerate oxycodone & dilaudid     Erythromycin Hives     ERYTHROMYCIN BASE     Grapefruit [Extra Strength Grapefruit]      GRAPEFRUIT     Penicillins Hives     Sulfa Drugs      SULFONAMIDE ANTIBIOTICS      Tomato        Problem List:    Patient Active Problem List    Diagnosis Date Noted     Aortic valve insufficiency 03/06/2019     Priority: Medium     Mitral regurgitation 03/06/2019     Priority: Medium     Tricuspid valve insufficiency 03/06/2019     Priority: Medium     Diastolic dysfunction grade 1 on 2/21/19 03/06/2019     Priority: Medium     Hypertriglyceridemia 03/06/2019     Priority: Medium     Palpitations 02/13/2019     Priority: Medium     PVC's (premature ventricular contractions) 02/13/2019     Priority: Medium     Atrial ectopy 02/13/2019     Priority: Medium     PSVT (paroxysmal supraventricular tachycardia) (H) 02/13/2019     Priority: Medium     COPD, mild on 9/9/14 02/13/2019      Priority: Medium     Bilateral carotid artery stenosis at less than 50% on 12/1/16 02/13/2019     Priority: Medium     Mixed hyperlipidemia 02/13/2019     Priority: Medium     On statin therapy 02/13/2019     Priority: Medium     Heart murmur 02/13/2019     Priority: Medium     Morbid obesity (H) 06/01/2017     Priority: Medium     ACP (advance care planning) 04/28/2016     Priority: Medium     Advance Care Planning 4/28/2016: ACP Review of Chart / Resources Provided:  Reviewed chart for advance care plan.  Dinorah Lim has no plan or code status on file. Discussed available resources and provided with information. Confirmed code status reflects current choices pending further ACP discussions.  Confirmed/documented legally designated decision makers.  Added by Aditi Gandhi             Anxiety 06/23/2014     Priority: Medium     Lung density on x-ray 07/23/2013     Priority: Medium     Osteoarthritis 06/14/2012     Priority: Medium     Problem list name updated by automated process. Provider to review       Advanced care planning/counseling discussion 01/13/2012     Priority: Medium     Abnormal glucose 08/01/2011     Priority: Medium     Hgb A1c 5.7 on 1/4/2015  Problem list name updated by automated process. Provider to review       Osteoarthritis of right hip 10/07/2004     Priority: Medium     Urticaria 06/01/2004     Priority: Medium     Problem list name updated by automated process. 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     Obesity, Class II, BMI 35-39.9  1/4/2015     Osteoarthritis of right hip 10/07/2004     Osteoarthrosis 06/14/2012     Otitis externa 10/04/2011     Prediabetes 08/01/2011       Past Surgical History:    Past Surgical History:   Procedure Laterality Date     ARTHROSCOPY KNEE Left 3/21/2019    Procedure: LEFT  KNEE ARTHROSCOPY, partial medial menisectomy;  Surgeon: Esteban Wills MD;  Location: [facility] OR     CLOSED REDUCTION WRIST Right 1952 x 2    long arm cast (same time as elbow fx)     CLOSED RX ELBOW DISLOCATION Right 1952    CR/long cast of fracture     HC INJ EPIDURAL LUMBAR/SACRAL W/WO CONTRAST Bilateral 5/2013    facet injections; prev 2012     LAPAROSCOPIC CHOLECYSTECTOMY N/A 1/4/2015    Procedure: LAPAROSCOPIC CHOLECYSTECTOMY;  Surgeon: Brittney العلي MD;  Location: [facility] OR     TONSILLECTOMY         Family History:    Family History   Problem Relation Age of Onset     Heart Disease Brother         atrial fibrillation     Atrial fibrillation Brother      Other - See Comments Mother         emphysema     Heart Disease Father 92        CHF     Cancer Paternal Grandfather         lung cancer     Cancer Maternal Uncle         lung cancer     Chronic Obstructive Pulmonary Disease Daughter      Emphysema Daughter      Other - See Comments Daughter         benign breast lesion     Esophagitis Daughter        Social History:  Marital Status:  Single [1]  Social History     Tobacco Use     Smoking status: Never Smoker     Smokeless tobacco: Never Used   Substance Use Topics     Alcohol use: No     Drug use: No        Medications:      cetirizine HCl 10 MG CAPS   clonazePAM (KLONOPIN) 1 MG tablet   dexamethasone (DECADRON) 2 MG tablet   HYDROcodone-acetaminophen (NORCO) 5-325 MG tablet   levocetirizine (XYZAL) 5 MG tablet   PARoxetine (PAXIL) 40 MG tablet   simvastatin (ZOCOR) 20 MG tablet   VITAMIN D, CHOLECALCIFEROL, PO   Calcium-Magnesium-Vitamin D (CALCIUM 1200+D3 PO)   FISH OIL   predniSONE (DELTASONE) 20 MG tablet         Review of Systems    Constitutional: Negative for appetite change, chills, fatigue and fever.   HENT: Positive for ear pain (outer ears due to swelling of hives). Negative for mouth sores.         No oral swelling   Respiratory: Negative for cough, choking, chest tightness, shortness of breath and wheezing.    Cardiovascular: Negative for chest pain, palpitations and leg swelling.   Gastrointestinal: Negative for abdominal pain, nausea and vomiting.   Skin: Positive for rash.   Neurological: Negative for dizziness, light-headedness and headaches.       Physical Exam   BP: 148/74  Pulse: 65  Temp: 98.3  F (36.8  C)  Resp: 18  SpO2: 97 %      Physical Exam   Constitutional: She appears well-developed. She is cooperative. She does not appear ill.   HENT:   Head: Normocephalic and atraumatic.   Right Ear: Tympanic membrane and ear canal normal.   Left Ear: Tympanic membrane and ear canal normal.   Mouth/Throat: Uvula is midline, oropharynx is clear and moist and mucous membranes are normal.   Right external ear with hives and mild swelling  Left ear with mild hives, resolved with cold wash cloth   Eyes: Conjunctivae are normal.   Neck: Normal range of motion. Neck supple.   Cardiovascular: Normal rate and regular rhythm.   Pulmonary/Chest: Effort normal and breath sounds normal.   Musculoskeletal: Normal range of motion.   Neurological: She is alert.   Skin: Rash noted. Rash is urticarial (legs, waistline, face, neck, scalp,ears).   Nursing note and vitals reviewed.      ED Course        Procedures    Medications   loratadine (CLARITIN) tablet 10 mg (10 mg Oral Given 7/15/19 1410)   dexamethasone oral soln (DECADRON) (inj used orally,not preservative free) 10 mg (10 mg Oral Given 7/15/19 1409)   ranitidine (ZANTAC) tablet 150 mg (150 mg Oral Given 7/15/19 1409)     Showed some improvement in symptoms with above medications    Assessments & Plan (with Medical Decision Making)     I have reviewed the nursing notes.    I have reviewed the  findings, diagnosis, plan and need for follow up with the patient.  ASSESSMENT / PLAN:  (L50.9) Hives  Comment: resistant to initial treatment  Plan:  Stop cetirizine (Zyrtec)  Start levocetirizine (xyzal) as prescribed  Continue Ranitidine (Zantac)  Stop Prednisone  Start decadron as prescribed  Call today to schedule a follow up appointment with Dr Sosa this week  Return to ED with worsening of symptoms or new concerns         Medication List      Started    dexamethasone 2 MG tablet  Commonly known as:  DECADRON  Take 8 mg on 7/16, 6 mg on 7/17, 4 mg on 7/18 then discontinue     levocetirizine 5 MG tablet  Commonly known as:  XYZAL  5 mg, Oral, 2 TIMES DAILY            Final diagnoses:   Hives       7/15/2019   [facility]     Alma Magana NP  07/15/19 [id number]

## 2019-07-29 ENCOUNTER — TRANSFERRED RECORDS (OUTPATIENT)
Dept: HEALTH INFORMATION MANAGEMENT | Facility: CLINIC | Age: 79
End: 2019-07-29

## 2019-07-30 ENCOUNTER — TELEPHONE (OUTPATIENT)
Dept: FAMILY MEDICINE | Facility: OTHER | Age: 79
End: 2019-07-30

## 2019-07-30 NOTE — TELEPHONE ENCOUNTER
3:48 PM    Reason for Call: OVERBOOK    Patient is having the following symptoms: Pre op 09-09-19 [facility]. [facility] [place] [facility] TTL Knee for 0 days.    The patient is requesting an appointment for overbook for the first week of Sept (preferred, but said she could do last week of Aug also) with Dr. SHAHZAD Sosa.    Was an appointment offered for this call? No  If yes : Appointment type              Date    Preferred method for responding to this message: Telephone Call  What is your phone number ? [phone]    If we cannot reach you directly, may we leave a detailed response at the number you provided? Yes    Can this message wait until your PCP/provider returns, if unavailable today? Not applicable, provider is in today    Cindy Teixeira

## 2019-08-23 NOTE — PATIENT INSTRUCTIONS
Before Your Surgery      Call your surgeon if there is any change in your health. This includes signs of a cold or flu (such as a sore throat, runny nose, cough, rash or fever).    Do not smoke, drink alcohol or take over the counter medicine (unless your surgeon or primary care doctor tells you to) for the 24 hours before and after surgery.    If you take prescribed drugs: Follow your doctor s orders about which medicines to take and which to stop until after surgery.    Eating and drinking prior to surgery: follow the instructions from your surgeon    Take a shower or bath the night before surgery. Use the soap your surgeon gave you to gently clean your skin. If you do not have soap from your surgeon, use your regular soap. Do not shave or scrub the surgery site.  Wear clean pajamas and have clean sheets on your bed.     Nothing to eat or drink after midnight the night before surgery    Take morning medications the morning of surgery with a small amount of water

## 2019-08-23 NOTE — PROGRESS NOTES
[facility] [place]  [address] [facility]  [place] [address]  [phone]  Dept: [phone]    PRE-OP EVALUATION:  Today's date: 2019    Dinorah Lim (: 1940) presents for pre-operative evaluation assessment as requested by Dr. Alonzo.  She requires evaluation and anesthesia risk assessment prior to undergoing surgery/procedure for treatment of knee pain .    Proposed Surgery/ Procedure: Left total knee arthroplasty  Date of Surgery/ Procedure: 19  Time of Surgery/ Procedure: to be determined  Hospital/Surgical Facility: [facility] [place]  Fax number for surgical facility:   Primary Physician: Magaly Sosa  Type of Anesthesia Anticipated: to be determined    Patient has a Health Care Directive or Living Will:  NO    1. NO - Do you have a history of heart attack, stroke, stent, bypass or surgery on an artery in the head, neck, heart or legs?  2. NO - Do you ever have any pain or discomfort in your chest?  3. NO - Do you have a history of  Heart Failure?  4. NO - Are you troubled by shortness of breath when: walking on the level, up a slight hill or at night?  5. NO - Do you currently have a cold, bronchitis or other respiratory infection?  6. NO - Do you have a cough, shortness of breath or wheezing?  7. NO - Do you sometimes get pains in the calves of your legs when you walk?  8. NO - Do you or anyone in your family have previous history of blood clots?  9. NO - Do you or does anyone in your family have a serious bleeding problem such as prolonged bleeding following surgeries or cuts?  10. NO - Have you ever had problems with anemia or been told to take iron pills?  11. NO - Have you had any abnormal blood loss such as black, tarry or bloody stools, or abnormal vaginal bleeding?  12. NO - Have you ever had a blood transfusion?  13. NO - Have you or any of your relatives ever had problems with anesthesia?  14. NO - Do you have sleep apnea, excessive snoring or daytime drowsiness?  15. NO - Do  you have any prosthetic heart valves?  16. NO - Do you have prosthetic joints?  17. NO - Is there any chance that you may be pregnant?      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 Patient has been doing well overall noting NO SYMPTOMS and continues on medication regimen consisting of no medications without adverse reactions or side effects.    HYPERLIPIDEMIA - Patient has a long history of significant Hyperlipidemia requiring medication for treatment with recent good control. Patient reports no problems or side effects with the medication.     HYPERTENSION - Patient has longstanding history of HTN , currently denies any symptoms referable to elevated blood pressure. Specifically denies chest pain, palpitations, dyspnea, orthopnea, PND or peripheral edema. Blood pressure readings have been in normal range. Current medication regimen is as listed below. Patient denies any side effects of medication.       MEDICAL HISTORY:     Patient Active Problem List    Diagnosis Date Noted     Aortic valve insufficiency 03/06/2019     Priority: Medium     Mitral regurgitation 03/06/2019     Priority: Medium     Tricuspid valve insufficiency 03/06/2019     Priority: Medium     Diastolic dysfunction grade 1 on 2/21/19 03/06/2019     Priority: Medium     Hypertriglyceridemia 03/06/2019     Priority: Medium     Palpitations 02/13/2019     Priority: Medium     PVC's (premature ventricular contractions) 02/13/2019     Priority: Medium     Atrial ectopy 02/13/2019     Priority: Medium     PSVT (paroxysmal supraventricular tachycardia) (H) 02/13/2019     Priority: Medium     COPD, mild on 9/9/14 02/13/2019     Priority: Medium     Bilateral carotid artery stenosis at less than 50% on 12/1/16 02/13/2019     Priority: Medium     Mixed hyperlipidemia 02/13/2019     Priority: Medium     On statin therapy 02/13/2019     Priority: Medium      Heart murmur 02/13/2019     Priority: Medium     Morbid obesity (H) 06/01/2017     Priority: Medium     ACP (advance care planning) 04/28/2016     Priority: Medium     Advance Care Planning 4/28/2016: ACP Review of Chart / Resources Provided:  Reviewed chart for advance care plan.  Dinorah Lim has no plan or code status on file. Discussed available resources and provided with information. Confirmed code status reflects current choices pending further ACP discussions.  Confirmed/documented legally designated decision makers.  Added by Aditi Gandhi             Anxiety 06/23/2014     Priority: Medium     Lung density on x-ray 07/23/2013     Priority: Medium     Osteoarthritis 06/14/2012     Priority: Medium     Problem list name updated by automated process. Provider to review       Advanced care planning/counseling discussion 01/13/2012     Priority: Medium     Abnormal glucose 08/01/2011     Priority: Medium     Hgb A1c 5.7 on 1/4/2015  Problem list name updated by automated process. Provider to review       Osteoarthritis of right hip 10/07/2004     Priority: Medium     Urticaria 06/01/2004     Priority: Medium     Problem list name updated by automated process. 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    Anterior Segment V 4 cm nodule abutting liver surface by US 1/4/2015      Obesity, Class II, BMI 35-39.9 1/4/2015    BMI 35.9 with comorbidities =  MORBID obesity     Osteoarthritis of right hip 10/07/2004     Osteoarthrosis 06/14/2012     Otitis externa 10/04/2011     Prediabetes 08/01/2011     Past Surgical History:   Procedure Laterality Date     ARTHROSCOPY KNEE Left 3/21/2019    Procedure: LEFT  KNEE ARTHROSCOPY, partial medial menisectomy;  Surgeon: Esteban Wills MD;  Location: [facility] OR     CLOSED REDUCTION WRIST Right 1952 x 2    long arm cast (same time as elbow fx)     CLOSED RX ELBOW DISLOCATION Right 1952    CR/long cast of fracture     HC INJ EPIDURAL LUMBAR/SACRAL W/WO CONTRAST Bilateral 5/2013    facet injections; prev 2012     LAPAROSCOPIC CHOLECYSTECTOMY N/A 1/4/2015    Procedure: LAPAROSCOPIC CHOLECYSTECTOMY;  Surgeon: Brittney العلي MD;  Location: [facility] OR     TONSILLECTOMY       Current Outpatient Medications   Medication Sig Dispense Refill     Calcium-Magnesium-Vitamin D (CALCIUM 1200+D3 PO) Take by mouth daily       clonazePAM (KLONOPIN) 1 MG tablet TAKE 1/4 TO 1/2 (ONE-FOURTH TO ONE-HALF) TABLET BY MOUTH EVERY 8 HOURS AS NEEDED 45 tablet 0     FISH OIL Take 1 capsule by mouth daily        HYDROcodone-acetaminophen (NORCO) 5-325 MG tablet Take 1 tablet by mouth every 4-6 hours as needed 60 tablet 0     PARoxetine (PAXIL) 40 MG tablet TAKE 1 TABLET BY MOUTH ONCE DAILY 90 tablet 2     simvastatin (ZOCOR) 20 MG tablet TAKE ONE TABLET BY MOUTH AT BEDTIME 90 tablet 2     VITAMIN D, CHOLECALCIFEROL, PO Take 2,000 Units by mouth daily       OTC products: None, except as noted above    Allergies   Allergen Reactions     Chocolate Hives     Lemon Flavor Hives     Lime [Calcium Oxysulfide] Hives     Orange Fruit [Citrus] Hives     Strawberry Hives     Adhesive Tape      Band-aids     Codeine Hives     Patient can tolerate oxycodone & dilaudid     Decadron [Dexamethasone] Hives     Erythromycin Hives     ERYTHROMYCIN BASE     Grapefruit [Extra Strength Grapefruit]      GRAPEFRUIT     Penicillins Hives     Sulfa Drugs      SULFONAMIDE ANTIBIOTICS  "     Tomato      Xyzal [Levocetirizine] Hives      Latex Allergy: NO    Social History     Tobacco Use     Smoking status: Never Smoker     Smokeless tobacco: Never Used   Substance Use Topics     Alcohol use: No     History   Drug Use No       REVIEW OF SYSTEMS:   CONSTITUTIONAL: NEGATIVE for fever, chills, change in weight  INTEGUMENTARY/SKIN: NEGATIVE for worrisome rashes, moles or lesions  EYES: NEGATIVE for vision changes or irritation  ENT/MOUTH: NEGATIVE for ear, mouth and throat problems  RESP: NEGATIVE for significant cough or SOB  BREAST: NEGATIVE for masses, tenderness or discharge  CV: NEGATIVE for chest pain, palpitations or peripheral edema  CV: rare palpitation  GI: NEGATIVE for nausea, abdominal pain, heartburn, or change in bowel habits  : NEGATIVE for frequency, dysuria, or hematuria  MUSCULOSKELETAL: NEGATIVE for significant arthralgias or myalgia  NEURO: NEGATIVE for weakness, dizziness or paresthesias  ENDOCRINE: NEGATIVE for temperature intolerance, skin/hair changes  HEME: NEGATIVE for bleeding problems  PSYCHIATRIC: NEGATIVE for changes in mood or affect    EXAM:   /74   Pulse 71   Temp 98  F (36.7  C) (Tympanic)   Resp 19   Ht 1.676 m (5' 6\")   Wt 105.2 kg (232 lb)   SpO2 98%   BMI 37.45 kg/m      GENERAL APPEARANCE: healthy, alert and no distress     EYES: EOMI, PERRL     HENT: ear canals and TM's normal and nose and mouth without ulcers or lesions     NECK: no adenopathy, no asymmetry, masses, or scars and thyroid normal to palpation     RESP: lungs clear to auscultation - no rales, rhonchi or wheezes     CV: regular rates and rhythm, normal S1 S2, no S3 or S4 and no murmur, click or rub     ABDOMEN:  soft, nontender, no HSM or masses and bowel sounds normal     MS: extremities normal- no gross deformities noted, no evidence of inflammation in joints, FROM in all extremities.     MS: DJD of both knees     SKIN: no suspicious lesions or rashes     NEURO: Normal strength and " tone, sensory exam grossly normal, mentation intact and speech normal     PSYCH: mentation appears normal. and affect normal/bright     LYMPHATICS: No cervical adenopathy    DIAGNOSTICS:     EKG: appears normal, NSR, normal axis, normal intervals, no acute ST/T changes c/w ischemia, no LVH by voltage criteria, done 5//23/19  Labs Resulted Today:   Results for orders placed or performed during the hospital encounter of 07/04/19   XR Foot Right 3 Views    Narrative    XR FOOT RT G/E 3 VW    HISTORY: 78 years Female hit middle toe with cane    COMPARISON: None    TECHNIQUE: Right foot 3 views    FINDINGS: There is a nondisplaced oblique fracture of the proximal  phalanx of the third digit. Joint spaces are congruent. There is  normal displacement and no significant angulation.      Impression    IMPRESSION: Obliquely oriented fracture of the third proximal phalanx  with minimal displacement and no significant angulation.    ALMA GALLOWAY MD       Recent Labs   Lab Test 05/23/19  1947 03/15/19  0933  10/03/17  0859  06/01/14 2010   HGB 13.1 12.7   < >  --    < > 12.2    142*   < >  --    < > 145*   INR  --   --   --   --   --  1.29*    140   < >  --    < >  --    POTASSIUM 4.1 4.1   < >  --    < >  --    CR 1.00 0.99   < >  --    < >  --    A1C  --  5.4  --  5.4   < >  --     < > = values in this interval not displayed.      Results for orders placed or performed in visit on 08/26/19   CBC with platelets   Result Value Ref Range    WBC 4.2 4.0 - 11.0 10e9/L    RBC Count 4.61 3.8 - 5.2 10e12/L    Hemoglobin 13.4 11.7 - 15.7 g/dL    Hematocrit 41.5 35.0 - 47.0 %    MCV 90 78 - 100 fl    MCH 29.1 26.5 - 33.0 pg    MCHC 32.3 31.5 - 36.5 g/dL    RDW 13.9 10.0 - 15.0 %    Platelet Count 177 150 - 450 10e9/L   Comprehensive metabolic panel   Result Value Ref Range    Sodium 138 133 - 144 mmol/L    Potassium 4.0 3.4 - 5.3 mmol/L    Chloride 105 94 - 109 mmol/L    Carbon Dioxide 30 20 - 32 mmol/L    Anion Gap 3  3 - 14 mmol/L    Glucose 85 70 - 99 mg/dL    Urea Nitrogen 17 7 - 30 mg/dL    Creatinine 0.94 0.52 - 1.04 mg/dL    GFR Estimate 59 (L) >60 mL/min/[1.73_m2]    GFR Estimate If Black 68 >60 mL/min/[1.73_m2]    Calcium 9.9 8.5 - 10.1 mg/dL    Bilirubin Total 0.4 0.2 - 1.3 mg/dL    Albumin 3.7 3.4 - 5.0 g/dL    Protein Total 7.1 6.8 - 8.8 g/dL    Alkaline Phosphatase 105 40 - 150 U/L    ALT 25 0 - 50 U/L    AST 16 0 - 45 U/L   UA reflex to Microscopic and Culture   Result Value Ref Range    Color Urine Yellow     Appearance Urine Slightly Cloudy     Glucose Urine Negative NEG^Negative mg/dL    Bilirubin Urine Negative NEG^Negative    Ketones Urine Negative NEG^Negative mg/dL    Specific Gravity Urine 1.022 1.003 - 1.035    Blood Urine Negative NEG^Negative    pH Urine 5.0 4.7 - 8.0 pH    Protein Albumin Urine Negative NEG^Negative mg/dL    Urobilinogen mg/dL Normal 0.0 - 2.0 mg/dL    Nitrite Urine Negative NEG^Negative    Leukocyte Esterase Urine Large (A) NEG^Negative    Source Midstream Urine     RBC Urine 1 0 - 2 /HPF    WBC Urine 17 (H) 0 - 5 /HPF    Bacteria Urine Few (A) NEG^Negative /HPF    Squamous Epithelial /HPF Urine 17 (H) 0 - 1 /HPF    Mucous Urine Present (A) NEG^Negative /LPF   ESR: Erythrocyte sedimentation rate   Result Value Ref Range    Sed Rate 13 0 - 30 mm/h   Hemoglobin A1c   Result Value Ref Range    Hemoglobin A1C 5.2 0 - 5.6 %   Estimated Average Glucose   Result Value Ref Range    Estimated Average Glucose 103 mg/dL       IMPRESSION:   Reason for surgery/procedure: end stage DJD of the left knee    The proposed surgical procedure is considered INTERMEDIATE risk.    REVISED CARDIAC RISK INDEX  The patient has the following serious cardiovascular risks for perioperative complications such as (MI, PE, VFib and 3  AV Block):  No serious cardiac risks  INTERPRETATION: 1 risks: Class II (low risk - 0.9% complication rate)    The patient has the following additional risks for perioperative  complications:  No identified additional risks  Morbid obesity      ICD-10-CM    1. Preop general physical exam Z01.818 CBC with platelets     Comprehensive metabolic panel     UA reflex to Microscopic and Culture     ESR: Erythrocyte sedimentation rate     Hemoglobin A1c   2. Primary osteoarthritis of left knee M17.12    3. Aortic valve insufficiency I35.1    4. Diastolic dysfunction grade 1 on 2/21/19 I51.89 stable   5. PSVT (paroxysmal supraventricular tachycardia) (H) I47.1 stable   6. COPD, mild on 9/9/14 J44.9 stable   7. Anxiety F41.9 Controlled on Paxil   8. Abnormal glucose R73.09 Hemoglobin A1c   9. Special screening for malignant neoplasms, colon Z12.11 Insurance won't cover cologuard testing. She is given a stool guaiac kit to use. She had a positive guaiac in the past and declined colonoscopy       RECOMMENDATIONS:       Cardiovascular Risk  Performs 4 METs exercise without symptoms (Light housework (dusting, washing dishes)) .       --Patient is to take all scheduled medications on the day of surgery    Urine is borderline, will await culture to see if needs treatment for possible UTI  Culture was not done as it didn't meet criteria for culture.   Ok for surgery    APPROVAL GIVEN to proceed with proposed procedure, without further diagnostic evaluation       Signed Electronically by: Magaly Sosa MD    Copy of this evaluation report is provided to requesting physician.    [facility] Preop Guidelines    Revised Cardiac Risk Index

## 2019-08-23 NOTE — H&P (VIEW-ONLY)
[facility] [place]  [address]  [place] [address]  [phone]  Dept: [phone]    PRE-OP EVALUATION:  Today's date: 2019    Dinorah Lim (: 1940) presents for pre-operative evaluation assessment as requested by Dr. Alonzo.  She requires evaluation and anesthesia risk assessment prior to undergoing surgery/procedure for treatment of knee pain .    Proposed Surgery/ Procedure: Left total knee arthroplasty  Date of Surgery/ Procedure: 19  Time of Surgery/ Procedure: to be determined  [place] [facility] [place] for [place] facility:   Primary Physician: Magaly Sosa  Type of Anesthesia Anticipated: to be determined    Patient has a Health Care Directive or Living Will:  NO    1. NO - Do you have a history of heart attack, stroke, stent, bypass or surgery on an artery in the head, neck, heart or legs?  2. NO - Do you ever have any pain or discomfort in your chest?  3. NO - Do you have a history of  Heart Failure?  4. NO - Are you troubled by shortness of breath when: walking on the level, up a slight hill or at night?  5. NO - Do you currently have a cold, bronchitis or other respiratory infection?  6. NO - Do you have a cough, shortness of breath or wheezing?  7. NO - Do you sometimes get pains in the calves of your legs when you walk?  8. NO - Do you or anyone in your family have previous history of blood clots?  9. NO - Do you or does anyone in your family have a serious bleeding problem such as prolonged bleeding following surgeries or cuts?  10. NO - Have you ever had problems with anemia or been told to take iron pills?  11. NO - Have you had any abnormal blood loss such as black, tarry or bloody stools, or abnormal vaginal bleeding?  12. NO - Have you ever had a blood transfusion?  13. NO - Have you or any of your relatives ever had problems with anesthesia?  14. NO - Do you have sleep apnea, excessive snoring or daytime drowsiness?  15. NO - Do  you have any prosthetic heart valves?  16. NO - Do you have prosthetic joints?  17. NO - Is there any chance that you may be pregnant?      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 Patient has been doing well overall noting NO SYMPTOMS and continues on medication regimen consisting of no medications without adverse reactions or side effects.    HYPERLIPIDEMIA - Patient has a long history of significant Hyperlipidemia requiring medication for treatment with recent good control. Patient reports no problems or side effects with the medication.     HYPERTENSION - Patient has longstanding history of HTN , currently denies any symptoms referable to elevated blood pressure. Specifically denies chest pain, palpitations, dyspnea, orthopnea, PND or peripheral edema. Blood pressure readings have been in normal range. Current medication regimen is as listed below. Patient denies any side effects of medication.       MEDICAL HISTORY:     Patient Active Problem List    Diagnosis Date Noted     Aortic valve insufficiency 03/06/2019     Priority: Medium     Mitral regurgitation 03/06/2019     Priority: Medium     Tricuspid valve insufficiency 03/06/2019     Priority: Medium     Diastolic dysfunction grade 1 on 2/21/19 03/06/2019     Priority: Medium     Hypertriglyceridemia 03/06/2019     Priority: Medium     Palpitations 02/13/2019     Priority: Medium     PVC's (premature ventricular contractions) 02/13/2019     Priority: Medium     Atrial ectopy 02/13/2019     Priority: Medium     PSVT (paroxysmal supraventricular tachycardia) (H) 02/13/2019     Priority: Medium     COPD, mild on 9/9/14 02/13/2019     Priority: Medium     Bilateral carotid artery stenosis at less than 50% on 12/1/16 02/13/2019     Priority: Medium     Mixed hyperlipidemia 02/13/2019     Priority: Medium     On statin therapy 02/13/2019     Priority: Medium      Heart murmur 02/13/2019     Priority: Medium     Morbid obesity (H) 06/01/2017     Priority: Medium     ACP (advance care planning) 04/28/2016     Priority: Medium     Advance Care Planning 4/28/2016: ACP Review of Chart / Resources Provided:  Reviewed chart for advance care plan.  Dinorah Lim has no plan or code status on file. Discussed available resources and provided with information. Confirmed code status reflects current choices pending further ACP discussions.  Confirmed/documented legally designated decision makers.  Added by Aditi Gandhi             Anxiety 06/23/2014     Priority: Medium     Lung density on x-ray 07/23/2013     Priority: Medium     Osteoarthritis 06/14/2012     Priority: Medium     Problem list name updated by automated process. Provider to review       Advanced care planning/counseling discussion 01/13/2012     Priority: Medium     Abnormal glucose 08/01/2011     Priority: Medium     Hgb A1c 5.7 on 1/4/2015  Problem list name updated by automated process. Provider to review       Osteoarthritis of right hip 10/07/2004     Priority: Medium     Urticaria 06/01/2004     Priority: Medium     Problem list name updated by automated process. 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    Anterior Segment V 4 cm nodule abutting liver surface by US 1/4/2015      Obesity, Class II, BMI 35-39.9 1/4/2015    BMI 35.9 with comorbidities =  MORBID obesity     Osteoarthritis of right hip 10/07/2004     Osteoarthrosis 06/14/2012     Otitis externa 10/04/2011     Prediabetes 08/01/2011     Past Surgical History:   Procedure Laterality Date     ARTHROSCOPY KNEE Left 3/21/2019    Procedure: LEFT  KNEE ARTHROSCOPY, partial medial menisectomy;  Surgeon: Esteban Wills MD;  Location: [facility] OR     CLOSED REDUCTION WRIST Right 1952 x 2    long arm cast (same time as elbow fx)     CLOSED RX ELBOW DISLOCATION Right 1952    CR/long cast of fracture     HC INJ EPIDURAL LUMBAR/SACRAL W/WO CONTRAST Bilateral 5/2013    facet injections; prev 2012     LAPAROSCOPIC CHOLECYSTECTOMY N/A 1/4/2015    Procedure: LAPAROSCOPIC CHOLECYSTECTOMY;  Surgeon: Brittney العلي MD;  Location: [facility] OR     TONSILLECTOMY       Current Outpatient Medications   Medication Sig Dispense Refill     Calcium-Magnesium-Vitamin D (CALCIUM 1200+D3 PO) Take by mouth daily       clonazePAM (KLONOPIN) 1 MG tablet TAKE 1/4 TO 1/2 (ONE-FOURTH TO ONE-HALF) TABLET BY MOUTH EVERY 8 HOURS AS NEEDED 45 tablet 0     FISH OIL Take 1 capsule by mouth daily        HYDROcodone-acetaminophen (NORCO) 5-325 MG tablet Take 1 tablet by mouth every 4-6 hours as needed 60 tablet 0     PARoxetine (PAXIL) 40 MG tablet TAKE 1 TABLET BY MOUTH ONCE DAILY 90 tablet 2     simvastatin (ZOCOR) 20 MG tablet TAKE ONE TABLET BY MOUTH AT BEDTIME 90 tablet 2     VITAMIN D, CHOLECALCIFEROL, PO Take 2,000 Units by mouth daily       OTC products: None, except as noted above    Allergies   Allergen Reactions     Chocolate Hives     Lemon Flavor Hives     Lime [Calcium Oxysulfide] Hives     Orange Fruit [Citrus] Hives     Strawberry Hives     Adhesive Tape      Band-aids     Codeine Hives     Patient can tolerate oxycodone & dilaudid     Decadron [Dexamethasone] Hives     Erythromycin Hives     ERYTHROMYCIN BASE     Grapefruit [Extra Strength Grapefruit]      GRAPEFRUIT     Penicillins Hives     Sulfa Drugs      SULFONAMIDE ANTIBIOTICS  "     Tomato      Xyzal [Levocetirizine] Hives      Latex Allergy: NO    Social History     Tobacco Use     Smoking status: Never Smoker     Smokeless tobacco: Never Used   Substance Use Topics     Alcohol use: No     History   Drug Use No       REVIEW OF SYSTEMS:   CONSTITUTIONAL: NEGATIVE for fever, chills, change in weight  INTEGUMENTARY/SKIN: NEGATIVE for worrisome rashes, moles or lesions  EYES: NEGATIVE for vision changes or irritation  ENT/MOUTH: NEGATIVE for ear, mouth and throat problems  RESP: NEGATIVE for significant cough or SOB  BREAST: NEGATIVE for masses, tenderness or discharge  CV: NEGATIVE for chest pain, palpitations or peripheral edema  CV: rare palpitation  GI: NEGATIVE for nausea, abdominal pain, heartburn, or change in bowel habits  : NEGATIVE for frequency, dysuria, or hematuria  MUSCULOSKELETAL: NEGATIVE for significant arthralgias or myalgia  NEURO: NEGATIVE for weakness, dizziness or paresthesias  ENDOCRINE: NEGATIVE for temperature intolerance, skin/hair changes  HEME: NEGATIVE for bleeding problems  PSYCHIATRIC: NEGATIVE for changes in mood or affect    EXAM:   /74   Pulse 71   Temp 98  F (36.7  C) (Tympanic)   Resp 19   Ht 1.676 m (5' 6\")   Wt 105.2 kg (232 lb)   SpO2 98%   BMI 37.45 kg/m      GENERAL APPEARANCE: healthy, alert and no distress     EYES: EOMI, PERRL     HENT: ear canals and TM's normal and nose and mouth without ulcers or lesions     NECK: no adenopathy, no asymmetry, masses, or scars and thyroid normal to palpation     RESP: lungs clear to auscultation - no rales, rhonchi or wheezes     CV: regular rates and rhythm, normal S1 S2, no S3 or S4 and no murmur, click or rub     ABDOMEN:  soft, nontender, no HSM or masses and bowel sounds normal     MS: extremities normal- no gross deformities noted, no evidence of inflammation in joints, FROM in all extremities.     MS: DJD of both knees     SKIN: no suspicious lesions or rashes     NEURO: Normal strength and " tone, sensory exam grossly normal, mentation intact and speech normal     PSYCH: mentation appears normal. and affect normal/bright     LYMPHATICS: No cervical adenopathy    DIAGNOSTICS:     EKG: appears normal, NSR, normal axis, normal intervals, no acute ST/T changes c/w ischemia, no LVH by voltage criteria, done 5//23/19  Labs Resulted Today:   Results for orders placed or performed during the hospital encounter of 07/04/19   XR Foot Right 3 Views    Narrative    XR FOOT RT G/E 3 VW    HISTORY: 78 years Female hit middle toe with cane    COMPARISON: None    TECHNIQUE: Right foot 3 views    FINDINGS: There is a nondisplaced oblique fracture of the proximal  phalanx of the third digit. Joint spaces are congruent. There is  normal displacement and no significant angulation.      Impression    IMPRESSION: Obliquely oriented fracture of the third proximal phalanx  with minimal displacement and no significant angulation.    ALMA GALLOWAY MD       Recent Labs   Lab Test 05/23/19  1947 03/15/19  0933  10/03/17  0859  06/01/14 2010   HGB 13.1 12.7   < >  --    < > 12.2    142*   < >  --    < > 145*   INR  --   --   --   --   --  1.29*    140   < >  --    < >  --    POTASSIUM 4.1 4.1   < >  --    < >  --    CR 1.00 0.99   < >  --    < >  --    A1C  --  5.4  --  5.4   < >  --     < > = values in this interval not displayed.      Results for orders placed or performed in visit on 08/26/19   CBC with platelets   Result Value Ref Range    WBC 4.2 4.0 - 11.0 10e9/L    RBC Count 4.61 3.8 - 5.2 10e12/L    Hemoglobin 13.4 11.7 - 15.7 g/dL    Hematocrit 41.5 35.0 - 47.0 %    MCV 90 78 - 100 fl    MCH 29.1 26.5 - 33.0 pg    MCHC 32.3 31.5 - 36.5 g/dL    RDW 13.9 10.0 - 15.0 %    Platelet Count 177 150 - 450 10e9/L   Comprehensive metabolic panel   Result Value Ref Range    Sodium 138 133 - 144 mmol/L    Potassium 4.0 3.4 - 5.3 mmol/L    Chloride 105 94 - 109 mmol/L    Carbon Dioxide 30 20 - 32 mmol/L    Anion Gap 3  3 - 14 mmol/L    Glucose 85 70 - 99 mg/dL    Urea Nitrogen 17 7 - 30 mg/dL    Creatinine 0.94 0.52 - 1.04 mg/dL    GFR Estimate 59 (L) >60 mL/min/[1.73_m2]    GFR Estimate If Black 68 >60 mL/min/[1.73_m2]    Calcium 9.9 8.5 - 10.1 mg/dL    Bilirubin Total 0.4 0.2 - 1.3 mg/dL    Albumin 3.7 3.4 - 5.0 g/dL    Protein Total 7.1 6.8 - 8.8 g/dL    Alkaline Phosphatase 105 40 - 150 U/L    ALT 25 0 - 50 U/L    AST 16 0 - 45 U/L   UA reflex to Microscopic and Culture   Result Value Ref Range    Color Urine Yellow     Appearance Urine Slightly Cloudy     Glucose Urine Negative NEG^Negative mg/dL    Bilirubin Urine Negative NEG^Negative    Ketones Urine Negative NEG^Negative mg/dL    Specific Gravity Urine 1.022 1.003 - 1.035    Blood Urine Negative NEG^Negative    pH Urine 5.0 4.7 - 8.0 pH    Protein Albumin Urine Negative NEG^Negative mg/dL    Urobilinogen mg/dL Normal 0.0 - 2.0 mg/dL    Nitrite Urine Negative NEG^Negative    Leukocyte Esterase Urine Large (A) NEG^Negative    Source Midstream Urine     RBC Urine 1 0 - 2 /HPF    WBC Urine 17 (H) 0 - 5 /HPF    Bacteria Urine Few (A) NEG^Negative /HPF    Squamous Epithelial /HPF Urine 17 (H) 0 - 1 /HPF    Mucous Urine Present (A) NEG^Negative /LPF   ESR: Erythrocyte sedimentation rate   Result Value Ref Range    Sed Rate 13 0 - 30 mm/h   Hemoglobin A1c   Result Value Ref Range    Hemoglobin A1C 5.2 0 - 5.6 %   Estimated Average Glucose   Result Value Ref Range    Estimated Average Glucose 103 mg/dL       IMPRESSION:   Reason for surgery/procedure: end stage DJD of the left knee    The proposed surgical procedure is considered INTERMEDIATE risk.    REVISED CARDIAC RISK INDEX  The patient has the following serious cardiovascular risks for perioperative complications such as (MI, PE, VFib and 3  AV Block):  No serious cardiac risks  INTERPRETATION: 1 risks: Class II (low risk - 0.9% complication rate)    The patient has the following additional risks for perioperative  complications:  No identified additional risks  Morbid obesity      ICD-10-CM    1. Preop general physical exam Z01.818 CBC with platelets     Comprehensive metabolic panel     UA reflex to Microscopic and Culture     ESR: Erythrocyte sedimentation rate     Hemoglobin A1c   2. Primary osteoarthritis of left knee M17.12    3. Aortic valve insufficiency I35.1    4. Diastolic dysfunction grade 1 on 2/21/19 I51.89 stable   5. PSVT (paroxysmal supraventricular tachycardia) (H) I47.1 stable   6. COPD, mild on 9/9/14 J44.9 stable   7. Anxiety F41.9 Controlled on Paxil   8. Abnormal glucose R73.09 Hemoglobin A1c   9. Special screening for malignant neoplasms, colon Z12.11 Insurance won't cover cologuard testing. She is given a stool guaiac kit to use. She had a positive guaiac in the past and declined colonoscopy       RECOMMENDATIONS:       Cardiovascular Risk  Performs 4 METs exercise without symptoms (Light housework (dusting, washing dishes)) .       --Patient is to take all scheduled medications on the day of surgery    Urine is borderline, will await culture to see if needs treatment for possible UTI  Culture was not done as it didn't meet criteria for culture.   Ok for surgery    APPROVAL GIVEN to proceed with proposed procedure, without further diagnostic evaluation       Signed Electronically by: Magaly Sosa MD    Copy of this evaluation report is provided to requesting physician.    [facility] Preop Guidelines    Revised Cardiac Risk Index

## 2019-08-26 ENCOUNTER — OFFICE VISIT (OUTPATIENT)
Dept: FAMILY MEDICINE | Facility: OTHER | Age: 79
End: 2019-08-26
Attending: FAMILY MEDICINE
Payer: COMMERCIAL

## 2019-08-26 VITALS
RESPIRATION RATE: 19 BRPM | HEIGHT: 66 IN | SYSTOLIC BLOOD PRESSURE: 126 MMHG | OXYGEN SATURATION: 98 % | BODY MASS INDEX: 37.28 KG/M2 | WEIGHT: 232 LBS | TEMPERATURE: 98 F | HEART RATE: 71 BPM | DIASTOLIC BLOOD PRESSURE: 74 MMHG

## 2019-08-26 DIAGNOSIS — Z01.818 PREOP GENERAL PHYSICAL EXAM: Primary | ICD-10-CM

## 2019-08-26 DIAGNOSIS — M17.12 PRIMARY OSTEOARTHRITIS OF LEFT KNEE: ICD-10-CM

## 2019-08-26 DIAGNOSIS — F41.9 ANXIETY: ICD-10-CM

## 2019-08-26 DIAGNOSIS — I51.89 DIASTOLIC DYSFUNCTION: ICD-10-CM

## 2019-08-26 DIAGNOSIS — I35.1 NONRHEUMATIC AORTIC VALVE INSUFFICIENCY: ICD-10-CM

## 2019-08-26 DIAGNOSIS — J44.9 COPD, MILD (H): ICD-10-CM

## 2019-08-26 DIAGNOSIS — Z12.11 SPECIAL SCREENING FOR MALIGNANT NEOPLASMS, COLON: ICD-10-CM

## 2019-08-26 DIAGNOSIS — R73.09 ABNORMAL GLUCOSE: ICD-10-CM

## 2019-08-26 DIAGNOSIS — I47.10 PSVT (PAROXYSMAL SUPRAVENTRICULAR TACHYCARDIA) (H): ICD-10-CM

## 2019-08-26 LAB
ALBUMIN SERPL-MCNC: 3.7 G/DL (ref 3.4–5)
ALBUMIN UR-MCNC: NEGATIVE MG/DL
ALP SERPL-CCNC: 105 U/L (ref 40–150)
ALT SERPL W P-5'-P-CCNC: 25 U/L (ref 0–50)
ANION GAP SERPL CALCULATED.3IONS-SCNC: 3 MMOL/L (ref 3–14)
APPEARANCE UR: ABNORMAL
AST SERPL W P-5'-P-CCNC: 16 U/L (ref 0–45)
BACTERIA #/AREA URNS HPF: ABNORMAL /HPF
BILIRUB SERPL-MCNC: 0.4 MG/DL (ref 0.2–1.3)
BILIRUB UR QL STRIP: NEGATIVE
BUN SERPL-MCNC: 17 MG/DL (ref 7–30)
CALCIUM SERPL-MCNC: 9.9 MG/DL (ref 8.5–10.1)
CHLORIDE SERPL-SCNC: 105 MMOL/L (ref 94–109)
CO2 SERPL-SCNC: 30 MMOL/L (ref 20–32)
COLOR UR AUTO: YELLOW
CREAT SERPL-MCNC: 0.94 MG/DL (ref 0.52–1.04)
ERYTHROCYTE [DISTWIDTH] IN BLOOD BY AUTOMATED COUNT: 13.9 % (ref 10–15)
ERYTHROCYTE [SEDIMENTATION RATE] IN BLOOD BY WESTERGREN METHOD: 13 MM/H (ref 0–30)
EST. AVERAGE GLUCOSE BLD GHB EST-MCNC: 103 MG/DL
GFR SERPL CREATININE-BSD FRML MDRD: 59 ML/MIN/{1.73_M2}
GLUCOSE SERPL-MCNC: 85 MG/DL (ref 70–99)
GLUCOSE UR STRIP-MCNC: NEGATIVE MG/DL
HBA1C MFR BLD: 5.2 % (ref 0–5.6)
HCT VFR BLD AUTO: 41.5 % (ref 35–47)
HGB BLD-MCNC: 13.4 G/DL (ref 11.7–15.7)
HGB UR QL STRIP: NEGATIVE
KETONES UR STRIP-MCNC: NEGATIVE MG/DL
LEUKOCYTE ESTERASE UR QL STRIP: ABNORMAL
MCH RBC QN AUTO: 29.1 PG (ref 26.5–33)
MCHC RBC AUTO-ENTMCNC: 32.3 G/DL (ref 31.5–36.5)
MCV RBC AUTO: 90 FL (ref 78–100)
MUCOUS THREADS #/AREA URNS LPF: PRESENT /LPF
NITRATE UR QL: NEGATIVE
PH UR STRIP: 5 PH (ref 4.7–8)
PLATELET # BLD AUTO: 177 10E9/L (ref 150–450)
POTASSIUM SERPL-SCNC: 4 MMOL/L (ref 3.4–5.3)
PROT SERPL-MCNC: 7.1 G/DL (ref 6.8–8.8)
RBC # BLD AUTO: 4.61 10E12/L (ref 3.8–5.2)
RBC #/AREA URNS AUTO: 1 /HPF (ref 0–2)
SODIUM SERPL-SCNC: 138 MMOL/L (ref 133–144)
SOURCE: ABNORMAL
SP GR UR STRIP: 1.02 (ref 1–1.03)
SQUAMOUS #/AREA URNS AUTO: 17 /HPF (ref 0–1)
UROBILINOGEN UR STRIP-MCNC: NORMAL MG/DL (ref 0–2)
WBC # BLD AUTO: 4.2 10E9/L (ref 4–11)
WBC #/AREA URNS AUTO: 17 /HPF (ref 0–5)

## 2019-08-26 PROCEDURE — 81001 URINALYSIS AUTO W/SCOPE: CPT | Mod: ZL | Performed by: FAMILY MEDICINE

## 2019-08-26 PROCEDURE — G0463 HOSPITAL OUTPT CLINIC VISIT: HCPCS

## 2019-08-26 PROCEDURE — 83036 HEMOGLOBIN GLYCOSYLATED A1C: CPT | Mod: ZL | Performed by: FAMILY MEDICINE

## 2019-08-26 PROCEDURE — 99215 OFFICE O/P EST HI 40 MIN: CPT | Performed by: FAMILY MEDICINE

## 2019-08-26 PROCEDURE — 36415 COLL VENOUS BLD VENIPUNCTURE: CPT | Mod: ZL | Performed by: FAMILY MEDICINE

## 2019-08-26 PROCEDURE — 80053 COMPREHEN METABOLIC PANEL: CPT | Mod: ZL | Performed by: FAMILY MEDICINE

## 2019-08-26 PROCEDURE — 85027 COMPLETE CBC AUTOMATED: CPT | Mod: ZL | Performed by: FAMILY MEDICINE

## 2019-08-26 PROCEDURE — 40000788 ZZHCL STATISTIC ESTIMATED AVERAGE GLUCOSE: Mod: ZL | Performed by: FAMILY MEDICINE

## 2019-08-26 PROCEDURE — 85652 RBC SED RATE AUTOMATED: CPT | Mod: ZL | Performed by: FAMILY MEDICINE

## 2019-08-26 ASSESSMENT — MIFFLIN-ST. JEOR: SCORE: 1559.1

## 2019-08-26 ASSESSMENT — PAIN SCALES - GENERAL: PAINLEVEL: SEVERE PAIN (6)

## 2019-08-26 NOTE — NURSING NOTE
"Chief Complaint   Patient presents with     Pre-Op Exam       Initial /74   Pulse 71   Temp 98  F (36.7  C) (Tympanic)   Resp 19   Ht 1.676 m (5' 6\")   Wt 105.2 kg (232 lb)   SpO2 98%   BMI 37.45 kg/m   Estimated body mass index is 37.45 kg/m  as calculated from the following:    Height as of this encounter: 1.676 m (5' 6\").    Weight as of this encounter: 105.2 kg (232 lb).  Medication Reconciliation: complete   Aditi Saleh LPN      "

## 2019-09-03 ENCOUNTER — ANESTHESIA EVENT (OUTPATIENT)
Dept: SURGERY | Facility: HOSPITAL | Age: 79
DRG: 470 | End: 2019-09-03
Payer: MEDICARE

## 2019-09-03 ASSESSMENT — COPD QUESTIONNAIRES
COPD: 1
CAT_SEVERITY: MILD

## 2019-09-03 NOTE — ANESTHESIA PREPROCEDURE EVALUATION
Anesthesia Pre-Procedure Evaluation    Patient: Dinorah Lim   MRN: [id number] : 1940          Preoperative Diagnosis: DJD LEFT KNEE    Procedure(s):  LEFT TOTAL KNEE ARTHROPLASTY S/N    Past Medical History:   Diagnosis Date     Anxiety 2011     Cholecystolithiasis 2015    noted on US 2015 --> cholecystectomy     Chronic kidney disease (CKD), stage III (moderate) (H)     Early,unknown eitiology, Dr Vilchis     Chronic kidney disease (CKD), stage III (moderate) (H) 2015    Early,unknown eitiology, Dr Vilchis      Cough 2001     Hiatal hernia 2014    Seen on chest CT     Hyperlipidemia 2001     Idiopathic hives since age 8 2004     Major depression 10/04/2011     Neoplasm of uncertain behavior of liver 2015    Anterior Segment V 4 cm nodule abutting liver surface by US 2015      Obesity, Class II, BMI 35-39.9 2015    BMI 35.9 with comorbidities = MORBID obesity     Osteoarthritis of right hip 10/07/2004     Osteoarthrosis 2012     Otitis externa 10/04/2011     Prediabetes 2011     Past Surgical History:   Procedure Laterality Date     ARTHROSCOPY KNEE Left 3/21/2019    Procedure: LEFT  KNEE ARTHROSCOPY, partial medial menisectomy;  Surgeon: Esteban Wills MD;  Location: [facility]     CLOSED REDUCTION WRIST Right 1952 x 2    long arm cast (same time as elbow fx)     CLOSED RX ELBOW DISLOCATION Right     CR/long cast of fracture     HC INJ EPIDURAL LUMBAR/SACRAL W/WO CONTRAST Bilateral 2013    facet injections; prev      LAPAROSCOPIC CHOLECYSTECTOMY N/A 2015    Procedure: LAPAROSCOPIC CHOLECYSTECTOMY;  Surgeon: Brittney العلي MD;  Location: [facility]     TONSILLECTOMY         Anesthesia Evaluation     . Pt has had prior anesthetic.     No history of anesthetic complications          ROS/MED HX    ENT/Pulmonary:     (+)DORY risk factors (BMI: 37.45) obese, mild COPD, , . .    Neurologic:  - neg neurologic ROS      Cardiovascular:     (+) Dyslipidemia, -Peripheral Vascular Disease-- Carotid Stenosis, --. : . . . :. dysrhythmias (PSVT (paroxysmal supraventricular tachycardia)) Other, Irregular Heartbeat/Palpitations, valvular problems/murmurs type: MR tricuspid insufficiecy:. Previous cardiac testing Echodate:[id number]:Interpretation Summary  No pericardial effusion is present.  Global and regional left ventricular function is hyperkinetic with an EF of  65-70%.  Grade I or early diastolic dysfunction.  The right ventricle is normal size.  Global right ventricular function is normal.  Mild left atrial enlargement is present.  Trace to mild mitral insufficiency is present.  The aortic valve is tricuspid.  Mild to moderate aortic insufficiency is present.  Trace to mild tricuspid insufficiency is present.  The peak velocity of the tricuspid regurgitant jet is not obtainable.  The pulmonic valve is normal.  The aorta root is normal.  _____________________________________________________________________________  __        Left Ventricle  Global and regional left ventricular function is hyperkinetic with an EF of  65-70%. Grade I or early diastolic dysfunction.     Right Ventricle  The right ventricle is normal size. Global right ventricular function is  normal.     Atria  Mild left atrial enlargement is present.     Mitral Valve  Trace to mild mitral insufficiency is present.     Aortic Valve  The aortic valve is tricuspid. Mild to moderate aortic insufficiency is  present. The mean AoV pressure gradient is 6.0 mmHg.        Tricuspid Valve  Trace to mild tricuspid insufficiency is present. The peak velocity of the  tricuspid regurgitant jet is not obtainable.     Pulmonic Valve  The pulmonic valve is normal.     Vessels  The aorta root is normal.     Pericardium  No pericardial effusion is present.     _____________________________________________________________________________  __  MMode/2D Measurements &  Calculations  RVDd: 1.6 cm  IVSd: 0.88 cm  IVSs: 1.3 cm  LVIDd: 5.4 cm  LVIDs: 3.3 cm  LVPWd: 0.88 cm  LVPWs: 1.4 cm  FS: 39.3 %  LV mass(C)d: 174.1 grams  LV mass(C)dI: 81.8 grams/m2  LV mass(C)sI: 70.9 grams/m2  Ao root diam: 3.0 cm  LA dimension: 4.3 cm  LA/Ao: 1.5  LVOT diam: 1.8 cm  LVOT area: 2.6 cm2     RWT: 0.33     Time Measurements  Pulm. HR: 170.3 BPM     Doppler Measurements & Calculations  MV E max isaac: 107.7 cm/sec  MV A max isaac: 143.0 cm/sec  MV E/A: 0.75  MV dec slope: 657.4 cm/sec2  MV dec time: 0.16 sec  Ao V2 max: 169.8 cm/sec  Ao max P.5 mmHg  Ao V2 mean: 113.1 cm/sec  Ao mean P.0 mmHg  Ao V2 VTI: 38.5 cm  JULIANA(I,D): 2.0 cm2  JULIANA(V,D): 2.0 cm2  AI P1/2t: 404.9 msec  LV V1 max P.9 mmHg  LV V1 max: 131.2 cm/sec  LV V1 VTI: 29.1 cm  CO(LVOT): 13.4 l/min  CI(LVOT): 6.3 l/min/m2  SV(LVOT): 75.1 ml  SI(LVOT): 35.3 ml/m2  PA V2 max: 89.8 cm/sec  PA max PG: 3.2 mmHg  PA mean P.9 mmHg  PA V2 VTI: 22.2 cm  AV Isaac Ratio (DI): 0.77  JULIANA Index (cm2/m2): 0.92  E/E': 19.3     Peak E' Isaac: 5.6 cm/sec     _____________________________________________________________________________  __           Report approved by: Shayy Arriola 2019 04:00 PM           Specimen Collected: 19 07:52 Last Resulted: 19 07:52 Order Details View Encounter Lab and Collection Details Routing Result History      Linked Documents       View Image      All Reviewers List     Ha Hughes,  on 2019 19:39  Encounter     View Encounter       Lab Information     CARDIOLOGY RESULTS      Signed     Electronically addended by Bertrand Puentes MD on 19 at 0752 CST  Electronically signed by Bertrand Puentes MD on 19 at 1600 CST  Additional Information     Specimen ID Bill Type Client ID  [id number]         Specimen Date Taken Specimen Time Taken Specimen Received Date Specimen Received Time Result Date Result Time  2019  7:52 AM   2019  7:52 AM  Recipient List for  Orders   Sent From To Cc'd Forwarded To Results  9/3/2019  8:55 AM       Echocardiogram Complete ([id number])         Patient Release Status:     This result is not viewable because no one can access it in [facility].       Exam Information     Exam Date Exam Time Exam Date Exam Time Accession # Performing Department Results   2/21/19  7:52 AM 2/21/19  8:36 AM [id number] [facility]   Order History   Inpatient   Date/Time Action Taken User Additional Information  02/21/19 0746 Release Mery Germain From Order: [id number]  02/21/19 0747 Result DonovankamaljitEddaKassandra In process  02/21/19 0752 Result Interface, Cupid In Final-Edited  02/21/19 1600 Result Interface, Cupid In Final    date: results:ECG reviewed date:5-23-19 results:nsrCath date: bilat carotid ultrasound results:   FINDINGS: There is atherosclerotic plaquing at both carotid  bifurcations.     On the right: In the common carotid artery peak systolic velocity  measured 76 cm/s. End-diastolic velocity measured 15 cm/s. In the  internal carotid artery peak systolic velocity measured 62 cm/s. The  end-diastolic velocity measured 17 cm/s. Forward flow was demonstrated  in the right vertebral.     On the left: In the common carotid artery peak systolic velocity  measured 84 cm/s. The end-diastolic velocity measured 15 cm/s. In the  internal carotid artery peak systolic velocity measured 69 cm/s. The  end-diastolic velocity measured 19 cm/s. Forward flow was demonstrated  in the left vertebral.                                                                        IMPRESSION: Atherosclerotic plaquing at both carotid bifurcations. No  hemodynamically significant stenosis is noted.     RAFIA           METS/Exercise Tolerance:     Hematologic:  - neg hematologic  ROS       Musculoskeletal:   (+) arthritis,  other musculoskeletal- DJD      GI/Hepatic:     (+) hiatal hernia, liver disease,       Renal/Genitourinary: Comment: ckd stage 3    (+) chronic  "renal disease,       Endo:     (+) Obesity, .      Psychiatric:     (+) psychiatric history anxiety and depression      Infectious Disease:         Malignancy:      - no malignancy   Other:    - neg other ROS                      Physical Exam  Normal systems: cardiovascular and pulmonary    Airway   Mallampati: II  TM distance: >3 FB  Neck ROM: full    Dental   (+) upper dentures, lower dentures and missing    Cardiovascular   Rhythm and rate: regular and normal      Pulmonary    breath sounds clear to auscultation            Lab Results   Component Value Date    WBC 4.2 08/26/2019    HGB 13.4 08/26/2019    HCT 41.5 08/26/2019     08/26/2019    CRP <2.9 09/27/2018    SED 13 08/26/2019     08/26/2019    POTASSIUM 4.0 08/26/2019    CHLORIDE 105 08/26/2019    CO2 30 08/26/2019    BUN 17 08/26/2019    CR 0.94 08/26/2019    GLC 85 08/26/2019    NAFISA 9.9 08/26/2019    PHOS 3.5 01/28/2015    MAG 1.9 01/05/2015    ALBUMIN 3.7 08/26/2019    PROTTOTAL 7.1 08/26/2019    ALT 25 08/26/2019    AST 16 08/26/2019    ALKPHOS 105 08/26/2019    BILITOTAL 0.4 08/26/2019    LIPASE 150 09/27/2018    AMYLASE 40 12/12/2016    PTT 24 06/01/2014    INR 1.29 (H) 06/01/2014    TSH 0.83 12/24/2018    TROPN 0.05 06/01/2014       Preop Vitals  BP Readings from Last 3 Encounters:   08/26/19 126/74   07/15/19 148/74   07/13/19 139/68    Pulse Readings from Last 3 Encounters:   08/26/19 71   07/15/19 65   07/03/19 71      Resp Readings from Last 3 Encounters:   08/26/19 19   07/15/19 18   07/13/19 20    SpO2 Readings from Last 3 Encounters:   08/26/19 98%   07/15/19 97%   07/13/19 96%      Temp Readings from Last 1 Encounters:   08/26/19 98  F (36.7  C) (Tympanic)    Ht Readings from Last 1 Encounters:   08/26/19 1.676 m (5' 6\")      Wt Readings from Last 1 Encounters:   08/26/19 105.2 kg (232 lb)    Estimated body mass index is 37.45 kg/m  as calculated from the following:    Height as of 8/26/19: 1.676 m (5' 6\").    Weight as of " 8/26/19: 105.2 kg (232 lb).       Anesthesia Plan      History & Physical Review  History and physical reviewed and following examination; no interval change.    ASA Status:  3 .    NPO Status:  > 8 hours    Plan for Spinal and Periph. Nerve Block for postop pain with Intravenous and Propofol induction. Maintenance will be TIVA.    PONV prophylaxis:  Ondansetron (or other 5HT-3) and Dexamethasone or Solumedrol       Postoperative Care  Postoperative pain management:  IV analgesics, Oral pain medications, Neuraxial analgesia and Peripheral nerve block (Single Shot).      Consents  Anesthetic plan, risks, benefits and alternatives discussed with:  Patient..                 RENA Cunningham CRNA

## 2019-09-03 NOTE — OR NURSING
We have Dinorah Lim : [birth year] coming in for left total knee arthroplasty by Dr. Alonzo on 19.  Her PCP is Dr. SHAHZAD Sosa.  She attended the total joint class on 18.  She had a left knee scope with Dr. Wills on 3/21/19.  Dinorah lives in [place] with her grandson (since he was 5 y.o. in his 30 s now) and her grandaughter will be staying with them for 3 days post op at home also.  Dinorah wants to go home after hospitalization.  She wants more education on Physical Therapy  coming into her house for the first couple weeks.  She has a one story house, 7 steps to enter home with 2 handrails; laundry is in the basement (grandyobani does laundry), and a walk in corner shower, no grab bars.  Dinorah has a walker she got at the  s shop with a seat on it, has her dad s wh/ch, a cane and high rise toilet seat.  She doesn t have throw rugs but has a 3 # 12 year old Yorkie.      Reviewed the following topics with;  Call don t Fall , CAUTI education, Capnography monitoring, and Signs & Symptoms of Infections to watch/report and prevent.  She verbalized good understanding of all topics discussed.    Thank you,  Cindi Haynes R.N.  Pre-Anesthesia Testing Surgical Education  [place] [address] [address]  [address]  [email or web address]    Office: [phone]  Fax [phone]  Sent to [facility].

## 2019-09-09 ENCOUNTER — ANESTHESIA (OUTPATIENT)
Dept: SURGERY | Facility: HOSPITAL | Age: 79
DRG: 470 | End: 2019-09-09
Payer: MEDICARE

## 2019-09-09 ENCOUNTER — APPOINTMENT (OUTPATIENT)
Dept: PHYSICAL THERAPY | Facility: HOSPITAL | Age: 79
DRG: 470 | End: 2019-09-09
Attending: ORTHOPAEDIC SURGERY
Payer: MEDICARE

## 2019-09-09 ENCOUNTER — APPOINTMENT (OUTPATIENT)
Dept: GENERAL RADIOLOGY | Facility: HOSPITAL | Age: 79
DRG: 470 | End: 2019-09-09
Attending: ORTHOPAEDIC SURGERY
Payer: MEDICARE

## 2019-09-09 ENCOUNTER — HOSPITAL ENCOUNTER (INPATIENT)
Facility: HOSPITAL | Age: 79
LOS: 1 days | Discharge: HOME | DRG: 470 | End: 2019-09-10
Attending: ORTHOPAEDIC SURGERY | Admitting: ORTHOPAEDIC SURGERY
Payer: MEDICARE

## 2019-09-09 DIAGNOSIS — Z96.652 STATUS POST TOTAL LEFT KNEE REPLACEMENT: Primary | ICD-10-CM

## 2019-09-09 PROCEDURE — 27810169 ZZH OR IMPLANT GENERAL: Performed by: ORTHOPAEDIC SURGERY

## 2019-09-09 PROCEDURE — 25000132 ZZH RX MED GY IP 250 OP 250 PS 637: Performed by: ORTHOPAEDIC SURGERY

## 2019-09-09 PROCEDURE — 37000009 ZZH ANESTHESIA TECHNICAL FEE, EACH ADDTL 15 MIN: Performed by: ORTHOPAEDIC SURGERY

## 2019-09-09 PROCEDURE — 40000305 ZZH STATISTIC PRE PROC ASSESS I: Performed by: ORTHOPAEDIC SURGERY

## 2019-09-09 PROCEDURE — 36000063 ZZH SURGERY LEVEL 4 EA 15 ADDTL MIN: Performed by: ORTHOPAEDIC SURGERY

## 2019-09-09 PROCEDURE — 25000125 ZZHC RX 250: Performed by: NURSE ANESTHETIST, CERTIFIED REGISTERED

## 2019-09-09 PROCEDURE — 27210794 ZZH OR GENERAL SUPPLY STERILE: Performed by: ORTHOPAEDIC SURGERY

## 2019-09-09 PROCEDURE — 36000093 ZZH SURGERY LEVEL 4 1ST 30 MIN: Performed by: ORTHOPAEDIC SURGERY

## 2019-09-09 PROCEDURE — 64447 NJX AA&/STRD FEMORAL NRV IMG: CPT | Mod: LT | Performed by: NURSE ANESTHETIST, CERTIFIED REGISTERED

## 2019-09-09 PROCEDURE — 99222 1ST HOSP IP/OBS MODERATE 55: CPT | Mod: 25 | Performed by: NURSE PRACTITIONER

## 2019-09-09 PROCEDURE — 99100 ANES PT EXTEME AGE<1 YR&>70: CPT | Performed by: NURSE ANESTHETIST, CERTIFIED REGISTERED

## 2019-09-09 PROCEDURE — 40000786 ZZHCL STATISTIC ACTIVE MRSA SURVEILLANCE CULTURE: Performed by: ORTHOPAEDIC SURGERY

## 2019-09-09 PROCEDURE — 40000985 XR KNEE PORT LT 1/2 VW: Mod: TC,LT

## 2019-09-09 PROCEDURE — 25000128 H RX IP 250 OP 636: Performed by: NURSE ANESTHETIST, CERTIFIED REGISTERED

## 2019-09-09 PROCEDURE — 25000128 H RX IP 250 OP 636: Performed by: ORTHOPAEDIC SURGERY

## 2019-09-09 PROCEDURE — 97110 THERAPEUTIC EXERCISES: CPT | Mod: GP

## 2019-09-09 PROCEDURE — 25000125 ZZHC RX 250: Performed by: ORTHOPAEDIC SURGERY

## 2019-09-09 PROCEDURE — C1776 JOINT DEVICE (IMPLANTABLE): HCPCS | Performed by: ORTHOPAEDIC SURGERY

## 2019-09-09 PROCEDURE — 97161 PT EVAL LOW COMPLEX 20 MIN: CPT | Mod: GP

## 2019-09-09 PROCEDURE — 88300 SURGICAL PATH GROSS: CPT | Mod: TC | Performed by: ORTHOPAEDIC SURGERY

## 2019-09-09 PROCEDURE — 27110028 ZZH OR GENERAL SUPPLY NON-STERILE: Performed by: ORTHOPAEDIC SURGERY

## 2019-09-09 PROCEDURE — 12000000 ZZH R&B MED SURG/OB

## 2019-09-09 PROCEDURE — 25800030 ZZH RX IP 258 OP 636: Performed by: NURSE ANESTHETIST, CERTIFIED REGISTERED

## 2019-09-09 PROCEDURE — 27447 TOTAL KNEE ARTHROPLASTY: CPT | Performed by: ANESTHESIOLOGY

## 2019-09-09 PROCEDURE — 25800030 ZZH RX IP 258 OP 636: Performed by: ORTHOPAEDIC SURGERY

## 2019-09-09 PROCEDURE — 25000128 H RX IP 250 OP 636: Performed by: ANESTHESIOLOGY

## 2019-09-09 PROCEDURE — 3E0T3BZ INTRODUCTION OF ANESTHETIC AGENT INTO PERIPHERAL NERVES AND PLEXI, PERCUTANEOUS APPROACH: ICD-10-PCS | Performed by: NURSE ANESTHETIST, CERTIFIED REGISTERED

## 2019-09-09 PROCEDURE — 27447 TOTAL KNEE ARTHROPLASTY: CPT | Performed by: NURSE ANESTHETIST, CERTIFIED REGISTERED

## 2019-09-09 PROCEDURE — 71000014 ZZH RECOVERY PHASE 1 LEVEL 2 FIRST HR: Performed by: ORTHOPAEDIC SURGERY

## 2019-09-09 PROCEDURE — 37000008 ZZH ANESTHESIA TECHNICAL FEE, 1ST 30 MIN: Performed by: ORTHOPAEDIC SURGERY

## 2019-09-09 PROCEDURE — 76942 ECHO GUIDE FOR BIOPSY: CPT | Mod: 26 | Performed by: NURSE ANESTHETIST, CERTIFIED REGISTERED

## 2019-09-09 PROCEDURE — C9290 INJ, BUPIVACAINE LIPOSOME: HCPCS | Performed by: ORTHOPAEDIC SURGERY

## 2019-09-09 PROCEDURE — 40000275 ZZH STATISTIC RCP TIME EA 10 MIN

## 2019-09-09 PROCEDURE — 0SRD0J9 REPLACEMENT OF LEFT KNEE JOINT WITH SYNTHETIC SUBSTITUTE, CEMENTED, OPEN APPROACH: ICD-10-PCS | Performed by: ORTHOPAEDIC SURGERY

## 2019-09-09 DEVICE — BONE CEMENT-SIMPLEX W/TOBRAMYCIN: Type: IMPLANTABLE DEVICE | Site: KNEE | Status: FUNCTIONAL

## 2019-09-09 DEVICE — FEMORAL COMPONENT-LEFT SZ 4 JOURNEY II OXIN: Type: IMPLANTABLE DEVICE | Site: KNEE | Status: FUNCTIONAL

## 2019-09-09 DEVICE — PATELLA-JOURNEY 29MM STD: Type: IMPLANTABLE DEVICE | Site: KNEE | Status: FUNCTIONAL

## 2019-09-09 DEVICE — TIBIA BASE-LEFT SZ 4 JOURNEY: Type: IMPLANTABLE DEVICE | Site: KNEE | Status: FUNCTIONAL

## 2019-09-09 DEVICE — IMPLANTABLE DEVICE: Type: IMPLANTABLE DEVICE | Site: KNEE | Status: FUNCTIONAL

## 2019-09-09 RX ORDER — BUPIVACAINE HYDROCHLORIDE 7.5 MG/ML
INJECTION, SOLUTION INTRASPINAL PRN
Status: DISCONTINUED | OUTPATIENT
Start: 2019-09-09 | End: 2019-09-09

## 2019-09-09 RX ORDER — DIPHENHYDRAMINE HYDROCHLORIDE 50 MG/ML
25 INJECTION INTRAMUSCULAR; INTRAVENOUS
Status: DISCONTINUED | OUTPATIENT
Start: 2019-09-09 | End: 2019-09-09 | Stop reason: HOSPADM

## 2019-09-09 RX ORDER — LIDOCAINE HYDROCHLORIDE 20 MG/ML
INJECTION, SOLUTION INFILTRATION; PERINEURAL PRN
Status: DISCONTINUED | OUTPATIENT
Start: 2019-09-09 | End: 2019-09-09

## 2019-09-09 RX ORDER — NALOXONE HYDROCHLORIDE 0.4 MG/ML
.1-.4 INJECTION, SOLUTION INTRAMUSCULAR; INTRAVENOUS; SUBCUTANEOUS
Status: ACTIVE | OUTPATIENT
Start: 2019-09-09 | End: 2019-09-10

## 2019-09-09 RX ORDER — SODIUM CHLORIDE 9 MG/ML
INJECTION, SOLUTION INTRAVENOUS CONTINUOUS
Status: DISCONTINUED | OUTPATIENT
Start: 2019-09-09 | End: 2019-09-09 | Stop reason: HOSPADM

## 2019-09-09 RX ORDER — FENTANYL CITRATE 50 UG/ML
25-50 INJECTION, SOLUTION INTRAMUSCULAR; INTRAVENOUS
Status: DISCONTINUED | OUTPATIENT
Start: 2019-09-09 | End: 2019-09-09 | Stop reason: HOSPADM

## 2019-09-09 RX ORDER — FENTANYL CITRATE 50 UG/ML
INJECTION, SOLUTION INTRAMUSCULAR; INTRAVENOUS PRN
Status: DISCONTINUED | OUTPATIENT
Start: 2019-09-09 | End: 2019-09-09

## 2019-09-09 RX ORDER — AMOXICILLIN 250 MG
1 CAPSULE ORAL 2 TIMES DAILY
Status: DISCONTINUED | OUTPATIENT
Start: 2019-09-09 | End: 2019-09-10 | Stop reason: HOSPADM

## 2019-09-09 RX ORDER — ACETAMINOPHEN 325 MG/1
975 TABLET ORAL EVERY 8 HOURS
Status: DISCONTINUED | OUTPATIENT
Start: 2019-09-09 | End: 2019-09-10 | Stop reason: HOSPADM

## 2019-09-09 RX ORDER — BUPIVACAINE HYDROCHLORIDE AND EPINEPHRINE 2.5; 5 MG/ML; UG/ML
INJECTION, SOLUTION INFILTRATION; PERINEURAL PRN
Status: DISCONTINUED | OUTPATIENT
Start: 2019-09-09 | End: 2019-09-09 | Stop reason: HOSPADM

## 2019-09-09 RX ORDER — METOCLOPRAMIDE 5 MG/1
5 TABLET ORAL EVERY 6 HOURS PRN
Status: DISCONTINUED | OUTPATIENT
Start: 2019-09-09 | End: 2019-09-10 | Stop reason: HOSPADM

## 2019-09-09 RX ORDER — ROPIVACAINE HYDROCHLORIDE 5 MG/ML
INJECTION, SOLUTION EPIDURAL; INFILTRATION; PERINEURAL PRN
Status: DISCONTINUED | OUTPATIENT
Start: 2019-09-09 | End: 2019-09-09

## 2019-09-09 RX ORDER — LIDOCAINE 40 MG/G
CREAM TOPICAL
Status: DISCONTINUED | OUTPATIENT
Start: 2019-09-09 | End: 2019-09-10 | Stop reason: HOSPADM

## 2019-09-09 RX ORDER — OXYCODONE HYDROCHLORIDE 5 MG/1
5-10 TABLET ORAL EVERY 4 HOURS PRN
Status: DISCONTINUED | OUTPATIENT
Start: 2019-09-09 | End: 2019-09-10 | Stop reason: HOSPADM

## 2019-09-09 RX ORDER — PROCHLORPERAZINE MALEATE 5 MG
5 TABLET ORAL EVERY 6 HOURS PRN
Status: DISCONTINUED | OUTPATIENT
Start: 2019-09-09 | End: 2019-09-10 | Stop reason: HOSPADM

## 2019-09-09 RX ORDER — LIDOCAINE 40 MG/G
CREAM TOPICAL
Status: DISCONTINUED | OUTPATIENT
Start: 2019-09-09 | End: 2019-09-09 | Stop reason: HOSPADM

## 2019-09-09 RX ORDER — SODIUM CHLORIDE 9 MG/ML
INJECTION, SOLUTION INTRAVENOUS CONTINUOUS
Status: DISCONTINUED | OUTPATIENT
Start: 2019-09-09 | End: 2019-09-09

## 2019-09-09 RX ORDER — PAROXETINE 10 MG/1
40 TABLET, FILM COATED ORAL DAILY
Status: DISCONTINUED | OUTPATIENT
Start: 2019-09-10 | End: 2019-09-10 | Stop reason: HOSPADM

## 2019-09-09 RX ORDER — CLINDAMYCIN PHOSPHATE 900 MG/50ML
900 INJECTION, SOLUTION INTRAVENOUS EVERY 8 HOURS
Status: COMPLETED | OUTPATIENT
Start: 2019-09-09 | End: 2019-09-10

## 2019-09-09 RX ORDER — PHENYLEPHRINE HYDROCHLORIDE 10 MG/ML
INJECTION INTRAVENOUS PRN
Status: DISCONTINUED | OUTPATIENT
Start: 2019-09-09 | End: 2019-09-09

## 2019-09-09 RX ORDER — KETOROLAC TROMETHAMINE 15 MG/ML
15 INJECTION, SOLUTION INTRAMUSCULAR; INTRAVENOUS EVERY 6 HOURS PRN
Status: DISCONTINUED | OUTPATIENT
Start: 2019-09-09 | End: 2019-09-10 | Stop reason: HOSPADM

## 2019-09-09 RX ORDER — CLINDAMYCIN PHOSPHATE 900 MG/50ML
900 INJECTION, SOLUTION INTRAVENOUS
Status: DISCONTINUED | OUTPATIENT
Start: 2019-09-09 | End: 2019-09-09

## 2019-09-09 RX ORDER — ONDANSETRON 2 MG/ML
4 INJECTION INTRAMUSCULAR; INTRAVENOUS EVERY 30 MIN PRN
Status: DISCONTINUED | OUTPATIENT
Start: 2019-09-09 | End: 2019-09-09 | Stop reason: HOSPADM

## 2019-09-09 RX ORDER — ONDANSETRON 4 MG/1
4 TABLET, ORALLY DISINTEGRATING ORAL EVERY 30 MIN PRN
Status: DISCONTINUED | OUTPATIENT
Start: 2019-09-09 | End: 2019-09-09 | Stop reason: HOSPADM

## 2019-09-09 RX ORDER — SIMVASTATIN 20 MG
20 TABLET ORAL AT BEDTIME
Status: DISCONTINUED | OUTPATIENT
Start: 2019-09-09 | End: 2019-09-10 | Stop reason: HOSPADM

## 2019-09-09 RX ORDER — METOCLOPRAMIDE HYDROCHLORIDE 5 MG/ML
5 INJECTION INTRAMUSCULAR; INTRAVENOUS EVERY 6 HOURS PRN
Status: DISCONTINUED | OUTPATIENT
Start: 2019-09-09 | End: 2019-09-10 | Stop reason: HOSPADM

## 2019-09-09 RX ORDER — NALOXONE HYDROCHLORIDE 0.4 MG/ML
.1-.4 INJECTION, SOLUTION INTRAMUSCULAR; INTRAVENOUS; SUBCUTANEOUS
Status: DISCONTINUED | OUTPATIENT
Start: 2019-09-09 | End: 2019-09-09

## 2019-09-09 RX ORDER — LABETALOL 20 MG/4 ML (5 MG/ML) INTRAVENOUS SYRINGE
10
Status: DISCONTINUED | OUTPATIENT
Start: 2019-09-09 | End: 2019-09-09 | Stop reason: HOSPADM

## 2019-09-09 RX ORDER — MULTIVIT-MIN/IRON/FOLIC ACID/K 18-600-40
2000 CAPSULE ORAL DAILY
Status: DISCONTINUED | OUTPATIENT
Start: 2019-09-09 | End: 2019-09-09 | Stop reason: CLARIF

## 2019-09-09 RX ORDER — PROPOFOL 10 MG/ML
INJECTION, EMULSION INTRAVENOUS CONTINUOUS PRN
Status: DISCONTINUED | OUTPATIENT
Start: 2019-09-09 | End: 2019-09-09

## 2019-09-09 RX ORDER — ONDANSETRON 4 MG/1
4 TABLET, ORALLY DISINTEGRATING ORAL EVERY 6 HOURS PRN
Status: DISCONTINUED | OUTPATIENT
Start: 2019-09-09 | End: 2019-09-10 | Stop reason: HOSPADM

## 2019-09-09 RX ORDER — HYDROMORPHONE HYDROCHLORIDE 1 MG/ML
.3-.5 INJECTION, SOLUTION INTRAMUSCULAR; INTRAVENOUS; SUBCUTANEOUS
Status: DISCONTINUED | OUTPATIENT
Start: 2019-09-09 | End: 2019-09-10 | Stop reason: HOSPADM

## 2019-09-09 RX ORDER — MEPERIDINE HYDROCHLORIDE 50 MG/ML
12.5 INJECTION INTRAMUSCULAR; INTRAVENOUS; SUBCUTANEOUS EVERY 5 MIN PRN
Status: DISCONTINUED | OUTPATIENT
Start: 2019-09-09 | End: 2019-09-09 | Stop reason: HOSPADM

## 2019-09-09 RX ORDER — ONDANSETRON 2 MG/ML
4 INJECTION INTRAMUSCULAR; INTRAVENOUS EVERY 6 HOURS PRN
Status: DISCONTINUED | OUTPATIENT
Start: 2019-09-09 | End: 2019-09-10 | Stop reason: HOSPADM

## 2019-09-09 RX ORDER — ACETAMINOPHEN 325 MG/1
650 TABLET ORAL EVERY 4 HOURS PRN
Status: DISCONTINUED | OUTPATIENT
Start: 2019-09-12 | End: 2019-09-10 | Stop reason: HOSPADM

## 2019-09-09 RX ORDER — SODIUM CHLORIDE, SODIUM LACTATE, POTASSIUM CHLORIDE, CALCIUM CHLORIDE 600; 310; 30; 20 MG/100ML; MG/100ML; MG/100ML; MG/100ML
INJECTION, SOLUTION INTRAVENOUS CONTINUOUS PRN
Status: DISCONTINUED | OUTPATIENT
Start: 2019-09-09 | End: 2019-09-09

## 2019-09-09 RX ORDER — AMOXICILLIN 250 MG
2 CAPSULE ORAL 2 TIMES DAILY
Status: DISCONTINUED | OUTPATIENT
Start: 2019-09-09 | End: 2019-09-10 | Stop reason: HOSPADM

## 2019-09-09 RX ADMIN — LIDOCAINE HYDROCHLORIDE 40 MG: 20 INJECTION, SOLUTION INFILTRATION; PERINEURAL at 07:46

## 2019-09-09 RX ADMIN — BUPIVACAINE HYDROCHLORIDE IN DEXTROSE 2 ML: 7.5 INJECTION, SOLUTION SUBARACHNOID at 07:44

## 2019-09-09 RX ADMIN — CLINDAMYCIN IN 5 PERCENT DEXTROSE 900 MG: 18 INJECTION, SOLUTION INTRAVENOUS at 23:50

## 2019-09-09 RX ADMIN — PHENYLEPHRINE HYDROCHLORIDE 50 MCG: 10 INJECTION INTRAVENOUS at 08:41

## 2019-09-09 RX ADMIN — CLINDAMYCIN 900 MG: 150 INJECTION, SOLUTION INTRAMUSCULAR; INTRAVENOUS at 07:48

## 2019-09-09 RX ADMIN — ACETAMINOPHEN 975 MG: 325 TABLET, FILM COATED ORAL at 13:26

## 2019-09-09 RX ADMIN — PHENYLEPHRINE HYDROCHLORIDE 100 MCG: 10 INJECTION INTRAVENOUS at 08:18

## 2019-09-09 RX ADMIN — CLINDAMYCIN IN 5 PERCENT DEXTROSE 900 MG: 18 INJECTION, SOLUTION INTRAVENOUS at 16:38

## 2019-09-09 RX ADMIN — PROPOFOL 75 MCG/KG/MIN: 10 INJECTION, EMULSION INTRAVENOUS at 07:46

## 2019-09-09 RX ADMIN — KETOROLAC TROMETHAMINE 15 MG: 15 INJECTION, SOLUTION INTRAMUSCULAR; INTRAVENOUS at 21:54

## 2019-09-09 RX ADMIN — OXYCODONE HYDROCHLORIDE 5 MG: 5 TABLET ORAL at 21:51

## 2019-09-09 RX ADMIN — SODIUM CHLORIDE, POTASSIUM CHLORIDE, SODIUM LACTATE AND CALCIUM CHLORIDE: 600; 310; 30; 20 INJECTION, SOLUTION INTRAVENOUS at 07:20

## 2019-09-09 RX ADMIN — FENTANYL CITRATE 25 MCG: 50 INJECTION, SOLUTION INTRAMUSCULAR; INTRAVENOUS at 07:44

## 2019-09-09 RX ADMIN — ACETAMINOPHEN 975 MG: 325 TABLET, FILM COATED ORAL at 21:51

## 2019-09-09 RX ADMIN — MIDAZOLAM 1 MG: 1 INJECTION INTRAMUSCULAR; INTRAVENOUS at 07:34

## 2019-09-09 RX ADMIN — MIDAZOLAM 1 MG: 1 INJECTION INTRAMUSCULAR; INTRAVENOUS at 07:25

## 2019-09-09 RX ADMIN — OXYCODONE HYDROCHLORIDE 10 MG: 5 TABLET ORAL at 16:35

## 2019-09-09 RX ADMIN — KETOROLAC TROMETHAMINE 15 MG: 15 INJECTION, SOLUTION INTRAMUSCULAR; INTRAVENOUS at 13:26

## 2019-09-09 RX ADMIN — PHENYLEPHRINE HYDROCHLORIDE 50 MCG: 10 INJECTION INTRAVENOUS at 08:30

## 2019-09-09 RX ADMIN — DIPHENHYDRAMINE HYDROCHLORIDE 25 MG: 50 INJECTION, SOLUTION INTRAMUSCULAR; INTRAVENOUS at 10:12

## 2019-09-09 RX ADMIN — SENNOSIDES AND DOCUSATE SODIUM 2 TABLET: 8.6; 5 TABLET ORAL at 11:21

## 2019-09-09 RX ADMIN — FENTANYL CITRATE 25 MCG: 50 INJECTION, SOLUTION INTRAMUSCULAR; INTRAVENOUS at 07:54

## 2019-09-09 RX ADMIN — PHENYLEPHRINE HYDROCHLORIDE 50 MCG: 10 INJECTION INTRAVENOUS at 08:57

## 2019-09-09 RX ADMIN — PHENYLEPHRINE HYDROCHLORIDE 50 MCG: 10 INJECTION INTRAVENOUS at 09:01

## 2019-09-09 RX ADMIN — SIMVASTATIN 20 MG: 20 TABLET, FILM COATED ORAL at 21:52

## 2019-09-09 RX ADMIN — TRANEXAMIC ACID 1 G: 1 INJECTION, SOLUTION INTRAVENOUS at 07:34

## 2019-09-09 RX ADMIN — TRANEXAMIC ACID 1 G: 1 INJECTION, SOLUTION INTRAVENOUS at 08:38

## 2019-09-09 RX ADMIN — FENTANYL CITRATE 25 MCG: 50 INJECTION, SOLUTION INTRAMUSCULAR; INTRAVENOUS at 09:00

## 2019-09-09 RX ADMIN — ROPIVACAINE HYDROCHLORIDE 25 ML: 5 INJECTION, SOLUTION EPIDURAL; INFILTRATION; PERINEURAL at 07:25

## 2019-09-09 RX ADMIN — FENTANYL CITRATE 25 MCG: 50 INJECTION, SOLUTION INTRAMUSCULAR; INTRAVENOUS at 08:02

## 2019-09-09 RX ADMIN — SODIUM CHLORIDE: 9 INJECTION, SOLUTION INTRAVENOUS at 11:20

## 2019-09-09 RX ADMIN — SENNOSIDES AND DOCUSATE SODIUM 1 TABLET: 8.6; 5 TABLET ORAL at 21:52

## 2019-09-09 ASSESSMENT — ENCOUNTER SYMPTOMS: DYSRHYTHMIAS: 1

## 2019-09-09 ASSESSMENT — ACTIVITIES OF DAILY LIVING (ADL)
ADLS_ACUITY_SCORE: 14
ADLS_ACUITY_SCORE: 18
ADLS_ACUITY_SCORE: 20

## 2019-09-09 ASSESSMENT — MIFFLIN-ST. JEOR: SCORE: 1551.16

## 2019-09-09 NOTE — PHARMACY
"The following home medications were NOT continued on inpatient admission per \"Discontinuation of nonessential home medications during hospitalization\" policy: Vit D, Calcium-Magnesium-Vit D    If a therapeutic holiday is deemed inappropriate per the prescriber, please notify the pharmacist regarding the medication order.    The pharmacist is available to answer any questions and/or concerns the patient may have regarding discontinuation of non-essential medications.    Please ensure that these medications are restarted as needed upon discharge via the medication reconciliation discharge process and included on the discharge medication reconciliation report.    Thank you,  Leyda Henao [facility]    "

## 2019-09-09 NOTE — ANESTHESIA PROCEDURE NOTES
Peripheral nerve/Neuraxial procedure note : Adductor canal  Pre-Procedure  Performed by   Referred by ERIC  Location: pre-op    Procedure Times:9/9/2019 7:25 AM and 9/9/2019 7:28 AM  Pre-Anesthestic Checklist: patient identified, IV checked, site marked, risks and benefits discussed, informed consent, monitors and equipment checked, pre-op evaluation, at physician/surgeon's request and post-op pain management    Timeout  Correct Patient: Yes   Correct Procedure: Yes   Correct Site: Yes   Correct Laterality: Yes   Correct Position: Yes   Site Marked: Yes   .   Procedure Documentation    Diagnosis:LEFT TOTAL KNEE REPLACEMENT.    Procedure:  left  Adductor canal.     Ultrasound used to identify targeted nerve, plexus, or vascular marker and placed a needle adjacent to it., Ultrasound was used to visualize the spread of the anesthetic in close proximity to the above stated nerve. A permanent image is entered into the patient's record.  Patient Prep;mask, sterile gloves, chlorhexidine gluconate and isopropyl alcohol.  .  Needle: insulated Needle Gauge: 20.    Needle Length (Inches) 4  Insertion Method: Single Shot.       Assessment/Narrative  Paresthesias: No.  Injection made incrementally with aspirations every 5 mL..  The placement was negative for: blood aspirated, painful injection and site bleeding.  Bolus given via needle. No blood aspirated via catheter.   Secured via.   Complications: none.

## 2019-09-09 NOTE — SIGNIFICANT EVENT
Pt states that she is itchy and is concerned about having hives.  No redness or signs of hives noted by RN. Pt educated on side effects of fentanyl and it can cause some people to feel itchy.  RN on floor notified of pt's concerns.

## 2019-09-09 NOTE — PLAN OF CARE
Discharge Planner PT   Patient plan for discharge: Home with OP PT  Current status: Patient is having very good post op day 0. Able to ambulate 300+ feet with good tolerance. Good motion of knee and good strength. Able to perform independent SLR and CGA to SBA for transfers and CGA for gait with FWW.   Barriers to return to prior living situation: 7 stairs to enter home. Has 4WW at home, but does not have FWW. Depending on mobility status may need fWW upon discharge.   Recommendations for discharge: Home with OP PT services   Rationale for recommendations: Patient is doing well and is safe to return home. Will have family staying with her to assist with chores and errands.        Entered by: Aubree Burleson 09/09/2019 4:12 PM

## 2019-09-09 NOTE — PROGRESS NOTES
Inpatient Physical Therapy Evaluation    Name: Dinorah Lim MRN# [id number]   Age: 78 year old YOB: 1940     Date of Consultation: 2019  Consultation is requested by:  Dr. Alonzo  Specific Consultation Request:  Post-op total knee, eval and treat  Primary care provider: Magaly Sosa    General Information:   Onset Date: 19    History of Current Problem/Admission: DJD LEFT KNEE  Status post total left knee replacement    Prior Level of Function: Patient was limited with gait duration and distance due to   Ambulation: 1 - Assistive Equipment, 4WW   Transferrin - Independent   Toiletin - Independent    Bathin - Not assessed  Dressin - Not assessed  Eatin - Not assessed  Communication: 0 - Understands/communicates without difficulty    Fall history within the last 6 months: No    Current Living Situation: Patient has 7 stairs to enter the home. Patient has a railing on both sides. Adult grandson lives with her and adult granddaughter will be staying will her for a few days following surgery to assist with anything she may need.     Current Equipment Used at Home: SPC, 4WW     Patient & Family Goals: to get home as soon as she can and recover fully from TKA     Past Medical History:   Past Medical History:   Diagnosis Date     Anxiety 2011     Cholecystolithiasis 2015    noted on US 2015 --> cholecystectomy     Chronic kidney disease (CKD), stage III (moderate) (H)     Early,unknown eitiology, Dr Vilchis     Chronic kidney disease (CKD), stage III (moderate) (H) 2015    Early,unknown eitiology, Dr Vilchis      Cough 2001     Hiatal hernia 2014    Seen on chest CT     Hyperlipidemia 2001     Idiopathic hives since age 8 2004     Major depression 10/04/2011     Neoplasm of uncertain behavior of liver 2015    Anterior Segment V 4 cm nodule abutting liver surface by US 2015      Obesity, Class II, BMI 35-39.9 2015     BMI 35.9 with comorbidities = MORBID obesity     Osteoarthritis of right hip 10/07/2004     Osteoarthrosis 06/14/2012     Otitis externa 10/04/2011     Prediabetes 08/01/2011       Past Surgical History:  Past Surgical History:   Procedure Laterality Date     ARTHROSCOPY KNEE Left 3/21/2019    Procedure: LEFT  KNEE ARTHROSCOPY, partial medial menisectomy;  Surgeon: Esteban Wills MD;  Location: [facility]     CLOSED REDUCTION WRIST Right 1952 x 2    long arm cast (same time as elbow fx)     CLOSED RX ELBOW DISLOCATION Right 1952    CR/long cast of fracture     HC INJ EPIDURAL LUMBAR/SACRAL W/WO CONTRAST Bilateral 5/2013    facet injections; prev 2012     LAPAROSCOPIC CHOLECYSTECTOMY N/A 1/4/2015    Procedure: LAPAROSCOPIC CHOLECYSTECTOMY;  Surgeon: Brittney العلي MD;  Location: [facility]     TONSILLECTOMY         Medications:   Current Facility-Administered Medications   Medication     [START ON 9/12/2019] acetaminophen (TYLENOL) tablet 650 mg     acetaminophen (TYLENOL) tablet 975 mg     [START ON 9/10/2019] aspirin (ASA) EC tablet 325 mg     benzocaine-menthol (CEPACOL) 15-3.6 MG lozenge 1-2 lozenge     clindamycin (CLEOCIN) infusion 900 mg     HYDROmorphone (PF) (DILAUDID) injection 0.3-0.5 mg     ketorolac (TORADOL) injection 15 mg     lidocaine (LMX4) kit     lidocaine 1 % 0.1-1 mL     metoclopramide (REGLAN) tablet 5 mg    Or     metoclopramide (REGLAN) injection 5 mg     naloxone (NARCAN) injection 0.1-0.4 mg     No Medication Sleep Aids for this Patient     ondansetron (ZOFRAN-ODT) ODT tab 4 mg    Or     ondansetron (ZOFRAN) injection 4 mg     oxyCODONE (ROXICODONE) tablet 5-10 mg     [START ON 9/10/2019] PARoxetine (PAXIL) tablet 40 mg     prochlorperazine (COMPAZINE) injection 5 mg    Or     prochlorperazine (COMPAZINE) tablet 5 mg     senna-docusate (SENOKOT-S/PERICOLACE) 8.6-50 MG per tablet 1 tablet    Or     senna-docusate (SENOKOT-S/PERICOLACE) 8.6-50 MG per tablet 2 tablet     simvastatin (ZOCOR)  tablet 20 mg     sodium chloride (PF) 0.9% PF flush 3 mL     sodium chloride (PF) 0.9% PF flush 3 mL     sodium chloride 0.9% infusion       Weight Bearing Status: WBAT L LE     Cognitive Status Examination:  Orientation: awake and alert  Level of Consciousness: alert  Follows Commands and Answers Questions: 100% of the time  Personal Safety and Judgement: Intact  Memory: Immediate recall intact      Pain:   Currently in pain? Yes  Pain Location? left knee  Pain Ratin    Physical Therapy Evaluation:   Integumentary/Edema: knee wrapped with cryo-cuff on  ROM: 0-90+ without pain limitation. Did warn patient that as pain medication wears off, patient may not increased pain and stiffness in next 48-72 hours  Strength: Good quad set and independent SLR  Bed Mobility: Mod I   Transfers: CGA to SBA with FWW  Gait: Patient ambulated 350 feet using FWW with one seated rest break with good performance and obed. Step to pattern noted. Good use of motion and strength.   Stairs: NT  Balance: Mild deficits present.   Sensory: NT  Coordination: NT    Physical Therapy Interventions: Bed Mobility, Gait Training , ROM, Strengthening, Stretching  and Transfer Training    Clinical Impressions:  Criteria for Skilled Therapeutic Intervention Met: Yes, treatment indicated  PT Diagnosis: Patient presents s/p L TKA   Influenced by the following impairments: pain, edema, motion restriction, strength deficits   Functional limitations due to impairment: decreased performance of functional mobility.   Clinical presentation: Stable/Uncomplicated  Clinical presentation rationale: clinical judgement  Clinical Decision making (complexity): Low Complexity  Frequency: 2 times/day   Predicted Duration of Therapy Intervention (days/wks): 5 days  Anticipated Discharge Disposition: Home with Outpatient Therapy   Anticipated Equipment Needs at Discharge: may need FWW. Has 4WW  Risks and Benefits of therapy have been explained: Yes  Patient, Family  & other staff in agreement with plan of care: Yes  Clinical Impression Comments: Patient is doing very well on post op day 0. Did caution patient that she may experiences increased soreness and stiffness in the next two days and to prepare herself for some discomfort in next 48 hours. Currently moving very well with good motion.     Total Eval Time: 15

## 2019-09-09 NOTE — CONSULTS
[facility]    Hospitalist Consult Note    Date of Service (when I saw the patient): 09/09/2019    Assessment & Plan        Status post total left knee replacement: No immediate post operative complications      Morbid obesity (H): BMI 38.02      COPD, mild on 9/9/14: She denies ever having any symptoms of such, no active treatments.       Diastolic dysfunction grade 1 on 2/21/19: NO chronic diuretics or therapies other than BB-will continue same.     DVT Prophylaxis: Defer to primary service  Code Status: Full Code    Disposition: Expected discharge in 1-3 days once cleared by PT and pain well controlled.    Maria Luisa Zavala, CNP    Interval History   Denies abdominal pain, nausea, dyspnea. Pain is very well controlled.    -Data reviewed today: I reviewed all new labs and imaging results over the last 24 hours..    Physical Exam   Temp: 98.3  F (36.8  C) Temp src: Temporal BP: 135/62   Heart Rate: 58 Resp: 18 SpO2: 96 % O2 Device: None (Room air) Oxygen Delivery: 2 LPM  Vitals:    09/09/19 0727   Weight: 105.2 kg (232 lb)     Vital Signs with Ranges  Temp:  [97.3  F (36.3  C)-98.3  F (36.8  C)] 98.3  F (36.8  C)  Heart Rate:  [56-64] 58  Resp:  [12-23] 18  BP: ()/(47-72) 135/62  SpO2:  [93 %-98 %] 96 %  I/O last 3 completed shifts:  In: 800 [I.V.:800]  Out: -     Peripheral IV 09/09/19 Left Hand (Active)   Site Assessment WDL 9/9/2019 10:30 AM   Line Status Infusing 9/9/2019 10:30 AM   Phlebitis Scale 0-->no symptoms 9/9/2019 10:30 AM   Infiltration Scale 0 9/9/2019 10:30 AM   Infiltration Site Treatment Method  None 9/9/2019  7:30 AM   Extravasation? No 9/9/2019  9:00 AM   Number of days: 0       Incision/Surgical Site 03/21/19 Left Knee (Active)   Number of days: 172       Incision/Surgical Site 09/09/19 Left Knee (Active)   Incision Assessment UTV 9/9/2019 11:00 AM   Chanell-Incision Assessment UTV 9/9/2019 11:00 AM   Closure Approximated;Staples 9/9/2019  9:08 AM   Incision Care Ice applied  9/9/2019 11:00 AM   Dressing Intervention Clean, dry, intact 9/9/2019 11:00 AM   Number of days: 0     Line/device assessment(s) completed for medical necessity    Constitutional: Awake,alert, no acute distress  Respiratory: Clear bilaterally, no wheezes, crackles, rhonchi  Cardiovascular: HRR, no murmurs, rubs, thrills.   GI: Soft,nontender, bowel sounds positive.   Skin/Integumen: No rashes, open areas or unusual bruising.  Other:  Left leg ACE wrapped with ice in place. CMS intact to toes.     Medications     No Medication Sleep Aids for this Patient         acetaminophen  975 mg Oral Q8H     [START ON 9/10/2019] aspirin  325 mg Oral BID     clindamycin  900 mg Intravenous Q8H     [START ON 9/10/2019] PARoxetine  40 mg Oral Daily     senna-docusate  1 tablet Oral BID    Or     senna-docusate  2 tablet Oral BID     simvastatin  20 mg Oral At Bedtime     sodium chloride (PF)  3 mL Intracatheter Q8H       Data   No lab results found in last 7 days.    Recent Results (from the past 24 hour(s))   XR Knee Port Left 1/2 Views    Narrative    PROCEDURE:  XR KNEE PORT LT 1/2 VW    HISTORY: S/P Left TKA; Post operative x-ray    COMPARISON:  None.    TECHNIQUE:  2 views of the left knee were obtained.    FINDINGS:  There Is a knee prosthesis in place. The prosthetic  elements are well seated. Postoperative gas is seen within the knee  joint.       Impression    IMPRESSION: Knee prosthesis in place    RAFIA SIEGEL MD

## 2019-09-09 NOTE — OP NOTE
Preoperative Diagnosis: Left Knee Osteoarthritis    Postoperative Diagnosis: Left Knee Osteoarthritis    Procedure: Left Total Knee Arthroplasty    Surgeon: Trevor Alonzo MD    Assistants: Jose ART    A skilled first assistant was required for patient positioning, retracting, and carrying out the procedure in a safe and effective manner    Anesthesia:   1) Spinal with Sedation  2) Adductor Canal Femoral Nerve Block  3) Local Injection around surgical site    Findings:    1) Tricompartmental OA   2) Stable TKA with ROM 0-125   3) Central Patella Tracking   4) Adequate hemostasis     Implants:    1) [employer] 2 Size 4 Femoral Component   2) Size 4 Tibial Base Plate   3) Size 29 Patella   4) 11 Poly Insert    Tourniquet Time: 40 Minutes    Estimated Blood Loss: 50 ml    Specimens: None    Drains: None    Complications: None    Indications:   This is a 78 year old patient with long standing right knee pain.  They have failed nonoperative treatment including use of NSAIDs; Tylenol; injections and exercise.  Given the continued symptoms they wished to proceed with a knee replacement.  The risks including pain, bleeding, infection, deep vein thrombosis, pulmonary embolism, myocardial infarction, component failure, need for revision operation was discussed with the patient.  The surgical consent was signed and surgical site was marked.  All questions regarding postoperative disposition were answered.      Description of Procedure:   The patient was administered a adductor canal femoral nerve block in the preoperative area.  They were then taken to the operating room and placed under spinal anesthesia.  A non-sterile tourniquet was applied and the Left leg was prepped with a Chloraprep wipe and Chloraprep device and was draped in standard fashion.  A timeout was called to identify the correct patient and surgical site.  The limb was then elevated, exsanguinated and the tourniquet inflated to 250 mmHg.   A midline incision and medial parapatellar arthrotomy was completed.  Adequate medial and lateral releases were completed.  The patella was everted and 9.0 mm of bone resection was completed using the patella clamp.  The patella was sized to 29 mm and the lug holes were drilled.  The patella protector plate was placed.  The knee was flexed and the intramedullary canal was drilled.  The intramedullary 5 degree Left distal femoral guide was placed.  Standard bone resection was completed.  The posterior referencing femoral sizing guide was placed and the femur was sized to a 4.  This cutting guide was placed and the 5 chamfer cuts were made.  The ACL, PCL, Medial and Lateral Meniscus were resected.  The tibia was subluxed anteriorly and the posterior retractor was placed.  The extramedullary proximal tibia cutting guide and made 11 mm of bone resection off the less involved lateral compartment.  The flexion and extension gaps were checked and pins were removed.  The Femoral component was placed and the box cut for the posterior stabilizing component was made.  All trial implants were placed and the knee was brought out to extension.  The rotation of the tibial trial was marked.  The knee came out to full extension and flexed to 125 degrees with central patellar tracking.  The tibia was sized to a 4 and prepared in standard fashion.  The periarticular injection was completed and the bone ends were washed and dried.  Final implants were cemented in place.  Betadine and pulse lavage irrigation was used.  The tourniquet was deflated and hemostasis was achieved.  A 11 mm poly insert was chosen and confirmed knee ROM and patella tracking.  The arthrotomy was closed with a #1 Vicryl suture in interrupted fashion.  A 0 Vicryl running suture was used in the deep fascia.  The skin was closed with a 2-0 Vicryl and staples.  An Aquacel dressing was applied.  The patient was awakened and transferred to PACU in stable condition.       Postoperative Plan:   Routine TKA protocol (Weightbearing as tolerated, PT, Pain control, DVT prophylaxis with Aspirin).  Anticipate discharge in 1-2 days.  Follow-up in  2 weeks in [facility].  No X-rays needed.

## 2019-09-09 NOTE — PLAN OF CARE
[facility] Inpatient Admission Note:    Patient admitted to 3218/3218-1 at approximately 1025 via bed accompanied by other:self from surgery . Report received from MELANIE Sanford in SBAR format at 1025 via face to face in room. Patient came up in bed to bed via NA. Patient is alert and oriented X 3, denies pain; rates at 0 on 0-10 scale.  Patient oriented to room, unit, hourly rounding, and plan of care. Explained admission packet and patient handbook with patient bill of rights brochure. Will continue to monitor and document as needed.     Inpatient Nursing criteria listed below was met:    Health care directives status obtained and documented: Yes    Care Everywhere authorization obtained No    MRSA swab completed for patient 65 years and older: Yes    Patient identifies a surrogate decision maker: Yes If yes, who:Yocasta Wolfe (daughter) Contact Information:[phone]    Core Measure diagnosis present:No.      If initial lactic acid >2.0, repeat lactic acid drawn within one hour of arrival to unit: NA. If no, state reason: NA    Vaccination assessment and education completed: Yes   Vaccinations received prior to admission: Pneumovax NO  Influenza(seasonal)  NO   Vaccination(s) ordered: not given today because Patient denies flu vaccine    Clergy visit ordered if patient requests: Yes    Skin issues/needs documented: Yes    Isolation Patient: no Education given, correct sign in place and documentation row added to PCS:  No    Fall Prevention Yes: Care plan updated, education given and documented, sticker and magnet in place: Yes    Care Plan initiated: Yes    Education Documented (including assessment): Yes    Patient has discharge needs : Yes If yes, please explain:PT

## 2019-09-09 NOTE — ANESTHESIA PROCEDURE NOTES
Peripheral nerve/Neuraxial procedure note : intrathecal  Pre-Procedure  Performed by   Referred by ERIC  Location: OR    Procedure Times:9/9/2019 7:41 AM and 9/9/2019 7:44 AM  Pre-Anesthestic Checklist: patient identified, IV checked, site marked, risks and benefits discussed, informed consent, monitors and equipment checked, pre-op evaluation, at physician/surgeon's request and post-op pain management    Timeout  Correct Patient: Yes   Correct Procedure: Yes   Correct Site: Yes   Correct Laterality: Yes   Correct Position: Yes   Site Marked: Yes   .   Procedure Documentation  ASA 3  Diagnosis:left total knee relacement.    Procedure:    Intrathecal.  Insertion Site:L2-3  (midline approach)      Patient Prep;mask, sterile gloves, povidone-iodine 7.5% surgical scrub, patient draped.  .  Needle: Ozzie tip Spinal Needle (gauge): 25  Spinal/LP Needle Length (inches): 3.5 # of attempts: 1 and  # of redirects:  1 Introducer used .       Assessment/Narrative  Paresthesias: No.  .  .  clear CSF fluid removed . Time Injected: 07:44

## 2019-09-09 NOTE — PLAN OF CARE
Temp: 98.3  F (36.8  C) Temp src: Temporal BP: 135/62   Heart Rate: 58 Resp: 18 SpO2: 96 % O2 Device: None (Room air) Oxygen Delivery: 2 LPM     Patient VSS afebrile and neuros intact. BLE pedal pulse intact, patient able to wiggle toes and bear weight on LLE. L leg wrapped in ACE, Kryocuff, and aquacel - all remain intact. Patient reports pain 7/10 given 975 scheduled Acetaminophen and PRN Toradol with adequate relief, prior to PT - pt up to chair, ambulated to bathroom, adequate urine output (one incontinent episode), and participated with PT in hallway. Patient appetite is good, no nausea reported.     Face to face report given with opportunity to observe patient.    Report given to MELANIE Kidd RN   9/9/2019  3:22 PM

## 2019-09-09 NOTE — PROVIDER NOTIFICATION
Face to face with Dr Alonzo to inform him of medication contraindication regarding Ketoralac and serotonin specific reuptake inhibitor, as well as patient concern for kidney function. MD OMALLEY to keep Ketoralac as PRN for pain control.

## 2019-09-10 ENCOUNTER — APPOINTMENT (OUTPATIENT)
Dept: OCCUPATIONAL THERAPY | Facility: HOSPITAL | Age: 79
DRG: 470 | End: 2019-09-10
Attending: ORTHOPAEDIC SURGERY
Payer: MEDICARE

## 2019-09-10 ENCOUNTER — APPOINTMENT (OUTPATIENT)
Dept: PHYSICAL THERAPY | Facility: HOSPITAL | Age: 79
DRG: 470 | End: 2019-09-10
Attending: ORTHOPAEDIC SURGERY
Payer: MEDICARE

## 2019-09-10 VITALS
SYSTOLIC BLOOD PRESSURE: 135 MMHG | DIASTOLIC BLOOD PRESSURE: 57 MMHG | RESPIRATION RATE: 18 BRPM | OXYGEN SATURATION: 97 % | TEMPERATURE: 98.1 F | BODY MASS INDEX: 37.28 KG/M2 | WEIGHT: 232 LBS | HEIGHT: 66 IN

## 2019-09-10 LAB
BACTERIA SPEC CULT: NORMAL
COPATH REPORT: NORMAL
GLUCOSE SERPL-MCNC: 115 MG/DL (ref 70–99)
HGB BLD-MCNC: 10.4 G/DL (ref 11.7–15.7)
SPECIMEN SOURCE: NORMAL

## 2019-09-10 PROCEDURE — 97530 THERAPEUTIC ACTIVITIES: CPT | Mod: GP

## 2019-09-10 PROCEDURE — 97165 OT EVAL LOW COMPLEX 30 MIN: CPT | Mod: GO

## 2019-09-10 PROCEDURE — 97530 THERAPEUTIC ACTIVITIES: CPT | Mod: GO

## 2019-09-10 PROCEDURE — 97110 THERAPEUTIC EXERCISES: CPT | Mod: GP

## 2019-09-10 PROCEDURE — 36415 COLL VENOUS BLD VENIPUNCTURE: CPT | Performed by: ORTHOPAEDIC SURGERY

## 2019-09-10 PROCEDURE — 82947 ASSAY GLUCOSE BLOOD QUANT: CPT | Performed by: ORTHOPAEDIC SURGERY

## 2019-09-10 PROCEDURE — 85018 HEMOGLOBIN: CPT | Performed by: ORTHOPAEDIC SURGERY

## 2019-09-10 PROCEDURE — 40000275 ZZH STATISTIC RCP TIME EA 10 MIN

## 2019-09-10 PROCEDURE — 25000132 ZZH RX MED GY IP 250 OP 250 PS 637: Performed by: NURSE PRACTITIONER

## 2019-09-10 PROCEDURE — 25000132 ZZH RX MED GY IP 250 OP 250 PS 637: Performed by: ORTHOPAEDIC SURGERY

## 2019-09-10 RX ORDER — CLONAZEPAM 0.5 MG/1
.25-.5 TABLET ORAL EVERY 8 HOURS PRN
Status: DISCONTINUED | OUTPATIENT
Start: 2019-09-10 | End: 2019-09-10 | Stop reason: HOSPADM

## 2019-09-10 RX ORDER — ACETAMINOPHEN 325 MG/1
975 TABLET ORAL EVERY 6 HOURS PRN
Qty: 60 TABLET | Refills: 0 | Status: SHIPPED | OUTPATIENT
Start: 2019-09-10 | End: 2019-12-10

## 2019-09-10 RX ORDER — ASPIRIN 325 MG
325 TABLET, DELAYED RELEASE (ENTERIC COATED) ORAL
Qty: 60 TABLET | Refills: 0 | Status: SHIPPED | OUTPATIENT
Start: 2019-09-10 | End: 2019-10-17

## 2019-09-10 RX ORDER — OXYCODONE HYDROCHLORIDE 5 MG/1
5-10 TABLET ORAL EVERY 4 HOURS PRN
Qty: 40 TABLET | Refills: 0 | Status: SHIPPED | OUTPATIENT
Start: 2019-09-10 | End: 2019-10-17

## 2019-09-10 RX ADMIN — CLONAZEPAM 0.25 MG: 0.5 TABLET ORAL at 08:16

## 2019-09-10 RX ADMIN — SENNOSIDES AND DOCUSATE SODIUM 1 TABLET: 8.6; 5 TABLET ORAL at 08:17

## 2019-09-10 RX ADMIN — ASPIRIN 325 MG: 325 TABLET, COATED ORAL at 08:16

## 2019-09-10 RX ADMIN — ACETAMINOPHEN 975 MG: 325 TABLET, FILM COATED ORAL at 05:27

## 2019-09-10 RX ADMIN — PAROXETINE 40 MG: 10 TABLET, FILM COATED ORAL at 08:15

## 2019-09-10 RX ADMIN — OXYCODONE HYDROCHLORIDE 5 MG: 5 TABLET ORAL at 05:27

## 2019-09-10 RX ADMIN — OXYCODONE HYDROCHLORIDE 5 MG: 5 TABLET ORAL at 12:04

## 2019-09-10 ASSESSMENT — ACTIVITIES OF DAILY LIVING (ADL)
ADLS_ACUITY_SCORE: 20
ADLS_ACUITY_SCORE: 18
ADLS_ACUITY_SCORE: 20
ADLS_ACUITY_SCORE: 20

## 2019-09-10 NOTE — PROGRESS NOTES
Assessment completed see flowsheet.    LOC: alert and oriented, pleasant and very talkative  Others present: Patient alone    Dx: LTKA  Chronic Disease Management: COPD, HTN    Lives with: her 31 yo grandson Zachery lives with her  Living at:  Home in [place]  Transportation: YES She normally drives, Zachery will transport her home at discharge    Primary PCP: Magaly Sosa  Insurance:  Medicare and [employer]   Medicare IM letter reviewed with her this morning.    Support System:  Family and PCA's  Homecare/PCA: has two PCA's (Leyda and Wendie) who provide 8 hours of PCA service per week through [facility]. She says she has been approved for more hours to be in effect after her surgery. Her PCA's provide home making services, shopping, errands, but at times will also help with bathing.   /[facility]:   Thinks she has one through the [facility] but does not know the name  : NO      [facility] Referral line called: cesar    Health Care Directive: YES says her health care agents are her dtrs and they have been named as such in her will. She was asked to bring in the documentation to support this.   Guardian: cesar  POA: dtr Yocasta    Pharmacy: [facility] [place]  Meds management: YES manages her meds independently, denies difficulty. She keeps them in the original bottles, says she remembers to take them as ordered, refills them timely and feels she understands them.    Adequate Resources for needs (housing, utilities, food/med): YES; with the assistance of area food supports and AEOA  Household chores: PCA does most, Zachery does some  Work/community/social activity: YES gets out as desired    ADLs: independent prior to surgery  Ambulation:independent prior to surgery, used a cane at times  Falls: denies  Nutrition: PCA does her shopping and meal prep; Zachery also helps with meal prep  Sleep: sleeps in a bed at night    Equipment used: none      Oxygen supplier: na      Does the supplier have valid oxygen orders:  na    Mental health: says she has anxiety and depression, feels her mental health is well managed with meds; does not see a counselor and does not feel she needs to  Substance abuse: denies any substance use  Exposure to violence/abuse: denies  Stressors: none additional voiced    Able to Return to Prior Living Arrangements: YES    Choice of Vendor: na    Barriers: none known    RAY: low    Plan: she plans to return home at discharge, her grand son Zachery will transport. She plans to attend outpatient PT here at [facility].     She will have Zachery get her a shower chair.

## 2019-09-10 NOTE — UTILIZATION REVIEW
Admission Status; Secondary Review Determination       Under the authority of the Utilization Management Committee, the utilization review process indicated a secondary review on the above patient. The review outcome is based on review of the medical records, discussions with staff, and applying clinical experience noted on the date of the review.     (x) Outpatient Status with extended recovery is appropriate - This patient does not meet hospital inpatient criteria. If this patient's primary payer is Medicare and was admitted as an inpatient, Condition Code 44 should be used and patient status changed to outpatient recovery.    RATIONALE FOR DETERMINATION   78 years old woman who underwent a L TKA . Patient was admitted to the hospital after the procedure. Patient has Medicare and the procedure is not on the CMS inpatient list. No documented complications or unexpected recovery. Patient can be safely  monitored for bleeding and recover in outpatient/extended recovery setting. The severity of illness, intensity of service provided, expected LOS and risk for adverse outcome doesn't meet inpatient hospital admission.    This document was produced using voice recognition software.     The information on this document is developed by the utilization review team in order for the business office to ensure compliance. This only denotes the appropriateness of proper admission status and does not reflect the quality of care rendered.   The definitions of Inpatient Status and Observation Status used in making the determination above are those provided in the CMS Coverage Manual, Chapter 1 and Chapter 6, section 70.4.   Sincerely,   ANDRY SWANN MD   System Medical Director   Utilization Management   [facility].

## 2019-09-10 NOTE — PLAN OF CARE
Pt resting in chair eating breakfast and watching tv. Took AM medication.  Walked with PT and denies any discomfort at present.  Patient bowel sounds active, no BM since before surgery.   (L) knee dressing on-Clean, dry, intact.  CMS WNL and equal to bilateral lower extremities.

## 2019-09-10 NOTE — PLAN OF CARE
Face to face report given with opportunity to observe patient.  Report given to MELANIE Arteaga.    Gaby Reyes RN  9/10/2019, 7:53 AM

## 2019-09-10 NOTE — PLAN OF CARE
Discharge Planner OT   Patient plan for discharge: Home with assist  Current status: SBA-Mod I for ADLs and functional mobility   Barriers to return to prior living situation: None from an OT perspective  Recommendations for discharge: Home with Assist  Rationale for recommendations: Pt is day 1 s/p L TKA and doing well with ADLs and functional mobility. Pt completed all tasks with SBA-Mod I. No concerns with ADLs or home accessibility upon discharge. Pt has a good support system upon discharge too. No further skilled OT needs identified at this time. Will complete OT order.        Entered by: Lainey Weller 09/10/2019 11:46 AM

## 2019-09-10 NOTE — PLAN OF CARE
Physical Therapy Discharge Summary    Reason for therapy discharge:    Discharged to home with outpatient therapy.    Progress towards therapy goal(s). [facility] for goal details.  Goals met    Therapy recommendation(s):    Continued therapy is recommended.  Rationale/Recommendations:  outpatient PT for ongoing work on ROM, strength, and mobility.

## 2019-09-10 NOTE — PLAN OF CARE
Discharge Planner PT Home with out pt PT  Patient plan for discharge: Home with out pt PT  Current status: Pt ambulated 300 feet FWW SBA1 no LOB or unsafe maneuvers. Pt performed 8 steps bilateral railings SBA1 to CGA1 with cues for proper sequencing with steps. Pt wants FWW upon discharge works better then her 4ww.   Barriers to return to prior living situation: N/A  Recommendations for discharge: N/A  Rationale for recommendations: N/A       Entered by: Dinorah Pena 09/10/2019 8:04 AM

## 2019-09-10 NOTE — DISCHARGE SUMMARY
Subjective: The patient is POD #1 s/p Left Total Knee Arthroplasty.  The patients pain is controlled fairly well.  They deny shortness of breath, chest pain, nausea or vomiting.  There have been no acute perioperative complications    Objective:   B/P: 135/57, Temp: 98.1, Pulse: Data Unavailable, Resp: 18    Alert and Orientated x3; No acute distress; Appears comfortable    Knee Exam: dressing/wound is clean, dry, intact without significant drainage.  No erythema or signs of infection.     No evidence of DVT    Distal motor/sensory exam is intact in the superficial peroneal, deep peroneal and tibial nerve distributions.      Normal capillary refill with a palpable dorsalis pedis pulse.    Hemoglobin   Date Value Ref Range Status   09/10/2019 10.4 (L) 11.7 - 15.7 g/dL Final       Assessment/Plan:     1) POD # 1 S/P Left Total Knee Arthroplasty  -Pain Controlled  -PT/OT--ROM and Gait training  -DVT prophylaxis  -Dispo--To Home when cleared Medically and by PT  -Follow-up in 2 weeks [facility]  -Hospitalist co-management

## 2019-09-10 NOTE — PROGRESS NOTES
Inpatient Occupational Therapy Evaluation    Name: Dinorah Lim MRN# [id number]   Age: 78 year old YOB: 1940     Date of Consultation: September 10, 2019  Consultation is requested by:  Dr. Alonzo  Specific Consultation Request:  Post-Op Total Knee  Primary care provider: Magaly Sosa    General Information:   Onset Date: 19    History of Current Problem/Admission: DJD LEFT KNEE  Status post total left knee replacement    Prior Level of Function: Pt previously only required assistance with washing her back and back of legs otherwise was independent in ADLs and used a cane for functional mobility. Pt's daughter is her PCA but completes household chores.   Ambulation: 1 - Assistive Equipment   Transferrin - Assistive Equipment   Toiletin - Independent    Bathin - Assistive Person   Dressin - Independent   Eatin - Independent   Communication: 0 - Understands/communicates without difficulty  Swallowin - swallows foods/liquids without difficulty  Cognition: 0 - no cognitive issues reported    Fall history within the last 6 months: No    Current Living Situation: Pt lives in a 1 story home with 7 steps to enter. Her adult grandson lives with her and her adult granddaughter will be staying with her for a few days, too. Laundry is in the basement but her grandson completes. Bathroom has a walk in corner shower and a high rise toilet; no grab bars.     Current Equipment Used at Home: Cane     Patient & Family Goals: Return home     Past Medical History:   Past Medical History:   Diagnosis Date     Anxiety 2011     Cholecystolithiasis 2015    noted on US 2015 --> cholecystectomy     Chronic kidney disease (CKD), stage III (moderate) (H)     Early,unknown eitiology, Dr Vilchis     Chronic kidney disease (CKD), stage III (moderate) (H) 2015    Early,unknown eitiology, Dr Vilchis      Cough 2001     Hiatal hernia 2014    Seen on chest CT      Hyperlipidemia 02/23/2001     Idiopathic hives since age 8 06/01/2004     Major depression 10/04/2011     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    Anterior Segment V 4 cm nodule abutting liver surface by US 1/4/2015      Obesity, Class II, BMI 35-39.9 1/4/2015    BMI 35.9 with comorbidities = MORBID obesity     Osteoarthritis of right hip 10/07/2004     Osteoarthrosis 06/14/2012     Otitis externa 10/04/2011     Prediabetes 08/01/2011       Past Surgical History:  Past Surgical History:   Procedure Laterality Date     ARTHROPLASTY KNEE Left 9/9/2019    Procedure: LEFT TOTAL KNEE ARTHROPLASTY S/N;  Surgeon: Trevor Alonzo MD;  Location: [facility]     ARTHROSCOPY KNEE Left 3/21/2019    Procedure: LEFT  KNEE ARTHROSCOPY, partial medial menisectomy;  Surgeon: Esteban Wills MD;  Location: [facility]     CLOSED REDUCTION WRIST Right 1952 x 2    long arm cast (same time as elbow fx)     CLOSED RX ELBOW DISLOCATION Right 1952    CR/long cast of fracture     HC INJ EPIDURAL LUMBAR/SACRAL W/WO CONTRAST Bilateral 5/2013    facet injections; prev 2012     LAPAROSCOPIC CHOLECYSTECTOMY N/A 1/4/2015    Procedure: LAPAROSCOPIC CHOLECYSTECTOMY;  Surgeon: Brittney العلي MD;  Location: [facility]     TONSILLECTOMY         Medications:   Current Facility-Administered Medications   Medication     [START ON 9/12/2019] acetaminophen (TYLENOL) tablet 650 mg     acetaminophen (TYLENOL) tablet 975 mg     aspirin (ASA) EC tablet 325 mg     benzocaine-menthol (CEPACOL) 15-3.6 MG lozenge 1-2 lozenge     clonazePAM (klonoPIN) half-tab 0.25-0.5 mg     HYDROmorphone (PF) (DILAUDID) injection 0.3-0.5 mg     ketorolac (TORADOL) injection 15 mg     lidocaine (LMX4) kit     lidocaine 1 % 0.1-1 mL     metoclopramide (REGLAN) tablet 5 mg    Or     metoclopramide (REGLAN) injection 5 mg     No Medication Sleep Aids for this Patient     ondansetron (ZOFRAN-ODT) ODT tab 4 mg    Or     ondansetron (ZOFRAN) injection 4 mg     oxyCODONE (ROXICODONE)  tablet 5-10 mg     PARoxetine (PAXIL) tablet 40 mg     prochlorperazine (COMPAZINE) injection 5 mg    Or     prochlorperazine (COMPAZINE) tablet 5 mg     senna-docusate (SENOKOT-S/PERICOLACE) 8.6-50 MG per tablet 1 tablet    Or     senna-docusate (SENOKOT-S/PERICOLACE) 8.6-50 MG per tablet 2 tablet     simvastatin (ZOCOR) tablet 20 mg     sodium chloride (PF) 0.9% PF flush 3 mL     sodium chloride (PF) 0.9% PF flush 3 mL       Weight Bearing Status: WBAT     Cognitive Status Examination:  Orientation: oriented to time, place and person  Level of Consciousness: alert  Follows Commands and Answers Questions: 100% of the time  Personal Safety and Judgement: Intact  Memory: Immediate recall intact and Long-term memory intact    Pain:   Currently in pain? Yes  Pain Location? left knee  Pain Ratin   Pt reported that her pain increases to 9/10 with activity. No major pain behaviors displayed while pt was performing ADLs though.     Occupational Therapy Evaluation:   Range of Motion: BUDILAN's WFL   Strength: BUE's grossly 4+/5  Muscle Tone Assessment: No deficits  Coordination: NT    Mobility:   Bed Mobility: Mod I  Transfer Skills: SBA sit to/from stand from the EOB  Toilet Transfer: SBA but educated about proper hand placement due to no grab bars at home   Balance: No LOB      ADLs:  Lower Body Dressing: Mod I doffing/donning socks  Toileting: Independent   Grooming: SBA washing her hands at the sink     IADLs:   Previous Responsibilities of the Patient: Medication Management, Finances  and Driving   Comments: Daughter/PCA, grandson(s), and granddaughter complete the remaining IADLs     Activities of Daily Living Analysis:   Impairments Contributing to Impaired Activities of Daily Living: pain    Occupational Therapy Interventions: Evaluation + 1 treatment session only    Clinical Impressions:  Criteria for Skilled Therapeutic Intervention Met: Evaluation Only and No problems identified which require skilled  intervention  OT Diagnosis: L TKA  Influenced by the following impairments: Pain  Functional limitations due to impairment: None  Clinical presentation: Stable/Uncomplicated  Clinical presentation rationale: Clinical judgement  Clinical Decision making (complexity): Low Complexity  Frequency: 1x  Predicted Duration of Therapy Intervention (days/wks): Today  Anticipated Discharge Disposition: Home with Assist  Anticipated Equipment Needs at Discharge: None from OT perspective  Risks and Benefits of therapy have been explained: Yes  Patient, Family & other staff in agreement with plan of care: Yes  Clinical Impression Comments: Pt is day 1 s/p L TKA and doing well with ADLs and functional mobility. Pt completed all tasks with SBA-Mod I. No concerns with ADLs or home accessibility upon discharge. Pt has a good support system upon discharge too. No further skilled OT needs identified at this time. Will complete OT order.     Total Eval Time: 12

## 2019-09-10 NOTE — PLAN OF CARE
Patient aquacel dressing to left knee clean, dry, and intact   CMS WNL and pedal pulses 2+ equal.  Oxycodone 5mg x1 administered for left knee discomfort which was helpful.  PT up to give patient a walker for home.  Education completed and AVS signed.  Patient and grandson have no further questions or concerns.  Patient left via wheelchair accompanied by staff off the unit.

## 2019-09-10 NOTE — PROGRESS NOTES
Name: Dinorah Lim    MRN#: [id number]    Reason for Hospitalization: DJD LEFT KNEE  Status post total left knee replacement    RAY: low    Discharge Date: 9/10/2019    Medicare IM letter reviewed with her this morning    Patient / Family response to discharge plan: she understands and agrees    Follow-Up Appt:   Future Appointments   Date Time Provider Department Center   9/13/2019  8:30 AM Connie Quarles, PT [facility]   11/7/2019  8:15 AM Magaly Sosa MD [facility]       Other Providers (Care Coordinator, County Services, PCA services etc): No    Discharge Disposition: home via Zachery Sheets CM RN

## 2019-09-10 NOTE — PLAN OF CARE
"Reason for hospital stay:  Patient is admitted s/p left TKA by Dr. Alonzo.     Most recent vitals: /60   Temp 98.2  F (36.8  C) (Temporal)   Resp 18   Ht 1.664 m (5' 5.5\")   Wt 105.2 kg (232 lb)   SpO2 97%   BMI 38.02 kg/m      Pain Management:  Patient slept well through the shift until scheduled Tylenol and this nurse brought oxycodone 5 mg in.     Orientation:  Patient is alert and oriented x 4.     Cardiac:  BP and HR WNL. AP regular.     Respiratory:  Lung Sounds CTA. No supplemental O2 needed this shift.     GI:  Bowel sounds active. Patient denies any nausea.     :  Patient is voiding adequately.     Nutrition: Regular diet.      Skin Issues: See PCS for skin assessment. Unable to assess skin closure from surgery.       IVF:  Patient's IV is SL.     ABX: Cleocin IV.     Mobility:  Patient is SBA with ww and gait belt.     Safety:  Bed in low position, brakes on, call light within reach.     Comments: Patient is in good spirits and verbalizes how happy she is that she chose to have her knee replacement done here in [place].       9/10/2019  5:16 AM  Gaby Reyes RN   "

## 2019-09-10 NOTE — PLAN OF CARE
Pt alert and orientated.  Up in chair for supper.  Ate well.  Medicated x2 with oxycodone with good relief.  Cms good.  IV site good. Voiding ok. Up to bathroom .   Crycuff on knee.  Face to face report given with opportunity to observe patient.    Report given to Rhonda Mary RN   9/10/2019  12:29 AM

## 2019-09-10 NOTE — DISCHARGE INSTRUCTIONS
* Please bring in a copy of the forms that note who your health care agents are. These copies can be brought to our Health Information department.       [facility] office [address] [place] [address]   Phone [phone]        [facility] [phone]  Senior services:   *discounted meals for seniors at a dining site or delivered to the home bound   *food stamp benefits program (SNAP) application assistance   *volunteer opportunities   *[employer] exercise classes   *grocery delivery service   *medical equipment loan closet   *medical rides   *telephone reassurance calls to senior citizens   *free senior resource guides   *income tax, property & rental return assistance for senior citizens   *local food shelf contact information   *Senior Carnival/Expo event   *Living Well programs for chronic disease, chronic pain, and moving & balance   *Living Well at Home-Senior Care Consultation Program-assessments to address safety and health issues that could limit a seniors ability to stay in the home    Additional services:   *adult basic education   *career assistance   *Family Assets for [place] in [place] (helps participants build assets)   *Lives in Transition (helps individuals transitioning from relationships through separation, divorce, death or disability   *[place] Dislocated Worker program-helps workers who've lost their jobs through no fault of their own to find a new career.   *[place] Family Investment Program-public assistance program for low income families with children   *food programs, [employer]   *Head Start programing   *housing assistance including the Energy Assistance Program, homebuyer assistance, homeless services, rental programs, home improvement options,     and weatherization assistance.   *medical loan closet    [place] [facility] Store: [phone];  [address] [place] [address]       You have a follow up appointment scheduled with Dr. Alonzo on  Tuesday September 24, 2019 at 12:30pm at the [facility]. If you have any questions regarding this appointment please call the clinic at [phone].

## 2019-09-11 NOTE — ANESTHESIA POSTPROCEDURE EVALUATION
Patient: Dinorah Lim    Procedure(s):  LEFT TOTAL KNEE ARTHROPLASTY S/N    Diagnosis:DJD LEFT KNEE  Diagnosis Additional Information: No value filed.    Anesthesia Type:  Spinal, Periph. Nerve Block for postop pain    Note:  Anesthesia Post Evaluation    Patient location during evaluation: PACU, Floor and Bedside  Patient participation: Able to fully participate in evaluation  Level of consciousness: awake and alert  Pain management: adequate  Airway patency: patent  Cardiovascular status: acceptable  Respiratory status: acceptable  Hydration status: stable  PONV: none     Anesthetic complications: None          Last vitals:  Vitals:    09/09/19 1635 09/09/19 2226 09/10/19 0527   BP: 137/60 127/60 135/57   Resp: 18 18 18   Temp: 98.6  F (37  C) 98.2  F (36.8  C) 98.1  F (36.7  C)   SpO2: 98% 97% 97%         Electronically Signed By: Maxim Cabrera MD  September 11, 2019  7:36 AM

## 2019-09-13 ENCOUNTER — HOSPITAL ENCOUNTER (OUTPATIENT)
Dept: PHYSICAL THERAPY | Facility: HOSPITAL | Age: 79
Setting detail: THERAPIES SERIES
End: 2019-09-13
Attending: FAMILY MEDICINE
Payer: MEDICARE

## 2019-09-13 DIAGNOSIS — Z96.652 STATUS POST TOTAL LEFT KNEE REPLACEMENT: ICD-10-CM

## 2019-09-13 PROCEDURE — 97162 PT EVAL MOD COMPLEX 30 MIN: CPT | Mod: GP | Performed by: PHYSICAL THERAPIST

## 2019-09-13 PROCEDURE — 97110 THERAPEUTIC EXERCISES: CPT | Mod: GP | Performed by: PHYSICAL THERAPIST

## 2019-09-13 ASSESSMENT — ACTIVITIES OF DAILY LIVING (ADL)
PAIN: THE SYMPTOM PREVENTS ME FROM ALL DAILY ACTIVITIES
WEAKNESS: THE SYMPTOM PREVENTS ME FROM ALL DAILY ACTIVITIES
KNEE_ACTIVITY_OF_DAILY_LIVING_SUM: 6
SWELLING: THE SYMPTOM PREVENTS ME FROM ALL DAILY ACTIVITIES
SIT WITH YOUR KNEE BENT: ACTIVITY IS VERY DIFFICULT
GO DOWN STAIRS: ACTIVITY IS VERY DIFFICULT
GO UP STAIRS: ACTIVITY IS VERY DIFFICULT
RISE FROM A CHAIR: ACTIVITY IS VERY DIFFICULT
WALK: ACTIVITY IS VERY DIFFICULT
STAND: ACTIVITY IS VERY DIFFICULT
KNEEL ON THE FRONT OF YOUR KNEE: I AM UNABLE TO DO THE ACTIVITY
HOW_WOULD_YOU_RATE_THE_OVERALL_FUNCTION_OF_YOUR_KNEE_DURING_YOUR_USUAL_DAILY_ACTIVITIES?: ABNORMAL
GIVING WAY, BUCKLING OR SHIFTING OF KNEE: THE SYMPTOM PREVENTS ME FROM ALL DAILY ACTIVITIES
SQUAT: I AM UNABLE TO DO THE ACTIVITY
AS_A_RESULT_OF_YOUR_KNEE_INJURY,_HOW_WOULD_YOU_RATE_YOUR_CURRENT_LEVEL_OF_DAILY_ACTIVITY?: ABNORMAL
RAW_SCORE: 6
LIMPING: THE SYMPTOM PREVENTS ME FROM ALL DAILY ACTIVITIES
KNEE_ACTIVITY_OF_DAILY_LIVING_SCORE: 8.57
STIFFNESS: THE SYMPTOM PREVENTS ME FROM ALL DAILY ACTIVITIES
HOW_WOULD_YOU_RATE_THE_CURRENT_FUNCTION_OF_YOUR_KNEE_DURING_YOUR_USUAL_DAILY_ACTIVITIES_ON_A_SCALE_FROM_0_TO_100_WITH_100_BEING_YOUR_LEVEL_OF_KNEE_FUNCTION_PRIOR_TO_YOUR_INJURY_AND_0_BEING_THE_INABILITY_TO_PERFORM_ANY_OF_YOUR_USUAL_DAILY_ACTIVITIES?: 20

## 2019-09-13 NOTE — PROGRESS NOTES
09/13/19 0824   General Information   Type of Visit Initial OP Ortho PT Evaluation   Start of Care Date 09/13/19   Referring Physician Dr. Alonzo   Patient/Family Goals Statement Pt wants to get rid of the pain, walk comfortably with a cane or no AD for short distances, manage stairs comfortably.     Orders Evaluate and Treat   Date of Order 09/10/19   Certification Required? Yes   Medical Diagnosis status post left total knee replacement   Surgical/Medical history reviewed Yes   Weight-Bearing Status - LLE weight-bearing as tolerated   Body Part(s)   Body Part(s) Knee   Presentation and Etiology   Pertinent history of current problem (include personal factors and/or comorbidities that impact the POC) Pt underwent L TKA on 9/9/19 by Dr. Alonzo.  Pt was discharged to home on Tuesday.  Pt pain has been excruciating and states she can only walk if her pain pills have kicked in.  Pt has been taking her pain medications every 4 hours.  Pt has been icing regularly.  Pt has tried to do her exercises but states it is just too painful.  Pt has stairs and was able to manage them today.  Pt  states she was independent with everything before surgery.  Pt's grandson lives with her and has been having to assist with everything.  Pt states she is not sleeping well.     Impairments A. Pain;B. Decreased WB tolerance;C. Swelling;D. Decreased ROM;E. Decreased flexibility;F. Decreased strength and endurance;H. Impaired gait;J. Burning   Functional Limitations perform activities of daily living;perform desired leisure / sports activities   Symptom Location left knee   How/Where did it occur From insidious onset   Onset date of current episode/exacerbation 09/09/19   Chronicity New   Pain rating (0-10 point scale) Best (/10);Worst (/10)   Best (/10) 0   Worst (/10) 10   Pain quality A. Sharp;B. Dull;C. Aching;D. Burning;E. Shooting;F. Stabbing   Frequency of pain/symptoms A. Constant   Pain/symptoms are: The same all the time    Pain/symptoms exacerbated by B. Walking;G. Certain positions;I. Bending;J. ADL;K. Home tasks   Pain/symptoms eased by A. Sitting;C. Rest;E. Changing positions;H. Cold;I. OTC medication(s)   Progression of symptoms since onset: Unchanged   Prior Level of Function   Prior Level of Function-Mobility ambulated with SEC prior to surgery   Prior Level of Function-ADLs independent   Current Level of Function   Current Community Support Family/friend caregiver   Patient role/employment history F. Retired   Living environment House/[facility]   Home/community accessibility 7 steps to enter home, has stairs to basement but grandson does not allow him to use them   Current equipment-Gait/Locomotion Front wheeled walker   Fall Risk Screen   Fall screen completed by PT   Have you fallen 2 or more times in the past year? No   Have you fallen and had an injury in the past year? No   Is patient a fall risk? No   Knee Objective Findings   Side (if bilateral, select both right and left) Left   Observation arrives to department in wheelchair   Integumentary  increased edema, small area of redness just medial to aquacel dressing about quarter size, 2 small areas of blood noted on aquacel dressing   Gait/Locomotion ambulates with FWW with antalgic gait, minimal knee flexion on swing through of L LE   Balance/Proprioception (Single Leg Stance) NT on this date   Palpation mild tenderness to palpation noted through joint and surrounding tissues   Accessory Motion/Joint Mobility not assessed due to presence of aquacel   Left Knee Extension AROM 0   Left Knee Flexion AROM 88   Left Knee Flexion PROM 94   Left Knee Flexion Strength 4/5 with pain   Left Knee Extension Strength 4/5 breaks due to pain   Left Hip Abduction Strength 4-/5   Left Quad Set Strength fair+   Left Gastrocnemius Flexibility tightness present   Planned Therapy Interventions   Planned Therapy Interventions balance training;gait training;joint mobilization;manual  therapy;neuromuscular re-education;ROM;strengthening;stretching;transfer training   Planned Modality Interventions   Planned Modality Interventions Electrical stimulation   Clinical Impression   Criteria for Skilled Therapeutic Interventions Met yes, treatment indicated   PT Diagnosis gait disturbance s/p L TKA   Influenced by the following impairments pain, decreased ROM, decreased strength, decreased activity tolerance, decreased balance   Functional limitations due to impairments decreased tolerance for basic transfers and bed mobility, decreased tolerance for ambulation and stairs   Clinical Presentation Evolving/Changing   Clinical Presentation Rationale clinical judgement   Clinical Decision Making (Complexity) Moderate complexity   Therapy Frequency 2 times/Week   Predicted Duration of Therapy Intervention (days/wks) 90 days   Risk & Benefits of therapy have been explained Yes   Patient, Family & other staff in agreement with plan of care Yes   Clinical Impression Comments Pt presents s/p L TKA on 9/9/19.  Pt reports severe pain since discharging to home and verbalizes that she can't bend her knee or get it in/out of bed.  However with assessment pt is noted to have 88 degrees of flexion and was able to get L LE on/off mat without difficulty.  Pt has impaired ROM and strength and requires skilled PT for progression of ROM and strength as well as work on improving independence with functional mobility.     Education Assessment   Preferred Learning Style Listening;Reading   Barriers to Learning No barriers   ORTHO GOALS   PT Ortho Eval Goals 1;3;2;4   Ortho Goal 1   Goal Identifier STG 1   Goal Description Pt to be independent and compliant with HEP.   Target Date 09/27/19   Ortho Goal 2   Goal Identifier LTG 1   Goal Description Pt to demonstrate improved knee AROM to 120 degrees to manage stairs without difficulty.   Target Date 12/12/19   Ortho Goal 3   Goal Identifier LTG 2   Goal Description Pt to  demonstrate improved strength of L LE to manage transfers and stairs with independence.   Target Date 12/12/19   Ortho Goal 4   Goal Identifier LTG 3   Goal Description Pt to tolerate community distance ambulation with SEC with pain <2/10.   Target Date 12/12/19   Total Evaluation Time   PT Eval, Moderate Complexity Minutes (53598) 20   Therapy Certification   Certification date from 09/13/19   Certification date to 12/12/19   Medical Diagnosis status post left total knee replacement.   I certify the need for these services furnished under this plan of treatment and while under my care. (Physician co-signature of this document indicates review and certification of the therapy plan).      _____________________________     __________________________    ____________  Physician's Signature                 Date               Time

## 2019-09-14 ENCOUNTER — TELEPHONE (OUTPATIENT)
Dept: CASE MANAGEMENT | Facility: HOSPITAL | Age: 79
End: 2019-09-14

## 2019-09-14 NOTE — TELEPHONE ENCOUNTER
Dinorah Lim, was discharged to home on 9/10/19  from [facility]. I spoke today with her regarding her  discharge.   She indicates she has receive(d) sufficient information upon discharge. Medications were reviewed in full on discharge, including: Medications to be started; medications to be stopped; medications to be continued from preadmission and any side effects. Prescriptions were received at discharge and were able to be filled. Medications are being taken as prescribed.   She indicates she has  an appointment scheduled for 9/24/19  and has  transportation available. She was  able to confirm her  appointment time and has  it written down.   She did not have any questions regarding discharge instructions or condition.  Per their request, the following employee (s) can be recognized for their outstanding services delivered:  Care was great.  Suggestions to improve service: Nothing indicated.   She was informed she  may receive a survey in the mail from the hospital. Asked if she  would kindly complete the survey in order for [facility] to know if services fully met patient needs.

## 2019-09-16 ENCOUNTER — HOSPITAL ENCOUNTER (OUTPATIENT)
Dept: PHYSICAL THERAPY | Facility: HOSPITAL | Age: 79
Setting detail: THERAPIES SERIES
End: 2019-09-16
Attending: FAMILY MEDICINE
Payer: MEDICARE

## 2019-09-16 PROCEDURE — 97110 THERAPEUTIC EXERCISES: CPT | Mod: GP

## 2019-09-23 ENCOUNTER — HOSPITAL ENCOUNTER (OUTPATIENT)
Dept: PHYSICAL THERAPY | Facility: HOSPITAL | Age: 79
Setting detail: THERAPIES SERIES
End: 2019-09-23
Attending: FAMILY MEDICINE
Payer: MEDICARE

## 2019-09-23 ENCOUNTER — TELEPHONE (OUTPATIENT)
Dept: FAMILY MEDICINE | Facility: OTHER | Age: 79
End: 2019-09-23

## 2019-09-23 DIAGNOSIS — R30.0 DYSURIA: Primary | ICD-10-CM

## 2019-09-23 DIAGNOSIS — M16.11 PRIMARY OSTEOARTHRITIS OF RIGHT HIP: Primary | ICD-10-CM

## 2019-09-23 PROCEDURE — 97110 THERAPEUTIC EXERCISES: CPT | Mod: GP | Performed by: PHYSICAL THERAPIST

## 2019-09-23 NOTE — TELEPHONE ENCOUNTER
HYDROCODONE   Last Written Prescription Date:  HISTORICAL  Last Fill Quantity: ?,   # refills: ?  Last Office Visit: 5/7/19  Future Office visit:    Next 5 appointments (look out 90 days)    Nov 07, 2019  8:15 AM CST  (Arrive by 8:00 AM)  Office Visit with Magaly Sosa MD  [facility] [place] ([facility] ) [address]  [place] [address]  [phone]           Routing refill request to provider for review/approval because    NOT ON MED LIST

## 2019-09-23 NOTE — TELEPHONE ENCOUNTER
Pt is having urinary frequency and lots of pressure in her bladder. She thinks she may have a bladder infection. She is wondering if she could have a lab order for a UA. Please call pt at [phone].

## 2019-09-24 ENCOUNTER — TRANSFERRED RECORDS (OUTPATIENT)
Dept: HEALTH INFORMATION MANAGEMENT | Facility: CLINIC | Age: 79
End: 2019-09-24

## 2019-09-24 DIAGNOSIS — N30.00 ACUTE CYSTITIS WITHOUT HEMATURIA: Primary | ICD-10-CM

## 2019-09-24 DIAGNOSIS — R30.0 DYSURIA: ICD-10-CM

## 2019-09-24 LAB
ALBUMIN UR-MCNC: 30 MG/DL
APPEARANCE UR: ABNORMAL
BACTERIA #/AREA URNS HPF: ABNORMAL /HPF
BILIRUB UR QL STRIP: NEGATIVE
COLOR UR AUTO: YELLOW
GLUCOSE UR STRIP-MCNC: NEGATIVE MG/DL
HGB UR QL STRIP: ABNORMAL
KETONES UR STRIP-MCNC: NEGATIVE MG/DL
LEUKOCYTE ESTERASE UR QL STRIP: ABNORMAL
MUCOUS THREADS #/AREA URNS LPF: PRESENT /LPF
NITRATE UR QL: NEGATIVE
PH UR STRIP: 5.5 PH (ref 4.7–8)
RBC #/AREA URNS AUTO: 12 /HPF (ref 0–2)
RENAL EPI CELLS #/AREA URNS HPF: 2 /HPF (ref 0–1)
SOURCE: ABNORMAL
SP GR UR STRIP: 1.03 (ref 1–1.03)
SQUAMOUS #/AREA URNS AUTO: 10 /HPF (ref 0–1)
UROBILINOGEN UR STRIP-MCNC: NORMAL MG/DL (ref 0–2)
WBC #/AREA URNS AUTO: >182 /HPF (ref 0–5)
WBC CLUMPS #/AREA URNS HPF: PRESENT /HPF

## 2019-09-24 PROCEDURE — 87086 URINE CULTURE/COLONY COUNT: CPT | Mod: ZL | Performed by: FAMILY MEDICINE

## 2019-09-24 PROCEDURE — 81001 URINALYSIS AUTO W/SCOPE: CPT | Mod: ZL | Performed by: FAMILY MEDICINE

## 2019-09-24 RX ORDER — NITROFURANTOIN 25; 75 MG/1; MG/1
100 CAPSULE ORAL 2 TIMES DAILY
Qty: 10 CAPSULE | Refills: 0 | Status: SHIPPED | OUTPATIENT
Start: 2019-09-24 | End: 2019-10-17

## 2019-09-25 ENCOUNTER — HOSPITAL ENCOUNTER (OUTPATIENT)
Dept: PHYSICAL THERAPY | Facility: HOSPITAL | Age: 79
Setting detail: THERAPIES SERIES
End: 2019-09-25
Attending: FAMILY MEDICINE
Payer: MEDICARE

## 2019-09-25 LAB
BACTERIA SPEC CULT: ABNORMAL
SPECIMEN SOURCE: ABNORMAL

## 2019-09-25 PROCEDURE — 97110 THERAPEUTIC EXERCISES: CPT | Mod: GP

## 2019-09-25 RX ORDER — HYDROCODONE BITARTRATE AND ACETAMINOPHEN 5; 325 MG/1; MG/1
1 TABLET ORAL EVERY 6 HOURS PRN
Qty: 60 TABLET | Refills: 0 | Status: SHIPPED | OUTPATIENT
Start: 2019-09-25 | End: 2019-11-25

## 2019-09-26 NOTE — TELEPHONE ENCOUNTER
Called and informed patient prescription was complete and ready for pick at [facility] registration.

## 2019-09-30 ENCOUNTER — HOSPITAL ENCOUNTER (OUTPATIENT)
Dept: PHYSICAL THERAPY | Facility: HOSPITAL | Age: 79
Setting detail: THERAPIES SERIES
End: 2019-09-30
Attending: FAMILY MEDICINE
Payer: MEDICARE

## 2019-09-30 PROCEDURE — 97110 THERAPEUTIC EXERCISES: CPT | Mod: GP

## 2019-10-01 DIAGNOSIS — E78.5 HYPERLIPIDEMIA LDL GOAL <100: ICD-10-CM

## 2019-10-02 ENCOUNTER — TRANSFERRED RECORDS (OUTPATIENT)
Dept: HEALTH INFORMATION MANAGEMENT | Facility: CLINIC | Age: 79
End: 2019-10-02

## 2019-10-02 ENCOUNTER — HOSPITAL ENCOUNTER (OUTPATIENT)
Dept: PHYSICAL THERAPY | Facility: HOSPITAL | Age: 79
Setting detail: THERAPIES SERIES
End: 2019-10-02
Attending: FAMILY MEDICINE
Payer: MEDICARE

## 2019-10-02 PROCEDURE — 97116 GAIT TRAINING THERAPY: CPT | Mod: GP | Performed by: PHYSICAL THERAPIST

## 2019-10-02 PROCEDURE — 97110 THERAPEUTIC EXERCISES: CPT | Mod: GP | Performed by: PHYSICAL THERAPIST

## 2019-10-03 RX ORDER — SIMVASTATIN 20 MG
TABLET ORAL
Qty: 90 TABLET | Refills: 0 | Status: SHIPPED | OUTPATIENT
Start: 2019-10-03 | End: 2019-11-08 | Stop reason: DRUGHIGH

## 2019-10-07 ENCOUNTER — HOSPITAL ENCOUNTER (OUTPATIENT)
Dept: PHYSICAL THERAPY | Facility: HOSPITAL | Age: 79
Setting detail: THERAPIES SERIES
End: 2019-10-07
Attending: FAMILY MEDICINE
Payer: MEDICARE

## 2019-10-07 PROCEDURE — 97110 THERAPEUTIC EXERCISES: CPT | Mod: GP

## 2019-10-09 ENCOUNTER — HOSPITAL ENCOUNTER (OUTPATIENT)
Dept: PHYSICAL THERAPY | Facility: HOSPITAL | Age: 79
Setting detail: THERAPIES SERIES
End: 2019-10-09
Attending: FAMILY MEDICINE
Payer: MEDICARE

## 2019-10-09 PROCEDURE — 97110 THERAPEUTIC EXERCISES: CPT | Mod: GP | Performed by: PHYSICAL THERAPIST

## 2019-10-16 ENCOUNTER — HOSPITAL ENCOUNTER (OUTPATIENT)
Dept: PHYSICAL THERAPY | Facility: HOSPITAL | Age: 79
Setting detail: THERAPIES SERIES
End: 2019-10-16
Attending: FAMILY MEDICINE
Payer: MEDICARE

## 2019-10-16 DIAGNOSIS — F41.9 ANXIETY: Chronic | ICD-10-CM

## 2019-10-16 PROCEDURE — 97110 THERAPEUTIC EXERCISES: CPT | Mod: GP | Performed by: PHYSICAL THERAPIST

## 2019-10-16 NOTE — PROGRESS NOTES
Subjective     Dinorah Lim is a 78 year old female who presents to clinic today for the following health issues:    HPI   Musculoskeletal problem/pain      Duration: ongoing now for 9 weeks    Description  Location: left knee, underwent Left TKA with Dr Alonzo. States it feels hot all the time and is swollen. Has been seen by Dr Alonzo since.     Intensity:  moderate, 8/10    Accompanying signs and symptoms: warmth, swelling and redness    History  Previous similar problem: no   Previous evaluation:  none    Precipitating or alleviating factors:  Trauma or overuse: no   Aggravating factors include: standing, walking and climbing stairs    Therapies tried and outcome: rest/inactivity, ice, immobilization, support wrap and Norco prn        Patient Active Problem List   Diagnosis     Osteoarthritis of right hip     Abnormal glucose     Osteoarthritis     Lung density on x-ray     Advanced care planning/counseling discussion     Anxiety     Urticaria     ACP (advance care planning)     Morbid obesity (H)     Palpitations     PVC's (premature ventricular contractions)     Atrial ectopy     PSVT (paroxysmal supraventricular tachycardia) (H)     COPD, mild on 9/9/14     Bilateral carotid artery stenosis at less than 50% on 12/1/16     Mixed hyperlipidemia     On statin therapy     Heart murmur     Aortic valve insufficiency     Mitral regurgitation     Tricuspid valve insufficiency     Diastolic dysfunction grade 1 on 2/21/19     Hypertriglyceridemia     Status post total left knee replacement     Past Surgical History:   Procedure Laterality Date     ARTHROPLASTY KNEE Left 9/9/2019    Procedure: LEFT TOTAL KNEE ARTHROPLASTY S/N;  Surgeon: Trevor Alonzo MD;  [facility]     ARTHROSCOPY KNEE Left 3/21/2019    Procedure: LEFT  KNEE ARTHROSCOPY, partial medial menisectomy;  Surgeon: Esteban Wills MD;  [facility]: [facility]     CLOSED REDUCTION WRIST Right 1952 x 2    long arm cast (same time as elbow fx)     CLOSED RX  ELBOW DISLOCATION Right 1952    CR/long cast of fracture     HC INJ EPIDURAL LUMBAR/SACRAL W/WO CONTRAST Bilateral 5/2013    facet injections; prev 2012     LAPAROSCOPIC CHOLECYSTECTOMY N/A 1/4/2015    Procedure: LAPAROSCOPIC CHOLECYSTECTOMY;  Surgeon: Brittney العلي MD;  Location: [facility]     TONSILLECTOMY         Social History     Tobacco Use     Smoking status: Never Smoker     Smokeless tobacco: Never Used   Substance Use Topics     Alcohol use: No     Family History   Problem Relation Age of Onset     Heart Disease Brother         atrial fibrillation     Atrial fibrillation Brother      Other - See Comments Mother         emphysema     Heart Disease Father 92        CHF     Cancer Paternal Grandfather         lung cancer     Cancer Maternal Uncle         lung cancer     Chronic Obstructive Pulmonary Disease Daughter      Emphysema Daughter      Other - See Comments Daughter         benign breast lesion     Esophagitis Daughter          Current Outpatient Medications   Medication Sig Dispense Refill     acetaminophen (TYLENOL) 325 MG tablet Take 3 tablets (975 mg) by mouth every 6 hours as needed for mild pain 60 tablet 0     Calcium-Magnesium-Vitamin D (CALCIUM 1200+D3 PO) Take by mouth daily       clonazePAM (KLONOPIN) 1 MG tablet TAKE 1/4 TO 1/2 (ONE-FOURTH TO ONE-HALF) TABLET BY MOUTH EVERY 8 HOURS AS NEEDED 45 tablet 0     FISH OIL Take 1 capsule by mouth daily        HYDROcodone-acetaminophen (NORCO) 5-325 MG tablet Take 1 tablet by mouth every 6 hours as needed for severe pain 60 tablet 0     PARoxetine (PAXIL) 40 MG tablet TAKE 1 TABLET BY MOUTH ONCE DAILY 90 tablet 2     simvastatin (ZOCOR) 20 MG tablet TAKE 1 TABLET BY MOUTH AT BEDTIME 90 tablet 0     VITAMIN D, CHOLECALCIFEROL, PO Take 2,000 Units by mouth daily       Allergies   Allergen Reactions     Chocolate Hives     Lemon Flavor Hives     Lime [Calcium Oxysulfide] Hives     Orange Fruit [Citrus] Hives     Strawberry Hives     Adhesive Tape   "    Band-aids     Codeine Hives     Patient can tolerate oxycodone & dilaudid     Decadron [Dexamethasone] Hives     Erythromycin Hives     ERYTHROMYCIN BASE     Grapefruit [Extra Strength Grapefruit]      GRAPEFRUIT     Morphine Hives     Pt. Reports had hives     Penicillins Hives     Sulfa Drugs      SULFONAMIDE ANTIBIOTICS      Tomato      Xyzal [Levocetirizine] Hives     BP Readings from Last 3 Encounters:   10/17/19 130/64   09/10/19 135/57   08/26/19 126/74    Wt Readings from Last 3 Encounters:   10/17/19 102.5 kg (226 lb)   09/09/19 105.2 kg (232 lb)   08/26/19 105.2 kg (232 lb)                    Asthma Follow-Up    Was ACT completed today?    Yes    ACT Total Scores 10/17/2019   ACT TOTAL SCORE (Goal Greater than or Equal to 20) 24   In the past 12 months, how many times did you visit the emergency room for your asthma without being admitted to the hospital? 0   In the past 12 months, how many times were you hospitalized overnight because of your asthma? 0       How many days per week do you miss taking your asthma controller medication?  0    Please describe any recent triggers for your asthma: None    Have you had any Emergency Room Visits, Urgent Care Visits, or Hospital Admissions since your last office visit?  No      Reviewed and updated as needed this visit by Provider  Allergies  Meds         Review of Systems   ROS COMP: Constitutional, HEENT, cardiovascular, pulmonary, gi and gu systems are negative, except as otherwise noted.      Objective    /64   Pulse 88   Temp 100.1  F (37.8  C) (Tympanic)   Resp 19   Ht 1.664 m (5' 5.5\")   Wt 102.5 kg (226 lb)   SpO2 98%   BMI 37.04 kg/m    Body mass index is 37.04 kg/m .  Physical Exam   GENERAL APPEARANCE: healthy, alert and no distress  MS: extremities normal- no gross deformities noted and minimal effusion of the left knee with well healed incision and no erythema or drainage noted  SKIN: no suspicious lesions or rashes  NEURO: Normal " "strength and tone, mentation intact and speech normal  PSYCH: mentation appears normal and affect normal/bright            Assessment & Plan     1. History of total knee arthroplasty, left  No evidence of infection today. X ray and lab are obtained with normal CBC, some fluid noted in the knee but otherwise healing   - CBC with platelets and differential  - XR Knee Left 3 Views; Future    2. Other emphysema (H)  Doing well.     3. Special screening for malignant neoplasms, colon  She had a positive stool occult testing. Colonoscopy not indicated so soon after her TKA       BMI:   Estimated body mass index is 37.04 kg/m  as calculated from the following:    Height as of this encounter: 1.664 m (5' 5.5\").    Weight as of this encounter: 102.5 kg (226 lb).   Weight management plan: Discussed healthy diet and exercise guidelines            No follow-ups on file.    Magaly Sosa MD  [facility] [place]        "

## 2019-10-16 NOTE — PROGRESS NOTES
Outpatient Physical Therapy Progress Note     Patient: Dinorah Lim  : 1940    Beginning/End Dates of Reporting Period:  2019 to 10/16/2019    Referring Provider: Dr. Alonzo    Therapy Diagnosis: gait disturbance s/p L TKA     Client Self Report: Pt states her sinuses were giving her a lot of trouble on Monday so she had to cancel.  Pt states the pain continues to be excruciating in her knee, states she has to     Objective Measurements:  Objective Measure: L knee AROM  Details: 120 degrees                                    Outcome Measures (most recent score):      Goals:  Goal Identifier STG 1   Goal Description Pt to be independent and compliant with HEP.   Target Date 19   Date Met  19   Progress:     Goal Identifier LTG 1   Goal Description Pt to demonstrate improved knee AROM to 120 degrees to manage stairs without difficulty.   Target Date 19   Date Met  10/09/19   Progress:     Goal Identifier LTG 2   Goal Description Pt to demonstrate improved strength of L LE to manage transfers and stairs with independence.   Target Date 19   Date Met      Progress:     Goal Identifier LTG 3   Goal Description Pt to tolerate community distance ambulation with SEC with pain <2/10.   Target Date 19   Date Met      Progress:     Goal Identifier     Goal Description     Target Date     Date Met      Progress:     Goal Identifier     Goal Description     Target Date     Date Met      Progress:     Goal Identifier     Goal Description     Target Date     Date Met      Progress:     Goal Identifier     Goal Description     Target Date     Date Met      Progress:     Progress Toward Goals:   Progress this reporting period: Pt making good gains towards goals.  Pt is able to ascend/descend stairs alternating feet on occasion but not 100% of the time yet.  Pt using SEC outside of home on unstable surfaces but has been ambulating without AD in her home.  Pt will requiring ongoing skilled  PT to progress final strengthening and NM control.             Plan:  Continue therapy per current plan of care.    Discharge:  No

## 2019-10-17 ENCOUNTER — OFFICE VISIT (OUTPATIENT)
Dept: FAMILY MEDICINE | Facility: OTHER | Age: 79
End: 2019-10-17
Attending: FAMILY MEDICINE
Payer: MEDICARE

## 2019-10-17 ENCOUNTER — ANCILLARY PROCEDURE (OUTPATIENT)
Dept: GENERAL RADIOLOGY | Facility: OTHER | Age: 79
End: 2019-10-17
Attending: FAMILY MEDICINE
Payer: MEDICARE

## 2019-10-17 VITALS
DIASTOLIC BLOOD PRESSURE: 64 MMHG | RESPIRATION RATE: 19 BRPM | OXYGEN SATURATION: 98 % | WEIGHT: 226 LBS | SYSTOLIC BLOOD PRESSURE: 130 MMHG | HEIGHT: 66 IN | HEART RATE: 88 BPM | BODY MASS INDEX: 36.32 KG/M2 | TEMPERATURE: 100.1 F

## 2019-10-17 DIAGNOSIS — Z96.652 HISTORY OF TOTAL KNEE ARTHROPLASTY, LEFT: Primary | ICD-10-CM

## 2019-10-17 DIAGNOSIS — Z12.11 SPECIAL SCREENING FOR MALIGNANT NEOPLASMS, COLON: ICD-10-CM

## 2019-10-17 DIAGNOSIS — J43.8 OTHER EMPHYSEMA (H): ICD-10-CM

## 2019-10-17 DIAGNOSIS — Z96.652 HISTORY OF TOTAL KNEE ARTHROPLASTY, LEFT: ICD-10-CM

## 2019-10-17 LAB
BASOPHILS # BLD AUTO: 0 10E9/L (ref 0–0.2)
BASOPHILS NFR BLD AUTO: 0.4 %
DIFFERENTIAL METHOD BLD: ABNORMAL
EOSINOPHIL # BLD AUTO: 0.1 10E9/L (ref 0–0.7)
EOSINOPHIL NFR BLD AUTO: 1.8 %
ERYTHROCYTE [DISTWIDTH] IN BLOOD BY AUTOMATED COUNT: 14.5 % (ref 10–15)
HCT VFR BLD AUTO: 37.7 % (ref 35–47)
HGB BLD-MCNC: 11.6 G/DL (ref 11.7–15.7)
IMM GRANULOCYTES # BLD: 0 10E9/L (ref 0–0.4)
IMM GRANULOCYTES NFR BLD: 0.2 %
LYMPHOCYTES # BLD AUTO: 1.6 10E9/L (ref 0.8–5.3)
LYMPHOCYTES NFR BLD AUTO: 33.5 %
MCH RBC QN AUTO: 27.7 PG (ref 26.5–33)
MCHC RBC AUTO-ENTMCNC: 30.8 G/DL (ref 31.5–36.5)
MCV RBC AUTO: 90 FL (ref 78–100)
MONOCYTES # BLD AUTO: 0.4 10E9/L (ref 0–1.3)
MONOCYTES NFR BLD AUTO: 8.4 %
NEUTROPHILS # BLD AUTO: 2.7 10E9/L (ref 1.6–8.3)
NEUTROPHILS NFR BLD AUTO: 55.7 %
NRBC # BLD AUTO: 0 10*3/UL
NRBC BLD AUTO-RTO: 0 /100
PLATELET # BLD AUTO: 207 10E9/L (ref 150–450)
RBC # BLD AUTO: 4.19 10E12/L (ref 3.8–5.2)
WBC # BLD AUTO: 4.9 10E9/L (ref 4–11)

## 2019-10-17 PROCEDURE — 99213 OFFICE O/P EST LOW 20 MIN: CPT | Performed by: FAMILY MEDICINE

## 2019-10-17 PROCEDURE — 73562 X-RAY EXAM OF KNEE 3: CPT | Mod: TC,LT

## 2019-10-17 PROCEDURE — G0463 HOSPITAL OUTPT CLINIC VISIT: HCPCS

## 2019-10-17 PROCEDURE — 36415 COLL VENOUS BLD VENIPUNCTURE: CPT | Mod: ZL | Performed by: FAMILY MEDICINE

## 2019-10-17 PROCEDURE — 85025 COMPLETE CBC W/AUTO DIFF WBC: CPT | Mod: ZL | Performed by: FAMILY MEDICINE

## 2019-10-17 ASSESSMENT — PAIN SCALES - GENERAL: PAINLEVEL: EXTREME PAIN (8)

## 2019-10-17 ASSESSMENT — MIFFLIN-ST. JEOR: SCORE: 1523.94

## 2019-10-17 NOTE — NURSING NOTE
"Chief Complaint   Patient presents with     Musculoskeletal Problem       Initial /64   Pulse 88   Temp 100.1  F (37.8  C) (Tympanic)   Resp 19   Ht 1.664 m (5' 5.5\")   Wt 102.5 kg (226 lb)   SpO2 98%   BMI 37.04 kg/m   Estimated body mass index is 37.04 kg/m  as calculated from the following:    Height as of this encounter: 1.664 m (5' 5.5\").    Weight as of this encounter: 102.5 kg (226 lb).  Medication Reconciliation: complete  Aditi Saleh LPN    "

## 2019-10-18 RX ORDER — CLONAZEPAM 1 MG/1
TABLET ORAL
Qty: 45 TABLET | Refills: 0 | Status: SHIPPED | OUTPATIENT
Start: 2019-10-18 | End: 2020-03-19

## 2019-10-18 ASSESSMENT — ASTHMA QUESTIONNAIRES: ACT_TOTALSCORE: 24

## 2019-10-18 NOTE — TELEPHONE ENCOUNTER
clonazePAM (KLONOPIN) 1 MG tablet      Last Written Prescription Date:  06/20/19  Last Fill Quantity: 45,   # refills: 0  Last Office Visit: 10/17/19  Future Office visit:    Next 5 appointments (look out 90 days)    Nov 07, 2019  8:15 AM CST  (Arrive by 8:00 AM)  Office Visit with Magaly Sosa MD  [facility] ([facility] ) [address]  [place] [address]  [phone]           Routing refill request to provider for review/approval because:  Drug not on the [facility], [facility] or [facility] refill protocol or controlled substance

## 2019-10-22 ENCOUNTER — TRANSFERRED RECORDS (OUTPATIENT)
Dept: HEALTH INFORMATION MANAGEMENT | Facility: CLINIC | Age: 79
End: 2019-10-22

## 2019-10-23 ENCOUNTER — HOSPITAL ENCOUNTER (OUTPATIENT)
Dept: PHYSICAL THERAPY | Facility: HOSPITAL | Age: 79
Setting detail: THERAPIES SERIES
End: 2019-10-23
Attending: FAMILY MEDICINE
Payer: MEDICARE

## 2019-10-23 PROCEDURE — 97110 THERAPEUTIC EXERCISES: CPT | Mod: GP | Performed by: PHYSICAL THERAPIST

## 2019-10-30 ENCOUNTER — HOSPITAL ENCOUNTER (OUTPATIENT)
Dept: PHYSICAL THERAPY | Facility: HOSPITAL | Age: 79
Setting detail: THERAPIES SERIES
End: 2019-10-30
Attending: FAMILY MEDICINE
Payer: MEDICARE

## 2019-10-30 PROCEDURE — 97110 THERAPEUTIC EXERCISES: CPT | Mod: GP | Performed by: PHYSICAL THERAPIST

## 2019-10-30 NOTE — PROGRESS NOTES
Subjective     Dinorah Lim is a 79 year old female who presents to clinic today for the following health issues:    HPI     Musculoskeletal problem/pain      Duration: ongoing for over 3 months    Description  Location: left knee, underwent left tka with Dr Alonzo.     Intensity:  severe    Accompanying signs and symptoms: warmth and swelling    History  Previous similar problem: no   Previous evaluation:  orthopedic evaluation    Precipitating or alleviating factors:  Trauma or overuse: no   Aggravating factors include: standing, walking and climbing stairs    Therapies tried and outcome: nothing  She had her left TKA done 9-9-19 so is about a month out, still with pain. She has seen Dr Alonzo, her surgeon about this and he has recommended following. She is doing her PT exercises at home; she has some edema, no redness, no fever   Hypertension Follow-up      Do you check your blood pressure regularly outside of the clinic? No     Are you following a low salt diet? Yes    Are your blood pressures ever more than 140 on the top number (systolic) OR more   than 90 on the bottom number (diastolic), for example 140/90? No        Patient Active Problem List   Diagnosis     Osteoarthritis of right hip     Abnormal glucose     Osteoarthritis     Lung density on x-ray     Advanced care planning/counseling discussion     Anxiety     Urticaria     ACP (advance care planning)     Morbid obesity (H)     Palpitations     PVC's (premature ventricular contractions)     Atrial ectopy     PSVT (paroxysmal supraventricular tachycardia) (H)     COPD, mild on 9/9/14     Bilateral carotid artery stenosis at less than 50% on 12/1/16     Mixed hyperlipidemia     On statin therapy     Heart murmur     Aortic valve insufficiency     Mitral regurgitation     Tricuspid valve insufficiency     Diastolic dysfunction grade 1 on 2/21/19     Hypertriglyceridemia     Status post total left knee replacement     Past Surgical History:   Procedure  Laterality Date     ARTHROPLASTY KNEE Left 9/9/2019    Procedure: LEFT TOTAL KNEE ARTHROPLASTY S/N;  Surgeon: Trevor Alonzo MD;  [facility]     ARTHROSCOPY KNEE Left 3/21/2019    Procedure: LEFT  KNEE ARTHROSCOPY, partial medial menisectomy;  Surgeon: Esteban Wills MD;  Location: [facility]     C TOTAL KNEE ARTHROPLASTY Left 09/09/2019    Dr Alonzo     CLOSED REDUCTION WRIST Right 1952 x 2    long arm cast (same time as elbow fx)     CLOSED RX ELBOW DISLOCATION Right 1952    CR/long cast of fracture     HC INJ EPIDURAL LUMBAR/SACRAL W/WO CONTRAST Bilateral 5/2013    facet injections; prev 2012     LAPAROSCOPIC CHOLECYSTECTOMY N/A 1/4/2015    Procedure: LAPAROSCOPIC CHOLECYSTECTOMY;  Surgeon: Brittney العلي MD;  Location: [facility]     TONSILLECTOMY         Social History     Tobacco Use     Smoking status: Never Smoker     Smokeless tobacco: Never Used   Substance Use Topics     Alcohol use: No     Family History   Problem Relation Age of Onset     Heart Disease Brother         atrial fibrillation     Atrial fibrillation Brother      Other - See Comments Mother         emphysema     Heart Disease Father 92        CHF     Cancer Paternal Grandfather         lung cancer     Cancer Maternal Uncle         lung cancer     Chronic Obstructive Pulmonary Disease Daughter      Emphysema Daughter      Other - See Comments Daughter         benign breast lesion     Esophagitis Daughter          Current Outpatient Medications   Medication Sig Dispense Refill     acetaminophen (TYLENOL) 325 MG tablet Take 3 tablets (975 mg) by mouth every 6 hours as needed for mild pain 60 tablet 0     Calcium-Magnesium-Vitamin D (CALCIUM 1200+D3 PO) Take by mouth daily       clonazePAM (KLONOPIN) 1 MG tablet TAKE 1/4 TO 1/2 (ONE-FOURTH TO ONE-HALF) TABLET BY MOUTH EVERY 8 HOURS AS NEEDED 45 tablet 0     FISH OIL Take 1 capsule by mouth daily        HYDROcodone-acetaminophen (NORCO) 5-325 MG tablet Take 1 tablet by mouth every 6 hours as  "needed for severe pain 60 tablet 0     methylPREDNISolone (MEDROL DOSEPAK) 4 MG tablet therapy pack        nabumetone (RELAFEN) 500 MG tablet Take 1 tablet (500 mg) by mouth 2 times daily 60 tablet 1     PARoxetine (PAXIL) 40 MG tablet TAKE 1 TABLET BY MOUTH ONCE DAILY 90 tablet 2     simvastatin (ZOCOR) 20 MG tablet TAKE 1 TABLET BY MOUTH AT BEDTIME 90 tablet 0     VITAMIN D, CHOLECALCIFEROL, PO Take 2,000 Units by mouth daily       Allergies   Allergen Reactions     Chocolate Hives     Lemon Flavor Hives     Lime [Calcium Oxysulfide] Hives     Orange Fruit [Citrus] Hives     Strawberry Hives     Adhesive Tape      Band-aids     Codeine Hives     Patient can tolerate oxycodone & dilaudid     Decadron [Dexamethasone] Hives     Erythromycin Hives     ERYTHROMYCIN BASE     Grapefruit [Extra Strength Grapefruit]      GRAPEFRUIT     Morphine Hives     Pt. Reports had hives     Penicillins Hives     Sulfa Drugs      SULFONAMIDE ANTIBIOTICS      Tomato      Xyzal [Levocetirizine] Hives     BP Readings from Last 3 Encounters:   11/07/19 138/78   10/17/19 130/64   09/10/19 135/57    Wt Readings from Last 3 Encounters:   11/07/19 100.2 kg (221 lb)   10/17/19 102.5 kg (226 lb)   09/09/19 105.2 kg (232 lb)                      Reviewed and updated as needed this visit by Provider  Tobacco  Allergies  Meds  Med Hx  Surg Hx  Fam Hx  Soc Hx        Review of Systems   ROS COMP: Constitutional, HEENT, cardiovascular, pulmonary, gi and gu systems are negative, except as otherwise noted.      Objective    /78   Pulse 70   Resp 19   Ht 1.664 m (5' 5.5\")   Wt 100.2 kg (221 lb)   SpO2 98%   BMI 36.22 kg/m    Body mass index is 36.22 kg/m .  Physical Exam   GENERAL: healthy, alert and no distress  NECK: no adenopathy, no asymmetry, masses, or scars and thyroid normal to palpation  RESP: lungs clear to auscultation - no rales, rhonchi or wheezes  CV: regular rate and rhythm, normal S1 S2, no S3 or S4, no murmur, click or " "rub, no peripheral edema and peripheral pulses strong  MS: left knee with small effusion, no tenderness of the joint line, slight tenderness of the later knee. ROM appropriate for her postop stage  SKIN: no suspicious lesions or rashes  NEURO: Normal strength and tone, mentation intact and speech normal  PSYCH: mentation appears normal, affect normal/bright            Assessment & Plan     1. Status post total left knee replacement  Will add nabumetone to help with pain and inflammation. We discussed that this is normal healing and can take up to a year. She is using hydrocodone bid;discussed weaning down on this now  - nabumetone (RELAFEN) 500 MG tablet; Take 1 tablet (500 mg) by mouth 2 times daily  Dispense: 60 tablet; Refill: 1    2. Hypertriglyceridemia  Check labs today  - Lipid Profile    3. Primary osteoarthritis involving multiple joints  Due for labs  - Comprehensive metabolic panel    4. Elevated glucose  Recheck A1C  - Hemoglobin A1c  - CBC with platelets    5. Elevated BP without diagnosis of hypertension  Better today. Will continue to follow off medications       BMI:   Estimated body mass index is 36.22 kg/m  as calculated from the following:    Height as of this encounter: 1.664 m (5' 5.5\").    Weight as of this encounter: 100.2 kg (221 lb).   Weight management plan: Discussed healthy diet and exercise guidelines            Return in about 3 months (around 2/7/2020) for BP Recheck, Lab Work.    Magaly Sosa MD  [facility] - [place]      "

## 2019-11-07 ENCOUNTER — OFFICE VISIT (OUTPATIENT)
Dept: FAMILY MEDICINE | Facility: OTHER | Age: 79
End: 2019-11-07
Attending: FAMILY MEDICINE
Payer: COMMERCIAL

## 2019-11-07 VITALS
WEIGHT: 221 LBS | OXYGEN SATURATION: 98 % | RESPIRATION RATE: 19 BRPM | BODY MASS INDEX: 35.52 KG/M2 | HEART RATE: 70 BPM | DIASTOLIC BLOOD PRESSURE: 78 MMHG | SYSTOLIC BLOOD PRESSURE: 138 MMHG | HEIGHT: 66 IN

## 2019-11-07 DIAGNOSIS — Z96.652 STATUS POST TOTAL LEFT KNEE REPLACEMENT: Primary | ICD-10-CM

## 2019-11-07 DIAGNOSIS — E78.1 HYPERTRIGLYCERIDEMIA: ICD-10-CM

## 2019-11-07 DIAGNOSIS — R03.0 ELEVATED BP WITHOUT DIAGNOSIS OF HYPERTENSION: ICD-10-CM

## 2019-11-07 DIAGNOSIS — R73.09 ELEVATED GLUCOSE: ICD-10-CM

## 2019-11-07 DIAGNOSIS — M15.0 PRIMARY OSTEOARTHRITIS INVOLVING MULTIPLE JOINTS: ICD-10-CM

## 2019-11-07 LAB
ALBUMIN SERPL-MCNC: 3.7 G/DL (ref 3.4–5)
ALP SERPL-CCNC: 121 U/L (ref 40–150)
ALT SERPL W P-5'-P-CCNC: 21 U/L (ref 0–50)
ANION GAP SERPL CALCULATED.3IONS-SCNC: 3 MMOL/L (ref 3–14)
AST SERPL W P-5'-P-CCNC: 17 U/L (ref 0–45)
BILIRUB SERPL-MCNC: 0.6 MG/DL (ref 0.2–1.3)
BUN SERPL-MCNC: 13 MG/DL (ref 7–30)
CALCIUM SERPL-MCNC: 9.5 MG/DL (ref 8.5–10.1)
CHLORIDE SERPL-SCNC: 108 MMOL/L (ref 94–109)
CHOLEST SERPL-MCNC: 190 MG/DL
CO2 SERPL-SCNC: 28 MMOL/L (ref 20–32)
CREAT SERPL-MCNC: 0.91 MG/DL (ref 0.52–1.04)
ERYTHROCYTE [DISTWIDTH] IN BLOOD BY AUTOMATED COUNT: 14.3 % (ref 10–15)
EST. AVERAGE GLUCOSE BLD GHB EST-MCNC: 97 MG/DL
GFR SERPL CREATININE-BSD FRML MDRD: 61 ML/MIN/{1.73_M2}
GLUCOSE SERPL-MCNC: 107 MG/DL (ref 70–99)
HBA1C MFR BLD: 5 % (ref 0–5.6)
HCT VFR BLD AUTO: 39.4 % (ref 35–47)
HDLC SERPL-MCNC: 46 MG/DL
HGB BLD-MCNC: 12.4 G/DL (ref 11.7–15.7)
LDLC SERPL CALC-MCNC: 107 MG/DL
MCH RBC QN AUTO: 27.7 PG (ref 26.5–33)
MCHC RBC AUTO-ENTMCNC: 31.5 G/DL (ref 31.5–36.5)
MCV RBC AUTO: 88 FL (ref 78–100)
NONHDLC SERPL-MCNC: 144 MG/DL
PLATELET # BLD AUTO: 199 10E9/L (ref 150–450)
POTASSIUM SERPL-SCNC: 4.2 MMOL/L (ref 3.4–5.3)
PROT SERPL-MCNC: 7.2 G/DL (ref 6.8–8.8)
RBC # BLD AUTO: 4.48 10E12/L (ref 3.8–5.2)
SODIUM SERPL-SCNC: 139 MMOL/L (ref 133–144)
TRIGL SERPL-MCNC: 183 MG/DL
WBC # BLD AUTO: 3.8 10E9/L (ref 4–11)

## 2019-11-07 PROCEDURE — 80061 LIPID PANEL: CPT | Mod: ZL | Performed by: FAMILY MEDICINE

## 2019-11-07 PROCEDURE — 40000788 ZZHCL STATISTIC ESTIMATED AVERAGE GLUCOSE: Mod: ZL | Performed by: FAMILY MEDICINE

## 2019-11-07 PROCEDURE — 83036 HEMOGLOBIN GLYCOSYLATED A1C: CPT | Mod: ZL | Performed by: FAMILY MEDICINE

## 2019-11-07 PROCEDURE — 99214 OFFICE O/P EST MOD 30 MIN: CPT | Performed by: FAMILY MEDICINE

## 2019-11-07 PROCEDURE — 36415 COLL VENOUS BLD VENIPUNCTURE: CPT | Mod: ZL | Performed by: FAMILY MEDICINE

## 2019-11-07 PROCEDURE — G0463 HOSPITAL OUTPT CLINIC VISIT: HCPCS

## 2019-11-07 PROCEDURE — 80053 COMPREHEN METABOLIC PANEL: CPT | Mod: ZL | Performed by: FAMILY MEDICINE

## 2019-11-07 PROCEDURE — 85027 COMPLETE CBC AUTOMATED: CPT | Mod: ZL | Performed by: FAMILY MEDICINE

## 2019-11-07 RX ORDER — METHYLPREDNISOLONE 4 MG
TABLET, DOSE PACK ORAL
COMMUNITY
Start: 2019-10-22 | End: 2019-12-10

## 2019-11-07 RX ORDER — NABUMETONE 500 MG/1
500 TABLET, FILM COATED ORAL 2 TIMES DAILY
Qty: 60 TABLET | Refills: 1 | Status: SHIPPED | OUTPATIENT
Start: 2019-11-07 | End: 2019-12-10

## 2019-11-07 ASSESSMENT — PAIN SCALES - GENERAL: PAINLEVEL: EXTREME PAIN (8)

## 2019-11-07 ASSESSMENT — MIFFLIN-ST. JEOR: SCORE: 1496.26

## 2019-11-07 NOTE — LETTER
My Asthma Action Plan    Name: Dinorah Lim   YOB: 1940  Date: 11/7/2019   My doctor: Magaly Sosa MD   [facility]        My Rescue Medicine:   Albuterol inhaler (Proair/Ventolin/Proventil HFA)  2-4 puffs EVERY 4 HOURS as needed. Use a spacer if recommended by your provider.   My Asthma Severity:   Intermittent / Exercise Induced  Know your asthma triggers:   None          GREEN ZONE   Good Control    I feel good    No cough or wheeze    Can work, sleep and play without asthma symptoms       Take your asthma control medicine every day.     1. If exercise triggers your asthma, take your rescue medication    15 minutes before exercise or sports, and    During exercise if you have asthma symptoms  2. Spacer to use with inhaler: If you have a spacer, make sure to use it with your inhaler             YELLOW ZONE Getting Worse  I have ANY of these:    I do not feel good    Cough or wheeze    Chest feels tight    Wake up at night   1. Keep taking your Green Zone medications  2. Start taking your rescue medicine:    every 20 minutes for up to 1 hour. Then every 4 hours for 24-48 hours.  3. If you stay in the Yellow Zone for more than 12-24 hours, contact your doctor.  4. If you do not return to the Green Zone in 12-24 hours or you get worse, start taking your oral steroid medicine if prescribed by your provider.           RED ZONE Medical Alert - Get Help  I have ANY of these:    I feel awful    Medicine is not helping    Breathing getting harder    Trouble walking or talking    Nose opens wide to breathe       1. Take your rescue medicine NOW  2. If your provider has prescribed an oral steroid medicine, start taking it NOW  3. Call your doctor NOW  4. If you are still in the Red Zone after 20 minutes and you have not reached your doctor:    Take your rescue medicine again and    Call 911 or go to the emergency room right away    See your regular doctor within 2 weeks  of an Emergency Room or Urgent Care visit for follow-up treatment.          Annual Reminders:  Meet with Asthma Educator,  Flu Shot in the Fall, consider Pneumonia Vaccination for patients with asthma (aged 19 and older).    Pharmacy: [facility] [place], [place] - [address] [facility] [address]                        Asthma Triggers  How To Control Things That Make Your Asthma Worse    Triggers are things that make your asthma worse.  Look at the list below to help you find your triggers and   what you can do about them. You can help prevent asthma flare-ups by staying away from your triggers.      Trigger                                                          What you can do   Cigarette Smoke  Tobacco smoke can make asthma worse. Do not allow smoking in your home, car or around you.  Be sure no one smokes at a child s day care or school.  If you smoke, ask your health care provider for ways to help you quit.  Ask family members to quit too.  Ask your health care provider for a referral to Quit Plan to help you quit smoking, or call 1-508-930-PLAN.     Colds, Flu, Bronchitis  These are common triggers of asthma. Wash your hands often.  Don t touch your eyes, nose or mouth.  Get a flu shot every year.     Dust Mites  These are tiny bugs that live in cloth or carpet. They are too small to see. Wash sheets and blankets in hot water every week.   Encase pillows and mattress in dust mite proof covers.  Avoid having carpet if you can. If you have carpet, vacuum weekly.   Use a dust mask and HEPA vacuum.   Pollen and Outdoor Mold  Some people are allergic to trees, grass, or weed pollen, or molds. Try to keep your windows closed.  Limit time out doors when pollen count is high.   Ask you health care provider about taking medicine during allergy season.     Animal Dander  Some people are allergic to skin flakes, urine or saliva from pets with fur or feathers. Keep pets with fur or feathers out of your home.    If you can t keep  the pet outdoors, then keep the pet out of your bedroom.  Keep the bedroom door closed.  Keep pets off cloth furniture and away from stuffed toys.     Mice, Rats, and Cockroaches  Some people are allergic to the waste from these pests.   Cover food and garbage.  Clean up spills and food crumbs.  Store grease in the refrigerator.   Keep food out of the bedroom.   Indoor Mold  This can be a trigger if your home has high moisture. Fix leaking faucets, pipes, or other sources of water.   Clean moldy surfaces.  Dehumidify basement if it is damp and smelly.   Smoke, Strong Odors, and Sprays  These can reduce air quality. Stay away from strong odors and sprays, such as perfume, powder, hair spray, paints, smoke incense, paint, cleaning products, candles and new carpet.   Exercise or Sports  Some people with asthma have this trigger. Be active!  Ask your doctor about taking medicine before sports or exercise to prevent symptoms.    Warm up for 5-10 minutes before and after sports or exercise.     Other Triggers of Asthma  Cold air:  Cover your nose and mouth with a scarf.  Sometimes laughing or crying can be a trigger.  Some medicines and food can trigger asthma.

## 2019-11-08 DIAGNOSIS — E78.5 HYPERLIPIDEMIA LDL GOAL <100: Primary | ICD-10-CM

## 2019-11-08 DIAGNOSIS — I47.10 PSVT (PAROXYSMAL SUPRAVENTRICULAR TACHYCARDIA) (H): ICD-10-CM

## 2019-11-08 DIAGNOSIS — E78.2 MIXED HYPERLIPIDEMIA: Primary | ICD-10-CM

## 2019-11-08 RX ORDER — SIMVASTATIN 40 MG
40 TABLET ORAL AT BEDTIME
Qty: 90 TABLET | Refills: 1 | Status: SHIPPED | OUTPATIENT
Start: 2019-11-08 | End: 2019-12-10

## 2019-11-08 RX ORDER — SIMVASTATIN 40 MG
40 TABLET ORAL AT BEDTIME
Qty: 90 TABLET | Refills: 3 | Status: SHIPPED | OUTPATIENT
Start: 2019-11-08 | End: 2019-12-10

## 2019-11-08 NOTE — TELEPHONE ENCOUNTER
Please see note below by PCP. New medication pended.       Notes recorded by Magaly Sosa MD on 11/7/2019 at 12:18 PM CST  A1C is normal at 5.0  LDL is close to goal of <100, recommend increasing simvastatin from 20 mg daily to 40 mg daily, rx not sent yet

## 2019-11-25 DIAGNOSIS — M16.11 PRIMARY OSTEOARTHRITIS OF RIGHT HIP: ICD-10-CM

## 2019-11-25 RX ORDER — HYDROCODONE BITARTRATE AND ACETAMINOPHEN 5; 325 MG/1; MG/1
1 TABLET ORAL EVERY 6 HOURS PRN
Qty: 60 TABLET | Refills: 0 | Status: SHIPPED | OUTPATIENT
Start: 2019-11-25 | End: 2019-12-10

## 2019-11-25 NOTE — TELEPHONE ENCOUNTER
HYDROcodone-acetaminophen (NORCO) 5-325 MG tablet      Last Written Prescription Date:  9/25/19  Last Fill Quantity: 60,   # refills: 0  Last Office Visit: 11/7/19  Future Office visit:    Next 5 appointments (look out 90 days)    Dec 09, 2019  8:30 AM CST  (Arrive by 8:15 AM)  SHORT with Magaly Sosa MD  [facility] [address]  [place] [address]  [phone]           Routing refill request to provider for review/approval because:  Drug not on the [facility], [facility] or [facility] refill protocol or controlled substance

## 2019-11-27 ENCOUNTER — TRANSFERRED RECORDS (OUTPATIENT)
Dept: HEALTH INFORMATION MANAGEMENT | Facility: CLINIC | Age: 79
End: 2019-11-27

## 2019-12-09 ENCOUNTER — NURSE TRIAGE (OUTPATIENT)
Dept: FAMILY MEDICINE | Facility: OTHER | Age: 79
End: 2019-12-09

## 2019-12-09 DIAGNOSIS — E78.5 HYPERLIPIDEMIA LDL GOAL <100: ICD-10-CM

## 2019-12-09 NOTE — TELEPHONE ENCOUNTER
Patient called stating she has been experiencing frequency of urination. Patient denies pain or fever.  Patient scheduled for appointment 12/10/19 at 1140. Nurse advised patient to be seen sooner if symptoms worsened. Patient understood and agreed with recommendation.    Reason for Disposition    [1] Can't control passage of urine (i.e., urinary incontinence) AND [2] new onset (< 2 weeks) or worsening    Additional Information    Negative: Shock suspected (e.g., cold/pale/clammy skin, too weak to stand, low BP, rapid pulse)    Negative: Sounds like a life-threatening emergency to the triager    Negative: Followed a genital area injury    Negative: Followed a genital area injury (penis, scrotum)    Negative: Vaginal discharge    Negative: Pus (white, yellow) or bloody discharge from end of penis    Negative: [1] Taking antibiotic for urinary tract infection (UTI) AND [2] female    Negative: [1] Taking antibiotic for urinary tract infection (UTI) AND [2] male    Negative: [1] Discomfort (pain, burning or stinging) when passing urine AND [2] pregnant    Negative: [1] Discomfort (pain, burning or stinging) when passing urine AND [2] postpartum < 1 month    Negative: [1] Discomfort (pain, burning or stinging) when passing urine AND [2] female    Negative: [1] Discomfort (pain, burning or stinging) when passing urine AND [2] male    Negative: Pain or itching in the vulvar area    Negative: Pain in scrotum is main symptom    Negative: Blood in the urine is main symptom    Negative: Symptoms arising from use of a urinary catheter (John or Coude)    Negative: [1] Unable to urinate (or only a few drops) > 4 hours AND     [2] bladder feels very full (e.g., palpable bladder or strong urge to urinate)    Negative: [1] Decreased urination and [2] drinking very little AND [2] dehydration suspected (e.g., dark urine, no urine > 12 hours, very dry mouth, very lightheaded)    Negative: Patient sounds very sick or weak to the  "triager    Negative: Fever > 100.5 F (38.1 C)    Negative: Side (flank) or lower back pain present    Answer Assessment - Initial Assessment Questions  1. SYMPTOM: \"What's the main symptom you're concerned about?\" (e.g., frequency, incontinence)      urgency  2. ONSET: \"When did the  urgency  start?\"      yesterday  3. PAIN: \"Is there any pain?\" If so, ask: \"How bad is it?\" (Scale: 1-10; mild, moderate, severe)      no  4. CAUSE: \"What do you think is causing the symptoms?\"      Possible UTI  5. OTHER SYMPTOMS: \"Do you have any other symptoms?\" (e.g., fever, flank pain, blood in urine, pain with urination)      no  6. PREGNANCY: \"Is there any chance you are pregnant?\" \"When was your last menstrual period?\"      no    Protocols used: URINARY SYMPTOMS-A-AH    "

## 2019-12-10 ENCOUNTER — OFFICE VISIT (OUTPATIENT)
Dept: FAMILY MEDICINE | Facility: OTHER | Age: 79
End: 2019-12-10
Attending: NURSE PRACTITIONER
Payer: MEDICARE

## 2019-12-10 VITALS
HEIGHT: 66 IN | BODY MASS INDEX: 34.72 KG/M2 | DIASTOLIC BLOOD PRESSURE: 69 MMHG | SYSTOLIC BLOOD PRESSURE: 130 MMHG | WEIGHT: 216 LBS | HEART RATE: 63 BPM | TEMPERATURE: 96.7 F | OXYGEN SATURATION: 98 %

## 2019-12-10 DIAGNOSIS — R35.0 URINARY FREQUENCY: Primary | ICD-10-CM

## 2019-12-10 DIAGNOSIS — N39.0 URINARY TRACT INFECTION WITHOUT COMPLICATION: ICD-10-CM

## 2019-12-10 DIAGNOSIS — E78.5 HYPERLIPIDEMIA LDL GOAL <100: ICD-10-CM

## 2019-12-10 LAB
ALBUMIN UR-MCNC: 10 MG/DL
APPEARANCE UR: ABNORMAL
BACTERIA #/AREA URNS HPF: ABNORMAL /HPF
BILIRUB UR QL STRIP: NEGATIVE
COLOR UR AUTO: YELLOW
GLUCOSE UR STRIP-MCNC: NEGATIVE MG/DL
HGB UR QL STRIP: NEGATIVE
KETONES UR STRIP-MCNC: NEGATIVE MG/DL
LEUKOCYTE ESTERASE UR QL STRIP: ABNORMAL
MUCOUS THREADS #/AREA URNS LPF: PRESENT /LPF
NITRATE UR QL: NEGATIVE
PH UR STRIP: 5 PH (ref 4.7–8)
RBC #/AREA URNS AUTO: 3 /HPF (ref 0–2)
SOURCE: ABNORMAL
SP GR UR STRIP: 1.03 (ref 1–1.03)
SQUAMOUS #/AREA URNS AUTO: 13 /HPF (ref 0–1)
TRANS CELLS #/AREA URNS HPF: 3 /HPF (ref 0–1)
UROBILINOGEN UR STRIP-MCNC: NORMAL MG/DL (ref 0–2)
WBC #/AREA URNS AUTO: 28 /HPF (ref 0–5)

## 2019-12-10 PROCEDURE — 87088 URINE BACTERIA CULTURE: CPT | Mod: ZL | Performed by: NURSE PRACTITIONER

## 2019-12-10 PROCEDURE — 81001 URINALYSIS AUTO W/SCOPE: CPT | Mod: ZL | Performed by: NURSE PRACTITIONER

## 2019-12-10 PROCEDURE — 87186 SC STD MICRODIL/AGAR DIL: CPT | Mod: ZL | Performed by: NURSE PRACTITIONER

## 2019-12-10 PROCEDURE — 87086 URINE CULTURE/COLONY COUNT: CPT | Mod: ZL | Performed by: NURSE PRACTITIONER

## 2019-12-10 PROCEDURE — G0463 HOSPITAL OUTPT CLINIC VISIT: HCPCS

## 2019-12-10 PROCEDURE — 99213 OFFICE O/P EST LOW 20 MIN: CPT | Performed by: NURSE PRACTITIONER

## 2019-12-10 RX ORDER — HYDROCODONE BITARTRATE AND ACETAMINOPHEN 5; 325 MG/1; MG/1
TABLET ORAL
Refills: 0 | COMMUNITY
Start: 2019-11-25 | End: 2020-05-15

## 2019-12-10 RX ORDER — NITROFURANTOIN 25; 75 MG/1; MG/1
100 CAPSULE ORAL 2 TIMES DAILY
Qty: 10 CAPSULE | Refills: 0 | Status: SHIPPED | OUTPATIENT
Start: 2019-12-10 | End: 2020-01-13

## 2019-12-10 RX ORDER — CEPHALEXIN 500 MG/1
500 CAPSULE ORAL 2 TIMES DAILY
Qty: 14 CAPSULE | Refills: 0 | Status: CANCELLED | OUTPATIENT
Start: 2019-12-10 | End: 2019-12-17

## 2019-12-10 RX ORDER — SIMVASTATIN 40 MG
40 TABLET ORAL AT BEDTIME
Qty: 90 TABLET | Refills: 0 | Status: SHIPPED | OUTPATIENT
Start: 2019-12-10 | End: 2021-01-11

## 2019-12-10 ASSESSMENT — PAIN SCALES - GENERAL: PAINLEVEL: NO PAIN (0)

## 2019-12-10 ASSESSMENT — MIFFLIN-ST. JEOR: SCORE: 1473.58

## 2019-12-10 NOTE — NURSING NOTE
"Chief Complaint   Patient presents with     Urinary Frequency       Initial /69 (BP Location: Left arm, Patient Position: Chair, Cuff Size: Adult Regular)   Pulse 63   Temp 96.7  F (35.9  C) (Tympanic)   Ht 1.664 m (5' 5.5\")   Wt 98 kg (216 lb)   SpO2 98%   BMI 35.40 kg/m   Estimated body mass index is 35.4 kg/m  as calculated from the following:    Height as of this encounter: 1.664 m (5' 5.5\").    Weight as of this encounter: 98 kg (216 lb).  Medication Reconciliation: complete  Lucrecia Renteria LPN  "

## 2019-12-10 NOTE — PROGRESS NOTES
Subjective     Dinorah Lim is a 79 year old female who presents to clinic today for the following health issues:    HPI   URINARY TRACT SYMPTOMS      Duration: 3 days    Description    frequency, urgency, foul odor, and urinary retention, no fevers, no hematuria, no vaginal discharge or odor    Intensity:  mild    Accompanying signs and symptoms:  Fever/chills: no   Flank pain no   Nausea and vomiting: no   Vaginal symptoms: none  Abdominal/Pelvic Pain: no     History  History of frequent UTI's: YES- has a total of 10 UTIs in her lifetime  History of kidney stones: no   Sexually Active: no   Possibility of pregnancy:no     Precipitating or alleviating factors: None    Therapies tried and outcome: cranberry juice and increase fluid intake   Outcome: she feels like the therapies are working.     Overall, she does feel better.       Patient Active Problem List   Diagnosis     Osteoarthritis of right hip     Abnormal glucose     Osteoarthritis     Lung density on x-ray     Advanced care planning/counseling discussion     Anxiety     Urticaria     ACP (advance care planning)     Morbid obesity (H)     Palpitations     PVC's (premature ventricular contractions)     Atrial ectopy     PSVT (paroxysmal supraventricular tachycardia) (H)     COPD, mild on 9/9/14     Bilateral carotid artery stenosis at less than 50% on 12/1/16     Mixed hyperlipidemia     On statin therapy     Heart murmur     Aortic valve insufficiency     Mitral regurgitation     Tricuspid valve insufficiency     Diastolic dysfunction grade 1 on 2/21/19     Hypertriglyceridemia     Status post total left knee replacement     Past Surgical History:   Procedure Laterality Date     ARTHROPLASTY KNEE Left 9/9/2019    Procedure: LEFT TOTAL KNEE ARTHROPLASTY S/N;  Surgeon: Trevor Alonzo MD;  Location: [facility]     ARTHROSCOPY KNEE Left 3/21/2019    Procedure: LEFT  KNEE ARTHROSCOPY, partial medial menisectomy;  Surgeon: Esteban Wills MD;  Location: [facility]      C TOTAL KNEE ARTHROPLASTY Left 09/09/2019    Dr Alonzo     CLOSED REDUCTION WRIST Right 1952 x 2    long arm cast (same time as elbow fx)     CLOSED RX ELBOW DISLOCATION Right 1952    CR/long cast of fracture     HC INJ EPIDURAL LUMBAR/SACRAL W/WO CONTRAST Bilateral 5/2013    facet injections; prev 2012     LAPAROSCOPIC CHOLECYSTECTOMY N/A 1/4/2015    Procedure: LAPAROSCOPIC CHOLECYSTECTOMY;  Surgeon: Brittney العلي MD;  Location: [facility]     TONSILLECTOMY         Social History     Tobacco Use     Smoking status: Never Smoker     Smokeless tobacco: Never Used   Substance Use Topics     Alcohol use: No     Family History   Problem Relation Age of Onset     Heart Disease Brother         atrial fibrillation     Atrial fibrillation Brother      Other - See Comments Mother         emphysema     Heart Disease Father 92        CHF     Cancer Paternal Grandfather         lung cancer     Cancer Maternal Uncle         lung cancer     Chronic Obstructive Pulmonary Disease Daughter      Emphysema Daughter      Other - See Comments Daughter         benign breast lesion     Esophagitis Daughter          Current Outpatient Medications   Medication Sig Dispense Refill     Calcium-Magnesium-Vitamin D (CALCIUM 1200+D3 PO) Take by mouth daily       clonazePAM (KLONOPIN) 1 MG tablet TAKE 1/4 TO 1/2 (ONE-FOURTH TO ONE-HALF) TABLET BY MOUTH EVERY 8 HOURS AS NEEDED 45 tablet 0     FISH OIL Take 1 capsule by mouth daily        nitroFURantoin macrocrystal-monohydrate (MACROBID) 100 MG capsule Take 1 capsule (100 mg) by mouth 2 times daily for 5 days 10 capsule 0     PARoxetine (PAXIL) 40 MG tablet TAKE 1 TABLET BY MOUTH ONCE DAILY 90 tablet 2     simvastatin (ZOCOR) 40 MG tablet Take 1 tablet (40 mg) by mouth At Bedtime 90 tablet 0     VITAMIN D, CHOLECALCIFEROL, PO Take 2,000 Units by mouth daily       HYDROcodone-acetaminophen (NORCO) 5-325 MG tablet TAKE 1 TABLET BY MOUTH EVERY 6 HOURS AS NEEDED FOR SEVERE PAIN  0     Allergies  "  Allergen Reactions     Chocolate Hives     Lemon Flavor Hives     Lime [Calcium Oxysulfide] Hives     Orange Fruit [Citrus] Hives     Strawberry Hives     Adhesive Tape      Band-aids     Codeine Hives     Patient can tolerate oxycodone & dilaudid     Decadron [Dexamethasone] Hives     Erythromycin Hives     ERYTHROMYCIN BASE     Grapefruit [Extra Strength Grapefruit]      GRAPEFRUIT     Morphine Hives     Pt. Reports had hives     Penicillins Hives     Sulfa Drugs      SULFONAMIDE ANTIBIOTICS      Tomato      Xyzal [Levocetirizine] Hives       Reviewed and updated as needed this visit by Provider         Review of Systems   As noted in the HPI.       Objective    /69 (BP Location: Left arm, Patient Position: Chair, Cuff Size: Adult Regular)   Pulse 63   Temp 96.7  F (35.9  C) (Tympanic)   Ht 1.664 m (5' 5.5\")   Wt 98 kg (216 lb)   SpO2 98%   BMI 35.40 kg/m    Body mass index is 35.4 kg/m .  Physical Exam   GENERAL: healthy, alert and no distress  RESP: lungs clear to auscultation - no rales, rhonchi or wheezes  CV: regular rate and rhythm, no murmur  ABDOMEN: soft, nontender, no hepatosplenomegaly, no masses and bowel sounds normal  PSYCH: mentation appears normal, affect normal/bright  No CVA tenderness    Diagnostic Test Results:  Labs reviewed in Epic  Results for orders placed or performed in visit on 12/10/19 (from the past 24 hour(s))   UA reflex to Microscopic and Culture - [place]   Result Value Ref Range    Color Urine Yellow     Appearance Urine Slightly Cloudy     Glucose Urine Negative NEG^Negative mg/dL    Bilirubin Urine Negative NEG^Negative    Ketones Urine Negative NEG^Negative mg/dL    Specific Gravity Urine 1.030 1.003 - 1.035    Blood Urine Negative NEG^Negative    pH Urine 5.0 4.7 - 8.0 pH    Protein Albumin Urine 10 (A) NEG^Negative mg/dL    Urobilinogen mg/dL Normal 0.0 - 2.0 mg/dL    Nitrite Urine Negative NEG^Negative    Leukocyte Esterase Urine Moderate (A) NEG^Negative    " Source Midstream Urine     RBC Urine 3 (H) 0 - 2 /HPF    WBC Urine 28 (H) 0 - 5 /HPF    Bacteria Urine Few (A) NEG^Negative /HPF    Squamous Epithelial /HPF Urine 13 (H) 0 - 1 /HPF    Transitional Epi 3 (H) 0 - 1 /HPF    Mucous Urine Present (A) NEG^Negative /LPF           Assessment & Plan   (R35.0) Urinary frequency  (primary encounter diagnosis)  (N39.0) Urinary tract infection without complication  Comment: urine positive for LE and WBC, Creatinine Clearance 80 using Cockcroft-Gault  Plan: nitroFURantoin macrocrystal-monohydrate (MACROBID) 100 MG capsule        Patient has many drug allergies, but notes that she has tolerated Macrobid in the past. Will order for 5 days. She was also encouraged to push fluids. Will culture. Will notify patient of the results when available and intervene accordingly. F/u new or worsening symptoms.       Marlyn Alexander NP  [facility] - [place]

## 2019-12-10 NOTE — TELEPHONE ENCOUNTER
Patient coming in for urinary frequency today 12/10/19, Marlyn would like to see if she could come in 10-20 minutes prior to appointment which is scheduled for 11:20am

## 2019-12-11 RX ORDER — SIMVASTATIN 20 MG
TABLET ORAL
Qty: 90 TABLET | Refills: 3 | Status: SHIPPED | OUTPATIENT
Start: 2019-12-11 | End: 2019-12-11

## 2019-12-11 NOTE — TELEPHONE ENCOUNTER
Called [facility] pharmacy regarding 20 mg simvastatin signed to please cancel wrong dose. Pharmacy confirmed they received 40 mg yesterday

## 2019-12-12 LAB
BACTERIA SPEC CULT: ABNORMAL
SPECIMEN SOURCE: ABNORMAL

## 2020-01-06 ENCOUNTER — NURSE TRIAGE (OUTPATIENT)
Dept: FAMILY MEDICINE | Facility: OTHER | Age: 80
End: 2020-01-06

## 2020-01-06 NOTE — TELEPHONE ENCOUNTER
"Pt called, reports that she had a total L knee replacement in September by Dr Alonzo. Pt reports that ever since surgery she has experienced numbness and burning down the side of her leg. Pt reports that incision is healed. Reports swelling present to entire knee and small red area present to lower leg. Reports that her knee is hot to the touch. No fever. States \"it feels like there is a hot poker in my knee.\" Pt reports crepitus when bending knee. Pt was encouraged to go to . Pt refuses. States that she will contact Dr Alonzo for further assistance.     Reason for Disposition    [1] Looks infected (spreading redness, pus) AND [2] large red area (> 2 in. or 5 cm)    Additional Information    Negative: Followed a knee injury    Negative: Leg pain is main symptom    Negative: Knee swelling is main symptom    Negative: [1] Swollen joint AND [2] fever    Negative: [1] Red area or streak AND [2] fever    Negative: Patient sounds very sick or weak to the triager    Negative: Sounds like a life-threatening emergency to the triager    Negative: [1] SEVERE pain (e.g., excruciating, unable to walk) AND [2] not improved after 2 hours of pain medicine    Negative: [1] Can't move swollen joint at all AND [2] no fever    Negative: [1] Thigh or calf pain AND [2] only 1 side AND [3] present > 1 hour    Negative: [1] Thigh, calf, or ankle swelling AND [2] only 1 side    Answer Assessment - Initial Assessment Questions  1. LOCATION and RADIATION: \"Where is the pain located?\"       Under knee cap and on side of leg  2. QUALITY: \"What does the pain feel like?\"  (e.g., sharp, dull, aching, burning)      Like a hot poker, burning  3. SEVERITY: \"How bad is the pain?\" \"What does it keep you from doing?\"   (Scale 1-10; or mild, moderate, severe)    -  MILD (1-3): doesn't interfere with normal activities     -  MODERATE (4-7): interferes with normal activities (e.g., work or school) or awakens from sleep, limping     -  SEVERE (8-10): " "excruciating pain, unable to do any normal activities, unable to walk      9-10/10  4. ONSET: \"When did the pain start?\" \"Does it come and go, or is it there all the time?\"      Since surgery in September  5. RECURRENT: \"Have you had this pain before?\" If so, ask: \"When, and what happened then?\"      no  6. SETTING: \"Has there been any recent work, exercise or other activity that involved that part of the body?\"       no  7. AGGRAVATING FACTORS: \"What makes the knee pain worse?\" (e.g., walking, climbing stairs, running)      no  8. ASSOCIATED SYMPTOMS: \"Is there any swelling or redness of the knee?\"      Swelling to whole knee and leg, red spot distal to knee  9. OTHER SYMPTOMS: \"Do you have any other symptoms?\" (e.g., chest pain, difficulty breathing, fever, calf pain)      No chest pain, no difficulty breathing, no fever  10. PREGNANCY: \"Is there any chance you are pregnant?\" \"When was your last menstrual period?\"        No    Protocols used: KNEE PAIN-A-AH      "

## 2020-01-07 ENCOUNTER — TELEPHONE (OUTPATIENT)
Dept: FAMILY MEDICINE | Facility: OTHER | Age: 80
End: 2020-01-07

## 2020-01-07 NOTE — TELEPHONE ENCOUNTER
Back pain has been coming and going and she would also like her knee replacement that was done in September looked at.

## 2020-01-09 NOTE — PROGRESS NOTES
Subjective     Dinorah Lim is a 79 year old female who presents to clinic today for the following health issues:    HPI   Back Pain       Duration: 1 week        Specific cause: unknown thinks its related to her new lift chair    Description:   Location of pain: low back right  Character of pain: dull ache  Pain radiation:none  New numbness or weakness in legs, not attributed to pain:  no     Intensity: Currently 0/10    History:   Pain interferes with job: Not applicable  History of back problems: no prior back problems  Any previous MRI or X-rays: None  Sees a specialist for back pain:  No  Therapies tried without relief: none    Alleviating factors:   Improved by: none      Precipitating factors:  Worsened by: Sitting    She also had her left knee replaced 9-20-19      Accompanying Signs & Symptoms:  Risk of Fracture:  None  Risk of Cauda Equina:  None  Risk of Infection:  None  Risk of Cancer:  None  Risk of Ankylosing Spondylitis:  Onset at age <35, male, AND morning back stiffness. no                Musculoskeletal problem/pain      Duration: ongoing since her knee replacement since September 9    Description  Location: right knee     Intensity:  moderate    Accompanying signs and symptoms: warmth    History  Previous similar problem: no   Previous evaluation:  x-ray    Precipitating or alleviating factors:  Trauma or overuse: no   Aggravating factors include: standing, walking and overuse    Therapies tried and outcome: nothing  She is most worried that the joint is infected. She saw Dr Alonzo on 1-9-2020 who re xrayed the knee and felt that all was normal post operatively. She is concerned that she has edema and continues to have burning pain laterally of the knee that comes on suddenly at times. She is taking 1/2 hydrocodone at bedtime only for this.     She would like to review her lipids from November as well. She continues on Simvastatin 40 mg daily. Labs are reviewed with her    Patient Active  Problem List   Diagnosis     Osteoarthritis of right hip     Abnormal glucose     Osteoarthritis     Lung density on x-ray     Advanced care planning/counseling discussion     Anxiety     Urticaria     ACP (advance care planning)     Morbid obesity (H)     Palpitations     PVC's (premature ventricular contractions)     Atrial ectopy     PSVT (paroxysmal supraventricular tachycardia) (H)     COPD, mild on 9/9/14     Bilateral carotid artery stenosis at less than 50% on 12/1/16     Mixed hyperlipidemia     On statin therapy     Heart murmur     Aortic valve insufficiency     Mitral regurgitation     Tricuspid valve insufficiency     Diastolic dysfunction grade 1 on 2/21/19     Hypertriglyceridemia     Status post total left knee replacement     Past Surgical History:   Procedure Laterality Date     ARTHROPLASTY KNEE Left 9/9/2019    Procedure: LEFT TOTAL KNEE ARTHROPLASTY S/N;  Surgeon: Trevor Alonzo MD;  Location: [facility]     ARTHROSCOPY KNEE Left 3/21/2019    Procedure: LEFT  KNEE ARTHROSCOPY, partial medial menisectomy;  Surgeon: Esteban Wills MD;  Location: [facility]     C TOTAL KNEE ARTHROPLASTY Left 09/09/2019    Dr Alonzo     CLOSED REDUCTION WRIST Right 1952 x 2    long arm cast (same time as elbow fx)     CLOSED RX ELBOW DISLOCATION Right 1952    CR/long cast of fracture     HC INJ EPIDURAL LUMBAR/SACRAL W/WO CONTRAST Bilateral 5/2013    facet injections; prev 2012     LAPAROSCOPIC CHOLECYSTECTOMY N/A 1/4/2015    Procedure: LAPAROSCOPIC CHOLECYSTECTOMY;  Surgeon: Brittney العلي MD;  Location: [facility]     TONSILLECTOMY         Social History     Tobacco Use     Smoking status: Never Smoker     Smokeless tobacco: Never Used   Substance Use Topics     Alcohol use: No     Family History   Problem Relation Age of Onset     Heart Disease Brother         atrial fibrillation     Atrial fibrillation Brother      Other - See Comments Mother         emphysema     Heart Disease Father 92        CHF     Cancer Paternal  Grandfather         lung cancer     Cancer Maternal Uncle         lung cancer     Chronic Obstructive Pulmonary Disease Daughter      Emphysema Daughter      Other - See Comments Daughter         benign breast lesion     Esophagitis Daughter          Current Outpatient Medications   Medication Sig Dispense Refill     Calcium-Magnesium-Vitamin D (CALCIUM 1200+D3 PO) Take by mouth daily       clonazePAM (KLONOPIN) 1 MG tablet TAKE 1/4 TO 1/2 (ONE-FOURTH TO ONE-HALF) TABLET BY MOUTH EVERY 8 HOURS AS NEEDED 45 tablet 0     FISH OIL Take 1 capsule by mouth daily        HYDROcodone-acetaminophen (NORCO) 5-325 MG tablet TAKE 1 TABLET BY MOUTH EVERY 6 HOURS AS NEEDED FOR SEVERE PAIN  0     PARoxetine (PAXIL) 40 MG tablet TAKE 1 TABLET BY MOUTH ONCE DAILY 90 tablet 2     simvastatin (ZOCOR) 40 MG tablet Take 1 tablet (40 mg) by mouth At Bedtime 90 tablet 0     VITAMIN D, CHOLECALCIFEROL, PO Take 2,000 Units by mouth daily       Allergies   Allergen Reactions     Chocolate Hives     Lemon Flavor Hives     Lime [Calcium Oxysulfide] Hives     Metal [Staples]      Orange Fruit [Citrus] Hives     Strawberry Hives     Adhesive Tape      Band-aids     Codeine Hives     Patient can tolerate oxycodone & dilaudid     Decadron [Dexamethasone] Hives     Erythromycin Hives     ERYTHROMYCIN BASE     Grapefruit [Extra Strength Grapefruit]      GRAPEFRUIT     Morphine Hives     Pt. Reports had hives     Penicillins Hives     Sulfa Drugs      SULFONAMIDE ANTIBIOTICS      Tomato      Xyzal [Levocetirizine] Hives     BP Readings from Last 3 Encounters:   01/13/20 108/60   12/10/19 130/69   11/07/19 138/78    Wt Readings from Last 3 Encounters:   01/13/20 96.2 kg (212 lb)   12/10/19 98 kg (216 lb)   11/07/19 100.2 kg (221 lb)                      Reviewed and updated as needed this visit by Provider  Tobacco  Allergies  Meds  Med Hx  Surg Hx  Fam Hx  Soc Hx        Review of Systems   ROS COMP: Constitutional, HEENT, cardiovascular,  "pulmonary, gi and gu systems are negative, except as otherwise noted.      Objective    /60   Pulse 65   Temp 97.5  F (36.4  C) (Tympanic)   Resp 20   Ht 1.664 m (5' 5.5\")   Wt 96.2 kg (212 lb)   SpO2 98%   BMI 34.74 kg/m    Body mass index is 34.74 kg/m .  Physical Exam   GENERAL: very anxious  MS: no gross musculoskeletal defects noted, trace edema of the left lower leg. She has soft fatty growths over both lateral malleoli. No erythema, incision has healed well. No fluid in the knee is noted. Full ROM. Para spinous muscles of the lumbar spine are not tender, lumbar spine is non tender.   SKIN: no suspicious lesions or rashes  NEURO: Normal strength and tone, mentation intact and speech normal. DTRs of the bilateral lower extremities are symmetric  PSYCH: mentation appears normal and anxious            Assessment & Plan     1. Chronic pain of left knee  S/p TKA. Discussed that this is normal yet after surgery. She has been seen by DR Alonzo last week for this as well with x rays. This knee is not infected which is her concern. Continue hydrocodone. She is offered Gabapentin to try but declines    2. Hyperlipidemia LDL goal <100  Reviewed labs, continue current medications    3. Strain of lumbar region, initial encounter  She feels this is due to her new lift chair. PT is declined. Follow              Return in about 3 months (around 4/13/2020) for Lab Work.    Magaly Sosa MD  [facility] - [place]      "

## 2020-01-13 ENCOUNTER — OFFICE VISIT (OUTPATIENT)
Dept: FAMILY MEDICINE | Facility: OTHER | Age: 80
End: 2020-01-13
Attending: FAMILY MEDICINE
Payer: COMMERCIAL

## 2020-01-13 VITALS
OXYGEN SATURATION: 98 % | WEIGHT: 212 LBS | BODY MASS INDEX: 34.07 KG/M2 | DIASTOLIC BLOOD PRESSURE: 60 MMHG | HEIGHT: 66 IN | TEMPERATURE: 97.5 F | SYSTOLIC BLOOD PRESSURE: 108 MMHG | RESPIRATION RATE: 20 BRPM | HEART RATE: 65 BPM

## 2020-01-13 DIAGNOSIS — M25.562 CHRONIC PAIN OF LEFT KNEE: Primary | ICD-10-CM

## 2020-01-13 DIAGNOSIS — G89.29 CHRONIC PAIN OF LEFT KNEE: Primary | ICD-10-CM

## 2020-01-13 DIAGNOSIS — E78.5 HYPERLIPIDEMIA LDL GOAL <100: ICD-10-CM

## 2020-01-13 DIAGNOSIS — S39.012A STRAIN OF LUMBAR REGION, INITIAL ENCOUNTER: ICD-10-CM

## 2020-01-13 PROCEDURE — G0463 HOSPITAL OUTPT CLINIC VISIT: HCPCS

## 2020-01-13 PROCEDURE — 99214 OFFICE O/P EST MOD 30 MIN: CPT | Performed by: FAMILY MEDICINE

## 2020-01-13 ASSESSMENT — ANXIETY QUESTIONNAIRES
2. NOT BEING ABLE TO STOP OR CONTROL WORRYING: NOT AT ALL
GAD7 TOTAL SCORE: 0
IF YOU CHECKED OFF ANY PROBLEMS ON THIS QUESTIONNAIRE, HOW DIFFICULT HAVE THESE PROBLEMS MADE IT FOR YOU TO DO YOUR WORK, TAKE CARE OF THINGS AT HOME, OR GET ALONG WITH OTHER PEOPLE: NOT DIFFICULT AT ALL
4. TROUBLE RELAXING: NOT AT ALL
7. FEELING AFRAID AS IF SOMETHING AWFUL MIGHT HAPPEN: NOT AT ALL
3. WORRYING TOO MUCH ABOUT DIFFERENT THINGS: NOT AT ALL
1. FEELING NERVOUS, ANXIOUS, OR ON EDGE: NOT AT ALL
6. BECOMING EASILY ANNOYED OR IRRITABLE: NOT AT ALL
5. BEING SO RESTLESS THAT IT IS HARD TO SIT STILL: NOT AT ALL

## 2020-01-13 ASSESSMENT — PAIN SCALES - GENERAL: PAINLEVEL: NO PAIN (0)

## 2020-01-13 ASSESSMENT — PATIENT HEALTH QUESTIONNAIRE - PHQ9: SUM OF ALL RESPONSES TO PHQ QUESTIONS 1-9: 0

## 2020-01-13 ASSESSMENT — MIFFLIN-ST. JEOR: SCORE: 1455.44

## 2020-01-13 NOTE — NURSING NOTE
"Chief Complaint   Patient presents with     Back Pain     Musculoskeletal Problem       Initial /60   Pulse 65   Temp 97.5  F (36.4  C) (Tympanic)   Resp 20   Ht 1.664 m (5' 5.5\")   Wt 96.2 kg (212 lb)   SpO2 98%   BMI 34.74 kg/m   Estimated body mass index is 34.74 kg/m  as calculated from the following:    Height as of this encounter: 1.664 m (5' 5.5\").    Weight as of this encounter: 96.2 kg (212 lb).  Medication Reconciliation: complete  Aditi Saleh LPN    "

## 2020-01-14 ASSESSMENT — ANXIETY QUESTIONNAIRES: GAD7 TOTAL SCORE: 0

## 2020-01-29 ENCOUNTER — HOSPITAL ENCOUNTER (EMERGENCY)
Facility: HOSPITAL | Age: 80
Discharge: HOME | End: 2020-01-29
Attending: NURSE PRACTITIONER | Admitting: NURSE PRACTITIONER
Payer: MEDICARE

## 2020-01-29 ENCOUNTER — NURSE TRIAGE (OUTPATIENT)
Dept: FAMILY MEDICINE | Facility: OTHER | Age: 80
End: 2020-01-29

## 2020-01-29 VITALS
BODY MASS INDEX: 34.74 KG/M2 | HEART RATE: 67 BPM | SYSTOLIC BLOOD PRESSURE: 125 MMHG | TEMPERATURE: 98 F | WEIGHT: 212 LBS | DIASTOLIC BLOOD PRESSURE: 67 MMHG | RESPIRATION RATE: 18 BRPM | OXYGEN SATURATION: 98 %

## 2020-01-29 DIAGNOSIS — N39.0 URINARY TRACT INFECTION: ICD-10-CM

## 2020-01-29 LAB
ALBUMIN UR-MCNC: 30 MG/DL
APPEARANCE UR: ABNORMAL
BACTERIA #/AREA URNS HPF: ABNORMAL /HPF
BILIRUB UR QL STRIP: NEGATIVE
COLOR UR AUTO: YELLOW
GLUCOSE UR STRIP-MCNC: NEGATIVE MG/DL
HGB UR QL STRIP: ABNORMAL
KETONES UR STRIP-MCNC: NEGATIVE MG/DL
LEUKOCYTE ESTERASE UR QL STRIP: ABNORMAL
MUCOUS THREADS #/AREA URNS LPF: PRESENT /LPF
NITRATE UR QL: NEGATIVE
PH UR STRIP: 5.5 PH (ref 4.7–8)
RBC #/AREA URNS AUTO: 61 /HPF (ref 0–2)
SOURCE: ABNORMAL
SP GR UR STRIP: 1.02 (ref 1–1.03)
SQUAMOUS #/AREA URNS AUTO: 15 /HPF (ref 0–1)
TRANS CELLS #/AREA URNS HPF: 3 /HPF (ref 0–1)
UROBILINOGEN UR STRIP-MCNC: NORMAL MG/DL (ref 0–2)
WBC #/AREA URNS AUTO: >182 /HPF (ref 0–5)
WBC CLUMPS #/AREA URNS HPF: PRESENT /HPF

## 2020-01-29 PROCEDURE — G0463 HOSPITAL OUTPT CLINIC VISIT: HCPCS

## 2020-01-29 PROCEDURE — 99213 OFFICE O/P EST LOW 20 MIN: CPT | Mod: Z6 | Performed by: NURSE PRACTITIONER

## 2020-01-29 PROCEDURE — 81001 URINALYSIS AUTO W/SCOPE: CPT | Performed by: FAMILY MEDICINE

## 2020-01-29 RX ORDER — NITROFURANTOIN 25; 75 MG/1; MG/1
100 CAPSULE ORAL 2 TIMES DAILY
Qty: 6 CAPSULE | Refills: 0 | Status: SHIPPED | OUTPATIENT
Start: 2020-01-29 | End: 2020-02-13

## 2020-01-29 NOTE — ED NOTES
Patient discharged with understanding of instructions and recommendations.   Denies any questions or concerns at this time.     Grace Tamez LPN on 1/29/2020 at 3:11 PM

## 2020-01-29 NOTE — ED AVS SNAPSHOT
[facility]  [address]  [place] [address]  Phone:  [phone]                                    Dniorah Lim   MRN: [id number]    Department:  [facility]   Date of Visit:  1/29/2020           After Visit Summary Signature Page    I have received my discharge instructions, and my questions have been answered. I have discussed any challenges I see with this plan with the nurse or doctor.    ..........................................................................................................................................  Patient/Patient Representative Signature      ..........................................................................................................................................  Patient Representative Print Name and Relationship to Patient    ..................................................               ................................................  Date                                   Time    ..........................................................................................................................................  Reviewed by Signature/Title    ...................................................              ..............................................  Date                                               Time          22EPIC Rev 08/18

## 2020-01-29 NOTE — DISCHARGE INSTRUCTIONS
Complete antibiotic as directed.    Increase water consumption.    Follow-up with Primary Care Provider around 2 weeks for re-evaluation/urinalysis.    Seek medical care sooner with any worsening symptoms or new onset of fever, back/side pain, visible blood in urine or nausea/vomiting.

## 2020-01-29 NOTE — ED TRIAGE NOTES
"Patient presents today with c/o UTI s/sx.   Ongoing since last night.   Experiencing frequency, possible dysuria (\"weird feeling\"), retention.   Denies hematuria, flank pain, abdominal pain, pelvic pain, discharge, fevers, chills, sweats, confusion, incontinence.   Not taking anything for pain/discomfort at this time.   Pushing fluids.         "

## 2020-01-29 NOTE — ED PROVIDER NOTES
"  History     Chief Complaint   Patient presents with     Rule out Urinary Tract Infection     \"symptoms started last night\"      HPI  Dinorah Lim is a 79 year old female who is here with concerns regarding a UTI. Since last night has had some bladder pressure, urinary frequency/urgency. Denies hematuria, flank pain, fever, chills, nausea, vomiting.     History of UTI once every 4-6 months.   Last treated with Macrobid on 12/10/19.     Allergies:  Allergies   Allergen Reactions     Chocolate Hives     Lemon Flavor Hives     Lime [Calcium Oxysulfide] Hives     Metal [Staples]      Orange Fruit [Citrus] Hives     Strawberry Hives     Adhesive Tape      Band-aids     Codeine Hives     Patient can tolerate oxycodone & dilaudid     Decadron [Dexamethasone] Hives     Erythromycin Hives     ERYTHROMYCIN BASE     Grapefruit [Extra Strength Grapefruit]      GRAPEFRUIT     Morphine Hives     Pt. Reports had hives     Penicillins Hives     Sulfa Drugs      SULFONAMIDE ANTIBIOTICS      Tomato      Xyzal [Levocetirizine] Hives       Problem List:    Patient Active Problem List    Diagnosis Date Noted     Status post total left knee replacement 09/09/2019     Priority: Medium     Aortic valve insufficiency 03/06/2019     Priority: Medium     Mitral regurgitation 03/06/2019     Priority: Medium     Tricuspid valve insufficiency 03/06/2019     Priority: Medium     Diastolic dysfunction grade 1 on 2/21/19 03/06/2019     Priority: Medium     Hypertriglyceridemia 03/06/2019     Priority: Medium     Palpitations 02/13/2019     Priority: Medium     PVC's (premature ventricular contractions) 02/13/2019     Priority: Medium     Atrial ectopy 02/13/2019     Priority: Medium     PSVT (paroxysmal supraventricular tachycardia) (H) 02/13/2019     Priority: Medium     COPD, mild on 9/9/14 02/13/2019     Priority: Medium     Bilateral carotid artery stenosis at less than 50% on 12/1/16 02/13/2019     Priority: Medium     Mixed hyperlipidemia " 02/13/2019     Priority: Medium     On statin therapy 02/13/2019     Priority: Medium     Heart murmur 02/13/2019     Priority: Medium     Morbid obesity (H) 06/01/2017     Priority: Medium     ACP (advance care planning) 04/28/2016     Priority: Medium     Advance Care Planning 4/28/2016: ACP Review of Chart / Resources Provided:  Reviewed chart for advance care plan.  Dinorah Lim has no plan or code status on file. Discussed available resources and provided with information. Confirmed code status reflects current choices pending further ACP discussions.  Confirmed/documented legally designated decision makers.  Added by Aditi Gandhi             Anxiety 06/23/2014     Priority: Medium     Lung density on x-ray 07/23/2013     Priority: Medium     Osteoarthritis 06/14/2012     Priority: Medium     Problem list name updated by automated process. Provider to review       Advanced care planning/counseling discussion 01/13/2012     Priority: Medium     Abnormal glucose 08/01/2011     Priority: Medium     Hgb A1c 5.7 on 1/4/2015  Problem list name updated by automated process. Provider to review       Osteoarthritis of right hip 10/07/2004     Priority: Medium     Urticaria 06/01/2004     Priority: Medium     Problem list name updated by automated process. 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     Obesity, Class II, BMI 35-39.9 1/4/2015     Osteoarthritis of right hip 10/07/2004     Osteoarthrosis 06/14/2012     Otitis externa 10/04/2011     Prediabetes 08/01/2011        Past Surgical History:    Past Surgical History:   Procedure Laterality Date     ARTHROPLASTY KNEE Left 9/9/2019    Procedure: LEFT TOTAL KNEE ARTHROPLASTY S/N;  Surgeon: Trevor Alonzo MD;  Location: [facility]     ARTHROSCOPY KNEE Left 3/21/2019    Procedure: LEFT  KNEE ARTHROSCOPY, partial medial menisectomy;  Surgeon: Esteban Wills MD;  Location: [facility]     C TOTAL KNEE ARTHROPLASTY Left 09/09/2019    Dr Alonzo     CLOSED REDUCTION WRIST Right 1952 x 2    long arm cast (same time as elbow fx)     CLOSED RX ELBOW DISLOCATION Right 1952    CR/long cast of fracture     HC INJ EPIDURAL LUMBAR/SACRAL W/WO CONTRAST Bilateral 5/2013    facet injections; prev 2012     LAPAROSCOPIC CHOLECYSTECTOMY N/A 1/4/2015    Procedure: LAPAROSCOPIC CHOLECYSTECTOMY;  Surgeon: Brittney العلي MD;  Location: [facility]     TONSILLECTOMY         Family History:    Family History   Problem Relation Age of Onset     Heart Disease Brother         atrial fibrillation     Atrial fibrillation Brother      Other - See Comments Mother         emphysema     Heart Disease Father 92        CHF     Cancer Paternal Grandfather         lung cancer     Cancer Maternal Uncle         lung cancer     Chronic Obstructive Pulmonary Disease Daughter      Emphysema Daughter      Other - See Comments Daughter         benign breast lesion     Esophagitis Daughter        Social History:  Marital Status:  Single [1]  Social History     Tobacco Use     Smoking status: Never Smoker     Smokeless tobacco: Never Used   Substance Use Topics     Alcohol use: No     Drug use: No        Medications:    Calcium-Magnesium-Vitamin D (CALCIUM 1200+D3 PO)  clonazePAM (KLONOPIN) 1 MG tablet  FISH OIL  HYDROcodone-acetaminophen (NORCO) 5-325 MG tablet  nitroFURantoin macrocrystal-monohydrate (MACROBID) 100 MG capsule  PARoxetine (PAXIL) 40 MG tablet  simvastatin (ZOCOR) 40 MG tablet  VITAMIN D, CHOLECALCIFEROL, PO          Review of Systems   Constitutional: Negative for  chills, diaphoresis and fever.   Genitourinary: Positive for dysuria, frequency and urgency. Negative for difficulty urinating, flank pain, hematuria, pelvic pain and vaginal bleeding.   All other systems reviewed and are negative.      Physical Exam   BP: 125/67  Pulse: 67  Temp: 98  F (36.7  C)  Resp: 18  Weight: 96.2 kg (212 lb)  SpO2: 98 %      Physical Exam  Vitals signs and nursing note reviewed.   Constitutional:       Appearance: Normal appearance. She is not ill-appearing.   Cardiovascular:      Rate and Rhythm: Normal rate and regular rhythm.      Heart sounds: Normal heart sounds.   Pulmonary:      Effort: Pulmonary effort is normal.      Breath sounds: Normal breath sounds.   Abdominal:      General: Abdomen is flat. Bowel sounds are normal. There is no distension.      Palpations: Abdomen is soft. There is no mass.      Tenderness: There is no abdominal tenderness. There is no right CVA tenderness, left CVA tenderness, guarding or rebound.   Skin:     General: Skin is warm and dry.   Neurological:      Mental Status: She is alert.         ED Course        Procedures    Results for orders placed or performed during the hospital encounter of 01/29/20 (from the past 24 hour(s))   UA reflex to Microscopic and Culture   Result Value Ref Range    Color Urine Yellow     Appearance Urine Cloudy     Glucose Urine Negative NEG^Negative mg/dL    Bilirubin Urine Negative NEG^Negative    Ketones Urine Negative NEG^Negative mg/dL    Specific Gravity Urine 1.025 1.003 - 1.035    Blood Urine Moderate (A) NEG^Negative    pH Urine 5.5 4.7 - 8.0 pH    Protein Albumin Urine 30 (A) NEG^Negative mg/dL    Urobilinogen mg/dL Normal 0.0 - 2.0 mg/dL    Nitrite Urine Negative NEG^Negative    Leukocyte Esterase Urine Large (A) NEG^Negative    Source Midstream Urine     RBC Urine 61 (H) 0 - 2 /HPF    WBC Urine >182 (H) 0 - 5 /HPF    WBC Clumps Present (A) NEG^Negative /HPF    Bacteria Urine None (A) NEG^Negative /HPF    Squamous  Epithelial /HPF Urine 15 (H) 0 - 1 /HPF    Transitional Epi 3 (H) 0 - 1 /HPF    Mucous Urine Present (A) NEG^Negative /LPF       Medications - No data to display    Assessments & Plan (with Medical Decision Making)     I have reviewed the nursing notes.    I have reviewed the findings, diagnosis, plan and need for follow up with the patient.    ICD-10-CM    1. Urinary tract infection N39.0      Urine with positive leukocytes, blood, WBC's.  Treat as UTI with Macrobid.  Encouraged to push water.  Seek medical care with any worsening symptoms or development of fever, flank pain, hematuria, chills, nausea/vomiting.  Follow-up with PCP in 1-2 weeks for re-evaluation/repeat urinalysis to make sure no further blood in urine.  Encouraged to follow-up sooner as needed.   Discharge Medication List as of 1/29/2020  3:06 PM      START taking these medications    Details   nitroFURantoin macrocrystal-monohydrate (MACROBID) 100 MG capsule Take 1 capsule (100 mg) by mouth 2 times daily, Disp-6 capsule, R-0, E-Prescribe             Final diagnoses:   Urinary tract infection     RENA Mon CNP   Certified Nurse Practitioner    1/29/2020   [facility]     Kenia Reynolds APRN CNP  01/29/20 [id number]       Kenia Reynolds APRN CNP  02/11/20 [id number]

## 2020-01-29 NOTE — TELEPHONE ENCOUNTER
Offered other [place] location for today and pt will not take.She will go to  today and verbalized understanding to go to  today.    Carla No RN    Reason for Disposition    Bad or foul-smelling urine    Additional Information    Negative: Shock suspected (e.g., cold/pale/clammy skin, too weak to stand, low BP, rapid pulse)    Negative: Sounds like a life-threatening emergency to the triager    Negative: Followed a genital area injury    Negative: Followed a genital area injury (penis, scrotum)    Negative: Vaginal discharge    Negative: Pus (white, yellow) or bloody discharge from end of penis    Negative: [1] Taking antibiotic for urinary tract infection (UTI) AND [2] female    Negative: [1] Taking antibiotic for urinary tract infection (UTI) AND [2] male    Negative: [1] Discomfort (pain, burning or stinging) when passing urine AND [2] pregnant    Negative: [1] Discomfort (pain, burning or stinging) when passing urine AND [2] postpartum < 1 month    Negative: [1] Discomfort (pain, burning or stinging) when passing urine AND [2] female    Negative: [1] Discomfort (pain, burning or stinging) when passing urine AND [2] male    Negative: Pain or itching in the vulvar area    Negative: Pain in scrotum is main symptom    Negative: Blood in the urine is main symptom    Negative: Symptoms arising from use of a urinary catheter (John or Coude)    Negative: [1] Unable to urinate (or only a few drops) > 4 hours AND     [2] bladder feels very full (e.g., palpable bladder or strong urge to urinate)    Negative: [1] Decreased urination and [2] drinking very little AND [2] dehydration suspected (e.g., dark urine, no urine > 12 hours, very dry mouth, very lightheaded)    Negative: Patient sounds very sick or weak to the triager    Negative: Fever > 100.5 F (38.1 C)    Negative: Side (flank) or lower back pain present    Negative: [1] Can't control passage of urine (i.e., urinary incontinence) AND [2] new onset (< 2  "weeks) or worsening    Negative: Urinating more frequently than usual (i.e., frequency)    Answer Assessment - Initial Assessment Questions  1. SYMPTOM: \"What's the main symptom you're concerned about?\" (e.g., frequency, incontinence)    frequency  2. ONSET: \"When did the  frequncy  start?\"      Late afternoon yesterday   3. PAIN: \"Is there any pain?\" If so, ask: \"How bad is it?\" (Scale: 1-10; mild, moderate, severe)      no  4. CAUSE: \"What do you think is causing the symptoms?\"      Bladder infection  5. OTHER SYMPTOMS: \"Do you have any other symptoms?\" (e.g., fever, flank pain, blood in urine, pain with urination)      no  6. PREGNANCY: \"Is there any chance you are pregnant?\" \"When was your last menstrual period?\"      no    Protocols used: URINARY SYMPTOMS-A-AH      "

## 2020-02-11 ASSESSMENT — ENCOUNTER SYMPTOMS
HEMATURIA: 0
CHILLS: 0
FEVER: 0
FLANK PAIN: 0
DYSURIA: 1
DIAPHORESIS: 0
FREQUENCY: 1
DIFFICULTY URINATING: 0

## 2020-02-13 ENCOUNTER — OFFICE VISIT (OUTPATIENT)
Dept: FAMILY MEDICINE | Facility: OTHER | Age: 80
End: 2020-02-13
Attending: FAMILY MEDICINE
Payer: COMMERCIAL

## 2020-02-13 ENCOUNTER — APPOINTMENT (OUTPATIENT)
Dept: LAB | Facility: OTHER | Age: 80
End: 2020-02-13
Attending: FAMILY MEDICINE
Payer: MEDICARE

## 2020-02-13 VITALS
WEIGHT: 212 LBS | HEART RATE: 71 BPM | TEMPERATURE: 96.3 F | HEIGHT: 66 IN | BODY MASS INDEX: 34.07 KG/M2 | OXYGEN SATURATION: 98 % | RESPIRATION RATE: 18 BRPM | SYSTOLIC BLOOD PRESSURE: 128 MMHG | DIASTOLIC BLOOD PRESSURE: 58 MMHG

## 2020-02-13 DIAGNOSIS — E78.2 MIXED HYPERLIPIDEMIA: ICD-10-CM

## 2020-02-13 DIAGNOSIS — R30.0 DYSURIA: Primary | ICD-10-CM

## 2020-02-13 DIAGNOSIS — Z96.652 STATUS POST TOTAL LEFT KNEE REPLACEMENT: ICD-10-CM

## 2020-02-13 DIAGNOSIS — R82.90 ABNORMAL URINE FINDINGS: Primary | ICD-10-CM

## 2020-02-13 DIAGNOSIS — R20.2 PARESTHESIAS: ICD-10-CM

## 2020-02-13 DIAGNOSIS — N30.00 ACUTE CYSTITIS WITHOUT HEMATURIA: ICD-10-CM

## 2020-02-13 LAB
ALBUMIN UR-MCNC: 10 MG/DL
APPEARANCE UR: ABNORMAL
BACTERIA #/AREA URNS HPF: ABNORMAL /HPF
BILIRUB UR QL STRIP: NEGATIVE
COLOR UR AUTO: YELLOW
GLUCOSE UR STRIP-MCNC: NEGATIVE MG/DL
HGB UR QL STRIP: NEGATIVE
KETONES UR STRIP-MCNC: NEGATIVE MG/DL
LEUKOCYTE ESTERASE UR QL STRIP: ABNORMAL
MUCOUS THREADS #/AREA URNS LPF: PRESENT /LPF
NITRATE UR QL: NEGATIVE
PH UR STRIP: 5 PH (ref 4.7–8)
RBC #/AREA URNS AUTO: 5 /HPF (ref 0–2)
SOURCE: ABNORMAL
SP GR UR STRIP: 1.02 (ref 1–1.03)
SQUAMOUS #/AREA URNS AUTO: 43 /HPF (ref 0–1)
TRANS CELLS #/AREA URNS HPF: <1 /HPF (ref 0–1)
UROBILINOGEN UR STRIP-MCNC: NORMAL MG/DL (ref 0–2)
WBC #/AREA URNS AUTO: 101 /HPF (ref 0–5)

## 2020-02-13 PROCEDURE — 99214 OFFICE O/P EST MOD 30 MIN: CPT | Performed by: FAMILY MEDICINE

## 2020-02-13 PROCEDURE — G0463 HOSPITAL OUTPT CLINIC VISIT: HCPCS

## 2020-02-13 PROCEDURE — 81001 URINALYSIS AUTO W/SCOPE: CPT | Mod: ZL | Performed by: FAMILY MEDICINE

## 2020-02-13 PROCEDURE — 87086 URINE CULTURE/COLONY COUNT: CPT | Mod: ZL | Performed by: FAMILY MEDICINE

## 2020-02-13 ASSESSMENT — PAIN SCALES - GENERAL: PAINLEVEL: EXTREME PAIN (8)

## 2020-02-13 ASSESSMENT — MIFFLIN-ST. JEOR: SCORE: 1455.44

## 2020-02-13 NOTE — PROGRESS NOTES
Subjective     Dinorah Lim is a 79 year old female who presents to clinic today for the following health issues:    HPI   ED/UC Followup:    Facility:  [facility] [place]  Date of visit: 01/29/2020  Reason for visit: UTI  Current Status: improved with course of antibiotics.   She would like to have this rechecked    Left knee   this clicks at times after her Left TKA. She also has an area of sensitivity and burning in a patch of the upper lateral calf that has been present since surgery. We have discussed trying gabapentin but she declines. She will be seeing Dr Alonzo next week     Mole  This was removed from the right upper back and she occasionally has some burning in this area as well.     Lipids  She is wondering when she is due for her lipid check next.         Patient Active Problem List   Diagnosis     Osteoarthritis of right hip     Abnormal glucose     Osteoarthritis     Lung density on x-ray     Advanced care planning/counseling discussion     Anxiety     Urticaria     ACP (advance care planning)     Morbid obesity (H)     Palpitations     PVC's (premature ventricular contractions)     Atrial ectopy     PSVT (paroxysmal supraventricular tachycardia) (H)     COPD, mild on 9/9/14     Bilateral carotid artery stenosis at less than 50% on 12/1/16     Mixed hyperlipidemia     On statin therapy     Heart murmur     Aortic valve insufficiency     Mitral regurgitation     Tricuspid valve insufficiency     Diastolic dysfunction grade 1 on 2/21/19     Hypertriglyceridemia     Status post total left knee replacement     Paresthesias     Past Surgical History:   Procedure Laterality Date     ARTHROPLASTY KNEE Left 9/9/2019    Procedure: LEFT TOTAL KNEE ARTHROPLASTY S/N;  Surgeon: Trevor Alonzo MD;  Location: [facility]     ARTHROSCOPY KNEE Left 3/21/2019    Procedure: LEFT  KNEE ARTHROSCOPY, partial medial menisectomy;  Surgeon: Esteban Wills MD;  Location: [facility]     C TOTAL KNEE ARTHROPLASTY Left 09/09/2019    Dr Alonzo      CLOSED REDUCTION WRIST Right 1952 x 2    long arm cast (same time as elbow fx)     CLOSED RX ELBOW DISLOCATION Right 1952    CR/long cast of fracture     HC INJ EPIDURAL LUMBAR/SACRAL W/WO CONTRAST Bilateral 5/2013    facet injections; prev 2012     LAPAROSCOPIC CHOLECYSTECTOMY N/A 1/4/2015    Procedure: LAPAROSCOPIC CHOLECYSTECTOMY;  Surgeon: Brittney العلي MD;  Location: [facility]     TONSILLECTOMY         Social History     Tobacco Use     Smoking status: Never Smoker     Smokeless tobacco: Never Used   Substance Use Topics     Alcohol use: No     Family History   Problem Relation Age of Onset     Heart Disease Brother         atrial fibrillation     Atrial fibrillation Brother      Other - See Comments Mother         emphysema     Heart Disease Father 92        CHF     Cancer Paternal Grandfather         lung cancer     Cancer Maternal Uncle         lung cancer     Chronic Obstructive Pulmonary Disease Daughter      Emphysema Daughter      Other - See Comments Daughter         benign breast lesion     Esophagitis Daughter          Current Outpatient Medications   Medication Sig Dispense Refill     Calcium-Magnesium-Vitamin D (CALCIUM 1200+D3 PO) Take by mouth daily       clonazePAM (KLONOPIN) 1 MG tablet TAKE 1/4 TO 1/2 (ONE-FOURTH TO ONE-HALF) TABLET BY MOUTH EVERY 8 HOURS AS NEEDED 45 tablet 0     FISH OIL Take 1 capsule by mouth daily        HYDROcodone-acetaminophen (NORCO) 5-325 MG tablet TAKE 1 TABLET BY MOUTH EVERY 6 HOURS AS NEEDED FOR SEVERE PAIN  0     PARoxetine (PAXIL) 40 MG tablet TAKE 1 TABLET BY MOUTH ONCE DAILY 90 tablet 0     simvastatin (ZOCOR) 40 MG tablet Take 1 tablet (40 mg) by mouth At Bedtime 90 tablet 0     VITAMIN D, CHOLECALCIFEROL, PO Take 2,000 Units by mouth daily       Allergies   Allergen Reactions     Chocolate Hives     Lemon Flavor Hives     Lime [Calcium Oxysulfide] Hives     Metal [Staples]      Orange Fruit [Citrus] Hives     Strawberry Hives     Adhesive Tape       "Band-aids     Codeine Hives     Patient can tolerate oxycodone & dilaudid     Decadron [Dexamethasone] Hives     Erythromycin Hives     ERYTHROMYCIN BASE     Grapefruit [Extra Strength Grapefruit]      GRAPEFRUIT     Morphine Hives     Pt. Reports had hives     Penicillins Hives     Sulfa Drugs      SULFONAMIDE ANTIBIOTICS      Tomato      Xyzal [Levocetirizine] Hives     BP Readings from Last 3 Encounters:   02/13/20 128/58   01/29/20 125/67   01/13/20 108/60    Wt Readings from Last 3 Encounters:   02/13/20 96.2 kg (212 lb)   01/29/20 96.2 kg (212 lb)   01/13/20 96.2 kg (212 lb)                      Reviewed and updated as needed this visit by Provider         Review of Systems   ROS COMP: Constitutional, HEENT, cardiovascular, pulmonary, gi and gu systems are negative, except as otherwise noted.      Objective    /58 (BP Location: Left arm, Patient Position: Sitting, Cuff Size: Adult Regular)   Pulse 71   Temp 96.3  F (35.7  C) (Tympanic)   Resp 18   Ht 1.664 m (5' 5.5\")   Wt 96.2 kg (212 lb)   SpO2 98%   BMI 34.74 kg/m    Body mass index is 34.74 kg/m .  Physical Exam   GENERAL: healthy, alert and no distress  NECK: no adenopathy, no asymmetry, masses, or scars and thyroid normal to palpation  RESP: lungs clear to auscultation - no rales, rhonchi or wheezes  CV: regular rate and rhythm, normal S1 S2, no S3 or S4, no murmur, click or rub, no peripheral edema and peripheral pulses strong  MS: no gross musculoskeletal defects noted, no edema, clicking of the knee with flexion and extension. Slight tenderness along the medical joint line where the clicking can be palpated. Full ROM   SKIN: no suspicious lesions or rashes, well healed incision of the right upper back   NEURO: Normal strength and tone, mentation intact and speech normal  PSYCH: mentation appears normal and anxious            Assessment & Plan     1. Dysuria  Recheck lab today  - UA reflex to Microscopic and Culture    2. Acute cystitis " without hematuria  resolving    3. Mixed hyperlipidemia  She will return again in April for fasting labs   - Lipid Profile; Standing  - CBC with platelets; Standing  - Comprehensive metabolic panel; Standing    4. Status post total left knee replacement  With clicking, discussed that this isn't unusual and is ok to follow. Recheck with Dr Alonzo    5. Paresthesias  Of the left lower leg and right upper back post surgery. Discussed this is due to irritated nerves to the skin. Declines gabapentin to treat. Follow              Return in about 2 months (around 4/13/2020) for Lipids.    Magaly Sosa MD  [facility] [place]

## 2020-02-13 NOTE — NURSING NOTE
"Chief Complaint   Patient presents with     ER F/U       Initial /58 (BP Location: Left arm, Patient Position: Sitting, Cuff Size: Adult Regular)   Pulse 71   Temp 96.3  F (35.7  C) (Tympanic)   Resp 18   Ht 1.664 m (5' 5.5\")   Wt 96.2 kg (212 lb)   SpO2 98%   BMI 34.74 kg/m   Estimated body mass index is 34.74 kg/m  as calculated from the following:    Height as of this encounter: 1.664 m (5' 5.5\").    Weight as of this encounter: 96.2 kg (212 lb).  Medication Reconciliation: complete  Linda Link LPN  "

## 2020-02-15 LAB
BACTERIA SPEC CULT: ABNORMAL
SPECIMEN SOURCE: ABNORMAL

## 2020-02-18 ENCOUNTER — TRANSFERRED RECORDS (OUTPATIENT)
Dept: HEALTH INFORMATION MANAGEMENT | Facility: CLINIC | Age: 80
End: 2020-02-18

## 2020-03-04 ENCOUNTER — HOSPITAL ENCOUNTER (EMERGENCY)
Facility: HOSPITAL | Age: 80
Discharge: HOME | End: 2020-03-04
Attending: INTERNAL MEDICINE | Admitting: INTERNAL MEDICINE
Payer: MEDICARE

## 2020-03-04 VITALS
OXYGEN SATURATION: 96 % | HEART RATE: 62 BPM | SYSTOLIC BLOOD PRESSURE: 136 MMHG | TEMPERATURE: 97.9 F | RESPIRATION RATE: 16 BRPM | DIASTOLIC BLOOD PRESSURE: 67 MMHG

## 2020-03-04 DIAGNOSIS — H11.32 SUBCONJUNCTIVAL BLEED, LEFT: ICD-10-CM

## 2020-03-04 PROCEDURE — 99282 EMERGENCY DEPT VISIT SF MDM: CPT | Mod: Z6 | Performed by: INTERNAL MEDICINE

## 2020-03-04 PROCEDURE — 99281 EMR DPT VST MAYX REQ PHY/QHP: CPT

## 2020-03-04 NOTE — ED NOTES
Patient ambulatory to ED room 11. Patient states that she was watching TV with her grandson a little bit ago when he noted that she had blood in her eye. Blood is noted in the sclera of the eye. Pupils are equal and reactive, patient denies pain and denies vision changes. Patient states that she has not done any forceful movements like coughing or vomiting. Patient states that she was in her storage shed today, but denies feeling like anything got into eye.

## 2020-03-04 NOTE — ED AVS SNAPSHOT
[facility]  [address]  [place] [address]  Phone:  [phone]                                    Dinorah Lim   MRN: [id number]    Department:  [facility]   Date of Visit:  3/4/2020           After Visit Summary Signature Page    I have received my discharge instructions, and my questions have been answered. I have discussed any challenges I see with this plan with the nurse or doctor.    ..........................................................................................................................................  Patient/Patient Representative Signature      ..........................................................................................................................................  Patient Representative Print Name and Relationship to Patient    ..................................................               ................................................  Date                                   Time    ..........................................................................................................................................  Reviewed by Signature/Title    ...................................................              ..............................................  Date                                               Time          22EPIC Rev 08/18

## 2020-03-04 NOTE — ED NOTES
Discharge instructions gone over with patient and she states understanding. Discharged in stable condition, ambulatory.

## 2020-03-06 ENCOUNTER — HOSPITAL ENCOUNTER (EMERGENCY)
Facility: HOSPITAL | Age: 80
Discharge: HOME | End: 2020-03-06
Attending: NURSE PRACTITIONER | Admitting: NURSE PRACTITIONER
Payer: MEDICARE

## 2020-03-06 VITALS
OXYGEN SATURATION: 97 % | SYSTOLIC BLOOD PRESSURE: 117 MMHG | DIASTOLIC BLOOD PRESSURE: 58 MMHG | RESPIRATION RATE: 16 BRPM | TEMPERATURE: 97.5 F

## 2020-03-06 DIAGNOSIS — R07.89 ATYPICAL CHEST PAIN: ICD-10-CM

## 2020-03-06 DIAGNOSIS — L08.9 LOCAL INFECTION OF SKIN AND SUBCUTANEOUS TISSUE: ICD-10-CM

## 2020-03-06 LAB — TROPONIN I SERPL-MCNC: <0.015 UG/L (ref 0–0.04)

## 2020-03-06 PROCEDURE — 36415 COLL VENOUS BLD VENIPUNCTURE: CPT | Performed by: NURSE PRACTITIONER

## 2020-03-06 PROCEDURE — 84484 ASSAY OF TROPONIN QUANT: CPT | Performed by: NURSE PRACTITIONER

## 2020-03-06 PROCEDURE — 99214 OFFICE O/P EST MOD 30 MIN: CPT | Mod: Z6 | Performed by: NURSE PRACTITIONER

## 2020-03-06 PROCEDURE — 93005 ELECTROCARDIOGRAM TRACING: CPT

## 2020-03-06 PROCEDURE — 93010 ELECTROCARDIOGRAM REPORT: CPT | Performed by: INTERNAL MEDICINE

## 2020-03-06 PROCEDURE — G0463 HOSPITAL OUTPT CLINIC VISIT: HCPCS | Mod: 25

## 2020-03-06 RX ORDER — CEPHALEXIN 500 MG/1
500 CAPSULE ORAL 4 TIMES DAILY
Qty: 40 CAPSULE | Refills: 0 | Status: SHIPPED | OUTPATIENT
Start: 2020-03-06 | End: 2020-03-09

## 2020-03-06 NOTE — ED TRIAGE NOTES
Pt presents with left hand pain to the palmar surface. Had a sliver 2 days ago. White matter noted to inside of reddened area to palm of left hand.

## 2020-03-06 NOTE — DISCHARGE INSTRUCTIONS
Keflex 500mg  4 times a day for 10 days  2. Soak hand in warm soapy water 2 times a day til redness recedes  3. Follow up with own provider if feel still a sliver present.    4. Heart checks OK today

## 2020-03-06 NOTE — ED PROVIDER NOTES
History     Chief Complaint   Patient presents with     Hand Pain     sliver to left palm. redness noted. no recent abx      HPI  Dinorah Lim is a 79 year old female who had sliver to left palm  . Removed the sliver, and thought she got it all, but now has swelling and  Redness with red line coming out of area.  And  States by the way , has midsternal chest discomfort , that goes to her back.  No shortness of breath, swelling. Was told had a 'leak' in heart in  past.  Started yesterday! Afraid it's her heart. 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       Allergies:  Allergies   Allergen Reactions     Chocolate Hives     Lemon Flavor Hives     Lime [Calcium Oxysulfide] Hives     Metal [Staples]      Orange Fruit [Citrus] Hives     Strawberry Hives     Adhesive Tape      Band-aids     Codeine Hives     Patient can tolerate oxycodone & dilaudid     Decadron [Dexamethasone] Hives     Erythromycin Hives     ERYTHROMYCIN BASE     Grapefruit [Extra Strength Grapefruit]      GRAPEFRUIT     Morphine Hives     Pt. Reports had hives     Penicillins Hives     Ranitidine GI Disturbance     Sulfa Drugs      SULFONAMIDE ANTIBIOTICS      Tomato      Xyzal [Levocetirizine] Hives       Problem List:    Patient Active Problem List    Diagnosis Date Noted     Paresthesias 02/13/2020     Priority: Medium     Status post total left knee replacement 09/09/2019     Priority: Medium     Aortic valve insufficiency 03/06/2019     Priority: Medium     Mitral regurgitation 03/06/2019     Priority: Medium     Tricuspid valve insufficiency 03/06/2019     Priority: Medium     Diastolic dysfunction grade 1 on 2/21/19 03/06/2019     Priority: Medium     Hypertriglyceridemia 03/06/2019     Priority: Medium     Palpitations 02/13/2019     Priority: Medium     PVC's (premature ventricular contractions) 02/13/2019     Priority: Medium     Atrial ectopy 02/13/2019     Priority: Medium     PSVT (paroxysmal supraventricular tachycardia) (H) 02/13/2019      Priority: Medium     COPD, mild on 9/9/14 02/13/2019     Priority: Medium     Bilateral carotid artery stenosis at less than 50% on 12/1/16 02/13/2019     Priority: Medium     Mixed hyperlipidemia 02/13/2019     Priority: Medium     On statin therapy 02/13/2019     Priority: Medium     Heart murmur 02/13/2019     Priority: Medium     Morbid obesity (H) 06/01/2017     Priority: Medium     ACP (advance care planning) 04/28/2016     Priority: Medium     Advance Care Planning 4/28/2016: ACP Review of Chart / Resources Provided:  Reviewed chart for advance care plan.  Dinorah Lim has no plan or code status on file. Discussed available resources and provided with information. Confirmed code status reflects current choices pending further ACP discussions.  Confirmed/documented legally designated decision makers.  Added by Aditi Gandhi             Anxiety 06/23/2014     Priority: Medium     Lung density on x-ray 07/23/2013     Priority: Medium     Osteoarthritis 06/14/2012     Priority: Medium     Problem list name updated by automated process. Provider to review       Advanced care planning/counseling discussion 01/13/2012     Priority: Medium     Abnormal glucose 08/01/2011     Priority: Medium     Hgb A1c 5.7 on 1/4/2015  Problem list name updated by automated process. Provider to review       Osteoarthritis of right hip 10/07/2004     Priority: Medium     Urticaria 06/01/2004     Priority: Medium     Problem list name updated by automated process. 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     Obesity, Class II, BMI 35-39.9 1/4/2015     Osteoarthritis of right hip 10/07/2004     Osteoarthrosis 06/14/2012     Otitis externa 10/04/2011     Prediabetes 08/01/2011       Past Surgical History:    Past Surgical History:   Procedure Laterality Date     ARTHROPLASTY KNEE Left 9/9/2019    Procedure: LEFT TOTAL KNEE ARTHROPLASTY S/N;  Surgeon: Trevor Alonzo MD;  Location: [facility]     ARTHROSCOPY KNEE Left 3/21/2019    Procedure: LEFT  KNEE ARTHROSCOPY, partial medial menisectomy;  Surgeon: Esteban Wills MD;  Location: [facility]     C TOTAL KNEE ARTHROPLASTY Left 09/09/2019    Dr Alonzo     CLOSED REDUCTION WRIST Right 1952 x 2    long arm cast (same time as elbow fx)     CLOSED RX ELBOW DISLOCATION Right 1952    CR/long cast of fracture     HC INJ EPIDURAL LUMBAR/SACRAL W/WO CONTRAST Bilateral 5/2013    facet injections; prev 2012     LAPAROSCOPIC CHOLECYSTECTOMY N/A 1/4/2015    Procedure: LAPAROSCOPIC CHOLECYSTECTOMY;  Surgeon: Brittney العلي MD;  Location: [facility]     TONSILLECTOMY         Family History:    Family History   Problem Relation Age of Onset     Heart Disease Brother         atrial fibrillation     Atrial fibrillation Brother      Other - See Comments Mother         emphysema     Heart Disease Father 92        CHF     Cancer Paternal Grandfather         lung cancer     Cancer Maternal Uncle         lung cancer     Chronic Obstructive Pulmonary Disease Daughter      Emphysema Daughter      Other - See Comments Daughter         benign breast lesion     Esophagitis Daughter        Social History:  Marital Status:  Single [1]  Social History     Tobacco Use     Smoking status: Never Smoker     Smokeless tobacco: Never Used   Substance Use Topics     Alcohol use: No     Drug use: No        Medications:    Calcium-Magnesium-Vitamin D (CALCIUM 1200+D3 PO)  cephALEXin (KEFLEX) 500 MG capsule  clonazePAM (KLONOPIN) 1 MG tablet  FISH OIL  HYDROcodone-acetaminophen (NORCO) 5-325 MG  tablet  PARoxetine (PAXIL) 40 MG tablet  simvastatin (ZOCOR) 40 MG tablet  VITAMIN D, CHOLECALCIFEROL, PO          Review of Systems   Constitutional: Negative for fever.   HENT: Negative.  Negative for congestion, ear pain, postnasal drip, sinus pressure and tinnitus.    Respiratory: Negative for cough and shortness of breath.    Cardiovascular: Positive for chest pain. Negative for palpitations and leg swelling.        Pain midstrnal to back  Not too painful     Gastrointestinal: Negative for abdominal pain, diarrhea, nausea and vomiting.   Genitourinary: Negative for difficulty urinating.   Musculoskeletal: Positive for myalgias.   Skin: Positive for wound.        Has post sliver wound to left palm, Red line coming from it     Neurological: Negative for headaches.   Psychiatric/Behavioral: Negative.        Physical Exam   BP: 117/58  Heart Rate: 68  Temp: 97.5  F (36.4  C)  Resp: 16  SpO2: 97 %      Physical Exam  Constitutional:       Appearance: Normal appearance.   Eyes:      Extraocular Movements: Extraocular movements intact.      Conjunctiva/sclera: Conjunctivae normal.      Pupils: Pupils are equal, round, and reactive to light.   Neck:      Musculoskeletal: Normal range of motion and neck supple.   Cardiovascular:      Rate and Rhythm: Normal rate and regular rhythm.      Heart sounds: Murmur present.      Comments: Murmur to Aortic area.    Pulmonary:      Effort: Pulmonary effort is normal.      Breath sounds: Normal breath sounds.      Comments: No pain with deep breath.    Musculoskeletal: Normal range of motion.         General: No swelling.      Comments: Points substernal.  No Pain to palpation.     Lymphadenopathy:      Cervical: No cervical adenopathy.   Skin:     General: Skin is warm and dry.      Comments: Left palm sealed wound. Slightly elevated, red, and red line extending to wrist.  Nontender to touch.     Neurological:      General: No focal deficit present.      Mental Status: She is  alert and oriented to person, place, and time.   Psychiatric:         Mood and Affect: Mood normal.         Behavior: Behavior normal.         ED Course        Procedures          EKG  Minor change in Lead II  (flipped T)   otherwise normal .  No Acute changes.      Results for orders placed or performed during the hospital encounter of 03/06/20   Troponin I     Status: None   Result Value Ref Range    Troponin I ES <0.015 0.000 - 0.045 ug/L              Medications - No data to display    Assessments & Plan (with Medical Decision Making)     I have reviewed the nursing notes.    I have reviewed the findings, diagnosis, plan and need for follow up with the patient.      New Prescriptions    CEPHALEXIN (KEFLEX) 500 MG CAPSULE    Take 1 capsule (500 mg) by mouth 4 times daily for 10 days       Final diagnoses:   Local infection of skin and subcutaneous tissue   Atypical chest pain     ASSESSMENT / PLAN:  (L08.9) Local infection of skin and subcutaneous tissue  Comment:   1. Plan:   Keflex 500mg  4 times a day for 10 days  2. 2. Soak hand in warm soapy water 2 times a day til redness recedes  3. 3. Follow up with own provider if feel still a sliver present.    4.  If develop hives, take Benadryl and stop Keflex.  (sometimes may have cross over allergy-discussed with patient.      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    Plan: Heart checks OK today  EKG and Troponin normal        3/6/2020   [facility]     Oz Ospina NP  03/06/20 1228       Oz Ospina NP  03/12/20 [id number]

## 2020-03-06 NOTE — ED AVS SNAPSHOT
[facility]  [address]  [place] [address]  Phone:  [phone]                                    Dinorah Lim   MRN: [id number]    Department:  [facility]   Date of Visit:  3/6/2020           After Visit Summary Signature Page    I have received my discharge instructions, and my questions have been answered. I have discussed any challenges I see with this plan with the nurse or doctor.    ..........................................................................................................................................  Patient/Patient Representative Signature      ..........................................................................................................................................  Patient Representative Print Name and Relationship to Patient    ..................................................               ................................................  Date                                   Time    ..........................................................................................................................................  Reviewed by Signature/Title    ...................................................              ..............................................  Date                                               Time          22EPIC Rev 08/18

## 2020-03-08 ASSESSMENT — ENCOUNTER SYMPTOMS
CONFUSION: 0
EYE DISCHARGE: 0
EYE PAIN: 1
ARTHRALGIAS: 0
COLOR CHANGE: 0
DOUBLE VISION: 0
NAUSEA: 0
NECK STIFFNESS: 0
BLURRED VISION: 0
PHOTOPHOBIA: 0
DIFFICULTY URINATING: 0
ABDOMINAL PAIN: 0
NUMBNESS: 0
HEADACHES: 0
FEVER: 0
EYE REDNESS: 0
SHORTNESS OF BREATH: 0

## 2020-03-09 ENCOUNTER — OFFICE VISIT (OUTPATIENT)
Dept: FAMILY MEDICINE | Facility: OTHER | Age: 80
End: 2020-03-09
Attending: NURSE PRACTITIONER
Payer: COMMERCIAL

## 2020-03-09 ENCOUNTER — NURSE TRIAGE (OUTPATIENT)
Dept: FAMILY MEDICINE | Facility: OTHER | Age: 80
End: 2020-03-09

## 2020-03-09 VITALS
DIASTOLIC BLOOD PRESSURE: 56 MMHG | OXYGEN SATURATION: 96 % | HEART RATE: 66 BPM | BODY MASS INDEX: 34.16 KG/M2 | SYSTOLIC BLOOD PRESSURE: 106 MMHG | TEMPERATURE: 97 F | WEIGHT: 205 LBS | HEIGHT: 65 IN

## 2020-03-09 DIAGNOSIS — L08.9 LOCAL INFECTION OF SKIN AND SUBCUTANEOUS TISSUE: Primary | ICD-10-CM

## 2020-03-09 PROCEDURE — G0463 HOSPITAL OUTPT CLINIC VISIT: HCPCS

## 2020-03-09 PROCEDURE — 99213 OFFICE O/P EST LOW 20 MIN: CPT | Performed by: NURSE PRACTITIONER

## 2020-03-09 RX ORDER — CLINDAMYCIN HCL 300 MG
300 CAPSULE ORAL 4 TIMES DAILY
Qty: 40 CAPSULE | Refills: 0 | Status: SHIPPED | OUTPATIENT
Start: 2020-03-09 | End: 2020-05-05

## 2020-03-09 ASSESSMENT — MIFFLIN-ST. JEOR: SCORE: 1415.75

## 2020-03-09 ASSESSMENT — PAIN SCALES - GENERAL: PAINLEVEL: MILD PAIN (3)

## 2020-03-09 NOTE — TELEPHONE ENCOUNTER
Pt got a sliver on Thursday went to  and they gave her the wrong ABX.She is allergic. There is a sliver underneath her pointer finger in left had, warm and red.The pointer finger is starting to swell. CMS intact.Sh said there is a hard vein on the rojelio of her hand.Denies fever.Advised she needs to be seen within 4 hours or go to . Updated her if increase in swelling or worsening s/s go back to ED.  Reason for Disposition    [1] Looks infected (spreading redness, red streak, pus) AND [2] fever    Additional Information    Negative: [1] Widespread rash AND [2] bright red, sunburn-like AND [3] too weak to stand    Negative: Sounds like a life-threatening emergency to the triager    Negative: Stitches (or staples) and  not  infected    Negative: Skin glue used to close wound and not infected    Negative:  surgical wound infection suspected    Negative: Surgical wound infection suspected (post-op)    Negative: Animal bite wound infection suspected    Negative: [1] Widespread rash AND [2] bright red, sunburn-like    Negative: Severe pain in the wound    Negative: Black (necrotic) or blisters develop in wound    Negative: Patient sounds very sick or weak to the triager    Protocols used: WOUND INFECTION-A-AH

## 2020-03-09 NOTE — NURSING NOTE
"Chief Complaint   Patient presents with     Hand Problem       Initial Temp 97  F (36.1  C)   Ht 1.651 m (5' 5\")   Wt 93 kg (205 lb)   BMI 34.11 kg/m   Estimated body mass index is 34.11 kg/m  as calculated from the following:    Height as of this encounter: 1.651 m (5' 5\").    Weight as of this encounter: 93 kg (205 lb).  Medication Reconciliation: complete  Melanie Early LPN      "

## 2020-03-09 NOTE — PATIENT INSTRUCTIONS
Continue treatment as discussed   -can soaking hand 2-3 times a day     If you do not have any improvement with antibiotic   Please follow up with Dr Magaly Sosa   May need referral to surgeon or hand specialist

## 2020-03-09 NOTE — ED PROVIDER NOTES
History     Chief Complaint   Patient presents with     Eye Problem     left eye     The history is provided by the patient.   Eye Problem   Location:  Left eye  Severity:  Mild  Onset quality:  Sudden  Timing:  Constant  Progression:  Unchanged  Chronicity:  New  Relieved by:  Nothing  Associated symptoms: no blurred vision, no decreased vision, no discharge, no double vision, no headaches, no nausea, no numbness, no photophobia and no redness        Allergies:  Allergies   Allergen Reactions     Chocolate Hives     Lemon Flavor Hives     Lime [Calcium Oxysulfide] Hives     Metal [Staples]      Orange Fruit [Citrus] Hives     Strawberry Hives     Adhesive Tape      Band-aids     Codeine Hives     Patient can tolerate oxycodone & dilaudid     Decadron [Dexamethasone] Hives     Erythromycin Hives     ERYTHROMYCIN BASE     Grapefruit [Extra Strength Grapefruit]      GRAPEFRUIT     Morphine Hives     Pt. Reports had hives     Penicillins Hives     Ranitidine GI Disturbance     Sulfa Drugs      SULFONAMIDE ANTIBIOTICS      Tomato      Xyzal [Levocetirizine] Hives       Problem List:    Patient Active Problem List    Diagnosis Date Noted     Paresthesias 02/13/2020     Priority: Medium     Status post total left knee replacement 09/09/2019     Priority: Medium     Aortic valve insufficiency 03/06/2019     Priority: Medium     Mitral regurgitation 03/06/2019     Priority: Medium     Tricuspid valve insufficiency 03/06/2019     Priority: Medium     Diastolic dysfunction grade 1 on 2/21/19 03/06/2019     Priority: Medium     Hypertriglyceridemia 03/06/2019     Priority: Medium     Palpitations 02/13/2019     Priority: Medium     PVC's (premature ventricular contractions) 02/13/2019     Priority: Medium     Atrial ectopy 02/13/2019     Priority: Medium     PSVT (paroxysmal supraventricular tachycardia) (H) 02/13/2019     Priority: Medium     COPD, mild on 9/9/14 02/13/2019     Priority: Medium     Bilateral carotid artery  stenosis at less than 50% on 12/1/16 02/13/2019     Priority: Medium     Mixed hyperlipidemia 02/13/2019     Priority: Medium     On statin therapy 02/13/2019     Priority: Medium     Heart murmur 02/13/2019     Priority: Medium     Morbid obesity (H) 06/01/2017     Priority: Medium     ACP (advance care planning) 04/28/2016     Priority: Medium     Advance Care Planning 4/28/2016: ACP Review of Chart / Resources Provided:  Reviewed chart for advance care plan.  Dinorah THOMAS Lim has no plan or code status on file. Discussed available resources and provided with information. Confirmed code status reflects current choices pending further ACP discussions.  Confirmed/documented legally designated decision makers.  Added by Aditi Gandhi             Anxiety 06/23/2014     Priority: Medium     Lung density on x-ray 07/23/2013     Priority: Medium     Osteoarthritis 06/14/2012     Priority: Medium     Problem list name updated by automated process. Provider to review       Advanced care planning/counseling discussion 01/13/2012     Priority: Medium     Abnormal glucose 08/01/2011     Priority: Medium     Hgb A1c 5.7 on 1/4/2015  Problem list name updated by automated process. Provider to review       Osteoarthritis of right hip 10/07/2004     Priority: Medium     Urticaria 06/01/2004     Priority: Medium     Problem list name updated by automated process. 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     Obesity, Class II, BMI 35-39.9 1/4/2015     Osteoarthritis of right hip 10/07/2004      Osteoarthrosis 06/14/2012     Otitis externa 10/04/2011     Prediabetes 08/01/2011       Past Surgical History:    Past Surgical History:   Procedure Laterality Date     ARTHROPLASTY KNEE Left 9/9/2019    Procedure: LEFT TOTAL KNEE ARTHROPLASTY S/N;  Surgeon: Trevor Alonzo MD;  Location: [facility]     ARTHROSCOPY KNEE Left 3/21/2019    Procedure: LEFT  KNEE ARTHROSCOPY, partial medial menisectomy;  Surgeon: Esteban Wills MD;  Location: [facility]     C TOTAL KNEE ARTHROPLASTY Left 09/09/2019    Dr Alonzo     CLOSED REDUCTION WRIST Right 1952 x 2    long arm cast (same time as elbow fx)     CLOSED RX ELBOW DISLOCATION Right 1952    CR/long cast of fracture     HC INJ EPIDURAL LUMBAR/SACRAL W/WO CONTRAST Bilateral 5/2013    facet injections; prev 2012     LAPAROSCOPIC CHOLECYSTECTOMY N/A 1/4/2015    Procedure: LAPAROSCOPIC CHOLECYSTECTOMY;  Surgeon: Brittney العلي MD;  Location: [facility]     TONSILLECTOMY         Family History:    Family History   Problem Relation Age of Onset     Heart Disease Brother         atrial fibrillation     Atrial fibrillation Brother      Other - See Comments Mother         emphysema     Heart Disease Father 92        CHF     Cancer Paternal Grandfather         lung cancer     Cancer Maternal Uncle         lung cancer     Chronic Obstructive Pulmonary Disease Daughter      Emphysema Daughter      Other - See Comments Daughter         benign breast lesion     Esophagitis Daughter        Social History:  Marital Status:  Single [1]  Social History     Tobacco Use     Smoking status: Never Smoker     Smokeless tobacco: Never Used   Substance Use Topics     Alcohol use: No     Drug use: No        Medications:    Calcium-Magnesium-Vitamin D (CALCIUM 1200+D3 PO)  clonazePAM (KLONOPIN) 1 MG tablet  FISH OIL  HYDROcodone-acetaminophen (NORCO) 5-325 MG tablet  PARoxetine (PAXIL) 40 MG tablet  simvastatin (ZOCOR) 40 MG tablet  VITAMIN D, CHOLECALCIFEROL, PO  cephALEXin (KEFLEX) 500 MG  capsule          Review of Systems   Constitutional: Negative for fever.   HENT: Negative for congestion.    Eyes: Positive for pain. Negative for blurred vision, double vision, photophobia, discharge and redness.   Respiratory: Negative for shortness of breath.    Cardiovascular: Negative for chest pain.   Gastrointestinal: Negative for abdominal pain and nausea.   Genitourinary: Negative for difficulty urinating.   Musculoskeletal: Negative for arthralgias and neck stiffness.   Skin: Negative for color change.   Neurological: Negative for numbness and headaches.   Psychiatric/Behavioral: Negative for confusion.       Physical Exam   BP: 154/73  Pulse: 65  Temp: 97.9  F (36.6  C)  Resp: 16  SpO2: 98 %      Physical Exam  Constitutional:       General: She is not in acute distress.     Appearance: She is not diaphoretic.   HENT:      Head: Atraumatic.      Mouth/Throat:      Pharynx: No oropharyngeal exudate.   Eyes:      General: No scleral icterus.     Conjunctiva/sclera:      Left eye: Hemorrhage present.      Pupils: Pupils are equal, round, and reactive to light.   Cardiovascular:      Heart sounds: Normal heart sounds.   Pulmonary:      Effort: No respiratory distress.      Breath sounds: Normal breath sounds.   Abdominal:      General: Bowel sounds are normal.      Palpations: Abdomen is soft.      Tenderness: There is no abdominal tenderness.   Musculoskeletal:         General: No tenderness.   Skin:     General: Skin is warm.      Findings: No rash.         ED Course        Procedures                   No results found for this or any previous visit (from the past 24 hour(s)).    Medications - No data to display    Assessments & Plan (with Medical Decision Making)   subconjective hemorrhage of Left eye after pt blew her nose hard  No visual changes  D C home, follow-up with PCP/ eye clinic  I have reviewed the nursing notes.    I have reviewed the findings, diagnosis, plan and need for follow up with the  patient.      Discharge Medication List as of 3/4/2020  1:06 AM          Final diagnoses:   Subconjunctival bleed, left       3/4/2020   [facility]     Jamaal Varghese MD  03/08/20 [id number]

## 2020-03-09 NOTE — PROGRESS NOTES
Subjective     Dinorah Lim is a 79 year old female who presents to clinic today for the following health issues:    HPI   Hand Problem      Duration: 3/6    Description (location/character/radiation): Left Hand infection    Intensity:  moderate    Accompanying signs and symptoms: started out as a sliver, pt stated infection is working its way down the hand. Its red, swollen.     History (similar episodes/previous evaluation): None    Precipitating or alleviating factors: using it makes it worse.    Therapies tried and outcome: running hot water on the hand, mild effectiveness    Did not start the keflex due to penicillin allergy and had concerns for reaction past     She states pharmacist told her it was 10% penicillin. She did not take it even though the pharmacist told her she would probable not have a reaction     She would be willing to take something she has taken in the past         Patient Active Problem List   Diagnosis     Osteoarthritis of right hip     Abnormal glucose     Osteoarthritis     Lung density on x-ray     Advanced care planning/counseling discussion     Anxiety     Urticaria     ACP (advance care planning)     Morbid obesity (H)     Palpitations     PVC's (premature ventricular contractions)     Atrial ectopy     PSVT (paroxysmal supraventricular tachycardia) (H)     COPD, mild on 9/9/14     Bilateral carotid artery stenosis at less than 50% on 12/1/16     Mixed hyperlipidemia     On statin therapy     Heart murmur     Aortic valve insufficiency     Mitral regurgitation     Tricuspid valve insufficiency     Diastolic dysfunction grade 1 on 2/21/19     Hypertriglyceridemia     Status post total left knee replacement     Paresthesias     Past Surgical History:   Procedure Laterality Date     ARTHROPLASTY KNEE Left 9/9/2019    Procedure: LEFT TOTAL KNEE ARTHROPLASTY S/N;  Surgeon: Trevor Alonzo MD;  Location: [facility]     ARTHROSCOPY KNEE Left 3/21/2019    Procedure: LEFT  KNEE ARTHROSCOPY,  partial medial menisectomy;  Surgeon: Esteban Wills MD;  Location: [facility] OR     C TOTAL KNEE ARTHROPLASTY Left 09/09/2019    Dr Alonzo     CLOSED REDUCTION WRIST Right 1952 x 2    long arm cast (same time as elbow fx)     CLOSED RX ELBOW DISLOCATION Right 1952    CR/long cast of fracture     HC INJ EPIDURAL LUMBAR/SACRAL W/WO CONTRAST Bilateral 5/2013    facet injections; prev 2012     LAPAROSCOPIC CHOLECYSTECTOMY N/A 1/4/2015    Procedure: LAPAROSCOPIC CHOLECYSTECTOMY;  Surgeon: Brittney العلي MD;  Location: [facility] OR     TONSILLECTOMY         Social History     Tobacco Use     Smoking status: Never Smoker     Smokeless tobacco: Never Used   Substance Use Topics     Alcohol use: No     Family History   Problem Relation Age of Onset     Heart Disease Brother         atrial fibrillation     Atrial fibrillation Brother      Other - See Comments Mother         emphysema     Heart Disease Father 92        CHF     Cancer Paternal Grandfather         lung cancer     Cancer Maternal Uncle         lung cancer     Chronic Obstructive Pulmonary Disease Daughter      Emphysema Daughter      Other - See Comments Daughter         benign breast lesion     Esophagitis Daughter          Current Outpatient Medications   Medication Sig Dispense Refill     Calcium-Magnesium-Vitamin D (CALCIUM 1200+D3 PO) Take by mouth daily       cephALEXin (KEFLEX) 500 MG capsule Take 1 capsule (500 mg) by mouth 4 times daily for 10 days 40 capsule 0     clonazePAM (KLONOPIN) 1 MG tablet TAKE 1/4 TO 1/2 (ONE-FOURTH TO ONE-HALF) TABLET BY MOUTH EVERY 8 HOURS AS NEEDED 45 tablet 0     FISH OIL Take 1 capsule by mouth daily        HYDROcodone-acetaminophen (NORCO) 5-325 MG tablet TAKE 1 TABLET BY MOUTH EVERY 6 HOURS AS NEEDED FOR SEVERE PAIN  0     PARoxetine (PAXIL) 40 MG tablet TAKE 1 TABLET BY MOUTH ONCE DAILY 90 tablet 0     simvastatin (ZOCOR) 40 MG tablet Take 1 tablet (40 mg) by mouth At Bedtime 90 tablet 0     VITAMIN D, CHOLECALCIFEROL, PO  "Take 2,000 Units by mouth daily       Allergies   Allergen Reactions     Chocolate Hives     Lemon Flavor Hives     Lime [Calcium Oxysulfide] Hives     Metal [Staples]      Orange Fruit [Citrus] Hives     Strawberry Hives     Adhesive Tape      Band-aids     Codeine Hives     Patient can tolerate oxycodone & dilaudid     Decadron [Dexamethasone] Hives     Erythromycin Hives     ERYTHROMYCIN BASE     Grapefruit [Extra Strength Grapefruit]      GRAPEFRUIT     Morphine Hives     Pt. Reports had hives     Penicillins Hives     Ranitidine GI Disturbance     Sulfa Drugs      SULFONAMIDE ANTIBIOTICS      Tomato      Xyzal [Levocetirizine] Hives     BP Readings from Last 3 Encounters:   03/09/20 106/56   03/06/20 117/58   03/04/20 136/67    Wt Readings from Last 3 Encounters:   03/09/20 93 kg (205 lb)   02/13/20 96.2 kg (212 lb)   01/29/20 96.2 kg (212 lb)                 Reviewed and updated as needed this visit by Provider         Review of Systems   ROS COMP: CONSTITUTIONAL:NEGATIVE for fever, chills, change in weight  INTEGUMENTARY/SKIN: left palm redness and swelling (had a sliver in the hand)  RESP: NEGATIVE for significant cough or SOB  CV: NEGATIVE for chest pain, palpitations or peripheral edema      Objective    /56   Pulse 66   Temp 97  F (36.1  C)   Ht 1.651 m (5' 5\")   Wt 93 kg (205 lb)   SpO2 96%   BMI 34.11 kg/m    Body mass index is 34.11 kg/m .  Physical Exam   GENERAL: healthy, alert and no distress  RESP: lungs clear to auscultation - no rales, rhonchi or wheezes  CV: regular rate and rhythm, normal S1 S2, no S3 or S4, no murmur, click or rub, no peripheral edema and peripheral pulses strong  SKIN: see picture below  PSYCH: mentation appears normal, affect normal/bright            Diagnostic Test Results:  Labs reviewed in Epic        Assessment & Plan     1. Local infection of skin and subcutaneous tissue  Did not take keflex   She is running hot water over area multiple times a day  She did " agree to an antibiotic she has taken in the past.   Plan for clindamycin \  She should start hand soaks with warm soapy water   If she does not have any improvement or  Change she may need a referral to surgeon   - clindamycin (CLEOCIN) 300 MG capsule; Take 1 capsule (300 mg) by mouth 4 times daily for 10 days  Dispense: 40 capsule; Refill: 0       See Patient Instructions    No follow-ups on file.    RENA Donis CNP  [facility] - [facility]

## 2020-03-12 ASSESSMENT — ENCOUNTER SYMPTOMS
NAUSEA: 0
DIFFICULTY URINATING: 0
PALPITATIONS: 0
MYALGIAS: 1
FEVER: 0
COUGH: 0
SINUS PRESSURE: 0
HEADACHES: 0
ABDOMINAL PAIN: 0
DIARRHEA: 0
SHORTNESS OF BREATH: 0
VOMITING: 0
PSYCHIATRIC NEGATIVE: 1
WOUND: 1

## 2020-03-18 ENCOUNTER — TELEPHONE (OUTPATIENT)
Dept: FAMILY MEDICINE | Facility: OTHER | Age: 80
End: 2020-03-18

## 2020-03-18 ENCOUNTER — APPOINTMENT (OUTPATIENT)
Dept: CT IMAGING | Facility: HOSPITAL | Age: 80
End: 2020-03-18
Attending: NURSE PRACTITIONER
Payer: MEDICARE

## 2020-03-18 ENCOUNTER — HOSPITAL ENCOUNTER (EMERGENCY)
Facility: HOSPITAL | Age: 80
Discharge: HOME | End: 2020-03-18
Attending: NURSE PRACTITIONER | Admitting: NURSE PRACTITIONER
Payer: MEDICARE

## 2020-03-18 ENCOUNTER — NURSE TRIAGE (OUTPATIENT)
Dept: FAMILY MEDICINE | Facility: OTHER | Age: 80
End: 2020-03-18

## 2020-03-18 ENCOUNTER — APPOINTMENT (OUTPATIENT)
Dept: GENERAL RADIOLOGY | Facility: HOSPITAL | Age: 80
End: 2020-03-18
Attending: NURSE PRACTITIONER
Payer: MEDICARE

## 2020-03-18 VITALS
HEART RATE: 66 BPM | DIASTOLIC BLOOD PRESSURE: 74 MMHG | TEMPERATURE: 98.3 F | OXYGEN SATURATION: 98 % | SYSTOLIC BLOOD PRESSURE: 110 MMHG | RESPIRATION RATE: 16 BRPM

## 2020-03-18 DIAGNOSIS — F41.9 ANXIETY: Chronic | ICD-10-CM

## 2020-03-18 DIAGNOSIS — R06.02 SHORTNESS OF BREATH: Primary | ICD-10-CM

## 2020-03-18 LAB
ALBUMIN SERPL-MCNC: 3.3 G/DL (ref 3.4–5)
ALP SERPL-CCNC: 105 U/L (ref 40–150)
ALT SERPL W P-5'-P-CCNC: 24 U/L (ref 0–50)
ANION GAP SERPL CALCULATED.3IONS-SCNC: 3 MMOL/L (ref 3–14)
AST SERPL W P-5'-P-CCNC: 20 U/L (ref 0–45)
BASOPHILS # BLD AUTO: 0 10E9/L (ref 0–0.2)
BASOPHILS NFR BLD AUTO: 0.6 %
BILIRUB SERPL-MCNC: 0.4 MG/DL (ref 0.2–1.3)
BUN SERPL-MCNC: 15 MG/DL (ref 7–30)
CALCIUM SERPL-MCNC: 9 MG/DL (ref 8.5–10.1)
CHLORIDE SERPL-SCNC: 110 MMOL/L (ref 94–109)
CO2 SERPL-SCNC: 28 MMOL/L (ref 20–32)
CREAT SERPL-MCNC: 0.84 MG/DL (ref 0.52–1.04)
D DIMER PPP FEU-MCNC: 1.4 UG/ML FEU (ref 0–0.5)
DIFFERENTIAL METHOD BLD: ABNORMAL
EOSINOPHIL # BLD AUTO: 0.1 10E9/L (ref 0–0.7)
EOSINOPHIL NFR BLD AUTO: 1.5 %
ERYTHROCYTE [DISTWIDTH] IN BLOOD BY AUTOMATED COUNT: 14.2 % (ref 10–15)
GFR SERPL CREATININE-BSD FRML MDRD: 67 ML/MIN/{1.73_M2}
GLUCOSE SERPL-MCNC: 97 MG/DL (ref 70–99)
HCT VFR BLD AUTO: 36.4 % (ref 35–47)
HGB BLD-MCNC: 11.5 G/DL (ref 11.7–15.7)
IMM GRANULOCYTES # BLD: 0 10E9/L (ref 0–0.4)
IMM GRANULOCYTES NFR BLD: 0.3 %
LACTATE BLD-SCNC: 0.4 MMOL/L (ref 0.7–2)
LYMPHOCYTES # BLD AUTO: 0.9 10E9/L (ref 0.8–5.3)
LYMPHOCYTES NFR BLD AUTO: 27.3 %
MCH RBC QN AUTO: 28.2 PG (ref 26.5–33)
MCHC RBC AUTO-ENTMCNC: 31.6 G/DL (ref 31.5–36.5)
MCV RBC AUTO: 89 FL (ref 78–100)
MONOCYTES # BLD AUTO: 0.4 10E9/L (ref 0–1.3)
MONOCYTES NFR BLD AUTO: 10.9 %
NEUTROPHILS # BLD AUTO: 2 10E9/L (ref 1.6–8.3)
NEUTROPHILS NFR BLD AUTO: 59.4 %
NRBC # BLD AUTO: 0 10*3/UL
NRBC BLD AUTO-RTO: 0 /100
PLATELET # BLD AUTO: 162 10E9/L (ref 150–450)
POTASSIUM SERPL-SCNC: 4.5 MMOL/L (ref 3.4–5.3)
PROT SERPL-MCNC: 6.4 G/DL (ref 6.8–8.8)
RBC # BLD AUTO: 4.08 10E12/L (ref 3.8–5.2)
SODIUM SERPL-SCNC: 141 MMOL/L (ref 133–144)
TROPONIN I SERPL-MCNC: <0.015 UG/L (ref 0–0.04)
WBC # BLD AUTO: 3.4 10E9/L (ref 4–11)

## 2020-03-18 PROCEDURE — 36415 COLL VENOUS BLD VENIPUNCTURE: CPT | Performed by: NURSE PRACTITIONER

## 2020-03-18 PROCEDURE — 93010 ELECTROCARDIOGRAM REPORT: CPT | Performed by: INTERNAL MEDICINE

## 2020-03-18 PROCEDURE — 71275 CT ANGIOGRAPHY CHEST: CPT | Mod: TC

## 2020-03-18 PROCEDURE — 93005 ELECTROCARDIOGRAM TRACING: CPT

## 2020-03-18 PROCEDURE — 71046 X-RAY EXAM CHEST 2 VIEWS: CPT | Mod: TC

## 2020-03-18 PROCEDURE — 99284 EMERGENCY DEPT VISIT MOD MDM: CPT | Mod: Z6 | Performed by: NURSE PRACTITIONER

## 2020-03-18 PROCEDURE — 40000275 ZZH STATISTIC RCP TIME EA 10 MIN

## 2020-03-18 PROCEDURE — 83605 ASSAY OF LACTIC ACID: CPT | Performed by: NURSE PRACTITIONER

## 2020-03-18 PROCEDURE — 85025 COMPLETE CBC W/AUTO DIFF WBC: CPT | Performed by: NURSE PRACTITIONER

## 2020-03-18 PROCEDURE — 99285 EMERGENCY DEPT VISIT HI MDM: CPT | Mod: 25

## 2020-03-18 PROCEDURE — 80053 COMPREHEN METABOLIC PANEL: CPT | Performed by: NURSE PRACTITIONER

## 2020-03-18 PROCEDURE — 25000125 ZZHC RX 250: Performed by: NURSE PRACTITIONER

## 2020-03-18 PROCEDURE — 85379 FIBRIN DEGRADATION QUANT: CPT | Performed by: NURSE PRACTITIONER

## 2020-03-18 PROCEDURE — 94640 AIRWAY INHALATION TREATMENT: CPT

## 2020-03-18 PROCEDURE — 84484 ASSAY OF TROPONIN QUANT: CPT | Performed by: NURSE PRACTITIONER

## 2020-03-18 PROCEDURE — 25500064 ZZH RX 255 OP 636: Performed by: RADIOLOGY

## 2020-03-18 RX ORDER — IPRATROPIUM BROMIDE AND ALBUTEROL SULFATE 2.5; .5 MG/3ML; MG/3ML
3 SOLUTION RESPIRATORY (INHALATION) ONCE
Status: COMPLETED | OUTPATIENT
Start: 2020-03-18 | End: 2020-03-18

## 2020-03-18 RX ORDER — IPRATROPIUM BROMIDE AND ALBUTEROL SULFATE 2.5; .5 MG/3ML; MG/3ML
1 SOLUTION RESPIRATORY (INHALATION) EVERY 6 HOURS PRN
Qty: 1 BOX | Refills: 1 | Status: SHIPPED | OUTPATIENT
Start: 2020-03-18 | End: 2022-05-19

## 2020-03-18 RX ORDER — PREDNISONE 20 MG/1
TABLET ORAL
Qty: 5 TABLET | Refills: 0 | Status: SHIPPED | OUTPATIENT
Start: 2020-03-18 | End: 2020-05-05

## 2020-03-18 RX ADMIN — IPRATROPIUM BROMIDE AND ALBUTEROL SULFATE 3 ML: .5; 3 SOLUTION RESPIRATORY (INHALATION) at 12:24

## 2020-03-18 RX ADMIN — IOHEXOL 75 ML: 350 INJECTION, SOLUTION INTRAVENOUS at 13:20

## 2020-03-18 ASSESSMENT — ENCOUNTER SYMPTOMS
WHEEZING: 0
DIZZINESS: 0
FEVER: 0
DYSURIA: 0
DIFFICULTY URINATING: 0
APPETITE CHANGE: 0
COUGH: 0
SHORTNESS OF BREATH: 1
ABDOMINAL PAIN: 0
SORE THROAT: 0
DIARRHEA: 0
FATIGUE: 0
RHINORRHEA: 0
NAUSEA: 0
CHILLS: 0
PALPITATIONS: 0
VOMITING: 0
LIGHT-HEADEDNESS: 0
FREQUENCY: 0
HEADACHES: 0

## 2020-03-18 NOTE — TELEPHONE ENCOUNTER
Please call pt she needs hospital follow up and is willing to do a telephone encounter if Dr. Sosa feels this is appropriate.

## 2020-03-18 NOTE — TELEPHONE ENCOUNTER
"Patient states she has a heavy chest, shoulders ache and back aches.  Advised patient to ER/UC.    Answer Assessment - Initial Assessment Questions  1. RESPIRATORY STATUS: \"Describe your breathing?\" (e.g., wheezing, shortness of breath, unable to speak, severe coughing)       States chest feels heavy  2. ONSET: \"When did this breathing problem begin?\"       One week ago  3. PATTERN \"Does the difficult breathing come and go, or has it been constant since it started?\"       It's not really bad but when she tries to talk she needs a breath  4. SEVERITY: \"How bad is your breathing?\" (e.g., mild, moderate, severe)     - MILD: No SOB at rest, mild SOB with walking, speaks normally in sentences, can lay down, no retractions, pulse < 100.     - MODERATE: SOB at rest, SOB with minimal exertion and prefers to sit, cannot lie down flat, speaks in phrases, mild retractions, audible wheezing, pulse 100-120.     - SEVERE: Very SOB at rest, speaks in single words, struggling to breathe, sitting hunched forward, retractions, pulse > 120       moderate  5. RECURRENT SYMPTOM: \"Have you had difficulty breathing before?\" If so, ask: \"When was the last time?\" and \"What happened that time?\"       no  6. CARDIAC HISTORY: \"Do you have any history of heart disease?\" (e.g., heart attack, angina, bypass surgery, angioplasty)       Bleeding from the aorta in the past  7. LUNG HISTORY: \"Do you have any history of lung disease?\"  (e.g., pulmonary embolus, asthma, emphysema)      They told her long time ago that she has asthma  8. CAUSE: \"What do you think is causing the breathing problem?\"       unknown  9. OTHER SYMPTOMS: \"Do you have any other symptoms? (e.g., dizziness, runny nose, cough, chest pain, fever)      Runnynose, chest is heavy at times, back and shoulders ache  10. PREGNANCY: \"Is there any chance you are pregnant?\" \"When was your last menstrual period?\"          11. TRAVEL: \"Have you traveled out of the country in the last month?\" " (e.g., travel history, exposures)        no    Protocols used: BREATHING DIFFICULTY-A-AH

## 2020-03-18 NOTE — ED TRIAGE NOTES
Pt states she has had SOB for 1 week with watery eyes. Denies any cough or fever or contact with known covid-19.

## 2020-03-18 NOTE — DISCHARGE INSTRUCTIONS
(R06.02) Shortness of breath  (primary encounter diagnosis)  Comment: acute, symptomatic  Plan: 79-year old female presents ambulatory with shortness of breath and chest heaviness ongoing x 1 week. Denies dyspnea on exertion, chest pain, LE edema, palpitations. Troponin is negative. EKG is normal sinus rhythm. No acute leukocytosis - WBC is actually slightly low which looks to be not uncommon from her. Mild anemia - hemoglobin is 11.5, this unlikely contributing to SOB. No acute electrolyte or renal abnormalities. Given ongoing chest pressure, SOB, normal chest xray, oxygen saturation prior to nebulizer was 92-93%, age adjusted ddimer elevated chest CT obtained - this was negative for PE but did show patchy areas of air trapping which would be consistent with COPD.   Discussed options of treating with prednisone and duo nebulizers at home.   She will start prednisone 20 mg daily x 5 days.   Duonebulizer every 6 hours for the next 2-3 days, then can use as needed. (No history of glaucoma)          RETURN TO THE ED WITH NEW OR WORSENING SYMPTOMS.    FOLLOW-UP WITH YOUR PRIMARY CARE PROVIDER IN 5-7 DAYS.      Flor Arana CNP      Results for orders placed or performed during the hospital encounter of 03/18/20   XR Chest 2 Views     Status: None    Narrative    PROCEDURE: XR CHEST 2 VW 3/18/2020 12:07 PM    HISTORY: shortness of breath x 1 week    COMPARISONS: 5/23/2019.    TECHNIQUE: 2 views.    FINDINGS: Heart is not enlarged. No acute infiltrate or effusion is  seen. There is a right convex scoliosis.    Nodular density at the left lung base has probably not changed  significantly. Surgical clips are seen in the right upper quadrant.         Impression    IMPRESSION: No acute infiltrate.    NAVID TOLEDO MD   CTA Chest with Contrast     Status: None    Narrative    CTA CHEST WITH CONTRAST  3/18/2020 1:24 PM    CLINICAL HISTORY: Female, age 79 years,  PE suspected, intermediate  prob, positive D-dimer;  shortness;    Comparison:  CT scan abdomen and pelvis 12/13/2016    TECHNIQUE:  CT was performed of the chest  with IV contrast.   Sagittal, coronal, axial and MIP reconstructions were reviewed.     FINDINGS:  Chest CT:   Lungs : Patchy areas of air trapping with groundglass opacification  and interstitial thickening. Areas of dependent atelectasis in both  lungs.    Thyroid: The visualized portions are normal.    Heart and Great Vessels:  Excellent quality examination demonstrates  no evidence of pulmonary embolus. The thoracic aorta is intact. There  are few atherosclerotic calcifications seen within the aortic arch and  a number of atherosclerotic calcifications within the coronary  arteries, most notably within the left anterior descending.    Lymph Nodes:  Normal.  Pleura: Normal.  Bony Structures:  No acute abnormality. Mild degenerative changes are  seen throughout the thoracic spine. The sternum is intact.  Esophagus/stomach: Moderate size hiatal hernia is unchanged.    Visualized portions of the upper abdomen demonstrate no evidence of  acute abnormality. The gallbladder surgically absent.      Impression    IMPRESSION:   Excellent quality examination demonstrates no evidence of acute  vascular abnormality. No pulmonary embolus. The thoracic aorta is  intact.    Patchy areas of air trapping throughout both lungs suggesting  obstructive pulmonary disease.    Moderate size hiatal hernia without distinct evidence of acute  associated abnormality.    RADHA MAHONEY MD   CBC with platelets differential     Status: Abnormal   Result Value Ref Range    WBC 3.4 (L) 4.0 - 11.0 10e9/L    RBC Count 4.08 3.8 - 5.2 10e12/L    Hemoglobin 11.5 (L) 11.7 - 15.7 g/dL    Hematocrit 36.4 35.0 - 47.0 %    MCV 89 78 - 100 fl    MCH 28.2 26.5 - 33.0 pg    MCHC 31.6 31.5 - 36.5 g/dL    RDW 14.2 10.0 - 15.0 %    Platelet Count 162 150 - 450 10e9/L    Diff Method Automated Method     % Neutrophils 59.4 %    % Lymphocytes 27.3 %     % Monocytes 10.9 %    % Eosinophils 1.5 %    % Basophils 0.6 %    % Immature Granulocytes 0.3 %    Nucleated RBCs 0 0 /100    Absolute Neutrophil 2.0 1.6 - 8.3 10e9/L    Absolute Lymphocytes 0.9 0.8 - 5.3 10e9/L    Absolute Monocytes 0.4 0.0 - 1.3 10e9/L    Absolute Eosinophils 0.1 0.0 - 0.7 10e9/L    Absolute Basophils 0.0 0.0 - 0.2 10e9/L    Abs Immature Granulocytes 0.0 0 - 0.4 10e9/L    Absolute Nucleated RBC 0.0    Comprehensive metabolic panel     Status: Abnormal   Result Value Ref Range    Sodium 141 133 - 144 mmol/L    Potassium 4.5 3.4 - 5.3 mmol/L    Chloride 110 (H) 94 - 109 mmol/L    Carbon Dioxide 28 20 - 32 mmol/L    Anion Gap 3 3 - 14 mmol/L    Glucose 97 70 - 99 mg/dL    Urea Nitrogen 15 7 - 30 mg/dL    Creatinine 0.84 0.52 - 1.04 mg/dL    GFR Estimate 67 >60 mL/min/[1.73_m2]    GFR Estimate If Black 77 >60 mL/min/[1.73_m2]    Calcium 9.0 8.5 - 10.1 mg/dL    Bilirubin Total 0.4 0.2 - 1.3 mg/dL    Albumin 3.3 (L) 3.4 - 5.0 g/dL    Protein Total 6.4 (L) 6.8 - 8.8 g/dL    Alkaline Phosphatase 105 40 - 150 U/L    ALT 24 0 - 50 U/L    AST 20 0 - 45 U/L   Troponin I     Status: None   Result Value Ref Range    Troponin I ES <0.015 0.000 - 0.045 ug/L   Lactic acid whole blood     Status: Abnormal   Result Value Ref Range    Lactic Acid 0.4 (L) 0.7 - 2.0 mmol/L   D dimer quantitative     Status: Abnormal   Result Value Ref Range    D Dimer 1.4 (H) 0.0 - 0.50 ug/ml FEU

## 2020-03-18 NOTE — ED NOTES
Discharge instructions reviewed with patient.   Rx has been e-scribed to pharmacy of choice.  Encouraged to return with new or worsening symptoms  Copy of AVS in hand on discharge.  No questions or concerns.

## 2020-03-18 NOTE — ED PROVIDER NOTES
History     Chief Complaint   Patient presents with     Shortness of Breath     HPI     Dinorah Lim is a 79 year old female who presents ambulatory with concerns of increasing shortness of breath over the last week. She denies associated recent upper respiratory symptoms of fever, chills, coughing, sneezing, congestion, otalgia, pharyngitis. Denies abdominal pain, n/v/d, urinary symptoms. Denies chest pain but feels heaviness in her chest. Denies palpitations, new/worsening LE edema. She reports LLE edema since she had her left TKA years ago. Denies history of heart disease. She denies history of smoking but has had a large amount of second hand smoke exposure in her life. States she believes she was told she had asthma/COPD at some point. PFTs in 2014 show minimal obstructive airway disease. She denies associated wheezing.   She has not tried anything for her shortness of breath.   Denies dyspnea on exertion - states she feels most short of breath while sitting in her chair and wonders if it could be positional.             Allergies:  Allergies   Allergen Reactions     Chocolate Hives     Lemon Flavor Hives     Lime [Calcium Oxysulfide] Hives     Metal [Staples]      Orange Fruit [Citrus] Hives     Strawberry Hives     Adhesive Tape      Band-aids     Codeine Hives     Patient can tolerate oxycodone & dilaudid     Decadron [Dexamethasone] Hives     Erythromycin Hives     ERYTHROMYCIN BASE     Grapefruit [Extra Strength Grapefruit]      GRAPEFRUIT     Morphine Hives     Pt. Reports had hives     Penicillins Hives     Ranitidine GI Disturbance     Sulfa Drugs      SULFONAMIDE ANTIBIOTICS      Tomato      Xyzal [Levocetirizine] Hives       Problem List:    Patient Active Problem List    Diagnosis Date Noted     Paresthesias 02/13/2020     Priority: Medium     Status post total left knee replacement 09/09/2019     Priority: Medium     Aortic valve insufficiency 03/06/2019     Priority: Medium     Mitral  regurgitation 03/06/2019     Priority: Medium     Tricuspid valve insufficiency 03/06/2019     Priority: Medium     Diastolic dysfunction grade 1 on 2/21/19 03/06/2019     Priority: Medium     Hypertriglyceridemia 03/06/2019     Priority: Medium     Palpitations 02/13/2019     Priority: Medium     PVC's (premature ventricular contractions) 02/13/2019     Priority: Medium     Atrial ectopy 02/13/2019     Priority: Medium     PSVT (paroxysmal supraventricular tachycardia) (H) 02/13/2019     Priority: Medium     COPD, mild on 9/9/14 02/13/2019     Priority: Medium     Bilateral carotid artery stenosis at less than 50% on 12/1/16 02/13/2019     Priority: Medium     Mixed hyperlipidemia 02/13/2019     Priority: Medium     On statin therapy 02/13/2019     Priority: Medium     Heart murmur 02/13/2019     Priority: Medium     Morbid obesity (H) 06/01/2017     Priority: Medium     ACP (advance care planning) 04/28/2016     Priority: Medium     Advance Care Planning 4/28/2016: ACP Review of Chart / Resources Provided:  Reviewed chart for advance care plan.  Dinorah Lim has no plan or code status on file. Discussed available resources and provided with information. Confirmed code status reflects current choices pending further ACP discussions.  Confirmed/documented legally designated decision makers.  Added by Aditi Gandhi             Anxiety 06/23/2014     Priority: Medium     Lung density on x-ray 07/23/2013     Priority: Medium     Osteoarthritis 06/14/2012     Priority: Medium     Problem list name updated by automated process. Provider to review       Advanced care planning/counseling discussion 01/13/2012     Priority: Medium     Abnormal glucose 08/01/2011     Priority: Medium     Hgb A1c 5.7 on 1/4/2015  Problem list name updated by automated process. Provider to review       Osteoarthritis of right hip 10/07/2004     Priority: Medium     Urticaria 06/01/2004     Priority: Medium     Problem list name updated by  automated process. 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     Obesity, Class II, BMI 35-39.9 1/4/2015     Osteoarthritis of right hip 10/07/2004     Osteoarthrosis 06/14/2012     Otitis externa 10/04/2011     Prediabetes 08/01/2011       Past Surgical History:    Past Surgical History:   Procedure Laterality Date     ARTHROPLASTY KNEE Left 9/9/2019    Procedure: LEFT TOTAL KNEE ARTHROPLASTY S/N;  Surgeon: Trevor Alonzo MD;  Location: [facility]     ARTHROSCOPY KNEE Left 3/21/2019    Procedure: LEFT  KNEE ARTHROSCOPY, partial medial menisectomy;  Surgeon: Esteban Wills MD;  Location: [facility]     C TOTAL KNEE ARTHROPLASTY Left 09/09/2019    Dr Alonzo     CLOSED REDUCTION WRIST Right 1952 x 2    long arm cast (same time as elbow fx)     CLOSED RX ELBOW DISLOCATION Right 1952    CR/long cast of fracture     HC INJ EPIDURAL LUMBAR/SACRAL W/WO CONTRAST Bilateral 5/2013    facet injections; prev 2012     LAPAROSCOPIC CHOLECYSTECTOMY N/A 1/4/2015    Procedure: LAPAROSCOPIC CHOLECYSTECTOMY;  Surgeon: Brittney العلي MD;  Location: [facility]     TONSILLECTOMY         Family History:    Family History   Problem Relation Age of Onset     Heart Disease Brother         atrial fibrillation     Atrial fibrillation Brother      Other - See Comments Mother         emphysema     Heart Disease Father 92        CHF     Cancer Paternal Grandfather         lung cancer     Cancer Maternal Uncle         lung cancer     Chronic Obstructive Pulmonary Disease Daughter      Emphysema Daughter       Other - See Comments Daughter         benign breast lesion     Esophagitis Daughter        Social History:  Marital Status:  Single [1]  Social History     Tobacco Use     Smoking status: Never Smoker     Smokeless tobacco: Never Used   Substance Use Topics     Alcohol use: No     Drug use: No        Medications:    Calcium-Magnesium-Vitamin D (CALCIUM 1200+D3 PO)  clindamycin (CLEOCIN) 300 MG capsule  clonazePAM (KLONOPIN) 1 MG tablet  FISH OIL  HYDROcodone-acetaminophen (NORCO) 5-325 MG tablet  PARoxetine (PAXIL) 40 MG tablet  simvastatin (ZOCOR) 40 MG tablet  VITAMIN D, CHOLECALCIFEROL, PO          Review of Systems   Constitutional: Negative for appetite change, chills, fatigue and fever.   HENT: Negative for congestion, ear pain, rhinorrhea and sore throat.    Respiratory: Positive for shortness of breath. Negative for cough and wheezing.    Cardiovascular: Negative for chest pain, palpitations and leg swelling.   Gastrointestinal: Negative for abdominal pain, diarrhea, nausea and vomiting.   Genitourinary: Negative for difficulty urinating, dysuria and frequency.   Musculoskeletal:        +Upper shoulder pain   Skin: Negative for rash.   Neurological: Negative for dizziness, light-headedness and headaches.       Physical Exam   BP: 128/67  Heart Rate: 66  Temp: 98  F (36.7  C)  Resp: 16  SpO2: 95 %      Physical Exam  Constitutional:       General: She is not in acute distress.     Appearance: Normal appearance. She is not ill-appearing, toxic-appearing or diaphoretic.   HENT:      Right Ear: Tympanic membrane, ear canal and external ear normal.      Left Ear: Tympanic membrane, ear canal and external ear normal.      Nose: Nose normal.      Mouth/Throat:      Lips: Pink.      Mouth: Mucous membranes are moist.      Pharynx: Oropharynx is clear. Uvula midline. No pharyngeal swelling, oropharyngeal exudate or posterior oropharyngeal erythema.   Neck:      Musculoskeletal: Neck supple.   Cardiovascular:       Rate and Rhythm: Normal rate and regular rhythm.      Heart sounds: S1 normal and S2 normal. Murmur (auscultated at RUSB) present. Systolic murmur present with a grade of 2/6. No friction rub. No gallop.    Pulmonary:      Effort: Pulmonary effort is normal.      Breath sounds: Normal breath sounds. No wheezing, rhonchi or rales.   Musculoskeletal:      Right lower le+ Edema present.      Left lower le+ Edema present.   Lymphadenopathy:      Cervical: No cervical adenopathy.   Skin:     General: Skin is warm and dry.      Capillary Refill: Capillary refill takes less than 2 seconds.   Neurological:      Mental Status: She is alert and oriented to person, place, and time.   Psychiatric:         Mood and Affect: Mood normal.         Speech: Speech normal.         Behavior: Behavior normal. Behavior is cooperative.         ED Course        Procedures               EKG Interpretation:      Interpreted by Flor Arana CNP  Time reviewed: 1220  Symptoms at time of EKG: dyspnea   Rhythm: normal sinus   Rate: normal  Axis: normal  Ectopy: none  Conduction: normal  ST Segments/ T Waves: No ST-T wave changes  Q Waves: none  Comparison to prior: T-wave inversion on 3/6/20 which is not present today, no changes compared to 2019 EKG    Clinical Impression: normal EKG, no acute ischemic changes.         Results for orders placed or performed during the hospital encounter of 20 (from the past 24 hour(s))   CBC with platelets differential   Result Value Ref Range    WBC 3.4 (L) 4.0 - 11.0 10e9/L    RBC Count 4.08 3.8 - 5.2 10e12/L    Hemoglobin 11.5 (L) 11.7 - 15.7 g/dL    Hematocrit 36.4 35.0 - 47.0 %    MCV 89 78 - 100 fl    MCH 28.2 26.5 - 33.0 pg    MCHC 31.6 31.5 - 36.5 g/dL    RDW 14.2 10.0 - 15.0 %    Platelet Count 162 150 - 450 10e9/L    Diff Method Automated Method     % Neutrophils 59.4 %    % Lymphocytes 27.3 %    % Monocytes 10.9 %    % Eosinophils 1.5 %    % Basophils 0.6 %    % Immature  Granulocytes 0.3 %    Nucleated RBCs 0 0 /100    Absolute Neutrophil 2.0 1.6 - 8.3 10e9/L    Absolute Lymphocytes 0.9 0.8 - 5.3 10e9/L    Absolute Monocytes 0.4 0.0 - 1.3 10e9/L    Absolute Eosinophils 0.1 0.0 - 0.7 10e9/L    Absolute Basophils 0.0 0.0 - 0.2 10e9/L    Abs Immature Granulocytes 0.0 0 - 0.4 10e9/L    Absolute Nucleated RBC 0.0    Comprehensive metabolic panel   Result Value Ref Range    Sodium 141 133 - 144 mmol/L    Potassium 4.5 3.4 - 5.3 mmol/L    Chloride 110 (H) 94 - 109 mmol/L    Carbon Dioxide 28 20 - 32 mmol/L    Anion Gap 3 3 - 14 mmol/L    Glucose 97 70 - 99 mg/dL    Urea Nitrogen 15 7 - 30 mg/dL    Creatinine 0.84 0.52 - 1.04 mg/dL    GFR Estimate 67 >60 mL/min/[1.73_m2]    GFR Estimate If Black 77 >60 mL/min/[1.73_m2]    Calcium 9.0 8.5 - 10.1 mg/dL    Bilirubin Total PENDING 0.2 - 1.3 mg/dL    Albumin PENDING 3.4 - 5.0 g/dL    Protein Total PENDING 6.8 - 8.8 g/dL    Alkaline Phosphatase PENDING 40 - 150 U/L    ALT PENDING 0 - 50 U/L    AST PENDING 0 - 45 U/L   Troponin I   Result Value Ref Range    Troponin I ES <0.015 0.000 - 0.045 ug/L   Lactic acid whole blood   Result Value Ref Range    Lactic Acid 0.4 (L) 0.7 - 2.0 mmol/L   D dimer quantitative   Result Value Ref Range    D Dimer 1.4 (H) 0.0 - 0.50 ug/ml FEU   XR Chest 2 Views    Narrative    PROCEDURE: XR CHEST 2 VW 3/18/2020 12:07 PM    HISTORY: shortness of breath x 1 week    COMPARISONS: 5/23/2019.    TECHNIQUE: 2 views.    FINDINGS: Heart is not enlarged. No acute infiltrate or effusion is  seen. There is a right convex scoliosis.    Nodular density at the left lung base has probably not changed  significantly. Surgical clips are seen in the right upper quadrant.         Impression    IMPRESSION: No acute infiltrate.    NAVID PARIS, MD       Medications   ipratropium - albuterol 0.5 mg/2.5 mg/3 mL (DUONEB) neb solution 3 mL (3 mLs Nebulization Given 3/18/20 [id number])       Assessments & Plan (with Medical Decision Making)     I  have reviewed the nursing notes.    I have reviewed the findings, diagnosis, plan and need for follow up with the patient.  (R06.02) Shortness of breath (primary encounter diagnosis)  Comment: acute, symptomatic  Plan: 79-year old female presents ambulatory with shortness of breath and chest heaviness ongoing x 1 week. Denies  dyspnea on exertion, chest pain, LE edema, palpitations. Troponin is negative. EKG is normal sinus rhythm. No acute  leukocytosis - WBC is actually slightly low which looks to be not uncommon from her. Mild anemia - hemoglobin is 11.5,  this unlikely contributing to SOB. No acute electrolyte or renal abnormalities. Given ongoing chest pressure, SOB,  normal chest xray, oxygen saturation prior to nebulizer was 92-93%, age adjusted ddimer elevated chest CT obtained -  this was negative for PE but did show patchy areas of air trapping which would be consistent with COPD.  Discussed options of treating with prednisone and duo nebulizers at home. She did feel better after nebulizer and SAO2 increased to 98%-99% on room air.   She will start prednisone 20 mg daily x 5 days.  Duonebulizer every 6 hours for the next 2-3 days, then can use as needed. (No history of glaucoma)      RETURN TO THE ED WITH NEW OR WORSENING SYMPTOMS.  FOLLOW-UP WITH YOUR PRIMARY CARE PROVIDER IN 5-7 DAYS.      Flor Arana CNP    New Prescriptions    No medications on file       Final diagnoses:   Shortness of breath       3/18/2020   [facility]     Flor Arana CNP  03/18/20 [id number]

## 2020-03-18 NOTE — TELEPHONE ENCOUNTER
10:12 AM    Reason for Call: OVERBOOK    Patient is having the following symptoms: achy body, heavy chest breathing for 5 days.    The patient is requesting an appointment for overbook request  with alice solo.    Was an appointment offered for this call? No  If yes : Appointment type              Date    Preferred method for responding to this message: Telephone Call  What is your phone number ?    If we cannot reach you directly, may we leave a detailed response at the number you provided? Yes    Can this message wait until your PCP/provider returns, if unavailable today? YES,     Conchita Mancini

## 2020-03-18 NOTE — ED NOTES
"Pt to ED with reports of pains in bilateral shoulders \" I think its the way I sit\"  Shoulder and back sore for about a week.  SOB of breath for a week. No cough or fever. Reports they say she has COPD.  Pt denies its getting worse.  Came in wanting CXR.  No chest pain  \" just feels heavy at time\"    Provider at bedside    "

## 2020-03-18 NOTE — ED AVS SNAPSHOT
[facility]  [address]  [place] [address]  Phone:  [phone]                                    Dinorah Lim   MRN: [id number]    Department:  [facility]   Date of Visit:  3/18/2020           After Visit Summary Signature Page    I have received my discharge instructions, and my questions have been answered. I have discussed any challenges I see with this plan with the nurse or doctor.    ..........................................................................................................................................  Patient/Patient Representative Signature      ..........................................................................................................................................  Patient Representative Print Name and Relationship to Patient    ..................................................               ................................................  Date                                   Time    ..........................................................................................................................................  Reviewed by Signature/Title    ...................................................              ..............................................  Date                                               Time          22EPIC Rev 08/18

## 2020-03-19 ENCOUNTER — VIRTUAL VISIT (OUTPATIENT)
Dept: FAMILY MEDICINE | Facility: OTHER | Age: 80
End: 2020-03-19
Attending: PHYSICIAN ASSISTANT
Payer: COMMERCIAL

## 2020-03-19 DIAGNOSIS — Z09 HOSPITAL DISCHARGE FOLLOW-UP: Primary | ICD-10-CM

## 2020-03-19 PROCEDURE — 99213 OFFICE O/P EST LOW 20 MIN: CPT | Mod: 95 | Performed by: PHYSICIAN ASSISTANT

## 2020-03-19 RX ORDER — CLONAZEPAM 1 MG/1
TABLET ORAL
Qty: 45 TABLET | Refills: 0 | Status: SHIPPED | OUTPATIENT
Start: 2020-03-19 | End: 2020-08-11

## 2020-03-19 NOTE — PROGRESS NOTES
"Dinorah Lim is a 79 year old female who is being evaluated via a billable telephone visit.      The patient has been notified of following:     \"This telephone visit will be conducted via a call between you and your physician/provider. We have found that certain health care needs can be provided without the need for a physical exam.  This service lets us provide the care you need with a short phone conversation.  If a prescription is necessary we can send it directly to your pharmacy.  If lab work is needed we can place an order for that and you can then stop by our lab to have the test done at a later time.    If during the course of the call the physician/provider feels a telephone visit is not appropriate, you will not be charged for this service.\"     Dinorah Lim complains of    Chief Complaint   Patient presents with     ER follow up       I have reviewed and updated the patient's Past Medical History, Social History, Family History and Medication List.    ALLERGIES  Chocolate; Lemon flavor; Lime [calcium oxysulfide]; Metal [staples]; Orange fruit [citrus]; Strawberry; Adhesive tape; Codeine; Decadron [dexamethasone]; Erythromycin; Grapefruit [extra strength grapefruit]; Morphine; Penicillins; Ranitidine; Sulfa drugs; Tomato; and Xyzal [levocetirizine]    [place]    [place] [facility] [place]: [place]/20  Reason for visit: Shortness of Breath  Current Status: feeling good today, on prednisone and abx.  Using neb. Finding it helps her .            Assessment/Plan:  1. Hospital discharge follow-up  She is doing better breathing better.  She will need to see Dr. Sosa in follow up in the future fore some PFT's but that will be down the line.  We will continue her nebulizer.  The steroids really did help.  Went over all her labs and imaging.  She was concerned about lung cancer.  Reassured her not the picture we are seeing. More of a chronic lung disease.  \"ground glass\" appearances.       Phone " call duration:  15 minutes    SILVANA Sheets

## 2020-03-19 NOTE — TELEPHONE ENCOUNTER
clonazePAM (KLONOPIN) 1 MG tablet       Last Written Prescription Date:  10/18/19  Last Fill Quantity: 45,   # refills: 0  Last Office Visit: 2/13/20  Future Office visit:    Next 5 appointments (look out 90 days)    Mar 19, 2020 11:20 AM CDT  [facility] [address]  [place] [address]  [phone]   May 05, 2020  8:15 AM CDT  (Arrive by 8:00 AM)  [facility] with [facility] Sosa MD  [facility] [address]  [place] [address]  [phone]           Routing refill request to provider for review/approval because:  Drug not on the [facility], [facility] or [facility] refill protocol or controlled substance

## 2020-03-22 ENCOUNTER — NURSE TRIAGE (OUTPATIENT)
Dept: NURSING | Facility: CLINIC | Age: 80
End: 2020-03-22

## 2020-03-23 NOTE — TELEPHONE ENCOUNTER
"Dinorah is calling with questions from a telephone visit with a provider.    Dinorah thought the provider told her that she has glass crystals in her lungs.    Chart review showed:  -------------------------------------------------  1. Hospital discharge follow-up  She is doing better breathing better.  She will need to see Dr. Sosa in follow up in the future fore some PFT's but that will be down the line.  We will continue her nebulizer.  The steroids really did help.  Went over all her labs and imaging.  She was concerned about lung cancer.  Reassured her not the picture we are seeing. More of a chronic lung disease.  \"ground glass\" appearances.      Phone call duration:  15 minutes     SILVANA Sheets  -------------------------------------------------    Notified patient of the above note.      Teresa Pineda, RN  [facility]    Reason for Disposition    [1] Follow-up call to recent contact AND [2] information only call, no triage required    Protocols used: INFORMATION ONLY CALL-A-      "

## 2020-03-23 NOTE — PROGRESS NOTES
Outpatient Physical Therapy Discharge Note     Patient: Dinorah Lim  : 1940    Beginning/End Dates of Reporting Period:  2019 to 3/23/2020    Referring Provider: Dr. Alonzo    Therapy Diagnosis: gait disturbance s/p L TKA     Client Self Report: On last attended session: Pt states she had a lot of pain and swelling the past couple of days.  Pt states she worked on icing and elevating and used BenGay to help with the pain.     Objective Measurements:      Outcome Measures (most recent score):      Goals:  Goal Identifier STG 1   Goal Description Pt to be independent and compliant with HEP.   Target Date 19   Date Met  19   Progress:     Goal Identifier LTG 1   Goal Description Pt to demonstrate improved knee AROM to 120 degrees to manage stairs without difficulty.   Target Date 19   Date Met  10/09/19   Progress:     Goal Identifier LTG 2   Goal Description Pt to demonstrate improved strength of L LE to manage transfers and stairs with independence.   Target Date 19   Date Met      Progress:     Goal Identifier LTG 3   Goal Description Pt to tolerate community distance ambulation with SEC with pain <2/10.   Target Date 19   Date Met      Progress:       Progress Toward Goals:   Not assessed this period.  Patient failed to attend additional PT sessions to obtain final measurements or assess progress          Plan:  Discharge from therapy.    Discharge:    Reason for Discharge: Patient has failed to schedule/attend further appointments.    Equipment Issued:     Discharge Plan: no plan established

## 2020-04-13 DIAGNOSIS — I47.10 PSVT (PAROXYSMAL SUPRAVENTRICULAR TACHYCARDIA) (H): ICD-10-CM

## 2020-04-13 DIAGNOSIS — E78.2 MIXED HYPERLIPIDEMIA: ICD-10-CM

## 2020-04-13 LAB
ALBUMIN SERPL-MCNC: 3.6 G/DL (ref 3.4–5)
ALP SERPL-CCNC: 102 U/L (ref 40–150)
ALT SERPL W P-5'-P-CCNC: 20 U/L (ref 0–50)
ANION GAP SERPL CALCULATED.3IONS-SCNC: 3 MMOL/L (ref 3–14)
AST SERPL W P-5'-P-CCNC: 16 U/L (ref 0–45)
BILIRUB SERPL-MCNC: 0.6 MG/DL (ref 0.2–1.3)
BUN SERPL-MCNC: 11 MG/DL (ref 7–30)
CALCIUM SERPL-MCNC: 9.1 MG/DL (ref 8.5–10.1)
CHLORIDE SERPL-SCNC: 108 MMOL/L (ref 94–109)
CHOLEST SERPL-MCNC: 146 MG/DL
CO2 SERPL-SCNC: 29 MMOL/L (ref 20–32)
CREAT SERPL-MCNC: 0.87 MG/DL (ref 0.52–1.04)
ERYTHROCYTE [DISTWIDTH] IN BLOOD BY AUTOMATED COUNT: 14.1 % (ref 10–15)
GFR SERPL CREATININE-BSD FRML MDRD: 64 ML/MIN/{1.73_M2}
GLUCOSE SERPL-MCNC: 101 MG/DL (ref 70–99)
HCT VFR BLD AUTO: 40 % (ref 35–47)
HDLC SERPL-MCNC: 55 MG/DL
HGB BLD-MCNC: 12.8 G/DL (ref 11.7–15.7)
LDLC SERPL CALC-MCNC: 72 MG/DL
MCH RBC QN AUTO: 28.9 PG (ref 26.5–33)
MCHC RBC AUTO-ENTMCNC: 32 G/DL (ref 31.5–36.5)
MCV RBC AUTO: 90 FL (ref 78–100)
NONHDLC SERPL-MCNC: 91 MG/DL
PLATELET # BLD AUTO: 162 10E9/L (ref 150–450)
POTASSIUM SERPL-SCNC: 3.8 MMOL/L (ref 3.4–5.3)
PROT SERPL-MCNC: 6.7 G/DL (ref 6.8–8.8)
RBC # BLD AUTO: 4.43 10E12/L (ref 3.8–5.2)
SODIUM SERPL-SCNC: 140 MMOL/L (ref 133–144)
TRIGL SERPL-MCNC: 96 MG/DL
WBC # BLD AUTO: 3.8 10E9/L (ref 4–11)

## 2020-04-13 PROCEDURE — 85027 COMPLETE CBC AUTOMATED: CPT | Mod: ZL | Performed by: FAMILY MEDICINE

## 2020-04-13 PROCEDURE — 36415 COLL VENOUS BLD VENIPUNCTURE: CPT | Mod: ZL | Performed by: FAMILY MEDICINE

## 2020-04-13 PROCEDURE — 80053 COMPREHEN METABOLIC PANEL: CPT | Mod: ZL | Performed by: FAMILY MEDICINE

## 2020-04-13 PROCEDURE — 80061 LIPID PANEL: CPT | Mod: ZL | Performed by: FAMILY MEDICINE

## 2020-05-05 ENCOUNTER — VIRTUAL VISIT (OUTPATIENT)
Dept: FAMILY MEDICINE | Facility: OTHER | Age: 80
End: 2020-05-05
Attending: FAMILY MEDICINE
Payer: COMMERCIAL

## 2020-05-05 VITALS — BODY MASS INDEX: 34.23 KG/M2 | WEIGHT: 213 LBS | HEIGHT: 66 IN

## 2020-05-05 DIAGNOSIS — J44.9 COPD, MILD (H): ICD-10-CM

## 2020-05-05 DIAGNOSIS — R91.8 GROUND GLASS OPACITY PRESENT ON IMAGING OF LUNG: ICD-10-CM

## 2020-05-05 DIAGNOSIS — D70.3 NEUTROPENIA ASSOCIATED WITH INFECTION (H): ICD-10-CM

## 2020-05-05 DIAGNOSIS — E78.2 MIXED HYPERLIPIDEMIA: Primary | ICD-10-CM

## 2020-05-05 DIAGNOSIS — G57.82 SAPHENOUS NEURITIS, LEFT: ICD-10-CM

## 2020-05-05 PROCEDURE — 99213 OFFICE O/P EST LOW 20 MIN: CPT | Mod: TEL | Performed by: FAMILY MEDICINE

## 2020-05-05 ASSESSMENT — PAIN SCALES - GENERAL: PAINLEVEL: EXTREME PAIN (8)

## 2020-05-05 ASSESSMENT — MIFFLIN-ST. JEOR: SCORE: 1459.97

## 2020-05-05 NOTE — PROGRESS NOTES
"Dinorah Lim is a 79 year old female who is being evaluated via a billable telephone visit.      The patient has been notified of following:     \"This telephone visit will be conducted via a call between you and your physician/provider. We have found that certain health care needs can be provided without the need for a physical exam.  This service lets us provide the care you need with a short phone conversation.  If a prescription is necessary we can send it directly to your pharmacy.  If lab work is needed we can place an order for that and you can then stop by our lab to have the test done at a later time.    Telephone visits are billed at different rates depending on your insurance coverage. During this emergency period, for some insurers they may be billed the same as an in-person visit.  Please reach out to your insurance provider with any questions.    If during the course of the call the physician/provider feels a telephone visit is not appropriate, you will not be charged for this service.\"    Patient has given verbal consent for Telephone visit?  Yes    What phone number would you like to be contacted at? [phone]    How would you like to obtain your AVS? Mail a copy    Subjective     Dinorah Lim is a 79 year old female who presents to clinic today for the following health issues:    HPI  Hyperlipidemia Follow-Up      Are you regularly taking any medication or supplement to lower your cholesterol?   Yes- zocor    Are you having muscle aches or other side effects that you think could be caused by your cholesterol lowering medication?  No      How many servings of fruits and vegetables do you eat daily?  0-1    On average, how many sweetened beverages do you drink each day (Examples: soda, juice, sweet tea, etc.  Do NOT count diet or artificially sweetened beverages)?   0    How many days per week do you exercise enough to make your heart beat faster? 3 or less    How many minutes a day do you " exercise enough to make your heart beat faster? 9 or less    How many days per week do you miss taking your medication? 0    Shortness of Breath/ Tachycardia      Duration: ongoing-murmur since little    Description (location/character/radiation): chest    Intensity:  moderate    Accompanying signs and symptoms: palpatations    History (similar episodes/previous evaluation): heart murmor    Precipitating or alleviating factors: None    Therapies tried and outcome: duoneb-semi-effective   Her shortness of breath is improving with using nebs. Reviewed her CT scan with her, offered Pulmonary referral which she declines at this time. Changes consistent with COPD were noted as well.     Left knee pain  S/p left knee arthroplasty with ongoing pain. She was seen by the PA at [facility] who felt she had saphenous neuritis, and she declined gabapentin at that time. Discussed again, she declines this again.            Patient Active Problem List   Diagnosis     Osteoarthritis of right hip     Abnormal glucose     Osteoarthritis     Lung density on x-ray     Advanced care planning/counseling discussion     Anxiety     Urticaria     ACP (advance care planning)     Morbid obesity (H)     Palpitations     PVC's (premature ventricular contractions)     Atrial ectopy     PSVT (paroxysmal supraventricular tachycardia) (H)     COPD, mild on 9/9/14     Bilateral carotid artery stenosis at less than 50% on 12/1/16     Mixed hyperlipidemia     On statin therapy     Heart murmur     Aortic valve insufficiency     Mitral regurgitation     Tricuspid valve insufficiency     Diastolic dysfunction grade 1 on 2/21/19     Hypertriglyceridemia     Status post total left knee replacement     Paresthesias     Past Surgical History:   Procedure Laterality Date     ARTHROPLASTY KNEE Left 9/9/2019    Procedure: LEFT TOTAL KNEE ARTHROPLASTY S/N;  Surgeon: Trevor Alonzo MD;  Location: [facility]     ARTHROSCOPY KNEE Left 3/21/2019    Procedure:  LEFT  KNEE ARTHROSCOPY, partial medial menisectomy;  Surgeon: Esteban Wills MD;  Location: [facility]     C TOTAL KNEE ARTHROPLASTY Left 09/09/2019    Dr Alonzo     CLOSED REDUCTION WRIST Right 1952 x 2    long arm cast (same time as elbow fx)     CLOSED RX ELBOW DISLOCATION Right 1952    CR/long cast of fracture     HC INJ EPIDURAL LUMBAR/SACRAL W/WO CONTRAST Bilateral 5/2013    facet injections; prev 2012     LAPAROSCOPIC CHOLECYSTECTOMY N/A 1/4/2015    Procedure: LAPAROSCOPIC CHOLECYSTECTOMY;  Surgeon: Brittney العلي MD;  Location: [facility]     TONSILLECTOMY         Social History     Tobacco Use     Smoking status: Never Smoker     Smokeless tobacco: Never Used   Substance Use Topics     Alcohol use: No     Family History   Problem Relation Age of Onset     Heart Disease Brother         atrial fibrillation     Atrial fibrillation Brother      Other - See Comments Mother         emphysema     Heart Disease Father 92        CHF     Cancer Paternal Grandfather         lung cancer     Cancer Maternal Uncle         lung cancer     Chronic Obstructive Pulmonary Disease Daughter      Emphysema Daughter      Other - See Comments Daughter         benign breast lesion     Esophagitis Daughter          Current Outpatient Medications   Medication Sig Dispense Refill     Calcium-Magnesium-Vitamin D (CALCIUM 1200+D3 PO) Take by mouth daily       clonazePAM (KLONOPIN) 1 MG tablet TAKE 1/4 TO 1/2 (ONE-FOURTH TO ONE-HALF) TABLET BY MOUTH EVERY 8 HOURS AS NEEDED 45 tablet 0     FISH OIL Take 1 capsule by mouth daily        HYDROcodone-acetaminophen (NORCO) 5-325 MG tablet TAKE 1 TABLET BY MOUTH EVERY 6 HOURS AS NEEDED FOR SEVERE PAIN  0     ipratropium - albuterol 0.5 mg/2.5 mg/3 mL (DUONEB) 0.5-2.5 (3) MG/3ML neb solution Take 1 vial (3 mLs) by nebulization every 6 hours as needed for shortness of breath / dyspnea or wheezing 1 Box 1     PARoxetine (PAXIL) 40 MG tablet TAKE 1 TABLET BY MOUTH ONCE DAILY 90 tablet 0      "simvastatin (ZOCOR) 40 MG tablet Take 1 tablet (40 mg) by mouth At Bedtime 90 tablet 0     VITAMIN D, CHOLECALCIFEROL, PO Take 2,000 Units by mouth daily       Allergies   Allergen Reactions     Chocolate Hives     Lemon Flavor Hives     Lime [Calcium Oxysulfide] Hives     Metal [Staples]      Orange Fruit [Citrus] Hives     Strawberry Hives     Adhesive Tape      Band-aids     Codeine Hives     Patient can tolerate oxycodone & dilaudid     Decadron [Dexamethasone] Hives     Erythromycin Hives     ERYTHROMYCIN BASE     Grapefruit [Extra Strength Grapefruit]      GRAPEFRUIT     Morphine Hives     Pt. Reports had hives     Penicillins Hives     Ranitidine GI Disturbance     Sulfa Drugs      SULFONAMIDE ANTIBIOTICS      Tomato      Xyzal [Levocetirizine] Hives     BP Readings from Last 3 Encounters:   03/18/20 110/74   03/09/20 106/56   03/06/20 117/58    Wt Readings from Last 3 Encounters:   05/05/20 96.6 kg (213 lb)   03/09/20 93 kg (205 lb)   02/13/20 96.2 kg (212 lb)                    Reviewed and updated as needed this visit by Provider         Review of Systems   ROS COMP: Constitutional, HEENT, cardiovascular, pulmonary, gi and gu systems are negative, except as otherwise noted.       Objective   Reported vitals:  Ht 1.664 m (5' 5.5\")   Wt 96.6 kg (213 lb)   BMI 34.91 kg/m     healthy, alert and no distress  PSYCH: Alert and oriented times 3; coherent speech, normal   rate and volume, able to articulate logical thoughts, able   to abstract reason, no tangential thoughts, no hallucinations   or delusions  Her affect is normal  RESP: No cough, no audible wheezing, able to talk in full sentences  Remainder of exam unable to be completed due to telephone visits            Assessment/Plan:  1. Mixed hyperlipidemia  LDL on 4- was down to 72 on statins which she is tolerating well. continue  - Lipid Profile; Future    2. Neutropenia associated with infection (H)  This had increased from 3.4 to 3.8. will " recheck again in 3 months. This is most likely due to a viral infection  - CBC with platelets and differential; Future    3. Saphenous neuritis, left  S/p left TKA. Continue to follow    4. COPD, mild on 9/9/14  Discussed today. She will let us know when she would like a referral to pulmonary    5. Ground glass opacity present on imaging of lung  As above      Return in about 3 months (around 8/5/2020) for Lab Work, Lipids, low white count.      Phone call duration:  8 minutes    Magaly Sosa MD

## 2020-05-05 NOTE — NURSING NOTE
"Chief Complaint   Patient presents with     Lipids     Tachycardia       Initial Ht 1.664 m (5' 5.5\")   Wt 96.6 kg (213 lb)   BMI 34.91 kg/m   Estimated body mass index is 34.91 kg/m  as calculated from the following:    Height as of this encounter: 1.664 m (5' 5.5\").    Weight as of this encounter: 96.6 kg (213 lb).  Medication Reconciliation: complete  Allison Lopez LPN  "

## 2020-05-15 DIAGNOSIS — R52 PAIN: Primary | ICD-10-CM

## 2020-05-15 RX ORDER — HYDROCODONE BITARTRATE AND ACETAMINOPHEN 5; 325 MG/1; MG/1
TABLET ORAL
Qty: 10 TABLET | Refills: 0 | Status: SHIPPED | OUTPATIENT
Start: 2020-05-15 | End: 2020-07-23

## 2020-05-15 NOTE — TELEPHONE ENCOUNTER
Pt reported mediation.Shes states it has not been since November.    Will need dispense amount.      Virtiual visit 5.5.2020.      Carla No RN

## 2020-05-18 ENCOUNTER — TRANSFERRED RECORDS (OUTPATIENT)
Dept: HEALTH INFORMATION MANAGEMENT | Facility: CLINIC | Age: 80
End: 2020-05-18

## 2020-05-19 ENCOUNTER — TRANSFERRED RECORDS (OUTPATIENT)
Dept: HEALTH INFORMATION MANAGEMENT | Facility: CLINIC | Age: 80
End: 2020-05-19

## 2020-05-20 ENCOUNTER — NURSE TRIAGE (OUTPATIENT)
Dept: NURSING | Facility: CLINIC | Age: 80
End: 2020-05-20

## 2020-05-21 ENCOUNTER — NURSE TRIAGE (OUTPATIENT)
Dept: FAMILY MEDICINE | Facility: OTHER | Age: 80
End: 2020-05-21

## 2020-05-21 ENCOUNTER — VIRTUAL VISIT (OUTPATIENT)
Dept: FAMILY MEDICINE | Facility: OTHER | Age: 80
End: 2020-05-21
Attending: FAMILY MEDICINE
Payer: COMMERCIAL

## 2020-05-21 ENCOUNTER — TELEPHONE (OUTPATIENT)
Dept: FAMILY MEDICINE | Facility: OTHER | Age: 80
End: 2020-05-21

## 2020-05-21 DIAGNOSIS — R51.9 NONINTRACTABLE HEADACHE, UNSPECIFIED CHRONICITY PATTERN, UNSPECIFIED HEADACHE TYPE: Primary | ICD-10-CM

## 2020-05-21 PROCEDURE — 99213 OFFICE O/P EST LOW 20 MIN: CPT | Mod: TEL | Performed by: FAMILY MEDICINE

## 2020-05-21 NOTE — PROGRESS NOTES
"Dinorah Lim is a 79 year old female who is being evaluated via a billable telephone visit.      The patient has been notified of following:     \"This telephone visit will be conducted via a call between you and your physician/provider. We have found that certain health care needs can be provided without the need for a physical exam.  This service lets us provide the care you need with a short phone conversation.  If a prescription is necessary we can send it directly to your pharmacy.  If lab work is needed we can place an order for that and you can then stop by our lab to have the test done at a later time.    Telephone visits are billed at different rates depending on your insurance coverage. During this emergency period, for some insurers they may be billed the same as an in-person visit.  Please reach out to your insurance provider with any questions.    If during the course of the call the physician/provider feels a telephone visit is not appropriate, you will not be charged for this service.\"    Patient has given verbal consent for Telephone visit?  Yes    What phone number would you like to be contacted at? [phone]    How would you like to obtain your AVS? Mail a copy    Subjective     Dinorah Lim is a 79 year old female who presents via phone visit today for the following health issues:    HPI  Headaches      Duration: yesterday; came on during the day; stress; grandson fell from tanker - fracture, brain bleeds; lots of worry about pandemic as well; relatives do shopping for her; headache resolved today    Description  Location: whole head and above eyes  Character: dull pain  Frequency:  none  Duration:  About 3-4 hours    Intensity:  moderate    Accompanying signs and symptoms:    Precipitating or Alleviating factors:  Nausea/vomiting: no  Dizziness: no  Weakness or numbness: no  Visual changes: none  Fever: YES- 99.9 yesterday; today was 98.7; 1 facial cheek is warm, but other is cooler;  " Body is a bit hot; little bit of chills  Sinus or URI symptoms YES- having a lung issue and has COPD; denies current symptoms; did have possible diagnosis of copd a few months ago; non-smoker; second hand exposure; no recent nebulizer use in past 2 weeks  Grandson lives with patient - he has had cold and sinus congestion for while.  Staying hydrated. Lots of water.  Eating ok.  No skin changes.    History  Head trauma: no   Family history of migraines: YES- daughter  Previous tests for headaches: no   Neurologist evaluations: no   Able to do daily activities when headache present: no   Wake with headaches: no   Daily pain medication use: no   Any changes in: family son in law in hospital    Precipitating or Alleviating factors (light/sound/sleep/caffeine): none    Therapies tried and outcome: Ibuprofen (Advil, Motrin)    Outcome - effective  Frequent/daily pain medication use: no            Current Outpatient Medications   Medication     Calcium-Magnesium-Vitamin D (CALCIUM 1200+D3 PO)     clonazePAM (KLONOPIN) 1 MG tablet     FISH OIL     HYDROcodone-acetaminophen (NORCO) 5-325 MG tablet     ipratropium - albuterol 0.5 mg/2.5 mg/3 mL (DUONEB) 0.5-2.5 (3) MG/3ML neb solution     PARoxetine (PAXIL) 40 MG tablet     simvastatin (ZOCOR) 40 MG tablet     VITAMIN D, CHOLECALCIFEROL, PO     No current facility-administered medications for this visit.        Patient Active Problem List   Diagnosis     Osteoarthritis of right hip     Abnormal glucose     Osteoarthritis     Lung density on x-ray     Advanced care planning/counseling discussion     Anxiety     Urticaria     ACP (advance care planning)     Morbid obesity (H)     Palpitations     PVC's (premature ventricular contractions)     Atrial ectopy     PSVT (paroxysmal supraventricular tachycardia) (H)     COPD, mild on 9/9/14     Bilateral carotid artery stenosis at less than 50% on 12/1/16     Mixed hyperlipidemia     On statin therapy     Heart murmur     Aortic  valve insufficiency     Mitral regurgitation     Tricuspid valve insufficiency     Diastolic dysfunction grade 1 on 2/21/19     Hypertriglyceridemia     Status post total left knee replacement     Paresthesias     Past Surgical History:   Procedure Laterality Date     ARTHROPLASTY KNEE Left 9/9/2019    Procedure: LEFT TOTAL KNEE ARTHROPLASTY S/N;  Surgeon: Trevor Alonzo MD;  Location: [facility]     ARTHROSCOPY KNEE Left 3/21/2019    Procedure: LEFT  KNEE ARTHROSCOPY, partial medial menisectomy;  Surgeon: Esteban Wills MD;  Location: [facility]     C TOTAL KNEE ARTHROPLASTY Left 09/09/2019    Dr Alonzo     CLOSED REDUCTION WRIST Right 1952 x 2    long arm cast (same time as elbow fx)     CLOSED RX ELBOW DISLOCATION Right 1952    CR/long cast of fracture     HC INJ EPIDURAL LUMBAR/SACRAL W/WO CONTRAST Bilateral 5/2013    facet injections; prev 2012     LAPAROSCOPIC CHOLECYSTECTOMY N/A 1/4/2015    Procedure: LAPAROSCOPIC CHOLECYSTECTOMY;  Surgeon: Brittney العلي MD;  Location: [facility]     TONSILLECTOMY         Social History     Tobacco Use     Smoking status: Never Smoker     Smokeless tobacco: Never Used   Substance Use Topics     Alcohol use: No     Family History   Problem Relation Age of Onset     Heart Disease Brother         atrial fibrillation     Atrial fibrillation Brother      Other - See Comments Mother         emphysema     Heart Disease Father 92        CHF     Cancer Paternal Grandfather         lung cancer     Cancer Maternal Uncle         lung cancer     Chronic Obstructive Pulmonary Disease Daughter      Emphysema Daughter      Other - See Comments Daughter         benign breast lesion     Esophagitis Daughter            Reviewed and updated as needed this visit by Provider  Tobacco  Allergies  Meds  Med Hx  Surg Hx  Fam Hx  Soc Hx        Review of Systems   Constitutional, HEENT, cardiovascular, pulmonary, gi and gu systems are negative, except as otherwise noted.       Objective   Reported  vitals:  There were no vitals taken for this visit.   alert and no distress  PSYCH: Alert and oriented times 3; coherent speech, normal   rate and volume, able to articulate logical thoughts, able   to abstract reason, no tangential thoughts, no hallucinations   or delusions  Her affect is normal  RESP: No cough, no audible wheezing, able to talk in full sentences  Remainder of exam unable to be completed due to telephone visits    Diagnostic Test Results:  none         Assessment/Plan:  1. Nonintractable headache, unspecified chronicity pattern, unspecified headache type  Patient had isolated headache yesterday under stressful situation.  No longer has headache today.  Had isolated temperature of 99.9 yesterday.  Normal today.  No other new symptoms.  Discussed cautiously monitoring.  If fever recurs or symptoms evolve, reassess need for COVID testing.  Discussed symptomatic cares - fluids, rest.  Patient comfortable with plan.  Will CC PCP as well.          Phone call duration:  6:02 minutes    Marion Das MD

## 2020-05-21 NOTE — TELEPHONE ENCOUNTER
Had visit with patient virtually this morning.  Headache and temp 99.9 yesterday, but had resolved today.  If acute changes, new symptoms, and feels needs to be seen, can go to UC/ER or schedule in walk in.

## 2020-05-21 NOTE — TELEPHONE ENCOUNTER
Updated on below. She states she will continue monitor.Temp is like 96.9 and then will go to 99.0.She is checking it every few hours.Encouraged to call back if needed.Advised UC/ED if s/s worsen or worrisome.She verbalized understanding.      Carla No RN

## 2020-05-21 NOTE — NURSING NOTE
"Chief Complaint   Patient presents with     Headache       Initial There were no vitals taken for this visit. Estimated body mass index is 34.91 kg/m  as calculated from the following:    Height as of 5/5/20: 1.664 m (5' 5.5\").    Weight as of 5/5/20: 96.6 kg (213 lb).  Medication Reconciliation: complete  Yolanda Mason MA  "

## 2020-05-21 NOTE — TELEPHONE ENCOUNTER
"Pt reports temperature of 99.9 and \"bad headache\". Pt reports she was around some grandchildren earlier today and \"afraid of that COVID19\".     Advised pt to be seen within 24 hours per protocol. Reviewed home care advice with pt.     Pt verbalizes understanding and agrees to plan.     Additional Information    Negative: Difficult to awaken or acting confused (e.g., disoriented, slurred speech)    Negative: [1] Weakness of the face, arm or leg on one side of the body AND [2] new onset    Negative: [1] Numbness of the face, arm or leg on one side of the body AND [2] new onset    Negative: [1] Loss of speech or garbled speech AND [2] new onset    Negative: Passed out (i.e., lost consciousness, collapsed and was not responding)    Negative: Sounds like a life-threatening emergency to the triager    Negative: Followed a head injury    Negative: Pregnant    Negative: Postpartum (from 0 to 6 weeks after delivery)    Negative: Traumatic Brain Injury (TBI) is suspected    Negative: Unable to walk, or can only walk with assistance (e.g., requires support)    Negative: Stiff neck (can't touch chin to chest)    Negative: Severe pain in one eye    Negative: [1] Other family members (or roommates) with headaches AND [2] possibility of carbon monoxide exposure    Negative: [1] SEVERE headache (e.g., excruciating) AND [2] \"worst headache\" of life    Negative: [1] SEVERE headache AND [2] sudden-onset (i.e., reaching maximum intensity within seconds)    Negative: [1] SEVERE headache AND [2] fever    Negative: Loss of vision or double vision (Exception: same as prior migraines)    Negative: [1] Fever > 100.0 F (37.8 C) AND [2] diabetes mellitus or weak immune system (e.g., HIV positive, cancer chemo, splenectomy, organ transplant, chronic steroids)    Negative: Patient sounds very sick or weak to the triager    Negative: [1] SEVERE headache (e.g., excruciating) AND [2] not improved after 2 hours of pain medicine    Negative: [1] " "Vomiting AND [2] 2 or more times (Exception: similar to previous migraines)    Negative: Fever > 104 F (40 C)    Negative: [1] MODERATE headache (e.g., interferes with normal activities) AND [2] present > 24 hours AND [3] unexplained  (Exceptions: analgesics not tried, typical migraine, or headache part of viral illness)    [1] New headache AND [2] weak immune system (e.g., HIV positive, cancer chemo, splenectomy, organ transplant, chronic steroids)    Answer Assessment - Initial Assessment Questions  1. LOCATION: \"Where does it hurt?\"      \"whole head\"  2. ONSET: \"When did the headache start?\" (Minutes, hours or days)       3 pm   3. PATTERN: \"Does the pain come and go, or has it been constant since it started?\"      Constant  4. SEVERITY: \"How bad is the pain?\" and \"What does it keep you from doing?\"  (e.g., Scale 1-10; mild, moderate, or severe)    - MILD (1-3): doesn't interfere with normal activities     - MODERATE (4-7): interferes with normal activities or awakens from sleep     - SEVERE (8-10): excruciating pain, unable to do any normal activities         '8\"  5. RECURRENT SYMPTOM: \"Have you ever had headaches before?\" If so, ask: \"When was the last time?\" and \"What happened that time?\"       Pt denies  6. CAUSE: \"What do you think is causing the headache?\"      \"no idea\"   7. MIGRAINE: \"Have you been diagnosed with migraine headaches?\" If so, ask: \"Is this headache similar?\"       Pt denies  8. HEAD INJURY: \"Has there been any recent injury to the head?\"       Pt denies  9. OTHER SYMPTOMS: \"Do you have any other symptoms?\" (fever, stiff neck, eye pain, sore throat, cold symptoms)      Oral temp 20 minutes ago 99.9  10. PREGNANCY: \"Is there any chance you are pregnant?\" \"When was your last menstrual period?\"        n/a    Protocols used: HEADACHE-A-AH      "

## 2020-05-21 NOTE — TELEPHONE ENCOUNTER
Patient called and wanted to report temp of 99.1. Had her check and it was 98.6x2.She states that she does not think her thermometer is working right and she feels like she is burning up from the inside out with her face burning on the left side and not the right.Had her smile in mirror and smile equal, no blurred vision, no HA.She started to be come nauseated about an hour ago.She is asking for a temp and BP check and if she qualifies for Covid testing.Diffucult to triage and would start to talk about different stuff after this writer would ask a question. Please advise.Per protocol Home care.    Advised if s/s worsen or worrisome before call back go to ED/UC.She verbalized understanding.      Call pt back at [phone]      Carla No RN      Additional Information    Negative: Shock suspected (e.g., cold/pale/clammy skin, too weak to stand, low BP, rapid pulse)    Negative: [1] Difficulty breathing AND [2] bluish lips, tongue or face    Negative: Difficult to awaken or acting confused (e.g., disoriented, slurred speech)    Negative: New onset rash with multiple purple (or blood-colored) spots or dots    Negative: Sounds like a life-threatening emergency to the triager    Negative: Fever in a cancer patient who is currently (or recently) receiving chemotherapy or radiation therapy, or cancer patient who has metastatic or end-stage cancer and is receiving palliative care    Negative: Pregnant    Negative: Postpartum (from 0 to 6 weeks after delivery)    Negative: Fever onset within 24 hours of receiving VACCINE    Negative: [1] Fever AND [2] within 21 days of Ebola EXPOSURE    Negative: Other symptom is present, see that guideline  (e.g., symptoms of cough, runny nose, sore throat, earache, abdominal pain, diarrhea, vomiting)    Negative: [1] Headache AND [2] stiff neck (can't touch chin to chest)    Negative: Difficulty breathing    Negative: IV drug abuse    Negative: [1] Drinking very little AND [2]  "dehydration suspected (e.g., no urine > 12 hours, very dry mouth, very lightheaded)    Negative: Patient sounds very sick or weak to the triager  (Exception: mild weakness and hasn't taken fever medicine)    Negative: [1] Fever > 100.0 F (37.8 C) AND [2] surgery in the last month    Negative: [1] Fever > 100.0 F (37.8 C) AND [2] has port (portacath), central line, or PICC line    Negative: [1] Fever > 100.0 F (37.8 C) AND [2] indwelling urinary catheter (e.g., John, Coude)    Negative: [1] Fever > 100.0 F (37.8 C) AND [2] bedridden (e.g., nursing home patient, CVA, chronic illness, recovering from surgery)    Negative: [1] Fever > 101 F (38.3 C) AND [2] age > 60    Negative: Fever > 104 F (40 C)    Negative: [1] Fever > 100.0 F (37.8 C) AND [2] diabetes mellitus or weak immune system (e.g., HIV positive, cancer chemo, splenectomy, organ transplant, chronic steroids)    Negative: Transplant patient (e.g., kidney, liver, lung, heart)    Negative: Fever present > 3 days (72 hours)    Negative: [1] Fever > 100.0 F (37.8 C) AND [2] foreign travel to a developing country in the last month    Negative: [1] Intermittent fever > 100.0 F (37.8 C) AND [2] lasts > 3 weeks    [1] Fever AND [2] no signs of serious infection or localizing symptoms (all other triage questions negative)    Answer Assessment - Initial Assessment Questions  1. TEMPERATURE: \"What is the most recent temperature?\"  \"How was it measured?\"       99.1 tongue and was calling to report to .States she feels like she is burning from the inside out.Had to recheck it and gave error. 98.6 x2, she feels like she is burning  2. ONSET: \"When did the fever start?\"       Started yesterday at 3 pm. And has felt that way the whole time  3. SYMPTOMS: \"Do you have any other symptoms besides the fever?\"  (e.g., colds, headache, sore throat, earache, cough, rash, diarrhea, vomiting, abdominal pain)      Nausea started today about hour ago  4. CAUSE: If there are " "no symptoms, ask: \"What do you think is causing the fever?\"       don't know, but her white count was low the last time.  5. CONTACTS: \"Does anyone else in the family have an infection?\"      No,grandson is stuffed up like a cold, and a little cough  6. TREATMENT: \"What have you done so far to treat this fever?\" (e.g., medications)      Yes took Tylenol a couple hours ago  7. IMMUNOCOMPROMISE: \"Do you have of the following: diabetes, HIV positive, splenectomy, cancer chemotherapy, chronic steroid treatment, transplant patient, etc.\"      no  8. PREGNANCY: \"Is there any chance you are pregnant?\" \"When was your last menstrual period?\"      no  9. TRAVEL: \"Have you traveled out of the country in the last month?\" (e.g., travel history, exposures)      No    Protocols used: FEVER-A-AH      "

## 2020-05-26 ENCOUNTER — OFFICE VISIT (OUTPATIENT)
Dept: FAMILY MEDICINE | Facility: OTHER | Age: 80
End: 2020-05-26
Attending: NURSE PRACTITIONER
Payer: MEDICARE

## 2020-05-26 ENCOUNTER — NURSE TRIAGE (OUTPATIENT)
Dept: FAMILY MEDICINE | Facility: OTHER | Age: 80
End: 2020-05-26

## 2020-05-26 DIAGNOSIS — R05.9 COUGH: Primary | ICD-10-CM

## 2020-05-26 PROCEDURE — 87635 SARS-COV-2 COVID-19 AMP PRB: CPT | Mod: ZL | Performed by: NURSE PRACTITIONER

## 2020-05-26 PROCEDURE — 99000 SPECIMEN HANDLING OFFICE-LAB: CPT | Performed by: NURSE PRACTITIONER

## 2020-05-26 PROCEDURE — 99207 ZZC NO CHARGE CURBSIDE PS: CPT

## 2020-05-26 NOTE — TELEPHONE ENCOUNTER
"    Answer Assessment - Initial Assessment Questions  1. COVID-19 DIAGNOSIS: \"Who made your Coronavirus (COVID-19) diagnosis?\" \"Was it confirmed by a positive lab test?\" If not diagnosed by a HCP, ask \"Are there lots of cases (community spread) where you live?\" (See [facility] health department website, if unsure)    * MAJOR community spread: high number of cases; numbers of cases are increasing; many people hospitalized.    * MINOR community spread: low number of cases; not increasing; few or no people hospitalized        2. ONSET: \"When did the COVID-19 symptoms start?\"       Last Thursday  3. WORST SYMPTOM: \"What is your worst symptom?\" (e.g., cough, fever, shortness of breath, muscle aches)      sob  4. COUGH: \"Do you have a cough?\" If so, ask: \"How bad is the cough?\"        no  5. FEVER: \"Do you have a fever?\" If so, ask: \"What is your temperature, how was it measured, and when did it start?\"      no  6. RESPIRATORY STATUS: \"Describe your breathing?\" (e.g., shortness of breath, wheezing, unable to speak)       Sob a little bit of wheezing  7. BETTER-SAME-WORSE: \"Are you getting better, staying the same or getting worse compared to yesterday?\"  If getting worse, ask, \"In what way?\"      SOB is still there  8. HIGH RISK DISEASE: \"Do you have any chronic medical problems?\" (e.g., asthma, heart or lung disease, weak immune system, etc.)      COPD  9. PREGNANCY: \"Is there any chance you are pregnant?\" \"When was your last menstrual period?\"        no  10. OTHER SYMPTOMS: \"Do you have any other symptoms?\"  (e.g., runny nose, headache, sore throat, loss of smell)        headache    Protocols used: CORONAVIRUS (COVID-19) DIAGNOSED OR [id number] 4.22.20      "

## 2020-05-28 LAB
SARS-COV-2 RNA SPEC QL NAA+PROBE: NOT DETECTED
SPECIMEN SOURCE: NORMAL

## 2020-06-02 DIAGNOSIS — F41.9 ANXIETY: ICD-10-CM

## 2020-06-02 NOTE — TELEPHONE ENCOUNTER
paroxetine      Last Written Prescription Date:  02/07/2020  Last Fill Quantity: 90,   # refills: 0  Last Office Visit: 05/26/2020  Future Office visit:    Next 5 appointments (look out 90 days)    Aug 06, 2020  8:00 AM CDT  (Arrive by 7:45 AM)  SHORT with Magaly Sosa MD  [facility] ([facility] [place] ) [address]  [place] [address]  [phone]

## 2020-06-03 RX ORDER — PAROXETINE 40 MG/1
TABLET, FILM COATED ORAL
Qty: 90 TABLET | Refills: 0 | Status: SHIPPED | OUTPATIENT
Start: 2020-06-03 | End: 2020-10-06

## 2020-06-07 NOTE — ANESTHESIA CARE TRANSFER NOTE
Patient: Dinorah Lim    Procedure(s):  LEFT TOTAL KNEE ARTHROPLASTY S/N    Diagnosis: DJD LEFT KNEE  Diagnosis Additional Information: No value filed.    Anesthesia Type:   Spinal, Periph. Nerve Block for postop pain     Note:  Airway :Nasal Cannula  Patient transferred to:PACU  Handoff Report: Identifed the Patient, Identified the Reponsible Provider, Reviewed the pertinent medical history, Discussed the surgical course, Reviewed Intra-OP anesthesia mangement and issues during anesthesia, Set expectations for post-procedure period and Allowed opportunity for questions and acknowledgement of understanding      Vitals: (Last set prior to Anesthesia Care Transfer)    CRNA VITALS  9/9/2019 0839 - 9/9/2019 0911      9/9/2019             Pulse:  58    SpO2:  96 %    Resp Rate (set):  8                Electronically Signed By: RENA Denise CRNA  September 9, 2019  9:11 AM   DC instructions

## 2020-07-22 DIAGNOSIS — R52 PAIN: ICD-10-CM

## 2020-07-22 NOTE — TELEPHONE ENCOUNTER
Norco      Last Written Prescription Date:  05/15/20  Last Fill Quantity: 10,   # refills: 0  Last Office Visit: 05/26/20  Future Office visit:    Next 5 appointments (look out 90 days)    Aug 06, 2020  8:45 AM CDT  (Arrive by 8:30 AM)  SHORT with Magaly Sosa MD  [facility] ([facility] ) [address]  [place] [address]  [phone]           Routing refill request to provider for review/approval because:  Drug not on the [facility], [facility] or [facility] refill protocol or controlled substance

## 2020-07-23 RX ORDER — HYDROCODONE BITARTRATE AND ACETAMINOPHEN 5; 325 MG/1; MG/1
TABLET ORAL
Qty: 10 TABLET | Refills: 0 | Status: SHIPPED | OUTPATIENT
Start: 2020-07-23 | End: 2020-10-07

## 2020-07-23 NOTE — PROGRESS NOTES
Subjective     Dinorah Lim is a 79 year old female who presents to clinic today for the following health issues:    HPI       Hyperlipidemia Follow-Up      Are you regularly taking any medication or supplement to lower your cholesterol?   Yes- zocor    Are you having muscle aches or other side effects that you think could be caused by your cholesterol lowering medication?  No      How many servings of fruits and vegetables do you eat daily?  0-1    On average, how many sweetened beverages do you drink each day (Examples: soda, juice, sweet tea, etc.  Do NOT count diet or artificially sweetened beverages)?   0    How many days per week do you exercise enough to make your heart beat faster? 3 or less    How many minutes a day do you exercise enough to make your heart beat faster? 9 or less    How many days per week do you miss taking your medication? 0    Neutropenia       Duration: few months    Description (location/character/radiation): 3 month followup. Last count 5/5/20 3.8.    Intensity:  0/10    Accompanying signs and symptoms: none    History (similar episodes/previous evaluation): None    Precipitating or alleviating factors: None    Therapies tried and outcome: None     She has lost inches, not pounds and is concerned about this. With the Covid 19 pandemic she has been sewing 6-8 hours daily and not drinking the pop she normally would    Patient Active Problem List   Diagnosis     Osteoarthritis of right hip     Abnormal glucose     Osteoarthritis     Lung density on x-ray     Advanced care planning/counseling discussion     Anxiety     Urticaria     ACP (advance care planning)     Morbid obesity (H)     Palpitations     PVC's (premature ventricular contractions)     Atrial ectopy     PSVT (paroxysmal supraventricular tachycardia) (H)     COPD, mild on 9/9/14     Bilateral carotid artery stenosis at less than 50% on 12/1/16     Mixed hyperlipidemia     On statin therapy     Heart murmur     Aortic valve  insufficiency     Mitral regurgitation     Tricuspid valve insufficiency     Diastolic dysfunction grade 1 on 2/21/19     Hypertriglyceridemia     Status post total left knee replacement     Paresthesias     Other neutropenia (H)     Past Surgical History:   Procedure Laterality Date     ARTHROPLASTY KNEE Left 9/9/2019    Procedure: LEFT TOTAL KNEE ARTHROPLASTY S/N;  Surgeon: Trevor Alonzo MD;  Location: [facility] OR     ARTHROSCOPY KNEE Left 3/21/2019    Procedure: LEFT  KNEE ARTHROSCOPY, partial medial menisectomy;  Surgeon: Esteban Wills MD;  Location: [facility] OR     C TOTAL KNEE ARTHROPLASTY Left 09/09/2019    Dr Alonzo     CLOSED REDUCTION WRIST Right 1952 x 2    long arm cast (same time as elbow fx)     CLOSED RX ELBOW DISLOCATION Right 1952    CR/long cast of fracture     HC INJ EPIDURAL LUMBAR/SACRAL W/WO CONTRAST Bilateral 5/2013    facet injections; prev 2012     LAPAROSCOPIC CHOLECYSTECTOMY N/A 1/4/2015    Procedure: LAPAROSCOPIC CHOLECYSTECTOMY;  Surgeon: Brittney العلي MD;  Location: [facility] OR     TONSILLECTOMY         Social History     Tobacco Use     Smoking status: Never Smoker     Smokeless tobacco: Never Used   Substance Use Topics     Alcohol use: No     Family History   Problem Relation Age of Onset     Heart Disease Brother         atrial fibrillation     Atrial fibrillation Brother      Other - See Comments Mother         emphysema     Heart Disease Father 92        CHF     Cancer Paternal Grandfather         lung cancer     Cancer Maternal Uncle         lung cancer     Chronic Obstructive Pulmonary Disease Daughter      Emphysema Daughter      Other - See Comments Daughter         benign breast lesion     Esophagitis Daughter          Current Outpatient Medications   Medication Sig Dispense Refill     clonazePAM (KLONOPIN) 1 MG tablet TAKE 1/4 TO 1/2 (ONE-FOURTH TO ONE-HALF) TABLET BY MOUTH EVERY 8 HOURS AS NEEDED 45 tablet 0     FISH OIL Take 1 capsule by mouth daily         "HYDROcodone-acetaminophen (NORCO) 5-325 MG tablet TAKE 1 TABLET BY MOUTH EVERY 6 HOURS AS NEEDED FOR SEVERE PAIN 10 tablet 0     ipratropium - albuterol 0.5 mg/2.5 mg/3 mL (DUONEB) 0.5-2.5 (3) MG/3ML neb solution Take 1 vial (3 mLs) by nebulization every 6 hours as needed for shortness of breath / dyspnea or wheezing 1 Box 1     PARoxetine (PAXIL) 40 MG tablet Take 1 tablet by mouth once daily 90 tablet 0     simvastatin (ZOCOR) 40 MG tablet Take 1 tablet (40 mg) by mouth At Bedtime 90 tablet 0     VITAMIN D, CHOLECALCIFEROL, PO Take 2,000 Units by mouth daily       Calcium-Magnesium-Vitamin D (CALCIUM 1200+D3 PO) Take by mouth daily       Allergies   Allergen Reactions     Chocolate Hives     Lemon Flavor Hives     Lime [Calcium Oxysulfide] Hives     Metal [Staples]      Orange Fruit [Citrus] Hives     Strawberry Hives     Adhesive Tape      Band-aids     Codeine Hives     Patient can tolerate oxycodone & dilaudid     Decadron [Dexamethasone] Hives     Erythromycin Hives     ERYTHROMYCIN BASE     Grapefruit [Extra Strength Grapefruit]      GRAPEFRUIT     Morphine Hives     Pt. Reports had hives     Penicillins Hives     Ranitidine GI Disturbance     Sulfa Drugs      SULFONAMIDE ANTIBIOTICS      Tomato      Xyzal [Levocetirizine] Hives     BP Readings from Last 3 Encounters:   08/06/20 128/74   03/18/20 110/74   03/09/20 106/56    Wt Readings from Last 3 Encounters:   08/06/20 94.3 kg (208 lb)   05/05/20 96.6 kg (213 lb)   03/09/20 93 kg (205 lb)                    Reviewed and updated as needed this visit by Provider         Review of Systems   Constitutional, HEENT, cardiovascular, pulmonary, gi and gu systems are negative, except as otherwise noted.      Objective    /74   Pulse 64   Temp 97.8  F (36.6  C) (Tympanic)   Resp 19   Ht 1.664 m (5' 5.5\")   Wt 94.3 kg (208 lb)   SpO2 95%   BMI 34.09 kg/m    Body mass index is 34.09 kg/m .  Physical Exam   GENERAL: healthy, alert and no distress  NECK: no " "adenopathy, no asymmetry, masses, or scars and thyroid normal to palpation  RESP: lungs clear to auscultation - no rales, rhonchi or wheezes  CV: regular rate and rhythm, normal S1 S2, no S3 or S4, no murmur, click or rub, no peripheral edema and peripheral pulses strong, no carotid bruits  ABDOMEN: soft, nontender, no hepatosplenomegaly, no masses and bowel sounds normal  MS: no gross musculoskeletal defects noted, no edema  NEURO: Normal strength and tone, mentation intact and speech normal  PSYCH: mentation appears normal, affect normal/bright            Assessment & Plan     1. Abnormal glucose  Check lab today  - Hemoglobin A1c; Standing    2. Mixed hyperlipidemia  Due for lab   - Lipid Profile; Standing    3. Other neutropenia (H)  Due for lab to recheck   - CBC with platelets and differential; Standing  - Comprehensive metabolic panel; Standing    4. Colon cancer screening  Discussed options, stool guiac was done last year. We decided to go with Cologuard testing for her and forms are signed     5. Change in weight  Check TSH today. This is most likely due to her sewing and not eating as much but will check labs to evaluate further. Weight is fairly stable though she is down 4 #. She notes she is down inches   - TSH with free T4 reflex     BMI:   Estimated body mass index is 34.09 kg/m  as calculated from the following:    Height as of this encounter: 1.664 m (5' 5.5\").    Weight as of this encounter: 94.3 kg (208 lb).   Weight management plan: Discussed healthy diet and exercise guidelines            Return in about 3 months (around 11/6/2020) for BP Recheck, Lab Work,  neutropenia.    Magaly Sosa MD  [facility] [place]  "

## 2020-07-29 ENCOUNTER — TRANSFERRED RECORDS (OUTPATIENT)
Dept: HEALTH INFORMATION MANAGEMENT | Facility: CLINIC | Age: 80
End: 2020-07-29

## 2020-08-04 PROBLEM — D70.8 OTHER NEUTROPENIA (H): Status: ACTIVE | Noted: 2020-08-04

## 2020-08-06 ENCOUNTER — OFFICE VISIT (OUTPATIENT)
Dept: FAMILY MEDICINE | Facility: OTHER | Age: 80
End: 2020-08-06
Attending: FAMILY MEDICINE
Payer: MEDICARE

## 2020-08-06 VITALS
DIASTOLIC BLOOD PRESSURE: 74 MMHG | TEMPERATURE: 97.8 F | RESPIRATION RATE: 19 BRPM | BODY MASS INDEX: 33.43 KG/M2 | WEIGHT: 208 LBS | HEIGHT: 66 IN | SYSTOLIC BLOOD PRESSURE: 128 MMHG | HEART RATE: 64 BPM | OXYGEN SATURATION: 95 %

## 2020-08-06 DIAGNOSIS — Z12.11 COLON CANCER SCREENING: ICD-10-CM

## 2020-08-06 DIAGNOSIS — D70.8 OTHER NEUTROPENIA (H): ICD-10-CM

## 2020-08-06 DIAGNOSIS — R68.89 CHANGE IN WEIGHT: ICD-10-CM

## 2020-08-06 DIAGNOSIS — E78.2 MIXED HYPERLIPIDEMIA: ICD-10-CM

## 2020-08-06 DIAGNOSIS — R73.09 ABNORMAL GLUCOSE: Primary | ICD-10-CM

## 2020-08-06 DIAGNOSIS — R73.09 ABNORMAL GLUCOSE: ICD-10-CM

## 2020-08-06 LAB
ALBUMIN SERPL-MCNC: 3.7 G/DL (ref 3.4–5)
ALP SERPL-CCNC: 98 U/L (ref 40–150)
ALT SERPL W P-5'-P-CCNC: 19 U/L (ref 0–50)
ANION GAP SERPL CALCULATED.3IONS-SCNC: 2 MMOL/L (ref 3–14)
AST SERPL W P-5'-P-CCNC: 11 U/L (ref 0–45)
BASOPHILS # BLD AUTO: 0 10E9/L (ref 0–0.2)
BASOPHILS NFR BLD AUTO: 0.2 %
BILIRUB SERPL-MCNC: 0.8 MG/DL (ref 0.2–1.3)
BUN SERPL-MCNC: 15 MG/DL (ref 7–30)
CALCIUM SERPL-MCNC: 9.4 MG/DL (ref 8.5–10.1)
CHLORIDE SERPL-SCNC: 109 MMOL/L (ref 94–109)
CHOLEST SERPL-MCNC: 141 MG/DL
CO2 SERPL-SCNC: 29 MMOL/L (ref 20–32)
CREAT SERPL-MCNC: 0.86 MG/DL (ref 0.52–1.04)
DIFFERENTIAL METHOD BLD: ABNORMAL
EOSINOPHIL # BLD AUTO: 0 10E9/L (ref 0–0.7)
EOSINOPHIL NFR BLD AUTO: 0.7 %
ERYTHROCYTE [DISTWIDTH] IN BLOOD BY AUTOMATED COUNT: 13.7 % (ref 10–15)
EST. AVERAGE GLUCOSE BLD GHB EST-MCNC: 103 MG/DL
GFR SERPL CREATININE-BSD FRML MDRD: 65 ML/MIN/{1.73_M2}
GLUCOSE SERPL-MCNC: 105 MG/DL (ref 70–99)
HBA1C MFR BLD: 5.2 % (ref 0–5.6)
HCT VFR BLD AUTO: 41.3 % (ref 35–47)
HDLC SERPL-MCNC: 63 MG/DL
HGB BLD-MCNC: 13.4 G/DL (ref 11.7–15.7)
IMM GRANULOCYTES # BLD: 0 10E9/L (ref 0–0.4)
IMM GRANULOCYTES NFR BLD: 0.2 %
LDLC SERPL CALC-MCNC: 61 MG/DL
LYMPHOCYTES # BLD AUTO: 1.5 10E9/L (ref 0.8–5.3)
LYMPHOCYTES NFR BLD AUTO: 33.6 %
MCH RBC QN AUTO: 29.2 PG (ref 26.5–33)
MCHC RBC AUTO-ENTMCNC: 32.4 G/DL (ref 31.5–36.5)
MCV RBC AUTO: 90 FL (ref 78–100)
MONOCYTES # BLD AUTO: 0.4 10E9/L (ref 0–1.3)
MONOCYTES NFR BLD AUTO: 9 %
NEUTROPHILS # BLD AUTO: 2.4 10E9/L (ref 1.6–8.3)
NEUTROPHILS NFR BLD AUTO: 56.3 %
NONHDLC SERPL-MCNC: 78 MG/DL
NRBC # BLD AUTO: 0 10*3/UL
NRBC BLD AUTO-RTO: 0 /100
PLATELET # BLD AUTO: 148 10E9/L (ref 150–450)
POTASSIUM SERPL-SCNC: 4.2 MMOL/L (ref 3.4–5.3)
PROT SERPL-MCNC: 7.1 G/DL (ref 6.8–8.8)
RBC # BLD AUTO: 4.59 10E12/L (ref 3.8–5.2)
SODIUM SERPL-SCNC: 140 MMOL/L (ref 133–144)
TRIGL SERPL-MCNC: 83 MG/DL
TSH SERPL DL<=0.005 MIU/L-ACNC: 0.5 MU/L (ref 0.4–4)
WBC # BLD AUTO: 4.3 10E9/L (ref 4–11)

## 2020-08-06 PROCEDURE — 80053 COMPREHEN METABOLIC PANEL: CPT | Mod: ZL | Performed by: FAMILY MEDICINE

## 2020-08-06 PROCEDURE — 80061 LIPID PANEL: CPT | Mod: ZL | Performed by: FAMILY MEDICINE

## 2020-08-06 PROCEDURE — 83036 HEMOGLOBIN GLYCOSYLATED A1C: CPT | Mod: ZL | Performed by: FAMILY MEDICINE

## 2020-08-06 PROCEDURE — 99214 OFFICE O/P EST MOD 30 MIN: CPT | Performed by: FAMILY MEDICINE

## 2020-08-06 PROCEDURE — 85025 COMPLETE CBC W/AUTO DIFF WBC: CPT | Mod: ZL | Performed by: FAMILY MEDICINE

## 2020-08-06 PROCEDURE — 40000788 ZZHCL STATISTIC ESTIMATED AVERAGE GLUCOSE: Performed by: FAMILY MEDICINE

## 2020-08-06 PROCEDURE — 36415 COLL VENOUS BLD VENIPUNCTURE: CPT | Mod: ZL | Performed by: FAMILY MEDICINE

## 2020-08-06 PROCEDURE — 84443 ASSAY THYROID STIM HORMONE: CPT | Mod: ZL | Performed by: FAMILY MEDICINE

## 2020-08-06 PROCEDURE — G0463 HOSPITAL OUTPT CLINIC VISIT: HCPCS

## 2020-08-06 ASSESSMENT — ANXIETY QUESTIONNAIRES
GAD7 TOTAL SCORE: 4
5. BEING SO RESTLESS THAT IT IS HARD TO SIT STILL: NOT AT ALL
3. WORRYING TOO MUCH ABOUT DIFFERENT THINGS: SEVERAL DAYS
6. BECOMING EASILY ANNOYED OR IRRITABLE: SEVERAL DAYS
IF YOU CHECKED OFF ANY PROBLEMS ON THIS QUESTIONNAIRE, HOW DIFFICULT HAVE THESE PROBLEMS MADE IT FOR YOU TO DO YOUR WORK, TAKE CARE OF THINGS AT HOME, OR GET ALONG WITH OTHER PEOPLE: SOMEWHAT DIFFICULT
7. FEELING AFRAID AS IF SOMETHING AWFUL MIGHT HAPPEN: NOT AT ALL
1. FEELING NERVOUS, ANXIOUS, OR ON EDGE: SEVERAL DAYS
2. NOT BEING ABLE TO STOP OR CONTROL WORRYING: SEVERAL DAYS

## 2020-08-06 ASSESSMENT — PATIENT HEALTH QUESTIONNAIRE - PHQ9
SUM OF ALL RESPONSES TO PHQ QUESTIONS 1-9: 2
5. POOR APPETITE OR OVEREATING: NOT AT ALL

## 2020-08-06 ASSESSMENT — PAIN SCALES - GENERAL: PAINLEVEL: NO PAIN (0)

## 2020-08-06 ASSESSMENT — MIFFLIN-ST. JEOR: SCORE: 1437.29

## 2020-08-06 NOTE — NURSING NOTE
"Chief Complaint   Patient presents with     Lipids       Initial /74   Pulse 64   Temp 97.8  F (36.6  C) (Tympanic)   Resp 19   Ht 1.664 m (5' 5.5\")   Wt 94.3 kg (208 lb)   SpO2 95%   BMI 34.09 kg/m   Estimated body mass index is 34.09 kg/m  as calculated from the following:    Height as of this encounter: 1.664 m (5' 5.5\").    Weight as of this encounter: 94.3 kg (208 lb).  Medication Reconciliation: complete  Aditi Saleh LPN    "

## 2020-08-07 ASSESSMENT — ANXIETY QUESTIONNAIRES: GAD7 TOTAL SCORE: 4

## 2020-08-10 DIAGNOSIS — F41.9 ANXIETY: Chronic | ICD-10-CM

## 2020-08-10 NOTE — TELEPHONE ENCOUNTER
klonopin      Last Written Prescription Date:  3/19/20  Last Fill Quantity: 45,   # refills: 0  Last Office Visit: /6/20  Future Office visit:       Routing refill request to provider for review/approval because:  Drug not on the [facility], [facility] or [facility] refill protocol or controlled substance

## 2020-08-11 RX ORDER — CLONAZEPAM 1 MG/1
TABLET ORAL
Qty: 45 TABLET | Refills: 0 | Status: SHIPPED | OUTPATIENT
Start: 2020-08-11 | End: 2020-12-22

## 2020-08-25 ENCOUNTER — TRANSFERRED RECORDS (OUTPATIENT)
Dept: HEALTH INFORMATION MANAGEMENT | Facility: CLINIC | Age: 80
End: 2020-08-25

## 2020-08-25 LAB — COLOGUARD-ABSTRACT: NEGATIVE

## 2020-09-01 ENCOUNTER — TELEPHONE (OUTPATIENT)
Dept: FAMILY MEDICINE | Facility: OTHER | Age: 80
End: 2020-09-01

## 2020-09-30 ENCOUNTER — TRANSFERRED RECORDS (OUTPATIENT)
Dept: HEALTH INFORMATION MANAGEMENT | Facility: CLINIC | Age: 80
End: 2020-09-30

## 2020-10-05 ENCOUNTER — NURSE TRIAGE (OUTPATIENT)
Dept: FAMILY MEDICINE | Facility: OTHER | Age: 80
End: 2020-10-05

## 2020-10-05 ENCOUNTER — OFFICE VISIT (OUTPATIENT)
Dept: FAMILY MEDICINE | Facility: OTHER | Age: 80
End: 2020-10-05
Attending: FAMILY MEDICINE
Payer: MEDICARE

## 2020-10-05 DIAGNOSIS — Z20.822 EXPOSURE TO 2019 NOVEL CORONAVIRUS: Primary | ICD-10-CM

## 2020-10-05 DIAGNOSIS — F41.9 ANXIETY: ICD-10-CM

## 2020-10-05 PROCEDURE — 99207 PR NO CHARGE NURSE ONLY: CPT

## 2020-10-05 PROCEDURE — U0003 INFECTIOUS AGENT DETECTION BY NUCLEIC ACID (DNA OR RNA); SEVERE ACUTE RESPIRATORY SYNDROME CORONAVIRUS 2 (SARS-COV-2) (CORONAVIRUS DISEASE [COVID-19]), AMPLIFIED PROBE TECHNIQUE, MAKING USE OF HIGH THROUGHPUT TECHNOLOGIES AS DESCRIBED BY CMS-2020-01-R: HCPCS | Mod: ZL | Performed by: FAMILY MEDICINE

## 2020-10-05 NOTE — TELEPHONE ENCOUNTER
Pt called, requesting covid testing. Reports headache and sore throat that started a few days ago. Denies fever, cough. Scheduled for curbside testing. Advised to quarantine per recommendations from [facility]/CDC, symptomatic treatment, call back with change or worsening in symptoms. Pt verbalizes understanding.       Reason for Disposition    [1] COVID-19 infection suspected by caller or triager AND [2] mild symptoms (cough, fever, or others) AND [3] no complications or SOB    Additional Information    Negative: SEVERE difficulty breathing (e.g., struggling for each breath, speaks in single words)    Negative: Difficult to awaken or acting confused (e.g., disoriented, slurred speech)    Negative: Bluish (or gray) lips or face now    Negative: Shock suspected (e.g., cold/pale/clammy skin, too weak to stand, low BP, rapid pulse)    Negative: Sounds like a life-threatening emergency to the triager    Negative: [1] COVID-19 exposure AND [2] no symptoms    Negative: COVID-19 and Breastfeeding, questions about    Negative: [1] Adult with possible COVID-19 symptoms AND [2] triager concerned about severity of symptoms or other causes    Negative: SEVERE or constant chest pain or pressure (Exception: mild central chest pain, present only when coughing)    Negative: MODERATE difficulty breathing (e.g., speaks in phrases, SOB even at rest, pulse 100-120)    Negative: Patient sounds very sick or weak to the triager    Negative: MILD difficulty breathing (e.g., minimal/no SOB at rest, SOB with walking, pulse <100)    Negative: Chest pain or pressure    Negative: Fever > 103 F (39.4 C)    Negative: [1] Fever > 101 F (38.3 C) AND [2] age > 60    Negative: [1] Fever > 100.0 F (37.8 C) AND [2] bedridden (e.g., nursing home patient, CVA, chronic illness, recovering from surgery)    Negative: HIGH RISK patient (e.g., age > 64 years, diabetes, heart or lung disease, weak immune system) (Exception: Has already been evaluated by healthcare  "provider and has no new or worsening symptoms)    Negative: Fever present > 3 days (72 hours)    Negative: [1] Fever returns after gone for over 24 hours AND [2] symptoms worse or not improved    Negative: [1] Continuous (nonstop) coughing interferes with work or school AND [2] no improvement using cough treatment per protocol    Answer Assessment - Initial Assessment Questions  1. COVID-19 DIAGNOSIS: \"Who made your Coronavirus (COVID-19) diagnosis?\" \"Was it confirmed by a positive lab test?\" If not diagnosed by a HCP, ask \"Are there lots of cases (community spread) where you live?\" (See public health department website, if unsure)      Not confirmed  2. ONSET: \"When did the COVID-19 symptoms start?\"       A few days ago  3. WORST SYMPTOM: \"What is your worst symptom?\" (e.g., cough, fever, shortness of breath, muscle aches)      Headache and sore throat  4. COUGH: \"Do you have a cough?\" If so, ask: \"How bad is the cough?\"        no  5. FEVER: \"Do you have a fever?\" If so, ask: \"What is your temperature, how was it measured, and when did it start?\"      no  6. RESPIRATORY STATUS: \"Describe your breathing?\" (e.g., shortness of breath, wheezing, unable to speak)       no  7. BETTER-SAME-WORSE: \"Are you getting better, staying the same or getting worse compared to yesterday?\"  If getting worse, ask, \"In what way?\"      same  8. HIGH RISK DISEASE: \"Do you have any chronic medical problems?\" (e.g., asthma, heart or lung disease, weak immune system, etc.)      COPD  9. PREGNANCY: \"Is there any chance you are pregnant?\" \"When was your last menstrual period?\"      no  10. OTHER SYMPTOMS: \"Do you have any other symptoms?\"  (e.g., chills, fatigue, headache, loss of smell or taste, muscle pain, sore throat)        Headache, sore throat    Protocols used: CORONAVIRUS (COVID-19) DIAGNOSED OR [id number] 8.4.20      "

## 2020-10-06 RX ORDER — PAROXETINE 40 MG/1
TABLET, FILM COATED ORAL
Qty: 90 TABLET | Refills: 0 | Status: SHIPPED | OUTPATIENT
Start: 2020-10-06 | End: 2021-01-26

## 2020-10-06 NOTE — TELEPHONE ENCOUNTER
paxil      Last Written Prescription Date:  6/3/2020  Last Fill Quantity: 90,   # refills: 0  Last Office Visit: 8/6/2020  Future Office visit:    Next 5 appointments (look out 90 days)    Oct 26, 2020  3:00 PM  (Arrive by 2:45 PM)  SHORT with Magaly Sosa MD  [facility] [place] ([facility] ) [address]  [place] [address]  [phone]   Nov 10, 2020  9:15 AM  (Arrive by 9:00 AM)  Office Visit with Magaly Sosa MD  [facility] [place] ([facility] [place] ) [address]  [place] [address]  [phone]

## 2020-10-07 DIAGNOSIS — R52 PAIN: ICD-10-CM

## 2020-10-07 LAB
SARS-COV-2 RNA SPEC QL NAA+PROBE: NOT DETECTED
SPECIMEN SOURCE: NORMAL

## 2020-10-07 RX ORDER — HYDROCODONE BITARTRATE AND ACETAMINOPHEN 5; 325 MG/1; MG/1
TABLET ORAL
Qty: 10 TABLET | Refills: 0 | Status: SHIPPED | OUTPATIENT
Start: 2020-10-07 | End: 2020-11-30

## 2020-10-07 NOTE — TELEPHONE ENCOUNTER
Norco      Last Written Prescription Date:  07/23/20  Last Fill Quantity: 10,   # refills: 0  Last Office Visit: 10/05/20  Future Office visit:    Next 5 appointments (look out 90 days)    Oct 26, 2020  3:00 PM  (Arrive by 2:45 PM)  SHORT with Magaly Sosa MD  [facility] ([facility] ) [address]  [facility] [address]  [phone]   Nov 10, 2020  9:15 AM  (Arrive by 9:00 AM)  Office Visit with Magaly Sosa MD  [facility] [address]  [facility] [address]  [phone]           Routing refill request to provider for review/approval because:  Medication is reported/historical

## 2020-10-13 NOTE — PROGRESS NOTES
"Subjective     Dinorah Lim is a 79 year old female who presents to clinic today for the following health issues:    HPI         Concern - follow up Covid test  Onset: since 10/5/2020  Description: follow up negative test  Intensity: {.:195032}  Progression of Symptoms:  {.:718019}  Accompanying Signs & Symptoms: ***  Previous history of similar problem: ***  Precipitating factors:        Worsened by: ***  Alleviating factors:        Improved by: ***  Therapies tried and outcome: {NONE DEFAULTED:397567::\" none \"}  {additonal problems for provider to add (Optional):866877}    Review of Systems   {ROS COMP (Optional):028862}      Objective    There were no vitals taken for this visit.  There is no height or weight on file to calculate BMI.  Physical Exam   {Exam List (Optional):198064}    {Diagnostic Test Results (Optional):364263}        {PROVIDER CHARTING PREFERENCE:416530}    "

## 2020-10-20 ENCOUNTER — TELEPHONE (OUTPATIENT)
Dept: FAMILY MEDICINE | Facility: OTHER | Age: 80
End: 2020-10-20

## 2020-10-20 NOTE — TELEPHONE ENCOUNTER
Called and notified pt that we did get her DMV parking cert form however we had to fill out a new one as hers had white out areas and an area where it was highlighted, the DMV won't accept that. So we did complete a new one for her. This will be up front for .

## 2020-10-20 NOTE — TELEPHONE ENCOUNTER
Form received disability parking certificate  ,placed in provider's wall bin.   After form is completed patient would like form to be given to patient at her appointment on Monday; patient states that if the form is finished prior to her Monday appointment you can put it up front and call her and she will come pick it up.

## 2020-10-26 ENCOUNTER — OFFICE VISIT (OUTPATIENT)
Dept: FAMILY MEDICINE | Facility: OTHER | Age: 80
End: 2020-10-26
Attending: FAMILY MEDICINE
Payer: COMMERCIAL

## 2020-10-26 VITALS
HEART RATE: 75 BPM | BODY MASS INDEX: 35.16 KG/M2 | SYSTOLIC BLOOD PRESSURE: 124 MMHG | TEMPERATURE: 98.1 F | WEIGHT: 211 LBS | RESPIRATION RATE: 19 BRPM | DIASTOLIC BLOOD PRESSURE: 68 MMHG | OXYGEN SATURATION: 96 % | HEIGHT: 65 IN

## 2020-10-26 DIAGNOSIS — I35.1 NONRHEUMATIC AORTIC VALVE INSUFFICIENCY: ICD-10-CM

## 2020-10-26 DIAGNOSIS — I51.89 DIASTOLIC DYSFUNCTION: ICD-10-CM

## 2020-10-26 DIAGNOSIS — M15.0 PRIMARY OSTEOARTHRITIS INVOLVING MULTIPLE JOINTS: ICD-10-CM

## 2020-10-26 DIAGNOSIS — J44.9 COPD, MILD (H): ICD-10-CM

## 2020-10-26 DIAGNOSIS — E78.5 HYPERLIPIDEMIA LDL GOAL <100: Primary | ICD-10-CM

## 2020-10-26 DIAGNOSIS — I47.10 PSVT (PAROXYSMAL SUPRAVENTRICULAR TACHYCARDIA) (H): ICD-10-CM

## 2020-10-26 PROCEDURE — G0463 HOSPITAL OUTPT CLINIC VISIT: HCPCS

## 2020-10-26 PROCEDURE — G0463 HOSPITAL OUTPT CLINIC VISIT: HCPCS | Mod: 25

## 2020-10-26 PROCEDURE — 99214 OFFICE O/P EST MOD 30 MIN: CPT | Performed by: FAMILY MEDICINE

## 2020-10-26 ASSESSMENT — MIFFLIN-ST. JEOR: SCORE: 1437.97

## 2020-10-26 ASSESSMENT — PAIN SCALES - GENERAL: PAINLEVEL: NO PAIN (0)

## 2020-10-26 NOTE — NURSING NOTE
"Chief Complaint   Patient presents with     RECHECK     Flu Shot     declined       Initial /68   Pulse 75   Temp 98.1  F (36.7  C) (Tympanic)   Resp 19   Ht 1.651 m (5' 5\")   Wt 95.7 kg (211 lb)   SpO2 96%   BMI 35.11 kg/m   Estimated body mass index is 35.11 kg/m  as calculated from the following:    Height as of this encounter: 1.651 m (5' 5\").    Weight as of this encounter: 95.7 kg (211 lb).  Medication Reconciliation: complete  Aditi Saleh LPN    "

## 2020-10-27 ENCOUNTER — TELEPHONE (OUTPATIENT)
Dept: FAMILY MEDICINE | Facility: OTHER | Age: 80
End: 2020-10-27

## 2020-10-27 DIAGNOSIS — R73.09 ABNORMAL GLUCOSE: ICD-10-CM

## 2020-10-27 DIAGNOSIS — E78.2 MIXED HYPERLIPIDEMIA: ICD-10-CM

## 2020-10-27 DIAGNOSIS — D70.8 OTHER NEUTROPENIA (H): ICD-10-CM

## 2020-10-27 LAB
ALBUMIN SERPL-MCNC: 3.4 G/DL (ref 3.4–5)
ALP SERPL-CCNC: 107 U/L (ref 40–150)
ALT SERPL W P-5'-P-CCNC: 34 U/L (ref 0–50)
ANION GAP SERPL CALCULATED.3IONS-SCNC: 3 MMOL/L (ref 3–14)
AST SERPL W P-5'-P-CCNC: 21 U/L (ref 0–45)
BASOPHILS # BLD AUTO: 0 10E9/L (ref 0–0.2)
BASOPHILS NFR BLD AUTO: 0.7 %
BILIRUB SERPL-MCNC: 0.6 MG/DL (ref 0.2–1.3)
BUN SERPL-MCNC: 13 MG/DL (ref 7–30)
CALCIUM SERPL-MCNC: 9.1 MG/DL (ref 8.5–10.1)
CHLORIDE SERPL-SCNC: 109 MMOL/L (ref 94–109)
CHOLEST SERPL-MCNC: 162 MG/DL
CO2 SERPL-SCNC: 30 MMOL/L (ref 20–32)
CREAT SERPL-MCNC: 0.9 MG/DL (ref 0.52–1.04)
DIFFERENTIAL METHOD BLD: NORMAL
EOSINOPHIL # BLD AUTO: 0.1 10E9/L (ref 0–0.7)
EOSINOPHIL NFR BLD AUTO: 1.9 %
ERYTHROCYTE [DISTWIDTH] IN BLOOD BY AUTOMATED COUNT: 13.2 % (ref 10–15)
EST. AVERAGE GLUCOSE BLD GHB EST-MCNC: 105 MG/DL
GFR SERPL CREATININE-BSD FRML MDRD: 62 ML/MIN/{1.73_M2}
GLUCOSE SERPL-MCNC: 101 MG/DL (ref 70–99)
HBA1C MFR BLD: 5.3 % (ref 0–5.6)
HCT VFR BLD AUTO: 40.3 % (ref 35–47)
HDLC SERPL-MCNC: 62 MG/DL
HGB BLD-MCNC: 12.9 G/DL (ref 11.7–15.7)
IMM GRANULOCYTES # BLD: 0 10E9/L (ref 0–0.4)
IMM GRANULOCYTES NFR BLD: 0.2 %
LDLC SERPL CALC-MCNC: 83 MG/DL
LYMPHOCYTES # BLD AUTO: 1.7 10E9/L (ref 0.8–5.3)
LYMPHOCYTES NFR BLD AUTO: 41.4 %
MCH RBC QN AUTO: 29.6 PG (ref 26.5–33)
MCHC RBC AUTO-ENTMCNC: 32 G/DL (ref 31.5–36.5)
MCV RBC AUTO: 92 FL (ref 78–100)
MONOCYTES # BLD AUTO: 0.4 10E9/L (ref 0–1.3)
MONOCYTES NFR BLD AUTO: 9.5 %
NEUTROPHILS # BLD AUTO: 1.9 10E9/L (ref 1.6–8.3)
NEUTROPHILS NFR BLD AUTO: 46.3 %
NONHDLC SERPL-MCNC: 100 MG/DL
NRBC # BLD AUTO: 0 10*3/UL
NRBC BLD AUTO-RTO: 0 /100
PLATELET # BLD AUTO: 156 10E9/L (ref 150–450)
POTASSIUM SERPL-SCNC: 4.1 MMOL/L (ref 3.4–5.3)
PROT SERPL-MCNC: 6.6 G/DL (ref 6.8–8.8)
RBC # BLD AUTO: 4.36 10E12/L (ref 3.8–5.2)
SODIUM SERPL-SCNC: 142 MMOL/L (ref 133–144)
TRIGL SERPL-MCNC: 87 MG/DL
WBC # BLD AUTO: 4.2 10E9/L (ref 4–11)

## 2020-10-27 PROCEDURE — 80053 COMPREHEN METABOLIC PANEL: CPT | Mod: ZL | Performed by: FAMILY MEDICINE

## 2020-10-27 PROCEDURE — 85025 COMPLETE CBC W/AUTO DIFF WBC: CPT | Mod: ZL | Performed by: FAMILY MEDICINE

## 2020-10-27 PROCEDURE — 80061 LIPID PANEL: CPT | Mod: ZL | Performed by: FAMILY MEDICINE

## 2020-10-27 PROCEDURE — 999N001182 HC STATISTIC ESTIMATED AVERAGE GLUCOSE: Mod: ZL | Performed by: FAMILY MEDICINE

## 2020-10-27 PROCEDURE — 36415 COLL VENOUS BLD VENIPUNCTURE: CPT | Mod: ZL | Performed by: FAMILY MEDICINE

## 2020-10-27 PROCEDURE — 83036 HEMOGLOBIN GLYCOSYLATED A1C: CPT | Mod: ZL | Performed by: FAMILY MEDICINE

## 2020-10-27 NOTE — TELEPHONE ENCOUNTER
Patient called and would like a copy of her lab results printed and put up front for her to . Thank you

## 2020-10-27 NOTE — TELEPHONE ENCOUNTER
Once labs are reviewed by Dr Magaly Sosa we will call patient and I will print and leave up at the front for .

## 2020-10-29 NOTE — TELEPHONE ENCOUNTER
I wasn't sent any labs yet on her to call on, pt had labs done on 10/27/20. Please advise and I will get patient a copy.

## 2020-10-29 NOTE — TELEPHONE ENCOUNTER
Notified of stable labs, copy printed for patient will leave up at the . Pt notified. I apologized for not getting back to her Tuesday afternoon as I never got lab results.

## 2020-10-29 NOTE — TELEPHONE ENCOUNTER
I didn't receive labs in Epic  Blood counts are normal  chemistrires are normal, blood sugar is borderline, watch treats and carbs in diet. Protein is borderline low, increase protein in diet  Lipids are excellent     A1C is normal at 5.3

## 2020-10-30 ENCOUNTER — OFFICE VISIT (OUTPATIENT)
Dept: FAMILY MEDICINE | Facility: OTHER | Age: 80
End: 2020-10-30
Attending: FAMILY MEDICINE
Payer: MEDICARE

## 2020-10-30 ENCOUNTER — NURSE TRIAGE (OUTPATIENT)
Dept: FAMILY MEDICINE | Facility: OTHER | Age: 80
End: 2020-10-30

## 2020-10-30 DIAGNOSIS — Z20.822 SUSPECTED 2019 NOVEL CORONAVIRUS INFECTION: ICD-10-CM

## 2020-10-30 DIAGNOSIS — Z20.822 SUSPECTED 2019 NOVEL CORONAVIRUS INFECTION: Primary | ICD-10-CM

## 2020-10-30 PROCEDURE — U0003 INFECTIOUS AGENT DETECTION BY NUCLEIC ACID (DNA OR RNA); SEVERE ACUTE RESPIRATORY SYNDROME CORONAVIRUS 2 (SARS-COV-2) (CORONAVIRUS DISEASE [COVID-19]), AMPLIFIED PROBE TECHNIQUE, MAKING USE OF HIGH THROUGHPUT TECHNOLOGIES AS DESCRIBED BY CMS-2020-01-R: HCPCS | Mod: ZL | Performed by: FAMILY MEDICINE

## 2020-10-30 NOTE — TELEPHONE ENCOUNTER
Reason for Disposition    [1] COVID-19 infection suspected by caller or triager AND [2] mild symptoms (cough, fever, or others) AND [3] no complications or SOB    Additional Information    Negative: SEVERE difficulty breathing (e.g., struggling for each breath, speaks in single words)    Negative: Difficult to awaken or acting confused (e.g., disoriented, slurred speech)    Negative: Bluish (or gray) lips or face now    Negative: Shock suspected (e.g., cold/pale/clammy skin, too weak to stand, low BP, rapid pulse)    Negative: Sounds like a life-threatening emergency to the triager    Negative: [1] COVID-19 exposure AND [2] no symptoms    Negative: COVID-19 and Breastfeeding, questions about    Negative: [1] Adult with possible COVID-19 symptoms AND [2] triager concerned about severity of symptoms or other causes    Negative: SEVERE or constant chest pain or pressure (Exception: mild central chest pain, present only when coughing)    Negative: MODERATE difficulty breathing (e.g., speaks in phrases, SOB even at rest, pulse 100-120)    Negative: Patient sounds very sick or weak to the triager    Negative: MILD difficulty breathing (e.g., minimal/no SOB at rest, SOB with walking, pulse <100)    Negative: Chest pain or pressure    Negative: Fever > 103 F (39.4 C)    Negative: [1] Fever > 101 F (38.3 C) AND [2] age > 60    Negative: [1] Fever > 100.0 F (37.8 C) AND [2] bedridden (e.g., nursing home patient, CVA, chronic illness, recovering from surgery)    Negative: HIGH RISK patient (e.g., age > 64 years, diabetes, heart or lung disease, weak immune system) (Exception: Has already been evaluated by healthcare provider and has no new or worsening symptoms)    Negative: Fever present > 3 days (72 hours)    Negative: [1] Fever returns after gone for over 24 hours AND [2] symptoms worse or not improved    Negative: [1] Continuous (nonstop) coughing interferes with work or school AND [2] no improvement using cough  "treatment per protocol    Answer Assessment - Initial Assessment Questions  1. COVID-19 DIAGNOSIS: \"Who made your Coronavirus (COVID-19) diagnosis?\" \"Was it confirmed by a positive lab test?\" If not diagnosed by a HCP, ask \"Are there lots of cases (community spread) where you live?\" (See [facility] health department website, if unsure)      Not diagnosed  2. ONSET: \"When did the COVID-19 symptoms start?\"       Tuesday night  3. WORST SYMPTOM: \"What is your worst symptom?\" (e.g., cough, fever, shortness of breath, muscle aches)      Sore throat  4. COUGH: \"Do you have a cough?\" If so, ask: \"How bad is the cough?\"        Yes. mild  5. FEVER: \"Do you have a fever?\" If so, ask: \"What is your temperature, how was it measured, and when did it start?\"      No  6. RESPIRATORY STATUS: \"Describe your breathing?\" (e.g., shortness of breath, wheezing, unable to speak)       ok  7. BETTER-SAME-WORSE: \"Are you getting better, staying the same or getting worse compared to yesterday?\"  If getting worse, ask, \"In what way?\"      same  8. HIGH RISK DISEASE: \"Do you have any chronic medical problems?\" (e.g., asthma, heart or lung disease, weak immune system, etc.)      Heart disease  9. PREGNANCY: \"Is there any chance you are pregnant?\" \"When was your last menstrual period?\"      No  10. OTHER SYMPTOMS: \"Do you have any other symptoms?\"  (e.g., chills, fatigue, headache, loss of smell or taste, muscle pain, sore throat)        Headache, fatigue    Protocols used: CORONAVIRUS (COVID-19) DIAGNOSED OR [id number]-X-CQ 8.4.20      "

## 2020-11-02 ENCOUNTER — TELEPHONE (OUTPATIENT)
Dept: EMERGENCY MEDICINE | Facility: CLINIC | Age: 80
End: 2020-11-02

## 2020-11-02 LAB
SARS-COV-2 RNA SPEC QL NAA+PROBE: ABNORMAL
SPECIMEN SOURCE: ABNORMAL

## 2020-11-02 NOTE — TELEPHONE ENCOUNTER
"Coronavirus (COVID-19) Notification    Caller Name (Patient, parent, daughter/son, grandparent, etc)  Patient     Reason for call  Notify of Positive Coronavirus (COVID-19) lab results, assess symptoms,  review [facility] recommendations    Lab Result    Lab test:  2019-nCoV rRt-PCR or SARS-CoV-2 PCR    Oropharyngeal AND/OR nasopharyngeal swabs is POSITIVE for 2019-nCoV RNA/SARS-COV-2 PCR (COVID-19 virus)    RN Recommendations/Instructions per [facility] Coronavirus COVID-19 recommendations    Brief introduction script  Introduce self then review script:  \"I am calling on behalf of [employer].  We were notified that your Coronavirus test (COVID-19) for was POSITIVE for the virus.  I have some information to relay to you but first I wanted to mention that the [place] Dept of Health will be contacting you shortly [it's possible MD already called Patient] to talk to you more about how you are feeling and other people you have had contact with who might now also have the virus.  Also, [facility] is Partnering with the [facility] for Covid-19 research, you may be contacted directly by research staff.\"    Assessment (Inquire about Patient's current symptoms)   Assessment   Current Symptoms at time of phone call: (if no symptoms, document No symptoms]  none    Symptoms onset (if applicable)  10/29/20     If at time of call, Patients symptoms hare worsened, the Patient should contact 911 or have someone drive them to Emergency Dept promptly:      If Patient calling 911, inform 911 personal that you have tested positive for the Coronavirus (COVID-19).  Place mask on and await 911 to arrive.    If Emergency Dept, If possible, please have another adult drive you to the Emergency Dept but you need to wear mask when in contact with other people.      Review information with Patient    Your result was positive. This means you have COVID-19 (coronavirus).  We have sent you a letter that reviews the " information that I'll be reviewing with you now.    How can I protect others?    If you have symptoms: stay home and away from others (self-isolate) until:    You've had no fever--and no medicine that reduces fever--for 1 full day (24 hours). And       Your other symptoms have gotten better. For example, your cough or breathing has improved. And     At least 10 days have passed since your symptoms started. (If you've been told by a doctor that you have a weak immune system, wait 20 days.)     If you don't have symptoms: Stay home and away from others (self-isolate) until at least 10 days have passed since your first positive COVID-19 test. (Date test collected)    During this time:    Stay in your own room, including for meals. Use your own bathroom if you can.    Stay away from others in your home. No hugging, kissing or shaking hands. No visitors.     Don't go to work, school or anywhere else.     Clean  high touch  surfaces often (doorknobs, counters, handles, etc.). Use a household cleaning spray or wipes. You'll find a full list on the EPA website at www.epa.gov/pesticide-registration/list-n-disinfectants-use-against-sars-cov-2.     Cover your mouth and nose with a mask, tissue or other face covering to avoid spreading germs.    Wash your hands and face often with soap and water.    Caregivers in these groups are at risk for severe illness due to COVID-19:  o People 65 years and older  o People who live in a nursing home or long-term care facility  o People with chronic disease (lung, heart, cancer, diabetes, kidney, liver, immunologic)  o People who have a weakened immune system, including those who:  - Are in cancer treatment  - Take medicine that weakens the immune system, such as corticosteroids  - Had a bone marrow or organ transplant  - Have an immune deficiency  - Have poorly controlled HIV or AIDS  - Are obese (body mass index of 40 or higher)  - Smoke regularly    Caregivers should wear gloves while  washing dishes, handling laundry and cleaning bedrooms and bathrooms.    Wash and dry laundry with special caution. Don't shake dirty laundry, and use the warmest water setting you can.    If you have a weakened immune system, ask your doctor about other actions you should take.    For more tips, go to www.cdc.gov/coronavirus/2019-ncov/downloads/10Things.pdf.    You should not go back to work until you meet the guidelines above for ending your home isolation. You don't need to be retested for COVID-19 before going back to work--studies show that you won't spread the virus if it's been at least 10 days since your symptoms started (or 20 days, if you have a weak immune system).    Employers: This document serves as formal notice of your employee's medical guidelines for going back to work. They must meet the above guidelines before going back to work in person.    How can I take care of myself?    1. Get lots of rest. Drink extra fluids (unless a doctor has told you not to).    2. Take Tylenol (acetaminophen) for fever or pain. If you have liver or kidney problems, ask your family doctor if it's okay to take Tylenol.     Take either:     650 mg (two 325 mg pills) every 4 to 6 hours, or     1,000 mg (two 500 mg pills) every 8 hours as needed.     Note: Don't take more than 3,000 mg in one day. Acetaminophen is found in many medicines (both prescribed and over-the-counter medicines). Read all labels to be sure you don't take too much.    For children, check the Tylenol bottle for the right dose (based on their age or weight).    3. If you have other health problems (like cancer, heart failure, an organ transplant or severe kidney disease): Call your specialty clinic if you don't feel better in the next 2 days.    4. Know when to call 911: Emergency warning signs include:    Trouble breathing or shortness of breath    Pain or pressure in the chest that doesn't go away    Feeling confused like you haven't felt before, or  not being able to wake up    Bluish-colored lips or face    5. Sign up for [employer]. We know it's scary to hear that you have COVID-19. We want to track your symptoms to make sure you're okay over the next 2 weeks. Please look for an email from [employer]--this is a free, online program that we'll use to keep in touch. To sign up, follow the link in the email. Learn more at [email or web address].    Where can I get more information?    [facility]: [email or web address]    Coronavirus Basics: www.[email or web address].[email or web address].[place]./diseases/coronavirus/basics.html    What to Do If You're Sick: www.cdc.gov/coronavirus/2019-ncov/about/steps-when-sick.html    Ending Home Isolation: www.cdc.gov/coronavirus/2019-ncov/hcp/disposition-in-home-patients.html     Caring for Someone with COVID-19: www.cdc.gov/coronavirus/2019-ncov/if-you-are-sick/care-for-someone.html     [facility] clinical trials (COVID-19 research studies): [email or web address]-clinical-trials     A Positive COVID-19 letter will be sent via [employer] or the mail. (Exception, no letters sent to Presurgerical/Preprocedure Patients)    [Name]  Marvin Rainey RN  [facility] - [facility]  COVID19 Results Team RN  Ph# [phone]

## 2020-11-08 ENCOUNTER — HOSPITAL ENCOUNTER (EMERGENCY)
Facility: HOSPITAL | Age: 80
Discharge: HOME | End: 2020-11-08
Attending: PHYSICIAN ASSISTANT | Admitting: PHYSICIAN ASSISTANT
Payer: MEDICARE

## 2020-11-08 VITALS
HEART RATE: 72 BPM | SYSTOLIC BLOOD PRESSURE: 130 MMHG | TEMPERATURE: 98.1 F | RESPIRATION RATE: 16 BRPM | OXYGEN SATURATION: 98 % | DIASTOLIC BLOOD PRESSURE: 71 MMHG

## 2020-11-08 DIAGNOSIS — L50.9 HIVES: ICD-10-CM

## 2020-11-08 PROCEDURE — 250N000011 HC RX IP 250 OP 636: Performed by: PHYSICIAN ASSISTANT

## 2020-11-08 PROCEDURE — 96372 THER/PROPH/DIAG INJ SC/IM: CPT | Performed by: PHYSICIAN ASSISTANT

## 2020-11-08 PROCEDURE — 99214 OFFICE O/P EST MOD 30 MIN: CPT | Performed by: PHYSICIAN ASSISTANT

## 2020-11-08 PROCEDURE — G0463 HOSPITAL OUTPT CLINIC VISIT: HCPCS | Mod: 25

## 2020-11-08 RX ORDER — DEXAMETHASONE SODIUM PHOSPHATE 10 MG/ML
10 INJECTION, SOLUTION INTRAMUSCULAR; INTRAVENOUS ONCE
Status: COMPLETED | OUTPATIENT
Start: 2020-11-08 | End: 2020-11-08

## 2020-11-08 RX ADMIN — DEXAMETHASONE SODIUM PHOSPHATE 10 MG: 10 INJECTION, SOLUTION INTRAMUSCULAR; INTRAVENOUS at 13:01

## 2020-11-08 ASSESSMENT — ENCOUNTER SYMPTOMS: FEVER: 0

## 2020-11-08 NOTE — ED AVS SNAPSHOT
[facility]  [address]  [place] [address]  Phone: [phone]                                    Dinorah Lim   MRN: [id number]    Department: [facility]   Date of Visit: 11/8/2020           After Visit Summary Signature Page    I have received my discharge instructions, and my questions have been answered. I have discussed any challenges I see with this plan with the nurse or doctor.    ..........................................................................................................................................  Patient/Patient Representative Signature      ..........................................................................................................................................  Patient Representative Print Name and Relationship to Patient    ..................................................               ................................................  Date                                   Time    ..........................................................................................................................................  Reviewed by Signature/Title    ...................................................              ..............................................  Date                                               Time          22EPIC Rev 08/18

## 2020-11-08 NOTE — ED TRIAGE NOTES
Pt presents with a reddened rash to her entire body since yesterday. States that it burns and sometimes makes her feel weak.

## 2020-11-08 NOTE — ED PROVIDER NOTES
"  History     Chief Complaint   Patient presents with     Hives     c/o hives, notes \"I usually come in for a shot\". states hives off and on \"since I was little\". notes tested positive for COVID 8 days ago     HPI  Dinorah Lim is a 80 year old female who presents to urgent care for evaluation of hives on her legs.  Patient states that this started yesterday.  She states that she sometimes gets a burning sensation in her legs.  She states that she has had this occurring since that she was a baby and that she normally comes in and gets an injection of Decadron.  Patient denies any new hygiene products, shortness of breath, fevers, cold symptoms, difficulty speaking, or any other associated symptoms.  Patient tested positive for Covid 19, 9 days ago but states that she was asymptomatic.  Patient states that she did take Benadryl over-the-counter.    Allergies:  Allergies   Allergen Reactions     Chocolate Hives     Lemon Flavor Hives     Lime [Calcium Oxysulfide] Hives     Metal [Staples]      Orange Fruit [Citrus] Hives     Strawberry Hives     Adhesive Tape      Band-aids     Codeine Hives     Patient can tolerate oxycodone & dilaudid     Decadron [Dexamethasone] Hives     Erythromycin Hives     ERYTHROMYCIN BASE     Grapefruit [Extra Strength Grapefruit]      GRAPEFRUIT     Morphine Hives     Pt. Reports had hives     Penicillins Hives     Ranitidine GI Disturbance     Sulfa Drugs      SULFONAMIDE ANTIBIOTICS      Tomato      Xyzal [Levocetirizine] Hives       Problem List:    Patient Active Problem List    Diagnosis Date Noted     Other neutropenia (H) 08/04/2020     Priority: Medium     Paresthesias 02/13/2020     Priority: Medium     Status post total left knee replacement 09/09/2019     Priority: Medium     Aortic valve insufficiency 03/06/2019     Priority: Medium     Mitral regurgitation 03/06/2019     Priority: Medium     Tricuspid valve insufficiency 03/06/2019     Priority: Medium     Diastolic " dysfunction grade 1 on 2/21/19 03/06/2019     Priority: Medium     Hypertriglyceridemia 03/06/2019     Priority: Medium     Palpitations 02/13/2019     Priority: Medium     PVC's (premature ventricular contractions) 02/13/2019     Priority: Medium     Atrial ectopy 02/13/2019     Priority: Medium     PSVT (paroxysmal supraventricular tachycardia) (H) 02/13/2019     Priority: Medium     COPD, mild on 9/9/14 02/13/2019     Priority: Medium     Bilateral carotid artery stenosis at less than 50% on 12/1/16 02/13/2019     Priority: Medium     Mixed hyperlipidemia 02/13/2019     Priority: Medium     On statin therapy 02/13/2019     Priority: Medium     Heart murmur 02/13/2019     Priority: Medium     Morbid obesity (H) 06/01/2017     Priority: Medium     ACP (advance care planning) 04/28/2016     Priority: Medium     Advance Care Planning 4/28/2016: ACP Review of Chart / Resources Provided:  Reviewed chart for advance care plan.  Dinorah Lim has no plan or code status on file. Discussed available resources and provided with information. Confirmed code status reflects current choices pending further ACP discussions.  Confirmed/documented legally designated decision makers.  Added by Aditi Gandhi             Anxiety 06/23/2014     Priority: Medium     Lung density on x-ray 07/23/2013     Priority: Medium     Osteoarthritis 06/14/2012     Priority: Medium     Problem list name updated by automated process. Provider to review       Advanced care planning/counseling discussion 01/13/2012     Priority: Medium     Abnormal glucose 08/01/2011     Priority: Medium     Hgb A1c 5.7 on 1/4/2015  Problem list name updated by automated process. Provider to review       Osteoarthritis of right hip 10/07/2004     Priority: Medium     Urticaria 06/01/2004     Priority: Medium     Problem list name updated by automated process. 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     Obesity, Class II, BMI 35-39.9 1/4/2015     Osteoarthritis of right hip 10/07/2004     Osteoarthrosis 06/14/2012     Otitis externa 10/04/2011     Prediabetes 08/01/2011       Past Surgical History:    Past Surgical History:   Procedure Laterality Date     ARTHROPLASTY KNEE Left 9/9/2019    Procedure: LEFT TOTAL KNEE ARTHROPLASTY S/N;  Surgeon: Trevor Alonzo MD;  Location: [facility]     ARTHROSCOPY KNEE Left 3/21/2019    Procedure: LEFT  KNEE ARTHROSCOPY, partial medial menisectomy;  Surgeon: Esteban Wills MD;  Location: [facility]     C TOTAL KNEE ARTHROPLASTY Left 09/09/2019    Dr Alonzo     CLOSED REDUCTION WRIST Right 1952 x 2    long arm cast (same time as elbow fx)     CLOSED RX ELBOW DISLOCATION Right 1952    CR/long cast of fracture     HC INJ EPIDURAL LUMBAR/SACRAL W/WO CONTRAST Bilateral 5/2013    facet injections; prev 2012     LAPAROSCOPIC CHOLECYSTECTOMY N/A 1/4/2015    Procedure: LAPAROSCOPIC CHOLECYSTECTOMY;  Surgeon: Brittney العلي MD;  Location: [facility] OR     TONSILLECTOMY         Family History:    Family History   Problem Relation Age of Onset     Heart Disease Brother         atrial fibrillation     Atrial fibrillation Brother      Other - See Comments Mother         emphysema     Heart Disease Father 92        CHF     Cancer Paternal Grandfather         lung cancer     Cancer Maternal Uncle         lung cancer     Chronic Obstructive Pulmonary Disease Daughter      Emphysema Daughter      Other - See Comments Daughter         benign breast lesion     Esophagitis Daughter        Social History:  Marital Status:  Single  [1]  Social History     Tobacco Use     Smoking status: Never Smoker     Smokeless tobacco: Never Used   Substance Use Topics     Alcohol use: No     Drug use: No        Medications:         Calcium-Magnesium-Vitamin D (CALCIUM 1200+D3 PO)       clonazePAM (KLONOPIN) 1 MG tablet       FISH OIL       HYDROcodone-acetaminophen (NORCO) 5-325 MG tablet       PARoxetine (PAXIL) 40 MG tablet       simvastatin (ZOCOR) 40 MG tablet       VITAMIN D, CHOLECALCIFEROL, PO       ipratropium - albuterol 0.5 mg/2.5 mg/3 mL (DUONEB) 0.5-2.5 (3) MG/3ML neb solution          Review of Systems   Constitutional: Negative for fever.   Skin: Positive for rash.   All other systems reviewed and are negative.      Physical Exam   BP: 130/71  Pulse: 72  Temp: 98.1  F (36.7  C)  Resp: 16  SpO2: 98 %      Physical Exam  Vitals signs and nursing note reviewed.   Constitutional:       General: She is not in acute distress.     Appearance: Normal appearance. She is not ill-appearing or toxic-appearing.   Cardiovascular:      Rate and Rhythm: Regular rhythm.      Heart sounds: Normal heart sounds.   Pulmonary:      Breath sounds: Normal breath sounds.   Skin:            Comments: Patient has erythematous macular rash present over both lower extremities bilaterally.   Neurological:      Mental Status: She is alert and oriented to person, place, and time.         ED Course        Procedures              Critical Care time:               No results found for this or any previous visit (from the past 24 hour(s)).    Medications   dexamethasone PF (DECADRON) injection 10 mg (has no administration in time range)       Assessments & Plan (with Medical Decision Making)   #1.  Hives    Discussed exam findings with patient.  Patient was given Decadron 10 mg IM in urgent care today.  Patient may continue using over-the-counter antihistamines as directed.  Any difficulty breathing go to emergency department immediately.  Any additional concerns please return  to urgent care or follow-up with primary care provider.  Patient verbalized understanding and agreement of plan.          I have reviewed the nursing notes.    I have reviewed the findings, diagnosis, plan and need for follow up with the patient.      New Prescriptions    No medications on file       Final diagnoses:   Hives       11/8/2020   [facility]     Edward Damon PA-C  11/08/20 0553

## 2020-11-10 ENCOUNTER — HOSPITAL ENCOUNTER (EMERGENCY)
Facility: HOSPITAL | Age: 80
Discharge: HOME | End: 2020-11-10
Attending: NURSE PRACTITIONER | Admitting: NURSE PRACTITIONER
Payer: MEDICARE

## 2020-11-10 VITALS
RESPIRATION RATE: 16 BRPM | DIASTOLIC BLOOD PRESSURE: 60 MMHG | OXYGEN SATURATION: 97 % | SYSTOLIC BLOOD PRESSURE: 111 MMHG | TEMPERATURE: 97.9 F | HEART RATE: 71 BPM

## 2020-11-10 DIAGNOSIS — L50.9 URTICARIA: ICD-10-CM

## 2020-11-10 PROCEDURE — 99214 OFFICE O/P EST MOD 30 MIN: CPT | Performed by: NURSE PRACTITIONER

## 2020-11-10 PROCEDURE — 250N000011 HC RX IP 250 OP 636: Performed by: NURSE PRACTITIONER

## 2020-11-10 PROCEDURE — G0463 HOSPITAL OUTPT CLINIC VISIT: HCPCS | Mod: 25

## 2020-11-10 PROCEDURE — 96372 THER/PROPH/DIAG INJ SC/IM: CPT | Performed by: NURSE PRACTITIONER

## 2020-11-10 RX ORDER — METHYLPREDNISOLONE ACETATE 80 MG/ML
40 INJECTION, SUSPENSION INTRA-ARTICULAR; INTRALESIONAL; INTRAMUSCULAR; SOFT TISSUE ONCE
Status: COMPLETED | OUTPATIENT
Start: 2020-11-10 | End: 2020-11-10

## 2020-11-10 RX ORDER — PREDNISONE 10 MG/1
TABLET ORAL
Qty: 7 TABLET | Refills: 0 | Status: SHIPPED | OUTPATIENT
Start: 2020-11-10 | End: 2020-11-16

## 2020-11-10 RX ADMIN — METHYLPREDNISOLONE ACETATE 40 MG: 80 INJECTION, SUSPENSION INTRA-ARTICULAR; INTRALESIONAL; INTRAMUSCULAR; SOFT TISSUE at 16:51

## 2020-11-10 ASSESSMENT — ENCOUNTER SYMPTOMS
FACIAL SWELLING: 1
DIZZINESS: 0
RHINORRHEA: 0
ACTIVITY CHANGE: 1
VOMITING: 0
NAUSEA: 0
LIGHT-HEADEDNESS: 1
CHILLS: 1
SHORTNESS OF BREATH: 0
FEVER: 1
HEADACHES: 0

## 2020-11-10 NOTE — DISCHARGE INSTRUCTIONS
Avoid hot baths or showers as this may aggravate the rash.  Oatmeal baths.  Cetirizine 20 mg daily ( may take a once daily dose of 20 mg or 10 mg in the morning and 10 mg at bedtime) for 10 to 14 days and then decrease the dose to 10 mg daily on an ongoing basis to prevent allergy flare-ups.  Consider consulting allergist.

## 2020-11-10 NOTE — ED AVS SNAPSHOT
[facility]  [address]  [place] [address]  Phone: [phone]                                    Dinorah Lim   MRN: [id number]    Department: [facility]   Date of Visit: 11/10/2020           After Visit Summary Signature Page    I have received my discharge instructions, and my questions have been answered. I have discussed any challenges I see with this plan with the nurse or doctor.    ..........................................................................................................................................  Patient/Patient Representative Signature      ..........................................................................................................................................  Patient Representative Print Name and Relationship to Patient    ..................................................               ................................................  Date                                   Time    ..........................................................................................................................................  Reviewed by Signature/Title    ...................................................              ..............................................  Date                                               Time          22EPIC Rev 08/18        no known allergies/nka

## 2020-11-10 NOTE — ED PROVIDER NOTES
History     Chief Complaint   Patient presents with     Hives     HPI  Dinorah Lim is a 80 year old female who presents with hives covering her entire body. She was given decadron injection in  two days ago and states she can not take that shot again as it made her condition worse. This condition has been ongoing for four days and is accompanied with fevers, and chills. The rash is on her head and she has some facial swelling, mainly in her forehead and around her eyes. This attributes to her light headedness when she stands up..  She gets hives like this once a year. She has multiple allergies. She saw an allergist when she was young, and will speak with her PCP about seeing an allergist again. Denies new foods, medications, toiletries, clothing, furniture, bedding, or different accommodations.   Non-smoker. She has tried benadryl once. Denies nausea, vomiting, diarrhea, and shortness of breath.    Allergies:  Allergies   Allergen Reactions     Chocolate Hives     Lemon Flavor Hives     Lime [Calcium Oxysulfide] Hives     Metal [Staples]      Orange Fruit [Citrus] Hives     Strawberry Hives     Adhesive Tape      Band-aids     Codeine Hives     Patient can tolerate oxycodone & dilaudid     Decadron [Dexamethasone] Hives     Erythromycin Hives     ERYTHROMYCIN BASE     Grapefruit [Extra Strength Grapefruit]      GRAPEFRUIT     Morphine Hives     Pt. Reports had hives     Penicillins Hives     Ranitidine GI Disturbance     Sulfa Drugs      SULFONAMIDE ANTIBIOTICS      Tomato      Xyzal [Levocetirizine] Hives       Problem List:    Patient Active Problem List    Diagnosis Date Noted     Other neutropenia (H) 08/04/2020     Priority: Medium     Paresthesias 02/13/2020     Priority: Medium     Status post total left knee replacement 09/09/2019     Priority: Medium     Aortic valve insufficiency 03/06/2019     Priority: Medium     Mitral regurgitation 03/06/2019     Priority: Medium     Tricuspid valve  insufficiency 03/06/2019     Priority: Medium     Diastolic dysfunction grade 1 on 2/21/19 03/06/2019     Priority: Medium     Hypertriglyceridemia 03/06/2019     Priority: Medium     Palpitations 02/13/2019     Priority: Medium     PVC's (premature ventricular contractions) 02/13/2019     Priority: Medium     Atrial ectopy 02/13/2019     Priority: Medium     PSVT (paroxysmal supraventricular tachycardia) (H) 02/13/2019     Priority: Medium     COPD, mild on 9/9/14 02/13/2019     Priority: Medium     Bilateral carotid artery stenosis at less than 50% on 12/1/16 02/13/2019     Priority: Medium     Mixed hyperlipidemia 02/13/2019     Priority: Medium     On statin therapy 02/13/2019     Priority: Medium     Heart murmur 02/13/2019     Priority: Medium     Morbid obesity (H) 06/01/2017     Priority: Medium     ACP (advance care planning) 04/28/2016     Priority: Medium     Advance Care Planning 4/28/2016: ACP Review of Chart / Resources Provided:  Reviewed chart for advance care plan.  Dinorah Lim has no plan or code status on file. Discussed available resources and provided with information. Confirmed code status reflects current choices pending further ACP discussions.  Confirmed/documented legally designated decision makers.  Added by Aditi Gandhi             Anxiety 06/23/2014     Priority: Medium     Lung density on x-ray 07/23/2013     Priority: Medium     Osteoarthritis 06/14/2012     Priority: Medium     Problem list name updated by automated process. Provider to review       Advanced care planning/counseling discussion 01/13/2012     Priority: Medium     Abnormal glucose 08/01/2011     Priority: Medium     Hgb A1c 5.7 on 1/4/2015  Problem list name updated by automated process. Provider to review       Osteoarthritis of right hip 10/07/2004     Priority: Medium     Urticaria 06/01/2004     Priority: Medium     Problem list name updated by automated process. 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     Obesity, Class II, BMI 35-39.9 1/4/2015     Osteoarthritis of right hip 10/07/2004     Osteoarthrosis 06/14/2012     Otitis externa 10/04/2011     Prediabetes 08/01/2011       Past Surgical History:    Past Surgical History:   Procedure Laterality Date     ARTHROPLASTY KNEE Left 9/9/2019    Procedure: LEFT TOTAL KNEE ARTHROPLASTY S/N;  Surgeon: Trevor Alonzo MD;  Location: [facility]     ARTHROSCOPY KNEE Left 3/21/2019    Procedure: LEFT  KNEE ARTHROSCOPY, partial medial menisectomy;  Surgeon: Esteban Wills MD;  Location: [facility]     C TOTAL KNEE ARTHROPLASTY Left 09/09/2019    Dr Alonzo     CLOSED REDUCTION WRIST Right 1952 x 2    long arm cast (same time as elbow fx)     CLOSED RX ELBOW DISLOCATION Right 1952    CR/long cast of fracture     HC INJ EPIDURAL LUMBAR/SACRAL W/WO CONTRAST Bilateral 5/2013    facet injections; prev 2012     LAPAROSCOPIC CHOLECYSTECTOMY N/A 1/4/2015    Procedure: LAPAROSCOPIC CHOLECYSTECTOMY;  Surgeon: Brittney العلي MD;  Location: [facility]     TONSILLECTOMY         Family History:    Family History   Problem Relation Age of Onset     Heart Disease Brother         atrial fibrillation     Atrial fibrillation Brother      Other - See Comments Mother         emphysema     Heart Disease Father 92        CHF     Cancer Paternal Grandfather         lung cancer     Cancer Maternal Uncle         lung cancer     Chronic Obstructive Pulmonary Disease Daughter      Emphysema Daughter      Other - See Comments Daughter         benign breast lesion      Esophagitis Daughter        Social History:  Marital Status:  Single [1]  Social History     Tobacco Use     Smoking status: Never Smoker     Smokeless tobacco: Never Used   Substance Use Topics     Alcohol use: No     Drug use: No        Medications:         predniSONE (DELTASONE) 10 MG tablet       Calcium-Magnesium-Vitamin D (CALCIUM 1200+D3 PO)       clonazePAM (KLONOPIN) 1 MG tablet       FISH OIL       HYDROcodone-acetaminophen (NORCO) 5-325 MG tablet       ipratropium - albuterol 0.5 mg/2.5 mg/3 mL (DUONEB) 0.5-2.5 (3) MG/3ML neb solution       PARoxetine (PAXIL) 40 MG tablet       simvastatin (ZOCOR) 40 MG tablet       VITAMIN D, CHOLECALCIFEROL, PO          Review of Systems   Constitutional: Positive for activity change, chills and fever.   HENT: Positive for facial swelling. Negative for rhinorrhea.    Eyes:        Orbital swelling   Respiratory: Negative for shortness of breath.    Gastrointestinal: Negative for nausea and vomiting.   Skin: Positive for rash (itching and sometimes it hurts.).   Neurological: Positive for light-headedness (when she stands up). Negative for dizziness and headaches.       Physical Exam   BP: 111/60  Pulse: 71  Temp: 97.9  F (36.6  C)  Resp: 16  SpO2: 97 %      Physical Exam  Vitals signs and nursing note reviewed.   Constitutional:       General: She is in acute distress.   HENT:      Head: Normocephalic.        Comments: Bright erythematous urticaria on forehead and posterior scalp. Bilateral, periorbital edema. Right greater that left.  Eyes:      Conjunctiva/sclera:      Right eye: Right conjunctiva is not injected.      Left eye: Left conjunctiva is not injected.   Cardiovascular:      Rate and Rhythm: Normal rate and regular rhythm.      Heart sounds: Normal heart sounds. No murmur.   Pulmonary:      Effort: Pulmonary effort is normal. No respiratory distress.      Breath sounds: Normal breath sounds. No wheezing.   Musculoskeletal:         General: Swelling and  tenderness present.   Skin:     General: Skin is warm and dry.      Findings: Erythema and rash present. Rash is urticarial.             Comments: Multiple urticarial lesions ranging in size from 1 -2  cm to 10 and 11 cm diameter   Neurological:      Mental Status: She is alert and oriented to person, place, and time.   Psychiatric:         Behavior: Behavior normal.         ED Course        Procedures             No results found for this or any previous visit (from the past 24 hour(s)).    Medications   methylPREDNISolone (DEPO-MEDROL) injection 40 mg (40 mg Intramuscular Given 11/10/20 1651)       Assessments & Plan (with Medical Decision Making)     I have reviewed the nursing notes.    I have reviewed the findings, diagnosis, plan and need for follow up with the patient.  (L50.9) Urticaria   Comment: 80 year old female who presents with hives covering her entire body. She was given decadron injection in  two days ago and states she can not take that shot again as it made her condition worse. This condition has been ongoing for four days and is accompanied with fevers, and chills. The rash is on her head and she has some facial swelling, mainly in her forehead and around her eyes. This attributes to her light headedness when she stands up..  She gets hives like this once a year. She has multiple allergies. She saw an allergist when she was young, and will speak with her PCP about seeing an allergist again. Denies new foods, medications, toiletries, clothing, furniture, bedding, or different accommodations. Non-smoker. She has tried benadryl once. Denies nausea, vomiting, diarrhea, and shortness of breath.    Assessment: NHT. Lungs CTA.  Bright erythematous urticaria on forehead and posterior scalp. Bilateral, periorbital edema. Right greater that left.  Multiple urticarial lesions ranging in size from 1 -2  cm to 10 and 11 cm diameter    Plan: methylprednisolone 40 mg given IM. Prednisone decreasing low dose  for six days. Education provided and/or discussed for this/these medications and for hive.   Avoid hot baths or showers as this may aggravate the rash.  Oatmeal baths.  Cetirizine 20 mg daily ( may take a once daily dose of 20 mg or 10 mg in the morning and 10 mg at bedtime) for 10 to 14 days and then decrease the dose to 10 mg daily on an ongoing basis to prevent allergy flare-ups.  Consider consulting allergist.  These discharge instructions and medications were reviewed with her and understanding verbalized.    Discharge Medication List as of 11/10/2020  5:10 PM          Final diagnoses:   Urticaria       11/10/2020   [facility]       Bozena Ortiz, CNP  11/11/20 1118

## 2020-11-10 NOTE — ED TRIAGE NOTES
"Pt presents today with c/o hives. Was seen yesterday. Pt states she got a shot yesterday for her hives, but still has hives today and \"I cant have that shot AT ALL\" per pt.   "

## 2020-11-17 NOTE — PROGRESS NOTES
"Subjective     Dinorah Lim is a 80 year old female who presents to clinic today for the following health issues:    HPI         ED/UC Followup:    Facility:  [facility] [place]  Date of visit: 11/10/2020  Reason for visit: Hives  Current Status: resolved     She was seen in the ER for hives on 11- which covered her body. She had no respiratory issues. She has had hives since she was a child occurring once a year. She has multiple allergies. These have now resolved completely     Aortic insufficiency  This was noted on echo with Dr Hughes 2-. She is due to follow up with him in May with a follow up echo. She has not had any symptoms except for an occasional palpitation, one beat occurring rarely. No edema         Review of Systems   Constitutional, HEENT, cardiovascular, pulmonary, gi and gu systems are negative, except as otherwise noted.      Objective    /62 (BP Location: Left arm, Patient Position: Sitting, Cuff Size: Adult Large)   Pulse 74   Temp 98.1  F (36.7  C) (Tympanic)   Resp 20   Ht 1.651 m (5' 5\")   Wt 93.2 kg (205 lb 6 oz)   SpO2 97%   BMI 34.18 kg/m    Body mass index is 34.18 kg/m .  Physical Exam   GENERAL: healthy, alert and no distress  NECK: no adenopathy, no asymmetry, masses, or scars and thyroid normal to palpation  RESP: lungs clear to auscultation - no rales, rhonchi or wheezes  CV: regular rates and rhythm, normal S1 S2, no S3 or S4, grade 2/6 SEN murmur heard best over the LSB, peripheral pulses strong and no peripheral edema  MS: no gross musculoskeletal defects noted, no edema  NEURO: Normal strength and tone, mentation intact and speech normal  PSYCH: mentation appears normal and anxious            Assessment & Plan     Hives  Resolved  She prefers not to go to [place] to see an allergist but is willing to see someone here for testing. Referral is done  epipen comes up as contraindicated due to grapefruit allergy. Checked with the hospital pharmacy, ok to prescribe. At " this point will follow   - OTOLARYNGOLOGY REFERRAL    Encounter for hepatitis C screening test for low risk patient  Discussed, will go ahead and screen   - Hepatitis C Screen Reflex to HCV RNA Quant and Genotype    Nonrheumatic aortic valve insufficiency  Stable. Discussed that since she isn't having issues follow up with Dr Hughes in May is appropriate.     Palpitations  Rare, follow at this time    Anxiety  Ongoing, continue current paroxetine    covid 19 virus infection  Positive test 10-  Recovered               Return if symptoms worsen or fail to improve.    Magaly Sosa MD  [facility] [place]

## 2020-11-24 ENCOUNTER — TELEPHONE (OUTPATIENT)
Dept: FAMILY MEDICINE | Facility: OTHER | Age: 80
End: 2020-11-24

## 2020-11-24 ENCOUNTER — APPOINTMENT (OUTPATIENT)
Dept: LAB | Facility: OTHER | Age: 80
End: 2020-11-24
Attending: FAMILY MEDICINE
Payer: MEDICARE

## 2020-11-24 ENCOUNTER — OFFICE VISIT (OUTPATIENT)
Dept: FAMILY MEDICINE | Facility: OTHER | Age: 80
End: 2020-11-24
Attending: FAMILY MEDICINE
Payer: COMMERCIAL

## 2020-11-24 VITALS
HEIGHT: 65 IN | SYSTOLIC BLOOD PRESSURE: 130 MMHG | TEMPERATURE: 98.1 F | OXYGEN SATURATION: 97 % | HEART RATE: 74 BPM | BODY MASS INDEX: 34.22 KG/M2 | RESPIRATION RATE: 20 BRPM | DIASTOLIC BLOOD PRESSURE: 62 MMHG | WEIGHT: 205.38 LBS

## 2020-11-24 DIAGNOSIS — Z11.59 ENCOUNTER FOR HEPATITIS C SCREENING TEST FOR LOW RISK PATIENT: ICD-10-CM

## 2020-11-24 DIAGNOSIS — L50.9 HIVES: Primary | ICD-10-CM

## 2020-11-24 DIAGNOSIS — U07.1 COVID-19 VIRUS INFECTION: ICD-10-CM

## 2020-11-24 DIAGNOSIS — I35.1 NONRHEUMATIC AORTIC VALVE INSUFFICIENCY: ICD-10-CM

## 2020-11-24 DIAGNOSIS — R00.2 PALPITATIONS: ICD-10-CM

## 2020-11-24 DIAGNOSIS — F41.9 ANXIETY: ICD-10-CM

## 2020-11-24 PROCEDURE — G0463 HOSPITAL OUTPT CLINIC VISIT: HCPCS

## 2020-11-24 PROCEDURE — 99214 OFFICE O/P EST MOD 30 MIN: CPT | Performed by: FAMILY MEDICINE

## 2020-11-24 PROCEDURE — 36415 COLL VENOUS BLD VENIPUNCTURE: CPT | Mod: ZL | Performed by: FAMILY MEDICINE

## 2020-11-24 PROCEDURE — 86803 HEPATITIS C AB TEST: CPT | Mod: ZL | Performed by: FAMILY MEDICINE

## 2020-11-24 ASSESSMENT — PAIN SCALES - GENERAL: PAINLEVEL: NO PAIN (0)

## 2020-11-24 ASSESSMENT — MIFFLIN-ST. JEOR: SCORE: 1412.45

## 2020-11-24 NOTE — NURSING NOTE
"Chief Complaint   Patient presents with     ER F/U       Initial /62 (BP Location: Left arm, Patient Position: Sitting, Cuff Size: Adult Large)   Pulse 74   Temp 98.1  F (36.7  C) (Tympanic)   Resp 20   Ht 1.651 m (5' 5\")   Wt 93.2 kg (205 lb 6 oz)   SpO2 97%   BMI 34.18 kg/m   Estimated body mass index is 34.18 kg/m  as calculated from the following:    Height as of this encounter: 1.651 m (5' 5\").    Weight as of this encounter: 93.2 kg (205 lb 6 oz).  Medication Reconciliation: complete  Linda Link LPN  "

## 2020-11-24 NOTE — TELEPHONE ENCOUNTER
Please let patient know that I checked with the pharmacy and the epipen will be ok to use for hives with the grapefruit allergy. I can prescribe one if she would like to have one on hand

## 2020-11-25 LAB — HCV AB SERPL QL IA: NONREACTIVE

## 2020-11-30 DIAGNOSIS — R52 PAIN: ICD-10-CM

## 2020-11-30 RX ORDER — HYDROCODONE BITARTRATE AND ACETAMINOPHEN 5; 325 MG/1; MG/1
TABLET ORAL
Qty: 10 TABLET | Refills: 0 | Status: SHIPPED | OUTPATIENT
Start: 2020-11-30 | End: 2021-01-14

## 2020-11-30 NOTE — TELEPHONE ENCOUNTER
HYDROcodone-acetaminophen (NORCO) 5-325 MG tablet      Last Written Prescription Date:  10/7/20  Last Fill Quantity: 10,   # refills: 0  Last Office Visit: 11/24/20  Future Office visit:       Routing refill request to provider for review/approval because:  Drug not on the [facility], [facility] or [facility] refill protocol or controlled substance

## 2020-12-15 ENCOUNTER — NURSE TRIAGE (OUTPATIENT)
Dept: FAMILY MEDICINE | Facility: OTHER | Age: 80
End: 2020-12-15

## 2020-12-15 NOTE — PROGRESS NOTES
Subjective     Dinorah Lim is a 80 year old female who presents to clinic today for the following health issues:    HPI         Acute Illness  Acute illness concerns: URI  Onset/Duration: 2 days  Symptoms:  Fever: no  Chills/Sweats: no  Headache (location?): YES- back of neck  Sinus Pressure: no  Conjunctivitis:  no  Ear Pain: YES: right  Rhinorrhea: YES  Congestion: no  Sore Throat: YES  Cough: no  Wheeze: no  Decreased Appetite: no  Nausea: no  Vomiting: no  Diarrhea: no  Dysuria/Freq.: no  Dysuria or Hematuria: no  Fatigue/Achiness: YES- arthritis   Sick/Strep Exposure: YES- son in law- covid/ pneumonia about 3 weeks ago  Therapies tried and outcome: None    Patients states she has had covid in October      Review of Systems   CONSTITUTIONAL:NEGATIVE for fever, chills, change in weight  INTEGUMENTARY/SKIN: NEGATIVE for worrisome rashes, moles or lesions  ENT/MOUTH: ear pain right, nasal congestion and sore throat  RESP:chest tightness/sob at times   CV: NEGATIVE for chest pain, palpitations or peripheral edema  GI: NEGATIVE for nausea, abdominal pain, heartburn, or change in bowel habits  : denies dysuria   MUSCULOSKELETAL: chronic right shoulder pain arthritis   NEURO: NEGATIVE for weakness, dizziness or paresthesias      Objective    /60   Pulse 68   Temp 97.4  F (36.3  C) (Tympanic)   Wt 93 kg (205 lb)   SpO2 98%   BMI 34.11 kg/m    Body mass index is 34.11 kg/m .  Physical Exam   GENERAL: alert and no distress  HENT: normal cephalic/atraumatic, both ears: clear effusion, nose and mouth without ulcers or lesions, posterior oropharynx erythema with some white spots on the right side  and oral mucous membranes moist  NECK: no adenopathy, no asymmetry, masses, or scars and thyroid normal to palpation  RESP: lungs clear to auscultation - no rales, rhonchi or wheezes  CV: regular rate and rhythm, normal S1 S2, no S3 or S4, no murmur, click or rub, no peripheral edema and peripheral pulses  strong  ABDOMEN: soft, nontender, no hepatosplenomegaly, no masses and bowel sounds normal  MS: no gross musculoskeletal defects noted, no edema  SKIN: no suspicious lesions or rashes  NEURO: Normal strength and tone, mentation intact and speech normal  PSYCH: mentation appears normal, affect normal/bright  LYMPH: normal ant/post cervical, supraclavicular nodes    Results for orders placed or performed in visit on 12/16/20 (from the past 24 hour(s))   Group A Streptococcus PCR Throat Swab ([place] ONLY)    Specimen: Throat   Result Value Ref Range    Specimen Description Throat     Strep Group A PCR Not Detected NDET^Not Detected           Assessment & Plan     Acute viral pharyngitis  Negative strep rapid and culture  Discussed symptomatic care  She should follow up if no improvement or worsening symptoms  She should go to the ER if develops SOB, difficulty breathing, chest pain or any concerns   - Group A Streptococcus PCR Throat Swab ([place] ONLY)        See Patient Instructions    No follow-ups on file.    RENA Donis CNP  [facility] [place]

## 2020-12-15 NOTE — TELEPHONE ENCOUNTER
Pt called reports right ear pain and white spots on back of throat, mild sore throat. No other concerns pt scheduled as requested.  Next 5 appointments (look out 90 days)    Dec 16, 2020  9:30 AM  (Arrive by 9:15 AM)  SHORT with RENA Camacho CNP  [facility] [place] ([facility] [place] ) [address]  [place] [address]  [phone]

## 2020-12-16 ENCOUNTER — OFFICE VISIT (OUTPATIENT)
Dept: FAMILY MEDICINE | Facility: OTHER | Age: 80
End: 2020-12-16
Attending: NURSE PRACTITIONER
Payer: MEDICARE

## 2020-12-16 VITALS
TEMPERATURE: 97.4 F | WEIGHT: 205 LBS | DIASTOLIC BLOOD PRESSURE: 60 MMHG | HEART RATE: 68 BPM | SYSTOLIC BLOOD PRESSURE: 122 MMHG | OXYGEN SATURATION: 98 % | BODY MASS INDEX: 34.11 KG/M2

## 2020-12-16 DIAGNOSIS — J02.9 ACUTE VIRAL PHARYNGITIS: Primary | ICD-10-CM

## 2020-12-16 LAB
SPECIMEN SOURCE: NORMAL
STREP GROUP A PCR: NOT DETECTED

## 2020-12-16 PROCEDURE — 99213 OFFICE O/P EST LOW 20 MIN: CPT | Performed by: NURSE PRACTITIONER

## 2020-12-16 PROCEDURE — 87651 STREP A DNA AMP PROBE: CPT | Mod: ZL | Performed by: NURSE PRACTITIONER

## 2020-12-16 PROCEDURE — G0463 HOSPITAL OUTPT CLINIC VISIT: HCPCS

## 2020-12-16 PROCEDURE — G0463 HOSPITAL OUTPT CLINIC VISIT: HCPCS | Mod: 25

## 2020-12-16 ASSESSMENT — ANXIETY QUESTIONNAIRES
7. FEELING AFRAID AS IF SOMETHING AWFUL MIGHT HAPPEN: NOT AT ALL
1. FEELING NERVOUS, ANXIOUS, OR ON EDGE: NOT AT ALL
GAD7 TOTAL SCORE: 0
3. WORRYING TOO MUCH ABOUT DIFFERENT THINGS: NOT AT ALL
6. BECOMING EASILY ANNOYED OR IRRITABLE: NOT AT ALL
4. TROUBLE RELAXING: NOT AT ALL
5. BEING SO RESTLESS THAT IT IS HARD TO SIT STILL: NOT AT ALL
2. NOT BEING ABLE TO STOP OR CONTROL WORRYING: NOT AT ALL

## 2020-12-16 ASSESSMENT — PATIENT HEALTH QUESTIONNAIRE - PHQ9: SUM OF ALL RESPONSES TO PHQ QUESTIONS 1-9: 0

## 2020-12-16 NOTE — NURSING NOTE
"Chief Complaint   Patient presents with     URI       Initial /60   Pulse 68   Temp 97.4  F (36.3  C) (Tympanic)   Wt 93 kg (205 lb)   SpO2 98%   BMI 34.11 kg/m   Estimated body mass index is 34.11 kg/m  as calculated from the following:    Height as of 11/24/20: 1.651 m (5' 5\").    Weight as of this encounter: 93 kg (205 lb).  Medication Reconciliation: complete  Mery Briggs LPN  "

## 2020-12-17 ASSESSMENT — ANXIETY QUESTIONNAIRES: GAD7 TOTAL SCORE: 0

## 2020-12-21 DIAGNOSIS — F41.9 ANXIETY: Chronic | ICD-10-CM

## 2020-12-21 NOTE — TELEPHONE ENCOUNTER
Klonopin  Last Written Prescription Date: 8/11/20  Last Fill Quantity: 45 # of Refills: 0  Last Office Visit: 12/16/20

## 2020-12-22 ENCOUNTER — MEDICAL CORRESPONDENCE (OUTPATIENT)
Dept: INTERVENTIONAL RADIOLOGY/VASCULAR | Facility: HOSPITAL | Age: 80
End: 2020-12-22

## 2020-12-22 ENCOUNTER — TRANSFERRED RECORDS (OUTPATIENT)
Dept: HEALTH INFORMATION MANAGEMENT | Facility: CLINIC | Age: 80
End: 2020-12-22

## 2020-12-22 RX ORDER — CLONAZEPAM 1 MG/1
TABLET ORAL
Qty: 45 TABLET | Refills: 0 | Status: SHIPPED | OUTPATIENT
Start: 2020-12-22 | End: 2021-02-26

## 2021-01-05 ENCOUNTER — TELEPHONE (OUTPATIENT)
Dept: FAMILY MEDICINE | Facility: OTHER | Age: 81
End: 2021-01-05

## 2021-01-05 NOTE — TELEPHONE ENCOUNTER
Please schedule patient for date/time: unable to see this week or next. May see covering provider or urgent care.    Have patient go to ER/Urgent Care Center. Urgent Care hours are 9:30 am to 8 pm, open 7 days a week. Yes.    Provider will call patient.No.    Other:

## 2021-01-05 NOTE — TELEPHONE ENCOUNTER
4:27 PM    Reason for Call: OVERBOOK    Patient is having the following symptoms: Patient experiencing burning in legs/knees, also stated that legs are very warm when this happens. Patient also stated she has bumps down her shins for 3 weeks.    The patient is requesting an appointment for as soon as possible with Magaly Sosa.    Was an appointment offered for this call? Yes  If yes : Appointment type              Date: 02/02/2021 declined    Preferred method for responding to this message: Telephone Call  What is your phone number ? [phone]    If we cannot reach you directly, may we leave a detailed response at the number you provided? Yes    Can this message wait until your PCP/provider returns, if unavailable today? YES    Marietta Bustillo

## 2021-01-09 DIAGNOSIS — E78.5 HYPERLIPIDEMIA LDL GOAL <100: ICD-10-CM

## 2021-01-11 ENCOUNTER — OFFICE VISIT (OUTPATIENT)
Dept: FAMILY MEDICINE | Facility: OTHER | Age: 81
End: 2021-01-11
Attending: FAMILY MEDICINE
Payer: COMMERCIAL

## 2021-01-11 VITALS
SYSTOLIC BLOOD PRESSURE: 110 MMHG | DIASTOLIC BLOOD PRESSURE: 54 MMHG | BODY MASS INDEX: 35.65 KG/M2 | HEART RATE: 69 BPM | WEIGHT: 214 LBS | TEMPERATURE: 98.3 F | OXYGEN SATURATION: 98 % | HEIGHT: 65 IN

## 2021-01-11 DIAGNOSIS — R52 PAIN: ICD-10-CM

## 2021-01-11 DIAGNOSIS — I83.893 SYMPTOMATIC VARICOSE VEINS OF BOTH LOWER EXTREMITIES: Primary | ICD-10-CM

## 2021-01-11 PROCEDURE — G0463 HOSPITAL OUTPT CLINIC VISIT: HCPCS

## 2021-01-11 PROCEDURE — 99213 OFFICE O/P EST LOW 20 MIN: CPT | Performed by: FAMILY MEDICINE

## 2021-01-11 RX ORDER — HYDROCODONE BITARTRATE AND ACETAMINOPHEN 5; 325 MG/1; MG/1
TABLET ORAL
Qty: 10 TABLET | Refills: 0 | Status: CANCELLED | OUTPATIENT
Start: 2021-01-11

## 2021-01-11 RX ORDER — SIMVASTATIN 40 MG
TABLET ORAL
Qty: 90 TABLET | Refills: 0 | Status: SHIPPED | OUTPATIENT
Start: 2021-01-11 | End: 2021-04-15

## 2021-01-11 ASSESSMENT — MIFFLIN-ST. JEOR: SCORE: 1451.58

## 2021-01-11 ASSESSMENT — PAIN SCALES - GENERAL: PAINLEVEL: EXTREME PAIN (9)

## 2021-01-11 NOTE — NURSING NOTE
"Chief Complaint   Patient presents with     Musculoskeletal Problem     Swelling     Tingling       Initial /54 (Patient Position: Sitting)   Pulse 69   Ht 1.651 m (5' 5\")   Wt 97.1 kg (214 lb)   SpO2 98%   BMI 35.61 kg/m   Estimated body mass index is 35.61 kg/m  as calculated from the following:    Height as of this encounter: 1.651 m (5' 5\").    Weight as of this encounter: 97.1 kg (214 lb).  Medication Reconciliation: complete  Jovita Levi LPN  "

## 2021-01-11 NOTE — TELEPHONE ENCOUNTER
Simvastatin 40 mg      Last Written Prescription Date:  12/10/19  Last Fill Quantity: 90,   # refills: 0  Last Office Visit: 12/16/20  Future Office visit:    Next 5 appointments (look out 90 days)    Jan 11, 2021  3:00 PM  (Arrive by 2:45 PM)  [facility] with Marion [facility]er [facility]  [facility] [address]  [place] [address]  [phone]   Mar 22, 2021  9:30 AM  (Arrive by 9:15 AM)  Return Visit with Ha Hughes DO  [facility] [address]  [place] [address]  [phone]           Routing refill request to provider for review/approval because:

## 2021-01-13 DIAGNOSIS — R52 PAIN: ICD-10-CM

## 2021-01-13 NOTE — TELEPHONE ENCOUNTER
hydrocodone      Last Written Prescription Date:  11/30/20  Last Fill Quantity: 10,   # refills: 0  Last Office Visit: 1/11/21  Future Office visit:    Next 5 appointments (look out 90 days)    Mar 22, 2021  9:30 AM  (Arrive by 9:15 AM)  Return Visit with Ha Hughes DO  [facility] ) [address]  [place] [address]  [phone]           Routing refill request to provider for review/approval because:  Drug not on the [facility], [facility] or [facility] refill protocol or controlled substance

## 2021-01-14 RX ORDER — HYDROCODONE BITARTRATE AND ACETAMINOPHEN 5; 325 MG/1; MG/1
TABLET ORAL
Qty: 10 TABLET | Refills: 0 | Status: SHIPPED | OUTPATIENT
Start: 2021-01-14 | End: 2021-02-18

## 2021-01-26 DIAGNOSIS — F41.9 ANXIETY: ICD-10-CM

## 2021-01-26 RX ORDER — PAROXETINE 40 MG/1
TABLET, FILM COATED ORAL
Qty: 90 TABLET | Refills: 0 | Status: SHIPPED | OUTPATIENT
Start: 2021-01-26 | End: 2021-05-03

## 2021-01-28 ENCOUNTER — HOSPITAL ENCOUNTER (OUTPATIENT)
Dept: CARDIOLOGY | Facility: HOSPITAL | Age: 81
End: 2021-01-28
Attending: INTERNAL MEDICINE
Payer: COMMERCIAL

## 2021-01-28 ENCOUNTER — HOSPITAL ENCOUNTER (OUTPATIENT)
Dept: ULTRASOUND IMAGING | Facility: HOSPITAL | Age: 81
End: 2021-01-28
Attending: FAMILY MEDICINE
Payer: COMMERCIAL

## 2021-01-28 DIAGNOSIS — I51.89 DIASTOLIC DYSFUNCTION: ICD-10-CM

## 2021-01-28 DIAGNOSIS — I34.0 NON-RHEUMATIC MITRAL REGURGITATION: ICD-10-CM

## 2021-01-28 DIAGNOSIS — I36.1 NON-RHEUMATIC TRICUSPID VALVE INSUFFICIENCY: ICD-10-CM

## 2021-01-28 DIAGNOSIS — I35.1 NONRHEUMATIC AORTIC VALVE INSUFFICIENCY: ICD-10-CM

## 2021-01-28 DIAGNOSIS — I83.893 SYMPTOMATIC VARICOSE VEINS OF BOTH LOWER EXTREMITIES: ICD-10-CM

## 2021-01-28 PROCEDURE — 93970 EXTREMITY STUDY: CPT

## 2021-01-28 PROCEDURE — 93306 TTE W/DOPPLER COMPLETE: CPT | Mod: 26 | Performed by: INTERNAL MEDICINE

## 2021-01-28 PROCEDURE — 93306 TTE W/DOPPLER COMPLETE: CPT

## 2021-02-09 ENCOUNTER — OFFICE VISIT (OUTPATIENT)
Dept: FAMILY MEDICINE | Facility: OTHER | Age: 81
End: 2021-02-09
Attending: FAMILY MEDICINE
Payer: COMMERCIAL

## 2021-02-09 VITALS
OXYGEN SATURATION: 98 % | WEIGHT: 215 LBS | HEART RATE: 68 BPM | SYSTOLIC BLOOD PRESSURE: 116 MMHG | HEIGHT: 65 IN | DIASTOLIC BLOOD PRESSURE: 68 MMHG | BODY MASS INDEX: 35.82 KG/M2 | RESPIRATION RATE: 19 BRPM | TEMPERATURE: 97.6 F

## 2021-02-09 DIAGNOSIS — R73.09 ABNORMAL GLUCOSE: Chronic | ICD-10-CM

## 2021-02-09 DIAGNOSIS — I83.93 ASYMPTOMATIC VARICOSE VEINS OF BOTH LOWER EXTREMITIES: ICD-10-CM

## 2021-02-09 DIAGNOSIS — I47.10 PSVT (PAROXYSMAL SUPRAVENTRICULAR TACHYCARDIA) (H): ICD-10-CM

## 2021-02-09 DIAGNOSIS — E78.2 MIXED HYPERLIPIDEMIA: Primary | ICD-10-CM

## 2021-02-09 DIAGNOSIS — I35.1 NONRHEUMATIC AORTIC VALVE INSUFFICIENCY: ICD-10-CM

## 2021-02-09 PROCEDURE — 99214 OFFICE O/P EST MOD 30 MIN: CPT | Performed by: FAMILY MEDICINE

## 2021-02-09 PROCEDURE — G0463 HOSPITAL OUTPT CLINIC VISIT: HCPCS

## 2021-02-09 ASSESSMENT — PAIN SCALES - GENERAL: PAINLEVEL: MODERATE PAIN (4)

## 2021-02-09 ASSESSMENT — MIFFLIN-ST. JEOR: SCORE: 1456.11

## 2021-02-09 NOTE — PROGRESS NOTES
"  Assessment & Plan     Mixed hyperlipidemia  She will return for fasting lab   - Lipid Profile; Future  - AST; Future  - ALT; Future    Abnormal glucose  Recheck lab   - Hemoglobin A1c; Future    Aortic valve insufficiency  Stable, follow up echo in 1-2 years     PSVT (paroxysmal supraventricular tachycardia) (H)  Stable, will recheck lab  - Basic metabolic panel; Future    Varicose veins of both lower extremities, asymptomatic     Review of external notes as documented above   Review of the result(s) of each unique test - ultrasound, echo, cologuard testing          20 minutes spent on the date of the encounter doing chart review, review of test results, interpretation of tests, patient visit and documentation          BMI:   Estimated body mass index is 35.78 kg/m  as calculated from the following:    Height as of this encounter: 1.651 m (5' 5\").    Weight as of this encounter: 97.5 kg (215 lb).             Return if symptoms worsen or fail to improve.    Magaly Sosa MD  [facility] - REI Copeland is a 80 year old who presents to clinic today for the following health issues     HPI       Concern - Results of ultrasound of lower extremity and Echo cardiogram   Onset: had testing done 1/28/21   Description: wants to go over testing results.   Reviewed results, with her, mild to moderate aortic regurgitation, mild MR, trace [place]. Doppler studies were negative for DVT with mild back flow of right saphenous vein. Also reviewed negative cologuard testing for her      Hyperlipidemia Follow-Up      Are you regularly taking any medication or supplement to lower your cholesterol?   Yes- Simvastatin 40 mg    Are you having muscle aches or other side effects that you think could be caused by your cholesterol lowering medication?  No     Due for lab     Elevated BP Follow-up      Do you check your blood pressure regularly outside of the clinic? No     Are you following a low salt diet? " "Yes    Are your blood pressures ever more than 140 on the top number (systolic) OR more   than 90 on the bottom number (diastolic), for example 140/90? No      Review of Systems   Constitutional, HEENT, cardiovascular, pulmonary, gi and gu systems are negative, except as otherwise noted.      Objective    Ht 1.651 m (5' 5\")   Wt 97.5 kg (215 lb)   BMI 35.78 kg/m    Body mass index is 35.78 kg/m .  Physical Exam   GENERAL: healthy, alert and no distress  NECK: no adenopathy, no asymmetry, masses, or scars and thyroid normal to palpation  RESP: lungs clear to auscultation - no rales, rhonchi or wheezes  CV: regular rate and rhythm, normal S1 S2, no S3 or S4, no murmur, click or rub, no peripheral edema and peripheral pulses strong  MS: no gross musculoskeletal defects noted, no edema  SKIN: no significant varicose veins  NEURO: Normal strength and tone, mentation intact and speech normal  PSYCH: mentation appears normal, affect normal/bright                "

## 2021-02-09 NOTE — NURSING NOTE
"Chief Complaint   Patient presents with     Hypertension     Lipids     Results       Initial /68   Pulse 68   Temp 97.6  F (36.4  C) (Tympanic)   Resp 19   Ht 1.651 m (5' 5\")   Wt 97.5 kg (215 lb)   SpO2 98%   BMI 35.78 kg/m   Estimated body mass index is 35.78 kg/m  as calculated from the following:    Height as of this encounter: 1.651 m (5' 5\").    Weight as of this encounter: 97.5 kg (215 lb).  Medication Reconciliation: complete  Aditi Saleh LPN    "

## 2021-02-10 ENCOUNTER — TELEPHONE (OUTPATIENT)
Dept: FAMILY MEDICINE | Facility: OTHER | Age: 81
End: 2021-02-10

## 2021-02-10 DIAGNOSIS — I47.10 PSVT (PAROXYSMAL SUPRAVENTRICULAR TACHYCARDIA) (H): ICD-10-CM

## 2021-02-10 DIAGNOSIS — R73.09 ABNORMAL GLUCOSE: Chronic | ICD-10-CM

## 2021-02-10 DIAGNOSIS — E78.2 MIXED HYPERLIPIDEMIA: ICD-10-CM

## 2021-02-10 LAB
ALT SERPL W P-5'-P-CCNC: 27 U/L (ref 0–50)
ANION GAP SERPL CALCULATED.3IONS-SCNC: 1 MMOL/L (ref 3–14)
AST SERPL W P-5'-P-CCNC: 20 U/L (ref 0–45)
BUN SERPL-MCNC: 15 MG/DL (ref 7–30)
CALCIUM SERPL-MCNC: 9.5 MG/DL (ref 8.5–10.1)
CHLORIDE SERPL-SCNC: 108 MMOL/L (ref 94–109)
CHOLEST SERPL-MCNC: 177 MG/DL
CO2 SERPL-SCNC: 30 MMOL/L (ref 20–32)
CREAT SERPL-MCNC: 1 MG/DL (ref 0.52–1.04)
EST. AVERAGE GLUCOSE BLD GHB EST-MCNC: 97 MG/DL
GFR SERPL CREATININE-BSD FRML MDRD: 54 ML/MIN/{1.73_M2}
GLUCOSE SERPL-MCNC: 104 MG/DL (ref 70–99)
HBA1C MFR BLD: 5 % (ref 0–5.6)
HDLC SERPL-MCNC: 53 MG/DL
LDLC SERPL CALC-MCNC: 91 MG/DL
NONHDLC SERPL-MCNC: 124 MG/DL
POTASSIUM SERPL-SCNC: 4 MMOL/L (ref 3.4–5.3)
SODIUM SERPL-SCNC: 139 MMOL/L (ref 133–144)
TRIGL SERPL-MCNC: 166 MG/DL

## 2021-02-10 PROCEDURE — 84450 TRANSFERASE (AST) (SGOT): CPT | Mod: ZL | Performed by: FAMILY MEDICINE

## 2021-02-10 PROCEDURE — 84460 ALANINE AMINO (ALT) (SGPT): CPT | Mod: ZL | Performed by: FAMILY MEDICINE

## 2021-02-10 PROCEDURE — 80048 BASIC METABOLIC PNL TOTAL CA: CPT | Mod: ZL | Performed by: FAMILY MEDICINE

## 2021-02-10 PROCEDURE — 80061 LIPID PANEL: CPT | Mod: ZL | Performed by: FAMILY MEDICINE

## 2021-02-10 PROCEDURE — 83036 HEMOGLOBIN GLYCOSYLATED A1C: CPT | Mod: ZL | Performed by: FAMILY MEDICINE

## 2021-02-10 PROCEDURE — 999N001182 HC STATISTIC ESTIMATED AVERAGE GLUCOSE: Mod: ZL | Performed by: FAMILY MEDICINE

## 2021-02-10 PROCEDURE — 36415 COLL VENOUS BLD VENIPUNCTURE: CPT | Mod: ZL | Performed by: FAMILY MEDICINE

## 2021-02-10 NOTE — TELEPHONE ENCOUNTER
Pt had a missed call. She did have lab work today.MD did not review labs. [facility] Family practice call?    Carla No RN

## 2021-02-18 DIAGNOSIS — R52 PAIN: ICD-10-CM

## 2021-02-18 RX ORDER — HYDROCODONE BITARTRATE AND ACETAMINOPHEN 5; 325 MG/1; MG/1
TABLET ORAL
Qty: 10 TABLET | Refills: 0 | Status: SHIPPED | OUTPATIENT
Start: 2021-02-18 | End: 2021-03-31

## 2021-02-18 NOTE — TELEPHONE ENCOUNTER
hydrocodone      Last Written Prescription Date:  1/14/21  Last Fill Quantity: 10,   # refills: 0  Last Office Visit: 2/9/21  Future Office visit:    Next 5 appointments (look out 90 days)    Mar 22, 2021  9:30 AM  (Arrive by 9:15 AM)  Return Visit with Ha Hughes DO  [facility] [address]  [place] [address]  [phone]   Apr 26, 2021  9:15 AM  (Arrive by 9:00 AM)  SHORT with Magaly Sosa MD  [facility] ([facility] [place] ) [address]  [place] [address]  [phone]           Routing refill request to provider for review/approval because:  Drug not on the [facility], [facility] or [facility] refill protocol or controlled substance

## 2021-02-25 DIAGNOSIS — F41.9 ANXIETY: Chronic | ICD-10-CM

## 2021-02-26 RX ORDER — CLONAZEPAM 1 MG/1
TABLET ORAL
Qty: 45 TABLET | Refills: 0 | Status: SHIPPED | OUTPATIENT
Start: 2021-02-26 | End: 2021-05-03

## 2021-02-26 NOTE — TELEPHONE ENCOUNTER
clonazePAM (KLONOPIN) 1 MG tablet      Last Written Prescription Date:  12/22/20  Last Fill Quantity: 45,   # refills: 0  Last Office Visit: 2/9/21  Future Office visit:    Next 5 appointments (look out 90 days)    Mar 22, 2021  9:30 AM  (Arrive by 9:15 AM)  Return Visit with Ha Hughes DO  [facility] [address] [address]  [phone]   Apr 26, 2021  9:15 AM  (Arrive by 9:00 AM)  SHORT with Magaly Sosa MD  [facility] ([facility] ) [address] [facility] [address]  [phone]           Routing refill request to provider for review/approval because:  Drug not on the [facility], [facility] or [facility] refill protocol or controlled substance

## 2021-03-20 NOTE — PROGRESS NOTES
[facility]   CARDIOLOGY PROGRESS NOTE     Chief Complaint   Patient presents with     RECHECK          Diagnosis:  1.  Chronic grade 1 diastolic dysfunction on 2/21/19, off diurectics.  2.  Bilateral carotid artery stenosis less than 50% noted on US from 2/21/19.  3.  Mild to moderate aortic valve insufficiency.  4.  Trace to mild mitral valve insufficiency.  5.  Trace to mild tricuspid valve insufficiency.  6.  COPD-minumal on 9/9/14.  7.  Morbid obesity with BMI of 35.  8.  Hyperlipidemia-controlled.  9.  Atrial ectopy.  10.  Palpitations.  11.  PSVT.  12.  PVCs.  13.  On statin therapy.  14.  Hypertriglyceridemia-uncontrolled.      Assessment/Plan:    1.  We reviewed her echocardiogram as she has a reported history of a murmur most of her life.  She was noted to have mild to moderate aortic valve insufficiency on her echo from 3/6/2019, unchanged on 1/28/2021.  We discussed doing a repeat echocardiogram in 2 years.  She is somewhat nervous about her findings.  She is requesting to have an echo in 1 year.  We will plan for an echo in 1 year.  2.  We have reviewed the ultrasound results of her carotid arteries.  She had ultrasound completed on 2/21/19 without significant disease.  3.  We reviewed her Zio patch from 3/1/18 with a potpourri of dysrhythmia.  She has palpitations infrequently and does not want to start a medication at this time.  4.  No additional changes.  Follow-up 1 year after completion of echocardiogram.      Interval history:  Dinorah is doing well.  She is here for 2-year follow-up.  Previously, she is known to have mild to moderate aortic insufficiency on 2019.  She is here in follow-up to repeat echocardiogram.  Thankfully, things remain unchanged.  She stated she was concerned and did not sleep well the previous night.  We discussed all x4 valves as she had mild mitral insufficiency and trace pulmonic and tricuspid insufficiency.  She has 6/10 knee pain.  She had COVID-19 on  10/30/2020.  She describes mild symptoms.  She is wondering about getting retested.  She stated she has hardly traveled out of [place].  She has been to [place] a few times and once to the cities over her lifetime.  She describes herself as being a homebody and not liking to travel.  She is hoping to go to [facility] in [place], [place] sometime in the future.  She is a huge fan of [employer].      HPI:    She has been concerned with a heart murmur as well as bilateral carotid artery stenosis.  She had echocardiogram completed on 2/21/19 as well as an ultrasound of the carotids on 2/21/19.  She is here for those results.       Previously, we reviewed her Zio patch results from 12/31/18 which showed rare PVCs, x1 run of NSVT 5 beats, PACs, and x26 episodes of PSVT lasting up to 20 beats.     She also has a history of mild carotid artery disease at less than 50% with mild plaquing on 12/1/16.  She does not have a personal history of smoking but was around secondhand smoke in the past.  She had a ultrasound of her carotids on 2/20/19 that showed atherosclerotic plaquing in both carotid bifurcations.  There was no significant hemo-dynamic stenosis.     Her echocardiogram from 2/21/19 showed an EF of 65-70%, grade 1 diastolic dysfunction, mild left atrial enlargement, mild to moderate aortic insufficiency, trace to mild tricuspid insufficiency, and trace to mild mitral insufficiency.  She is concerned about her valvular insufficiency.  She will have an echocardiogram in approximately 2 years to follow-up on the mild to moderate aortic insufficiency.      Relevant testing:  ECHO on 1/28/21:  No pericardial effusion is present.  Global and regional left ventricular function is normal with an EF of 55-60%.  The right ventricle is normal size.  Global right ventricular function is normal.  Both atria appear normal.  Mild mitral insufficiency is present.  The aortic valve is tricuspid.  Mild to moderate aortic insufficiency is  present.  AI unchanged from previous ECHO 2/21/19  The mean gradient across the aortic valve is5.1 mmHg.  Trace tricuspid insufficiency is present.  Trace pulmonic insufficiency is present.  The aorta root is normal.    ECHO on 2/21/19:  No pericardial effusion is present.  Global and regional left ventricular function is hyperkinetic with an EF of  65-70%.  Grade I or early diastolic dysfunction.  The right ventricle is normal size.  Global right ventricular function is normal.  Mild left atrial enlargement is present.  Trace to mild mitral insufficiency is present.  The aortic valve is tricuspid.  Mild to moderate aortic insufficiency is present.  Trace to mild tricuspid insufficiency is present.  The peak velocity of the tricuspid regurgitant jet is not obtainable.  The pulmonic valve is normal.  The aorta root is normal.          ICD-10-CM    1. COPD, mild on 9/9/14  J44.9    2. Diastolic dysfunction  I51.89 Echocardiogram Complete   3. Nonrheumatic aortic valve insufficiency  I35.1 Echocardiogram Complete   4. Stage 3a chronic kidney disease  N18.31    5. Lung density on x-ray  J98.4    6. Morbid obesity (H)  E66.01    7. Hypertriglyceridemia  E78.1    8. Mixed hyperlipidemia  E78.2    9. Bilateral carotid artery stenosis at less than 50% on 12/1/16  I65.23    10. On statin therapy  Z79.899    11. COVID-19 virus infection on 10/30/20  U07.1    12. Atrial ectopy  I49.1    13. Heart murmur  R01.1    14. Mitral regurgitation  I34.0    15. Palpitations  R00.2    16. PSVT (paroxysmal supraventricular tachycardia) (H)  I47.1    17. PVC's (premature ventricular contractions)  I49.3    18. 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    Anterior Segment V 4 cm nodule abutting liver surface by US 1/4/2015      Obesity, Class II, BMI 35-39.9 1/4/2015    BMI 35.9 with comorbidities = MORBID obesity     Osteoarthritis of right hip 10/07/2004     Osteoarthrosis 06/14/2012     Otitis externa 10/04/2011     Prediabetes 08/01/2011       Past Surgical History:   Procedure Laterality Date     ARTHROPLASTY KNEE Left 9/9/2019    Procedure: LEFT TOTAL KNEE ARTHROPLASTY S/N;  Surgeon: Trevor Alonzo MD;  Location: [facility]     ARTHROSCOPY KNEE Left 3/21/2019    Procedure: LEFT  KNEE ARTHROSCOPY, partial medial menisectomy;  Surgeon: Esteban Wills MD;  Location: [facility] OR     C TOTAL KNEE ARTHROPLASTY Left 09/09/2019    Dr Alonzo     CLOSED REDUCTION WRIST Right 1952 x 2    long arm cast (same time as elbow fx)     CLOSED RX ELBOW DISLOCATION Right 1952    CR/long cast of fracture     HC INJ EPIDURAL LUMBAR/SACRAL W/WO CONTRAST Bilateral 5/2013    facet injections; prev 2012     LAPAROSCOPIC CHOLECYSTECTOMY N/A 1/4/2015    Procedure: LAPAROSCOPIC CHOLECYSTECTOMY;  Surgeon: Brittney العلي MD;  Location: [facility] OR     TONSILLECTOMY         Allergies   Allergen Reactions     Chocolate Hives     Lemon Flavor Hives     Lime [Calcium Oxysulfide] Hives     Metal [Staples]      Orange Fruit [Citrus] Hives     Strawberry Hives     Adhesive Tape      Band-aids     Codeine Hives     Patient can tolerate oxycodone & dilaudid     Decadron [Dexamethasone] Hives     Erythromycin Hives     ERYTHROMYCIN BASE     Grapefruit [Extra Strength Grapefruit]      GRAPEFRUIT     Morphine Hives     Pt. Reports had hives     Penicillins Hives     Ranitidine GI Disturbance     Sulfa Drugs      SULFONAMIDE ANTIBIOTICS       Tomato      Xyzal [Levocetirizine] Hives       Current Outpatient Medications   Medication Sig Dispense Refill     Calcium-Magnesium-Vitamin D (CALCIUM 1200+D3 PO) Take by mouth daily       clonazePAM (KLONOPIN) 1 MG tablet TAKE 1/4 TO 1/2 (ONE-FOURTH TO ONE-HALF) TABLET BY MOUTH EVERY 8 HOURS AS NEEDED 45 tablet 0     FISH OIL Take 1 capsule by mouth daily        HYDROcodone-acetaminophen (NORCO) 5-325 MG tablet TAKE 1 TABLET BY MOUTH EVERY 6 HOURS AS NEEDED FOR SEVERE PAIN 10 tablet 0     PARoxetine (PAXIL) 40 MG tablet Take 1 tablet by mouth once daily 90 tablet 0     simvastatin (ZOCOR) 40 MG tablet TAKE 1 TABLET BY MOUTH AT BEDTIME 90 tablet 0     VITAMIN D, CHOLECALCIFEROL, PO Take 2,000 Units by mouth daily       ipratropium - albuterol 0.5 mg/2.5 mg/3 mL (DUONEB) 0.5-2.5 (3) MG/3ML neb solution Take 1 vial (3 mLs) by nebulization every 6 hours as needed for shortness of breath / dyspnea or wheezing (Patient not taking: Reported on 3/22/2021) 1 Box 1       Social History     Socioeconomic History     Marital status: Single     Spouse name: Not on file     Number of children: 4     Years of education: 12     Highest education level: Not on file   Occupational History     Occupation: personal care attendant   Social Needs     Financial resource strain: Not on file     Food insecurity     Worry: Not on file     Inability: Not on file     Transportation needs     Medical: Not on file     Non-medical: Not on file   Tobacco Use     Smoking status: Never Smoker     Smokeless tobacco: Never Used   Substance and Sexual Activity     Alcohol use: No     Drug use: No     Sexual activity: Never   Lifestyle     Physical activity     Days per week: Not on file     Minutes per session: Not on file     Stress: Not on file   Relationships     Social connections     Talks on phone: Not on file     Gets together: Not on file     Attends Zoroastrianism service: Not on file     Active member of club or organization: Not on file      Attends meetings of clubs or organizations: Not on file     Relationship status: Not on file     Intimate partner violence     Fear of current or ex partner: Not on file     Emotionally abused: Not on file     Physically abused: Not on file     Forced sexual activity: Not on file   Other Topics Concern      Service Not Asked     Blood Transfusions Yes     Caffeine Concern Yes     Comment: >32 oz soda/day     Occupational Exposure Not Asked     Hobby Hazards Not Asked     Sleep Concern Yes     Comment: insomnia     Stress Concern Not Asked     Weight Concern Not Asked     Special Diet Not Asked     Back Care Not Asked     Exercise Not Asked     Bike Helmet Not Asked     Seat Belt Not Asked     Self-Exams Not Asked     Parent/sibling w/ CABG, MI or angioplasty before 65F 55M? No   Social History Narrative     Not on file       LAB RESULTS:   Orders Only on 02/10/2021   Component Date Value Ref Range Status     Sodium 02/10/2021 139  133 - 144 mmol/L Final     Potassium 02/10/2021 4.0  3.4 - 5.3 mmol/L Final     Chloride 02/10/2021 108  94 - 109 mmol/L Final     Carbon Dioxide 02/10/2021 30  20 - 32 mmol/L Final     Anion Gap 02/10/2021 1* 3 - 14 mmol/L Final     Glucose 02/10/2021 104* 70 - 99 mg/dL Final     Urea Nitrogen 02/10/2021 15  7 - 30 mg/dL Final     Creatinine 02/10/2021 1.00  0.52 - 1.04 mg/dL Final     GFR Estimate 02/10/2021 54* >60 mL/min/[1.73_m2] Final     GFR Estimate If Black 02/10/2021 62  >60 mL/min/[1.73_m2] Final     Calcium 02/10/2021 9.5  8.5 - 10.1 mg/dL Final     Hemoglobin A1C 02/10/2021 5.0  0 - 5.6 % Final     ALT 02/10/2021 27  0 - 50 U/L Final     AST 02/10/2021 20  0 - 45 U/L Final     Cholesterol 02/10/2021 177  <200 mg/dL Final     Triglycerides 02/10/2021 166* <150 mg/dL Final     HDL Cholesterol 02/10/2021 53  >49 mg/dL Final     LDL Cholesterol Calculated 02/10/2021 91  <100 mg/dL Final     Non HDL Cholesterol 02/10/2021 124  <130 mg/dL Final     Estimated Average Glucose  02/10/2021 97  mg/dL Final        Review of systems: Negative except that which was noted in the HPI.    Physical examination:  /73 (BP Location: Left arm, Patient Position: Sitting, Cuff Size: Adult Large)   Pulse 69   Temp 97.1  F (36.2  C) (Tympanic)   Wt 96.3 kg (212 lb 6.4 oz)   SpO2 97%   BMI 35.35 kg/m      GENERAL APPEARANCE: healthy, alert and no distress  HEENT: no icterus, no xanthelasmas, normal pupil size and reaction, no cyanosis.  NECK: no adenopathy, no asymmetry, masses.  CHEST: lungs clear to auscultation - no rales, rhonchi or wheezes, no use of accessory muscles, no retractions, respirations are unlabored, normal respiratory rate  CARDIOVASCULAR: regular rhythm, normal S1 with physiologic split S2, no S3 or S4 and no murmur, click or rub  EXTREMITIES: no clubbing, cyanosis or edema  NEURO: alert and oriented normal speech, and affect  VASC: No vascular bruits heard.  SKIN: no ecchymoses, no rashes        Thank you for allowing me to participate in the care of your patient. Please do not hesitate to contact me if you have any questions.     Ha Hughes, DO

## 2021-03-22 ENCOUNTER — OFFICE VISIT (OUTPATIENT)
Dept: CARDIOLOGY | Facility: OTHER | Age: 81
End: 2021-03-22
Attending: FAMILY MEDICINE
Payer: COMMERCIAL

## 2021-03-22 VITALS
HEART RATE: 69 BPM | WEIGHT: 212.4 LBS | DIASTOLIC BLOOD PRESSURE: 73 MMHG | OXYGEN SATURATION: 97 % | TEMPERATURE: 97.1 F | SYSTOLIC BLOOD PRESSURE: 114 MMHG | BODY MASS INDEX: 35.35 KG/M2

## 2021-03-22 DIAGNOSIS — R01.1 HEART MURMUR: ICD-10-CM

## 2021-03-22 DIAGNOSIS — I65.23 BILATERAL CAROTID ARTERY STENOSIS: ICD-10-CM

## 2021-03-22 DIAGNOSIS — I35.1 NONRHEUMATIC AORTIC VALVE INSUFFICIENCY: ICD-10-CM

## 2021-03-22 DIAGNOSIS — I51.89 DIASTOLIC DYSFUNCTION: ICD-10-CM

## 2021-03-22 DIAGNOSIS — I47.10 PSVT (PAROXYSMAL SUPRAVENTRICULAR TACHYCARDIA) (H): ICD-10-CM

## 2021-03-22 DIAGNOSIS — E66.01 MORBID OBESITY (H): ICD-10-CM

## 2021-03-22 DIAGNOSIS — I49.3 PVC'S (PREMATURE VENTRICULAR CONTRACTIONS): ICD-10-CM

## 2021-03-22 DIAGNOSIS — I49.1 ATRIAL ECTOPY: ICD-10-CM

## 2021-03-22 DIAGNOSIS — U07.1 COVID-19 VIRUS INFECTION: ICD-10-CM

## 2021-03-22 DIAGNOSIS — I36.1 NON-RHEUMATIC TRICUSPID VALVE INSUFFICIENCY: ICD-10-CM

## 2021-03-22 DIAGNOSIS — J98.4 LUNG DENSITY ON X-RAY: Chronic | ICD-10-CM

## 2021-03-22 DIAGNOSIS — J44.9 COPD, MILD (H): Primary | ICD-10-CM

## 2021-03-22 DIAGNOSIS — R00.2 PALPITATIONS: Primary | ICD-10-CM

## 2021-03-22 DIAGNOSIS — R00.2 PALPITATIONS: ICD-10-CM

## 2021-03-22 DIAGNOSIS — N18.31 STAGE 3A CHRONIC KIDNEY DISEASE (H): ICD-10-CM

## 2021-03-22 DIAGNOSIS — E78.2 MIXED HYPERLIPIDEMIA: ICD-10-CM

## 2021-03-22 DIAGNOSIS — Z79.899 ON STATIN THERAPY: ICD-10-CM

## 2021-03-22 DIAGNOSIS — E78.1 HYPERTRIGLYCERIDEMIA: ICD-10-CM

## 2021-03-22 DIAGNOSIS — I34.0 NON-RHEUMATIC MITRAL REGURGITATION: ICD-10-CM

## 2021-03-22 PROCEDURE — 99214 OFFICE O/P EST MOD 30 MIN: CPT | Performed by: INTERNAL MEDICINE

## 2021-03-22 PROCEDURE — G0463 HOSPITAL OUTPT CLINIC VISIT: HCPCS

## 2021-03-22 ASSESSMENT — PAIN SCALES - GENERAL: PAINLEVEL: SEVERE PAIN (6)

## 2021-03-22 NOTE — PATIENT INSTRUCTIONS
Thank you for allowing Khushboo Curiel and our team to participate in your care. Please call our office at [phone] with scheduling questions or if you need to cancel or change your appointment. With any other questions or concerns you may call Jesusita cardiology nurse at [phone].       If you experience chest pain, chest pressure, chest tightness, shortness of breath, fainting, lightheadedness, nausea, vomiting, or other concerning symptoms, please report to the Emergency Department or call 911. These symptoms may be emergent, and best treated in the Emergency Department.    Follow up in 1 year    You will have an echocardiogram performed.  This is an ultrasound of the heart, that evaluates heart function.  The hospital scheduling department will call to schedule you for this test.

## 2021-03-22 NOTE — NURSING NOTE
"Chief Complaint   Patient presents with     RECHECK       Initial /73 (BP Location: Left arm, Patient Position: Sitting, Cuff Size: Adult Large)   Pulse 69   Temp 97.1  F (36.2  C) (Tympanic)   Wt 96.3 kg (212 lb 6.4 oz)   SpO2 97%   BMI 35.35 kg/m   Estimated body mass index is 35.35 kg/m  as calculated from the following:    Height as of 2/9/21: 1.651 m (5' 5\").    Weight as of this encounter: 96.3 kg (212 lb 6.4 oz).  Medication Reconciliation: complete  Jesusita So LPN    "

## 2021-03-23 ENCOUNTER — TRANSFERRED RECORDS (OUTPATIENT)
Dept: HEALTH INFORMATION MANAGEMENT | Facility: CLINIC | Age: 81
End: 2021-03-23

## 2021-03-31 DIAGNOSIS — R52 PAIN: ICD-10-CM

## 2021-03-31 RX ORDER — HYDROCODONE BITARTRATE AND ACETAMINOPHEN 5; 325 MG/1; MG/1
TABLET ORAL
Qty: 10 TABLET | Refills: 0 | Status: SHIPPED | OUTPATIENT
Start: 2021-03-31 | End: 2021-06-07

## 2021-03-31 NOTE — TELEPHONE ENCOUNTER
lortab      Last Written Prescription Date:  2/18/21  Last Fill Quantity: 10,   # refills: 0  Last Office Visit: 2/9/21  Future Office visit:    Next 5 appointments (look out 90 days)    Apr 26, 2021  9:15 AM  (Arrive by 9:00 AM)  SHORT with Magaly Sosa MD  [facility] ([facility] ) [address]  [place] [address]  [phone]           Routing refill request to provider for review/approval because:  Drug not on the [facility], [facility] or [facility] refill protocol or controlled substance

## 2021-04-05 NOTE — TELEPHONE ENCOUNTER
Patient has been waiting to have surgery on her left knee she would like Jesusita to call her back ASAP to talk about it. Thank you.  
Patient notified of upcoming appointments for Surgery of left knee , return appointment before surgery and Pre op with primary.   Jesusita So LPN    
Airway patent, TM normal bilaterally, normal appearing mouth, nose, throat, neck supple with full range of motion, no cervical adenopathy.

## 2021-04-06 ENCOUNTER — TELEPHONE (OUTPATIENT)
Dept: INTERVENTIONAL RADIOLOGY/VASCULAR | Facility: HOSPITAL | Age: 81
End: 2021-04-06

## 2021-04-06 NOTE — TELEPHONE ENCOUNTER
Informed patient not to have a flu shot/immunization/covid vaccine two weeks before/after injection.

## 2021-04-08 ENCOUNTER — DOCUMENTATION ONLY (OUTPATIENT)
Dept: INTERVENTIONAL RADIOLOGY/VASCULAR | Facility: HOSPITAL | Age: 81
End: 2021-04-08

## 2021-04-08 NOTE — PROGRESS NOTES
Patient scheduled for hip injection on 04/15.  Has history of having hives with dexamethasone.  Per pharmacy ([facility]), patient could have similar reaction with depomedrol or kenalog (one or the other used in hip injections), it will be up to the administering provider which to use.

## 2021-04-15 ENCOUNTER — HOSPITAL ENCOUNTER (OUTPATIENT)
Dept: INTERVENTIONAL RADIOLOGY/VASCULAR | Facility: HOSPITAL | Age: 81
End: 2021-04-15
Attending: ORTHOPAEDIC SURGERY
Payer: COMMERCIAL

## 2021-04-15 ENCOUNTER — HOSPITAL ENCOUNTER (OUTPATIENT)
Facility: HOSPITAL | Age: 81
Discharge: HOME | End: 2021-04-15
Attending: RADIOLOGY | Admitting: RADIOLOGY
Payer: COMMERCIAL

## 2021-04-15 DIAGNOSIS — M16.11 ARTHRITIS OF RIGHT HIP: ICD-10-CM

## 2021-04-15 DIAGNOSIS — M25.551 RIGHT HIP PAIN: ICD-10-CM

## 2021-04-15 DIAGNOSIS — E78.5 HYPERLIPIDEMIA LDL GOAL <100: ICD-10-CM

## 2021-04-15 PROCEDURE — 250N000009 HC RX 250: Performed by: RADIOLOGY

## 2021-04-15 PROCEDURE — 250N000011 HC RX IP 250 OP 636: Performed by: RADIOLOGY

## 2021-04-15 PROCEDURE — 20610 DRAIN/INJ JOINT/BURSA W/O US: CPT | Mod: RT

## 2021-04-15 PROCEDURE — 255N000002 HC RX 255 OP 636: Performed by: RADIOLOGY

## 2021-04-15 RX ORDER — LIDOCAINE HYDROCHLORIDE 10 MG/ML
INJECTION, SOLUTION EPIDURAL; INFILTRATION; INTRACAUDAL; PERINEURAL
Status: DISCONTINUED
Start: 2021-04-15 | End: 2021-04-15 | Stop reason: HOSPADM

## 2021-04-15 RX ORDER — LIDOCAINE HYDROCHLORIDE 10 MG/ML
10 INJECTION, SOLUTION INFILTRATION; PERINEURAL ONCE
Status: COMPLETED | OUTPATIENT
Start: 2021-04-15 | End: 2021-04-15

## 2021-04-15 RX ORDER — SIMVASTATIN 40 MG
TABLET ORAL
Qty: 90 TABLET | Refills: 2 | Status: SHIPPED | OUTPATIENT
Start: 2021-04-15 | End: 2022-03-16

## 2021-04-15 RX ORDER — IOPAMIDOL 612 MG/ML
50 INJECTION, SOLUTION INTRAVASCULAR ONCE
Status: COMPLETED | OUTPATIENT
Start: 2021-04-15 | End: 2021-04-15

## 2021-04-15 RX ORDER — TRIAMCINOLONE ACETONIDE 40 MG/ML
40 INJECTION, SUSPENSION INTRA-ARTICULAR; INTRAMUSCULAR ONCE
Status: COMPLETED | OUTPATIENT
Start: 2021-04-15 | End: 2021-04-15

## 2021-04-15 RX ORDER — TRIAMCINOLONE ACETONIDE 40 MG/ML
INJECTION, SUSPENSION INTRA-ARTICULAR; INTRAMUSCULAR
Status: DISCONTINUED
Start: 2021-04-15 | End: 2021-04-15 | Stop reason: HOSPADM

## 2021-04-15 RX ADMIN — TRIAMCINOLONE ACETONIDE 40 MG: 40 INJECTION, SUSPENSION INTRA-ARTICULAR; INTRAMUSCULAR at 12:14

## 2021-04-15 RX ADMIN — LIDOCAINE HYDROCHLORIDE 6 ML: 10 INJECTION, SOLUTION EPIDURAL; INFILTRATION; INTRACAUDAL; PERINEURAL at 12:13

## 2021-04-15 RX ADMIN — IOPAMIDOL 2 ML: 612 INJECTION, SOLUTION INTRAVENOUS at 12:14

## 2021-04-15 NOTE — DISCHARGE INSTRUCTIONS
Discharge Instructions for an Radiology Injection    Home number on file [phone] (home)  Is it ok to leave a message at this number(s) Yes    Dr. Baig completed your procedure on 4/15/2021.    Current Pain Level (0-10 Scale): 6/10  Post Pain Level (0-10):  0/10      Activity Level:     Do not do any heavy activity or exercise for 24 hours.   Hip Injections:  Do not drive for 4 hours after your injection.  Diet:   Return to your normal diet.  Medications:   If you have stopped taking your Aspirin, Coumadin/Warfarin, Ibuprofen, or any   other blood thinner for this procedure you may resume in the morning unless   your primary care provider has given you other instructions.    Diabetics may see an increase in blood sugar after steroid injections. If you are concerned about your blood sugar, please contact your family doctor.    Site Care:  Remove the bandage and bathe or shower the morning after the procedure.      Please allow two weeks to experience improvement in your pain.  If you have any further issues, please contact your provider.  Call your Primary Care Provider if you have the following (if your primary care provider is not available please seek emergency care):   Nausea with vomiting   Severe headache   Drowsiness or confusion   Redness or drainage at the injection or puncture site   Temperature over 101 degrees F   Other concerns   Increasing joint pain

## 2021-04-15 NOTE — IP AVS SNAPSHOT
[facility]  [address] [address]  Phone: [phone]  Fax: [phone]                                    After Visit Summary   4/15/2021    Dinorah Lim    MRN: [id number]           After Visit Summary Signature Page    I have received my discharge instructions, and my questions have been answered. I have discussed any challenges I see with this plan with the nurse or doctor.    ..........................................................................................................................................  Patient/Patient Representative Signature      ..........................................................................................................................................  Patient Representative Print Name and Relationship to Patient    ..................................................               ................................................  Date                                   Time    ..........................................................................................................................................  Reviewed by Signature/Title    ...................................................              ..............................................  Date                                               Time          22EPIC Rev 08/18

## 2021-04-20 NOTE — PROGRESS NOTES
"    Assessment & Plan     Mixed hyperlipidemia  Due for lab today  - Lipid Profile    Abnormal glucose  Due for lab   - Hemoglobin A1c    PSVT (paroxysmal supraventricular tachycardia) (H)  Stable  Following with Dr Hughes  Due for lab  - Comprehensive metabolic panel    Aortic valve insufficiency  Stable. Recent Echo last month    Diastolic dysfunction grade 1 on 2/21/19  Stable     Stage 3a chronic kidney disease  Recheck lab today    Other neutropenia (H)  Recheck lab today  - CBC with platelets    Estrogen deficiency  Due for follow up Dexa scan   - DX Hip/Pelvis/Spine; Future    COVID-19 virus infection  October 2020  Vaccine is declined         25 minutes spent on the date of the encounter doing chart review, review of test results, interpretation of tests, patient visit and documentation        BMI:   Estimated body mass index is 35.11 kg/m  as calculated from the following:    Height as of this encounter: 1.651 m (5' 5\").    Weight as of this encounter: 95.7 kg (211 lb).   Weight management plan: Discussed healthy diet and exercise guidelines        No follow-ups on file.    Magaly Sosa MD  [facility] - REI Copeland is a 80 year old who presents for the following health issues     HPI     Hyperlipidemia Follow-Up      Are you regularly taking any medication or supplement to lower your cholesterol?   Yes- Simvastatin 40 mg    Are you having muscle aches or other side effects that you think could be caused by your cholesterol lowering medication?  No      She had a right hip injection which has completely relieved her pain and the swelling of her right knee. This is followed by Dr Clinton Tamez vaccine is declined. She did have Covid in October 2020        Review of Systems   Constitutional, HEENT, cardiovascular, pulmonary, gi and gu systems are negative, except as otherwise noted.      Objective    /70   Pulse 65   Temp 97.8  F (36.6  C) (Tympanic)   Resp 19   Ht " "1.651 m (5' 5\")   Wt 95.7 kg (211 lb)   SpO2 98%   BMI 35.11 kg/m    Body mass index is 35.11 kg/m .  Physical Exam   GENERAL: healthy, alert and no distress  NECK: no adenopathy, no asymmetry, masses, or scars and thyroid normal to palpation  RESP: lungs clear to auscultation - no rales, rhonchi or wheezes  CV: regular rate and rhythm, normal S1 S2, no S3 or S4, grade 1/6 ITALO  , click or rub, no peripheral edema and peripheral pulses strong  MS: no gross musculoskeletal defects noted, no edema  NEURO: Normal strength and tone, mentation intact and speech normal  PSYCH: mentation appears normal, affect normal/bright                "

## 2021-04-26 ENCOUNTER — OFFICE VISIT (OUTPATIENT)
Dept: FAMILY MEDICINE | Facility: OTHER | Age: 81
End: 2021-04-26
Attending: FAMILY MEDICINE
Payer: COMMERCIAL

## 2021-04-26 VITALS
HEART RATE: 65 BPM | SYSTOLIC BLOOD PRESSURE: 118 MMHG | DIASTOLIC BLOOD PRESSURE: 70 MMHG | WEIGHT: 211 LBS | RESPIRATION RATE: 19 BRPM | BODY MASS INDEX: 35.16 KG/M2 | TEMPERATURE: 97.8 F | OXYGEN SATURATION: 98 % | HEIGHT: 65 IN

## 2021-04-26 DIAGNOSIS — I51.89 DIASTOLIC DYSFUNCTION: ICD-10-CM

## 2021-04-26 DIAGNOSIS — U07.1 COVID-19 VIRUS INFECTION: ICD-10-CM

## 2021-04-26 DIAGNOSIS — I47.10 PSVT (PAROXYSMAL SUPRAVENTRICULAR TACHYCARDIA) (H): ICD-10-CM

## 2021-04-26 DIAGNOSIS — N18.31 STAGE 3A CHRONIC KIDNEY DISEASE (H): ICD-10-CM

## 2021-04-26 DIAGNOSIS — R73.09 ABNORMAL GLUCOSE: ICD-10-CM

## 2021-04-26 DIAGNOSIS — I35.1 NONRHEUMATIC AORTIC VALVE INSUFFICIENCY: ICD-10-CM

## 2021-04-26 DIAGNOSIS — E78.2 MIXED HYPERLIPIDEMIA: Primary | ICD-10-CM

## 2021-04-26 DIAGNOSIS — D70.8 OTHER NEUTROPENIA (H): ICD-10-CM

## 2021-04-26 DIAGNOSIS — E28.39 ESTROGEN DEFICIENCY: ICD-10-CM

## 2021-04-26 LAB
ALBUMIN SERPL-MCNC: 3.5 G/DL (ref 3.4–5)
ALP SERPL-CCNC: 92 U/L (ref 40–150)
ALT SERPL W P-5'-P-CCNC: 33 U/L (ref 0–50)
ANION GAP SERPL CALCULATED.3IONS-SCNC: 2 MMOL/L (ref 3–14)
AST SERPL W P-5'-P-CCNC: 20 U/L (ref 0–45)
BILIRUB SERPL-MCNC: 0.9 MG/DL (ref 0.2–1.3)
BUN SERPL-MCNC: 19 MG/DL (ref 7–30)
CALCIUM SERPL-MCNC: 9.4 MG/DL (ref 8.5–10.1)
CHLORIDE SERPL-SCNC: 108 MMOL/L (ref 94–109)
CHOLEST SERPL-MCNC: 171 MG/DL
CO2 SERPL-SCNC: 28 MMOL/L (ref 20–32)
CREAT SERPL-MCNC: 0.94 MG/DL (ref 0.52–1.04)
ERYTHROCYTE [DISTWIDTH] IN BLOOD BY AUTOMATED COUNT: 13.6 % (ref 10–15)
EST. AVERAGE GLUCOSE BLD GHB EST-MCNC: 105 MG/DL
GFR SERPL CREATININE-BSD FRML MDRD: 58 ML/MIN/{1.73_M2}
GLUCOSE SERPL-MCNC: 104 MG/DL (ref 70–99)
HBA1C MFR BLD: 5.3 % (ref 0–5.6)
HCT VFR BLD AUTO: 41.6 % (ref 35–47)
HDLC SERPL-MCNC: 65 MG/DL
HGB BLD-MCNC: 13.4 G/DL (ref 11.7–15.7)
LDLC SERPL CALC-MCNC: 89 MG/DL
MCH RBC QN AUTO: 29.8 PG (ref 26.5–33)
MCHC RBC AUTO-ENTMCNC: 32.2 G/DL (ref 31.5–36.5)
MCV RBC AUTO: 92 FL (ref 78–100)
NONHDLC SERPL-MCNC: 106 MG/DL
PLATELET # BLD AUTO: 170 10E9/L (ref 150–450)
POTASSIUM SERPL-SCNC: 4.1 MMOL/L (ref 3.4–5.3)
PROT SERPL-MCNC: 6.6 G/DL (ref 6.8–8.8)
RBC # BLD AUTO: 4.5 10E12/L (ref 3.8–5.2)
SODIUM SERPL-SCNC: 138 MMOL/L (ref 133–144)
TRIGL SERPL-MCNC: 87 MG/DL
WBC # BLD AUTO: 5 10E9/L (ref 4–11)

## 2021-04-26 PROCEDURE — 80061 LIPID PANEL: CPT | Mod: ZL | Performed by: FAMILY MEDICINE

## 2021-04-26 PROCEDURE — 80053 COMPREHEN METABOLIC PANEL: CPT | Mod: ZL | Performed by: FAMILY MEDICINE

## 2021-04-26 PROCEDURE — 83036 HEMOGLOBIN GLYCOSYLATED A1C: CPT | Mod: ZL | Performed by: FAMILY MEDICINE

## 2021-04-26 PROCEDURE — G0463 HOSPITAL OUTPT CLINIC VISIT: HCPCS | Mod: 25

## 2021-04-26 PROCEDURE — 99214 OFFICE O/P EST MOD 30 MIN: CPT | Performed by: FAMILY MEDICINE

## 2021-04-26 PROCEDURE — 36415 COLL VENOUS BLD VENIPUNCTURE: CPT | Mod: ZL | Performed by: FAMILY MEDICINE

## 2021-04-26 PROCEDURE — 999N001182 HC STATISTIC ESTIMATED AVERAGE GLUCOSE: Mod: ZL | Performed by: FAMILY MEDICINE

## 2021-04-26 PROCEDURE — 85027 COMPLETE CBC AUTOMATED: CPT | Mod: ZL | Performed by: FAMILY MEDICINE

## 2021-04-26 ASSESSMENT — PAIN SCALES - GENERAL: PAINLEVEL: NO PAIN (0)

## 2021-04-26 ASSESSMENT — MIFFLIN-ST. JEOR: SCORE: 1437.97

## 2021-04-26 NOTE — NURSING NOTE
"Chief Complaint   Patient presents with     Lipids       Initial /70   Pulse 65   Temp 97.8  F (36.6  C) (Tympanic)   Resp 19   Ht 1.651 m (5' 5\")   Wt 95.7 kg (211 lb)   SpO2 98%   BMI 35.11 kg/m   Estimated body mass index is 35.11 kg/m  as calculated from the following:    Height as of this encounter: 1.651 m (5' 5\").    Weight as of this encounter: 95.7 kg (211 lb).  Medication Reconciliation: complete  Aditi Saleh LPN    "

## 2021-04-26 NOTE — LETTER
April 26, 2021      Dinorah Lim  [address] [place] [address]        Dear ,    We are writing to inform you of your test results.      Lipids are excellent on simvastatin  Chemistries are ok, blood sugar is borderline, total protein is slightly low but stable   A1C is normal (looking at blood sugars over 3 months) watch sweets and carbs in diet   Blood counts are normal  Resulted Orders   CBC with platelets   Result Value Ref Range    WBC 5.0 4.0 - 11.0 10e9/L    RBC Count 4.50 3.8 - 5.2 10e12/L    Hemoglobin 13.4 11.7 - 15.7 g/dL    Hematocrit 41.6 35.0 - 47.0 %    MCV 92 78 - 100 fl    MCH 29.8 26.5 - 33.0 pg    MCHC 32.2 31.5 - 36.5 g/dL    RDW 13.6 10.0 - 15.0 %    Platelet Count 170 150 - 450 10e9/L   Comprehensive metabolic panel   Result Value Ref Range    Sodium 138 133 - 144 mmol/L    Potassium 4.1 3.4 - 5.3 mmol/L    Chloride 108 94 - 109 mmol/L    Carbon Dioxide 28 20 - 32 mmol/L    Anion Gap 2 (L) 3 - 14 mmol/L    Glucose 104 (H) 70 - 99 mg/dL    Urea Nitrogen 19 7 - 30 mg/dL    Creatinine 0.94 0.52 - 1.04 mg/dL    GFR Estimate 58 (L) >60 mL/min/[1.73_m2]      Comment:      Non  GFR Calc  Starting 12/18/2018, serum creatinine based estimated GFR (eGFR) will be   calculated using the Chronic Kidney Disease Epidemiology Collaboration   (CKD-EPI) equation.      GFR Estimate If Black 68 >60 mL/min/[1.73_m2]      Comment:       GFR Calc  Starting 12/18/2018, serum creatinine based estimated GFR (eGFR) will be   calculated using the Chronic Kidney Disease Epidemiology Collaboration   (CKD-EPI) equation.      Calcium 9.4 8.5 - 10.1 mg/dL    Bilirubin Total 0.9 0.2 - 1.3 mg/dL    Albumin 3.5 3.4 - 5.0 g/dL    Protein Total 6.6 (L) 6.8 - 8.8 g/dL    Alkaline Phosphatase 92 40 - 150 U/L    ALT 33 0 - 50 U/L    AST 20 0 - 45 U/L   Lipid Profile   Result Value Ref Range    Cholesterol 171 <200 mg/dL    Triglycerides 87 <150 mg/dL    HDL Cholesterol 65 >49 mg/dL     LDL Cholesterol Calculated 89 <100 mg/dL      Comment:      Desirable:       <100 mg/dl    Non HDL Cholesterol 106 <130 mg/dL   Hemoglobin A1c   Result Value Ref Range    Hemoglobin A1C 5.3 0 - 5.6 %      Comment:      Normal <5.7% Prediabetes 5.7-6.4%  Diabetes 6.5% or higher - adopted from ADA   consensus guidelines.     Estimated Average Glucose   Result Value Ref Range    Estimated Average Glucose 105 mg/dL       If you have any questions or concerns, please call the clinic at the number listed above.       Sincerely,      Magaly Sosa MD

## 2021-04-29 ENCOUNTER — HOSPITAL ENCOUNTER (OUTPATIENT)
Dept: BONE DENSITY | Facility: HOSPITAL | Age: 81
Discharge: HOME | End: 2021-04-29
Attending: FAMILY MEDICINE | Admitting: FAMILY MEDICINE
Payer: COMMERCIAL

## 2021-04-29 DIAGNOSIS — M81.0 AGE-RELATED OSTEOPOROSIS WITHOUT CURRENT PATHOLOGICAL FRACTURE: Primary | ICD-10-CM

## 2021-04-29 DIAGNOSIS — E28.39 ESTROGEN DEFICIENCY: ICD-10-CM

## 2021-04-29 PROCEDURE — 77080 DXA BONE DENSITY AXIAL: CPT

## 2021-04-29 RX ORDER — ALENDRONATE SODIUM 70 MG/1
70 TABLET ORAL
Qty: 12 TABLET | Refills: 3 | Status: SHIPPED | OUTPATIENT
Start: 2021-04-29 | End: 2021-07-28

## 2021-05-02 DIAGNOSIS — F41.9 ANXIETY: Chronic | ICD-10-CM

## 2021-05-02 DIAGNOSIS — F41.9 ANXIETY: ICD-10-CM

## 2021-05-03 RX ORDER — PAROXETINE 40 MG/1
TABLET, FILM COATED ORAL
Qty: 90 TABLET | Refills: 0 | Status: SHIPPED | OUTPATIENT
Start: 2021-05-03 | End: 2021-08-12

## 2021-05-03 RX ORDER — CLONAZEPAM 1 MG/1
TABLET ORAL
Qty: 45 TABLET | Refills: 0 | Status: SHIPPED | OUTPATIENT
Start: 2021-05-03 | End: 2021-07-18

## 2021-05-03 NOTE — TELEPHONE ENCOUNTER
PARoxetine (PAXIL) 40 MG tablet      Last Written Prescription Date:  1/26/2021  Last Fill Quantity: 90   # refills: 0  Last Office Visit: 4/29/2021  Future Office visit:

## 2021-05-03 NOTE — TELEPHONE ENCOUNTER
KLONOPIN      Last Written Prescription Date:  2-  Last Fill Quantity: 45,   # refills: 0  Last Office Visit: 4-  Future Office visit:       Routing refill request to provider for review/approval because:  Drug not on the [facility], [facility] or [facility] refill protocol or controlled substance

## 2021-05-17 PROBLEM — F11.90 CHRONIC, CONTINUOUS USE OF OPIOIDS: Chronic | Status: ACTIVE | Noted: 2021-05-17

## 2021-05-18 NOTE — PROGRESS NOTES
"    Assessment & Plan     Hip pain, right  Chronic,due for hip replacement. Will see Dr Alonzo again     Chronic, continuous use of opioids  She is taking 10 tablets over 3 months  Continue to monitor     Pain  Renewed, #10  - HYDROcodone-acetaminophen (NORCO) 5-325 MG tablet; TAKE 1 TABLET BY MOUTH EVERY 6 HOURS AS NEEDED FOR SEVERE PAIN      20 minutes spent on the date of the encounter doing chart review, patient visit and documentation            No follow-ups on file.    Magaly Sosa MD  [facility] - REI Copeland is a 80 year old who presents for the following health issues     HPI     Musculoskeletal problem/pain  Onset/Duration: few weeks  Description  Location: hip - right  Joint Swelling: no  Redness: no  Pain: YES  Warmth: no  Intensity:  severe, 10/10  Progression of Symptoms:  constant  Accompanying signs and symptoms:   Fevers: no  Numbness/tingling/weakness: no  History  Trauma to the area: no  Recent illness:  no  Previous similar problem: no  Previous evaluation:  no  Precipitating or alleviating factors:  Aggravating factors include: standing, walking, climbing stairs and overuse  Therapies tried and outcome: ice    She is seeing Dr Alonzo for this and had a hip injection done on 4-15-21 which lasted about a month. She had hydrocodone prescribe, #10, which has lasted 3 months. She uses this when the pain is unbearable. She is discussing hip replacement with Dr Alonzo. Her last hydrocodone was filled in March, 10 tablets,  and she would like to refill this    Discussed Covid vaccine. She had covid 10-31-20 which was a mild illness. She is reluctant too obtain the vaccine.     Review of Systems   Constitutional, HEENT, cardiovascular, pulmonary, gi and gu systems are negative, except as otherwise noted.      Objective    /62   Pulse 65   Temp 98.5  F (36.9  C) (Tympanic)   Resp 19   Ht 1.651 m (5' 5\")   Wt 95.7 kg (211 lb)   SpO2 97%   BMI 35.11 kg/m    Body " mass index is 35.11 kg/m .  Physical Exam   GENERAL APPEARANCE: healthy, alert and no distress  ORTHO: Hip Exam: Palpation: Tender:   right greater trochanter, right ASIS  Non-tender:  right iliac crest, right proximal hamstring attachment  Range of Motion:  Right Hip  flexion   full, painful, extension  full, painful, external rotation  full, painful, internal rotation  full, painful, adduction  full, painful, abduction  full, painful  Strength:  full strength  SKIN : no suspicious lesions or rashes  NEURO: Normal strength and tone, mentation intact and speech normal  PSYCH: mentation appears normal and affect normal/bright

## 2021-05-19 ENCOUNTER — NURSE TRIAGE (OUTPATIENT)
Dept: FAMILY MEDICINE | Facility: OTHER | Age: 81
End: 2021-05-19

## 2021-05-19 NOTE — TELEPHONE ENCOUNTER
"Patient wants a chest xray to see if she bronchitis.Please all her for an overbook tomorrow if appropriate.    Reason for Disposition    Wheezing is present    Answer Assessment - Initial Assessment Questions  1. ONSET: \"When did the cough begin?\"       Yesterday morning  2. SEVERITY: \"How bad is the cough today?\"       Feels like it is down in her lungs  3. RESPIRATORY DISTRESS: \"Describe your breathing.\"       A little sob  4. FEVER: \"Do you have a fever?\" If so, ask: \"What is your temperature, how was it measured, and when did it start?\"      no  5. HEMOPTYSIS: \"Are you coughing up any blood?\" If so ask: \"How much?\" (flecks, streaks, tablespoons, etc.)      no  6. TREATMENT: \"What have you done so far to treat the cough?\" (e.g., meds, fluids, humidifier)      no  7. CARDIAC HISTORY: \"Do you have any history of heart disease?\" (e.g., heart attack, congestive heart failure)       Heart failure  8. LUNG HISTORY: \"Do you have any history of lung disease?\"  (e.g., pulmonary embolus, asthma, emphysema)      no  9. PE RISK FACTORS: \"Do you have a history of blood clots?\" (or: recent major surgery, recent prolonged travel, bedridden)      no  10. OTHER SYMPTOMS: \"Do you have any other symptoms? (e.g., runny nose, wheezing, chest pain)        Chest feels heavy wheezing  11. PREGNANCY: \"Is there any chance you are pregnant?\" \"When was your last menstrual period?\"        no  12. TRAVEL: \"Have you traveled out of the country in the last month?\" (e.g., travel history, exposures)        no    Protocols used: COUGH-A-[place]      "

## 2021-05-19 NOTE — TELEPHONE ENCOUNTER
Spoke with patient she would like to have ct xray to see if she has bronchitis if she is not able to be seen by provider for over book can she just have the xray? She did not feel she needed UC at this time but will consider it tomorrow if unable to have xray please advise Thank you.  Flor Mccormick LPN

## 2021-05-20 ENCOUNTER — APPOINTMENT (OUTPATIENT)
Dept: GENERAL RADIOLOGY | Facility: HOSPITAL | Age: 81
End: 2021-05-20
Attending: NURSE PRACTITIONER
Payer: COMMERCIAL

## 2021-05-20 ENCOUNTER — HOSPITAL ENCOUNTER (EMERGENCY)
Facility: HOSPITAL | Age: 81
Discharge: HOME | End: 2021-05-20
Attending: NURSE PRACTITIONER | Admitting: NURSE PRACTITIONER
Payer: COMMERCIAL

## 2021-05-20 VITALS
TEMPERATURE: 98.8 F | SYSTOLIC BLOOD PRESSURE: 130 MMHG | OXYGEN SATURATION: 96 % | HEART RATE: 69 BPM | DIASTOLIC BLOOD PRESSURE: 68 MMHG | RESPIRATION RATE: 16 BRPM

## 2021-05-20 DIAGNOSIS — R05.9 COUGH: Primary | ICD-10-CM

## 2021-05-20 LAB
LABORATORY COMMENT REPORT: NORMAL
SARS-COV-2 RNA RESP QL NAA+PROBE: NEGATIVE
SPECIMEN SOURCE: NORMAL

## 2021-05-20 PROCEDURE — C9803 HOPD COVID-19 SPEC COLLECT: HCPCS

## 2021-05-20 PROCEDURE — G0463 HOSPITAL OUTPT CLINIC VISIT: HCPCS

## 2021-05-20 PROCEDURE — U0003 INFECTIOUS AGENT DETECTION BY NUCLEIC ACID (DNA OR RNA); SEVERE ACUTE RESPIRATORY SYNDROME CORONAVIRUS 2 (SARS-COV-2) (CORONAVIRUS DISEASE [COVID-19]), AMPLIFIED PROBE TECHNIQUE, MAKING USE OF HIGH THROUGHPUT TECHNOLOGIES AS DESCRIBED BY CMS-2020-01-R: HCPCS | Performed by: NURSE PRACTITIONER

## 2021-05-20 PROCEDURE — U0005 INFEC AGEN DETEC AMPLI PROBE: HCPCS | Performed by: NURSE PRACTITIONER

## 2021-05-20 PROCEDURE — 71046 X-RAY EXAM CHEST 2 VIEWS: CPT

## 2021-05-20 PROCEDURE — 99213 OFFICE O/P EST LOW 20 MIN: CPT | Performed by: NURSE PRACTITIONER

## 2021-05-20 ASSESSMENT — ENCOUNTER SYMPTOMS
TROUBLE SWALLOWING: 0
CHILLS: 0
APPETITE CHANGE: 0
FEVER: 0
SORE THROAT: 0
COUGH: 1
SHORTNESS OF BREATH: 0

## 2021-05-20 NOTE — ED TRIAGE NOTES
Pt requests a chest xray. Pt states that SAnna Sosa told her to come her to get one done because she has a cough. Pt did a neb last night that she says helped. Pt states she will do a covid test if she absolutely has to.

## 2021-05-20 NOTE — TELEPHONE ENCOUNTER
See below.Will you see this pt today?Did update if no availability may need to go to .    Denies SOB.    Call back [phone]      Carla No RN

## 2021-05-20 NOTE — DISCHARGE INSTRUCTIONS
Continue using your nebulizers as needed.    You will be notified of your COVID-19 result when available.    Schedule an appointment with your doctor as needed if no improvement in symptoms.    Return to emergency department for worsening or concerning symptoms.

## 2021-05-20 NOTE — ED PROVIDER NOTES
History     Chief Complaint   Patient presents with     Cough     HPI  Dinorah Lim is a 80 year old female who presents ambulatory to urgent care for concerns of a cough.  Onset 2 days ago.  Cough is nonproductive.  Denies fevers, chills, shortness of breath.  No known recent ill contacts.  She did try albuterol nebulizer last night once with slight improvement in her cough.  History of COPD.  Denies smoking history.    Patient is requesting a chest x-ray.  Patient initially declined COVID-19 testing.  She is now requesting to have a COVID-19 test done.    She has not had a COVID-19 vaccine.    Allergies:  Allergies   Allergen Reactions     Chocolate Hives     Lemon Flavor Hives     Lime [Calcium Oxysulfide] Hives     Metal [Staples]      Orange Fruit [Citrus] Hives     Strawberry Hives     Adhesive Tape      Band-aids     Codeine Hives     Patient can tolerate oxycodone & dilaudid     Decadron [Dexamethasone] Hives     Erythromycin Hives     ERYTHROMYCIN BASE     Grapefruit [Extra Strength Grapefruit]      GRAPEFRUIT     Morphine Hives     Pt. Reports had hives     Penicillins Hives     Ranitidine GI Disturbance     Sulfa Drugs      SULFONAMIDE ANTIBIOTICS      Tomato      Xyzal [Levocetirizine] Hives       Problem List:    Patient Active Problem List    Diagnosis Date Noted     Chronic, continuous use of opioids 05/17/2021     Priority: Medium     Stage 3a chronic kidney disease 03/22/2021     Priority: Medium     COVID-19 virus infection on 10/30/20 11/24/2020     Priority: Medium     10-       Other neutropenia (H) 08/04/2020     Priority: Medium     Paresthesias 02/13/2020     Priority: Medium     Status post total left knee replacement 09/09/2019     Priority: Medium     Aortic valve insufficiency 03/06/2019     Priority: Medium     Mitral regurgitation 03/06/2019     Priority: Medium     Tricuspid valve insufficiency 03/06/2019     Priority: Medium     Diastolic dysfunction grade 1 on 2/21/19  03/06/2019     Priority: Medium     Hypertriglyceridemia 03/06/2019     Priority: Medium     Palpitations 02/13/2019     Priority: Medium     PVC's (premature ventricular contractions) 02/13/2019     Priority: Medium     Atrial ectopy 02/13/2019     Priority: Medium     PSVT (paroxysmal supraventricular tachycardia) (H) 02/13/2019     Priority: Medium     COPD, mild on 9/9/14 02/13/2019     Priority: Medium     Bilateral carotid artery stenosis at less than 50% on 12/1/16 02/13/2019     Priority: Medium     Mixed hyperlipidemia 02/13/2019     Priority: Medium     On statin therapy 02/13/2019     Priority: Medium     Heart murmur 02/13/2019     Priority: Medium     Morbid obesity (H) 06/01/2017     Priority: Medium     ACP (advance care planning) 04/28/2016     Priority: Medium     Advance Care Planning 4/28/2016: ACP Review of Chart / Resources Provided:  Reviewed chart for advance care plan.  Dinorah WILLIAM Lim has no plan or code status on file. Discussed available resources and provided with information. Confirmed code status reflects current choices pending further ACP discussions.  Confirmed/documented legally designated decision makers.  Added by Aditi Gandhi             Anxiety 06/23/2014     Priority: Medium     Lung density on x-ray 07/23/2013     Priority: Medium     Osteoarthritis 06/14/2012     Priority: Medium     Problem list name updated by automated process. Provider to review       Advanced care planning/counseling discussion 01/13/2012     Priority: Medium     Abnormal glucose 08/01/2011     Priority: Medium     Hgb A1c 5.7 on 1/4/2015  Problem list name updated by automated process. Provider to review       Osteoarthritis of right hip 10/07/2004     Priority: Medium     Urticaria 06/01/2004     Priority: Medium     Problem list name updated by automated process. 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     Obesity, Class II, BMI 35-39.9 1/4/2015     Osteoarthritis of right hip 10/07/2004     Osteoarthrosis 06/14/2012     Otitis externa 10/04/2011     Prediabetes 08/01/2011       Past Surgical History:    Past Surgical History:   Procedure Laterality Date     ARTHROPLASTY KNEE Left 9/9/2019    Procedure: LEFT TOTAL KNEE ARTHROPLASTY S/N;  Surgeon: Trevor Alonzo MD;  Location: [facility]     ARTHROSCOPY KNEE Left 3/21/2019    Procedure: LEFT  KNEE ARTHROSCOPY, partial medial menisectomy;  Surgeon: Esteban Wills MD;  Location: [facility] OR     C TOTAL KNEE ARTHROPLASTY Left 09/09/2019    Dr Alonzo     CLOSED REDUCTION WRIST Right 1952 x 2    long arm cast (same time as elbow fx)     CLOSED RX ELBOW DISLOCATION Right 1952    CR/long cast of fracture     HC INJ EPIDURAL LUMBAR/SACRAL W/WO CONTRAST Bilateral 5/2013    facet injections; prev 2012     LAPAROSCOPIC CHOLECYSTECTOMY N/A 1/4/2015    Procedure: LAPAROSCOPIC CHOLECYSTECTOMY;  Surgeon: Brittney العلي MD;  Location: [facility] OR     TONSILLECTOMY         Family History:    Family History   Problem Relation Age of Onset     Heart Disease Brother         atrial fibrillation     Atrial fibrillation Brother      Other - See Comments Mother         emphysema     Heart Disease Father 92        CHF     Cancer Paternal Grandfather         lung cancer     Cancer Maternal Uncle         lung cancer     Chronic Obstructive Pulmonary Disease Daughter      Emphysema Daughter      Other - See Comments Daughter         benign breast lesion     Esophagitis Daughter        Social History:  Marital Status:  Single [1]  Social  History     Tobacco Use     Smoking status: Never Smoker     Smokeless tobacco: Never Used   Substance Use Topics     Alcohol use: No     Drug use: No        Medications:    alendronate (FOSAMAX) 70 MG tablet  Calcium-Magnesium-Vitamin D (CALCIUM 1200+D3 PO)  clonazePAM (KLONOPIN) 1 MG tablet  FISH OIL  HYDROcodone-acetaminophen (NORCO) 5-325 MG tablet  ipratropium - albuterol 0.5 mg/2.5 mg/3 mL (DUONEB) 0.5-2.5 (3) MG/3ML neb solution  PARoxetine (PAXIL) 40 MG tablet  simvastatin (ZOCOR) 40 MG tablet  VITAMIN D, CHOLECALCIFEROL, PO          Review of Systems   Constitutional: Negative for appetite change, chills and fever.   HENT: Negative for sore throat and trouble swallowing.    Respiratory: Positive for cough. Negative for shortness of breath.    All other systems reviewed and are negative.      Physical Exam   BP: 130/68  Pulse: 69  Temp: 98.8  F (37.1  C)  Resp: 16  SpO2: 96 %      Physical Exam  Vitals signs and nursing note reviewed.   Constitutional:       General: She is not in acute distress.     Appearance: Normal appearance. She is not ill-appearing, toxic-appearing or diaphoretic.   HENT:      Head: Normocephalic and atraumatic.      Right Ear: Tympanic membrane normal.      Left Ear: Tympanic membrane and ear canal normal.      Nose: Nose normal.      Mouth/Throat:      Mouth: Mucous membranes are moist.      Pharynx: No oropharyngeal exudate.   Eyes:      Pupils: Pupils are equal, round, and reactive to light.   Neck:      Musculoskeletal: Neck supple.   Cardiovascular:      Rate and Rhythm: Normal rate and regular rhythm.      Heart sounds: Normal heart sounds. No murmur.   Pulmonary:      Effort: Pulmonary effort is normal. No respiratory distress.      Breath sounds: Normal breath sounds. No wheezing, rhonchi or rales.   Musculoskeletal: Normal range of motion.         General: No signs of injury.   Lymphadenopathy:      Cervical: No cervical adenopathy.   Skin:     General: Skin is warm and dry.    Neurological:      Mental Status: She is alert and oriented to person, place, and time.         ED Course        Procedures     Results for orders placed or performed during the hospital encounter of 05/20/21 (from the past 24 hour(s))   Chest XR,  PA & LAT    Narrative    PROCEDURE:  XR CHEST 2 VW    HISTORY: Cough    COMPARISON:  3/18/2020    FINDINGS:  The cardiomediastinal contours are notable for a hiatal hernia.  The  trachea is midline.  There is calcific aortic atherosclerosis.  No focal consolidation, effusion or pneumothorax.    Dextroscoliosis of the thoracic spine.      Impression    IMPRESSION:      No discrete consolidation.    JEAN PIERRE BARBER MD       Medications - No data to display    Assessments & Plan (with Medical Decision Making)     I have reviewed the nursing notes.    I have reviewed the findings, diagnosis, plan and need for follow up with the patient.  80-year-old female that presented with concerns of a cough and requesting a chest x-ray.  Heart rate regular.  Respirations nonlabored.  No wheezes, crackles, rhonchi auscultated.  Patient talking in full sentences.  Patient is afebrile.  Vital signs are stable.  No acute findings on chest x-ray.  COVID-19 test done with results pending.  Discussed findings with patient.  Continue using duonebs as needed. Patient will be notified of covid19 result when available. Follow up with PCP for reevaluation if no improvement in symptoms. Return to emergency department for worsening or concerning symptoms. Patient verbalized understanding.     This document was prepared using a combination of typing and voice generated software.  While every attempt was made for accuracy, spelling and grammatical errors may exist.    Discharge Medication List as of 5/20/2021  5:21 PM          Final diagnoses:   Cough       5/20/2021   [facility]     Mpofu, Prudence, CNP  05/22/21 1028

## 2021-06-01 ENCOUNTER — TELEPHONE (OUTPATIENT)
Dept: CARDIOLOGY | Facility: OTHER | Age: 81
End: 2021-06-01

## 2021-06-01 ENCOUNTER — HOSPITAL ENCOUNTER (EMERGENCY)
Facility: HOSPITAL | Age: 81
Discharge: HOME | End: 2021-06-01
Attending: EMERGENCY MEDICINE | Admitting: EMERGENCY MEDICINE
Payer: COMMERCIAL

## 2021-06-01 ENCOUNTER — HOSPITAL ENCOUNTER (EMERGENCY)
Dept: ULTRASOUND IMAGING | Facility: HOSPITAL | Age: 81
End: 2021-06-01
Attending: EMERGENCY MEDICINE
Payer: COMMERCIAL

## 2021-06-01 ENCOUNTER — APPOINTMENT (OUTPATIENT)
Dept: GENERAL RADIOLOGY | Facility: HOSPITAL | Age: 81
End: 2021-06-01
Attending: EMERGENCY MEDICINE
Payer: COMMERCIAL

## 2021-06-01 VITALS
TEMPERATURE: 98.9 F | HEART RATE: 59 BPM | RESPIRATION RATE: 16 BRPM | WEIGHT: 211 LBS | OXYGEN SATURATION: 96 % | BODY MASS INDEX: 35.11 KG/M2 | DIASTOLIC BLOOD PRESSURE: 70 MMHG | SYSTOLIC BLOOD PRESSURE: 126 MMHG

## 2021-06-01 DIAGNOSIS — R06.02 SHORTNESS OF BREATH: ICD-10-CM

## 2021-06-01 LAB
ANION GAP SERPL CALCULATED.3IONS-SCNC: 3 MMOL/L (ref 3–14)
BASOPHILS # BLD AUTO: 0 10E9/L (ref 0–0.2)
BASOPHILS NFR BLD AUTO: 0.6 %
BUN SERPL-MCNC: 17 MG/DL (ref 7–30)
CALCIUM SERPL-MCNC: 9.3 MG/DL (ref 8.5–10.1)
CHLORIDE SERPL-SCNC: 110 MMOL/L (ref 94–109)
CO2 SERPL-SCNC: 29 MMOL/L (ref 20–32)
CREAT SERPL-MCNC: 0.85 MG/DL (ref 0.52–1.04)
DIFFERENTIAL METHOD BLD: ABNORMAL
EOSINOPHIL # BLD AUTO: 0.1 10E9/L (ref 0–0.7)
EOSINOPHIL NFR BLD AUTO: 1.7 %
ERYTHROCYTE [DISTWIDTH] IN BLOOD BY AUTOMATED COUNT: 13.2 % (ref 10–15)
GFR SERPL CREATININE-BSD FRML MDRD: 65 ML/MIN/{1.73_M2}
GLUCOSE SERPL-MCNC: 112 MG/DL (ref 70–99)
HCT VFR BLD AUTO: 39.7 % (ref 35–47)
HGB BLD-MCNC: 13 G/DL (ref 11.7–15.7)
IMM GRANULOCYTES # BLD: 0 10E9/L (ref 0–0.4)
IMM GRANULOCYTES NFR BLD: 0.3 %
LYMPHOCYTES # BLD AUTO: 1 10E9/L (ref 0.8–5.3)
LYMPHOCYTES NFR BLD AUTO: 27.6 %
MCH RBC QN AUTO: 30.3 PG (ref 26.5–33)
MCHC RBC AUTO-ENTMCNC: 32.7 G/DL (ref 31.5–36.5)
MCV RBC AUTO: 93 FL (ref 78–100)
MONOCYTES # BLD AUTO: 0.3 10E9/L (ref 0–1.3)
MONOCYTES NFR BLD AUTO: 8.6 %
NEUTROPHILS # BLD AUTO: 2.2 10E9/L (ref 1.6–8.3)
NEUTROPHILS NFR BLD AUTO: 61.2 %
NRBC # BLD AUTO: 0 10*3/UL
NRBC BLD AUTO-RTO: 0 /100
NT-PROBNP SERPL-MCNC: 179 PG/ML (ref 0–1800)
PLATELET # BLD AUTO: 158 10E9/L (ref 150–450)
POTASSIUM SERPL-SCNC: 4.3 MMOL/L (ref 3.4–5.3)
RBC # BLD AUTO: 4.29 10E12/L (ref 3.8–5.2)
SODIUM SERPL-SCNC: 142 MMOL/L (ref 133–144)
TROPONIN I SERPL-MCNC: <0.015 UG/L (ref 0–0.04)
WBC # BLD AUTO: 3.6 10E9/L (ref 4–11)

## 2021-06-01 PROCEDURE — 99285 EMERGENCY DEPT VISIT HI MDM: CPT | Mod: 25 | Performed by: EMERGENCY MEDICINE

## 2021-06-01 PROCEDURE — 36415 COLL VENOUS BLD VENIPUNCTURE: CPT | Performed by: EMERGENCY MEDICINE

## 2021-06-01 PROCEDURE — 93005 ELECTROCARDIOGRAM TRACING: CPT

## 2021-06-01 PROCEDURE — 93010 ELECTROCARDIOGRAM REPORT: CPT | Performed by: INTERNAL MEDICINE

## 2021-06-01 PROCEDURE — 99285 EMERGENCY DEPT VISIT HI MDM: CPT | Mod: 25

## 2021-06-01 PROCEDURE — 93308 TTE F-UP OR LMTD: CPT | Mod: 26 | Performed by: EMERGENCY MEDICINE

## 2021-06-01 PROCEDURE — 84484 ASSAY OF TROPONIN QUANT: CPT | Performed by: EMERGENCY MEDICINE

## 2021-06-01 PROCEDURE — 85025 COMPLETE CBC W/AUTO DIFF WBC: CPT | Performed by: EMERGENCY MEDICINE

## 2021-06-01 PROCEDURE — 83880 ASSAY OF NATRIURETIC PEPTIDE: CPT | Performed by: EMERGENCY MEDICINE

## 2021-06-01 PROCEDURE — 93308 TTE F-UP OR LMTD: CPT | Mod: TC

## 2021-06-01 PROCEDURE — 80048 BASIC METABOLIC PNL TOTAL CA: CPT | Performed by: EMERGENCY MEDICINE

## 2021-06-01 PROCEDURE — 71045 X-RAY EXAM CHEST 1 VIEW: CPT

## 2021-06-01 NOTE — ED PROVIDER NOTES
Emergency Department Provider Note  : 1940 Age: 80 year old Sex: female MRN: [id number]    Chief Complaint   Patient presents with     Shortness of Breath     Shoulder Pain       Medical Decision Making / Assessment / Plan   80 year old female presenting with shortness of breath for the past week.  EKG nonischemic, initial troponin negative, I do not have a high suspicion for acute coronary syndrome.  Bedside cardiac ultrasound demonstrated no B-lines or decreased function and did not have an enlarged RV or pericardial effusion, low concern for acute heart failure.  Chest x-ray clear, no hypoxia, no fevers.  CBC and BMP within normal limits as well.  BNP also negative.  Given she is nontoxic appearing and has a reassuring work-up, she was discharged home with close PCP follow-up    The patient was informed of the plan and verbalized understanding and agreed with the plan. The patient was given strict return to Emergency Department precautions as well as appropriate follow up instructions. The patient was discharged in stable condition.    Final diagnoses:   Shortness of breath       Tripp Jackson MD  2021   Emergency Department    Subjective   Dinorah is a 80 year old female who presents at  3:38 PM with shortness of breath over the past week that she says is normally at rest but happens when she moves her shoulder and got worse after she was shoveling.  It does not get worse with acute exertion like walking up or down stairs.  Worse with movement.    I have reviewed the Medications, Allergies, Past Medical and Surgical History, and Social History in the Epic System and with family.    Review of Systems:  Please see HPI for pertinent positives and negatives. All other systems reviewed and found to be negative.      Objective   BP: 127/66  Pulse: 71  Temp: 98.9  F (37.2  C)  Resp: 18  Weight: 95.7 kg (211 lb)  SpO2: 95 %    Physical Exam:   General: Awake, alert, in no acute distress.  Head:  Normocephalic, atraumatic.  Eyes: Conjugate gaze.  ENT: Moist membranes, external ear appears normal.   Chest/Respiratory: Equal chest rise.  Cardiovascular: Peripheral pulses present.  Abdominal: Soft, non-distended, non-tender.  Extremities: No obvious deformity.  Neurological: GCS 15, moving all extremities without gross deficit.  Skin: Warm, no rashes, lesions, or bruising.   Psychiatric: Appropriate affect.     Procedures / Critical Care   Procedures    Critical Care Time: None.          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    Anterior Segment V 4 cm nodule abutting liver surface by US 1/4/2015      Obesity, Class II, BMI 35-39.9 1/4/2015    BMI 35.9 with comorbidities = MORBID obesity     Osteoarthritis of right hip 10/07/2004     Osteoarthrosis 06/14/2012     Otitis externa 10/04/2011     Prediabetes 08/01/2011     Past Surgical History:   Procedure Laterality Date     ARTHROPLASTY KNEE Left 9/9/2019    Procedure: LEFT TOTAL KNEE ARTHROPLASTY S/N;  Surgeon: Trevor Alonzo MD;  Location: [facility]     ARTHROSCOPY KNEE Left 3/21/2019    Procedure: LEFT  KNEE ARTHROSCOPY, partial medial menisectomy;  Surgeon: Esteban Wills MD;  Location: [facility] OR     C TOTAL KNEE ARTHROPLASTY Left 09/09/2019    Dr Alonzo     CLOSED REDUCTION WRIST Right 1952 x 2    long arm cast (same time as elbow fx)     CLOSED RX ELBOW DISLOCATION Right 1952    CR/long cast of fracture     HC INJ  EPIDURAL LUMBAR/SACRAL W/WO CONTRAST Bilateral 5/2013    facet injections; prev 2012     LAPAROSCOPIC CHOLECYSTECTOMY N/A 1/4/2015    Procedure: LAPAROSCOPIC CHOLECYSTECTOMY;  Surgeon: Brittney العلي MD;  Location: [facility] OR     TONSILLECTOMY         Medications:  No current facility-administered medications for this encounter.      Current Outpatient Medications   Medication     alendronate (FOSAMAX) 70 MG tablet     Calcium-Magnesium-Vitamin D (CALCIUM 1200+D3 PO)     clonazePAM (KLONOPIN) 1 MG tablet     FISH OIL     HYDROcodone-acetaminophen (NORCO) 5-325 MG tablet     ipratropium - albuterol 0.5 mg/2.5 mg/3 mL (DUONEB) 0.5-2.5 (3) MG/3ML neb solution     PARoxetine (PAXIL) 40 MG tablet     simvastatin (ZOCOR) 40 MG tablet     VITAMIN D, CHOLECALCIFEROL, PO       Allergies:  Chocolate, Lemon flavor, Lime [calcium oxysulfide], Metal [staples], Orange fruit [citrus], Strawberry, Adhesive tape, Codeine, Decadron [dexamethasone], Erythromycin, Grapefruit [extra strength grapefruit], Morphine, Penicillins, Ranitidine, Sulfa drugs, Tomato, and Xyzal [levocetirizine]    Relevant labs, images, EKGs, Epic and outside hospital (if applicable) charts were reviewed. The findings, diagnosis, plan, and need for follow up were discussed with the patient/family. Nursing notes were reviewed.      Tripp Jackson MD  06/01/21 0167

## 2021-06-01 NOTE — TELEPHONE ENCOUNTER
"Patient called today and needed to come in stating she was having chest pain and at times SOB, patient states this has been happening for 2 weeks now, patient advised to be seen in our UC/ER, Dr. Hughes is not in today and has no openings until July. Writer had been disconnected from patient, writer call back and she stated \" Jesusita it is more up in my Shoulder \" writer again advised ER/UC. Patient agreed and follow up was made to come in to see Dr. Hughes sooner then her initial  follow up.   "

## 2021-06-01 NOTE — ED TRIAGE NOTES
"\"Here for mild shortness of breath that comes and goes.  I had left shoulder pain which came and then went away yesterday and the day before yesterday and none today.  I want an EKG and the valves in my heart checked.  \"    "

## 2021-06-07 ENCOUNTER — OFFICE VISIT (OUTPATIENT)
Dept: FAMILY MEDICINE | Facility: OTHER | Age: 81
End: 2021-06-07
Attending: FAMILY MEDICINE
Payer: COMMERCIAL

## 2021-06-07 VITALS
TEMPERATURE: 98.5 F | SYSTOLIC BLOOD PRESSURE: 124 MMHG | RESPIRATION RATE: 19 BRPM | OXYGEN SATURATION: 97 % | WEIGHT: 211 LBS | BODY MASS INDEX: 35.16 KG/M2 | HEIGHT: 65 IN | HEART RATE: 65 BPM | DIASTOLIC BLOOD PRESSURE: 62 MMHG

## 2021-06-07 DIAGNOSIS — R52 PAIN: ICD-10-CM

## 2021-06-07 DIAGNOSIS — M25.551 HIP PAIN, RIGHT: Primary | ICD-10-CM

## 2021-06-07 DIAGNOSIS — F11.90 CHRONIC, CONTINUOUS USE OF OPIOIDS: Chronic | ICD-10-CM

## 2021-06-07 PROCEDURE — G0463 HOSPITAL OUTPT CLINIC VISIT: HCPCS

## 2021-06-07 PROCEDURE — G0463 HOSPITAL OUTPT CLINIC VISIT: HCPCS | Mod: 25

## 2021-06-07 PROCEDURE — 99213 OFFICE O/P EST LOW 20 MIN: CPT | Performed by: FAMILY MEDICINE

## 2021-06-07 RX ORDER — HYDROCODONE BITARTRATE AND ACETAMINOPHEN 5; 325 MG/1; MG/1
TABLET ORAL
Qty: 10 TABLET | Refills: 0 | Status: SHIPPED | OUTPATIENT
Start: 2021-06-07 | End: 2021-07-28

## 2021-06-07 ASSESSMENT — PAIN SCALES - GENERAL: PAINLEVEL: WORST PAIN (10)

## 2021-06-07 ASSESSMENT — MIFFLIN-ST. JEOR: SCORE: 1437.97

## 2021-06-07 NOTE — NURSING NOTE
"Chief Complaint   Patient presents with     Musculoskeletal Problem       Initial /62   Pulse 65   Temp 98.5  F (36.9  C) (Tympanic)   Resp 19   Ht 1.651 m (5' 5\")   Wt 95.7 kg (211 lb)   SpO2 97%   BMI 35.11 kg/m   Estimated body mass index is 35.11 kg/m  as calculated from the following:    Height as of this encounter: 1.651 m (5' 5\").    Weight as of this encounter: 95.7 kg (211 lb).  Medication Reconciliation: complete  Aditi Saleh LPN    "

## 2021-06-07 NOTE — LETTER
Opioid / Opioid Plus Controlled Substance Agreement    This is an agreement between you and your provider about the safe and appropriate use of controlled substance/opioids prescribed by your care team. Controlled substances are medicines that can cause physical and mental dependence (abuse).    There are strict laws about having and using these medicines. We here at [facility] are committing to working with you in your efforts to get better. To support you in this work, we ll help you schedule regular office appointments for medicine refills. If we must cancel or change your appointment for any reason, we ll make sure you have enough medicine to last until your next appointment.     As a Provider, I will:    Listen carefully to your concerns and treat you with respect.     Recommend a treatment plan that I believe is in your best interest. This plan may involve therapies other than opioid pain medication.     Talk with you often about the possible benefits, and the risk of harm of any medicine that we prescribe for you.     Provide a plan on how to taper (discontinue or go off) using this medicine if the decision is made to stop its use.    As a Patient, I understand that opioid(s):     Are a controlled substance prescribed by my care team to help me function or work and manage my condition(s).     Are strong medicines and can cause serious side effects such as:    Drowsiness, which can seriously affect my driving ability    A lower breathing rate, enough to cause death    Harm to my thinking ability     Depression     Abuse of and addiction to this medicine    Need to be taken exactly as prescribed. Combining opioids with certain medicines or chemicals (such as illegal drugs, sedatives, sleeping pills, and benzodiazepines) can be dangerous or even fatal. If I stop opioids suddenly, I may have severe withdrawal symptoms.    Do not work for all types of pain nor for all patients. If they re not helpful, I may  be asked to stop them.        The risks, benefits and side effects of these medicine(s) were explained to me. I agree that:  1. I will take part in other treatments as advised by my care team. This may be psychiatry or counseling, physical therapy, behavioral therapy, group treatment or a referral to a specialist.     2. I will keep all my appointments. I understand that this is part of the monitoring of opioids. My care team may require an office visit for EVERY opioid/controlled substance refill. If I miss appointments or don t follow instructions, my care team may stop my medicine.    3. I will take my medicines as prescribed. I will not change the dose or schedule unless my care team tells me to. There will be no refills if I run out early.     4. I may be asked to come to the clinic and complete a urine drug test or complete a pill count at any time. If I don t give a urine sample or participate in a pill count, the care team may stop my medicine.    5. I will only receive prescriptions from this clinic for chronic pain. If I am treated by another provider for acute pain issues, I will tell them that I am taking opioid pain medication for chronic pain and that I have a treatment agreement with this provider. I will inform my [facility] care team within one business day if I am given a prescription for any pain medication by another healthcare provider. My [facility] care team can contact other providers and pharmacists about my use of any medicines.    6. It is up to me to make sure that I don t run out of my medicines on weekends or holidays. If my care team is willing to refill my opioid prescription without a visit, I must request refills only during office hours. Refills may take up to 3 business days to process. I will use one pharmacy to fill all my opioid and other controlled substance prescriptions. I will notify the clinic about any changes to my insurance or medication  availability.    7. I am responsible for my prescriptions. If the medicine/prescription is lost, stolen or destroyed, it will not be replaced. I also agree not to share controlled substance medicines with anyone.    8. I am aware I should not use any illegal or recreational drugs. I agree not to drink alcohol unless my care team says I can.       9. If I enroll in the [place] [facility] Cann[facility] program, I will tell my care team prior to my next refill.     10. I will tell my care team right away if I become pregnant, have a new medical problem treated outside of my regular clinic, or have a change in my medications.    11. I understand that this medicine can affect my thinking, judgment and reaction time. Alcohol and drugs affect the brain and body, which can affect the safety of my driving. Being under the influence of alcohol or drugs can affect my decision-making, behaviors, personal safety, and the safety of others. Driving while impaired (DWI) can occur if a person is driving, operating, or in physical control of a car, motorcycle, boat, snowmobile, ATV, motorbike, off-road vehicle, or any other motor vehicle ([place] Statute 169A.20). I understand the risk if I choose to drive or operate any vehicle or machinery.    I understand that if I do not follow any of the conditions above, my prescriptions or treatment may be stopped or changed.          Opioids  What You Need to Know    What are opioids?   Opioids are pain medicines that must be prescribed by a doctor. They are also known as narcotics.     Examples are:   1. morphine (MS Contin, Lana)  2. oxycodone (Oxycontin)  3. oxycodone and acetaminophen (Percocet)  4. hydrocodone and acetaminophen (Vicodin, Norco)   5. fentanyl patch (Duragesic)   6. hydromorphone (Dilaudid)   7. methadone  8. codeine (Tylenol #3)     What do opioids do well?   Opioids are best for severe short-term pain such as after a surgery or injury. They may work well for cancer pain. They may  help some people with long-lasting (chronic) pain.     What do opioids NOT do well?   Opioids never get rid of pain entirely, and they don t work well for most patients with chronic pain. Opioids don t reduce swelling, one of the causes of pain.                                    Other ways to manage chronic pain and improve function include:       Treat the health problem that may be causing pain    Anti-inflammation medicines, which reduce swelling and tenderness, such as ibuprofen (Advil, Motrin) or naproxen (Aleve)    Acetaminophen (Tylenol)    Antidepressants and anti-seizure medicines, especially for nerve pain    Topical treatments such as patches or creams    Injections or nerve blocks    Chiropractic or osteopathic treatment    Acupuncture, massage, deep breathing, meditation, visual imagery, aromatherapy    Use heat or ice at the pain site    Physical therapy     Exercise    Stop smoking    Take part in therapy       Risks and side effects     Talk to your doctor before you start or decide to keep taking opioids. Possible side effects include:      Lowering your breathing rate enough to cause death    Overdose, including death, especially if taking higher than prescribed doses    Worse depression symptoms; less pleasure in things you usually enjoy    Feeling tired or sluggish    Slower thoughts or cloudy thinking    Being more sensitive to pain over time; pain is harder to control    Trouble sleeping or restless sleep    Changes in hormone levels (for example, less testosterone)    Changes in sex drive or ability to have sex    Constipation    Unsafe driving    Itching and sweating    Dizziness    Nausea, throwing up and dry mouth    What else should I know about opioids?    Opioids may lead to dependence, tolerance, or addiction.      Dependence means that if you stop or reduce the medicine too quickly, you will have withdrawal symptoms. These include loose poop (diarrhea), jitters, flu-like symptoms,  nervousness and tremors. Dependence is not the same as addiction.                       Tolerance means needing higher doses over time to get the same effect. This may increase the chance of serious side effects.      Addiction is when people improperly use a substance that harms their body, their mind or their relations with others. Use of opiates can cause a relapse of addiction if you have a history of drug or alcohol abuse.      People who have used opioids for a long time may have a lower quality of life, worse depression, higher levels of pain and more visits to doctors.    You can overdose on opioids. Take these steps to lower your risk of overdose:    1. Recognize the signs:  Signs of overdose include decrease or loss of consciousness (blackout), slowed breathing, trouble waking up and blue lips. If someone is worried about overdose, they should call 911.    2. Talk to your doctor about Narcan (naloxone).   If you are at risk for overdose, you may be given a prescription for Narcan. This medicine very quickly reverses the effects of opioids.   If you overdose, a friend or family member can give you Narcan while waiting for the ambulance. They need to know the signs of overdose and how to give Narcan.     3. Don't use alcohol or street drugs.   Taking them with opioids can cause death.    4. Do not take any of these medicines unless your doctor says it s OK. Taking these with opioids can cause death:    Benzodiazepines, such as lorazepam (Ativan), alprazolam (Xanax) or diazepam (Valium)    Muscle relaxers, such as cyclobenzaprine (Flexeril)    Sleeping pills like zolpidem (Ambien)     Other opioids      How to keep you and other people safe while taking opioids:    1. Never share your opioids with others.  Opioid medicines are regulated by the Drug Enforcement Agency (ARLEN). Selling or sharing medications is a criminal act.    2. Be sure to store opioids in a secure place, locked up if possible. Young children  can easily swallow them and overdose.    3. When you are traveling with your medicines, keep them in the original bottles. If you use a pill box, be sure you also carry a copy of your medicine list from your clinic or pharmacy.    4. Safe disposal of opioids    Most pharmacies have places to get rid of medicine, called disposal kiosks. Medicine disposal options are also available in every [facility]. Search your county and  medication disposal  to find more options. You can find more details at:  [email or web address]     I agree that my provider, clinic care team, and pharmacy may work with any city, state or federal law enforcement agency that investigates the misuse, sale, or other diversion of my controlled medicine. I will allow my provider to discuss my care with, or share a copy of, this agreement with any other treating provider, pharmacy or emergency room where I receive care.    I have read this agreement and have asked questions about anything I did not understand.    _______________________________________________________  Patient Signature - Dinorah Lim _____________________                   Date     _______________________________________________________  Provider Signature - Magaly Sosa MD   _____________________                   Date     _______________________________________________________  Witness Signature (required if provider not present while patient signing)   _____________________                   Date   This document has been retired.     Please use either SmartText [id number] CONTROLLED SUBSTANCE AGREEMENT - OPIOID / OPIOID PLUS NON-OPIOID  Or SmartText 35020 CONTROLLED SUBSTANCE AGREEMENT - NON-OPIOID  For questions or concerns, please visit [email or web address]

## 2021-06-22 ENCOUNTER — TRANSFERRED RECORDS (OUTPATIENT)
Dept: HEALTH INFORMATION MANAGEMENT | Facility: CLINIC | Age: 81
End: 2021-06-22

## 2021-07-13 ENCOUNTER — TELEPHONE (OUTPATIENT)
Dept: FAMILY MEDICINE | Facility: OTHER | Age: 81
End: 2021-07-13

## 2021-07-18 DIAGNOSIS — F41.9 ANXIETY: Chronic | ICD-10-CM

## 2021-07-18 RX ORDER — CLONAZEPAM 1 MG/1
TABLET ORAL
Qty: 45 TABLET | Refills: 0 | Status: SHIPPED | OUTPATIENT
Start: 2021-07-18 | End: 2021-07-28

## 2021-07-18 NOTE — TELEPHONE ENCOUNTER
clonazePAM (KLONOPIN) 1 MG tablet      Last Written Prescription Date:  5/3/2021  Last Fill Quantity: 45,   # refills: 0  Last Office Visit: 6/7/2021  Future Office visit:    Next 5 appointments (look out 90 days)    Jul 28, 2021  9:30 AM  (Arrive by 9:15 AM)  Return Visit with Ha Hughes DO  [facility] ([facility] ) [address] [place] [address]  [phone]

## 2021-07-22 ENCOUNTER — TELEPHONE (OUTPATIENT)
Dept: CARDIOLOGY | Facility: OTHER | Age: 81
End: 2021-07-22

## 2021-07-22 NOTE — TELEPHONE ENCOUNTER
Patient called LM of reminder of appointment, she states she has gotten new number [phone], returned call and no VM set up will continue to reach out to make sure this is the number I heard on message before changing in [employer].

## 2021-07-27 NOTE — H&P (VIEW-ONLY)
[facility] HEART [facility]   CARDIOLOGY PROGRESS NOTE     Chief Complaint   Patient presents with     RECHECK          Diagnosis:  1.  Chronic grade 1 diastolic dysfunction on 2/21/19, off diuretics.  2.  Bilateral carotid artery atherosclerosis at less than 50% noted on US from 2/21/19.  3.  Mild to moderate aortic valve insufficiency on 1/20/2021.  4.  Trace to mild mitral regurgitation on 1/20/2021.  5.  Trace to mild tricuspid valve insufficiency.  6.  COPD-minimal on 9/9/14.  7.  Morbid obesity with BMI of 35.  8.  Hyperlipidemia-controlled.  9.  Atrial ectopy.  10.  Palpitations.  11.  PSVT.  12.  PVC's.  13.  On statin therapy.  14.  Hypertriglyceridemia-uncontrolled.  15.  Moderate hiatal hernia on 3/18/2020 on a CTA of the chest.      Assessment/Plan:    1.  We reviewed her echocardiogram as she is concerned with her valvular issues.  We reviewed her 4 valves all of which are leaky to some extent, as noted below.  She was reassured today.  2.  We will plan for an ultrasound of her carotids with an ultrasound of her carotids in 2019 showing less than 50% stenosis in bilateral arteries.  3.  No changes to medications today.  Continue on simvastatin 40 mg daily.  4.  No additional changes.  Follow-up 1 year or sooner with issues.      Interval history:  Dinorah is doing well.  She is here for 1-year follow-up.  Previously, she noted to have mild to moderate aortic insufficiency, trace/mild TR, and trace/mild mitral insufficiency on her echo from 3/6/2019.  She is here in follow-up to her repeat echocardiogram.  This echo shows mild mitral insufficiency, mild to moderate aortic insufficiency, trace tricuspid and pulmonic insufficiency.  The thought of having leaky valves makes her very anxious.  She states she has had a murmur all her life.  She believes her leaky valves are familial.  She has had some infrequent pain to her chest.  She describes a focal area to her left precordium consistent with activity.  The pain  lasts for seconds.  She describes it as sharp.  She has 1-2 episodes a month.  She also has 1-2 episodes of palpitations a month lasting seconds.  She describes her symptoms as minimal.  She is due for an ultrasound of her carotids with history of less than 50% stenosis to her carotids in 2019.  She is not needing medications refilled.  We did discuss that she does have a history of a hiatal hernia.  This potentially could be causing her discomfort.  She is not having heartburn.  She has no additional concerns or complaints.    HPI:    She has been concerned with a heart murmur as well as bilateral carotid artery stenosis.  She had echocardiogram completed on 2/21/19 as well as an ultrasound of the carotids on 2/21/19.  She is here for those results.       Previously, we reviewed her Zio patch results from 12/31/18 which showed rare PVC's, x1 run of NSVT 5 beats, PAC's, and x26 episodes of PSVT lasting up to 20 beats.     She also has a history of mild carotid artery disease at less than 50% with mild plaquing on 12/1/16.  She does not have a personal history of smoking but was around secondhand smoke in the past.  She had a ultrasound of her carotids on 2/20/19 that showed atherosclerotic plaquing in both carotid bifurcations.  There was no significant hemo-dynamic stenosis.     Her echocardiogram from 2/21/19 showed an EF of 65-70%, grade 1 diastolic dysfunction, mild left atrial enlargement, mild to moderate aortic insufficiency, trace to mild tricuspid insufficiency, and trace to mild mitral insufficiency.  She is concerned about her valvular insufficiency.  She will have an echocardiogram in approximately 2 years to follow-up on the mild to moderate aortic insufficiency.      Relevant testing:  ECHO on 1/28/21:  No pericardial effusion is present.  Global and regional left ventricular function is normal with an EF of 55-60%.  The right ventricle is normal size.  Global right ventricular function is  normal.  Both atria appear normal.  Mild mitral insufficiency is present.  The aortic valve is tricuspid.  Mild to moderate aortic insufficiency is present.  AI unchanged from previous ECHO 2/21/19  The mean gradient across the aortic valve is5.1 mmHg.  Trace tricuspid insufficiency is present.  Trace pulmonic insufficiency is present.  The aorta root is normal.    ECHO on 2/21/19:  No pericardial effusion is present.  Global and regional left ventricular function is hyperkinetic with an EF of  65-70%.  Grade I or early diastolic dysfunction.  The right ventricle is normal size.  Global right ventricular function is normal.  Mild left atrial enlargement is present.  Trace to mild mitral insufficiency is present.  The aortic valve is tricuspid.  Mild to moderate aortic insufficiency is present.  Trace to mild tricuspid insufficiency is present.  The peak velocity of the tricuspid regurgitant jet is not obtainable.  The pulmonic valve is normal.  The aorta root is normal.    Zio patch from 12/31/2018:  The enrollment period was from 12/31/18 through 1/7/19.  There were 6 days and 12 hours of analysis time available for review.  The minimum heart rate was 49, average heart rate 66 and maximum heart rate 200 bpm.  The patient has underlying sinus rhythm throughout.  There is no significant bradycardia pauses or heart block seen.  There were very rare isolated PVC's.  There was a 5 beat run of ventricular tachycardia.  With an average heart rate of 113 bpm.  This was the only run.  There are rare PAC's seen.  Less than 1% of total beats.  There were 26 episodes of a supraventricular tachycardia greater than 4 beats.  The longest was for 20 beats with an average heart rate of 108 bpm.  The fastest was for 11 beats with a maximum heart rate of 200 bpm but an average heart rate of 134 bpm.  Review of some of these tracings show that it likely is an atrial tachycardia  There were 3 triggered events all 3 of these strips do  have PAC's seen.  1 of them had a very slower run of supraventricular tachycardia average heart rate of 113 bpm.  There were 3 diary entries with the symptom of skipped irregular beats.  Review of the associated strips show all had sinus rhythm to them had supraventricular beats.          ICD-10-CM    1. Mitral regurgitation  I34.0 Echocardiogram Complete   2. Aortic valve insufficiency  I35.1 Echocardiogram Complete   3. Tricuspid valve insufficiency  I36.1 Echocardiogram Complete   4. Heart murmur  R01.1 Echocardiogram Complete   5. Diastolic dysfunction grade 1 on 2/21/19  I51.89 Echocardiogram Complete   6. Bilateral carotid artery stenosis at less than 50% on 12/1/16  I65.23 US Carotid Bilateral   7. COPD, mild on 9/9/14  J44.9    8. Morbid obesity (H)  E66.01    9. Hypertriglyceridemia  E78.1    10. Mixed hyperlipidemia  E78.2    11. Atrial ectopy  I49.1    12. Palpitations  R00.2    13. PSVT (paroxysmal supraventricular tachycardia) (H)  I47.1    14. PVC's (premature ventricular contractions)  I49.3    15. Stage 3a chronic kidney disease  N18.31    16. COVID-19 virus infection on 10/30/20  U07.1    17. Anxiety  F41.9    18. On statin therapy  Z79.899    19. 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    Anterior Segment V 4 cm nodule abutting liver surface by US 1/4/2015      Obesity, Class II, BMI 35-39.9 1/4/2015    BMI 35.9 with comorbidities = MORBID obesity      Osteoarthritis of right hip 10/07/2004     Osteoarthrosis 06/14/2012     Otitis externa 10/04/2011     Prediabetes 08/01/2011       Past Surgical History:   Procedure Laterality Date     ARTHROPLASTY KNEE Left 9/9/2019    Procedure: LEFT TOTAL KNEE ARTHROPLASTY S/N;  Surgeon: Trevor Alonzo MD;  Location: [facility] OR     ARTHROSCOPY KNEE Left 3/21/2019    Procedure: LEFT  KNEE ARTHROSCOPY, partial medial menisectomy;  Surgeon: Esteban Wills MD;  Location: [facility] OR     C TOTAL KNEE ARTHROPLASTY Left 09/09/2019    Dr Alonzo     CLOSED REDUCTION WRIST Right 1952 x 2    long arm cast (same time as elbow fx)     CLOSED RX ELBOW DISLOCATION Right 1952    CR/long cast of fracture     HC INJ EPIDURAL LUMBAR/SACRAL W/WO CONTRAST Bilateral 5/2013    facet injections; prev 2012     LAPAROSCOPIC CHOLECYSTECTOMY N/A 1/4/2015    Procedure: LAPAROSCOPIC CHOLECYSTECTOMY;  Surgeon: Brittney العلي MD;  Location: [facility] OR     TONSILLECTOMY         Allergies   Allergen Reactions     Chocolate Hives     Lemon Flavor Hives     Lime [Calcium Oxysulfide] Hives     Metal [Staples]      Orange Fruit [Citrus] Hives     Strawberry Hives     Adhesive Tape      Band-aids     Codeine Hives     Patient can tolerate oxycodone & dilaudid     Decadron [Dexamethasone] Hives     Erythromycin Hives     ERYTHROMYCIN BASE     Grapefruit [Extra Strength Grapefruit]      GRAPEFRUIT     Morphine Hives     Pt. Reports had hives     Penicillins Hives     Ranitidine GI Disturbance     Sulfa Drugs      SULFONAMIDE ANTIBIOTICS      Tomato      Xyzal [Levocetirizine] Hives       Current Outpatient Medications   Medication Sig Dispense Refill     Calcium-Magnesium-Vitamin D (CALCIUM 1200+D3 PO) Take by mouth daily       FISH OIL Take 1 capsule by mouth daily        PARoxetine (PAXIL) 40 MG tablet Take 1 tablet by mouth once daily 90 tablet 0     simvastatin (ZOCOR) 40 MG tablet TAKE 1 TABLET BY MOUTH AT BEDTIME 90 tablet 2     VITAMIN D, CHOLECALCIFEROL, PO Take  2,000 Units by mouth daily       ipratropium - albuterol 0.5 mg/2.5 mg/3 mL (DUONEB) 0.5-2.5 (3) MG/3ML neb solution Take 1 vial (3 mLs) by nebulization every 6 hours as needed for shortness of breath / dyspnea or wheezing (Patient not taking: Reported on 3/22/2021) 1 Box 1       Social History     Socioeconomic History     Marital status: Single     Spouse name: Not on file     Number of children: 4     Years of education: 12     Highest education level: Not on file   Occupational History     Occupation: personal care attendant   Tobacco Use     Smoking status: Never Smoker     Smokeless tobacco: Never Used   Substance and Sexual Activity     Alcohol use: No     Drug use: No     Sexual activity: Never   Other Topics Concern      Service Not Asked     Blood Transfusions Yes     Caffeine Concern Yes     Comment: >32 oz soda/day     Occupational Exposure Not Asked     Hobby Hazards Not Asked     Sleep Concern Yes     Comment: insomnia     Stress Concern Not Asked     Weight Concern Not Asked     Special Diet Not Asked     Back Care Not Asked     Exercise Not Asked     Bike Helmet Not Asked     Seat Belt Not Asked     Self-Exams Not Asked     Parent/sibling w/ CABG, MI or angioplasty before 65F 55M? No   Social History Narrative     Not on file     Social Determinants of Health     Financial Resource Strain:      Difficulty of Paying Living Expenses:    Food Insecurity:      Worried About Running Out of Food in the Last Year:      Ran Out of Food in the Last Year:    Transportation Needs:      Lack of Transportation (Medical):      Lack of Transportation (Non-Medical):    Physical Activity:      Days of Exercise per Week:      Minutes of Exercise per Session:    Stress:      Feeling of Stress :    Social Connections:      Frequency of Communication with Friends and Family:      Frequency of Social Gatherings with Friends and Family:      Attends Jainism Services:      Active Member of Clubs or Organizations:       Attends Club or Organization Meetings:      Marital Status:    Intimate Partner Violence:      Fear of Current or Ex-Partner:      Emotionally Abused:      Physically Abused:      Sexually Abused:        LAB RESULTS:   Orders Only on 02/10/2021   Component Date Value Ref Range Status     Sodium 02/10/2021 139  133 - 144 mmol/L Final     Potassium 02/10/2021 4.0  3.4 - 5.3 mmol/L Final     Chloride 02/10/2021 108  94 - 109 mmol/L Final     Carbon Dioxide 02/10/2021 30  20 - 32 mmol/L Final     Anion Gap 02/10/2021 1* 3 - 14 mmol/L Final     Glucose 02/10/2021 104* 70 - 99 mg/dL Final     Urea Nitrogen 02/10/2021 15  7 - 30 mg/dL Final     Creatinine 02/10/2021 1.00  0.52 - 1.04 mg/dL Final     GFR Estimate 02/10/2021 54* >60 mL/min/[1.73_m2] Final     GFR Estimate If Black 02/10/2021 62  >60 mL/min/[1.73_m2] Final     Calcium 02/10/2021 9.5  8.5 - 10.1 mg/dL Final     Hemoglobin A1C 02/10/2021 5.0  0 - 5.6 % Final     ALT 02/10/2021 27  0 - 50 U/L Final     AST 02/10/2021 20  0 - 45 U/L Final     Cholesterol 02/10/2021 177  <200 mg/dL Final     Triglycerides 02/10/2021 166* <150 mg/dL Final     HDL Cholesterol 02/10/2021 53  >49 mg/dL Final     LDL Cholesterol Calculated 02/10/2021 91  <100 mg/dL Final     Non HDL Cholesterol 02/10/2021 124  <130 mg/dL Final     Estimated Average Glucose 02/10/2021 97  mg/dL Final        Review of systems: Negative except that which was noted in the HPI.    Physical examination:  /60 (BP Location: Right arm, Patient Position: Sitting, Cuff Size: Adult Large)   Pulse 69   Temp 97.5  F (36.4  C) (Tympanic)   Wt 98 kg (216 lb)   SpO2 96%   BMI 35.94 kg/m      GENERAL APPEARANCE: healthy, alert and no distress  HEENT: no icterus, no xanthelasmas, normal pupil size and reaction, no cyanosis.  NECK: no adenopathy, no asymmetry, masses.  CHEST: lungs clear to auscultation - no rales, rhonchi or wheezes, no use of accessory muscles, no retractions, respirations are unlabored,  normal respiratory rate  CARDIOVASCULAR: regular rhythm, normal S1 with physiologic split S2, no S3 or S4 and no murmur, click or rub  EXTREMITIES: no clubbing, cyanosis or edema  NEURO: alert and oriented normal speech, and affect  VASC: No vascular bruits heard.  SKIN: no ecchymoses, no rashes        Thank you for allowing me to participate in the care of your patient. Please do not hesitate to contact me if you have any questions.     Ha Hughes, DO

## 2021-07-27 NOTE — PROGRESS NOTES
[facility] HEART [facility]   CARDIOLOGY PROGRESS NOTE     Chief Complaint   Patient presents with     RECHECK          Diagnosis:  1.  Chronic grade 1 diastolic dysfunction on 2/21/19, off diuretics.  2.  Bilateral carotid artery atherosclerosis at less than 50% noted on US from 2/21/19.  3.  Mild to moderate aortic valve insufficiency on 1/20/2021.  4.  Trace to mild mitral regurgitation on 1/20/2021.  5.  Trace to mild tricuspid valve insufficiency.  6.  COPD-minimal on 9/9/14.  7.  Morbid obesity with BMI of 35.  8.  Hyperlipidemia-controlled.  9.  Atrial ectopy.  10.  Palpitations.  11.  PSVT.  12.  PVC's.  13.  On statin therapy.  14.  Hypertriglyceridemia-uncontrolled.  15.  Moderate hiatal hernia on 3/18/2020 on a CTA of the chest.      Assessment/Plan:    1.  We reviewed her echocardiogram as she is concerned with her valvular issues.  We reviewed her 4 valves all of which are leaky to some extent, as noted below.  She was reassured today.  2.  We will plan for an ultrasound of her carotids with an ultrasound of her carotids in 2019 showing less than 50% stenosis in bilateral arteries.  3.  No changes to medications today.  Continue on simvastatin 40 mg daily.  4.  No additional changes.  Follow-up 1 year or sooner with issues.      Interval history:  Dinorah is doing well.  She is here for 1-year follow-up.  Previously, she noted to have mild to moderate aortic insufficiency, trace/mild TR, and trace/mild mitral insufficiency on her echo from 3/6/2019.  She is here in follow-up to her repeat echocardiogram.  This echo shows mild mitral insufficiency, mild to moderate aortic insufficiency, trace tricuspid and pulmonic insufficiency.  The thought of having leaky valves makes her very anxious.  She states she has had a murmur all her life.  She believes her leaky valves are familial.  She has had some infrequent pain to her chest.  She describes a focal area to her left precordium consistent with activity.  The pain  lasts for seconds.  She describes it as sharp.  She has 1-2 episodes a month.  She also has 1-2 episodes of palpitations a month lasting seconds.  She describes her symptoms as minimal.  She is due for an ultrasound of her carotids with history of less than 50% stenosis to her carotids in 2019.  She is not needing medications refilled.  We did discuss that she does have a history of a hiatal hernia.  This potentially could be causing her discomfort.  She is not having heartburn.  She has no additional concerns or complaints.    HPI:    She has been concerned with a heart murmur as well as bilateral carotid artery stenosis.  She had echocardiogram completed on 2/21/19 as well as an ultrasound of the carotids on 2/21/19.  She is here for those results.       Previously, we reviewed her Zio patch results from 12/31/18 which showed rare PVC's, x1 run of NSVT 5 beats, PAC's, and x26 episodes of PSVT lasting up to 20 beats.     She also has a history of mild carotid artery disease at less than 50% with mild plaquing on 12/1/16.  She does not have a personal history of smoking but was around secondhand smoke in the past.  She had a ultrasound of her carotids on 2/20/19 that showed atherosclerotic plaquing in both carotid bifurcations.  There was no significant hemo-dynamic stenosis.     Her echocardiogram from 2/21/19 showed an EF of 65-70%, grade 1 diastolic dysfunction, mild left atrial enlargement, mild to moderate aortic insufficiency, trace to mild tricuspid insufficiency, and trace to mild mitral insufficiency.  She is concerned about her valvular insufficiency.  She will have an echocardiogram in approximately 2 years to follow-up on the mild to moderate aortic insufficiency.      Relevant testing:  ECHO on 1/28/21:  No pericardial effusion is present.  Global and regional left ventricular function is normal with an EF of 55-60%.  The right ventricle is normal size.  Global right ventricular function is  normal.  Both atria appear normal.  Mild mitral insufficiency is present.  The aortic valve is tricuspid.  Mild to moderate aortic insufficiency is present.  AI unchanged from previous ECHO 2/21/19  The mean gradient across the aortic valve is5.1 mmHg.  Trace tricuspid insufficiency is present.  Trace pulmonic insufficiency is present.  The aorta root is normal.    ECHO on 2/21/19:  No pericardial effusion is present.  Global and regional left ventricular function is hyperkinetic with an EF of  65-70%.  Grade I or early diastolic dysfunction.  The right ventricle is normal size.  Global right ventricular function is normal.  Mild left atrial enlargement is present.  Trace to mild mitral insufficiency is present.  The aortic valve is tricuspid.  Mild to moderate aortic insufficiency is present.  Trace to mild tricuspid insufficiency is present.  The peak velocity of the tricuspid regurgitant jet is not obtainable.  The pulmonic valve is normal.  The aorta root is normal.    Zio patch from 12/31/2018:  The enrollment period was from 12/31/18 through 1/7/19.  There were 6 days and 12 hours of analysis time available for review.  The minimum heart rate was 49, average heart rate 66 and maximum heart rate 200 bpm.  The patient has underlying sinus rhythm throughout.  There is no significant bradycardia pauses or heart block seen.  There were very rare isolated PVC's.  There was a 5 beat run of ventricular tachycardia.  With an average heart rate of 113 bpm.  This was the only run.  There are rare PAC's seen.  Less than 1% of total beats.  There were 26 episodes of a supraventricular tachycardia greater than 4 beats.  The longest was for 20 beats with an average heart rate of 108 bpm.  The fastest was for 11 beats with a maximum heart rate of 200 bpm but an average heart rate of 134 bpm.  Review of some of these tracings show that it likely is an atrial tachycardia  There were 3 triggered events all 3 of these strips do  have PAC's seen.  1 of them had a very slower run of supraventricular tachycardia average heart rate of 113 bpm.  There were 3 diary entries with the symptom of skipped irregular beats.  Review of the associated strips show all had sinus rhythm to them had supraventricular beats.          ICD-10-CM    1. Mitral regurgitation  I34.0 Echocardiogram Complete   2. Aortic valve insufficiency  I35.1 Echocardiogram Complete   3. Tricuspid valve insufficiency  I36.1 Echocardiogram Complete   4. Heart murmur  R01.1 Echocardiogram Complete   5. Diastolic dysfunction grade 1 on 2/21/19  I51.89 Echocardiogram Complete   6. Bilateral carotid artery stenosis at less than 50% on 12/1/16  I65.23 US Carotid Bilateral   7. COPD, mild on 9/9/14  J44.9    8. Morbid obesity (H)  E66.01    9. Hypertriglyceridemia  E78.1    10. Mixed hyperlipidemia  E78.2    11. Atrial ectopy  I49.1    12. Palpitations  R00.2    13. PSVT (paroxysmal supraventricular tachycardia) (H)  I47.1    14. PVC's (premature ventricular contractions)  I49.3    15. Stage 3a chronic kidney disease  N18.31    16. COVID-19 virus infection on 10/30/20  U07.1    17. Anxiety  F41.9    18. On statin therapy  Z79.899    19. 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    Anterior Segment V 4 cm nodule abutting liver surface by US 1/4/2015      Obesity, Class II, BMI 35-39.9 1/4/2015    BMI 35.9 with comorbidities = MORBID obesity      Osteoarthritis of right hip 10/07/2004     Osteoarthrosis 06/14/2012     Otitis externa 10/04/2011     Prediabetes 08/01/2011       Past Surgical History:   Procedure Laterality Date     ARTHROPLASTY KNEE Left 9/9/2019    Procedure: LEFT TOTAL KNEE ARTHROPLASTY S/N;  Surgeon: Trevor Alonzo MD;  Location: [facility] OR     ARTHROSCOPY KNEE Left 3/21/2019    Procedure: LEFT  KNEE ARTHROSCOPY, partial medial menisectomy;  Surgeon: Esteban Wills MD;  Location: [facility] OR     C TOTAL KNEE ARTHROPLASTY Left 09/09/2019    Dr Alonzo     CLOSED REDUCTION WRIST Right 1952 x 2    long arm cast (same time as elbow fx)     CLOSED RX ELBOW DISLOCATION Right 1952    CR/long cast of fracture     HC INJ EPIDURAL LUMBAR/SACRAL W/WO CONTRAST Bilateral 5/2013    facet injections; prev 2012     LAPAROSCOPIC CHOLECYSTECTOMY N/A 1/4/2015    Procedure: LAPAROSCOPIC CHOLECYSTECTOMY;  Surgeon: Brittney العلي MD;  Location: [facility] OR     TONSILLECTOMY         Allergies   Allergen Reactions     Chocolate Hives     Lemon Flavor Hives     Lime [Calcium Oxysulfide] Hives     Metal [Staples]      Orange Fruit [Citrus] Hives     Strawberry Hives     Adhesive Tape      Band-aids     Codeine Hives     Patient can tolerate oxycodone & dilaudid     Decadron [Dexamethasone] Hives     Erythromycin Hives     ERYTHROMYCIN BASE     Grapefruit [Extra Strength Grapefruit]      GRAPEFRUIT     Morphine Hives     Pt. Reports had hives     Penicillins Hives     Ranitidine GI Disturbance     Sulfa Drugs      SULFONAMIDE ANTIBIOTICS      Tomato      Xyzal [Levocetirizine] Hives       Current Outpatient Medications   Medication Sig Dispense Refill     Calcium-Magnesium-Vitamin D (CALCIUM 1200+D3 PO) Take by mouth daily       FISH OIL Take 1 capsule by mouth daily        PARoxetine (PAXIL) 40 MG tablet Take 1 tablet by mouth once daily 90 tablet 0     simvastatin (ZOCOR) 40 MG tablet TAKE 1 TABLET BY MOUTH AT BEDTIME 90 tablet 2     VITAMIN D, CHOLECALCIFEROL, PO Take  2,000 Units by mouth daily       ipratropium - albuterol 0.5 mg/2.5 mg/3 mL (DUONEB) 0.5-2.5 (3) MG/3ML neb solution Take 1 vial (3 mLs) by nebulization every 6 hours as needed for shortness of breath / dyspnea or wheezing (Patient not taking: Reported on 3/22/2021) 1 Box 1       Social History     Socioeconomic History     Marital status: Single     Spouse name: Not on file     Number of children: 4     Years of education: 12     Highest education level: Not on file   Occupational History     Occupation: personal care attendant   Tobacco Use     Smoking status: Never Smoker     Smokeless tobacco: Never Used   Substance and Sexual Activity     Alcohol use: No     Drug use: No     Sexual activity: Never   Other Topics Concern      Service Not Asked     Blood Transfusions Yes     Caffeine Concern Yes     Comment: >32 oz soda/day     Occupational Exposure Not Asked     Hobby Hazards Not Asked     Sleep Concern Yes     Comment: insomnia     Stress Concern Not Asked     Weight Concern Not Asked     Special Diet Not Asked     Back Care Not Asked     Exercise Not Asked     Bike Helmet Not Asked     Seat Belt Not Asked     Self-Exams Not Asked     Parent/sibling w/ CABG, MI or angioplasty before 65F 55M? No   Social History Narrative     Not on file     Social Determinants of Health     Financial Resource Strain:      Difficulty of Paying Living Expenses:    Food Insecurity:      Worried About Running Out of Food in the Last Year:      Ran Out of Food in the Last Year:    Transportation Needs:      Lack of Transportation (Medical):      Lack of Transportation (Non-Medical):    Physical Activity:      Days of Exercise per Week:      Minutes of Exercise per Session:    Stress:      Feeling of Stress :    Social Connections:      Frequency of Communication with Friends and Family:      Frequency of Social Gatherings with Friends and Family:      Attends Sabianist Services:      Active Member of Clubs or Organizations:       Attends Club or Organization Meetings:      Marital Status:    Intimate Partner Violence:      Fear of Current or Ex-Partner:      Emotionally Abused:      Physically Abused:      Sexually Abused:        LAB RESULTS:   Orders Only on 02/10/2021   Component Date Value Ref Range Status     Sodium 02/10/2021 139  133 - 144 mmol/L Final     Potassium 02/10/2021 4.0  3.4 - 5.3 mmol/L Final     Chloride 02/10/2021 108  94 - 109 mmol/L Final     Carbon Dioxide 02/10/2021 30  20 - 32 mmol/L Final     Anion Gap 02/10/2021 1* 3 - 14 mmol/L Final     Glucose 02/10/2021 104* 70 - 99 mg/dL Final     Urea Nitrogen 02/10/2021 15  7 - 30 mg/dL Final     Creatinine 02/10/2021 1.00  0.52 - 1.04 mg/dL Final     GFR Estimate 02/10/2021 54* >60 mL/min/[1.73_m2] Final     GFR Estimate If Black 02/10/2021 62  >60 mL/min/[1.73_m2] Final     Calcium 02/10/2021 9.5  8.5 - 10.1 mg/dL Final     Hemoglobin A1C 02/10/2021 5.0  0 - 5.6 % Final     ALT 02/10/2021 27  0 - 50 U/L Final     AST 02/10/2021 20  0 - 45 U/L Final     Cholesterol 02/10/2021 177  <200 mg/dL Final     Triglycerides 02/10/2021 166* <150 mg/dL Final     HDL Cholesterol 02/10/2021 53  >49 mg/dL Final     LDL Cholesterol Calculated 02/10/2021 91  <100 mg/dL Final     Non HDL Cholesterol 02/10/2021 124  <130 mg/dL Final     Estimated Average Glucose 02/10/2021 97  mg/dL Final        Review of systems: Negative except that which was noted in the HPI.    Physical examination:  /60 (BP Location: Right arm, Patient Position: Sitting, Cuff Size: Adult Large)   Pulse 69   Temp 97.5  F (36.4  C) (Tympanic)   Wt 98 kg (216 lb)   SpO2 96%   BMI 35.94 kg/m      GENERAL APPEARANCE: healthy, alert and no distress  HEENT: no icterus, no xanthelasmas, normal pupil size and reaction, no cyanosis.  NECK: no adenopathy, no asymmetry, masses.  CHEST: lungs clear to auscultation - no rales, rhonchi or wheezes, no use of accessory muscles, no retractions, respirations are unlabored,  normal respiratory rate  CARDIOVASCULAR: regular rhythm, normal S1 with physiologic split S2, no S3 or S4 and no murmur, click or rub  EXTREMITIES: no clubbing, cyanosis or edema  NEURO: alert and oriented normal speech, and affect  VASC: No vascular bruits heard.  SKIN: no ecchymoses, no rashes        Thank you for allowing me to participate in the care of your patient. Please do not hesitate to contact me if you have any questions.     Ha Hughes, DO

## 2021-07-28 ENCOUNTER — OFFICE VISIT (OUTPATIENT)
Dept: CARDIOLOGY | Facility: OTHER | Age: 81
End: 2021-07-28
Attending: INTERNAL MEDICINE
Payer: COMMERCIAL

## 2021-07-28 VITALS
BODY MASS INDEX: 35.94 KG/M2 | TEMPERATURE: 97.5 F | DIASTOLIC BLOOD PRESSURE: 60 MMHG | OXYGEN SATURATION: 96 % | HEART RATE: 69 BPM | WEIGHT: 216 LBS | SYSTOLIC BLOOD PRESSURE: 120 MMHG

## 2021-07-28 DIAGNOSIS — R01.1 HEART MURMUR: ICD-10-CM

## 2021-07-28 DIAGNOSIS — Z79.899 ON STATIN THERAPY: ICD-10-CM

## 2021-07-28 DIAGNOSIS — F11.90 CHRONIC, CONTINUOUS USE OF OPIOIDS: Primary | ICD-10-CM

## 2021-07-28 DIAGNOSIS — I36.1 NON-RHEUMATIC TRICUSPID VALVE INSUFFICIENCY: ICD-10-CM

## 2021-07-28 DIAGNOSIS — I51.89 DIASTOLIC DYSFUNCTION: ICD-10-CM

## 2021-07-28 DIAGNOSIS — I34.0 NON-RHEUMATIC MITRAL REGURGITATION: Primary | ICD-10-CM

## 2021-07-28 DIAGNOSIS — I49.3 PVC'S (PREMATURE VENTRICULAR CONTRACTIONS): ICD-10-CM

## 2021-07-28 DIAGNOSIS — I49.1 ATRIAL ECTOPY: ICD-10-CM

## 2021-07-28 DIAGNOSIS — R00.2 PALPITATIONS: ICD-10-CM

## 2021-07-28 DIAGNOSIS — E78.1 HYPERTRIGLYCERIDEMIA: ICD-10-CM

## 2021-07-28 DIAGNOSIS — I47.10 PSVT (PAROXYSMAL SUPRAVENTRICULAR TACHYCARDIA) (H): ICD-10-CM

## 2021-07-28 DIAGNOSIS — I35.1 NONRHEUMATIC AORTIC VALVE INSUFFICIENCY: ICD-10-CM

## 2021-07-28 DIAGNOSIS — N18.31 STAGE 3A CHRONIC KIDNEY DISEASE (H): ICD-10-CM

## 2021-07-28 DIAGNOSIS — F41.9 ANXIETY: Chronic | ICD-10-CM

## 2021-07-28 DIAGNOSIS — E66.01 MORBID OBESITY (H): ICD-10-CM

## 2021-07-28 DIAGNOSIS — U07.1 COVID-19 VIRUS INFECTION: ICD-10-CM

## 2021-07-28 DIAGNOSIS — I65.23 BILATERAL CAROTID ARTERY STENOSIS: ICD-10-CM

## 2021-07-28 DIAGNOSIS — K44.9 HIATAL HERNIA: ICD-10-CM

## 2021-07-28 DIAGNOSIS — J44.9 COPD, MILD (H): ICD-10-CM

## 2021-07-28 DIAGNOSIS — E78.2 MIXED HYPERLIPIDEMIA: ICD-10-CM

## 2021-07-28 PROCEDURE — G0463 HOSPITAL OUTPT CLINIC VISIT: HCPCS

## 2021-07-28 PROCEDURE — 99214 OFFICE O/P EST MOD 30 MIN: CPT | Performed by: INTERNAL MEDICINE

## 2021-07-28 RX ORDER — HYDROCODONE BITARTRATE AND ACETAMINOPHEN 5; 325 MG/1; MG/1
1 TABLET ORAL EVERY 6 HOURS PRN
Qty: 10 TABLET | Refills: 0 | Status: SHIPPED | OUTPATIENT
Start: 2021-07-28 | End: 2021-10-19

## 2021-07-28 ASSESSMENT — PAIN SCALES - GENERAL: PAINLEVEL: NO PAIN (0)

## 2021-07-28 NOTE — TELEPHONE ENCOUNTER
hydrocodone      Last Written Prescription Date:  06/07/21-07/28/21 patient states she is still taking this medication and does not know why it was discontinued on her list.  Last Fill Quantity: 10,   # refills: 0  Last Office Visit: 06/07/21  Future Office visit:    Next 5 appointments (look out 90 days)    Aug 12, 2021 10:00 AM  (Arrive by 9:45 AM)  SHORT with [facility] ([facility] ) [address]  [place] [address]  [phone]           Routing refill request to provider for review/approval because:  Drug not active on patient's medication list

## 2021-07-28 NOTE — PATIENT INSTRUCTIONS
Thank you for allowing Dr. Hughes and our  team to participate in your care. Please call our office at [phone] with scheduling questions or if you need to cancel or change your appointment. With any other questions or concerns you may call [facility] cardiology nurse at [phone].       If you experience chest pain, chest pressure, chest tightness, shortness of breath, fainting, lightheadedness, nausea, vomiting, or other concerning symptoms, please report to the Emergency Department or call 911. These symptoms may be emergent, and best treated in the Emergency Department.    Follow up in 1 year.       You will have an echocardiogram performed in January.  This is an ultrasound of the heart, that evaluates heart function.  The hospital scheduling department will call to schedule you for this test.     You will have an ultrasound of the carotid arteries. The hospital will call you to schedule this.

## 2021-07-28 NOTE — NURSING NOTE
"Chief Complaint   Patient presents with     RECHECK       Initial /60 (BP Location: Right arm, Patient Position: Sitting, Cuff Size: Adult Large)   Pulse 69   Temp 97.5  F (36.4  C) (Tympanic)   Wt 98 kg (216 lb)   SpO2 96%   BMI 35.94 kg/m   Estimated body mass index is 35.94 kg/m  as calculated from the following:    Height as of 6/7/21: 1.651 m (5' 5\").    Weight as of this encounter: 98 kg (216 lb).  Medication Reconciliation: complete  Radha Ruth LPN  "

## 2021-08-04 ENCOUNTER — HOSPITAL ENCOUNTER (OUTPATIENT)
Dept: ULTRASOUND IMAGING | Facility: HOSPITAL | Age: 81
Discharge: HOME | End: 2021-08-04
Attending: INTERNAL MEDICINE | Admitting: INTERNAL MEDICINE
Payer: COMMERCIAL

## 2021-08-04 DIAGNOSIS — I65.23 BILATERAL CAROTID ARTERY STENOSIS: ICD-10-CM

## 2021-08-04 PROCEDURE — 93880 EXTRACRANIAL BILAT STUDY: CPT

## 2021-08-09 ENCOUNTER — ANESTHESIA EVENT (OUTPATIENT)
Dept: SURGERY | Facility: HOSPITAL | Age: 81
End: 2021-08-09
Payer: COMMERCIAL

## 2021-08-09 ENCOUNTER — TRANSFERRED RECORDS (OUTPATIENT)
Dept: HEALTH INFORMATION MANAGEMENT | Facility: CLINIC | Age: 81
End: 2021-08-09

## 2021-08-09 ASSESSMENT — COPD QUESTIONNAIRES
COPD: 1
CAT_SEVERITY: MILD

## 2021-08-09 ASSESSMENT — ENCOUNTER SYMPTOMS: DYSRHYTHMIAS: 1

## 2021-08-09 NOTE — ANESTHESIA PREPROCEDURE EVALUATION
Anesthesia Pre-Procedure Evaluation    Patient: Dinorah Lim   MRN: [id number] : 1940        Preoperative Diagnosis: Ankle mass, left [R22.42]   Procedure : Procedure(s):  LEFT ANKLE MASS EXCISION     Past Medical History:   Diagnosis Date     Anxiety 2011     Cholecystolithiasis 2015    noted on US 2015 --> cholecystectomy     Chronic kidney disease (CKD), stage III (moderate)     Early,unknown eitiology, Dr Vilchis     Chronic kidney disease (CKD), stage III (moderate) 2015    Early,unknown eitiology, Dr Vilchis      Cough 2001     Hiatal hernia 2014    Seen on chest CT     Hyperlipidemia 2001     Idiopathic hives since age 8 2004     Major depression 10/04/2011     Neoplasm of uncertain behavior of liver 2015    Anterior Segment V 4 cm nodule abutting liver surface by US 2015      Obesity, Class II, BMI 35-39.9 2015    BMI 35.9 with comorbidities = MORBID obesity     Osteoarthritis of right hip 10/07/2004     Osteoarthrosis 2012     Otitis externa 10/04/2011     Prediabetes 2011      Past Surgical History:   Procedure Laterality Date     ARTHROPLASTY KNEE Left 2019    Procedure: LEFT TOTAL KNEE ARTHROPLASTY S/N;  Surgeon: Trevor Alonzo MD;  Location: [facility]     ARTHROSCOPY KNEE Left 3/21/2019    Procedure: LEFT  KNEE ARTHROSCOPY, partial medial menisectomy;  Surgeon: Esteban Wills MD;  Location: [facility]     C TOTAL KNEE ARTHROPLASTY Left 2019    Dr Alonzo     CLOSED REDUCTION WRIST Right 1952 x 2    long arm cast (same time as elbow fx)     CLOSED RX ELBOW DISLOCATION Right     CR/long cast of fracture     HC INJ EPIDURAL LUMBAR/SACRAL W/WO CONTRAST Bilateral 2013    facet injections; prev      LAPAROSCOPIC CHOLECYSTECTOMY N/A 2015    Procedure: LAPAROSCOPIC CHOLECYSTECTOMY;  Surgeon: Brittney العلي MD;  Location: [facility]     TONSILLECTOMY        Allergies   Allergen Reactions     Chocolate Hives      "Lemon Flavor Hives     Lime [Calcium Oxysulfide] Hives     Metal [Staples]      Orange Fruit [Citrus] Hives     Strawberry Hives     Adhesive Tape      Band-aids     Codeine Hives     Patient can tolerate oxycodone & dilaudid     Decadron [Dexamethasone] Hives     Erythromycin Hives     ERYTHROMYCIN BASE     Grapefruit [Extra Strength Grapefruit]      GRAPEFRUIT     Morphine Hives     Pt. Reports had hives     Penicillins Hives     Ranitidine GI Disturbance     Sulfa Drugs      SULFONAMIDE ANTIBIOTICS      Tomato      Xyzal [Levocetirizine] Hives      Social History     Tobacco Use     Smoking status: Never Smoker     Smokeless tobacco: Never Used   Substance Use Topics     Alcohol use: No      Wt Readings from Last 1 Encounters:   07/28/21 98 kg (216 lb)        Anesthesia Evaluation   Pt has had prior anesthetic. Type: General and MAC.    No history of anesthetic complications       ROS/MED HX  ENT/Pulmonary: Comment: \"Lung density\" on x-ray    (+) DORY risk factors, obese, mild,  COPD,     Neurologic:     (+) peripheral neuropathy, - left leg.     Cardiovascular: Comment: Grade I diastolic dysfunction  Bilateral carotid artery stenosis < 50% per ultrasound completed in 2019  Atrial ectopy  PSVT  Zio patch 12/31/18 report: The enrollment period was from 12/31/18 through 1/7/19.  There were 6 days and 12 hours of analysis time available for review.  The minimum heart rate was 49, average heart rate 66 and maximum heart rate 200 bpm.  The patient has underlying sinus rhythm throughout.  There is no significant bradycardia pauses or heart block seen.  There were very rare isolated PVC's.  There was a 5 beat run of ventricular tachycardia.  With an average heart rate of 113 bpm.  This was the only run.  There are rare PAC's seen.  Less than 1% of total beats.  There were 26 episodes of a supraventricular tachycardia greater than 4 beats.  The longest was for 20 beats with an average heart rate of 108 bpm.  The fastest " was for 11 beats with a maximum heart rate of 200 bpm but an average heart rate of 134 bpm.  Review of some of these tracings show that it likely is an atrial tachycardia  There were 3 triggered events all 3 of these strips do have PAC's seen.  1 of them had a very slower run of supraventricular tachycardia average heart rate of 113 bpm.  There were 3 diary entries with the symptom of skipped irregular beats.  Review of the associated strips show all had sinus rhythm to them had supraventricular beats.    (+) Dyslipidemia -Peripheral Vascular Disease-- Carotid Stenosis. ---CHF etiology: diastolic dysfunction  Last EF: 65 date: 2019 dysrhythmias, PVCs and Other, valvular problems/murmurs type: MR and AI aortic valve insufficiency, tricuspid valve insufficiency. Previous cardiac testing   Echo: Date: 01/28/21 Results:  EF 55-60%  Previous echo on 02/21/19 showed EF 65-70%  Stress Test: Date: Results:    ECG Reviewed: Date: 6/1/21 Results:  NSR  Cath: Date: Results:      METS/Exercise Tolerance: 3 - Able to walk 1-2 blocks without stopping    Hematologic:  - neg hematologic  ROS     Musculoskeletal: Comment: S/p left TKA  OA right hip  (+) arthritis,     GI/Hepatic:     (+) hiatal hernia,     Renal/Genitourinary:     (+) renal disease, type: CRI, Pt does not require dialysis,     Endo:     (+) Obesity,     Psychiatric/Substance Use:     (+) psychiatric history anxiety and depression H/O chronic opiod use .     Infectious Disease:  - neg infectious disease ROS     Malignancy:  - neg malignancy ROS     Other: Comment: Multiple allergies  Idiopathic hives     (+) , H/O Chronic Pain,        Physical Exam    Airway        Mallampati: II   TM distance: > 3 FB   Neck ROM: full   Mouth opening: > 3 cm    Respiratory Devices and Support         Dental       (+) upper dentures and lower dentures      Cardiovascular   cardiovascular exam normal       Rhythm and rate: regular and normal     Pulmonary   pulmonary exam normal         breath sounds clear to auscultation           OUTSIDE LABS:  CBC:   Lab Results   Component Value Date    WBC 3.6 (L) 06/01/2021    WBC 5.0 04/26/2021    HGB 13.0 06/01/2021    HGB 13.4 04/26/2021    HCT 39.7 06/01/2021    HCT 41.6 04/26/2021     06/01/2021     04/26/2021     BMP:   Lab Results   Component Value Date     06/01/2021     04/26/2021    POTASSIUM 4.3 06/01/2021    POTASSIUM 4.1 04/26/2021    CHLORIDE 110 (H) 06/01/2021    CHLORIDE 108 04/26/2021    CO2 29 06/01/2021    CO2 28 04/26/2021    BUN 17 06/01/2021    BUN 19 04/26/2021    CR 0.85 06/01/2021    CR 0.94 04/26/2021     (H) 06/01/2021     (H) 04/26/2021     COAGS:   Lab Results   Component Value Date    PTT 24 06/01/2014    INR 1.29 (H) 06/01/2014     POC:   Lab Results   Component Value Date     (H) 01/05/2015     HEPATIC:   Lab Results   Component Value Date    ALBUMIN 3.5 04/26/2021    PROTTOTAL 6.6 (L) 04/26/2021    ALT 33 04/26/2021    AST 20 04/26/2021    ALKPHOS 92 04/26/2021    BILITOTAL 0.9 04/26/2021     OTHER:   Lab Results   Component Value Date    LACT 0.4 (L) 03/18/2020    A1C 5.3 04/26/2021    NAFISA 9.3 06/01/2021    PHOS 3.5 01/28/2015    MAG 1.9 01/05/2015    LIPASE 150 09/27/2018    AMYLASE 40 12/12/2016    TSH 0.50 08/06/2020    CRP <2.9 09/27/2018    SED 13 08/26/2019       Anesthesia Plan    ASA Status:  3   NPO Status:  NPO Appropriate    Anesthesia Type: MAC (+ peripheral nerve blockade).     - Reason for MAC: chronic cardiopulmonary disease, straight local not clinically adequate   Induction: Propofol.   Maintenance: TIVA.        Consents    Anesthesia Plan(s) and associated risks, benefits, and realistic alternatives discussed. Questions answered and patient/representative(s) expressed understanding.     - Discussed with:  Patient      - Extended Intubation/Ventilatory Support Discussed: No.      - Patient is DNR/DNI Status: No    Use of blood products discussed: No .      Postoperative Care    Pain management: IV analgesics, Oral pain medications.        Comments:    Risks and benefits of MAC anesthetic discussed including dental damage, aspiration, loss of airway, conversion to general anesthetic, CV complications, MI, stroke, death. Pt wishes to proceed.               RENA Dang CRNA

## 2021-08-12 ENCOUNTER — OFFICE VISIT (OUTPATIENT)
Dept: FAMILY MEDICINE | Facility: OTHER | Age: 81
End: 2021-08-12
Attending: ORTHOPAEDIC SURGERY
Payer: COMMERCIAL

## 2021-08-12 DIAGNOSIS — F41.9 ANXIETY: ICD-10-CM

## 2021-08-12 DIAGNOSIS — Z01.818 PRE-OP EXAM: Primary | ICD-10-CM

## 2021-08-12 LAB — SARS-COV-2 RNA RESP QL NAA+PROBE: NEGATIVE

## 2021-08-12 PROCEDURE — U0005 INFEC AGEN DETEC AMPLI PROBE: HCPCS | Mod: ZL

## 2021-08-12 RX ORDER — PAROXETINE 40 MG/1
TABLET, FILM COATED ORAL
Qty: 90 TABLET | Refills: 0 | Status: SHIPPED | OUTPATIENT
Start: 2021-08-12 | End: 2021-11-26

## 2021-08-12 NOTE — TELEPHONE ENCOUNTER
Paxil      Last Written Prescription Date:  5/3/21  Last Fill Quantity: 90,   # refills: 0  Last Office Visit: 8/12/21  Future Office visit:       Routing refill request to provider for review/approval because:

## 2021-08-16 ENCOUNTER — ANESTHESIA (OUTPATIENT)
Dept: SURGERY | Facility: HOSPITAL | Age: 81
End: 2021-08-16
Payer: COMMERCIAL

## 2021-08-16 ENCOUNTER — HOSPITAL ENCOUNTER (OUTPATIENT)
Facility: HOSPITAL | Age: 81
Discharge: HOME | End: 2021-08-16
Attending: ORTHOPAEDIC SURGERY | Admitting: ORTHOPAEDIC SURGERY
Payer: COMMERCIAL

## 2021-08-16 VITALS
OXYGEN SATURATION: 92 % | HEART RATE: 63 BPM | TEMPERATURE: 97.4 F | RESPIRATION RATE: 16 BRPM | SYSTOLIC BLOOD PRESSURE: 149 MMHG | HEIGHT: 65 IN | WEIGHT: 214 LBS | BODY MASS INDEX: 35.65 KG/M2 | DIASTOLIC BLOOD PRESSURE: 89 MMHG

## 2021-08-16 DIAGNOSIS — R22.42 ANKLE MASS, LEFT: Primary | ICD-10-CM

## 2021-08-16 PROCEDURE — 12031 INTMD RPR S/A/T/EXT 2.5 CM/<: CPT | Performed by: PHYSICIAN ASSISTANT

## 2021-08-16 PROCEDURE — 12031 INTMD RPR S/A/T/EXT 2.5 CM/<: CPT | Mod: LT | Performed by: ORTHOPAEDIC SURGERY

## 2021-08-16 PROCEDURE — 99100 ANES PT EXTEME AGE<1 YR&>70: CPT | Performed by: NURSE ANESTHETIST, CERTIFIED REGISTERED

## 2021-08-16 PROCEDURE — 360N000075 HC SURGERY LEVEL 2, PER MIN: Performed by: ORTHOPAEDIC SURGERY

## 2021-08-16 PROCEDURE — 250N000011 HC RX IP 250 OP 636: Performed by: NURSE ANESTHETIST, CERTIFIED REGISTERED

## 2021-08-16 PROCEDURE — 272N000001 HC OR GENERAL SUPPLY STERILE: Performed by: ORTHOPAEDIC SURGERY

## 2021-08-16 PROCEDURE — 370N000017 HC ANESTHESIA TECHNICAL FEE, PER MIN: Performed by: ORTHOPAEDIC SURGERY

## 2021-08-16 PROCEDURE — 88304 TISSUE EXAM BY PATHOLOGIST: CPT | Mod: TC | Performed by: ORTHOPAEDIC SURGERY

## 2021-08-16 PROCEDURE — 250N000011 HC RX IP 250 OP 636: Performed by: ORTHOPAEDIC SURGERY

## 2021-08-16 PROCEDURE — 27619 EXC LEG/ANKLE TUM DEEP <5 CM: CPT | Performed by: NURSE ANESTHETIST, CERTIFIED REGISTERED

## 2021-08-16 PROCEDURE — 710N000012 HC RECOVERY PHASE 2, PER MINUTE: Performed by: ORTHOPAEDIC SURGERY

## 2021-08-16 PROCEDURE — 250N000009 HC RX 250: Performed by: NURSE ANESTHETIST, CERTIFIED REGISTERED

## 2021-08-16 PROCEDURE — 999N000141 HC STATISTIC PRE-PROCEDURE NURSING ASSESSMENT: Performed by: ORTHOPAEDIC SURGERY

## 2021-08-16 PROCEDURE — 11406 EXC TR-EXT B9+MARG >4.0 CM: CPT | Performed by: PHYSICIAN ASSISTANT

## 2021-08-16 PROCEDURE — 11406 EXC TR-EXT B9+MARG >4.0 CM: CPT | Mod: LT | Performed by: ORTHOPAEDIC SURGERY

## 2021-08-16 PROCEDURE — 258N000003 HC RX IP 258 OP 636: Performed by: NURSE ANESTHETIST, CERTIFIED REGISTERED

## 2021-08-16 RX ORDER — SODIUM CHLORIDE, SODIUM LACTATE, POTASSIUM CHLORIDE, CALCIUM CHLORIDE 600; 310; 30; 20 MG/100ML; MG/100ML; MG/100ML; MG/100ML
INJECTION, SOLUTION INTRAVENOUS CONTINUOUS PRN
Status: DISCONTINUED | OUTPATIENT
Start: 2021-08-16 | End: 2021-08-16

## 2021-08-16 RX ORDER — OXYCODONE HYDROCHLORIDE 5 MG/1
5 TABLET ORAL
Status: DISCONTINUED | OUTPATIENT
Start: 2021-08-16 | End: 2021-08-16 | Stop reason: HOSPADM

## 2021-08-16 RX ORDER — OXYCODONE HYDROCHLORIDE 5 MG/1
5-10 TABLET ORAL EVERY 4 HOURS PRN
Qty: 10 TABLET | Refills: 0 | Status: SHIPPED | OUTPATIENT
Start: 2021-08-16 | End: 2021-08-19

## 2021-08-16 RX ORDER — ONDANSETRON 2 MG/ML
4 INJECTION INTRAMUSCULAR; INTRAVENOUS EVERY 30 MIN PRN
Status: DISCONTINUED | OUTPATIENT
Start: 2021-08-16 | End: 2021-08-16 | Stop reason: HOSPADM

## 2021-08-16 RX ORDER — KETAMINE HYDROCHLORIDE 10 MG/ML
INJECTION INTRAMUSCULAR; INTRAVENOUS PRN
Status: DISCONTINUED | OUTPATIENT
Start: 2021-08-16 | End: 2021-08-16

## 2021-08-16 RX ORDER — BUPIVACAINE HYDROCHLORIDE 2.5 MG/ML
INJECTION, SOLUTION INFILTRATION; PERINEURAL PRN
Status: DISCONTINUED | OUTPATIENT
Start: 2021-08-16 | End: 2021-08-16 | Stop reason: HOSPADM

## 2021-08-16 RX ORDER — BUPIVACAINE HYDROCHLORIDE AND EPINEPHRINE 2.5; 5 MG/ML; UG/ML
INJECTION, SOLUTION INFILTRATION; PERINEURAL
Status: DISCONTINUED
Start: 2021-08-16 | End: 2021-08-16 | Stop reason: WASHOUT

## 2021-08-16 RX ORDER — LIDOCAINE 40 MG/G
CREAM TOPICAL
Status: DISCONTINUED | OUTPATIENT
Start: 2021-08-16 | End: 2021-08-16 | Stop reason: HOSPADM

## 2021-08-16 RX ORDER — BUPIVACAINE HYDROCHLORIDE 2.5 MG/ML
INJECTION, SOLUTION EPIDURAL; INFILTRATION; INTRACAUDAL
Status: DISCONTINUED
Start: 2021-08-16 | End: 2021-08-16 | Stop reason: HOSPADM

## 2021-08-16 RX ORDER — CEFAZOLIN SODIUM 2 G/100ML
2 INJECTION, SOLUTION INTRAVENOUS
Status: COMPLETED | OUTPATIENT
Start: 2021-08-16 | End: 2021-08-16

## 2021-08-16 RX ORDER — FENTANYL CITRATE 50 UG/ML
INJECTION, SOLUTION INTRAMUSCULAR; INTRAVENOUS PRN
Status: DISCONTINUED | OUTPATIENT
Start: 2021-08-16 | End: 2021-08-16

## 2021-08-16 RX ORDER — SODIUM CHLORIDE 9 MG/ML
INJECTION, SOLUTION INTRAVENOUS CONTINUOUS
Status: DISCONTINUED | OUTPATIENT
Start: 2021-08-16 | End: 2021-08-16 | Stop reason: HOSPADM

## 2021-08-16 RX ORDER — PROPOFOL 10 MG/ML
INJECTION, EMULSION INTRAVENOUS PRN
Status: DISCONTINUED | OUTPATIENT
Start: 2021-08-16 | End: 2021-08-16

## 2021-08-16 RX ORDER — ONDANSETRON 4 MG/1
4 TABLET, ORALLY DISINTEGRATING ORAL EVERY 30 MIN PRN
Status: DISCONTINUED | OUTPATIENT
Start: 2021-08-16 | End: 2021-08-16 | Stop reason: HOSPADM

## 2021-08-16 RX ADMIN — CEFAZOLIN SODIUM 2 G: 2 INJECTION, SOLUTION INTRAVENOUS at 13:42

## 2021-08-16 RX ADMIN — FENTANYL CITRATE 50 MCG: 50 INJECTION, SOLUTION INTRAMUSCULAR; INTRAVENOUS at 13:54

## 2021-08-16 RX ADMIN — PROPOFOL 20 MG: 10 INJECTION, EMULSION INTRAVENOUS at 14:00

## 2021-08-16 RX ADMIN — PROPOFOL 20 MG: 10 INJECTION, EMULSION INTRAVENOUS at 14:09

## 2021-08-16 RX ADMIN — PROCHLORPERAZINE EDISYLATE 5 MG: 5 INJECTION INTRAMUSCULAR; INTRAVENOUS at 15:35

## 2021-08-16 RX ADMIN — FENTANYL CITRATE 50 MCG: 50 INJECTION, SOLUTION INTRAMUSCULAR; INTRAVENOUS at 13:44

## 2021-08-16 RX ADMIN — PROPOFOL 20 MG: 10 INJECTION, EMULSION INTRAVENOUS at 13:54

## 2021-08-16 RX ADMIN — PROPOFOL 20 MG: 10 INJECTION, EMULSION INTRAVENOUS at 14:04

## 2021-08-16 RX ADMIN — MIDAZOLAM 1 MG: 1 INJECTION INTRAMUSCULAR; INTRAVENOUS at 13:49

## 2021-08-16 RX ADMIN — SODIUM CHLORIDE, POTASSIUM CHLORIDE, SODIUM LACTATE AND CALCIUM CHLORIDE: 600; 310; 30; 20 INJECTION, SOLUTION INTRAVENOUS at 13:47

## 2021-08-16 RX ADMIN — KETAMINE HYDROCHLORIDE 10 MG: 10 INJECTION, SOLUTION INTRAMUSCULAR; INTRAVENOUS at 13:53

## 2021-08-16 RX ADMIN — ONDANSETRON 4 MG: 2 INJECTION INTRAMUSCULAR; INTRAVENOUS at 14:44

## 2021-08-16 RX ADMIN — KETAMINE HYDROCHLORIDE 10 MG: 10 INJECTION, SOLUTION INTRAMUSCULAR; INTRAVENOUS at 13:46

## 2021-08-16 RX ADMIN — KETAMINE HYDROCHLORIDE 10 MG: 10 INJECTION, SOLUTION INTRAMUSCULAR; INTRAVENOUS at 13:49

## 2021-08-16 RX ADMIN — MIDAZOLAM 1 MG: 1 INJECTION INTRAMUSCULAR; INTRAVENOUS at 13:44

## 2021-08-16 ASSESSMENT — MIFFLIN-ST. JEOR: SCORE: 1451.58

## 2021-08-16 NOTE — OR NURSING
"Pt states nausea is getting better.  \"still feel weird and now is feeling dizzy\".   Cold compress applied to forehead. 1500 Pt now dry heaving.   "

## 2021-08-16 NOTE — OR NURSING
Patient received compazine for nausea, had emesis x2 clear yellow after drinking water and edward. Up to bathroom to void and large BM.  Denies dizziness, denies chest pain or pressure.  Discharge instructions reviewed with patient and niece. Verbalized understanding. Currently drank 300 ml of water with no further nausea or vomiting.  Discharged home via wheelchair.

## 2021-08-16 NOTE — ANESTHESIA POSTPROCEDURE EVALUATION
Patient: Dinorah Lim    Procedure(s):  LEFT ANKLE MASS EXCISION    Diagnosis:Ankle mass, left [R22.42]  Diagnosis Additional Information: No value filed.    Anesthesia Type:  MAC    Note:  Disposition: Outpatient   Postop Pain Control: Uneventful            Sign Out: Well controlled pain   PONV: No   Neuro/Psych: Uneventful            Sign Out: Acceptable/Baseline neuro status   Airway/Respiratory: Uneventful            Sign Out: Acceptable/Baseline resp. status   CV/Hemodynamics: Uneventful            Sign Out: Acceptable CV status; No obvious hypovolemia; No obvious fluid overload   Other NRE: NONE   DID A NON-ROUTINE EVENT OCCUR? No           Last vitals:  Vitals Value Taken Time   /79 08/16/21 1440   Temp 97.5  F (36.4  C) 08/16/21 1425   Pulse 61 08/16/21 1440   Resp 16 08/16/21 1430   SpO2 93 % 08/16/21 1441   Vitals shown include unvalidated device data.    Electronically Signed By: RENA Wilson CRNA  August 16, 2021  2:42 PM

## 2021-08-16 NOTE — ANESTHESIA CARE TRANSFER NOTE
Patient: Dinorah Lim    Procedure(s):  LEFT ANKLE MASS EXCISION    Diagnosis: Ankle mass, left [R22.42]  Diagnosis Additional Information: No value filed.    Anesthesia Type:   MAC     Note:    Oropharynx: oropharynx clear of all foreign objects  Level of Consciousness: drowsy  Oxygen Supplementation: nasal cannula  Level of Supplemental Oxygen (L/min / FiO2): 2  Independent Airway: airway patency satisfactory and stable  Dentition: dentition unchanged  Vital Signs Stable: post-procedure vital signs reviewed and stable  Report to RN Given: handoff report given  Patient transferred to: Phase II    Handoff Report: Identifed the Patient, Identified the Reponsible Provider, Reviewed the pertinent medical history, Discussed the surgical course, Reviewed Intra-OP anesthesia mangement and issues during anesthesia, Set expectations for post-procedure period and Allowed opportunity for questions and acknowledgement of understanding      Vitals:  Vitals Value Taken Time   BP     Temp     Pulse     Resp     SpO2         Electronically Signed By: RENA Wilson CRNA  August 16, 2021  2:26 PM

## 2021-08-16 NOTE — BRIEF OP NOTE
[facility]    Brief Operative Note    Pre-operative diagnosis: Ankle mass, left [R22.42]  Post-operative diagnosis Same as pre-operative diagnosis    Procedure: Procedure(s):  LEFT ANKLE MASS EXCISION  Surgeon: Surgeon(s) and Role:     * Trevor Alonzo MD - Primary     * Dale Harvey PA-C  Anesthesia: Combined MAC with Local   Estimated blood loss: Less than 10 ml  Drains: None  Specimens:   ID Type Source Tests Collected by Time Destination   1 : left ankle mass Biopsy Other SURGICAL PATHOLOGY EXAM Trevor Alonzo MD 8/16/2021  2:05 PM      Findings:   Lipoma   Complications: None.  Implants: * No implants in log *      [id number]

## 2021-08-16 NOTE — DISCHARGE INSTRUCTIONS
Post-Anesthesia Patient Instructions    IMMEDIATELY FOLLOWING SURGERY:  Do not drive or operate machinery for the first twenty four hours after surgery.  Do not make any important decisions for twenty four hours after surgery or while taking narcotic pain medications or sedatives.  If you develop intractable nausea and vomiting or a severe headache please notify your doctor immediately.    FOLLOW-UP:  Please make an appointment with your surgeon as instructed. You do not need to follow up with anesthesia unless specifically instructed to do so.    WOUND CARE INSTRUCTIONS (if applicable):  Keep a dry clean dressing on the anesthesia/puncture wound site if there is drainage.  Once the wound has quit draining you may leave it open to air.  Generally you should leave the bandage intact for twenty four hours unless there is drainage.  If the epidural site drains for more than 36-48 hours please call the anesthesia department.    QUESTIONS?:  Please feel free to call your physician or the hospital  if you have any questions, and they will be happy to assist you.             FOLLOW UP APPT WITH ROOSEVELT REYES AT  ON AUGUST 24TH AT 10:15 AM.

## 2021-08-16 NOTE — OP NOTE
Procedure Date: 08/16/2021    PREOPERATIVE DIAGNOSIS:  Left lateral ankle mass.    POSTOPERATIVE DIAGNOSIS:  Left lateral ankle mass.    PROCEDURE PERFORMED:  Left ankle mass excision, measuring 4 x 5 cm.    SURGEON:  Trevor Alonzo MD    ASSISTANT:  Jose Harvey PA-C.  A surgical assistant was necessary to position and assist with exposing the joint and assisting in closure postoperatively.    ANESTHESIA:  Sedation with local.    FINDINGS:     1.  Mass 4 x 5 cm with the appearance of a lipoma.  2.  No deep extension.  3.  Adequate hemostasis.    IMPLANTS:  None.    SPECIMENS:  Mass was sent to Pathology for final analysis and tissue diagnosis.    DRAINS:  None.    COMPLICATIONS:  None.    TOURNIQUET TIME:  10 minutes.    ESTIMATED BLOOD LOSS:  5 mL.    INDICATIONS FOR PROCEDURE:  The patient is a 80-year-old female with a longstanding mass to her left lateral ankle, had the appearance of a ganglion cyst or lipoma.  It was subcutaneous and mobile.  I discussed surgical excision.  Risks and benefits were discussed.  Consent was obtained.    DESCRIPTION OF PROCEDURE:  The patient was seen in the preoperative area, the surgical site was marked, questions answered, taken to the operating room and placed under sedation.  Her left lower extremity was prepped and draped in sterile fashion with ChloraPrep.  Time-out was called to identify the correct patient and correct surgical site.  I then elevated and exsanguinated the limb.  Tourniquet was inflated to 250 mmHg. I anesthetized the skin and subcutaneous tissue with 0.25% Marcaine.  I made a relatively straight incision 3 cm in length overlying the mass.  I dissected down through the skin and subcutaneous tissue.  I identified the mass, it had the appearance of a lipoma with multiple septations.  I used blunt dissection and electrocautery around the mass in its entirety and removed it en bloc.  I confirmed it was right down to fascia.  There was no deep extension.  I  then obtained hemostasis.  Tourniquet was deflated.  Hemostasis was achieved.  Incision was closed with a 3-0 Vicryl, 3-0 nylon in running fashion.  Sterile dressings were applied.  He was awakened, transferred to PACU in stable condition.    POSTOPERATIVE PLAN:  The patient will be weightbearing as tolerated, range of motion as tolerated to her left ankle.  Follow up in 2 weeks for wound check and suture removal.  No x-rays are needed.  We will follow up on pathology.      Trevor Alonzo MD        D: 2021   T: 2021   MT: [id number]    Name:     ANAHY BROWNE  MRN:      [id number]        Account:        [id number]   :      1940           Procedure Date: 2021     Document: [id number]

## 2021-08-16 NOTE — OR NURSING
Face to face report given with opportunity to observe patient.    Report given to Nandini Metcalf RN   8/16/2021  3:17 PM

## 2021-08-16 NOTE — OR NURSING
"Pt to SDS very drowsy.  States to nurse during assessment she \"feels weird\" but is unable to describe exactly what she means.  Pt then starting to rouse and states she feels she may throw up.  Denies P/O N/V in the past \"this is the first time this has ever happened time\".  Medicated with zofran and states she still feels nauseated at this time.  "

## 2021-08-16 NOTE — OR NURSING
Updated Alexy CRNA on pt condition - BP, dry heaves, dizzy, tremors, incontinence. CRNA at the bedside now speaking with pt.

## 2021-08-19 LAB
PATH REPORT.COMMENTS IMP SPEC: NORMAL
PATH REPORT.COMMENTS IMP SPEC: NORMAL
PATH REPORT.FINAL DX SPEC: NORMAL
PATH REPORT.GROSS SPEC: NORMAL
PATH REPORT.MICROSCOPIC SPEC OTHER STN: NORMAL
PATH REPORT.RELEVANT HX SPEC: NORMAL
PHOTO IMAGE: NORMAL

## 2021-08-19 PROCEDURE — 88304 TISSUE EXAM BY PATHOLOGIST: CPT | Mod: 26 | Performed by: PATHOLOGY

## 2021-08-24 ENCOUNTER — TRANSFERRED RECORDS (OUTPATIENT)
Dept: HEALTH INFORMATION MANAGEMENT | Facility: CLINIC | Age: 81
End: 2021-08-24

## 2021-09-14 ENCOUNTER — TRANSFERRED RECORDS (OUTPATIENT)
Dept: HEALTH INFORMATION MANAGEMENT | Facility: CLINIC | Age: 81
End: 2021-09-14

## 2021-09-16 ENCOUNTER — TRANSFERRED RECORDS (OUTPATIENT)
Dept: HEALTH INFORMATION MANAGEMENT | Facility: CLINIC | Age: 81
End: 2021-09-16

## 2021-09-20 ENCOUNTER — TRANSFERRED RECORDS (OUTPATIENT)
Dept: HEALTH INFORMATION MANAGEMENT | Facility: CLINIC | Age: 81
End: 2021-09-20

## 2021-09-27 ENCOUNTER — TRANSFERRED RECORDS (OUTPATIENT)
Dept: HEALTH INFORMATION MANAGEMENT | Facility: CLINIC | Age: 81
End: 2021-09-27

## 2021-09-29 DIAGNOSIS — F41.9 ANXIETY: Chronic | ICD-10-CM

## 2021-09-30 RX ORDER — CLONAZEPAM 1 MG/1
TABLET ORAL
Qty: 45 TABLET | Refills: 0 | Status: SHIPPED | OUTPATIENT
Start: 2021-09-30 | End: 2022-02-09

## 2021-09-30 NOTE — TELEPHONE ENCOUNTER
clonazepam   Last Written Prescription Date:  Not on medication list  Last Fill Quantity: ,   # refills:   Last Office Visit: 6/7/21  Future Office visit:    Next 5 appointments (look out 90 days)    Oct 20, 2021  9:30 AM  (Arrive by 9:15 AM)  Return Visit with Ha Hughes DO  [facility] ([facility] ) [address] [place] [address]  [phone]

## 2021-10-07 ENCOUNTER — NURSE TRIAGE (OUTPATIENT)
Dept: FAMILY MEDICINE | Facility: OTHER | Age: 81
End: 2021-10-07

## 2021-10-07 ENCOUNTER — OFFICE VISIT (OUTPATIENT)
Dept: FAMILY MEDICINE | Facility: OTHER | Age: 81
End: 2021-10-07
Attending: FAMILY MEDICINE
Payer: COMMERCIAL

## 2021-10-07 DIAGNOSIS — Z20.822 EXPOSURE TO 2019 NOVEL CORONAVIRUS: ICD-10-CM

## 2021-10-07 DIAGNOSIS — Z20.822 SUSPECTED 2019 NOVEL CORONAVIRUS INFECTION: ICD-10-CM

## 2021-10-07 DIAGNOSIS — Z20.822 SUSPECTED 2019 NOVEL CORONAVIRUS INFECTION: Primary | ICD-10-CM

## 2021-10-07 PROCEDURE — U0003 INFECTIOUS AGENT DETECTION BY NUCLEIC ACID (DNA OR RNA); SEVERE ACUTE RESPIRATORY SYNDROME CORONAVIRUS 2 (SARS-COV-2) (CORONAVIRUS DISEASE [COVID-19]), AMPLIFIED PROBE TECHNIQUE, MAKING USE OF HIGH THROUGHPUT TECHNOLOGIES AS DESCRIBED BY CMS-2020-01-R: HCPCS | Mod: ZL

## 2021-10-07 NOTE — PROGRESS NOTES
"    Assessment & Plan     Infected incision  Of the left foot  Healing well, discussed that the slight edema is normal after an infection as she had and will take several months to clear. Good blood flow to the foot     Chronic, continuous use of opioids  She has stopped these now     Cough  She was exposed to RSV with her grandchildren. Will swab for strep. Discussed if this is negative and chest x ray is negative this is most likely RSV and she will not need antibiotics  - XR CHEST 2 VW (Clinic Performed); Future    Pharyngitis, unspecified etiology  As above   - Group A Streptococcus PCR Throat Swab ([place] ONLY)      23 minutes spent on the date of the encounter doing         BMI:   Estimated body mass index is 35.94 kg/m  as calculated from the following:    Height as of this encounter: 1.651 m (5' 5\").    Weight as of this encounter: 98 kg (216 lb).           Return if symptoms worsen or fail to improve.    Magaly Sosa MD  [facility] [place] Bill is a 80 year old who presents for the following health issues     HPI     Musculoskeletal problem/pain  Onset/Duration: 8/16/2021 had left ankle surgery by Dr Alonzo. Last follow up was on 9/20/21, still still having some drainage from the site. Was started on another antibiotic and advised to follow up in 1 week.   Description  Location: ankle - left  Joint Swelling: YES  Redness: no  Pain: YES  Warmth: no  Intensity:  mild  Progression of Symptoms:  intermittent  Accompanying signs and symptoms:   Fevers: no  Numbness/tingling/weakness: no  History  Trauma to the area: no  Recent illness:  no  Previous similar problem: no  Previous evaluation:  no  Precipitating or alleviating factors:  Aggravating factors include: standing and walking  Therapies tried and outcome: nothing    She had surgery with Dr Alonzo and two courses of antibiotics. She feels she isn't getting a lot of blood flow to the foot. No pain, no drainage    Acute " "Illness  Acute illness concerns: pt was swabbed for covid on Friday 10/15/21 was negative. Was exposed 3 weeks ago to a family member who was positive. Pt states would like chest x ray.  Onset/Duration: couple days  Symptoms:  Fever: no  Chills/Sweats: no  Headache (location?): no  Sinus Pressure: no  Conjunctivitis:  no  Ear Pain: no  Rhinorrhea: no  Congestion: no  Sore Throat: YES  Cough: YES-non-productive  Wheeze: no  Decreased Appetite: no  Nausea: no  Vomiting: no  Diarrhea: no  Dysuria/Freq.: no  Dysuria or Hematuria: no  Fatigue/Achiness: no  Sick/Strep Exposure: no  Therapies tried and outcome: None      She has had a sore throat and dry cough for the last two days. No fever. Negative covid test as above. Not vaccinated.     Review of Systems   Constitutional, HEENT, cardiovascular, pulmonary, gi and gu systems are negative, except as otherwise noted.      Objective    /74   Pulse 66   Temp 97.8  F (36.6  C) (Tympanic)   Resp 19   Ht 1.651 m (5' 5\")   Wt 98 kg (216 lb)   SpO2 99%   BMI 35.94 kg/m    Body mass index is 35.94 kg/m .  Physical Exam   GENERAL: healthy, alert and no distress  HEENT: ears clear, pharynx injected without exudate.   NECK: no adenopathy, no asymmetry, masses, or scars and thyroid normal to palpation  RESP: lungs clear to auscultation - no rales, rhonchi or wheezes  CV: regular rate and rhythm, normal S1 S2, no S3 or S4, no murmur, click or rub, no peripheral edema and peripheral pulses strong  MS: good DP pulse, 4/4, capillary refill is 1 second. Slight edema noted around the lateral malleolus  SKIN: incision is healing with scabbing noted over the anterior lateral foot. No drainage, no erythema. Peeling of the skin is noted near the toes  NEURO: Normal strength and tone, mentation intact and speech normal  PSYCH: mentation appears normal, affect normal/bright                "

## 2021-10-07 NOTE — TELEPHONE ENCOUNTER
Headache and nausea starting on 10/4/21.    Family member testing positive x 2 weeks ago. Patient was around the family member.     Patient has not been vaccinated for covid 19.    Covid testing scheduled:    Next 5 appointments (look out 90 days)    Oct 07, 2021  2:45 PM  (Arrive by 2:30 PM)  SHORT with HC COLLECTION  [facility] [place] ([facility] [place] ) [address]  [place] [address]  [phone]   Oct 19, 2021  8:45 AM  (Arrive by 8:30 AM)  SHORT with Magaly Sosa MD  [facility] [place] ([facility] [place] ) [address]  [place] [address]  [phone]   Oct 20, 2021  9:30 AM  (Arrive by 9:15 AM)  Return Visit with Ha Hughes DO  [facility] [place] ([facility] [place] ) [address]  [place] [address]  [phone]            Reason for Disposition    COVID-19 Home Isolation, questions about    Additional Information    Negative: SEVERE difficulty breathing (e.g., struggling for each breath, speaks in single words)    Negative: Difficult to awaken or acting confused (e.g., disoriented, slurred speech)    Negative: Bluish (or gray) lips or face now    Negative: Shock suspected (e.g., cold/pale/clammy skin, too weak to stand, low BP, rapid pulse)    Negative: Sounds like a life-threatening emergency to the triager    Negative: [1] COVID-19 exposure AND [2] no symptoms    Negative: COVID-19 vaccine reaction suspected (e.g., fever, headache, muscle aches) occurring 1 to 3 days after getting vaccine    Negative: COVID-19 vaccine, questions about    Negative: [1] Lives with someone known to have influenza (flu test positive) AND [2] flu-like symptoms (e.g., cough, runny nose, sore throat, SOB; with or without fever)    Negative: [1] Adult with possible COVID-19 symptoms AND [2] triager concerned about severity of symptoms or other causes    Negative: COVID-19 and breastfeeding, questions about    Negative:  SEVERE or constant chest pain or pressure (Exception: mild central chest pain, present only when coughing)    Negative: MODERATE difficulty breathing (e.g., speaks in phrases, SOB even at rest, pulse 100-120)    Negative: [1] Headache AND [2] stiff neck (can't touch chin to chest)    Negative: MILD difficulty breathing (e.g., minimal/no SOB at rest, SOB with walking, pulse <100)    Negative: Chest pain or pressure    Negative: Patient sounds very sick or weak to the triager    Negative: Fever > 103 F (39.4 C)    Negative: [1] Fever > 101 F (38.3 C) AND [2] age > 60 years    Negative: [1] Fever > 100.0 F (37.8 C) AND [2] bedridden (e.g., nursing home patient, CVA, chronic illness, recovering from surgery)    Negative: HIGH RISK for severe COVID complications (e.g., age > 64 years, obesity with BMI > 25, pregnant, chronic lung disease or other chronic medical condition)  (Exception: Already seen by PCP and no new or worsening symptoms.)    Negative: [1] HIGH RISK patient AND [2] influenza is widespread in the community AND [3] ONE OR MORE respiratory symptoms: cough, sore throat, runny or stuffy nose    Negative: [1] HIGH RISK patient AND [2] influenza exposure within the last 7 days AND [3] ONE OR MORE respiratory symptoms: cough, sore throat, runny or stuffy nose    Negative: [1] COVID-19 infection suspected by caller or triager AND [2] mild symptoms (cough, fever, or others) AND [3] negative COVID-19 rapid test    Negative: Fever present > 3 days (72 hours)    Negative: [1] Fever returns after gone for over 24 hours AND [2] symptoms worse or not improved    Negative: [1] Continuous (nonstop) coughing interferes with work or school AND [2] no improvement using cough treatment per protocol    Negative: Cough present > 3 weeks    Negative: [1] COVID-19 diagnosed by positive lab test AND [2] NO symptoms (e.g., cough, fever, others)    Negative: [1] COVID-19 diagnosed by positive lab test AND [2] mild symptoms (e.g.,  "cough, fever, others) AND [3] no complications or SOB    Negative: [1] COVID-19 diagnosed by doctor (or NP/PA) AND [2] mild symptoms (e.g., cough, fever, others) AND [3] no complications or SOB    Negative: [1] COVID-19 diagnosed AND [2] has mild nausea, vomiting or diarrhea    Answer Assessment - Initial Assessment Questions  1. COVID-19 DIAGNOSIS: \"Who made your Coronavirus (COVID-19) diagnosis?\" \"Was it confirmed by a positive lab test?\" If not diagnosed by a HCP, ask \"Are there lots of cases (community spread) where you live?\" (See public health department website, if unsure)      Yes, 10/20      2. COVID-19 EXPOSURE: \"Was there any known exposure to COVID before the symptoms began?\" CDC Definition of close contact: within 6 feet (2 meters) for a total of 15 minutes or more over a 24-hour period.         3. ONSET: \"When did the COVID-19 symptoms start?\"       Yes, sister in law tested positive x 2 weeks ago     4. WORST SYMPTOM: \"What is your worst symptom?\" (e.g., cough, fever, shortness of breath, muscle aches)      Headache     5. COUGH: \"Do you have a cough?\" If Yes, ask: \"How bad is the cough?\"        No     6. FEVER: \"Do you have a fever?\" If Yes, ask: \"What is your temperature, how was it measured, and when did it start?\"      No     7. RESPIRATORY STATUS: \"Describe your breathing?\" (e.g., shortness of breath, wheezing, unable to speak)       No     8. BETTER-SAME-WORSE: \"Are you getting better, staying the same or getting worse compared to yesterday?\"  If getting worse, ask, \"In what way?\"      better     9. HIGH RISK DISEASE: \"Do you have any chronic medical problems?\" (e.g., asthma, heart or lung disease, weak immune system, obesity, etc.)      Heart Valves (leaking)     10. PREGNANCY: \"Is there any chance you are pregnant?\" \"When was your last menstrual period?\"        No     11. OTHER SYMPTOMS: \"Do you have any other symptoms?\"  (e.g., chills, fatigue, headache, loss of smell or taste, muscle pain, " sore throat; new loss of smell or taste especially support the diagnosis of COVID-19)        Headache, nausea    Protocols used: CORONAVIRUS (COVID-19) DIAGNOSED OR [id number] 8.25.2021

## 2021-10-09 LAB — SARS-COV-2 RNA RESP QL NAA+PROBE: NEGATIVE

## 2021-10-19 ENCOUNTER — ANCILLARY PROCEDURE (OUTPATIENT)
Dept: GENERAL RADIOLOGY | Facility: OTHER | Age: 81
End: 2021-10-19
Attending: FAMILY MEDICINE
Payer: COMMERCIAL

## 2021-10-19 ENCOUNTER — OFFICE VISIT (OUTPATIENT)
Dept: FAMILY MEDICINE | Facility: OTHER | Age: 81
End: 2021-10-19
Attending: FAMILY MEDICINE
Payer: COMMERCIAL

## 2021-10-19 VITALS
HEIGHT: 65 IN | SYSTOLIC BLOOD PRESSURE: 132 MMHG | HEART RATE: 66 BPM | RESPIRATION RATE: 19 BRPM | WEIGHT: 216 LBS | DIASTOLIC BLOOD PRESSURE: 74 MMHG | BODY MASS INDEX: 35.99 KG/M2 | TEMPERATURE: 97.8 F | OXYGEN SATURATION: 99 %

## 2021-10-19 DIAGNOSIS — R05.9 COUGH: ICD-10-CM

## 2021-10-19 DIAGNOSIS — F11.90 CHRONIC, CONTINUOUS USE OF OPIOIDS: ICD-10-CM

## 2021-10-19 DIAGNOSIS — J02.9 PHARYNGITIS, UNSPECIFIED ETIOLOGY: ICD-10-CM

## 2021-10-19 DIAGNOSIS — R73.09 ABNORMAL GLUCOSE: ICD-10-CM

## 2021-10-19 DIAGNOSIS — E78.2 MIXED HYPERLIPIDEMIA: ICD-10-CM

## 2021-10-19 DIAGNOSIS — D70.8 OTHER NEUTROPENIA (H): ICD-10-CM

## 2021-10-19 DIAGNOSIS — N18.31 STAGE 3A CHRONIC KIDNEY DISEASE (H): ICD-10-CM

## 2021-10-19 DIAGNOSIS — E78.1 HYPERTRIGLYCERIDEMIA: ICD-10-CM

## 2021-10-19 DIAGNOSIS — T81.49XA INFECTED INCISION: Primary | ICD-10-CM

## 2021-10-19 LAB — GROUP A STREP BY PCR: NOT DETECTED

## 2021-10-19 PROCEDURE — 71046 X-RAY EXAM CHEST 2 VIEWS: CPT | Mod: TC

## 2021-10-19 PROCEDURE — G0463 HOSPITAL OUTPT CLINIC VISIT: HCPCS | Mod: 25

## 2021-10-19 PROCEDURE — 99213 OFFICE O/P EST LOW 20 MIN: CPT | Performed by: FAMILY MEDICINE

## 2021-10-19 PROCEDURE — 87651 STREP A DNA AMP PROBE: CPT | Mod: ZL | Performed by: FAMILY MEDICINE

## 2021-10-19 ASSESSMENT — MIFFLIN-ST. JEOR: SCORE: 1460.65

## 2021-10-19 ASSESSMENT — PAIN SCALES - GENERAL: PAINLEVEL: NO PAIN (0)

## 2021-10-19 NOTE — NURSING NOTE
"Chief Complaint   Patient presents with     Musculoskeletal Problem     URI       Initial /74   Pulse 66   Temp 97.8  F (36.6  C) (Tympanic)   Resp 19   Ht 1.651 m (5' 5\")   Wt 98 kg (216 lb)   SpO2 99%   BMI 35.94 kg/m   Estimated body mass index is 35.94 kg/m  as calculated from the following:    Height as of this encounter: 1.651 m (5' 5\").    Weight as of this encounter: 98 kg (216 lb).  Medication Reconciliation: complete  Aditi Saleh LPN    "

## 2021-10-19 NOTE — LETTER
Opioid / Opioid Plus Controlled Substance Agreement    This is an agreement between you and your provider about the safe and appropriate use of controlled substance/opioids prescribed by your care team. Controlled substances are medicines that can cause physical and mental dependence (abuse).    There are strict laws about having and using these medicines. We here at [facility] are committing to working with you in your efforts to get better. To support you in this work, we ll help you schedule regular office appointments for medicine refills. If we must cancel or change your appointment for any reason, we ll make sure you have enough medicine to last until your next appointment.     As a Provider, I will:    Listen carefully to your concerns and treat you with respect.     Recommend a treatment plan that I believe is in your best interest. This plan may involve therapies other than opioid pain medication.     Talk with you often about the possible benefits, and the risk of harm of any medicine that we prescribe for you.     Provide a plan on how to taper (discontinue or go off) using this medicine if the decision is made to stop its use.    As a Patient, I understand that opioid(s):     Are a controlled substance prescribed by my care team to help me function or work and manage my condition(s).     Are strong medicines and can cause serious side effects such as:    Drowsiness, which can seriously affect my driving ability    A lower breathing rate, enough to cause death    Harm to my thinking ability     Depression     Abuse of and addiction to this medicine    Need to be taken exactly as prescribed. Combining opioids with certain medicines or chemicals (such as illegal drugs, sedatives, sleeping pills, and benzodiazepines) can be dangerous or even fatal. If I stop opioids suddenly, I may have severe withdrawal symptoms.    Do not work for all types of pain nor for all patients. If they re not helpful, I may  be asked to stop them.        The risks, benefits and side effects of these medicine(s) were explained to me. I agree that:  1. I will take part in other treatments as advised by my care team. This may be psychiatry or counseling, physical therapy, behavioral therapy, group treatment or a referral to a specialist.     2. I will keep all my appointments. I understand that this is part of the monitoring of opioids. My care team may require an office visit for EVERY opioid/controlled substance refill. If I miss appointments or don t follow instructions, my care team may stop my medicine.    3. I will take my medicines as prescribed. I will not change the dose or schedule unless my care team tells me to. There will be no refills if I run out early.     4. I may be asked to come to the clinic and complete a urine drug test or complete a pill count at any time. If I don t give a urine sample or participate in a pill count, the care team may stop my medicine.    5. I will only receive prescriptions from this clinic for chronic pain. If I am treated by another provider for acute pain issues, I will tell them that I am taking opioid pain medication for chronic pain and that I have a treatment agreement with this provider. I will inform my [facility] care team within one business day if I am given a prescription for any pain medication by another healthcare provider. My [facility] care team can contact other providers and pharmacists about my use of any medicines.    6. It is up to me to make sure that I don t run out of my medicines on weekends or holidays. If my care team is willing to refill my opioid prescription without a visit, I must request refills only during office hours. Refills may take up to 3 business days to process. I will use one pharmacy to fill all my opioid and other controlled substance prescriptions. I will notify the clinic about any changes to my insurance or medication  availability.    7. I am responsible for my prescriptions. If the medicine/prescription is lost, stolen or destroyed, it will not be replaced. I also agree not to share controlled substance medicines with anyone.    8. I am aware I should not use any illegal or recreational drugs. I agree not to drink alcohol unless my care team says I can.       9. If I enroll in the [place] [facility] program, I will tell my care team prior to my next refill.     10. I will tell my care team right away if I become pregnant, have a new medical problem treated outside of my regular clinic, or have a change in my medications.    11. I understand that this medicine can affect my thinking, judgment and reaction time. Alcohol and drugs affect the brain and body, which can affect the safety of my driving. Being under the influence of alcohol or drugs can affect my decision-making, behaviors, personal safety, and the safety of others. Driving while impaired (DWI) can occur if a person is driving, operating, or in physical control of a car, motorcycle, boat, snowmobile, ATV, motorbike, off-road vehicle, or any other motor vehicle ([place] Statute 169A.20). I understand the risk if I choose to drive or operate any vehicle or machinery.    I understand that if I do not follow any of the conditions above, my prescriptions or treatment may be stopped or changed.          Opioids  What You Need to Know    What are opioids?   Opioids are pain medicines that must be prescribed by a doctor. They are also known as narcotics.     Examples are:   1. morphine (MS Contin, Lana)  2. oxycodone (Oxycontin)  3. oxycodone and acetaminophen (Percocet)  4. hydrocodone and acetaminophen (Vicodin, Norco)   5. fentanyl patch (Duragesic)   6. hydromorphone (Dilaudid)   7. methadone  8. codeine (Tylenol #3)     What do opioids do well?   Opioids are best for severe short-term pain such as after a surgery or injury. They may work well for cancer pain. They may  help some people with long-lasting (chronic) pain.     What do opioids NOT do well?   Opioids never get rid of pain entirely, and they don t work well for most patients with chronic pain. Opioids don t reduce swelling, one of the causes of pain.                                    Other ways to manage chronic pain and improve function include:       Treat the health problem that may be causing pain    Anti-inflammation medicines, which reduce swelling and tenderness, such as ibuprofen (Advil, Motrin) or naproxen (Aleve)    Acetaminophen (Tylenol)    Antidepressants and anti-seizure medicines, especially for nerve pain    Topical treatments such as patches or creams    Injections or nerve blocks    Chiropractic or osteopathic treatment    Acupuncture, massage, deep breathing, meditation, visual imagery, aromatherapy    Use heat or ice at the pain site    Physical therapy     Exercise    Stop smoking    Take part in therapy       Risks and side effects     Talk to your doctor before you start or decide to keep taking opioids. Possible side effects include:      Lowering your breathing rate enough to cause death    Overdose, including death, especially if taking higher than prescribed doses    Worse depression symptoms; less pleasure in things you usually enjoy    Feeling tired or sluggish    Slower thoughts or cloudy thinking    Being more sensitive to pain over time; pain is harder to control    Trouble sleeping or restless sleep    Changes in hormone levels (for example, less testosterone)    Changes in sex drive or ability to have sex    Constipation    Unsafe driving    Itching and sweating    Dizziness    Nausea, throwing up and dry mouth    What else should I know about opioids?    Opioids may lead to dependence, tolerance, or addiction.      Dependence means that if you stop or reduce the medicine too quickly, you will have withdrawal symptoms. These include loose poop (diarrhea), jitters, flu-like symptoms,  nervousness and tremors. Dependence is not the same as addiction.                       Tolerance means needing higher doses over time to get the same effect. This may increase the chance of serious side effects.      Addiction is when people improperly use a substance that harms their body, their mind or their relations with others. Use of opiates can cause a relapse of addiction if you have a history of drug or alcohol abuse.      People who have used opioids for a long time may have a lower quality of life, worse depression, higher levels of pain and more visits to doctors.    You can overdose on opioids. Take these steps to lower your risk of overdose:    1. Recognize the signs:  Signs of overdose include decrease or loss of consciousness (blackout), slowed breathing, trouble waking up and blue lips. If someone is worried about overdose, they should call 911.    2. Talk to your doctor about Narcan (naloxone).   If you are at risk for overdose, you may be given a prescription for Narcan. This medicine very quickly reverses the effects of opioids.   If you overdose, a friend or family member can give you Narcan while waiting for the ambulance. They need to know the signs of overdose and how to give Narcan.     3. Don't use alcohol or street drugs.   Taking them with opioids can cause death.    4. Do not take any of these medicines unless your doctor says it s OK. Taking these with opioids can cause death:    Benzodiazepines, such as lorazepam (Ativan), alprazolam (Xanax) or diazepam (Valium)    Muscle relaxers, such as cyclobenzaprine (Flexeril)    Sleeping pills like zolpidem (Ambien)     Other opioids      How to keep you and other people safe while taking opioids:    1. Never share your opioids with others.  Opioid medicines are regulated by the Drug Enforcement Agency ([employer]LEN). Selling or sharing medications is a criminal act.    2. Be sure to store opioids in a secure place, locked up if possible. Young children  can easily swallow them and overdose.    3. When you are traveling with your medicines, keep them in the original bottles. If you use a pill box, be sure you also carry a copy of your medicine list from your clinic or pharmacy.    4. Safe disposal of opioids    Most pharmacies have places to get rid of medicine, called disposal kiosks. Medicine disposal options are also available in every [facility]. Search your county and  medication disposal  to find more options. You can find more details at:  [email or web address]     I agree that my provider, clinic care team, and pharmacy may work with any city, state or federal law enforcement agency that investigates the misuse, sale, or other diversion of my controlled medicine. I will allow my provider to discuss my care with, or share a copy of, this agreement with any other treating provider, pharmacy or emergency room where I receive care.    I have read this agreement and have asked questions about anything I did not understand.    _______________________________________________________  Patient Signature - Dinorah Lim _____________________                   Date     _______________________________________________________  Provider Signature - Magaly Sosa MD   _____________________                   Date     _______________________________________________________  Witness Signature (required if provider not present while patient signing)   _____________________                   Date

## 2021-10-20 ENCOUNTER — OFFICE VISIT (OUTPATIENT)
Dept: CARDIOLOGY | Facility: OTHER | Age: 81
End: 2021-10-20
Attending: INTERNAL MEDICINE
Payer: COMMERCIAL

## 2021-10-20 ENCOUNTER — PATIENT OUTREACH (OUTPATIENT)
Dept: CARE COORDINATION | Facility: OTHER | Age: 81
End: 2021-10-20

## 2021-10-20 VITALS
BODY MASS INDEX: 35.94 KG/M2 | HEART RATE: 67 BPM | OXYGEN SATURATION: 95 % | DIASTOLIC BLOOD PRESSURE: 76 MMHG | WEIGHT: 216 LBS | SYSTOLIC BLOOD PRESSURE: 111 MMHG | TEMPERATURE: 96.4 F

## 2021-10-20 DIAGNOSIS — R01.1 HEART MURMUR: ICD-10-CM

## 2021-10-20 DIAGNOSIS — N18.31 STAGE 3A CHRONIC KIDNEY DISEASE (H): ICD-10-CM

## 2021-10-20 DIAGNOSIS — F41.9 ANXIETY: Chronic | ICD-10-CM

## 2021-10-20 DIAGNOSIS — R00.2 PALPITATIONS: ICD-10-CM

## 2021-10-20 DIAGNOSIS — I65.23 BILATERAL CAROTID ARTERY STENOSIS: ICD-10-CM

## 2021-10-20 DIAGNOSIS — Z79.899 ON STATIN THERAPY: ICD-10-CM

## 2021-10-20 DIAGNOSIS — J44.9 COPD, MILD (H): Primary | ICD-10-CM

## 2021-10-20 DIAGNOSIS — E78.1 HYPERTRIGLYCERIDEMIA: ICD-10-CM

## 2021-10-20 DIAGNOSIS — I49.1 ATRIAL ECTOPY: ICD-10-CM

## 2021-10-20 DIAGNOSIS — I47.10 PSVT (PAROXYSMAL SUPRAVENTRICULAR TACHYCARDIA) (H): ICD-10-CM

## 2021-10-20 DIAGNOSIS — E66.01 MORBID OBESITY (H): ICD-10-CM

## 2021-10-20 DIAGNOSIS — I51.89 DIASTOLIC DYSFUNCTION: ICD-10-CM

## 2021-10-20 DIAGNOSIS — I35.1 NONRHEUMATIC AORTIC VALVE INSUFFICIENCY: ICD-10-CM

## 2021-10-20 DIAGNOSIS — I49.3 PVC'S (PREMATURE VENTRICULAR CONTRACTIONS): ICD-10-CM

## 2021-10-20 DIAGNOSIS — U07.1 COVID-19 VIRUS INFECTION: ICD-10-CM

## 2021-10-20 DIAGNOSIS — I34.0 NON-RHEUMATIC MITRAL REGURGITATION: ICD-10-CM

## 2021-10-20 DIAGNOSIS — E78.2 MIXED HYPERLIPIDEMIA: ICD-10-CM

## 2021-10-20 DIAGNOSIS — I36.1 NON-RHEUMATIC TRICUSPID VALVE INSUFFICIENCY: ICD-10-CM

## 2021-10-20 DIAGNOSIS — R07.89 OTHER CHEST PAIN: ICD-10-CM

## 2021-10-20 PROCEDURE — G0463 HOSPITAL OUTPT CLINIC VISIT: HCPCS

## 2021-10-20 PROCEDURE — 99214 OFFICE O/P EST MOD 30 MIN: CPT | Performed by: INTERNAL MEDICINE

## 2021-10-20 ASSESSMENT — PAIN SCALES - GENERAL: PAINLEVEL: EXTREME PAIN (8)

## 2021-10-20 NOTE — PATIENT INSTRUCTIONS
Thank you for allowing Dr. Hughes and our  team to participate in your care. Please call our office at [phone] with scheduling questions or if you need to cancel or change your appointment. With any other questions or concerns you may call [facility] cardiology nurse at [phone].       If you experience chest pain, chest pressure, chest tightness, shortness of breath, fainting, lightheadedness, nausea, vomiting, or other concerning symptoms, please report to the Emergency Department or call 911. These symptoms may be emergent, and best treated in the Emergency Department.    Follow up in       You will have an echocardiogram performed in February 2022.  This is an ultrasound of the heart, that evaluates heart function.  The hospital scheduling department will call to schedule you for this test.     You will have an ultrasound of the Carotids in February 2022. The hospital will call you to schedule this.

## 2021-10-20 NOTE — PROGRESS NOTES
[facility] HEART [facility]   CARDIOLOGY PROGRESS NOTE     Chief Complaint   Patient presents with     RECHECK          Diagnosis:  1.  Chronic grade 1 diastolic dysfunction on 2/21/19, off diuretics.  2.  Bilateral carotid artery atherosclerosis at less than 50%, US from 2/21/19.  3.  Mild to moderate aortic valve insufficiency on 1/28/2021.  4.  Trace to mild mitral regurgitation on 1/28/2021.  5.  Trace to mild tricuspid valve insufficiency.  6.  COPD-minimal on 9/9/14. H/O second hand smoke from mom who smoked 3/day.  1/day. No primary tobacco usage.   7.  Morbid obesity with BMI of 35.  8.  Hyperlipidemia-controlled.  9.  Atrial ectopy.  10.  Palpitations.  11.  PSVT.  12.  PVC's.  13.  On statin therapy.  14.  Hypertriglyceridemia-uncontrolled.  15.  Moderate hiatal hernia on 3/18/2020 on a CTA of the chest.  16.  COVID-19 (+) on 10/30/20.  17.  Vitamin D deficiency at 17 on 1/28/15.      Assessment/Plan:    1.  We reviewed her echocardiogram as she is concerned with her valvular issues.  We reviewed her 4 valves all of which are leaky to some extent, as noted below.  She was reassured today.  Repeat echo planned for February, 2022.  2.  We will plan for an ultrasound of her carotids with an ultrasound of her carotids in 2019 showing less than 50% stenosis in bilateral arteries.  3.  No changes to medications today.  Continue on simvastatin 40 mg daily.  4.  Her chest pain is noncardiac.  No additional work-up indicated.  5.  Follow-up in 6 months or sooner if issues.      Interval history:  Dinorah is doing well.  She has been having some atypical chest pain.  She describes left precordial pain without radiation.  It is tender, lasting only a few seconds.  If she touches the area it is gone.  The discomfort comes on at random.  She was reassured today.  She gets occasional palpitations.  She states she gets them every 1 to 2 weeks.  They last seconds.  Previous Zio patch results discussed.  She was noted to have  mild to moderate aortic insufficiency, trace/mild TR, and trace/mild mitral insufficiency on her echo from 3/6/2019.  More recently, her echo on 1/20/2021 showed mild mitral insufficiency, mild to moderate aortic insufficiency, trace tricuspid, and pulmonic insufficiency.  The thought of having leaky valves makes her very anxious.  She reports having had a murmur all her life.  She believes her leaky valves are familial.  She has had some infrequent pain to her chest. She is due for an ultrasound of her carotids with history of less than 50% stenosis to her carotids in 2019.  She is not needing medications refilled.  We did discuss that she does have a history of a hiatal hernia.  This potentially could be causing her discomfort. She has no additional concerns or complaints.    HPI:    She has been concerned with a heart murmur as well as bilateral carotid artery stenosis.  She had echocardiogram completed on 2/21/19 as well as an ultrasound of the carotids on 2/21/19.  She is here for those results.       Previously, we reviewed her Zio patch results from 12/31/18 which showed rare PVC's, x1 run of NSVT 5 beats, PAC's, and x26 episodes of PSVT lasting up to 20 beats.     She also has a history of mild carotid artery disease at less than 50% with mild plaquing on 12/1/16.  She does not have a personal history of smoking but was around secondhand smoke in the past.  She had a ultrasound of her carotids on 2/20/19 that showed atherosclerotic plaquing in both carotid bifurcations.  There was no significant hemo-dynamic stenosis.     Her echocardiogram from 2/21/19 showed an EF of 65-70%, grade 1 diastolic dysfunction, mild left atrial enlargement, mild to moderate aortic insufficiency, trace to mild tricuspid insufficiency, and trace to mild mitral insufficiency.  She is concerned about her valvular insufficiency.  She will have an echocardiogram in approximately 2 years to follow-up on the mild to moderate aortic  insufficiency.      Relevant testing:  ECHO on 1/28/21:  No pericardial effusion is present.  Global and regional left ventricular function is normal with an EF of 55-60%.  The right ventricle is normal size.  Global right ventricular function is normal.  Both atria appear normal.  Mild mitral insufficiency is present.  The aortic valve is tricuspid.  Mild to moderate aortic insufficiency is present.  AI unchanged from previous ECHO 2/21/19  The mean gradient across the aortic valve is5.1 mmHg.  Trace tricuspid insufficiency is present.  Trace pulmonic insufficiency is present.  The aorta root is normal.    ECHO on 2/21/19:  No pericardial effusion is present.  Global and regional left ventricular function is hyperkinetic with an EF of  65-70%.  Grade I or early diastolic dysfunction.  The right ventricle is normal size.  Global right ventricular function is normal.  Mild left atrial enlargement is present.  Trace to mild mitral insufficiency is present.  The aortic valve is tricuspid.  Mild to moderate aortic insufficiency is present.  Trace to mild tricuspid insufficiency is present.  The peak velocity of the tricuspid regurgitant jet is not obtainable.  The pulmonic valve is normal.  The aorta root is normal.    Zio patch from 12/31/2018:  The enrollment period was from 12/31/18 through 1/7/19.  There were 6 days and 12 hours of analysis time available for review.  The minimum heart rate was 49, average heart rate 66 and maximum heart rate 200 bpm.  The patient has underlying sinus rhythm throughout.  There is no significant bradycardia pauses or heart block seen.  There were very rare isolated PVC's.  There was a 5 beat run of ventricular tachycardia.  With an average heart rate of 113 bpm.  This was the only run.  There are rare PAC's seen.  Less than 1% of total beats.  There were 26 episodes of a supraventricular tachycardia greater than 4 beats.  The longest was for 20 beats with an average heart rate of 108  bpm.  The fastest was for 11 beats with a maximum heart rate of 200 bpm but an average heart rate of 134 bpm.  Review of some of these tracings show that it likely is an atrial tachycardia  There were 3 triggered events all 3 of these strips do have PAC's seen.  1 of them had a very slower run of supraventricular tachycardia average heart rate of 113 bpm.  There were 3 diary entries with the symptom of skipped irregular beats.  Review of the associated strips show all had sinus rhythm to them had supraventricular beats.          ICD-10-CM    1. COPD, mild on 9/9/14  J44.9    2. Other chest pain  R07.89 Echocardiogram Complete   3. Morbid obesity (H)  E66.01    4. Mixed hyperlipidemia  E78.2    5. Hypertriglyceridemia  E78.1    6. Tricuspid valve insufficiency  I36.1 Echocardiogram Complete   7. PVC's (premature ventricular contractions)  I49.3    8. PSVT (paroxysmal supraventricular tachycardia) (H)  I47.1    9. Palpitations  R00.2    10. Mitral regurgitation  I34.0 Echocardiogram Complete   11. Heart murmur  R01.1 Echocardiogram Complete   12. Diastolic dysfunction grade 1 on 2/21/19  I51.89 Echocardiogram Complete   13. Bilateral carotid artery stenosis at less than 50% on 12/1/16  I65.23 US Carotid Bilateral   14. Atrial ectopy  I49.1    15. Aortic valve insufficiency  I35.1 Echocardiogram Complete   16. Stage 3a chronic kidney disease (H)  N18.31    17. COVID-19 virus infection on 10/30/20  U07.1    18. On statin therapy  Z79.899    19. 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    Anterior Segment V 4 cm nodule abutting liver surface by US 1/4/2015      Obesity, Class II, BMI 35-39.9 1/4/2015    BMI 35.9 with comorbidities = MORBID obesity     Osteoarthritis of right hip 10/07/2004     Osteoarthrosis 06/14/2012     Otitis externa 10/04/2011     Prediabetes 08/01/2011       Past Surgical History:   Procedure Laterality Date     ARTHROPLASTY KNEE Left 9/9/2019    Procedure: LEFT TOTAL KNEE ARTHROPLASTY S/N;  Surgeon: Trevor Alonzo MD;  Location: [facility]     ARTHROSCOPY KNEE Left 3/21/2019    Procedure: LEFT  KNEE ARTHROSCOPY, partial medial menisectomy;  Surgeon: Esteban Wills MD;  Location: [facility] OR     C TOTAL KNEE ARTHROPLASTY Left 09/09/2019    Dr Alonzo     CLOSED REDUCTION WRIST Right 1952 x 2    long arm cast (same time as elbow fx)     CLOSED RX ELBOW DISLOCATION Right 1952    CR/long cast of fracture     EXCISE MASS FOOT Left 8/16/2021    Procedure: LEFT ANKLE MASS EXCISION;  Surgeon: Trevor Alonzo MD;  Location: [facility] OR     HC INJ EPIDURAL LUMBAR/SACRAL W/WO CONTRAST Bilateral 5/2013    facet injections; prev 2012     LAPAROSCOPIC CHOLECYSTECTOMY N/A 1/4/2015    Procedure: LAPAROSCOPIC CHOLECYSTECTOMY;  Surgeon: Brittney العلي MD;  Location: [facility] OR     TONSILLECTOMY         Allergies   Allergen Reactions     Chocolate Hives     Lemon Flavor Hives     Lime [Calcium Oxysulfide] Hives     Metal [Staples]      Orange Fruit [Citrus] Hives     Strawberry Hives     Adhesive Tape      Band-aids     Codeine Hives     Patient can tolerate oxycodone & dilaudid     Decadron [Dexamethasone] Hives     Erythromycin Hives     ERYTHROMYCIN BASE     Grapefruit [Extra Strength Grapefruit]      GRAPEFRUIT     Morphine Hives     Pt. Reports had hives     Penicillins Hives     Ranitidine GI Disturbance     Sulfa Drugs      SULFONAMIDE ANTIBIOTICS      Tomato      Xyzal [Levocetirizine]  Hives       Current Outpatient Medications   Medication Sig Dispense Refill     Calcium-Magnesium-Vitamin D (CALCIUM 1200+D3 PO) Take by mouth daily       clonazePAM (KLONOPIN) 1 MG tablet TAKE ONE-FOURTH TO ONE-HALF TABLET BY MOUTH EVERY 8 HOURS AS NEEDED 45 tablet 0     FISH OIL Take 1 capsule by mouth daily        ipratropium - albuterol 0.5 mg/2.5 mg/3 mL (DUONEB) 0.5-2.5 (3) MG/3ML neb solution Take 1 vial (3 mLs) by nebulization every 6 hours as needed for shortness of breath / dyspnea or wheezing 1 Box 1     PARoxetine (PAXIL) 40 MG tablet Take 1 tablet by mouth once daily 90 tablet 0     simvastatin (ZOCOR) 40 MG tablet TAKE 1 TABLET BY MOUTH AT BEDTIME 90 tablet 2     VITAMIN D, CHOLECALCIFEROL, PO Take 2,000 Units by mouth daily         Social History     Socioeconomic History     Marital status: Single     Spouse name: Not on file     Number of children: 4     Years of education: 12     Highest education level: Not on file   Occupational History     Occupation: personal care attendant   Tobacco Use     Smoking status: Never Smoker     Smokeless tobacco: Never Used   Substance and Sexual Activity     Alcohol use: No     Drug use: No     Sexual activity: Never   Other Topics Concern      Service Not Asked     Blood Transfusions Yes     Caffeine Concern Yes     Comment: >32 oz soda/day     Occupational Exposure Not Asked     Hobby Hazards Not Asked     Sleep Concern Yes     Comment: insomnia     Stress Concern Not Asked     Weight Concern Not Asked     Special Diet Not Asked     Back Care Not Asked     Exercise Not Asked     Bike Helmet Not Asked     Seat Belt Not Asked     Self-Exams Not Asked     Parent/sibling w/ CABG, MI or angioplasty before 65F 55M? No   Social History Narrative     Not on file     Social Determinants of Health     Financial Resource Strain:      Difficulty of Paying Living Expenses:    Food Insecurity:      Worried About Running Out of Food in the Last Year:      Ran Out of  Food in the Last Year:    Transportation Needs:      Lack of Transportation (Medical):      Lack of Transportation (Non-Medical):    Physical Activity:      Days of Exercise per Week:      Minutes of Exercise per Session:    Stress:      Feeling of Stress :    Social Connections:      Frequency of Communication with Friends and Family:      Frequency of Social Gatherings with Friends and Family:      Attends Baptism Services:      Active Member of Clubs or Organizations:      Attends Club or Organization Meetings:      Marital Status:    Intimate Partner Violence:      Fear of Current or Ex-Partner:      Emotionally Abused:      Physically Abused:      Sexually Abused:        LAB RESULTS:   Orders Only on 02/10/2021   Component Date Value Ref Range Status     Sodium 02/10/2021 139  133 - 144 mmol/L Final     Potassium 02/10/2021 4.0  3.4 - 5.3 mmol/L Final     Chloride 02/10/2021 108  94 - 109 mmol/L Final     Carbon Dioxide 02/10/2021 30  20 - 32 mmol/L Final     Anion Gap 02/10/2021 1* 3 - 14 mmol/L Final     Glucose 02/10/2021 104* 70 - 99 mg/dL Final     Urea Nitrogen 02/10/2021 15  7 - 30 mg/dL Final     Creatinine 02/10/2021 1.00  0.52 - 1.04 mg/dL Final     GFR Estimate 02/10/2021 54* >60 mL/min/[1.73_m2] Final     GFR Estimate If Black 02/10/2021 62  >60 mL/min/[1.73_m2] Final     Calcium 02/10/2021 9.5  8.5 - 10.1 mg/dL Final     Hemoglobin A1C 02/10/2021 5.0  0 - 5.6 % Final     ALT 02/10/2021 27  0 - 50 U/L Final     AST 02/10/2021 20  0 - 45 U/L Final     Cholesterol 02/10/2021 177  <200 mg/dL Final     Triglycerides 02/10/2021 166* <150 mg/dL Final     HDL Cholesterol 02/10/2021 53  >49 mg/dL Final     LDL Cholesterol Calculated 02/10/2021 91  <100 mg/dL Final     Non HDL Cholesterol 02/10/2021 124  <130 mg/dL Final     Estimated Average Glucose 02/10/2021 97  mg/dL Final        Review of systems: Negative except that which was noted in the HPI.    Physical examination:  /76   Pulse 67   Temp  (!) 96.4  F (35.8  C) (Tympanic)   Wt 98 kg (216 lb)   SpO2 95%   BMI 35.94 kg/m      GENERAL APPEARANCE: healthy, alert and no distress  HEENT: no icterus, no xanthelasmas, normal pupil size and reaction, no cyanosis.  NECK: no adenopathy, no asymmetry, masses.  CHEST: lungs clear to auscultation - no rales, rhonchi or wheezes, no use of accessory muscles, no retractions, respirations are unlabored, normal respiratory rate  CARDIOVASCULAR: regular rhythm, normal S1 with physiologic split S2, no S3 or S4 and no murmur, click or rub  EXTREMITIES: no clubbing, cyanosis or edema  NEURO: alert and oriented normal speech, and affect  VASC: No vascular bruits heard.  SKIN: no ecchymoses, no rashes        Thank you for allowing me to participate in the care of your patient. Please do not hesitate to contact me if you have any questions.     Ha Hughes, DO

## 2021-10-20 NOTE — NURSING NOTE
"Chief Complaint   Patient presents with     RECHECK       Initial /76   Pulse 67   Temp (!) 96.4  F (35.8  C) (Tympanic)   Wt 98 kg (216 lb)   SpO2 95%   BMI 35.94 kg/m   Estimated body mass index is 35.94 kg/m  as calculated from the following:    Height as of 10/19/21: 1.651 m (5' 5\").    Weight as of this encounter: 98 kg (216 lb).  Medication Reconciliation: complete  Jesusita So LPN    "

## 2021-10-20 NOTE — PROGRESS NOTES
Clinic Care Coordination Contact  Care Team Conversations    Spoke with Dr. Watson after patient appointment. Not current candidate for CHF CC. Will add to watch list for possible future enrollment.     Edwardo BANSAL RN  CHF Care Coordinator   [phone] Option #8  Ext. *[phone]

## 2021-10-28 ENCOUNTER — LAB (OUTPATIENT)
Dept: LAB | Facility: OTHER | Age: 81
End: 2021-10-28
Payer: COMMERCIAL

## 2021-10-28 DIAGNOSIS — E78.2 MIXED HYPERLIPIDEMIA: ICD-10-CM

## 2021-10-28 DIAGNOSIS — N18.31 STAGE 3A CHRONIC KIDNEY DISEASE (H): ICD-10-CM

## 2021-10-28 DIAGNOSIS — R73.09 ABNORMAL GLUCOSE: ICD-10-CM

## 2021-10-28 LAB
ALBUMIN SERPL-MCNC: 3.4 G/DL (ref 3.4–5)
ALP SERPL-CCNC: 90 U/L (ref 40–150)
ALT SERPL W P-5'-P-CCNC: 22 U/L (ref 0–50)
ANION GAP SERPL CALCULATED.3IONS-SCNC: 2 MMOL/L (ref 3–14)
AST SERPL W P-5'-P-CCNC: 17 U/L (ref 0–45)
BILIRUB SERPL-MCNC: 0.6 MG/DL (ref 0.2–1.3)
BUN SERPL-MCNC: 11 MG/DL (ref 7–30)
CALCIUM SERPL-MCNC: 8.9 MG/DL (ref 8.5–10.1)
CHLORIDE BLD-SCNC: 111 MMOL/L (ref 94–109)
CHOLEST SERPL-MCNC: 148 MG/DL
CO2 SERPL-SCNC: 28 MMOL/L (ref 20–32)
CREAT SERPL-MCNC: 0.93 MG/DL (ref 0.52–1.04)
ERYTHROCYTE [DISTWIDTH] IN BLOOD BY AUTOMATED COUNT: 13.6 % (ref 10–15)
EST. AVERAGE GLUCOSE BLD GHB EST-MCNC: 100 MG/DL
FASTING STATUS PATIENT QL REPORTED: YES
GFR SERPL CREATININE-BSD FRML MDRD: 59 ML/MIN/1.73M2
GLUCOSE BLD-MCNC: 95 MG/DL (ref 70–99)
HBA1C MFR BLD: 5.1 % (ref 0–5.6)
HCT VFR BLD AUTO: 41.7 % (ref 35–47)
HDLC SERPL-MCNC: 58 MG/DL
HGB BLD-MCNC: 13.3 G/DL (ref 11.7–15.7)
LDLC SERPL CALC-MCNC: 75 MG/DL
MCH RBC QN AUTO: 29.6 PG (ref 26.5–33)
MCHC RBC AUTO-ENTMCNC: 31.9 G/DL (ref 31.5–36.5)
MCV RBC AUTO: 93 FL (ref 78–100)
NONHDLC SERPL-MCNC: 90 MG/DL
PLATELET # BLD AUTO: 155 10E3/UL (ref 150–450)
POTASSIUM BLD-SCNC: 4 MMOL/L (ref 3.4–5.3)
PROT SERPL-MCNC: 6.9 G/DL (ref 6.8–8.8)
RBC # BLD AUTO: 4.49 10E6/UL (ref 3.8–5.2)
SODIUM SERPL-SCNC: 141 MMOL/L (ref 133–144)
TRIGL SERPL-MCNC: 74 MG/DL
WBC # BLD AUTO: 3.3 10E3/UL (ref 4–11)

## 2021-10-28 PROCEDURE — 80053 COMPREHEN METABOLIC PANEL: CPT | Mod: ZL

## 2021-10-28 PROCEDURE — 83036 HEMOGLOBIN GLYCOSYLATED A1C: CPT | Mod: ZL

## 2021-10-28 PROCEDURE — 36415 COLL VENOUS BLD VENIPUNCTURE: CPT | Mod: ZL

## 2021-10-28 PROCEDURE — 85027 COMPLETE CBC AUTOMATED: CPT | Mod: ZL

## 2021-10-28 PROCEDURE — 80061 LIPID PANEL: CPT | Mod: ZL

## 2021-11-08 ENCOUNTER — TRANSFERRED RECORDS (OUTPATIENT)
Dept: HEALTH INFORMATION MANAGEMENT | Facility: CLINIC | Age: 81
End: 2021-11-08

## 2021-11-26 DIAGNOSIS — F41.9 ANXIETY: ICD-10-CM

## 2021-11-26 RX ORDER — PAROXETINE 40 MG/1
TABLET, FILM COATED ORAL
Qty: 90 TABLET | Refills: 0 | Status: SHIPPED | OUTPATIENT
Start: 2021-11-26 | End: 2022-03-03

## 2021-12-07 ENCOUNTER — TRANSFERRED RECORDS (OUTPATIENT)
Dept: HEALTH INFORMATION MANAGEMENT | Facility: CLINIC | Age: 81
End: 2021-12-07

## 2021-12-07 ENCOUNTER — NURSE TRIAGE (OUTPATIENT)
Dept: FAMILY MEDICINE | Facility: OTHER | Age: 81
End: 2021-12-07

## 2021-12-07 NOTE — TELEPHONE ENCOUNTER
"Pt called \"I saw [facility] today they told me to call Dr. Sosa for an appointment regarding the veins in my legs\". Pt reports varicose veins located right anterior leg. No changes sx present for years.     Overbook request sent to PCP and nurse.  Call back number [phone]    Reason for Disposition    [1] MODERATE pain (e.g., interferes with normal activities, limping) AND [2] present > 3 days    Additional Information    Negative: Looks like a broken bone or dislocated joint (e.g., crooked or deformed)    Negative: Sounds like a life-threatening emergency to the triager    Negative: Followed a leg injury    Negative: Leg swelling is main symptom    Negative: Back pain radiating (shooting) into leg(s)    Negative: Knee pain is main symptom    Negative: Ankle pain is main symptom    Negative: Pregnant    Negative: Postpartum (from 0 to 6 weeks after delivery)    Negative: Chest pain    Negative: Difficulty breathing    Negative: Entire foot is cool or blue in comparison to other side    Negative: Unable to walk    Negative: [1] Red area or streak AND [2] fever    Negative: [1] Swollen joint AND [2] fever    Negative: [1] Cast on leg or ankle AND [2] now increased pain    Negative: Patient sounds very sick or weak to the triager    Negative: [1] SEVERE pain (e.g., excruciating, unable to do any normal activities) AND [2] not improved after 2 hours of pain medicine    Negative: [1] Thigh or calf pain AND [2] only 1 side AND [3] present > 1 hour    Negative: [1] Thigh, calf, or ankle swelling AND [2] only 1 side    Negative: [1] Thigh, calf, or ankle swelling AND [2] bilateral AND [3] 1 side is more swollen    Negative: [1] Red area or streak AND [2] large (> 2 in. or 5 cm)    Negative: History of prior \"blood clot\" in leg or lungs (i.e., deep vein thrombosis, pulmonary embolism)    Negative: History of inherited increased risk of blood clots (e.g., Factor 5 Leiden, Anti-thrombin 3, Protein C or Protein S " "deficiency, Prothrombin mutation)    Negative: Major surgery in the past month    Negative: Hip or leg fracture (broken bone) in past month (or had cast on leg or ankle in past month)    Negative: Illness requiring prolonged bedrest in past month (e.g., immobilization, long hospital stay)    Negative: Long-distance travel in past month (e.g., car, bus, train, plane; with trip lasting 6 or more hours)    Negative: Cancer treatment in the past two months (or has cancer now)    Negative: [1] Painful rash AND [2] multiple small blisters grouped together (i.e., dermatomal distribution or \"band\" or \"stripe\")    Negative: Looks like a boil, infected sore, deep ulcer or other infected rash (spreading redness, pus)    Negative: [1] Localized rash is very painful AND [2] no fever    Negative: Numbness in a leg or foot (i.e., loss of sensation)    Negative: Localized pain, redness or hard lump along vein    Answer Assessment - Initial Assessment Questions  1. ONSET: \"When did the pain start?\"       Varicose veins front on right leg \"I've had them for years\"  2. LOCATION: \"Where is the pain located?\"       Right anterior leg  3. PAIN: \"How bad is the pain?\"    (Scale 1-10; or mild, moderate, severe)    -  MILD (1-3): doesn't interfere with normal activities     -  MODERATE (4-7): interferes with normal activities (e.g., work or school) or awakens from sleep, limping     -  SEVERE (8-10): excruciating pain, unable to do any normal activities, unable to walk      9  4. WORK OR EXERCISE: \"Has there been any recent work or exercise that involved this part of the body?\"       no  5. CAUSE: \"What do you think is causing the leg pain?\"      varicose veins   6. OTHER SYMPTOMS: \"Do you have any other symptoms?\" (e.g., chest pain, back pain, breathing difficulty, swelling, rash, fever, numbness, weakness)      Leg pain only   7. PREGNANCY: \"Is there any chance you are pregnant?\" \"When was your last menstrual period?\"      no    Protocols " used: LEG PAIN-A-AH

## 2021-12-08 NOTE — TELEPHONE ENCOUNTER
Appointments in Next Year    Dec 20, 2021  3:30 PM  (Arrive by 3:15 PM)  SHORT with Magaly Sosa MD  [facility] ([facility] ) [phone]   Apr 25, 2022 10:30 AM  (Arrive by 10:15 AM)  Return Visit with Ha Hughes DO  [facility] ([facility] ) [phone]

## 2021-12-09 ENCOUNTER — HOSPITAL ENCOUNTER (EMERGENCY)
Facility: HOSPITAL | Age: 81
Discharge: HOME | End: 2021-12-09
Attending: NURSE PRACTITIONER | Admitting: NURSE PRACTITIONER
Payer: COMMERCIAL

## 2021-12-09 VITALS
HEART RATE: 65 BPM | RESPIRATION RATE: 16 BRPM | OXYGEN SATURATION: 97 % | DIASTOLIC BLOOD PRESSURE: 64 MMHG | SYSTOLIC BLOOD PRESSURE: 135 MMHG | TEMPERATURE: 97.3 F

## 2021-12-09 DIAGNOSIS — J02.9 PHARYNGITIS, UNSPECIFIED ETIOLOGY: ICD-10-CM

## 2021-12-09 LAB — GROUP A STREP BY PCR: NOT DETECTED

## 2021-12-09 PROCEDURE — 99213 OFFICE O/P EST LOW 20 MIN: CPT | Performed by: NURSE PRACTITIONER

## 2021-12-09 PROCEDURE — G0463 HOSPITAL OUTPT CLINIC VISIT: HCPCS

## 2021-12-09 PROCEDURE — 87651 STREP A DNA AMP PROBE: CPT | Performed by: NURSE PRACTITIONER

## 2021-12-09 PROCEDURE — 87637 SARSCOV2&INF A&B&RSV AMP PRB: CPT | Performed by: NURSE PRACTITIONER

## 2021-12-09 PROCEDURE — C9803 HOPD COVID-19 SPEC COLLECT: HCPCS

## 2021-12-09 ASSESSMENT — ENCOUNTER SYMPTOMS
DIZZINESS: 0
FEVER: 1
MUSCULOSKELETAL NEGATIVE: 1
SORE THROAT: 1
HEADACHES: 0
PSYCHIATRIC NEGATIVE: 1
VOMITING: 0
COUGH: 1
NAUSEA: 0
CARDIOVASCULAR NEGATIVE: 1
DIARRHEA: 0
SINUS PRESSURE: 0
SHORTNESS OF BREATH: 1

## 2021-12-10 LAB
FLUAV RNA SPEC QL NAA+PROBE: NEGATIVE
FLUBV RNA RESP QL NAA+PROBE: NEGATIVE
RSV RNA SPEC NAA+PROBE: NEGATIVE
SARS-COV-2 RNA RESP QL NAA+PROBE: NEGATIVE

## 2021-12-10 NOTE — ED PROVIDER NOTES
History     Chief Complaint   Patient presents with     Otalgia     HPI  Dinorah Lim is a 81 year old female who developed ear pain last night. Has sore throat for three days.  . Slight cough and slight SOB. No nausea vomiting diarrhea. No fever.  Worried as has 2 leaky valves.      Allergies:  Allergies   Allergen Reactions     Chocolate Hives     Lemon Flavor Hives     Lime [Calcium Oxysulfide] Hives     Metal [Staples]      Orange Fruit [Citrus] Hives     Strawberry Hives     Adhesive Tape      Band-aids     Codeine Hives     Patient can tolerate oxycodone & dilaudid     Decadron [Dexamethasone] Hives     Erythromycin Hives     ERYTHROMYCIN BASE     Grapefruit [Extra Strength Grapefruit]      GRAPEFRUIT     Morphine Hives     Pt. Reports had hives     Penicillins Hives     Ranitidine GI Disturbance     Sulfa Drugs      SULFONAMIDE ANTIBIOTICS      Tomato      Xyzal [Levocetirizine] Hives       Problem List:    Patient Active Problem List    Diagnosis Date Noted     Other chest pain 10/20/2021     Priority: Medium     Hiatal hernia 07/28/2021     Priority: Medium     Chronic, continuous use of opioids 05/17/2021     Priority: Medium     Stage 3a chronic kidney disease (H) 03/22/2021     Priority: Medium     COVID-19 virus infection on 10/30/20 11/24/2020     Priority: Medium     10-       Other neutropenia (H) 08/04/2020     Priority: Medium     Paresthesias 02/13/2020     Priority: Medium     Status post total left knee replacement 09/09/2019     Priority: Medium     Aortic valve insufficiency 03/06/2019     Priority: Medium     Mitral regurgitation 03/06/2019     Priority: Medium     Tricuspid valve insufficiency 03/06/2019     Priority: Medium     Diastolic dysfunction grade 1 on 2/21/19 03/06/2019     Priority: Medium     Hypertriglyceridemia 03/06/2019     Priority: Medium     Palpitations 02/13/2019     Priority: Medium     PVC's (premature ventricular contractions) 02/13/2019     Priority:  Medium     Atrial ectopy 02/13/2019     Priority: Medium     PSVT (paroxysmal supraventricular tachycardia) (H) 02/13/2019     Priority: Medium     COPD, mild on 9/9/14 02/13/2019     Priority: Medium     Bilateral carotid artery stenosis at less than 50% on 12/1/16 02/13/2019     Priority: Medium     Mixed hyperlipidemia 02/13/2019     Priority: Medium     On statin therapy 02/13/2019     Priority: Medium     Heart murmur 02/13/2019     Priority: Medium     Morbid obesity (H) 06/01/2017     Priority: Medium     ACP (advance care planning) 04/28/2016     Priority: Medium     Advance Care Planning 4/28/2016: ACP Review of Chart / Resources Provided:  Reviewed chart for advance care plan.  Dinorah WILLIAM Lim has no plan or code status on file. Discussed available resources and provided with information. Confirmed code status reflects current choices pending further ACP discussions.  Confirmed/documented legally designated decision makers.  Added by Aditi Gandhi             Anxiety 06/23/2014     Priority: Medium     Lung density on x-ray 07/23/2013     Priority: Medium     Osteoarthritis 06/14/2012     Priority: Medium     Problem list name updated by automated process. Provider to review       Advanced care planning/counseling discussion 01/13/2012     Priority: Medium     Abnormal glucose 08/01/2011     Priority: Medium     Hgb A1c 5.7 on 1/4/2015  Problem list name updated by automated process. Provider to review       Osteoarthritis of right hip 10/07/2004     Priority: Medium     Urticaria 06/01/2004     Priority: Medium     Problem list name updated by automated process. 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     Obesity, Class II, BMI 35-39.9 1/4/2015     Osteoarthritis of right hip 10/07/2004     Osteoarthrosis 06/14/2012     Otitis externa 10/04/2011     Prediabetes 08/01/2011       Past Surgical History:    Past Surgical History:   Procedure Laterality Date     ARTHROPLASTY KNEE Left 9/9/2019    Procedure: LEFT TOTAL KNEE ARTHROPLASTY S/N;  Surgeon: Trevor Alonzo MD;  Location: [facility]     ARTHROSCOPY KNEE Left 3/21/2019    Procedure: LEFT  KNEE ARTHROSCOPY, partial medial menisectomy;  Surgeon: Esteban Wills MD;  Location: [facility]     C TOTAL KNEE ARTHROPLASTY Left 09/09/2019    Dr Alonzo     CLOSED REDUCTION WRIST Right 1952 x 2    long arm cast (same time as elbow fx)     CLOSED RX ELBOW DISLOCATION Right 1952    CR/long cast of fracture     EXCISE MASS FOOT Left 8/16/2021    Procedure: LEFT ANKLE MASS EXCISION;  Surgeon: Trevor Alonzo MD;  Location: [facility]     HC INJ EPIDURAL LUMBAR/SACRAL W/WO CONTRAST Bilateral 5/2013    facet injections; prev 2012     LAPAROSCOPIC CHOLECYSTECTOMY N/A 1/4/2015    Procedure: LAPAROSCOPIC CHOLECYSTECTOMY;  Surgeon: Brittney العلي MD;  Location: [facility]     TONSILLECTOMY         Family History:    Family History   Problem Relation Age of Onset     Heart Disease Brother         atrial fibrillation     Atrial fibrillation Brother      Other - See Comments Mother         emphysema     Heart Disease Father 92        CHF     Cancer Paternal Grandfather         lung cancer     Cancer Maternal Uncle         lung cancer     Chronic Obstructive Pulmonary Disease Daughter      Emphysema Daughter      Other - See Comments Daughter         benign breast lesion     Esophagitis Daughter        Social History:  Marital Status:  Single [1]  Social History     Tobacco Use     Smoking status: Never Smoker     Smokeless tobacco: Never Used    Substance Use Topics     Alcohol use: No     Drug use: No        Medications:    Calcium-Magnesium-Vitamin D (CALCIUM 1200+D3 PO)  clonazePAM (KLONOPIN) 1 MG tablet  FISH OIL  ipratropium - albuterol 0.5 mg/2.5 mg/3 mL (DUONEB) 0.5-2.5 (3) MG/3ML neb solution  PARoxetine (PAXIL) 40 MG tablet  simvastatin (ZOCOR) 40 MG tablet  VITAMIN D, CHOLECALCIFEROL, PO          Review of Systems   Constitutional: Positive for fever.   HENT: Positive for ear pain and sore throat. Negative for congestion, postnasal drip and sinus pressure.    Respiratory: Positive for cough and shortness of breath.         Slight cough, slight shortness of breath.     Cardiovascular: Negative.    Gastrointestinal: Negative for diarrhea, nausea and vomiting.   Genitourinary: Negative.    Musculoskeletal: Negative.    Neurological: Negative for dizziness and headaches.   Psychiatric/Behavioral: Negative.        Physical Exam   BP: 135/64  Pulse: 65  Temp: 97.3  F (36.3  C)  Resp: 16  SpO2: 97 %      Physical Exam  Constitutional:       Appearance: Normal appearance. She is not ill-appearing.   HENT:      Right Ear: Tympanic membrane, ear canal and external ear normal.      Left Ear: Tympanic membrane, ear canal and external ear normal.      Nose: Nose normal.      Mouth/Throat:      Mouth: Mucous membranes are moist.      Pharynx: Posterior oropharyngeal erythema present.      Comments: Red to sides of oropharynx  Cardiovascular:      Rate and Rhythm: Normal rate and regular rhythm.      Heart sounds: Murmur heard.       Pulmonary:      Effort: Pulmonary effort is normal.      Breath sounds: Normal breath sounds. No wheezing, rhonchi or rales.   Musculoskeletal:         General: Normal range of motion.      Cervical back: Normal range of motion and neck supple.   Lymphadenopathy:      Cervical: No cervical adenopathy.   Skin:     General: Skin is warm and dry.   Neurological:      General: No focal deficit present.      Mental Status: She is alert  and oriented to person, place, and time.   Psychiatric:         Mood and Affect: Mood normal.         Behavior: Behavior normal.         ED Course                 Procedures                  Results for orders placed or performed during the hospital encounter of 12/09/21 (from the past 24 hour(s))   Group A Streptococcus PCR Throat Swab    Specimen: Throat; Swab   Result Value Ref Range    Group A strep by PCR Not Detected Not Detected    Narrative    The Xpert Xpress Strep A test, performed on the [employer], is a rapid, qualitative in vitro diagnostic test for the detection of Streptococcus pyogenes (Group A ß-hemolytic Streptococcus, Strep A) in throat swab specimens from patients with signs and symptoms of pharyngitis. The Xpert Xpress Strep A test can be used as an aid in the diagnosis of Group A Streptococcal pharyngitis. The assay is not intended to monitor treatment for Group A Streptococcus infections. The Xpert Xpress Strep A test utilizes an automated real-time polymerase chain reaction (PCR) to detect Streptococcus pyogenes DNA.       Medications - No data to display    Assessments & Plan (with Medical Decision Making)     I have reviewed the nursing notes.    I have reviewed the findings, diagnosis, plan and need for follow up with the patient.      Discharge Medication List as of 12/9/2021  9:26 PM          Final diagnoses:   Pharyngitis, unspecified etiology   ASSESSMENT / PLAN:  (J02.9) Pharyngitis, unspecified etiology  Comment:   Plan:   1.  Salt water gargles for sore throat  2. Ibuprofen may give you some relief   200-400mg  3 times a day with food.   3. 3. [facility], and influenza reports will be called to you        12/9/2021   [facility]     Oz Ospina NP  12/09/21 1254

## 2021-12-10 NOTE — ED TRIAGE NOTES
Patient presents to urgent care for bilateral ear pain, sore throat, cough x3 days. Patient was exposed to COVID about a week ago.   Patient would like a chest xray because she has 2 leaky valves.

## 2021-12-10 NOTE — ED TRIAGE NOTES
Has 2 earaches that started last night. Starting a cough and at times, chest is full, throat is sore

## 2022-01-20 ENCOUNTER — HOSPITAL ENCOUNTER (EMERGENCY)
Facility: HOSPITAL | Age: 82
Discharge: HOME | End: 2022-01-20
Attending: NURSE PRACTITIONER | Admitting: NURSE PRACTITIONER
Payer: COMMERCIAL

## 2022-01-20 VITALS
SYSTOLIC BLOOD PRESSURE: 137 MMHG | RESPIRATION RATE: 16 BRPM | DIASTOLIC BLOOD PRESSURE: 64 MMHG | HEART RATE: 74 BPM | OXYGEN SATURATION: 96 % | TEMPERATURE: 99.4 F

## 2022-01-20 DIAGNOSIS — B34.9 VIRAL SYNDROME: Primary | ICD-10-CM

## 2022-01-20 PROCEDURE — C9803 HOPD COVID-19 SPEC COLLECT: HCPCS

## 2022-01-20 PROCEDURE — 99213 OFFICE O/P EST LOW 20 MIN: CPT | Performed by: NURSE PRACTITIONER

## 2022-01-20 PROCEDURE — G0463 HOSPITAL OUTPT CLINIC VISIT: HCPCS

## 2022-01-20 PROCEDURE — 87637 SARSCOV2&INF A&B&RSV AMP PRB: CPT | Performed by: NURSE PRACTITIONER

## 2022-01-20 ASSESSMENT — ENCOUNTER SYMPTOMS
FATIGUE: 0
HEADACHES: 1
EYE PAIN: 0
MYALGIAS: 1
PALPITATIONS: 0
EYE REDNESS: 0
COUGH: 1
SHORTNESS OF BREATH: 1
VOMITING: 0
SINUS PAIN: 1
SINUS PRESSURE: 1
TROUBLE SWALLOWING: 0
SORE THROAT: 1
RHINORRHEA: 1
NAUSEA: 0
PSYCHIATRIC NEGATIVE: 1
EYE ITCHING: 0
CHILLS: 0
DIARRHEA: 0
FEVER: 1

## 2022-01-21 LAB
FLUAV RNA SPEC QL NAA+PROBE: NEGATIVE
FLUBV RNA RESP QL NAA+PROBE: NEGATIVE
RSV RNA SPEC NAA+PROBE: NEGATIVE
SARS-COV-2 RNA RESP QL NAA+PROBE: POSITIVE

## 2022-01-21 NOTE — DISCHARGE INSTRUCTIONS
Symptomatic treatments recommended.  -Discussed that antibiotics would not help symptoms of viral URI. Education provided on symptoms of secondary bacterial infection such as new fever, chills, rigors, shortness of breath, increased work of breathing, that can occur with viral URI and need for further evaluation, if they occur.   - Ensure you are staying hydrated by drinking plenty of fluids or eating foods such as popsicles, jello, pudding.  - Honey can be soothing for sore throat  - Warm salt water gurgles can help soothe sore throat  - Rest  - Humidifier can help with congestion and help keep mucus membranes such as throat and nose from drying out.  - Sleeping slightly propped up can help with congestion and postnasal drainage that can worsen cough at bedtime.  - As long as you have never been told to take Tylenol and/or Ibuprofen you can use them to manage fever and body aches per package instructions  Make sure you eat when you take ibuprofen to avoid stomach upset.  - OTC cough medications per package instructions to help with cough. Check to see if the cough/cold medication already has acetaminophen (Tylenol) in it. If it does avoid taking additional Tylenol.  - If sudden onset of new fever, worsening symptoms return for further evaluation.  - OTC nasal steroid such as Flonase can help decrease sinus inflammation to help with congestion.  - Education provided on symptoms of post-viral bacterial infections including ear infection and pneumonia. This would require re-evaluation for treatment.    Follow up with primary care provider or return to urgent care/ED with any worsening in condition or additional concerns.

## 2022-01-21 NOTE — ED TRIAGE NOTES
Problem: Musculor/Skeletal Functional Status  Goal: Absence of falls  8/2/2020 1431 by Sabrina Thomas RN  Outcome: Ongoing  Call light within reach. Side rails up x2. Bed alarm on. Non skid slippers available. Problem: Discharge Planning:  Goal: Participates in care planning  Description: Participates in care planning  8/2/2020 1431 by Sabrina Thomas RN  Outcome: Ongoing  Patient plans to be discharged to inpatient rehab if accepted. Awaiting Dr Jim Bernal assessment     Problem: Activity Intolerance:  Goal: Ability to tolerate increased activity will improve  Description: Ability to tolerate increased activity will improve  8/2/2020 1431 by Sabrina Thomas RN  Outcome: Ongoing  Patient ambulating with nurse assistance and walking boot     Problem: Pain:  Goal: Pain level will decrease  Description: Pain level will decrease  8/2/2020 1431 by Sabrina Thomas RN  Outcome: Ongoing  Patient complained of a headache throughout the shift. PRN tylenol given as requested and ordered. Will continue to monitor. Problem: Serum Glucose Level - Abnormal:  Goal: Ability to maintain appropriate glucose levels will improve to within specified parameters  Description: Ability to maintain appropriate glucose levels will improve to within specified parameters  8/2/2020 1431 by Sabrina Thomas RN  Outcome: Ongoing  Chem AC/HS, insulin given per sliding scale     Problem: Skin Integrity - Impaired:  Goal: Absence of new skin breakdown  Description: Absence of new skin breakdown  8/2/2020 1431 by Sabrina Thomas RN  Outcome: Ongoing  Patient free from skin breakdown. Patient turns self and makes frequent positional changes. Will continue to monitor. Care plan reviewed with patient. Patient verbalize understanding of the plan of care and contribute to goal setting. Sore throat and cough for 2 days

## 2022-01-21 NOTE — ED PROVIDER NOTES
History     Chief Complaint   Patient presents with     Cough     Pharyngitis     HPI  Dinorah Lim is a 81 year old female who presents to urgent care today (ambulatory) with complaints of fever (TMAX 99.4), congestion, ear pain, rhinorrhea, sinus pain and pressure, sore throat, cough, SOB, myalgia and headache which started 2 days ago.  No known sick contact.  COVID 10/2020.  No COVID Vaccines.  Staying hydrated, normal output.  COPD and albuterol neb treatment, last use yesterday.  Nonsmoker.  Patient requesting COVID test due to needing one weekly for work as she is unvaccinated.  No other concerns.        Allergies:  Allergies   Allergen Reactions     Chocolate Hives     Lemon Flavor Hives     Lime [Calcium Oxysulfide] Hives     Metal [Staples]      Orange Fruit [Citrus] Hives     Strawberry Hives     Adhesive Tape      Band-aids     Codeine Hives     Patient can tolerate oxycodone & dilaudid     Decadron [Dexamethasone] Hives     Erythromycin Hives     ERYTHROMYCIN BASE     Grapefruit [Extra Strength Grapefruit]      GRAPEFRUIT     Morphine Hives     Pt. Reports had hives     Penicillins Hives     Ranitidine GI Disturbance     Sulfa Drugs      SULFONAMIDE ANTIBIOTICS      Tomato      Xyzal [Levocetirizine] Hives       Problem List:    Patient Active Problem List    Diagnosis Date Noted     Other chest pain 10/20/2021     Priority: Medium     Hiatal hernia 07/28/2021     Priority: Medium     Chronic, continuous use of opioids 05/17/2021     Priority: Medium     Stage 3a chronic kidney disease (H) 03/22/2021     Priority: Medium     COVID-19 virus infection on 10/30/20 11/24/2020     Priority: Medium     10-       Other neutropenia (H) 08/04/2020     Priority: Medium     Paresthesias 02/13/2020     Priority: Medium     Status post total left knee replacement 09/09/2019     Priority: Medium     Aortic valve insufficiency 03/06/2019     Priority: Medium     Mitral regurgitation 03/06/2019     Priority:  Medium     Tricuspid valve insufficiency 03/06/2019     Priority: Medium     Diastolic dysfunction grade 1 on 2/21/19 03/06/2019     Priority: Medium     Hypertriglyceridemia 03/06/2019     Priority: Medium     Palpitations 02/13/2019     Priority: Medium     PVC's (premature ventricular contractions) 02/13/2019     Priority: Medium     Atrial ectopy 02/13/2019     Priority: Medium     PSVT (paroxysmal supraventricular tachycardia) (H) 02/13/2019     Priority: Medium     COPD, mild on 9/9/14 02/13/2019     Priority: Medium     Bilateral carotid artery stenosis at less than 50% on 12/1/16 02/13/2019     Priority: Medium     Mixed hyperlipidemia 02/13/2019     Priority: Medium     On statin therapy 02/13/2019     Priority: Medium     Heart murmur 02/13/2019     Priority: Medium     Morbid obesity (H) 06/01/2017     Priority: Medium     ACP (advance care planning) 04/28/2016     Priority: Medium     Advance Care Planning 4/28/2016: ACP Review of Chart / Resources Provided:  Reviewed chart for advance care plan.  Dinorah Lim has no plan or code status on file. Discussed available resources and provided with information. Confirmed code status reflects current choices pending further ACP discussions.  Confirmed/documented legally designated decision makers.  Added by Aditi Gandhi             Anxiety 06/23/2014     Priority: Medium     Lung density on x-ray 07/23/2013     Priority: Medium     Osteoarthritis 06/14/2012     Priority: Medium     Problem list name updated by automated process. Provider to review       Advanced care planning/counseling discussion 01/13/2012     Priority: Medium     Abnormal glucose 08/01/2011     Priority: Medium     Hgb A1c 5.7 on 1/4/2015  Problem list name updated by automated process. Provider to review       Osteoarthritis of right hip 10/07/2004     Priority: Medium     Urticaria 06/01/2004     Priority: Medium     Problem list name updated by automated process. 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     Obesity, Class II, BMI 35-39.9 1/4/2015     Osteoarthritis of right hip 10/07/2004     Osteoarthrosis 06/14/2012     Otitis externa 10/04/2011     Prediabetes 08/01/2011       Past Surgical History:    Past Surgical History:   Procedure Laterality Date     ARTHROPLASTY KNEE Left 9/9/2019    Procedure: LEFT TOTAL KNEE ARTHROPLASTY S/N;  Surgeon: Trevor Alonzo MD;  Location: [facility]     ARTHROSCOPY KNEE Left 3/21/2019    Procedure: LEFT  KNEE ARTHROSCOPY, partial medial menisectomy;  Surgeon: Esteban Wills MD;  Location: [facility]     CLOSED REDUCTION WRIST Right 1952 x 2    long arm cast (same time as elbow fx)     CLOSED RX ELBOW DISLOCATION Right 1952    CR/long cast of fracture     EXCISE MASS FOOT Left 8/16/2021    Procedure: LEFT ANKLE MASS EXCISION;  Surgeon: Trevor Alonzo MD;  Location: [facility]     HC INJ EPIDURAL LUMBAR/SACRAL W/WO CONTRAST Bilateral 5/2013    facet injections; prev 2012     LAPAROSCOPIC CHOLECYSTECTOMY N/A 1/4/2015    Procedure: LAPAROSCOPIC CHOLECYSTECTOMY;  Surgeon: Brittney العلي MD;  Location: [facility]     TONSILLECTOMY       ZZC TOTAL KNEE ARTHROPLASTY Left 09/09/2019    Dr Alonzo       Family History:    Family History   Problem Relation Age of Onset     Heart Disease Brother         atrial fibrillation     Atrial fibrillation Brother      Other - See Comments Mother         emphysema     Heart Disease Father 92        CHF     Cancer Paternal Grandfather         lung cancer     Cancer Maternal Uncle          lung cancer     Chronic Obstructive Pulmonary Disease Daughter      Emphysema Daughter      Other - See Comments Daughter         benign breast lesion     Esophagitis Daughter        Social History:  Marital Status:  Single [1]  Social History     Tobacco Use     Smoking status: Never Smoker     Smokeless tobacco: Never Used   Substance Use Topics     Alcohol use: No     Drug use: No        Medications:    Calcium-Magnesium-Vitamin D (CALCIUM 1200+D3 PO)  clonazePAM (KLONOPIN) 1 MG tablet  FISH OIL  ipratropium - albuterol 0.5 mg/2.5 mg/3 mL (DUONEB) 0.5-2.5 (3) MG/3ML neb solution  PARoxetine (PAXIL) 40 MG tablet  simvastatin (ZOCOR) 40 MG tablet  VITAMIN D, CHOLECALCIFEROL, PO      Review of Systems   Constitutional: Positive for fever (TMAX 99.4). Negative for chills and fatigue.   HENT: Positive for congestion, ear pain (right), rhinorrhea, sinus pressure, sinus pain and sore throat. Negative for trouble swallowing.    Eyes: Negative for pain, redness and itching.   Respiratory: Positive for cough and shortness of breath.    Cardiovascular: Negative for chest pain and palpitations.   Gastrointestinal: Negative for diarrhea, nausea and vomiting.   Genitourinary: Negative for decreased urine volume.   Musculoskeletal: Positive for myalgias.   Skin: Negative for rash.   Neurological: Positive for headaches.   Psychiatric/Behavioral: Negative.      Physical Exam   BP: 137/64  Pulse: 74  Temp: 99.4  F (37.4  C)  Resp: 16  SpO2: 96 %    Physical Exam  Vitals and nursing note reviewed.   Constitutional:       General: She is not in acute distress.     Appearance: She is not ill-appearing or toxic-appearing.   HENT:      Head: Normocephalic.      Right Ear: Tympanic membrane, ear canal and external ear normal.      Left Ear: Tympanic membrane, ear canal and external ear normal.      Nose: Congestion present. No rhinorrhea.      Mouth/Throat:      Mouth: Mucous membranes are moist.      Pharynx: Oropharynx is clear.  "Posterior oropharyngeal erythema present. No oropharyngeal exudate.   Eyes:      Extraocular Movements: Extraocular movements intact.      Conjunctiva/sclera: Conjunctivae normal.      Pupils: Pupils are equal, round, and reactive to light.   Cardiovascular:      Rate and Rhythm: Normal rate and regular rhythm.      Pulses: Normal pulses.      Heart sounds: Normal heart sounds.   Pulmonary:      Effort: Pulmonary effort is normal.      Breath sounds: Normal breath sounds.   Abdominal:      General: Bowel sounds are normal.      Palpations: Abdomen is soft.      Tenderness: There is no abdominal tenderness.   Musculoskeletal:      Cervical back: Normal range of motion and neck supple. No rigidity or tenderness.   Lymphadenopathy:      Cervical: No cervical adenopathy.   Neurological:      Mental Status: She is alert.   Psychiatric:         Mood and Affect: Mood normal.       ED Course     No results found for this or any previous visit (from the past 24 hour(s)).    Medications - No data to display    Assessments & Plan (with Medical Decision Making)     I have reviewed the nursing notes.    I have reviewed the findings, diagnosis, plan and need for follow up with the patient.  (B34.9) Viral syndrome  (primary encounter diagnosis)  Plan: [facility]  Patient ambulatory with a nontoxic appearance.  Staying hydrated, normal output.  No rashes.  Lungs clear throughout.  No increased work of breathing.  No wheezing.  Mild COPD and uses albuterol neb treatments as needed, last use was yesterday, none today.  Non-smoker, states she has never smoked.  No known sick contact.  Patient declines EKG, chest x-ray and lab work for shortness of breath. Patient states \"I have been waiting for 4 hours and I cannot wait any longer.  History of COVID-positive infection 10-20.  Patient requesting COVID test due to needing one weekly for work as she is unvaccinated.  No signs of bacterial infection.  Symptomatic " treatment recommendations provided.  Patient to follow-up with primary care provider or return to urgent care-ED with any worsening in condition or additional concerns.    New Prescriptions    No medications on file     Final diagnoses:   Viral syndrome     1/20/2022   [facility]     Chelsea Leija NP  01/20/22 2059

## 2022-01-23 ENCOUNTER — TELEPHONE (OUTPATIENT)
Dept: EMERGENCY MEDICINE | Facility: HOSPITAL | Age: 82
End: 2022-01-23
Payer: COMMERCIAL

## 2022-01-23 NOTE — TELEPHONE ENCOUNTER
"Coronavirus (COVID-19) Notification    Caller Name (Patient, parent, daughter/son, grandparent, etc)  The patient      Reason for call  Notify of Positive Coronavirus (COVID-19) lab results, assess symptoms,  review  [facility] recommendations    Lab Result    Lab test:  2019-nCoV rRt-PCR or SARS-CoV-2 PCR    Oropharyngeal AND/OR nasopharyngeal swabs is POSITIVE for 2019-nCoV RNA/SARS-COV-2 PCR (COVID-19 virus)    RN Recommendations/Instructions per [facility] Coronavirus COVID-19 recommendations    Brief introduction script  Introduce self then review script:  \"I am calling on behalf of [employer].  We were notified that your Coronavirus test (COVID-19) for was POSITIVE for the virus.  I have some information to relay to you but first I wanted to mention that the [place] Dept of Health will be contacting you shortly [it's possible MD already called Patient] to talk to you more about how you are feeling and other people you have had contact with who might now also have the virus.  Also,  [employer] [facility] is Partnering with the [facility] for Covid-19 research, you may be contacted directly by research staff.\"      Assessment (Inquire about Patient's current symptoms)   Assessment   Current Symptoms at time of phone call: (if no symptoms, document No symptoms] Productive cough. Head stuffy    Symptoms onset (if applicable) 1 week ago     If at time of call, Patients symptoms hare worsened, the Patient should contact 911 or have someone drive them to Emergency Dept promptly:      If Patient calling 911, inform 911 personal that you have tested positive for the Coronavirus (COVID-19).  Place mask on and await 911 to arrive.    If Emergency Dept, If possible, please have another adult drive you to the Emergency Dept but you need to wear mask when in contact with other people.          Treatment Options:   Patient classified as COVID treatment eligible by Epic high risk algorithm: Yes  Is the patient " symptomatic at the time of result notification? Yes. Was the onset of symptoms within the last 5 days? No. You may be eligible to receive a new treatment with a monoclonal antibody for preventing hospitalization in patients at high risk for complications from COVID-19.   This medication is still experimental and available on a limited basis; it is given through an IV and must be given at an infusion center. Please note that not all people who are eligible will receive the medication since it is in limited supply.  Are you interested in being considered for this medication?  No.     Review information with Patient    Your result was positive. This means you have COVID-19 (coronavirus).  We have sent you a letter that reviews the information that I'll be reviewing with you now.    How can I protect others?    If you have symptoms: stay home and away from others (self-isolate) until:    You've had no fever--and no medicine that reduces fever--for 1 full day (24 hours). And       Your other symptoms have gotten better. For example, your cough or breathing has improved. And     At least 10 days have passed since your symptoms started. (If you've been told by a doctor that you have a weak immune system, wait 20 days.)     If you don't have symptoms: Stay home and away from others (self-isolate) until at least 10 days have passed since your first positive COVID-19 test. (Date test collected)    During this time:    Stay in your own room, including for meals. Use your own bathroom if you can.    Stay away from others in your home. No hugging, kissing or shaking hands. No visitors.     Don't go to work, school or anywhere else.     Clean  high touch  surfaces often (doorknobs, counters, handles, etc.). Use a household cleaning spray or wipes. You'll find a full list on the EPA website at www.epa.gov/pesticide-registration/list-n-disinfectants-use-against-sars-cov-2.     Cover your mouth and nose with a mask, tissue or other  face covering to avoid spreading germs.    Wash your hands and face often with soap and water.    Make a list of people you have been in close contact with recently, even if either of you wore a face covering.   - Start your list from 2 days before you became ill or had a positive test.  - Include anyone that was within 6 feet of you for a cumulative total of 15 minutes or more in 24 hours. (Example: if you sat next to Kuldip for 5 minutes in the morning and 10 minutes in the afternoon, then you were in close contact for 15 minutes total that day. Kuldip would be added to your list.)    A public health worker will call or text you. It is important that you answer. They will ask you questions about possible exposures to COVID-19, such as people you have been in direct contact with and places you have visited.    Tell the people on your list that you have COVID-19; they should stay away from others for 14 days starting from the last time they were in contact with you (unless you are told something different from a public health worker).     Caregivers in these groups are at risk for severe illness due to COVID-19:  o People 65 years and older  o People who live in a nursing home or long-term care facility  o People with chronic disease (lung, heart, cancer, diabetes, kidney, liver, immunologic)  o People who have a weakened immune system, including those who:  - Are in cancer treatment  - Take medicine that weakens the immune system, such as corticosteroids  - Had a bone marrow or organ transplant  - Have an immune deficiency  - Have poorly controlled HIV or AIDS  - Are obese (body mass index of 40 or higher)  - Smoke regularly    Caregivers should wear gloves while washing dishes, handling laundry and cleaning bedrooms and bathrooms.    Wash and dry laundry with special caution. Don't shake dirty laundry, and use the warmest water setting you can.    If you have a weakened immune system, ask your doctor about other  actions you should take.    For more tips, go to www.cdc.gov/coronavirus/2019-ncov/downloads/10Things.pdf.    You should not go back to work until you meet the guidelines above for ending your home isolation. You don't need to be retested for COVID-19 before going back to work--studies show that you won't spread the virus if it's been at least 10 days since your symptoms started (or 20 days, if you have a weak immune system).    Employers: This document serves as formal notice of your employee's medical guidelines for going back to work. They must meet the above guidelines before going back to work in person.    How can I take care of myself?    1. Get lots of rest. Drink extra fluids (unless a doctor has told you not to).    2. Take Tylenol (acetaminophen) for fever or pain. If you have liver or kidney problems, ask your family doctor if it's okay to take Tylenol.     Take either:     650 mg (two 325 mg pills) every 4 to 6 hours, or     1,000 mg (two 500 mg pills) every 8 hours as needed.     Note: Don't take more than 3,000 mg in one day. Acetaminophen is found in many medicines (both prescribed and over-the-counter medicines). Read all labels to be sure you don't take too much.    For children, check the Tylenol bottle for the right dose (based on their age or weight).    3. If you have other health problems (like cancer, heart failure, an organ transplant or severe kidney disease): Call your specialty clinic if you don't feel better in the next 2 days.    4. Know when to call 911: Emergency warning signs include:    Trouble breathing or shortness of breath    Pain or pressure in the chest that doesn't go away    Feeling confused like you haven't felt before, or not being able to wake up    Bluish-colored lips or face    5. Sign up for [employer]. We know it's scary to hear that you have COVID-19. We want to track your symptoms to make sure you're okay over the next 2 weeks. Please look for an email from  [employer]--this is a free, online program that we'll use to keep in touch. To sign up, follow the link in the email. Learn more at [email or web address].    Where can I get more information?    [facility]: [email or web address]    Coronavirus Basics: www.health.[facility].[place]./diseases/coronavirus/basics.html    What to Do If You're Sick: www.cdc.gov/coronavirus/2019-ncov/about/steps-when-sick.html    Ending Home Isolation: www.cdc.gov/coronavirus/2019-ncov/hcp/disposition-in-home-patients.html     Caring for Someone with COVID-19: www.cdc.gov/coronavirus/2019-ncov/if-you-are-sick/care-for-someone.html     [facility] clinical trials (COVID-19 research studies): [email or web address].[email or web address]-clinical-trials     A Positive COVID-19 letter will be sent via [employer] or the mail. (Exception, no letters sent to Presurgerical/Preprocedure Patients)    Daija Izaguirre LPN

## 2022-01-29 ENCOUNTER — HOSPITAL ENCOUNTER (EMERGENCY)
Facility: HOSPITAL | Age: 82
Discharge: HOME | End: 2022-01-29
Attending: PHYSICIAN ASSISTANT | Admitting: PHYSICIAN ASSISTANT
Payer: COMMERCIAL

## 2022-01-29 VITALS
RESPIRATION RATE: 14 BRPM | HEART RATE: 79 BPM | DIASTOLIC BLOOD PRESSURE: 69 MMHG | SYSTOLIC BLOOD PRESSURE: 131 MMHG | TEMPERATURE: 97.4 F | OXYGEN SATURATION: 97 %

## 2022-01-29 DIAGNOSIS — L50.9 URTICARIA: ICD-10-CM

## 2022-01-29 PROCEDURE — 99213 OFFICE O/P EST LOW 20 MIN: CPT | Performed by: PHYSICIAN ASSISTANT

## 2022-01-29 PROCEDURE — 96372 THER/PROPH/DIAG INJ SC/IM: CPT | Performed by: PHYSICIAN ASSISTANT

## 2022-01-29 PROCEDURE — G0463 HOSPITAL OUTPT CLINIC VISIT: HCPCS | Mod: 25

## 2022-01-29 PROCEDURE — 250N000011 HC RX IP 250 OP 636: Performed by: PHYSICIAN ASSISTANT

## 2022-01-29 RX ORDER — PREDNISONE 20 MG/1
TABLET ORAL
Qty: 5 TABLET | Refills: 0 | Status: SHIPPED | OUTPATIENT
Start: 2022-01-29 | End: 2022-02-05

## 2022-01-29 RX ORDER — METHYLPREDNISOLONE SODIUM SUCCINATE 125 MG/2ML
60 INJECTION, POWDER, LYOPHILIZED, FOR SOLUTION INTRAMUSCULAR; INTRAVENOUS ONCE
Status: DISCONTINUED | OUTPATIENT
Start: 2022-01-29 | End: 2022-01-29 | Stop reason: HOSPADM

## 2022-01-29 RX ORDER — METHYLPREDNISOLONE SODIUM SUCCINATE 40 MG/ML
40 INJECTION, POWDER, LYOPHILIZED, FOR SOLUTION INTRAMUSCULAR; INTRAVENOUS ONCE
Status: COMPLETED | OUTPATIENT
Start: 2022-01-29 | End: 2022-01-29

## 2022-01-29 RX ADMIN — METHYLPREDNISOLONE SODIUM SUCCINATE 40 MG: 40 INJECTION, POWDER, FOR SOLUTION INTRAMUSCULAR; INTRAVENOUS at 20:01

## 2022-01-29 ASSESSMENT — ENCOUNTER SYMPTOMS
SHORTNESS OF BREATH: 0
SLEEP DISTURBANCE: 0
DIARRHEA: 0
CONSTIPATION: 0
FATIGUE: 0
NAUSEA: 0
ABDOMINAL PAIN: 0
VOMITING: 0
DYSURIA: 0
COUGH: 1
DIZZINESS: 0
MYALGIAS: 0
FEVER: 0

## 2022-01-30 NOTE — DISCHARGE INSTRUCTIONS
- Complete taper of prednisone x 7 days.   - Avoid hot baths or showers. Avoid itching.   - Apply calamine lotion to prevent itching.   - Follow up if hives worsen or do not resolve in 1 week. Monitor for increasing shortness of breath, swelling of the lips, tongue or throat.

## 2022-01-30 NOTE — ED PROVIDER NOTES
History     Chief Complaint   Patient presents with     Rash     c/o itchy rash     HPI  Dinorah Lim is a 81 year old female who presents for evaluation of hives. She has had urticarial reactions ever since she was a baby. As an adult, they occur about once per year. Steroid shot and pills are usually sufficient for resolution. This episode started last night and has spread. It is extremely pruritic. Today the rash is located on her buttocks, bilateral thighs and back. COVID-19 positive 1/20.    Allergies:  Allergies   Allergen Reactions     Chocolate Hives     Lemon Flavor Hives     Lime [Calcium Oxysulfide] Hives     Metal [Staples]      Orange Fruit [Citrus] Hives     Strawberry Hives     Adhesive Tape      Band-aids     Codeine Hives     Patient can tolerate oxycodone & dilaudid     Decadron [Dexamethasone] Hives     Erythromycin Hives     ERYTHROMYCIN BASE     Grapefruit [Extra Strength Grapefruit]      GRAPEFRUIT     Morphine Hives     Pt. Reports had hives     Penicillins Hives     Ranitidine GI Disturbance     Sulfa Drugs      SULFONAMIDE ANTIBIOTICS      Tomato      Xyzal [Levocetirizine] Hives       Problem List:    Patient Active Problem List    Diagnosis Date Noted     Other chest pain 10/20/2021     Priority: Medium     Hiatal hernia 07/28/2021     Priority: Medium     Chronic, continuous use of opioids 05/17/2021     Priority: Medium     Stage 3a chronic kidney disease (H) 03/22/2021     Priority: Medium     COVID-19 virus infection on 10/30/20 11/24/2020     Priority: Medium     10-       Other neutropenia (H) 08/04/2020     Priority: Medium     Paresthesias 02/13/2020     Priority: Medium     Status post total left knee replacement 09/09/2019     Priority: Medium     Aortic valve insufficiency 03/06/2019     Priority: Medium     Mitral regurgitation 03/06/2019     Priority: Medium     Tricuspid valve insufficiency 03/06/2019     Priority: Medium     Diastolic dysfunction grade 1 on  2/21/19 03/06/2019     Priority: Medium     Hypertriglyceridemia 03/06/2019     Priority: Medium     Palpitations 02/13/2019     Priority: Medium     PVC's (premature ventricular contractions) 02/13/2019     Priority: Medium     Atrial ectopy 02/13/2019     Priority: Medium     PSVT (paroxysmal supraventricular tachycardia) (H) 02/13/2019     Priority: Medium     COPD, mild on 9/9/14 02/13/2019     Priority: Medium     Bilateral carotid artery stenosis at less than 50% on 12/1/16 02/13/2019     Priority: Medium     Mixed hyperlipidemia 02/13/2019     Priority: Medium     On statin therapy 02/13/2019     Priority: Medium     Heart murmur 02/13/2019     Priority: Medium     Morbid obesity (H) 06/01/2017     Priority: Medium     ACP (advance care planning) 04/28/2016     Priority: Medium     Advance Care Planning 4/28/2016: ACP Review of Chart / Resources Provided:  Reviewed chart for advance care plan.  Dinorah WILLIAM Lim has no plan or code status on file. Discussed available resources and provided with information. Confirmed code status reflects current choices pending further ACP discussions.  Confirmed/documented legally designated decision makers.  Added by Aditi Gandhi             Anxiety 06/23/2014     Priority: Medium     Lung density on x-ray 07/23/2013     Priority: Medium     Osteoarthritis 06/14/2012     Priority: Medium     Problem list name updated by automated process. Provider to review       Advanced care planning/counseling discussion 01/13/2012     Priority: Medium     Abnormal glucose 08/01/2011     Priority: Medium     Hgb A1c 5.7 on 1/4/2015  Problem list name updated by automated process. Provider to review       Osteoarthritis of right hip 10/07/2004     Priority: Medium     Urticaria 06/01/2004     Priority: Medium     Problem list name updated by automated process. 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     Obesity, Class II, BMI 35-39.9 1/4/2015     Osteoarthritis of right hip 10/07/2004     Osteoarthrosis 06/14/2012     Otitis externa 10/04/2011     Prediabetes 08/01/2011       Past Surgical History:    Past Surgical History:   Procedure Laterality Date     ARTHROPLASTY KNEE Left 9/9/2019    Procedure: LEFT TOTAL KNEE ARTHROPLASTY S/N;  Surgeon: Trevor Alonzo MD;  Location: [facility] OR     ARTHROSCOPY KNEE Left 3/21/2019    Procedure: LEFT  KNEE ARTHROSCOPY, partial medial menisectomy;  Surgeon: Esteban Wills MD;  Location: [facility] OR     CLOSED REDUCTION WRIST Right 1952 x 2    long arm cast (same time as elbow fx)     CLOSED RX ELBOW DISLOCATION Right 1952    CR/long cast of fracture     EXCISE MASS FOOT Left 8/16/2021    Procedure: LEFT ANKLE MASS EXCISION;  Surgeon: Trevor Alonzo MD;  Location: [facility] OR     HC INJ EPIDURAL LUMBAR/SACRAL W/WO CONTRAST Bilateral 5/2013    facet injections; prev 2012     LAPAROSCOPIC CHOLECYSTECTOMY N/A 1/4/2015    Procedure: LAPAROSCOPIC CHOLECYSTECTOMY;  Surgeon: Brittney العلي MD;  Location: [facility] OR     TONSILLECTOMY       ZZC TOTAL KNEE ARTHROPLASTY Left 09/09/2019    Dr Alonzo       Family History:    Family History   Problem Relation Age of Onset     Heart Disease Brother         atrial fibrillation     Atrial fibrillation Brother      Other - See Comments Mother         emphysema     Heart Disease Father 92        CHF     Cancer Paternal Grandfather         lung cancer     Cancer Maternal Uncle         lung cancer     Chronic Obstructive Pulmonary Disease Daughter      Emphysema Daughter      Other - See  Comments Daughter         benign breast lesion     Esophagitis Daughter        Social History:  Marital Status:  Single [1]  Social History     Tobacco Use     Smoking status: Never Smoker     Smokeless tobacco: Never Used   Substance Use Topics     Alcohol use: No     Drug use: No        Medications:    Calcium-Magnesium-Vitamin D (CALCIUM 1200+D3 PO)  clonazePAM (KLONOPIN) 1 MG tablet  FISH OIL  ipratropium - albuterol 0.5 mg/2.5 mg/3 mL (DUONEB) 0.5-2.5 (3) MG/3ML neb solution  PARoxetine (PAXIL) 40 MG tablet  predniSONE (DELTASONE) 20 MG tablet  simvastatin (ZOCOR) 40 MG tablet  VITAMIN D, CHOLECALCIFEROL, PO          Review of Systems   Constitutional: Negative for fatigue and fever.   HENT: Positive for congestion and sneezing.    Respiratory: Positive for cough (productive). Negative for shortness of breath.    Cardiovascular: Negative for chest pain.   Gastrointestinal: Negative for abdominal pain, constipation, diarrhea, nausea and vomiting.   Genitourinary: Negative for dysuria.   Musculoskeletal: Negative for myalgias.   Skin: Positive for rash.   Neurological: Negative for dizziness.   Psychiatric/Behavioral: Negative for sleep disturbance.       Physical Exam   BP: 131/69  Pulse: 79  Temp: 97.4  F (36.3  C)  Resp: 14  SpO2: 97 %      Physical Exam  Vitals and nursing note reviewed.   Constitutional:       General: She is not in acute distress.     Appearance: Normal appearance. She is normal weight. She is not toxic-appearing.   HENT:      Head: Atraumatic.   Skin:     General: Skin is warm.      Capillary Refill: Capillary refill takes less than 2 seconds.      Findings: Rash present. Rash is urticarial. Rash is not papular, purpuric or pustular.          Neurological:      Mental Status: She is alert.         ED Course     No results found for this or any previous visit (from the past 24 hour(s)).    Medications   methylPREDNISolone sodium succinate (solu-MEDROL) injection 62.5 mg (has no  administration in time range)   methylPREDNISolone sodium succinate (solu-MEDROL) injection 40 mg (40 mg Intramuscular Given 1/29/22 2001)       Assessments & Plan (with Medical Decision Making)     I have reviewed the nursing notes.    I have reviewed the findings, diagnosis, plan and need for follow up with the patient.  Urticaria  - Complete taper of prednisone x 7 days.   - Avoid hot baths or showers. Avoid itching.   - Apply calamine lotion to prevent itching. - Complete taper of prednisone x 7 days.   - Avoid hot baths or showers. Avoid itching.   - Apply calamine lotion to prevent itching.   - Follow up if hives worsen or do not resolve in 1 week. Monitor for increasing shortness of breath, swelling of the lips, tongue or throat.   - Follow up if hives worsen or do not resolve in 1 week. Monitor for increasing shortness of breath, swelling of the lips, tongue or throat.     Discharge Medication List as of 1/29/2022  7:51 PM      START taking these medications    Details   predniSONE (DELTASONE) 20 MG tablet 1 tab daily for 3 days, then 1/2 tab daily for 4 days, Disp-5 tablet, R-0, E-Prescribe             Final diagnoses:   Urticaria       1/29/2022   [facility]

## 2022-01-30 NOTE — ED TRIAGE NOTES
Pt presents with a red, itchy rash to her right thigh and buttock and some on her right leg. She also has been coughing up a lot of phlegm and has post nasal drip also.

## 2022-02-03 ENCOUNTER — NURSE TRIAGE (OUTPATIENT)
Dept: FAMILY MEDICINE | Facility: OTHER | Age: 82
End: 2022-02-03
Payer: COMMERCIAL

## 2022-02-07 ENCOUNTER — OFFICE VISIT (OUTPATIENT)
Dept: FAMILY MEDICINE | Facility: OTHER | Age: 82
End: 2022-02-07
Attending: STUDENT IN AN ORGANIZED HEALTH CARE EDUCATION/TRAINING PROGRAM
Payer: COMMERCIAL

## 2022-02-07 VITALS
DIASTOLIC BLOOD PRESSURE: 52 MMHG | BODY MASS INDEX: 36.11 KG/M2 | OXYGEN SATURATION: 97 % | HEART RATE: 66 BPM | WEIGHT: 217 LBS | TEMPERATURE: 99.1 F | SYSTOLIC BLOOD PRESSURE: 96 MMHG

## 2022-02-07 DIAGNOSIS — M25.511 CHRONIC RIGHT SHOULDER PAIN: Primary | ICD-10-CM

## 2022-02-07 DIAGNOSIS — G89.29 CHRONIC RIGHT SHOULDER PAIN: Primary | ICD-10-CM

## 2022-02-07 PROCEDURE — 99213 OFFICE O/P EST LOW 20 MIN: CPT | Performed by: STUDENT IN AN ORGANIZED HEALTH CARE EDUCATION/TRAINING PROGRAM

## 2022-02-07 PROCEDURE — G0463 HOSPITAL OUTPT CLINIC VISIT: HCPCS

## 2022-02-07 RX ORDER — HYDROCODONE BITARTRATE AND ACETAMINOPHEN 5; 325 MG/1; MG/1
1 TABLET ORAL EVERY 6 HOURS PRN
Qty: 10 TABLET | Refills: 0 | Status: SHIPPED | OUTPATIENT
Start: 2022-02-07 | End: 2022-04-25

## 2022-02-07 ASSESSMENT — PAIN SCALES - GENERAL: PAINLEVEL: EXTREME PAIN (8)

## 2022-02-07 NOTE — PROGRESS NOTES
"  Assessment & Plan     Chronic right shoulder pain  Managed by Dr. Alonzo with intermittent corticosteroid injections  Will refill Norco, only to be used PRN and at bedtime   - HYDROcodone-acetaminophen (NORCO) 5-325 MG tablet; Take 1 tablet by mouth every 6 hours as needed for severe pain      BMI:   Estimated body mass index is 36.11 kg/m  as calculated from the following:    Height as of 10/19/21: 1.651 m (5' 5\").    Weight as of this encounter: 98.4 kg (217 lb).     Yanique Mar MD  [facility] Bill is a 81 year old who presents to clinic to establish care.    HPI     Ms. Landy Lim is a very pleasant 81 year old female presenting to clinic today to establish care.  Patient has a long standing history of hives since childhood.  This was worked up in the past, but unclear as to extend of work-up.  Flares occur 1x a year, used to be more frequent per patient.  Currently has rash on her chest/breast region.  Rash is itchy but patient refrains from scratching.  Of note, she was seen in our ED on 1/29/2022 for urticarial rash and given steroid taper to which patient has responded remarkably well.  She reports no other symptoms.    She has a history of 'leaky heart valves' and is closely followed by Dr. Hughes for this.  She denies any exertional CP, dyspnea, palpitations, dizziness, lightheadedness, vision changes, nausea.  She also denies any pedal edema, rapid weight gain, PND, or orthopnea.      She has mild intermittent reactive airway disease for which she uses duoneb, about 1x a month    She endorses feeling depressed and sad almost on a daily basis.  Cries easily, reportedly on daily basis.  Still taking her medications as prescribed.  Denies thoughts of self harm or suicide.  Has reportedly tried several different kinds of medications for her depression in the past.  Uses Klonipin 1-2x per week.      Patient is also followed by Dr. Alonzo for chronic R shoulder " pain.  Occasionally uses Norco, cuts them in quarters       Dr. Alonzo- hydrocodone.  5-325.  Has been on hydrocodone.  Cuts them in quarters. Cortisone injection with Dr. Alonzo.      Ambulates without assistive devices generally.  Uses cane occasionally for additional support to prevent falls.        Pain History:  When did you first notice your pain? - More than 6 weeks   Have you seen this provider for your pain in the past? No   Where in your body do your have pain? Right Shoulder, Both knees, has had a knee replacement on Left knee 3 years ago  Are you seeing anyone else for your pain? Yes - OA  What makes your pain better? Steroid injections  What makes your pain worse? Cold weather  How has pain affected your ability to work? Not currently working - unrelated to pain  Who lives in your household? Grandson        Review of Systems   Constitutional, HEENT, cardiovascular, pulmonary, GI, , musculoskeletal, neuro, skin, endocrine and psych systems are negative, except as otherwise noted.      Objective    BP 96/52   Pulse 66   Temp 99.1  F (37.3  C)   Wt 98.4 kg (217 lb)   SpO2 97%   BMI 36.11 kg/m    There is no height or weight on file to calculate BMI.  Physical Exam   GENERAL: healthy, alert and no distress  EYES: Eyes grossly normal to inspection, PERRL and conjunctivae and sclerae normal  HENT: ear canals and TM's normal, nose and mouth without ulcers or lesions  NECK: no adenopathy, no asymmetry, masses, or scars and thyroid normal to palpation  RESP: lungs clear to auscultation - no rales, rhonchi or wheezes  BREAST: normal without masses, tenderness or nipple discharge and no palpable axillary masses or adenopathy  CV: regular rate and rhythm, normal S1 S2, no S3 or S4, no murmur, click or rub, no peripheral edema and peripheral pulses strong  ABDOMEN: soft, nontender, no hepatosplenomegaly, no masses and bowel sounds normal  MS: no gross musculoskeletal defects noted, no edema  SKIN: no suspicious  lesions or rashes  NEURO: Normal strength and tone, mentation intact and speech normal  PSYCH: mentation appears normal, affect normal/bright

## 2022-02-15 ENCOUNTER — TRANSFERRED RECORDS (OUTPATIENT)
Dept: HEALTH INFORMATION MANAGEMENT | Facility: CLINIC | Age: 82
End: 2022-02-15
Payer: COMMERCIAL

## 2022-02-21 ENCOUNTER — HOSPITAL ENCOUNTER (OUTPATIENT)
Dept: ULTRASOUND IMAGING | Facility: HOSPITAL | Age: 82
Discharge: HOME | End: 2022-02-21
Attending: INTERNAL MEDICINE | Admitting: INTERNAL MEDICINE
Payer: COMMERCIAL

## 2022-02-21 DIAGNOSIS — I65.23 BILATERAL CAROTID ARTERY STENOSIS: ICD-10-CM

## 2022-02-21 PROCEDURE — 93880 EXTRACRANIAL BILAT STUDY: CPT

## 2022-02-24 ENCOUNTER — HOSPITAL ENCOUNTER (OUTPATIENT)
Dept: CARDIOLOGY | Facility: HOSPITAL | Age: 82
Discharge: HOME | End: 2022-02-24
Attending: INTERNAL MEDICINE | Admitting: INTERNAL MEDICINE
Payer: COMMERCIAL

## 2022-02-24 DIAGNOSIS — I36.1 NON-RHEUMATIC TRICUSPID VALVE INSUFFICIENCY: ICD-10-CM

## 2022-02-24 DIAGNOSIS — I35.1 NONRHEUMATIC AORTIC VALVE INSUFFICIENCY: ICD-10-CM

## 2022-02-24 DIAGNOSIS — I34.0 NON-RHEUMATIC MITRAL REGURGITATION: ICD-10-CM

## 2022-02-24 DIAGNOSIS — I51.89 DIASTOLIC DYSFUNCTION: ICD-10-CM

## 2022-02-24 DIAGNOSIS — R07.89 OTHER CHEST PAIN: ICD-10-CM

## 2022-02-24 DIAGNOSIS — R01.1 HEART MURMUR: ICD-10-CM

## 2022-02-24 LAB — LVEF ECHO: NORMAL

## 2022-02-24 PROCEDURE — 93306 TTE W/DOPPLER COMPLETE: CPT

## 2022-03-01 DIAGNOSIS — F41.9 ANXIETY: ICD-10-CM

## 2022-03-03 RX ORDER — PAROXETINE 40 MG/1
TABLET, FILM COATED ORAL
Qty: 90 TABLET | Refills: 0 | Status: SHIPPED | OUTPATIENT
Start: 2022-03-03 | End: 2022-06-13

## 2022-03-08 ENCOUNTER — APPOINTMENT (OUTPATIENT)
Dept: GENERAL RADIOLOGY | Facility: HOSPITAL | Age: 82
End: 2022-03-08
Attending: PHYSICIAN ASSISTANT
Payer: COMMERCIAL

## 2022-03-08 ENCOUNTER — APPOINTMENT (OUTPATIENT)
Dept: CT IMAGING | Facility: HOSPITAL | Age: 82
End: 2022-03-08
Attending: PHYSICIAN ASSISTANT
Payer: COMMERCIAL

## 2022-03-08 ENCOUNTER — NURSE TRIAGE (OUTPATIENT)
Dept: FAMILY MEDICINE | Facility: OTHER | Age: 82
End: 2022-03-08
Payer: COMMERCIAL

## 2022-03-08 ENCOUNTER — HOSPITAL ENCOUNTER (EMERGENCY)
Facility: HOSPITAL | Age: 82
Discharge: HOME | End: 2022-03-08
Attending: PHYSICIAN ASSISTANT | Admitting: PHYSICIAN ASSISTANT
Payer: COMMERCIAL

## 2022-03-08 VITALS
OXYGEN SATURATION: 97 % | HEART RATE: 65 BPM | TEMPERATURE: 97 F | DIASTOLIC BLOOD PRESSURE: 68 MMHG | SYSTOLIC BLOOD PRESSURE: 146 MMHG | RESPIRATION RATE: 16 BRPM

## 2022-03-08 DIAGNOSIS — S16.1XXA STRAIN OF NECK MUSCLE, INITIAL ENCOUNTER: ICD-10-CM

## 2022-03-08 DIAGNOSIS — M54.50 RIGHT-SIDED LOW BACK PAIN WITHOUT SCIATICA, UNSPECIFIED CHRONICITY: ICD-10-CM

## 2022-03-08 DIAGNOSIS — S09.90XA CLOSED HEAD INJURY, INITIAL ENCOUNTER: ICD-10-CM
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
## 2022-03-08 PROCEDURE — 72170 X-RAY EXAM OF PELVIS: CPT

## 2022-03-08 PROCEDURE — 72100 X-RAY EXAM L-S SPINE 2/3 VWS: CPT

## 2022-03-08 PROCEDURE — 72125 CT NECK SPINE W/O DYE: CPT

## 2022-03-08 PROCEDURE — 99213 OFFICE O/P EST LOW 20 MIN: CPT | Performed by: PHYSICIAN ASSISTANT

## 2022-03-08 PROCEDURE — G0463 HOSPITAL OUTPT CLINIC VISIT: HCPCS

## 2022-03-08 PROCEDURE — 70450 CT HEAD/BRAIN W/O DYE: CPT

## 2022-03-08 ASSESSMENT — ENCOUNTER SYMPTOMS
LIGHT-HEADEDNESS: 0
BACK PAIN: 1
ABDOMINAL PAIN: 0
NECK PAIN: 1
HEADACHES: 1
WEAKNESS: 0
SHORTNESS OF BREATH: 0
BRUISES/BLEEDS EASILY: 0
NECK STIFFNESS: 0

## 2022-03-08 NOTE — TELEPHONE ENCOUNTER
Pt calling and fell on ice about a month ago. Fell to back and hit the back of her head. Has had headaches,neck pain and lower back pain since fall. Neck and HA every other day and lasts all day.No blurred vision.Did not loss consciousness. Lower back pain constant and cannot stand long because of it.6-8/10. Per care advice needs to be seen in 3 days.did speak with Naima pt should go to ED to evaluate or follow up in clinic with appt. Pt at risk for neck,back,head injury and explained this. Pt will go to the ED.She was updated if needs immediate care call 911.    Carla No RN      Reason for Disposition    [1] After 72 hours AND [2] headache persists    Additional Information    Negative: [1] ACUTE NEURO SYMPTOM AND [2] present now  (DEFINITION: difficult to awaken OR confused thinking and talking OR slurred speech OR weakness of arms OR unsteady walking)    Negative: Knocked out (unconscious) > 1 minute    Negative: Seizure (convulsion) occurred  (Exception: prior history of seizures and now alert and without Acute Neuro Symptoms)    Negative: Penetrating head injury (e.g., knife, gun shot wound, metal object)    Negative: [1] Major bleeding (e.g., actively dripping or spurting) AND [2] can't be stopped    Negative: [1] Dangerous mechanism of injury (e.g., MVA, diving, trampoline, contact sports, fall > 10 feet or 3 meters) AND [2] NECK pain AND [3] began < 1 hour after injury    Negative: Sounds like a life-threatening emergency to the triager    Negative: [1] Diagnosed with concussion AND [2] within last 14 days    Negative: [1] Traumatic brain injury (mTBI; concussion) AND [2] more than 14 days since head injury    Negative: Can't remember what happened (amnesia)    Negative: Vomiting once or more    Negative: [1] Loss of vision or double vision AND [2] present now    Negative: Watery or blood-tinged fluid dripping from the NOSE or EARS now  (Exception: tears from crying or nosebleed from nasal  "trauma)    Negative: [1] One or two \"black eyes\" (bruising, purple color of eyelids) AND [2] onset within 24 hours of head injury    Negative: Large swelling or bruise > 2 inches (5 cm)    Negative: Skin is split open or gaping  (or length > 1/2 inch or 12 mm)    Negative: [1] Bleeding AND [2] won't stop after 10 minutes of direct pressure (using correct technique)    Negative: Sounds like a serious injury to the triager    Negative: [1] ACUTE NEURO SYMPTOM AND [2] now fine  (DEFINITION: difficult to awaken OR confused thinking and talking OR slurred speech OR weakness of arms OR unsteady walking)    Negative: [1] Knocked out (unconscious) < 1 minute AND [2] now fine    Negative: Dangerous injury (e.g., MVA, diving, trampoline, contact sports, fall > 10 feet or 3 meters) or severe blow from hard object (e.g., golf club or baseball bat)    Negative: [1] SEVERE headache AND [2] not improved 2 hours after pain medicine/ice packs    Negative: Taking Coumadin (warfarin) or other strong blood thinner, or known bleeding disorder (e.g., thrombocytopenia)    Negative: Suspicious history for the injury    Negative: [1] Age over 65 years AND [2] swelling or bruise    Negative: Patient is confused or is an unreliable provider of information (e.g., dementia, severe intellectual disability, alcohol intoxication)    Negative: [1] No prior tetanus shots (or is not fully vaccinated) AND [2] any wound (e.g., cut, scrape)    Negative: [1] HIV positive or severe immunodeficiency (severely weak immune system) AND [2] DIRTY cut or scrape    Negative: [1] Last tetanus shot > 5 years ago AND [2] DIRTY cut or scrape    Negative: [1] Last tetanus shot > 10 years ago AND [2] CLEAN cut or scrape (e.g., object AND skin were clean)    Answer Assessment - Initial Assessment Questions  1. MECHANISM: \"How did the injury happen?\" For falls, ask: \"What height did you fall from?\" and \"What surface did you fall against?\"       Fell on ice -about a " "month ago  2. ONSET: \"When did the injury happen?\" (Minutes or hours ago)       Walking on sidewalk  3. NEUROLOGIC SYMPTOMS: \"Was there any loss of consciousness?\" \"Are there any other neurological symptoms?\"       no  4. MENTAL STATUS: \"Does the person know who he is, who you are, and where he is?\"       Alert and orienated  5. LOCATION: \"What part of the head was hit?\"       Bottom back head neck, loser back  6. SCALP APPEARANCE: \"What does the scalp look like? Is it bleeding now?\" If so, ask: \"Is it difficult to stop?\"       no  7. SIZE: For cuts, bruises, or swelling, ask: \"How large is it?\" (e.g., inches or centimeters)       no  8. PAIN: \"Is there any pain?\" If so, ask: \"How bad is it?\"  (e.g., Scale 1-10; or mild, moderate, severe)      Time of fall it hurt after and has come and goes like a HA the headache is the back of head  9. TETANUS: For any breaks in the skin, ask: \"When was the last tetanus booster?\"        10. OTHER SYMPTOMS: \"Do you have any other symptoms?\" (e.g., neck pain, vomiting)        Neck pain-6/10 and HA comes and goes but back is constant in lower back ache at the waist line and below 6-8/10-cant stand long or gets worse  11. PREGNANCY: \"Is there any chance you are pregnant?\" \"When was your last menstrual period?\"  na    Protocols used: HEAD INJURY-A-AH      "

## 2022-03-09 NOTE — ED PROVIDER NOTES
History     Chief Complaint   Patient presents with     Fall     Back Pain     The history is provided by the patient.     Dinorah Lim is a 81 year old female who presented to the urgent care ambulatory for evaluation of persistent low back pain as well as persistent right buttock pain and persistent neck discomfort as well as intermittent headaches after a fall 1 month ago.  No LOC.  No blood thinners.  Mechanical fall.    Allergies:  Allergies   Allergen Reactions     Chocolate Hives     Lemon Flavor Hives     Lime [Calcium Oxysulfide] Hives     Metal [Staples]      Orange Fruit [Citrus] Hives     Strawberry Hives     Adhesive Tape      Band-aids     Codeine Hives     Patient can tolerate oxycodone & dilaudid     Decadron [Dexamethasone] Hives     Erythromycin Hives     ERYTHROMYCIN BASE     Grapefruit [Extra Strength Grapefruit]      GRAPEFRUIT     Morphine Hives     Pt. Reports had hives     Penicillins Hives     Ranitidine GI Disturbance     Sulfa Drugs      SULFONAMIDE ANTIBIOTICS      Tomato      Xyzal [Levocetirizine] Hives       Problem List:    Patient Active Problem List    Diagnosis Date Noted     Other chest pain 10/20/2021     Priority: Medium     Hiatal hernia 07/28/2021     Priority: Medium     Chronic, continuous use of opioids 05/17/2021     Priority: Medium     Stage 3a chronic kidney disease (H) 03/22/2021     Priority: Medium     COVID-19 virus infection on 10/30/20 11/24/2020     Priority: Medium     10-       Other neutropenia (H) 08/04/2020     Priority: Medium     Paresthesias 02/13/2020     Priority: Medium     Status post total left knee replacement 09/09/2019     Priority: Medium     Aortic valve insufficiency 03/06/2019     Priority: Medium     Mitral regurgitation 03/06/2019     Priority: Medium     Tricuspid valve insufficiency 03/06/2019     Priority: Medium     Diastolic dysfunction grade 1 on 2/21/19 03/06/2019     Priority: Medium     Hypertriglyceridemia 03/06/2019      Priority: Medium     Palpitations 02/13/2019     Priority: Medium     PVC's (premature ventricular contractions) 02/13/2019     Priority: Medium     Atrial ectopy 02/13/2019     Priority: Medium     PSVT (paroxysmal supraventricular tachycardia) (H) 02/13/2019     Priority: Medium     COPD, mild on 9/9/14 02/13/2019     Priority: Medium     Bilateral carotid artery stenosis at less than 50% on 12/1/16 02/13/2019     Priority: Medium     Mixed hyperlipidemia 02/13/2019     Priority: Medium     On statin therapy 02/13/2019     Priority: Medium     Heart murmur 02/13/2019     Priority: Medium     Morbid obesity (H) 06/01/2017     Priority: Medium     ACP (advance care planning) 04/28/2016     Priority: Medium     Advance Care Planning 4/28/2016: ACP Review of Chart / Resources Provided:  Reviewed chart for advance care plan.  Dinorah THOMAS Raphael has no plan or code status on file. Discussed available resources and provided with information. Confirmed code status reflects current choices pending further ACP discussions.  Confirmed/documented legally designated decision makers.  Added by Aditi Gandhi             Anxiety 06/23/2014     Priority: Medium     Lung density on x-ray 07/23/2013     Priority: Medium     Osteoarthritis 06/14/2012     Priority: Medium     Problem list name updated by automated process. Provider to review       Advanced care planning/counseling discussion 01/13/2012     Priority: Medium     Abnormal glucose 08/01/2011     Priority: Medium     Hgb A1c 5.7 on 1/4/2015  Problem list name updated by automated process. Provider to review       Osteoarthritis of right hip 10/07/2004     Priority: Medium     Urticaria 06/01/2004     Priority: Medium     Problem list name updated by automated process. 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     Obesity, Class II, BMI 35-39.9 1/4/2015     Osteoarthritis of right hip 10/07/2004     Osteoarthrosis 06/14/2012     Otitis externa 10/04/2011     Prediabetes 08/01/2011       Past Surgical History:    Past Surgical History:   Procedure Laterality Date     ARTHROPLASTY KNEE Left 9/9/2019    Procedure: LEFT TOTAL KNEE ARTHROPLASTY S/N;  Surgeon: Trevor Alonzo MD;  Location: [facility]     ARTHROSCOPY KNEE Left 3/21/2019    Procedure: LEFT  KNEE ARTHROSCOPY, partial medial menisectomy;  Surgeon: Esteban Wills MD;  Location: [facility]     CLOSED REDUCTION WRIST Right 1952 x 2    long arm cast (same time as elbow fx)     CLOSED RX ELBOW DISLOCATION Right 1952    CR/long cast of fracture     EXCISE MASS FOOT Left 8/16/2021    Procedure: LEFT ANKLE MASS EXCISION;  Surgeon: Trevor Alonzo MD;  Location: [facility]     HC INJ EPIDURAL LUMBAR/SACRAL W/WO CONTRAST Bilateral 5/2013    facet injections; prev 2012     LAPAROSCOPIC CHOLECYSTECTOMY N/A 1/4/2015    Procedure: LAPAROSCOPIC CHOLECYSTECTOMY;  Surgeon: Brittney العلي MD;  Location: [facility]     TONSILLECTOMY       Z TOTAL KNEE ARTHROPLASTY Left 09/09/2019    Dr Alonzo       Family History:    Family History   Problem Relation Age of Onset     Heart Disease Brother         atrial fibrillation     Atrial fibrillation Brother      Other - See Comments Mother         emphysema     Heart Disease Father 92        CHF     Cancer Paternal Grandfather         lung cancer     Cancer Maternal Uncle         lung cancer     Chronic Obstructive Pulmonary Disease Daughter      Emphysema Daughter      Other - See Comments Daughter         benign breast lesion     Esophagitis Daughter        Social  History:  Marital Status:  Single [1]  Social History     Tobacco Use     Smoking status: Never Smoker     Smokeless tobacco: Never Used   Vaping Use     Vaping Use: Never used   Substance Use Topics     Alcohol use: No     Drug use: No        Medications:    Calcium-Magnesium-Vitamin D (CALCIUM 1200+D3 PO)  clonazePAM (KLONOPIN) 1 MG tablet  FISH OIL  HYDROcodone-acetaminophen (NORCO) 5-325 MG tablet  ipratropium - albuterol 0.5 mg/2.5 mg/3 mL (DUONEB) 0.5-2.5 (3) MG/3ML neb solution  PARoxetine (PAXIL) 40 MG tablet  simvastatin (ZOCOR) 40 MG tablet  VITAMIN D, CHOLECALCIFEROL, PO          Review of Systems   Respiratory: Negative for shortness of breath.    Cardiovascular: Negative for chest pain.   Gastrointestinal: Negative for abdominal pain.   Genitourinary: Negative.    Musculoskeletal: Positive for back pain and neck pain. Negative for neck stiffness.   Neurological: Positive for headaches. Negative for weakness and light-headedness.   Hematological: Does not bruise/bleed easily.       Physical Exam   BP: 146/68  Pulse: 65  Temp: 97  F (36.1  C)  Resp: 16  SpO2: 97 %      Physical Exam  Vitals and nursing note reviewed.   Constitutional:       General: She is not in acute distress.     Appearance: Normal appearance. She is not ill-appearing, toxic-appearing or diaphoretic.   HENT:      Head: Normocephalic and atraumatic.      Comments: No evidence of head injury.  Neck:      Comments: Mild increasing pain upon palpation of the right side of the midline cervical spine.  There is no step-off or crepitus.  Pulmonary:      Effort: Pulmonary effort is normal.   Musculoskeletal:      Comments: Able to stand under her own power and she is increasing pain upon palpation of the right buttock.  There is no significant or appreciable midline lumbar tenderness.   Skin:     General: Skin is warm and dry.      Capillary Refill: Capillary refill takes less than 2 seconds.   Neurological:      General: No focal deficit  present.      Mental Status: She is alert and oriented to person, place, and time.   Psychiatric:         Mood and Affect: Mood normal.         ED Course                 Procedures              Critical Care time:  none               Results for orders placed or performed during the hospital encounter of 03/08/22 (from the past 24 hour(s))   CT Head w/o Contrast    Narrative    PROCEDURE: CT HEAD W/O CONTRAST     HISTORY: Fall, headaches.    COMPARISON: 10/13/2016    TECHNIQUE:  Helical images of the head from the foramen magnum to the  vertex were obtained without contrast.    FINDINGS: The ventricles and sulci are prominent, compatible with  mild, generalized volume loss. No acute intracranial hemorrhage, mass  effect, midline shift, hydrocephalus or basilar cystern effacement are  present.    The grey-white matter interface is preserved. Scattered  hypoattenuation within the supratentorial subcortical and  periventricular white matter is most compatible with microvascular  ischemic change.     The calvarium is intact. The mastoid air cells are clear.  The  visualized paranasal sinuses are clear.      Impression    IMPRESSION: No acute intracranial hemorrhage.      JEAN PIERRE BARBER MD         SYSTEM ID:  [id number]   CT Cervical Spine w/o Contrast    Narrative    PROCEDURE: CT CERVICAL SPINE W/O CONTRAST     HISTORY: Fall 1 month ago with persistent neck pain..    TECHNIQUE: Helical noncontrast CT images of the cervical spine.    COMPARISON: None.    FINDINGS:     No acute fracture is identified. The vertebral bodies are normal in  height. The cervical lordosis is reversed. The C1-2 articulation and  the craniocervical junction are intact.     Diffuse degenerative changes are most pronounced at C5-6.     The paravertebral soft tissues are unremarkable. The lung apices are  clear.      Impression    IMPRESSION: No evidence of acute cervical spine fracture.    JEAN PIERRE BARBER MD         SYSTEM ID:  [id number]    XR Lumbar Spine 2/3 Views    Narrative    XR LUMBAR SPINE 2-3 VIEWS, XR PELVIS 1/2 VW    HISTORY: fall one month ago.  worsening right sided pain .    COMPARISON: 3/1/2016.    TECHNIQUE: 3 views of the lumbosacral spine. AP pelvis.    FINDINGS:    No acute lumbar spine fracture. Diffuse chronic degenerative changes.       No acute proximal femoral fracture. Bilateral hip osteoarthritis.      Impression    IMPRESSION:     No acute fracture.      JEAN PIERRE BARBER MD         SYSTEM ID:  [id number]   XR Pelvis 1/2 Views    Narrative    XR LUMBAR SPINE 2-3 VIEWS, XR PELVIS 1/2 VW    HISTORY: fall one month ago.  worsening right sided pain .    COMPARISON: 3/1/2016.    TECHNIQUE: 3 views of the lumbosacral spine. AP pelvis.    FINDINGS:    No acute lumbar spine fracture. Diffuse chronic degenerative changes.       No acute proximal femoral fracture. Bilateral hip osteoarthritis.      Impression    IMPRESSION:     No acute fracture.      JEAN PIERRE BARBER MD         SYSTEM ID:  [id number]       Medications - No data to display    Assessments & Plan (with Medical Decision Making)   81-year-old female with a mechanical fall 1 month ago with persistent symptoms.  Imaging is unremarkable for catastrophic findings or fracture.  Return here for any new or worsening symptoms.  Patient voiced understanding and was agreeable.    This document was prepared using a combination of typing and voice generated software.  While every attempt was made for accuracy, spelling and grammatical errors may exist.    I have reviewed the nursing notes.    I have reviewed the findings, diagnosis, plan and need for follow up with the patient.       Discharge Medication List as of 3/8/2022  8:03 PM          Final diagnoses:   Fall, initial encounter   Right-sided low back pain without sciatica, unspecified chronicity   Strain of neck muscle, initial encounter   Closed head injury, initial encounter       3/8/2022   [facility]      Ovidio Alvarado PA-C  03/08/22 2023

## 2022-03-09 NOTE — ED TRIAGE NOTES
"Patient presents for evaluation of back pain after a fall 1 month ago.  Patient states she was walking to her daughters when she slipped on the side walk and landed on her back-did not hit her head and did not have any loss of consciousness.  Notes since then she has been having pain in her lower back \"at the waistline\"  Patient states that the pain is constant and has not changed.  Has been unable to see a doctor at the clinic for this and today the triage nurse told her she should go in to be evaluated.  Patient states she has been eating and drinking without difficulties; denies any blood in urine or stool.  "

## 2022-03-09 NOTE — ED TRIAGE NOTES
Pt presents with c/o of back pain from a fall that happened a month ago. Pt tried to get into her PCP but was directed to come here. Pt reports that she slipped on a sidewalk and fell onto her back. Pain is lower back/coccyx area. Pt states it is constant and has not changed. Pt states that she did take a half tab of hydrocodone. Also states she is having some neck pain.

## 2022-03-10 ENCOUNTER — TELEPHONE (OUTPATIENT)
Dept: FAMILY MEDICINE | Facility: OTHER | Age: 82
End: 2022-03-10
Payer: COMMERCIAL

## 2022-03-15 ENCOUNTER — TRANSFERRED RECORDS (OUTPATIENT)
Dept: HEALTH INFORMATION MANAGEMENT | Facility: CLINIC | Age: 82
End: 2022-03-15
Payer: COMMERCIAL

## 2022-03-16 DIAGNOSIS — E78.5 HYPERLIPIDEMIA LDL GOAL <100: ICD-10-CM

## 2022-03-16 RX ORDER — SIMVASTATIN 40 MG
40 TABLET ORAL AT BEDTIME
Qty: 90 TABLET | Refills: 1 | Status: SHIPPED | OUTPATIENT
Start: 2022-03-16 | End: 2022-09-13

## 2022-03-16 NOTE — TELEPHONE ENCOUNTER
simvastatin (ZOCOR) 40 MG tablet      Last Written Prescription Date:  04/15/21  Last Fill Quantity: 90,   # refills: 2  Last Office Visit: 02/07/22  Future Office visit:    Next 5 appointments (look out 90 days)    Apr 25, 2022 10:30 AM  (Arrive by 10:15 AM)  Return Visit with Ha Hughes DO  [facility] ([facility] [place] ) [address] [place] [address]  [phone]           Routing refill request to provider for review/approval because:  Last signed by Dr. Sosa

## 2022-03-17 ENCOUNTER — ALLIED HEALTH/NURSE VISIT (OUTPATIENT)
Dept: FAMILY MEDICINE | Facility: OTHER | Age: 82
End: 2022-03-17
Attending: STUDENT IN AN ORGANIZED HEALTH CARE EDUCATION/TRAINING PROGRAM
Payer: COMMERCIAL

## 2022-03-17 VITALS
RESPIRATION RATE: 18 BRPM | SYSTOLIC BLOOD PRESSURE: 138 MMHG | HEART RATE: 72 BPM | DIASTOLIC BLOOD PRESSURE: 66 MMHG | OXYGEN SATURATION: 96 %

## 2022-03-17 DIAGNOSIS — I15.9 SECONDARY HYPERTENSION: Primary | ICD-10-CM

## 2022-03-17 PROCEDURE — 99207 PR NO CHARGE NURSE ONLY: CPT

## 2022-03-17 ASSESSMENT — PAIN SCALES - GENERAL: PAINLEVEL: WORST PAIN (10)

## 2022-04-18 DIAGNOSIS — F41.9 ANXIETY: Chronic | ICD-10-CM

## 2022-04-20 RX ORDER — CLONAZEPAM 1 MG/1
TABLET ORAL
Qty: 25 TABLET | Refills: 0 | Status: SHIPPED | OUTPATIENT
Start: 2022-04-20 | End: 2022-06-01

## 2022-04-20 NOTE — TELEPHONE ENCOUNTER
Klonopin 1 MG      Last Written Prescription Date:  02/09/22  Last Fill Quantity: 25,   # refills: 0  Last Office Visit: 02/07/22  Future Office visit:    Next 5 appointments (look out 90 days)    Apr 25, 2022 10:30 AM  (Arrive by 10:15 AM)  Return Visit with Ha Hughes DO  [facility] [place] ([facility] [place] ) [address]  [place] [address]  [phone]   Jun 22, 2022 10:00 AM  (Arrive by 9:45 AM)  SHORT with Yanique Mar MD  [facility] [place] ([facility] [place] ) [address]  [place] [address]  [phone]           Routing refill request to provider for review/approval because:  Drug not on the [facility], [facility] or [facility] refill protocol or controlled substance

## 2022-04-25 ENCOUNTER — OFFICE VISIT (OUTPATIENT)
Dept: CARDIOLOGY | Facility: OTHER | Age: 82
End: 2022-04-25
Attending: INTERNAL MEDICINE
Payer: COMMERCIAL

## 2022-04-25 VITALS
SYSTOLIC BLOOD PRESSURE: 120 MMHG | HEART RATE: 71 BPM | HEIGHT: 65 IN | OXYGEN SATURATION: 96 % | BODY MASS INDEX: 36.32 KG/M2 | DIASTOLIC BLOOD PRESSURE: 79 MMHG | WEIGHT: 218 LBS | TEMPERATURE: 98 F

## 2022-04-25 DIAGNOSIS — R07.89 OTHER CHEST PAIN: ICD-10-CM

## 2022-04-25 DIAGNOSIS — U07.1 COVID-19 VIRUS INFECTION: ICD-10-CM

## 2022-04-25 DIAGNOSIS — I65.23 BILATERAL CAROTID ARTERY STENOSIS: ICD-10-CM

## 2022-04-25 DIAGNOSIS — R01.1 HEART MURMUR: ICD-10-CM

## 2022-04-25 DIAGNOSIS — N18.31 STAGE 3A CHRONIC KIDNEY DISEASE (H): ICD-10-CM

## 2022-04-25 DIAGNOSIS — I51.89 DIASTOLIC DYSFUNCTION: ICD-10-CM

## 2022-04-25 DIAGNOSIS — R00.2 PALPITATIONS: ICD-10-CM

## 2022-04-25 DIAGNOSIS — J44.9 COPD, MILD (H): ICD-10-CM

## 2022-04-25 DIAGNOSIS — E78.1 HYPERTRIGLYCERIDEMIA: ICD-10-CM

## 2022-04-25 DIAGNOSIS — I49.1 ATRIAL ECTOPY: ICD-10-CM

## 2022-04-25 DIAGNOSIS — I35.1 NONRHEUMATIC AORTIC VALVE INSUFFICIENCY: ICD-10-CM

## 2022-04-25 DIAGNOSIS — I47.10 PSVT (PAROXYSMAL SUPRAVENTRICULAR TACHYCARDIA) (H): ICD-10-CM

## 2022-04-25 DIAGNOSIS — E78.2 MIXED HYPERLIPIDEMIA: ICD-10-CM

## 2022-04-25 DIAGNOSIS — K44.9 HIATAL HERNIA: ICD-10-CM

## 2022-04-25 DIAGNOSIS — Z79.899 ON STATIN THERAPY: ICD-10-CM

## 2022-04-25 DIAGNOSIS — E66.01 MORBID OBESITY (H): ICD-10-CM

## 2022-04-25 DIAGNOSIS — I49.3 PVC'S (PREMATURE VENTRICULAR CONTRACTIONS): ICD-10-CM

## 2022-04-25 DIAGNOSIS — I34.0 NON-RHEUMATIC MITRAL REGURGITATION: Primary | ICD-10-CM

## 2022-04-25 DIAGNOSIS — I36.1 NON-RHEUMATIC TRICUSPID VALVE INSUFFICIENCY: ICD-10-CM

## 2022-04-25 PROCEDURE — 99214 OFFICE O/P EST MOD 30 MIN: CPT | Performed by: INTERNAL MEDICINE

## 2022-04-25 PROCEDURE — G0463 HOSPITAL OUTPT CLINIC VISIT: HCPCS

## 2022-04-25 ASSESSMENT — PAIN SCALES - GENERAL: PAINLEVEL: SEVERE PAIN (6)

## 2022-04-25 NOTE — NURSING NOTE
"Chief Complaint   Patient presents with     RECHECK     1 year follow up- Echo done 2/24/22, US carotids done 2/21/22- no new symptoms       Initial /79 (BP Location: Left arm, Patient Position: Chair, Cuff Size: Adult Large)   Pulse 71   Temp 98  F (36.7  C) (Tympanic)   Ht 1.651 m (5' 5\")   Wt 98.9 kg (218 lb)   SpO2 96%   BMI 36.28 kg/m   Estimated body mass index is 36.28 kg/m  as calculated from the following:    Height as of this encounter: 1.651 m (5' 5\").    Weight as of this encounter: 98.9 kg (218 lb).  Medication Reconciliation: complete  VENITA GRULLON LPN    "

## 2022-04-25 NOTE — PATIENT INSTRUCTIONS
Thank you for allowing Dr. Hughes and our  team to participate in your care. Please call our office at [phone] with scheduling questions or if you need to cancel or change your appointment. With any other questions or concerns you may call [facility] cardiology nurse at [phone].       If you experience chest pain, chest pressure, chest tightness, shortness of breath, fainting, lightheadedness, nausea, vomiting, or other concerning symptoms, please report to the Emergency Department or call 911. These symptoms may be emergent, and best treated in the Emergency Department.    Follow up in 1 year  Schedule your Echocardiogram in one year

## 2022-04-25 NOTE — PROGRESS NOTES
[facility] HEART [facility]   CARDIOLOGY PROGRESS NOTE     Chief Complaint   Patient presents with     RECHECK     1 year follow up- Echo done 2/24/22, US carotids done 2/21/22- no new symptoms          Diagnosis:  1.  Diastolic dysfunction, grade 1 on 2/21/19, off diuretics.  2.  Bilateral carotid artery atherosclerosis at less than 50%, US from 2/21/19.  3.  Moderate AI on 2/24/22. Mild/moderate on 1/28/2021.  4.  Trace to mild mitral regurgitation on 1/28/2021.  5.  Trace to mild tricuspid valve insufficiency.  6.  COPD-minimal on 9/9/14. H/O second hand smoke from mom who smoked 3/day.  1/day. No primary tobacco usage.   7.  Morbid obesity with BMI of 35.  8.  Hyperlipidemia-controlled.  9.  Atrial ectopy.  10.  Palpitations.  11.  PSVT.  12.  PVC's.  13.  On statin therapy.  14.  Hypertriglyceridemia-uncontrolled.  15.  Moderate hiatal hernia on 3/18/2020 on a CTA of the chest.  16.  COVID-19 (+) on 10/30/20.  17.  Vitamin D deficiency at 17 on 1/28/15.      Assessment/Plan:    1.  We reviewed her echocardiogram as she is concerned with her valvular issues.  We reviewed her x4 valves, all of which are leaky to some extent, as noted below.  Echo from 1/28/2020 was compared to the echo from 2/24/2022.  Mitral, aortic, and pulmonic insufficiency has increased slightly.  Previously, the mitral valve was mildly insufficient, there was mild to moderate aortic insufficiency, mild pulmonic insufficiency and trace tricuspid insufficiency.  More recently, she was noted to have mild to moderate mitral insufficiency, moderate aortic insufficiency, mild pulmonic insufficiency and trace tricuspid insufficiency.  She understands if her valves become severely insufficient, will need to discuss replacement/repair.  For now, she is asymptomatic, plan for repeat echocardiogram in 1 year.  3.  No changes to medications today.  Continue on simvastatin 40 mg daily.  4.  Ultrasound of her carotids showed small volume of calcified  plaque involving the left internal carotid artery.  No significant stenosis.  5.  Follow-up in 1 year after completion of echocardiogram.      Interval history:  Dinorah is doing well.  She is here in follow-up to her echocardiogram and ultrasound of her carotids.  As mentioned above, all 4 valves have some level of insufficiency.  She has seen slight progression in her mitral, aortic valve, and pulmonic valve.  Tricuspid valve remains trace.  She had mild to moderate mitral insufficiency, moderate aortic insufficiency, mild pulmonic insufficiency.  She has not had any shortness of breath or swelling.  She states overall, she feels well.  We did discuss the natural progression of these valves.  If they were to be severe, it would require surgical repair.  No medications or dietary changes will improve her valves.  She understands that she has heart failure symptoms, shortness of breath, dyspnea she would let us know sooner and additional work-up will performed.  Otherwise, she denies chest pain, chest tightness, shortness of breath, edema, or other concerns. She reports having had a murmur all her life.  She believes her leaky valves are familial.  She had an ultrasound of her carotids with history of less than 50% stenosis to her carotids in 2019.  Repeat ultrasound of carotids on 2/21/2022 shows plaque, no stenosis.  We did discuss that she does have a history of a hiatal hernia.  This potentially could be causing her discomfort. She has no additional concerns or complaints.    HPI:    She has been concerned with a heart murmur as well as bilateral carotid artery stenosis.  She had echocardiogram completed on 2/21/19 as well as an ultrasound of the carotids on 2/21/19.  She is here for those results.       Previously, we reviewed her Zio patch results from 12/31/18 which showed rare PVC's, x1 run of NSVT 5 beats, PAC's, and x26 episodes of PSVT lasting up to 20 beats.     She also has a history of mild carotid  artery disease at less than 50% with mild plaquing on 12/1/16.  She does not have a personal history of smoking but was around secondhand smoke in the past.  She had a ultrasound of her carotids on 2/20/19 that showed atherosclerotic plaquing in both carotid bifurcations.  There was no significant hemo-dynamic stenosis.     Her echocardiogram from 2/21/19 showed an EF of 65-70%, grade 1 diastolic dysfunction, mild left atrial enlargement, mild to moderate aortic insufficiency, trace to mild tricuspid insufficiency, and trace to mild mitral insufficiency.  She is concerned about her valvular insufficiency.  She will have an echocardiogram in approximately 2 years to follow-up on the mild to moderate aortic insufficiency.      Relevant testing:  ECHO on 2/24/22:  Left ventricular size, wall motion and function are normal. The ejection fraction is 60-65%.  Global right ventricular function is normal.  Mild to moderate mitral insufficiency is present.  Moderate aortic insufficiency is present.  The inferior vena cava is normal.  No pericardial effusion is present.  Compared to prior imaging on 1/28/2021 There is mildly increased PI, MR and AI.   The subtle change is confirmed on visual inspection.    ECHO on 1/28/21:  No pericardial effusion is present.  Global and regional left ventricular function is normal with an EF of 55-60%.  The right ventricle is normal size.  Global right ventricular function is normal.  Both atria appear normal.  Mild mitral insufficiency is present.  The aortic valve is tricuspid.  Mild to moderate aortic insufficiency is present.  AI unchanged from previous ECHO 2/21/19  The mean gradient across the aortic valve is5.1 mmHg.  Trace tricuspid insufficiency is present.  Trace pulmonic insufficiency is present.  The aorta root is normal.    ECHO on 2/21/19:  No pericardial effusion is present.  Global and regional left ventricular function is hyperkinetic with an EF of  65-70%.  Grade I or early  diastolic dysfunction.  The right ventricle is normal size.  Global right ventricular function is normal.  Mild left atrial enlargement is present.  Trace to mild mitral insufficiency is present.  The aortic valve is tricuspid.  Mild to moderate aortic insufficiency is present.  Trace to mild tricuspid insufficiency is present.  The peak velocity of the tricuspid regurgitant jet is not obtainable.  The pulmonic valve is normal.  The aorta root is normal.    Zio patch from 12/31/2018:  The enrollment period was from 12/31/18 through 1/7/19.  There were 6 days and 12 hours of analysis time available for review.  The minimum heart rate was 49, average heart rate 66 and maximum heart rate 200 bpm.  The patient has underlying sinus rhythm throughout.  There is no significant bradycardia pauses or heart block seen.  There were very rare isolated PVC's.  There was a 5 beat run of ventricular tachycardia.  With an average heart rate of 113 bpm.  This was the only run.  There are rare PAC's seen.  Less than 1% of total beats.  There were 26 episodes of a supraventricular tachycardia greater than 4 beats.  The longest was for 20 beats with an average heart rate of 108 bpm.  The fastest was for 11 beats with a maximum heart rate of 200 bpm but an average heart rate of 134 bpm.  Review of some of these tracings show that it likely is an atrial tachycardia  There were 3 triggered events all 3 of these strips do have PAC's seen.  1 of them had a very slower run of supraventricular tachycardia average heart rate of 113 bpm.  There were 3 diary entries with the symptom of skipped irregular beats.  Review of the associated strips show all had sinus rhythm to them had supraventricular beats.    US carotids on 2/21/22:  No ultrasound evidence of hemo-dynamically significant stenosis.   Small volume of calcified plaque again seen in the left internal  carotid artery.          ICD-10-CM    1. Mitral regurgitation  I34.0 Echocardiogram  Complete   2. Aortic valve insufficiency  I35.1 Echocardiogram Complete   3. Mixed hyperlipidemia  E78.2    4. Hypertriglyceridemia  E78.1    5. Tricuspid valve insufficiency  I36.1 Echocardiogram Complete   6. PVC's (premature ventricular contractions)  I49.3    7. Palpitations  R00.2    8. Heart murmur  R01.1 Echocardiogram Complete   9. Diastolic dysfunction grade 1 on 2/21/19  I51.89 Echocardiogram Complete   10. Bilateral carotid artery stenosis at less than 50% on 12/1/16  I65.23    11. Atrial ectopy  I49.1    12. Other chest pain  R07.89    13. COPD, mild on 9/9/14  J44.9    14. Morbid obesity (H)  E66.01    15. PSVT (paroxysmal supraventricular tachycardia) (H)  I47.1    16. Hiatal hernia  K44.9    17. Stage 3a chronic kidney disease (H)  N18.31    18. COVID-19 virus infection on 10/30/20  U07.1    19. 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    Anterior Segment V 4 cm nodule abutting liver surface by US 1/4/2015      Obesity, Class II, BMI 35-39.9 1/4/2015    BMI 35.9 with comorbidities = MORBID obesity     Osteoarthritis of right hip 10/07/2004     Osteoarthrosis 06/14/2012     Otitis externa 10/04/2011     Prediabetes 08/01/2011       Past Surgical History:   Procedure Laterality Date     ARTHROPLASTY KNEE Left 9/9/2019    Procedure: LEFT TOTAL KNEE ARTHROPLASTY S/N;  Surgeon: Trevor Alonzo MD;  Location: [facility] OR     ARTHROSCOPY KNEE Left  3/21/2019    Procedure: LEFT  KNEE ARTHROSCOPY, partial medial menisectomy;  Surgeon: Esteban Wills MD;  Location: [facility] OR     CLOSED REDUCTION WRIST Right 1952 x 2    long arm cast (same time as elbow fx)     CLOSED RX ELBOW DISLOCATION Right 1952    CR/long cast of fracture     EXCISE MASS FOOT Left 8/16/2021    Procedure: LEFT ANKLE MASS EXCISION;  Surgeon: Trevor Alonzo MD;  Location: [facility] OR     HC INJ EPIDURAL LUMBAR/SACRAL W/WO CONTRAST Bilateral 5/2013    facet injections; prev 2012     LAPAROSCOPIC CHOLECYSTECTOMY N/A 1/4/2015    Procedure: LAPAROSCOPIC CHOLECYSTECTOMY;  Surgeon: Brittney العلي MD;  Location: [facility] OR     TONSILLECTOMY       ZZC TOTAL KNEE ARTHROPLASTY Left 09/09/2019    Dr Alonzo       Allergies   Allergen Reactions     Chocolate Hives     Lemon Flavor Hives     Lime [Calcium Oxysulfide] Hives     Metal [Staples]      Orange Fruit [Citrus] Hives     Strawberry Hives     Adhesive Tape      Band-aids     Codeine Hives     Patient can tolerate oxycodone & dilaudid     Decadron [Dexamethasone] Hives     Erythromycin Hives     ERYTHROMYCIN BASE     Grapefruit [Extra Strength Grapefruit]      GRAPEFRUIT     Morphine Hives     Pt. Reports had hives     Penicillins Hives     Ranitidine GI Disturbance     Sulfa Drugs      SULFONAMIDE ANTIBIOTICS      Tomato      Xyzal [Levocetirizine] Hives       Current Outpatient Medications   Medication Sig Dispense Refill     Calcium-Magnesium-Vitamin D (CALCIUM 1200+D3 PO) Take by mouth daily       clonazePAM (KLONOPIN) 1 MG tablet TAKE ONE-FOURTH TO ONE-HALF TABLET BY MOUTH EVERY 8 HOURS AS NEEDED 25 tablet 0     FISH OIL Take 1 capsule by mouth daily        ipratropium - albuterol 0.5 mg/2.5 mg/3 mL (DUONEB) 0.5-2.5 (3) MG/3ML neb solution Take 1 vial (3 mLs) by nebulization every 6 hours as needed for shortness of breath / dyspnea or wheezing 1 Box 1     PARoxetine (PAXIL) 40 MG tablet Take 1 tablet by mouth once daily 90 tablet 0     simvastatin  (ZOCOR) 40 MG tablet Take 1 tablet (40 mg) by mouth At Bedtime 90 tablet 1     VITAMIN D, CHOLECALCIFEROL, PO Take 2,000 Units by mouth daily         Social History     Socioeconomic History     Marital status: Single     Spouse name: Not on file     Number of children: 4     Years of education: 12     Highest education level: Not on file   Occupational History     Occupation: personal care attendant   Tobacco Use     Smoking status: Never Smoker     Smokeless tobacco: Never Used   Vaping Use     Vaping Use: Never used   Substance and Sexual Activity     Alcohol use: No     Drug use: No     Sexual activity: Never   Other Topics Concern      Service Not Asked     Blood Transfusions Yes     Caffeine Concern Yes     Comment: >32 oz soda/day     Occupational Exposure Not Asked     Hobby Hazards Not Asked     Sleep Concern Yes     Comment: insomnia     Stress Concern Not Asked     Weight Concern Not Asked     Special Diet Not Asked     Back Care Not Asked     Exercise Not Asked     Bike Helmet Not Asked     Seat Belt Not Asked     Self-Exams Not Asked     Parent/sibling w/ CABG, MI or angioplasty before 65F 55M? No   Social History Narrative     Not on file     Social Determinants of Health     Financial Resource Strain: Not on file   Food Insecurity: Not on file   Transportation Needs: Not on file   Physical Activity: Not on file   Stress: Not on file   Social Connections: Not on file   Intimate Partner Violence: Not on file   Housing Stability: Not on file       LAB RESULTS:   Orders Only on 02/10/2021   Component Date Value Ref Range Status     Sodium 02/10/2021 139  133 - 144 mmol/L Final     Potassium 02/10/2021 4.0  3.4 - 5.3 mmol/L Final     Chloride 02/10/2021 108  94 - 109 mmol/L Final     Carbon Dioxide 02/10/2021 30  20 - 32 mmol/L Final     Anion Gap 02/10/2021 1* 3 - 14 mmol/L Final     Glucose 02/10/2021 104* 70 - 99 mg/dL Final     Urea Nitrogen 02/10/2021 15  7 - 30 mg/dL Final     Creatinine  "02/10/2021 1.00  0.52 - 1.04 mg/dL Final     GFR Estimate 02/10/2021 54* >60 mL/min/[1.73_m2] Final     GFR Estimate If Black 02/10/2021 62  >60 mL/min/[1.73_m2] Final     Calcium 02/10/2021 9.5  8.5 - 10.1 mg/dL Final     Hemoglobin A1C 02/10/2021 5.0  0 - 5.6 % Final     ALT 02/10/2021 27  0 - 50 U/L Final     AST 02/10/2021 20  0 - 45 U/L Final     Cholesterol 02/10/2021 177  <200 mg/dL Final     Triglycerides 02/10/2021 166* <150 mg/dL Final     HDL Cholesterol 02/10/2021 53  >49 mg/dL Final     LDL Cholesterol Calculated 02/10/2021 91  <100 mg/dL Final     Non HDL Cholesterol 02/10/2021 124  <130 mg/dL Final     Estimated Average Glucose 02/10/2021 97  mg/dL Final        Review of systems: Negative except that which was noted in the HPI.    Physical examination:  /79 (BP Location: Left arm, Patient Position: Chair, Cuff Size: Adult Large)   Pulse 71   Temp 98  F (36.7  C) (Tympanic)   Ht 1.651 m (5' 5\")   Wt 98.9 kg (218 lb)   SpO2 96%   BMI 36.28 kg/m      GENERAL APPEARANCE: healthy, alert and no distress  HEENT: no icterus, no xanthelasmas, normal pupil size and reaction, no cyanosis.  NECK: no adenopathy, no asymmetry, masses.  CHEST: lungs clear to auscultation - no rales, rhonchi or wheezes, no use of accessory muscles, no retractions, respirations are unlabored, normal respiratory rate  CARDIOVASCULAR: regular rhythm, normal S1 with physiologic split S2, no S3 or S4 and no murmur, click or rub  EXTREMITIES: no clubbing, cyanosis or edema  NEURO: alert and oriented normal speech, and affect  VASC: No vascular bruits heard.  SKIN: no ecchymoses, no rashes        Thank you for allowing me to participate in the care of your patient. Please do not hesitate to contact me if you have any questions.     Ha Huhges, DO          "

## 2022-05-12 ENCOUNTER — NURSE TRIAGE (OUTPATIENT)
Dept: FAMILY MEDICINE | Facility: OTHER | Age: 82
End: 2022-05-12
Payer: COMMERCIAL

## 2022-05-12 NOTE — TELEPHONE ENCOUNTER
"    Reason for Disposition    MODERATE back pain (e.g., interferes with normal activities) and present > 3 days    Answer Assessment - Initial Assessment Questions  1. ONSET: \"When did the pain begin?\"       3 weeks ago  2. LOCATION: \"Where does it hurt?\" (upper, mid or lower back)      Mid and lower back  3. SEVERITY: \"How bad is the pain?\"  (e.g., Scale 1-10; mild, moderate, or severe)    - MILD (1-3): doesn't interfere with normal activities     - MODERATE (4-7): interferes with normal activities or awakens from sleep     - SEVERE (8-10): excruciating pain, unable to do any normal activities       8/10   4. PATTERN: \"Is the pain constant?\" (e.g., yes, no; constant, intermittent)       Comes and goes but mostly constant and hurts to touch by her waistline  5. RADIATION: \"Does the pain shoot into your legs or elsewhere?\"      no  6. CAUSE:  \"What do you think is causing the back pain?\"       Unknown, Dr Hughes told her she has stage 3 kidney disease  7. BACK OVERUSE:  \"Any recent lifting of heavy objects, strenuous work or exercise?\"      She did fell about 3 months ago and she is not sure if that is the cause the pain has progressively gotten worse over the past 3 weeks  8. MEDICATIONS: \"What have you taken so far for the pain?\" (e.g., nothing, acetaminophen, NSAIDS)      Ibuprofen but doesn't want to take that if it's her kidneys.  She has been using Bengay and heating pad  9. NEUROLOGIC SYMPTOMS: \"Do you have any weakness, numbness, or problems with bowel/bladder control?\"      no  10. OTHER SYMPTOMS: \"Do you have any other symptoms?\" (e.g., fever, abdominal pain, burning with urination, blood in urine)        no  11. PREGNANCY: \"Is there any chance you are pregnant?\" (e.g., yes, no; LMP)        no    Protocols used: BACK PAIN-A-[place]      "

## 2022-05-12 NOTE — PROGRESS NOTES
Assessment & Plan     Chronic bilateral low back pain without sciatica  Discussed pain is not coming from her kidneys, which is the information she was concerned about. Declined tizanidine. Pain is tolerable. Reluctant to Rx opioids d/t use of clonazepam.   - Physical Therapy Referral; Future  - if pain is not improved, consideration for SI joint injection (right)  - c/w OTC muscle rubs    See Patient Instructions    Return if symptoms worsen or fail to improve.    Magaly Mobley MD  [facility] - REI Bill is a 81 year old who presents for the following health issues    HPI       Lower Back Pain       Duration: End of February 2022        Specific cause: fall     Description:   Location of pain: below lower back - tailbone area  Character of pain: dull ache  Pain radiation:none  New numbness or weakness in legs, not attributed to pain:  no     Intensity: At its worst 8/10, moderate    History:   Pain interferes with job: Not applicable  History of back problems: no prior back problems  Any previous MRI or X-rays: None  Sees a specialist for back pain:  No  Therapies tried without relief: cold and heat    Alleviating factors:   Improved by: none      Precipitating factors:  Worsened by: Sitting      - on clonazepam  - rare use of norco  - ER visit (3/8/2022) for back pain d/t fall one month prior. Imaging negative    - fell on ice in February 2022  - pain is okay today  - has topical muscle rubs with some relief  - Landy is concerned the pain is coming from her kidneys since she has stage 3 CKD  - discussed location of kidney is more mid back and her pain is most consistent with SI joint pain  - discussed tizanidine, but Landy is concerned to try d/t multiple allergies  - 1/2 norco works well for her pain, but she is out of medication  - follows with OA    Review of Systems   Constitutional: Negative for chills and fever.   HENT: Negative for congestion.    Respiratory:  Negative for shortness of breath.    Cardiovascular: Negative for chest pain and palpitations.   Gastrointestinal: Negative for abdominal pain.   Musculoskeletal: Positive for arthralgias (shoulder pain) and back pain.            Objective    /60   Pulse 73   Temp 98.4  F (36.9  C) (Tympanic)   Resp 18   Wt 99.5 kg (219 lb 6 oz)   SpO2 96%   BMI 36.51 kg/m    Body mass index is 36.51 kg/m .  Physical Exam  Constitutional:       General: She is not in acute distress.     Appearance: She is not ill-appearing.   Cardiovascular:      Rate and Rhythm: Normal rate and regular rhythm.      Heart sounds: No murmur heard.  Pulmonary:      Effort: Pulmonary effort is normal. No respiratory distress.      Breath sounds: No wheezing or rales.   Musculoskeletal:      Comments: Back: No tenderness to palpation over vertebral bodies.  Tenderness over SI joints with right greater than left.  Tenderness over greater trochanter bilaterally.   Neurological:      Mental Status: She is alert.          Xray lumbar (3/8/2022):  FINDINGS: No acute lumbar spine fracture. Diffuse chronic degenerative changes.      No acute proximal femoral fracture. Bilateral hip osteoarthritis.                                                                   IMPRESSION: No acute fracture.

## 2022-05-13 ENCOUNTER — OFFICE VISIT (OUTPATIENT)
Dept: FAMILY MEDICINE | Facility: OTHER | Age: 82
End: 2022-05-13
Attending: FAMILY MEDICINE
Payer: COMMERCIAL

## 2022-05-13 VITALS
RESPIRATION RATE: 18 BRPM | SYSTOLIC BLOOD PRESSURE: 108 MMHG | BODY MASS INDEX: 36.51 KG/M2 | WEIGHT: 219.38 LBS | OXYGEN SATURATION: 96 % | TEMPERATURE: 98.4 F | DIASTOLIC BLOOD PRESSURE: 60 MMHG | HEART RATE: 73 BPM

## 2022-05-13 DIAGNOSIS — G89.29 CHRONIC BILATERAL LOW BACK PAIN WITHOUT SCIATICA: Primary | ICD-10-CM

## 2022-05-13 DIAGNOSIS — M54.50 CHRONIC BILATERAL LOW BACK PAIN WITHOUT SCIATICA: Primary | ICD-10-CM

## 2022-05-13 PROCEDURE — G0463 HOSPITAL OUTPT CLINIC VISIT: HCPCS

## 2022-05-13 PROCEDURE — 99213 OFFICE O/P EST LOW 20 MIN: CPT | Performed by: FAMILY MEDICINE

## 2022-05-13 PROCEDURE — G0463 HOSPITAL OUTPT CLINIC VISIT: HCPCS | Mod: 25

## 2022-05-13 ASSESSMENT — ENCOUNTER SYMPTOMS
FEVER: 0
SHORTNESS OF BREATH: 0
CHILLS: 0
ARTHRALGIAS: 1
BACK PAIN: 1
PALPITATIONS: 0
ABDOMINAL PAIN: 0

## 2022-05-13 ASSESSMENT — PAIN SCALES - GENERAL: PAINLEVEL: MODERATE PAIN (4)

## 2022-05-13 NOTE — NURSING NOTE
"Chief Complaint   Patient presents with     Pain       Initial /60   Pulse 73   Temp 98.4  F (36.9  C) (Tympanic)   Resp 18   Wt 99.5 kg (219 lb 6 oz)   SpO2 96%   BMI 36.51 kg/m   Estimated body mass index is 36.51 kg/m  as calculated from the following:    Height as of 4/25/22: 1.651 m (5' 5\").    Weight as of this encounter: 99.5 kg (219 lb 6 oz).  Medication Reconciliation: complete  Shayla Tee LPN  "

## 2022-05-13 NOTE — PATIENT INSTRUCTIONS
We put in a referral into [facility] for your back pain     If you are interested in tizanidine (muscle relaxer), please give the clinic a call.

## 2022-05-17 ENCOUNTER — HOSPITAL ENCOUNTER (EMERGENCY)
Facility: HOSPITAL | Age: 82
Discharge: HOME | End: 2022-05-17
Attending: EMERGENCY MEDICINE | Admitting: EMERGENCY MEDICINE
Payer: COMMERCIAL

## 2022-05-17 VITALS
HEART RATE: 82 BPM | OXYGEN SATURATION: 96 % | DIASTOLIC BLOOD PRESSURE: 63 MMHG | WEIGHT: 218 LBS | BODY MASS INDEX: 36.28 KG/M2 | RESPIRATION RATE: 16 BRPM | TEMPERATURE: 99 F | SYSTOLIC BLOOD PRESSURE: 131 MMHG

## 2022-05-17 DIAGNOSIS — M54.50 ACUTE BILATERAL LOW BACK PAIN WITHOUT SCIATICA: ICD-10-CM

## 2022-05-17 LAB
ALBUMIN UR-MCNC: 20 MG/DL
APPEARANCE UR: ABNORMAL
BILIRUB UR QL STRIP: NEGATIVE
COLOR UR AUTO: YELLOW
GLUCOSE UR STRIP-MCNC: NEGATIVE MG/DL
HGB UR QL STRIP: NEGATIVE
HYALINE CASTS: 3 /LPF
KETONES UR STRIP-MCNC: NEGATIVE MG/DL
LEUKOCYTE ESTERASE UR QL STRIP: ABNORMAL
MUCOUS THREADS #/AREA URNS LPF: PRESENT /LPF
NITRATE UR QL: NEGATIVE
PH UR STRIP: 5.5 [PH] (ref 4.7–8)
RBC URINE: 3 /HPF
SP GR UR STRIP: 1.03 (ref 1–1.03)
SQUAMOUS EPITHELIAL: 13 /HPF
UROBILINOGEN UR STRIP-MCNC: 2 MG/DL
WBC URINE: 40 /HPF

## 2022-05-17 PROCEDURE — 250N000013 HC RX MED GY IP 250 OP 250 PS 637: Performed by: EMERGENCY MEDICINE

## 2022-05-17 PROCEDURE — 81001 URINALYSIS AUTO W/SCOPE: CPT | Performed by: EMERGENCY MEDICINE

## 2022-05-17 PROCEDURE — 99283 EMERGENCY DEPT VISIT LOW MDM: CPT

## 2022-05-17 PROCEDURE — 99283 EMERGENCY DEPT VISIT LOW MDM: CPT | Performed by: EMERGENCY MEDICINE

## 2022-05-17 RX ORDER — ACETAMINOPHEN 325 MG/1
975 TABLET ORAL ONCE
Status: COMPLETED | OUTPATIENT
Start: 2022-05-17 | End: 2022-05-17

## 2022-05-17 RX ORDER — LIDOCAINE 4 G/G
1 PATCH TOPICAL ONCE
Status: DISCONTINUED | OUTPATIENT
Start: 2022-05-17 | End: 2022-05-17 | Stop reason: HOSPADM

## 2022-05-17 RX ADMIN — Medication 1 PATCH: at 21:33

## 2022-05-17 RX ADMIN — ACETAMINOPHEN 975 MG: 325 TABLET ORAL at 21:32

## 2022-05-18 NOTE — ED TRIAGE NOTES
Pt c/o L/R lower back pain and dysuria, states Kidney Disease Stage 3? Reports fall 3 months ago/ seen by provider/ hx arthritis. To provide urine sample. Alert ambulatory no distress at current.      Triage Assessment     Row Name 05/17/22 2012       Triage Assessment (Adult)    Airway WDL WDL       Respiratory WDL    Respiratory WDL WDL

## 2022-05-18 NOTE — ED PROVIDER NOTES
History     Chief Complaint   Patient presents with     Back Pain     Urinary Frequency     HPI  Dinorah Lim is a 81 year old female who presents with back pain. She fell 2 months ago, had xrays which were negative for fracture. Has had ongoing back pain since then. Pain is diffuse, usually below her waist, sometimes radiates to upper back which is new recently. Also associated with nausea sometimes. No numbness or weakness. Was seen in clinic last week. She has been treating with norco, topical medications, ibuprofen x1. Has not taken tylenol. No loss of bowel or bladder control, no numbness in the groin. Reports she is not having burning with urination, but asked for her urine to be checked because she was concerned about an infectious cause. No fevers. She was also concerned that the pain was caused by her kidneys.  Hs not started physical therapy yet but plans to.    Social: no tobacco, no alcohol  PSH: cholecystectomy, knee replacement    Allergies:  Allergies   Allergen Reactions     Chocolate Hives     Lemon Flavor Hives     Lime [Calcium Oxysulfide] Hives     Metal [Staples]      Orange Fruit [Citrus] Hives     Strawberry Hives     Adhesive Tape      Band-aids     Codeine Hives     Patient can tolerate oxycodone & dilaudid     Decadron [Dexamethasone] Hives     Erythromycin Hives     ERYTHROMYCIN BASE     Grapefruit [Extra Strength Grapefruit]      GRAPEFRUIT     Morphine Hives     Pt. Reports had hives     Penicillins Hives     Ranitidine GI Disturbance     Sulfa Drugs      SULFONAMIDE ANTIBIOTICS      Tomato      Xyzal [Levocetirizine] Hives       Problem List:    Patient Active Problem List    Diagnosis Date Noted     Other chest pain 10/20/2021     Priority: Medium     Hiatal hernia 07/28/2021     Priority: Medium     Chronic, continuous use of opioids 05/17/2021     Priority: Medium     Stage 3a chronic kidney disease (H) 03/22/2021     Priority: Medium     COVID-19 virus infection on 10/30/20  11/24/2020     Priority: Medium     10-       Other neutropenia (H) 08/04/2020     Priority: Medium     Paresthesias 02/13/2020     Priority: Medium     Status post total left knee replacement 09/09/2019     Priority: Medium     Aortic valve insufficiency 03/06/2019     Priority: Medium     Mitral regurgitation 03/06/2019     Priority: Medium     Tricuspid valve insufficiency 03/06/2019     Priority: Medium     Diastolic dysfunction grade 1 on 2/21/19 03/06/2019     Priority: Medium     Hypertriglyceridemia 03/06/2019     Priority: Medium     Palpitations 02/13/2019     Priority: Medium     PVC's (premature ventricular contractions) 02/13/2019     Priority: Medium     Atrial ectopy 02/13/2019     Priority: Medium     PSVT (paroxysmal supraventricular tachycardia) (H) 02/13/2019     Priority: Medium     COPD, mild on 9/9/14 02/13/2019     Priority: Medium     Bilateral carotid artery stenosis at less than 50% on 12/1/16 02/13/2019     Priority: Medium     Mixed hyperlipidemia 02/13/2019     Priority: Medium     On statin therapy 02/13/2019     Priority: Medium     Heart murmur 02/13/2019     Priority: Medium     Morbid obesity (H) 06/01/2017     Priority: Medium     ACP (advance care planning) 04/28/2016     Priority: Medium     Advance Care Planning 4/28/2016: ACP Review of Chart / Resources Provided:  Reviewed chart for advance care plan.  Dinorah Lim has no plan or code status on file. Discussed available resources and provided with information. Confirmed code status reflects current choices pending further ACP discussions.  Confirmed/documented legally designated decision makers.  Added by Aditi Gandhi             Anxiety 06/23/2014     Priority: Medium     Lung density on x-ray 07/23/2013     Priority: Medium     Osteoarthritis 06/14/2012     Priority: Medium     Problem list name updated by automated process. Provider to review       Advanced care planning/counseling discussion 01/13/2012      Priority: Medium     Abnormal glucose 08/01/2011     Priority: Medium     Hgb A1c 5.7 on 1/4/2015  Problem list name updated by automated process. Provider to review       Osteoarthritis of right hip 10/07/2004     Priority: Medium     Urticaria 06/01/2004     Priority: Medium     Problem list name updated by automated process. 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     Obesity, Class II, BMI 35-39.9 1/4/2015     Osteoarthritis of right hip 10/07/2004     Osteoarthrosis 06/14/2012     Otitis externa 10/04/2011     Prediabetes 08/01/2011       Past Surgical History:    Past Surgical History:   Procedure Laterality Date     ARTHROPLASTY KNEE Left 9/9/2019    Procedure: LEFT TOTAL KNEE ARTHROPLASTY S/N;  Surgeon: Trevor Alonzo MD;  [facility]: [facility]     ARTHROSCOPY KNEE Left 3/21/2019    Procedure: LEFT  KNEE ARTHROSCOPY, partial medial menisectomy;  Surgeon: Esteban Wills MD;  [facility]: [facility]     CLOSED REDUCTION WRIST Right 1952 x 2    long arm cast (same time as elbow fx)     CLOSED RX ELBOW DISLOCATION Right 1952    CR/long cast of fracture     EXCISE MASS FOOT Left 8/16/2021    Procedure: LEFT ANKLE MASS EXCISION;  Surgeon: Trevor Alonzo MD;  Location: [facility]     HC INJ EPIDURAL LUMBAR/SACRAL W/WO CONTRAST Bilateral 5/2013    facet injections; prev 2012     LAPAROSCOPIC CHOLECYSTECTOMY N/A 1/4/2015    Procedure: LAPAROSCOPIC CHOLECYSTECTOMY;  Surgeon: Brittney العلي MD;  Location: [facility]     TONSILLECTOMY       Z TOTAL KNEE ARTHROPLASTY Left 09/09/2019      [facility]       Family History:    Family History   Problem Relation Age of Onset     Heart Disease Brother         atrial fibrillation     Atrial fibrillation Brother      Other - See Comments Mother         emphysema     Heart Disease Father 92        CHF     Cancer Paternal Grandfather         lung cancer     Cancer Maternal Uncle         lung cancer     Chronic Obstructive Pulmonary Disease Daughter      Emphysema Daughter      Other - See Comments Daughter         benign breast lesion     Esophagitis Daughter        Social History:  Marital Status:  Single [1]  Social History     Tobacco Use     Smoking status: Never Smoker     Smokeless tobacco: Never Used   Vaping Use     Vaping Use: Never used   Substance Use Topics     Alcohol use: No     Drug use: No        Medications:    Calcium-Magnesium-Vitamin D (CALCIUM 1200+D3 PO)  clonazePAM (KLONOPIN) 1 MG tablet  FISH OIL  ipratropium - albuterol 0.5 mg/2.5 mg/3 mL (DUONEB) 0.5-2.5 (3) MG/3ML neb solution  PARoxetine (PAXIL) 40 MG tablet  simvastatin (ZOCOR) 40 MG tablet  VITAMIN D, CHOLECALCIFEROL, PO          Review of Systems  Please seen HPI for pertinent positives and negatives. All other systems reviewed and found to be negative.   Physical Exam   BP: 150/83  Pulse: 74  Temp: 98.1  F (36.7  C)  Resp: 18  Weight: 98.9 kg (218 lb)  SpO2: 96 %      Physical Exam  Constitutional:       General: She is not in acute distress.  HENT:      Head: Normocephalic and atraumatic.   Eyes:      Conjunctiva/sclera: Conjunctivae normal.   Cardiovascular:      Rate and Rhythm: Normal rate.   Pulmonary:      Effort: Pulmonary effort is normal.   Abdominal:      Palpations: Abdomen is soft.      Tenderness: There is no abdominal tenderness.   Musculoskeletal:      Right lower leg: No edema.      Left lower leg: No edema.      Comments: 5/5 strength BL lower extremities  No midline spine tenderness  Positive sacral tenderness and mild low R back tenderness/buttock tenderness    Skin:     General: Skin is warm and dry.   Neurological:      Mental Status: She is alert and oriented to person, place, and time.      Comments: Sensation intact to BL lower extremities         ED Course                 Procedures              Critical Care time:  none               Results for orders placed or performed during the hospital encounter of 05/17/22 (from the past 24 hour(s))   UA with Microscopic reflex to Culture    Specimen: Urine, NOS   Result Value Ref Range    Color Urine Yellow Colorless, Straw, Light Yellow, Yellow    Appearance Urine Slightly Cloudy (A) Clear    Glucose Urine Negative Negative mg/dL    Bilirubin Urine Negative Negative    Ketones Urine Negative Negative mg/dL    Specific Gravity Urine 1.032 1.003 - 1.035    Blood Urine Negative Negative    pH Urine 5.5 4.7 - 8.0    Protein Albumin Urine 20  (A) Negative mg/dL    Urobilinogen Urine 2.0 Normal, 2.0 mg/dL    Nitrite Urine Negative Negative    Leukocyte Esterase Urine Large (A) Negative    Mucus Urine Present (A) None Seen /LPF    RBC Urine 3 (H) <=2 /HPF    WBC Urine 40 (H) <=5 /HPF    Squamous Epithelials Urine 13 (H) <=1 /HPF    Hyaline Casts Urine 3 (H) <=2 /LPF       Medications   acetaminophen (TYLENOL) tablet 975 mg (has no administration in time range)   Lidocaine (LIDOCARE) 4 % Patch 1 patch (has no administration in time range)       Assessments & Plan (with Medical Decision Making)     I have reviewed the nursing notes.    I have reviewed the findings, diagnosis, plan and need for follow up with the patient.   Ms Lim is a 81-year-old woman who presents with low back pain for 2.5 months since having a fall.  She became concerned because her back pain recently started radiating upward and she also occasionally has nausea.  She wanted to be evaluated for urinary cause of pain today.  She has no urinary symptoms, no fever.  Her urine appears contaminated, will send for culture.  I do not feel that this urinalysis  warrants treatment based on her symptoms at this time.  She is tender over her sacrum and right buttock.  Suspect this is musculoskeletal pain.  She has no evidence of cord compression, cauda equina, is bearing weight, has no hip tenderness.  Recommend that she follow-up with orthopedic doctor, take Tylenol instead ibuprofen for pain given her history of chronic kidney disease, use topical medications, and follow-up with physical therapy as planned.  Return precautions detailed in the AVS.  She expressed understanding of instructions    New Prescriptions    No medications on file       Final diagnoses:   Acute bilateral low back pain without sciatica       5/17/2022   [facility]     Manasa Shore MD  05/17/22 9600

## 2022-05-18 NOTE — DISCHARGE INSTRUCTIONS
Tylenol 500-1000 mg every 6 hours as needed for pain.  Do not take more than 4000 mg per day   Avoid NSAIDs like ibuprofen and naproxen due to your history of kidney problems  Ice or heat (protect skin with towel) as needed for pain.  Remove lidocaine patch 12 hours after application (915am)  Do not apply heat over the patch  Wash hands after handling and dispose of out of reach of children  After your skin has been patch free for 12 hours you can apply a new patch.  These are available over-the-counter as 4% lidocaine patches, brand name Salonpas  Remove immediately if you develop tingling particularly of the face, palpitations, skin irritation, or other concerns.   See your orthopedic doctor in 2-3 days.  Follow up on your physical therapy referral.  Return to the emergency department for worsening pain, leg weakness/numbness, loss of bowel/bladder control, fever, or if you have any new or changing symptoms/concerns

## 2022-05-19 DIAGNOSIS — J44.9 COPD, MILD (H): Primary | ICD-10-CM

## 2022-05-19 RX ORDER — IPRATROPIUM BROMIDE AND ALBUTEROL SULFATE 2.5; .5 MG/3ML; MG/3ML
1 SOLUTION RESPIRATORY (INHALATION) EVERY 6 HOURS PRN
Qty: 3 ML | Refills: 1 | Status: SHIPPED | OUTPATIENT
Start: 2022-05-19

## 2022-05-19 NOTE — TELEPHONE ENCOUNTER
Duoneb      Last Written Prescription Date:  3-  Last Fill Quantity: 1 Box 3 ml ,   # refills: 1   Last Office Visit: 5-  Future Office visit:    Next 5 appointments (look out 90 days)    Jun 22, 2022 10:00 AM  (Arrive by 9:45 AM)  SHORT with Yanique Mar MD  [facility] [place] ([facility] [place] ) [address]  [place] [address]  [phone]

## 2022-05-29 ENCOUNTER — HOSPITAL ENCOUNTER (EMERGENCY)
Facility: HOSPITAL | Age: 82
Discharge: HOME | End: 2022-05-29
Attending: PHYSICIAN ASSISTANT | Admitting: PHYSICIAN ASSISTANT
Payer: COMMERCIAL

## 2022-05-29 VITALS
DIASTOLIC BLOOD PRESSURE: 89 MMHG | HEART RATE: 78 BPM | SYSTOLIC BLOOD PRESSURE: 147 MMHG | RESPIRATION RATE: 16 BRPM | OXYGEN SATURATION: 96 % | TEMPERATURE: 98.6 F

## 2022-05-29 DIAGNOSIS — J06.9 UPPER RESPIRATORY TRACT INFECTION, UNSPECIFIED TYPE: ICD-10-CM

## 2022-05-29 DIAGNOSIS — J06.9 URI (UPPER RESPIRATORY INFECTION): ICD-10-CM

## 2022-05-29 LAB — GROUP A STREP BY PCR: NOT DETECTED

## 2022-05-29 PROCEDURE — G0463 HOSPITAL OUTPT CLINIC VISIT: HCPCS

## 2022-05-29 PROCEDURE — 87651 STREP A DNA AMP PROBE: CPT | Performed by: PHYSICIAN ASSISTANT

## 2022-05-29 PROCEDURE — 99213 OFFICE O/P EST LOW 20 MIN: CPT | Performed by: PHYSICIAN ASSISTANT

## 2022-05-29 ASSESSMENT — ENCOUNTER SYMPTOMS
SORE THROAT: 1
RHINORRHEA: 1
COUGH: 1

## 2022-05-29 NOTE — ED TRIAGE NOTES
Patient presents to urgent care for sore throat and cough since yesterday.  Patient took COVID home test which was negative.  No other symptoms.

## 2022-05-29 NOTE — ED PROVIDER NOTES
History     Chief Complaint   Patient presents with     Pharyngitis     HPI  Dinorah Lim is a 81 year old female who presents to urgent care for evaluation of cold symptoms.  Patient developed cough, sore throat, runny nose, which started yesterday.  Patient denies any shortness of breath, nausea, vomiting, diarrhea, myalgias, otalgia, or any other associated symptoms.  Patient took an at-home COVID test which was negative.    Allergies:  Allergies   Allergen Reactions     Chocolate Hives     Lemon Flavor Hives     Lime [Calcium Oxysulfide] Hives     Metal [Staples]      Orange Fruit [Citrus] Hives     Strawberry Hives     Adhesive Tape      Band-aids     Codeine Hives     Patient can tolerate oxycodone & dilaudid     Decadron [Dexamethasone] Hives     Erythromycin Hives     ERYTHROMYCIN BASE     Grapefruit [Extra Strength Grapefruit]      GRAPEFRUIT     Morphine Hives     Pt. Reports had hives     Penicillins Hives     Ranitidine GI Disturbance     Sulfa Drugs      SULFONAMIDE ANTIBIOTICS      Tomato      Xyzal [Levocetirizine] Hives       Problem List:    Patient Active Problem List    Diagnosis Date Noted     Other chest pain 10/20/2021     Priority: Medium     Hiatal hernia 07/28/2021     Priority: Medium     Chronic, continuous use of opioids 05/17/2021     Priority: Medium     Stage 3a chronic kidney disease (H) 03/22/2021     Priority: Medium     COVID-19 virus infection on 10/30/20 11/24/2020     Priority: Medium     10-       Other neutropenia (H) 08/04/2020     Priority: Medium     Paresthesias 02/13/2020     Priority: Medium     Status post total left knee replacement 09/09/2019     Priority: Medium     Aortic valve insufficiency 03/06/2019     Priority: Medium     Mitral regurgitation 03/06/2019     Priority: Medium     Tricuspid valve insufficiency 03/06/2019     Priority: Medium     Diastolic dysfunction grade 1 on 2/21/19 03/06/2019     Priority: Medium     Hypertriglyceridemia 03/06/2019      Priority: Medium     Palpitations 02/13/2019     Priority: Medium     PVC's (premature ventricular contractions) 02/13/2019     Priority: Medium     Atrial ectopy 02/13/2019     Priority: Medium     PSVT (paroxysmal supraventricular tachycardia) (H) 02/13/2019     Priority: Medium     COPD, mild on 9/9/14 02/13/2019     Priority: Medium     Bilateral carotid artery stenosis at less than 50% on 12/1/16 02/13/2019     Priority: Medium     Mixed hyperlipidemia 02/13/2019     Priority: Medium     On statin therapy 02/13/2019     Priority: Medium     Heart murmur 02/13/2019     Priority: Medium     Morbid obesity (H) 06/01/2017     Priority: Medium     ACP (advance care planning) 04/28/2016     Priority: Medium     Advance Care Planning 4/28/2016: ACP Review of Chart / Resources Provided:  Reviewed chart for advance care plan.  Dinorah THOMAS Raphael has no plan or code status on file. Discussed available resources and provided with information. Confirmed code status reflects current choices pending further ACP discussions.  Confirmed/documented legally designated decision makers.  Added by Aditi Gandhi             Anxiety 06/23/2014     Priority: Medium     Lung density on x-ray 07/23/2013     Priority: Medium     Osteoarthritis 06/14/2012     Priority: Medium     Problem list name updated by automated process. Provider to review       Advanced care planning/counseling discussion 01/13/2012     Priority: Medium     Abnormal glucose 08/01/2011     Priority: Medium     Hgb A1c 5.7 on 1/4/2015  Problem list name updated by automated process. Provider to review       Osteoarthritis of right hip 10/07/2004     Priority: Medium     Urticaria 06/01/2004     Priority: Medium     Problem list name updated by automated process. 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     Obesity, Class II, BMI 35-39.9 1/4/2015     Osteoarthritis of right hip 10/07/2004     Osteoarthrosis 06/14/2012     Otitis externa 10/04/2011     Prediabetes 08/01/2011       Past Surgical History:    Past Surgical History:   Procedure Laterality Date     ARTHROPLASTY KNEE Left 9/9/2019    Procedure: LEFT TOTAL KNEE ARTHROPLASTY S/N;  Surgeon: Trevor Alonzo MD;  [facility]     ARTHROSCOPY KNEE Left 3/21/2019    Procedure: LEFT  KNEE ARTHROSCOPY, partial medial menisectomy;  Surgeon: Esteban Wills MD;  [facility]     CLOSED REDUCTION WRIST Right 1952 x 2    long arm cast (same time as elbow fx)     CLOSED RX ELBOW DISLOCATION Right 1952    CR/long cast of fracture     EXCISE MASS FOOT Left 8/16/2021    Procedure: LEFT ANKLE MASS EXCISION;  Surgeon: Trevor Alonzo MD;  [facility]     HC INJ EPIDURAL LUMBAR/SACRAL W/WO CONTRAST Bilateral 5/2013    facet injections; prev 2012     LAPAROSCOPIC CHOLECYSTECTOMY N/A 1/4/2015    Procedure: LAPAROSCOPIC CHOLECYSTECTOMY;  Surgeon: Brittney العلي MD;  [facility]: [facility]     TONSILLECTOMY       ZZC TOTAL KNEE ARTHROPLASTY Left 09/09/2019    Dr Alonzo       Family History:    Family History   Problem Relation Age of Onset     Heart Disease Brother         atrial fibrillation     Atrial fibrillation Brother      Other - See Comments Mother         emphysema     Heart Disease Father 92        CHF     Cancer Paternal Grandfather         lung cancer     Cancer Maternal Uncle         lung cancer     Chronic Obstructive Pulmonary Disease Daughter      Emphysema Daughter      Other - See Comments Daughter         benign breast lesion     Esophagitis Daughter        Social  History:  Marital Status:  Single [1]  Social History     Tobacco Use     Smoking status: Never Smoker     Smokeless tobacco: Never Used   Vaping Use     Vaping Use: Never used   Substance Use Topics     Alcohol use: No     Drug use: No        Medications:    Calcium-Magnesium-Vitamin D (CALCIUM 1200+D3 PO)  clonazePAM (KLONOPIN) 1 MG tablet  FISH OIL  ipratropium - albuterol 0.5 mg/2.5 mg/3 mL (DUONEB) 0.5-2.5 (3) MG/3ML neb solution  PARoxetine (PAXIL) 40 MG tablet  simvastatin (ZOCOR) 40 MG tablet  VITAMIN D, CHOLECALCIFEROL, PO          Review of Systems   HENT: Positive for rhinorrhea and sore throat.    Respiratory: Positive for cough.    All other systems reviewed and are negative.      Physical Exam   BP: 147/89  Pulse: 78  Temp: 98.6  F (37  C)  Resp: 16  SpO2: 96 %      Physical Exam  Vitals and nursing note reviewed.   Constitutional:       Appearance: Normal appearance. She is well-developed. She is not ill-appearing.   HENT:      Right Ear: Tympanic membrane normal.      Left Ear: Tympanic membrane normal.      Nose: No congestion or rhinorrhea.      Mouth/Throat:      Mouth: Mucous membranes are moist. No oral lesions.      Pharynx: No pharyngeal swelling.   Eyes:      Pupils: Pupils are equal, round, and reactive to light.   Cardiovascular:      Rate and Rhythm: Regular rhythm.      Heart sounds: Normal heart sounds.   Pulmonary:      Effort: Pulmonary effort is normal. No respiratory distress.      Breath sounds: Normal breath sounds. No stridor. No wheezing, rhonchi or rales.   Neurological:      Mental Status: She is oriented to person, place, and time.         ED Course                 Procedures             Critical Care time:               No results found for this or any previous visit (from the past 24 hour(s)).    Medications - No data to display    Assessments & Plan (with Medical Decision Making)   #1.  URI    Discussed exam findings with patient.  Patient strep test pending will be  notified of any abnormal result.  She is instructed on appropriate social distancing and supportive cares in the meantime.  If patient would develop any significant shortness of breath she should go to emergency department immediately.  Any additional concerns please return to urgent care or follow-up with primary care provider.  Patient verbalized understanding and agreement of plan.    I have reviewed the nursing notes.    I have reviewed the findings, diagnosis, plan and need for follow up with the patient.    New Prescriptions    No medications on file       Final diagnoses:   URI (upper respiratory infection)       5/29/2022   [facility]     Edward Damon PA-C  05/29/22 4101

## 2022-05-30 DIAGNOSIS — F41.9 ANXIETY: Chronic | ICD-10-CM

## 2022-06-01 ENCOUNTER — NURSE TRIAGE (OUTPATIENT)
Dept: FAMILY MEDICINE | Facility: OTHER | Age: 82
End: 2022-06-01
Payer: COMMERCIAL

## 2022-06-01 RX ORDER — CLONAZEPAM 1 MG/1
TABLET ORAL
Qty: 25 TABLET | Refills: 0 | Status: SHIPPED | OUTPATIENT
Start: 2022-06-01 | End: 2022-07-15

## 2022-06-01 NOTE — TELEPHONE ENCOUNTER
Emergency Department and Urgent Care Follow-up      Reason for ER/UC visit: UC - URI - cold symptoms   o Date seen: 5/29/22      New or Worsening symptoms:  Headache is new.        Prescription Received/Picked up from Pharmacy?: no   o Medications started? NA  o Any questions or issues regarding your prescription?: na      Follow-up Results or Labs that are pending: no       Questions or concerns?: patient concerned with lungs, requesting a CXR       ER Recommends Follow-up by: with PCP for continued or worsening symptoms       RN Recommendations:   o Appointment scheduled:   o Patient requesting an appointment for UC follow up - Dr. Mar PCP out of office.   o Cough continues, patient would like to have a CXR.   o appt scheduled:    Next 5 appointments (look out 90 days)    Jun 03, 2022 11:40 AM  (Arrive by 11:25 AM)  SHORT with RENA Camacho CNP  [facility] [place] ([facility] [place] ) [address] [place] [address]  [phone]   Jun 22, 2022 10:00 AM  (Arrive by 9:45 AM)  SHORT with Yanique Mar MD  [facility] [place] ([facility] [place] ) [address]  [place] [address]  [phone]            If you start feeling worse, or have any further questions, please feel free to contact Nurse Triage at [phone].  If needing immediate medical attention at any time please call 911/Go to the ER.

## 2022-06-01 NOTE — TELEPHONE ENCOUNTER
Clonazepam      Last Written Prescription Date:  4/20/22  Last Fill Quantity: 25,   # refills: 0  Last Office Visit: 5/13/22  Future Office visit:    Next 5 appointments (look out 90 days)    Jun 22, 2022 10:00 AM  (Arrive by 9:45 AM)  SHORT with Yanique Mar MD  [facility] [place] ([facility] [place] ) [address]  [place] [address]  [phone]

## 2022-06-02 NOTE — PROGRESS NOTES
"  Assessment & Plan     Viral URI with cough  Some changes in symptoms.  Updated COVID test today, results pending. Chest XR does not show any pneumonia.  Her labs look like this is viral.  Discussed symptomatic treatment.  An antibiotic will not help with viral illness.  If symptoms worsen or not controlled she should go to the ER.  Follow up in clinic in 1-2 weeks if symptoms continue     - CBC with platelets and differential; Future  - XR CHEST 2 VW (Clinic Performed); Future  - Symptomatic; Yes; 5/27/2022 COVID-19 Virus (Coronavirus) by PCR; Future  - CBC with platelets and differential  - Symptomatic; Yes; 5/27/2022 COVID-19 Virus (Coronavirus) by PCR Nasopharyngeal      I spent a total of 34 minutes on the day of the visit.   Time spent doing chart review, history and exam, documentation and further activities per the note       BMI:   Estimated body mass index is 35.78 kg/m  as calculated from the following:    Height as of 4/25/22: 1.651 m (5' 5\").    Weight as of this encounter: 97.5 kg (215 lb).   Weight management plan: Discussed healthy diet and exercise guidelines    See Patient Instructions    No follow-ups on file.    RENA Don[facility] - REI Bill is a 81 year old who presents for the following health issues  accompanied by her grandson.    HPI     ED/UC Followup:    [place]  [facility] [place] of visit: 5/29/22  Reason for visit: URI - negative home COVID test, negative strep test   Current Status: coughing up a lot of yellow/green phlegm           Review of Systems   CONSTITUTIONAL:fever low grade  INTEGUMENTARY/SKIN: hive   EYES: NEGATIVE for vision changes or irritation  ENT/MOUTH: NEGATIVE for ear, mouth and throat problems  RESP:productive cough worse at night   CV: NEGATIVE for chest pain, palpitations or peripheral edema  GI: NEGATIVE for nausea, abdominal pain, heartburn, or change in bowel habits  : denies dysuria   MUSCULOSKELETAL: back " pain from cough  NEURO: NEGATIVE for weakness, dizziness or paresthesias      Objective    /56   Pulse 98   Temp 99  F (37.2  C) (Tympanic)   Wt 97.5 kg (215 lb)   SpO2 95%   BMI 35.78 kg/m    Body mass index is 35.78 kg/m .  Physical Exam   GENERAL: alert  NECK: no adenopathy, no asymmetry, masses, or scars and thyroid normal to palpation  RESP: decreased breath sounds bibasilar and moist cough   CV: regular rate and rhythm, normal S1 S2, no S3 or S4, no murmur, click or rub, no peripheral edema and peripheral pulses strong  ABDOMEN: soft, nontender, no hepatosplenomegaly, no masses and bowel sounds normal  SKIN: no suspicious lesions or rashes  NEURO: Normal strength and tone, mentation intact and speech normal  PSYCH: mentation appears normal and fatigued    Results for orders placed or performed in visit on 06/03/22   XR CHEST 2 VW (Clinic Performed)     Status: None    Narrative    PROCEDURE: XR CHEST 2 VW 6/3/2022 12:19 PM    HISTORY: Cough    COMPARISONS: 10/19/2021.    TECHNIQUE: 2 views.    FINDINGS: Heart and pulmonary vasculature are normal. No confluent  infiltrate or pleural effusion is seen.    There is small-to-moderate size hiatal hernia. There are surgical  clips in the right upper quadrant. There is a right convex scoliosis.         Impression    IMPRESSION: Hiatal hernia. No acute infiltrate.    NAVID TOLEDO MD         SYSTEM ID:  [id number]   Results for orders placed or performed in visit on 06/03/22   CBC with platelets and differential     Status: None   Result Value Ref Range    WBC Count 4.0 4.0 - 11.0 10e3/uL    RBC Count 4.36 3.80 - 5.20 10e6/uL    Hemoglobin 12.7 11.7 - 15.7 g/dL    Hematocrit 39.9 35.0 - 47.0 %    MCV 92 78 - 100 fL    MCH 29.1 26.5 - 33.0 pg    MCHC 31.8 31.5 - 36.5 g/dL    RDW 13.7 10.0 - 15.0 %    Platelet Count 173 150 - 450 10e3/uL    % Neutrophils 64 %    % Lymphocytes 21 %    % Monocytes 12 %    % Eosinophils 2 %    % Basophils 1 %    % Immature  Granulocytes 0 %    NRBCs per 100 WBC 0 <1 /100    Absolute Neutrophils 2.6 1.6 - 8.3 10e3/uL    Absolute Lymphocytes 0.8 0.8 - 5.3 10e3/uL    Absolute Monocytes 0.5 0.0 - 1.3 10e3/uL    Absolute Eosinophils 0.1 0.0 - 0.7 10e3/uL    Absolute Basophils 0.0 0.0 - 0.2 10e3/uL    Absolute Immature Granulocytes 0.0 <=0.4 10e3/uL    Absolute NRBCs 0.0 10e3/uL   CBC with platelets and differential     Status: None    Narrative    The following orders were created for panel order CBC with platelets and differential.  Procedure                               Abnormality         Status                     ---------                               -----------         ------                     CBC with platelets and d...[[id number]]                      Final result                 Please view results for these tests on the individual orders.

## 2022-06-02 NOTE — TELEPHONE ENCOUNTER
Next 5 appointments (look out 90 days)    Jun 03, 2022 11:40 AM  (Arrive by 11:25 AM)  SHORT with RENA Camacho CNP  [facility] [place] ([facility] [place] [facility] [address]  [place] [address]  [phone]   Jun 22, 2022 10:00 AM  (Arrive by 9:45 AM)  SHORT with Yanique Mar MD  [facility] [place] ([facility] [place] ) [address]  [place] [address]  [phone]

## 2022-06-03 ENCOUNTER — OFFICE VISIT (OUTPATIENT)
Dept: FAMILY MEDICINE | Facility: OTHER | Age: 82
End: 2022-06-03
Attending: NURSE PRACTITIONER
Payer: COMMERCIAL

## 2022-06-03 ENCOUNTER — ANCILLARY PROCEDURE (OUTPATIENT)
Dept: GENERAL RADIOLOGY | Facility: OTHER | Age: 82
End: 2022-06-03
Attending: NURSE PRACTITIONER
Payer: COMMERCIAL

## 2022-06-03 VITALS
TEMPERATURE: 99 F | DIASTOLIC BLOOD PRESSURE: 56 MMHG | OXYGEN SATURATION: 95 % | SYSTOLIC BLOOD PRESSURE: 136 MMHG | HEART RATE: 98 BPM | WEIGHT: 215 LBS | BODY MASS INDEX: 35.78 KG/M2

## 2022-06-03 DIAGNOSIS — J06.9 VIRAL URI WITH COUGH: Primary | ICD-10-CM

## 2022-06-03 DIAGNOSIS — R05.9 COUGH: ICD-10-CM

## 2022-06-03 LAB
BASOPHILS # BLD AUTO: 0 10E3/UL (ref 0–0.2)
BASOPHILS NFR BLD AUTO: 1 %
EOSINOPHIL # BLD AUTO: 0.1 10E3/UL (ref 0–0.7)
EOSINOPHIL NFR BLD AUTO: 2 %
ERYTHROCYTE [DISTWIDTH] IN BLOOD BY AUTOMATED COUNT: 13.7 % (ref 10–15)
HCT VFR BLD AUTO: 39.9 % (ref 35–47)
HGB BLD-MCNC: 12.7 G/DL (ref 11.7–15.7)
IMM GRANULOCYTES # BLD: 0 10E3/UL
IMM GRANULOCYTES NFR BLD: 0 %
LYMPHOCYTES # BLD AUTO: 0.8 10E3/UL (ref 0.8–5.3)
LYMPHOCYTES NFR BLD AUTO: 21 %
MCH RBC QN AUTO: 29.1 PG (ref 26.5–33)
MCHC RBC AUTO-ENTMCNC: 31.8 G/DL (ref 31.5–36.5)
MCV RBC AUTO: 92 FL (ref 78–100)
MONOCYTES # BLD AUTO: 0.5 10E3/UL (ref 0–1.3)
MONOCYTES NFR BLD AUTO: 12 %
NEUTROPHILS # BLD AUTO: 2.6 10E3/UL (ref 1.6–8.3)
NEUTROPHILS NFR BLD AUTO: 64 %
NRBC # BLD AUTO: 0 10E3/UL
NRBC BLD AUTO-RTO: 0 /100
PLATELET # BLD AUTO: 173 10E3/UL (ref 150–450)
RBC # BLD AUTO: 4.36 10E6/UL (ref 3.8–5.2)
WBC # BLD AUTO: 4 10E3/UL (ref 4–11)

## 2022-06-03 PROCEDURE — 71046 X-RAY EXAM CHEST 2 VIEWS: CPT | Mod: TC

## 2022-06-03 PROCEDURE — G0463 HOSPITAL OUTPT CLINIC VISIT: HCPCS | Mod: 25

## 2022-06-03 PROCEDURE — U0003 INFECTIOUS AGENT DETECTION BY NUCLEIC ACID (DNA OR RNA); SEVERE ACUTE RESPIRATORY SYNDROME CORONAVIRUS 2 (SARS-COV-2) (CORONAVIRUS DISEASE [COVID-19]), AMPLIFIED PROBE TECHNIQUE, MAKING USE OF HIGH THROUGHPUT TECHNOLOGIES AS DESCRIBED BY CMS-2020-01-R: HCPCS | Mod: ZL | Performed by: NURSE PRACTITIONER

## 2022-06-03 PROCEDURE — 85025 COMPLETE CBC W/AUTO DIFF WBC: CPT | Mod: ZL | Performed by: NURSE PRACTITIONER

## 2022-06-03 PROCEDURE — 36415 COLL VENOUS BLD VENIPUNCTURE: CPT | Mod: ZL | Performed by: NURSE PRACTITIONER

## 2022-06-03 PROCEDURE — 99214 OFFICE O/P EST MOD 30 MIN: CPT | Mod: CS | Performed by: NURSE PRACTITIONER

## 2022-06-03 RX ORDER — BENZONATATE 100 MG/1
100 CAPSULE ORAL 3 TIMES DAILY PRN
Qty: 20 CAPSULE | Refills: 0 | Status: CANCELLED | OUTPATIENT
Start: 2022-06-03

## 2022-06-03 RX ORDER — ACETAMINOPHEN 325 MG/1
325-650 TABLET ORAL EVERY 6 HOURS PRN
COMMUNITY

## 2022-06-03 ASSESSMENT — PAIN SCALES - GENERAL: PAINLEVEL: NO PAIN (0)

## 2022-06-03 NOTE — NURSING NOTE
"Chief Complaint   Patient presents with     ER F/U       Initial /56   Pulse 98   Temp 99  F (37.2  C) (Tympanic)   Wt 97.5 kg (215 lb)   SpO2 95%   BMI 35.78 kg/m   Estimated body mass index is 35.78 kg/m  as calculated from the following:    Height as of 4/25/22: 1.651 m (5' 5\").    Weight as of this encounter: 97.5 kg (215 lb).  Medication Reconciliation: complete  Raul Guerrero LPN  "

## 2022-06-04 ENCOUNTER — HOSPITAL ENCOUNTER (EMERGENCY)
Facility: HOSPITAL | Age: 82
Discharge: HOME | End: 2022-06-04
Attending: PHYSICIAN ASSISTANT | Admitting: PHYSICIAN ASSISTANT
Payer: COMMERCIAL

## 2022-06-04 VITALS
SYSTOLIC BLOOD PRESSURE: 133 MMHG | DIASTOLIC BLOOD PRESSURE: 77 MMHG | OXYGEN SATURATION: 95 % | RESPIRATION RATE: 16 BRPM | HEART RATE: 84 BPM | TEMPERATURE: 99.1 F

## 2022-06-04 DIAGNOSIS — L50.9 HIVES: ICD-10-CM

## 2022-06-04 LAB — SARS-COV-2 RNA RESP QL NAA+PROBE: NEGATIVE

## 2022-06-04 PROCEDURE — 96372 THER/PROPH/DIAG INJ SC/IM: CPT | Performed by: PHYSICIAN ASSISTANT

## 2022-06-04 PROCEDURE — G0463 HOSPITAL OUTPT CLINIC VISIT: HCPCS | Mod: 25

## 2022-06-04 PROCEDURE — 99214 OFFICE O/P EST MOD 30 MIN: CPT | Performed by: PHYSICIAN ASSISTANT

## 2022-06-04 PROCEDURE — 250N000011 HC RX IP 250 OP 636: Performed by: PHYSICIAN ASSISTANT

## 2022-06-04 RX ORDER — DEXAMETHASONE SODIUM PHOSPHATE 10 MG/ML
10 INJECTION, SOLUTION INTRAMUSCULAR; INTRAVENOUS ONCE
Status: COMPLETED | OUTPATIENT
Start: 2022-06-04 | End: 2022-06-04

## 2022-06-04 RX ADMIN — DEXAMETHASONE SODIUM PHOSPHATE 10 MG: 10 INJECTION, SOLUTION INTRAMUSCULAR; INTRAVENOUS at 16:25

## 2022-06-04 NOTE — ED PROVIDER NOTES
History     Chief Complaint   Patient presents with     Hives     HPI  Dinorah Lim is a 81 year old female who presents to urgent care with complaint of hives.  Patient states that she has had these intermittently over her entire life and that a Decadron injection works the best for her.  She denies any new hygiene products, new environments.  She states she did try some Benadryl last night which did not help.  She denies any difficulty breathing, difficulty swallowing, cold symptoms, fevers or any other associated symptoms.    Allergies:  Allergies   Allergen Reactions     Chocolate Hives     Lemon Flavor Hives     Lime [Calcium Oxysulfide] Hives     Metal [Staples]      Orange Fruit [Citrus] Hives     Strawberry Hives     Adhesive Tape      Band-aids     Codeine Hives     Patient can tolerate oxycodone & dilaudid     Erythromycin Hives     ERYTHROMYCIN BASE     Food      Tomato, grapefruit, oranges.     Grapefruit [Extra Strength Grapefruit]      GRAPEFRUIT     Morphine Hives     Pt. Reports had hives     Penicillins Hives     Ranitidine GI Disturbance     Sulfa Drugs      SULFONAMIDE ANTIBIOTICS      Tomato      Xyzal [Levocetirizine] Hives       Problem List:    Patient Active Problem List    Diagnosis Date Noted     Other chest pain 10/20/2021     Priority: Medium     Hiatal hernia 07/28/2021     Priority: Medium     Chronic, continuous use of opioids 05/17/2021     Priority: Medium     Stage 3a chronic kidney disease (H) 03/22/2021     Priority: Medium     COVID-19 virus infection on 10/30/20 11/24/2020     Priority: Medium     10-       Other neutropenia (H) 08/04/2020     Priority: Medium     Paresthesias 02/13/2020     Priority: Medium     Status post total left knee replacement 09/09/2019     Priority: Medium     Aortic valve insufficiency 03/06/2019     Priority: Medium     Mitral regurgitation 03/06/2019     Priority: Medium     Tricuspid valve insufficiency 03/06/2019     Priority: Medium      Diastolic dysfunction grade 1 on 2/21/19 03/06/2019     Priority: Medium     Hypertriglyceridemia 03/06/2019     Priority: Medium     Palpitations 02/13/2019     Priority: Medium     PVC's (premature ventricular contractions) 02/13/2019     Priority: Medium     Atrial ectopy 02/13/2019     Priority: Medium     PSVT (paroxysmal supraventricular tachycardia) (H) 02/13/2019     Priority: Medium     COPD, mild on 9/9/14 02/13/2019     Priority: Medium     Bilateral carotid artery stenosis at less than 50% on 12/1/16 02/13/2019     Priority: Medium     Mixed hyperlipidemia 02/13/2019     Priority: Medium     On statin therapy 02/13/2019     Priority: Medium     Heart murmur 02/13/2019     Priority: Medium     Morbid obesity (H) 06/01/2017     Priority: Medium     ACP (advance care planning) 04/28/2016     Priority: Medium     Advance Care Planning 4/28/2016: ACP Review of Chart / Resources Provided:  Reviewed chart for advance care plan.  Dinorah Lim has no plan or code status on file. Discussed available resources and provided with information. Confirmed code status reflects current choices pending further ACP discussions.  Confirmed/documented legally designated decision makers.  Added by Aditi Gandhi             Anxiety 06/23/2014     Priority: Medium     Lung density on x-ray 07/23/2013     Priority: Medium     Osteoarthritis 06/14/2012     Priority: Medium     Problem list name updated by automated process. Provider to review       Advanced care planning/counseling discussion 01/13/2012     Priority: Medium     Abnormal glucose 08/01/2011     Priority: Medium     Hgb A1c 5.7 on 1/4/2015  Problem list name updated by automated process. Provider to review       Osteoarthritis of right hip 10/07/2004     Priority: Medium     Urticaria 06/01/2004     Priority: Medium     Problem list name updated by automated process. 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     Obesity, Class II, BMI 35-39.9 1/4/2015     Osteoarthritis of right hip 10/07/2004     Osteoarthrosis 06/14/2012     Otitis externa 10/04/2011     Prediabetes 08/01/2011       Past Surgical History:    Past Surgical History:   Procedure Laterality Date     ARTHROPLASTY KNEE Left 9/9/2019    Procedure: LEFT TOTAL KNEE ARTHROPLASTY S/N;  Surgeon: Trevor Alonzo MD;  Location: [facility] OR     ARTHROSCOPY KNEE Left 3/21/2019    Procedure: LEFT  KNEE ARTHROSCOPY, partial medial menisectomy;  Surgeon: Esteban Wills MD;  Location: [facility] OR     CLOSED REDUCTION WRIST Right 1952 x 2    long arm cast (same time as elbow fx)     CLOSED RX ELBOW DISLOCATION Right 1952    CR/long cast of fracture     EXCISE MASS FOOT Left 8/16/2021    Procedure: LEFT ANKLE MASS EXCISION;  Surgeon: Trevor Alonzo MD;  Location: [facility] OR     HC INJ EPIDURAL LUMBAR/SACRAL W/WO CONTRAST Bilateral 5/2013    facet injections; prev 2012     LAPAROSCOPIC CHOLECYSTECTOMY N/A 1/4/2015    Procedure: LAPAROSCOPIC CHOLECYSTECTOMY;  Surgeon: Brittney العلي MD;  Location: [facility] OR     TONSILLECTOMY       ZZC TOTAL KNEE ARTHROPLASTY Left 09/09/2019    Dr Alonzo       Family History:    Family History   Problem Relation Age of Onset     Heart Disease Brother         atrial fibrillation     Atrial fibrillation Brother      Other - See Comments Mother         emphysema     Heart Disease Father 92        CHF     Cancer Paternal Grandfather         lung cancer     Cancer Maternal Uncle         lung cancer     Chronic Obstructive Pulmonary Disease Daughter      Emphysema  Daughter      Other - See Comments Daughter         benign breast lesion     Esophagitis Daughter        Social History:  Marital Status:  Single [1]  Social History     Tobacco Use     Smoking status: Never Smoker     Smokeless tobacco: Never Used   Vaping Use     Vaping Use: Never used   Substance Use Topics     Alcohol use: No     Drug use: No        Medications:    acetaminophen (TYLENOL) 325 MG tablet  Calcium-Magnesium-Vitamin D (CALCIUM 1200+D3 PO)  clonazePAM (KLONOPIN) 1 MG tablet  FISH OIL  ipratropium - albuterol 0.5 mg/2.5 mg/3 mL (DUONEB) 0.5-2.5 (3) MG/3ML neb solution  PARoxetine (PAXIL) 40 MG tablet  simvastatin (ZOCOR) 40 MG tablet  VITAMIN D, CHOLECALCIFEROL, PO          Review of Systems   Skin: Positive for rash.   All other systems reviewed and are negative.      Physical Exam   BP: 133/77  Pulse: 84  Temp: 99.1  F (37.3  C)  Resp: 16  SpO2: 95 %      Physical Exam  Vitals and nursing note reviewed.   Constitutional:       General: She is not in acute distress.     Appearance: She is not ill-appearing or toxic-appearing.   Cardiovascular:      Rate and Rhythm: Regular rhythm.      Heart sounds: Normal heart sounds.   Pulmonary:      Effort: Pulmonary effort is normal.      Breath sounds: Normal breath sounds.   Skin:     Comments: Patient has circular erythematous hives present on legs bilaterally, anterior chest and anterior aspect of arms bilaterally.   Neurological:      Mental Status: She is alert and oriented to person, place, and time.         ED Course                 Procedures             Critical Care time:               No results found for this or any previous visit (from the past 24 hour(s)).    Medications   dexamethasone PF (DECADRON) injection 10 mg (has no administration in time range)       Assessments & Plan (with Medical Decision Making)   #1.  Hives    Discussed exam findings with patient.  Patient was given Decadron 10 mg IM today in urgent care for her hives.  She is to  continue to monitor symptoms and return to urgent care with any additional concerns.  Patient verbalized understanding and agreement of plan.    I have reviewed the nursing notes.    I have reviewed the findings, diagnosis, plan and need for follow up with the patient.    New Prescriptions    No medications on file       Final diagnoses:   Hives       6/4/2022   [facility]     Edward Damon PA-C  06/04/22 [id number]

## 2022-06-04 NOTE — ED TRIAGE NOTES
On the back side of her thighs up her back and on to her chest. Has taken Benadryl but it has not helped.

## 2022-06-06 ENCOUNTER — TELEPHONE (OUTPATIENT)
Dept: FAMILY MEDICINE | Facility: OTHER | Age: 82
End: 2022-06-06
Payer: COMMERCIAL

## 2022-06-10 DIAGNOSIS — F41.9 ANXIETY: ICD-10-CM

## 2022-06-13 RX ORDER — PAROXETINE 40 MG/1
TABLET, FILM COATED ORAL
Qty: 90 TABLET | Refills: 1 | Status: SHIPPED | OUTPATIENT
Start: 2022-06-13 | End: 2023-01-03

## 2022-06-21 NOTE — PROGRESS NOTES
Assessment & Plan     Morbid obesity (H)  Patient requesting routine labs  - Comprehensive metabolic panel; Future  - Hemoglobin A1c; Future  - Lipid Panel with Glucose POCT, Enter/Edit Result  - Hemoglobin A1c  - Comprehensive metabolic panel    Stage 3a chronic kidney disease (H)  Patient concerned about her kidneys.  She is voiding normally.  She is hydrating well  Recent test shows some microalbuminuria but this is relatively small amount  Will repeat studies  - CBC with platelets and differential; Future  - Albumin Random Urine Quantitative with Creat Ratio; Future  - Albumin Random Urine Quantitative with Creat Ratio  - CBC with platelets and differential    Impaired glucose tolerance (oral)   Will recheck HA1c, normal 7 months ago  Doubt that patient is diabetic  - Hemoglobin A1c; Future  - Hemoglobin A1c    No follow-ups on file.    Yanique Mar MD  [facility] - REI Bill is a 81 year old presenting for the following health issues:  Follow Up      HPI     Concern - Wants Blood Count Checked/CKD    Description: On April 25th patient noticed that AVS stated patient has Chronic Kidney Disease. Patient doesn't recall every being tested for this. Patient would also like to have a complete blood count completed due to mitral regurgitation. Patient reports that her pain is usually controlled. Went to chiropractor yesterday and is experiencing discomfort in lower back.          Review of Systems   Constitutional, HEENT, cardiovascular, pulmonary, gi and gu systems are negative, except as otherwise noted.      Objective    /66   Pulse 74   Resp 18   Wt 97.1 kg (214 lb)   SpO2 97%   BMI 35.61 kg/m    Body mass index is 35.61 kg/m .  Physical Exam   GENERAL: healthy, alert and no distress  EYES: Eyes grossly normal to inspection, PERRL and conjunctivae and sclerae normal  HENT: ear canals and TM's normal, nose and mouth without ulcers or lesions  NECK: no adenopathy,  no asymmetry, masses, or scars and thyroid normal to palpation  RESP: lungs clear to auscultation - no rales, rhonchi or wheezes  CV: regular rate and rhythm, normal S1 S2, no S3 or S4, no murmur, click or rub, no peripheral edema and peripheral pulses strong  ABDOMEN: soft, nontender, no hepatosplenomegaly, no masses and bowel sounds normal  MS: no gross musculoskeletal defects noted, no edema  SKIN: no suspicious lesions or rashes  NEURO: Normal strength and tone, mentation intact and speech normal  PSYCH: mentation appears normal, affect normal/bright

## 2022-06-22 ENCOUNTER — OFFICE VISIT (OUTPATIENT)
Dept: FAMILY MEDICINE | Facility: OTHER | Age: 82
End: 2022-06-22
Attending: STUDENT IN AN ORGANIZED HEALTH CARE EDUCATION/TRAINING PROGRAM
Payer: COMMERCIAL

## 2022-06-22 VITALS
HEART RATE: 74 BPM | BODY MASS INDEX: 35.61 KG/M2 | RESPIRATION RATE: 18 BRPM | OXYGEN SATURATION: 97 % | WEIGHT: 214 LBS | SYSTOLIC BLOOD PRESSURE: 128 MMHG | DIASTOLIC BLOOD PRESSURE: 66 MMHG

## 2022-06-22 DIAGNOSIS — E66.01 MORBID OBESITY (H): Primary | ICD-10-CM

## 2022-06-22 DIAGNOSIS — R73.02 IMPAIRED GLUCOSE TOLERANCE (ORAL): ICD-10-CM

## 2022-06-22 DIAGNOSIS — N18.31 STAGE 3A CHRONIC KIDNEY DISEASE (H): ICD-10-CM

## 2022-06-22 LAB
ALBUMIN SERPL-MCNC: 3.4 G/DL (ref 3.4–5)
ALP SERPL-CCNC: 95 U/L (ref 40–150)
ALT SERPL W P-5'-P-CCNC: 20 U/L (ref 0–50)
ANION GAP SERPL CALCULATED.3IONS-SCNC: 4 MMOL/L (ref 3–14)
AST SERPL W P-5'-P-CCNC: 17 U/L (ref 0–45)
BASOPHILS # BLD AUTO: 0 10E3/UL (ref 0–0.2)
BASOPHILS NFR BLD AUTO: 1 %
BILIRUB SERPL-MCNC: 0.8 MG/DL (ref 0.2–1.3)
BUN SERPL-MCNC: 13 MG/DL (ref 7–30)
CALCIUM SERPL-MCNC: 9.4 MG/DL (ref 8.5–10.1)
CHLORIDE BLD-SCNC: 108 MMOL/L (ref 94–109)
CO2 SERPL-SCNC: 27 MMOL/L (ref 20–32)
CREAT SERPL-MCNC: 0.88 MG/DL (ref 0.52–1.04)
CREAT UR-MCNC: 151 MG/DL
EOSINOPHIL # BLD AUTO: 0 10E3/UL (ref 0–0.7)
EOSINOPHIL NFR BLD AUTO: 1 %
ERYTHROCYTE [DISTWIDTH] IN BLOOD BY AUTOMATED COUNT: 13.5 % (ref 10–15)
EST. AVERAGE GLUCOSE BLD GHB EST-MCNC: 100 MG/DL
GFR SERPL CREATININE-BSD FRML MDRD: 66 ML/MIN/1.73M2
GLUCOSE BLD-MCNC: 92 MG/DL (ref 70–99)
HBA1C MFR BLD: 5.1 % (ref 0–5.6)
HCT VFR BLD AUTO: 38.3 % (ref 35–47)
HGB BLD-MCNC: 12.6 G/DL (ref 11.7–15.7)
IMM GRANULOCYTES # BLD: 0 10E3/UL
IMM GRANULOCYTES NFR BLD: 0 %
LYMPHOCYTES # BLD AUTO: 1.1 10E3/UL (ref 0.8–5.3)
LYMPHOCYTES NFR BLD AUTO: 31 %
MCH RBC QN AUTO: 29.9 PG (ref 26.5–33)
MCHC RBC AUTO-ENTMCNC: 32.9 G/DL (ref 31.5–36.5)
MCV RBC AUTO: 91 FL (ref 78–100)
MICROALBUMIN UR-MCNC: 13 MG/L
MICROALBUMIN/CREAT UR: 8.61 MG/G CR (ref 0–25)
MONOCYTES # BLD AUTO: 0.4 10E3/UL (ref 0–1.3)
MONOCYTES NFR BLD AUTO: 10 %
NEUTROPHILS # BLD AUTO: 1.9 10E3/UL (ref 1.6–8.3)
NEUTROPHILS NFR BLD AUTO: 57 %
NRBC # BLD AUTO: 0 10E3/UL
NRBC BLD AUTO-RTO: 0 /100
PLATELET # BLD AUTO: 148 10E3/UL (ref 150–450)
POTASSIUM BLD-SCNC: 3.9 MMOL/L (ref 3.4–5.3)
PROT SERPL-MCNC: 6.7 G/DL (ref 6.8–8.8)
RBC # BLD AUTO: 4.21 10E6/UL (ref 3.8–5.2)
SODIUM SERPL-SCNC: 139 MMOL/L (ref 133–144)
WBC # BLD AUTO: 3.4 10E3/UL (ref 4–11)

## 2022-06-22 PROCEDURE — 85025 COMPLETE CBC W/AUTO DIFF WBC: CPT | Mod: ZL | Performed by: STUDENT IN AN ORGANIZED HEALTH CARE EDUCATION/TRAINING PROGRAM

## 2022-06-22 PROCEDURE — 82040 ASSAY OF SERUM ALBUMIN: CPT | Mod: ZL | Performed by: STUDENT IN AN ORGANIZED HEALTH CARE EDUCATION/TRAINING PROGRAM

## 2022-06-22 PROCEDURE — 80053 COMPREHEN METABOLIC PANEL: CPT | Mod: ZL | Performed by: STUDENT IN AN ORGANIZED HEALTH CARE EDUCATION/TRAINING PROGRAM

## 2022-06-22 PROCEDURE — G0463 HOSPITAL OUTPT CLINIC VISIT: HCPCS

## 2022-06-22 PROCEDURE — 82043 UR ALBUMIN QUANTITATIVE: CPT | Mod: ZL | Performed by: STUDENT IN AN ORGANIZED HEALTH CARE EDUCATION/TRAINING PROGRAM

## 2022-06-22 PROCEDURE — 36415 COLL VENOUS BLD VENIPUNCTURE: CPT | Mod: ZL | Performed by: STUDENT IN AN ORGANIZED HEALTH CARE EDUCATION/TRAINING PROGRAM

## 2022-06-22 PROCEDURE — 99213 OFFICE O/P EST LOW 20 MIN: CPT | Performed by: STUDENT IN AN ORGANIZED HEALTH CARE EDUCATION/TRAINING PROGRAM

## 2022-06-22 PROCEDURE — 83036 HEMOGLOBIN GLYCOSYLATED A1C: CPT | Mod: ZL | Performed by: STUDENT IN AN ORGANIZED HEALTH CARE EDUCATION/TRAINING PROGRAM

## 2022-06-22 ASSESSMENT — ANXIETY QUESTIONNAIRES
GAD7 TOTAL SCORE: 0
2. NOT BEING ABLE TO STOP OR CONTROL WORRYING: NOT AT ALL
6. BECOMING EASILY ANNOYED OR IRRITABLE: NOT AT ALL
3. WORRYING TOO MUCH ABOUT DIFFERENT THINGS: NOT AT ALL
1. FEELING NERVOUS, ANXIOUS, OR ON EDGE: NOT AT ALL
5. BEING SO RESTLESS THAT IT IS HARD TO SIT STILL: NOT AT ALL
IF YOU CHECKED OFF ANY PROBLEMS ON THIS QUESTIONNAIRE, HOW DIFFICULT HAVE THESE PROBLEMS MADE IT FOR YOU TO DO YOUR WORK, TAKE CARE OF THINGS AT HOME, OR GET ALONG WITH OTHER PEOPLE: NOT DIFFICULT AT ALL
4. TROUBLE RELAXING: NOT AT ALL
7. FEELING AFRAID AS IF SOMETHING AWFUL MIGHT HAPPEN: NOT AT ALL
GAD7 TOTAL SCORE: 0

## 2022-06-22 ASSESSMENT — PATIENT HEALTH QUESTIONNAIRE - PHQ9: SUM OF ALL RESPONSES TO PHQ QUESTIONS 1-9: 3

## 2022-06-22 ASSESSMENT — PAIN SCALES - GENERAL: PAINLEVEL: NO PAIN (0)

## 2022-06-22 NOTE — NURSING NOTE
"Chief Complaint   Patient presents with     Follow Up       Initial /66   Pulse 74   Resp 18   Wt 97.1 kg (214 lb)   SpO2 97%   BMI 35.61 kg/m   Estimated body mass index is 35.61 kg/m  as calculated from the following:    Height as of 4/25/22: 1.651 m (5' 5\").    Weight as of this encounter: 97.1 kg (214 lb).  Medication Reconciliation: complete  Kaley Mcclelland    "

## 2022-06-24 ENCOUNTER — TELEPHONE (OUTPATIENT)
Dept: FAMILY MEDICINE | Facility: OTHER | Age: 82
End: 2022-06-24

## 2022-06-24 NOTE — TELEPHONE ENCOUNTER
9:34 AM    Reason for Call: Phone Call    Description: Patient called and states that she needs her AVS and lab results printed and left at the . Patient states that she already spoke to a nurse about this. Please call patient for further discussion.     Was an appointment offered for this call? No  If yes : Appointment type              Date    Preferred method for responding to this message: Telephone Call  What is your phone number ? [phone]    If we cannot reach you directly, may we leave a detailed response at the number you provided? Yes    Can this message wait until your PCP/provider returns, if available today? Not applicable, provider is in today     Rosi Ramos

## 2022-06-27 ENCOUNTER — NURSE TRIAGE (OUTPATIENT)
Dept: FAMILY MEDICINE | Facility: OTHER | Age: 82
End: 2022-06-27

## 2022-06-27 NOTE — TELEPHONE ENCOUNTER
"Pt report a hoarse voice, sx started around URI previously about two weeks ago. Pt insist on being seen in clinic.   Pt scheduled.     Reason for Disposition    Hoarseness persists > 2 weeks    Additional Information    Negative: Severe difficulty breathing (e.g., struggling for each breath, speaks in single words)    Negative: Bluish (or gray) lips or face now    Negative: [1] Stridor AND [2] difficulty breathing    Negative: Started suddenly after sting from bee, wasp, or yellow jacket    Negative: Started suddenly after taking a medicine or allergic food (e.g., nuts, seafood)    Negative: Started suddenly along with widespread hives    Negative: Can't swallow normal secretions (e.g., drooling or spitting)    Negative: Tongue or facial swelling    Negative: Sounds like a life-threatening emergency to the triager    Negative: [1] Nasal allergies also present AND [2] they are acting up    Negative: Cold symptoms are main concern    Negative: Sore throat is main symptom    Negative: [1] Resolved choking episode AND [2] hoarseness lasts > 30 minutes    Negative: Direct blow to front of neck    Negative: Difficulty breathing    Negative: [1] Hoarseness starting in past 24 hours AND [2] taking an ACE Inhibitor medication (e.g., benazepril/LOTENSIN, captopril/CAPOTEN, enalapril/VASOTEC, lisinopril/ZESTRIL)    Negative: Patient sounds very sick or weak to the triager    Negative: Fever present > 3 days (72 hours)    Negative: Severe sore throat pain    Negative: [1] Hoarseness starting > 24 hours ago AND [2] taking an ACE Inhibitor medication (e.g., benazepril/LOTENSIN, captopril/CAPOTEN, enalapril/VASOTEC, lisinopril/ZESTRIL)    Answer Assessment - Initial Assessment Questions  1. DESCRIPTION: \"Describe your voice.\"      See note  2. ONSET: \"When did the hoarseness begin?\"        3. COUGH: \"Is there a cough?\" If so, ask: \"How bad?\"      Mild occasional cough   4. FEVER: \"Do you have a fever?\" If so, ask: \"What is your " "temperature, how was it measured, and when did it start?\"      no  5. RESPIRATORY STATUS: \"Describe your breathing.\"       no  6. ALLERGIES: \"Any allergy symptoms?\" If so, ask: \"What are they?\"      no  7. IRRITANTS: \"Do you smoke?\" \"Have you been exposed to any irritating fumes?\"      no  8. CAUSE: \"What do you think is causing the hoarseness?\"      unknown  9. OTHER SYMPTOMS: \"Do you have any other symptoms?\" (e.g., sore throat, swelling, foreign body, rash)      \"lower back continues no changes since I saw the doctor last\"  10. PREGNANCY: \"Is there any chance you are pregnant?\" \"When was your last menstrual period?\"        no    Protocols used: [id number]      "

## 2022-07-15 ENCOUNTER — OFFICE VISIT (OUTPATIENT)
Dept: FAMILY MEDICINE | Facility: OTHER | Age: 82
End: 2022-07-15
Attending: STUDENT IN AN ORGANIZED HEALTH CARE EDUCATION/TRAINING PROGRAM
Payer: COMMERCIAL

## 2022-07-15 VITALS
OXYGEN SATURATION: 98 % | WEIGHT: 213 LBS | DIASTOLIC BLOOD PRESSURE: 62 MMHG | TEMPERATURE: 99.4 F | HEART RATE: 74 BPM | BODY MASS INDEX: 35.45 KG/M2 | SYSTOLIC BLOOD PRESSURE: 116 MMHG

## 2022-07-15 DIAGNOSIS — M54.50 CHRONIC BILATERAL LOW BACK PAIN WITHOUT SCIATICA: Primary | ICD-10-CM

## 2022-07-15 DIAGNOSIS — G89.29 CHRONIC BILATERAL LOW BACK PAIN WITHOUT SCIATICA: Primary | ICD-10-CM

## 2022-07-15 DIAGNOSIS — F41.9 ANXIETY: Chronic | ICD-10-CM

## 2022-07-15 PROCEDURE — G0463 HOSPITAL OUTPT CLINIC VISIT: HCPCS

## 2022-07-15 PROCEDURE — 99213 OFFICE O/P EST LOW 20 MIN: CPT | Performed by: STUDENT IN AN ORGANIZED HEALTH CARE EDUCATION/TRAINING PROGRAM

## 2022-07-15 RX ORDER — CLONAZEPAM 1 MG/1
TABLET ORAL
Qty: 15 TABLET | Refills: 0 | Status: SHIPPED | OUTPATIENT
Start: 2022-07-15 | End: 2023-11-27

## 2022-07-15 ASSESSMENT — PAIN SCALES - GENERAL: PAINLEVEL: NO PAIN (0)

## 2022-07-15 NOTE — NURSING NOTE
"Chief Complaint   Patient presents with     Hoarse     Back Pain       Initial /62   Pulse 74   Temp 99.4  F (37.4  C)   Wt 96.6 kg (213 lb)   SpO2 98%   BMI 35.45 kg/m   Estimated body mass index is 35.45 kg/m  as calculated from the following:    Height as of 4/25/22: 1.651 m (5' 5\").    Weight as of this encounter: 96.6 kg (213 lb).  Medication Reconciliation: complete  Yolanda Mason MA  "

## 2022-07-15 NOTE — PROGRESS NOTES
Assessment & Plan     Chronic bilateral low back pain without sciatica  DDx includes muscle strain vs SI joint dysfunction vs other.  Patient has tried Bengay cream with good relief of symptoms  No red flag signs or symptoms  I think she will benefit from PT  - Physical Therapy Referral; Future    Anxiety  Reports feeling more anxious lately with death of relatives  She uses Klonipin only as needed and takes about 1/4 to 1/2 tabs per day  I discussed risks and benefits of benzodiazepines  Patient only using as needed  I will refill for 15 tablets of this, discussed we should consider tapering off in the near future  Patient is on Paxil 40 mg for her anxiety/depression.  We should consider switching patient to a different agent as this medication is on the BEER list and potentially harmful, especially in combination with a benzodiazepine.      No follow-ups on file.    Yanique Mar MD  [facility] - REI Bill is a 81 year old, presenting for the following health issues:  Hoarse and Back Pain      HPI     Patient has questions about her kidney and liver labs.       Chronic/Recurring Back Pain Follow Up      Where is your back pain located? (Select all that apply) low back left once and a while    How would you describe your back pain?  dull ache    Where does your back pain spread? nowhere    Since your last clinic visit for back pain, how has your pain changed? unchanged    Does your back pain interfere with your job? Not applicable    Since your last visit, have you tried any new treatment? No      How many servings of fruits and vegetables do you eat daily?  2-3    On average, how many sweetened beverages do you drink each day (Examples: soda, juice, sweet tea, etc.  Do NOT count diet or artificially sweetened beverages)?   0    How many days per week do you exercise enough to make your heart beat faster? 3 or less, just walking around the house    How many minutes a day do  you exercise enough to make your heart beat faster? 9 or less    How many days per week do you miss taking your medication? 0    Review of Systems   Constitutional, HEENT, cardiovascular, pulmonary, GI, , musculoskeletal, neuro, skin, endocrine and psych systems are negative, except as otherwise noted.      Objective    /62   Pulse 74   Temp 99.4  F (37.4  C)   Wt 96.6 kg (213 lb)   SpO2 98%   BMI 35.45 kg/m    Body mass index is 35.45 kg/m .  Physical Exam   GENERAL: healthy, alert and no distress  EYES: Eyes grossly normal to inspection, PERRL and conjunctivae and sclerae normal  HENT: ear canals and TM's normal, nose and mouth without ulcers or lesions  NECK: no adenopathy, no asymmetry, masses, or scars and thyroid normal to palpation  RESP: lungs clear to auscultation - no rales, rhonchi or wheezes  BREAST: normal without masses, tenderness or nipple discharge and no palpable axillary masses or adenopathy  CV: regular rate and rhythm, normal S1 S2, no S3 or S4, no murmur, click or rub, no peripheral edema and peripheral pulses strong  ABDOMEN: soft, nontender, no hepatosplenomegaly, no masses and bowel sounds normal  MS: no gross musculoskeletal defects noted, no edema, no tenderness to palpation over vertebrae  SKIN: no suspicious lesions or rashes  NEURO: Normal strength and tone, mentation intact and speech normal  PSYCH: mentation appears normal, affect normal/bright

## 2022-08-01 ENCOUNTER — NURSE TRIAGE (OUTPATIENT)
Dept: FAMILY MEDICINE | Facility: OTHER | Age: 82
End: 2022-08-01

## 2022-08-01 NOTE — TELEPHONE ENCOUNTER
Pt called regarding chronic low back pain. Reports PT starts end of August. Pt reports pain remains unchanged. Pt scheduled as she requested.

## 2022-08-22 ENCOUNTER — NURSE TRIAGE (OUTPATIENT)
Dept: NURSING | Facility: CLINIC | Age: 82
End: 2022-08-22

## 2022-08-23 NOTE — TELEPHONE ENCOUNTER
"Pt calls to report her BP readings past week  106/51  116/56  112/45  108/51  HR 61    States calling Nurse line last evening to report low BP reading. Instructions were to take BP for 24 hours.  Writer instructed pt to take BP while on call.    Today 10:00am  106/51   HR 61              10:05am  115/54  HR 61              10:10am  116/54  HR 61  Patients states feeling \"just fine\".  Writer assured patient that BP seems to be wnl for her considering past weeks readings.  Educated on proper positioning while taking BP  Encouraged Pt to present to the ER/UC with any acute decline in condition  Pt relayed understanding  No further concerns at calls end      "

## 2022-08-23 NOTE — TELEPHONE ENCOUNTER
Patient called complaining. /90  Pulse 93. Earlier today she had appointment and it eas 140/70.     Advise to see PCP tomorrow, or Urgent care.    Kathleen Up RN on 8/22/2022 at 11:07 PM      Reason for Disposition    Systolic BP  >= 180 OR Diastolic >= 110    Additional Information    Negative: Difficult to awaken or acting confused (e.g., disoriented, slurred speech)    Negative: SEVERE difficulty breathing (e.g., struggling for each breath, speaks in single words)    Negative: [1] Weakness of the face, arm or leg on one side of the body AND [2] new-onset    Negative: [1] Numbness (i.e., loss of sensation) of the face, arm or leg on one side of the body AND [2] new-onset    Negative: [1] Chest pain lasts > 5 minutes AND [2] history of heart disease (i.e., heart attack, bypass surgery, angina, angioplasty, CHF)    Negative: [1] Chest pain AND [2] took nitrogylcerin AND [3] pain was not relieved    Negative: Sounds like a life-threatening emergency to the triager    Negative: Symptom is main concern (e.g., headache, chest pain)    Negative: Low blood pressure is main concern    Negative: [1] Systolic BP  >= 160 OR Diastolic >= 100 AND [2] cardiac or neurologic symptoms (e.g., chest pain, difficulty breathing, unsteady gait, blurred vision)    Negative: [1] Pregnant 20 or more weeks (or postpartum < 6 weeks) AND [2] new hand or face swelling    Negative: [1] Pregnant 20 or more weeks (or postpartum < 6 weeks) AND [2] Systolic BP >= 160 OR Diastolic >= 100    Negative: [1] Systolic BP  >= 200 OR Diastolic >= 120 AND [2] having NO cardiac or neurologic symptoms    Negative: [1] Pregnant 20 or more weeks (or postpartum < 6 weeks) AND [2] Systolic BP  >= 140 OR Diastolic >= 90    Negative: [1] Systolic BP  >= 180 OR Diastolic >= 110 AND [2] missed most recent dose of blood pressure medication    Protocols used: [facility]ESS[facility] [place]

## 2022-09-07 NOTE — PROGRESS NOTES
"  Assessment & Plan     Anxiety  History of anxiety and difficulty sleeping  Has been taking Klonipin as a sleeping aid  Reviewed the risks of continuing this medication  - clonazePAM (KLONOPIN) 0.5 MG tablet; Take 0.5 tablets (0.25 mg) by mouth nightly as needed for anxiety     Chronic low back pain without sciatica  Discussed PT with patient  She seems to be coping well with her pain.  Has not needed to use any narcotic medications for this  PE reassuring and no sciatica or other red flag symptoms      Return in about 2 months (around 11/8/2022) for Follow up.    Yanique Mar MD  [facility] - REI Bill is a 81 year old , presenting for the following health issues:  Back Pain      HPI      Below waist has pain- All the way across.  Has a female therapist.  Did have an incident with male provider.   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 No dizzyiness or lightheadedness.  Feels like arthritis locks in the shoulder.  Went to bingo last night.      Not able to sleep well at night.    Naps during the day for 2-3 hours.       Chronic/Recurring Back Pain Follow Up      Where is your back pain located? (Select all that apply) low back both    How would you describe your back pain?  dull ache    Where does your back pain spread? nowhere    Since your last clinic visit for back pain, how has your pain changed? unchanged    Does your back pain interfere with your job? Not applicable    Since your last visit, have you tried any new treatment? No      Review of Systems   Constitutional, HEENT, cardiovascular, pulmonary, gi and gu systems are negative, except as otherwise noted.      Objective    /58 (BP Location: Right arm, Patient Position: Chair)   Pulse 68   Temp 98.2  F (36.8  C)   Resp 16   Ht 1.651 m (5' 5\")   Wt 97.1 kg (214 lb)   SpO2 96%   BMI 35.61 kg/m    Body mass index is 35.61 kg/m .  Physical Exam   GENERAL: healthy, alert and no " distress  EYES: Eyes grossly normal to inspection, PERRL and conjunctivae and sclerae normal  HENT: ear canals and TM's normal, nose and mouth without ulcers or lesions  NECK: no adenopathy, no asymmetry, masses, or scars and thyroid normal to palpation  RESP: lungs clear to auscultation - no rales, rhonchi or wheezes  CV: regular rate and rhythm, normal S1 S2, no S3 or S4, no murmur, click or rub, no peripheral edema and peripheral pulses strong  ABDOMEN: soft, nontender, no hepatosplenomegaly, no masses and bowel sounds normal  MS: no gross musculoskeletal defects noted, no edema  SKIN: no suspicious lesions or rashes  NEURO: Normal strength and tone, mentation intact and speech normal  PSYCH: mentation appears normal, affect normal/bright

## 2022-09-08 ENCOUNTER — OFFICE VISIT (OUTPATIENT)
Dept: FAMILY MEDICINE | Facility: OTHER | Age: 82
End: 2022-09-08
Attending: STUDENT IN AN ORGANIZED HEALTH CARE EDUCATION/TRAINING PROGRAM
Payer: COMMERCIAL

## 2022-09-08 ENCOUNTER — NURSE TRIAGE (OUTPATIENT)
Dept: FAMILY MEDICINE | Facility: OTHER | Age: 82
End: 2022-09-08

## 2022-09-08 ENCOUNTER — TELEPHONE (OUTPATIENT)
Dept: CARDIOLOGY | Facility: OTHER | Age: 82
End: 2022-09-08

## 2022-09-08 VITALS
WEIGHT: 214 LBS | HEART RATE: 68 BPM | OXYGEN SATURATION: 96 % | SYSTOLIC BLOOD PRESSURE: 108 MMHG | HEIGHT: 65 IN | RESPIRATION RATE: 16 BRPM | DIASTOLIC BLOOD PRESSURE: 58 MMHG | TEMPERATURE: 98.2 F | BODY MASS INDEX: 35.65 KG/M2

## 2022-09-08 DIAGNOSIS — F41.9 ANXIETY: Primary | ICD-10-CM

## 2022-09-08 DIAGNOSIS — G89.29 CHRONIC LOW BACK PAIN WITHOUT SCIATICA, UNSPECIFIED BACK PAIN LATERALITY: ICD-10-CM

## 2022-09-08 DIAGNOSIS — M54.50 CHRONIC LOW BACK PAIN WITHOUT SCIATICA, UNSPECIFIED BACK PAIN LATERALITY: ICD-10-CM

## 2022-09-08 PROCEDURE — 99213 OFFICE O/P EST LOW 20 MIN: CPT | Performed by: STUDENT IN AN ORGANIZED HEALTH CARE EDUCATION/TRAINING PROGRAM

## 2022-09-08 PROCEDURE — G0463 HOSPITAL OUTPT CLINIC VISIT: HCPCS

## 2022-09-08 ASSESSMENT — PAIN SCALES - GENERAL: PAINLEVEL: NO PAIN (0)

## 2022-09-08 NOTE — TELEPHONE ENCOUNTER
Ha Hughes, DO  Margaret Moon RN 18 minutes ago (3:17 PM)     DB    Symptoms as described are unlikely to be valve related.  However, she does have history of valvular issues and it has been almost 2 years since her last echocardiogram.  It would not be unreasonable to do an echocardiogram.  I believe it will take at least 2 to 3 weeks before she can get in for the echocardiogram.  If she would like to have one done, let me know and I will order.  Thanks in advance!     Dr. Hughes    Message text

## 2022-09-08 NOTE — NURSING NOTE
"Chief Complaint   Patient presents with     Back Pain       Initial /58 (BP Location: Right arm, Patient Position: Chair)   Pulse 68   Temp 98.2  F (36.8  C)   Resp 16   Ht 1.651 m (5' 5\")   Wt 97.1 kg (214 lb)   SpO2 96%   BMI 35.61 kg/m   Estimated body mass index is 35.61 kg/m  as calculated from the following:    Height as of this encounter: 1.651 m (5' 5\").    Weight as of this encounter: 97.1 kg (214 lb).  Medication Reconciliation: complete  Catalina Rodriguez LPN    "

## 2022-09-08 NOTE — TELEPHONE ENCOUNTER
"Patient calling and reports a \"twinge or bubble\". in her chest. States \"it is only there for a second or so. It is really hard to explain\". Patient thinks this is from her leaky valves. Reports this comes and goes and started yesterday.     Patient denies chest pain, difficulty breathing, fever, weakness, dizziness, pain, nausea/vomiting,  numbness or weakness on one side of the face or body, or loss of consciousness.     Patient advised to be seen in ER for new or worsening symptoms and verbalized understanding.     Patient is requesting an appointment with Dr. Hughes. States she was advised to call if she \"felt anything\".     Please advise, thank you.   "

## 2022-09-09 NOTE — TELEPHONE ENCOUNTER
Outreach to patient regarding message below. States that she is feeling good today. Denies any chest pain or chest tightness. She states that she would be interested in another echocardiogram though.     She will call with any other questions or concerns.   Otherwise she will wait for echo results.     Please place order.

## 2022-09-12 DIAGNOSIS — I36.1 NON-RHEUMATIC TRICUSPID VALVE INSUFFICIENCY: ICD-10-CM

## 2022-09-12 DIAGNOSIS — R07.89 OTHER CHEST PAIN: Primary | ICD-10-CM

## 2022-09-12 DIAGNOSIS — R01.1 HEART MURMUR: ICD-10-CM

## 2022-09-12 DIAGNOSIS — I35.1 NONRHEUMATIC AORTIC VALVE INSUFFICIENCY: ICD-10-CM

## 2022-09-12 DIAGNOSIS — R00.2 PALPITATIONS: ICD-10-CM

## 2022-09-12 DIAGNOSIS — I34.0 NON-RHEUMATIC MITRAL REGURGITATION: ICD-10-CM

## 2022-09-12 DIAGNOSIS — E78.5 HYPERLIPIDEMIA LDL GOAL <100: ICD-10-CM

## 2022-09-12 DIAGNOSIS — I51.89 DIASTOLIC DYSFUNCTION: ICD-10-CM

## 2022-09-13 RX ORDER — SIMVASTATIN 40 MG
TABLET ORAL
Qty: 90 TABLET | Refills: 0 | Status: SHIPPED | OUTPATIENT
Start: 2022-09-13 | End: 2022-12-27

## 2022-09-14 NOTE — TELEPHONE ENCOUNTER
Ha Hughes, DO  You 2 days ago     DB    Order for echo placed.     Dr. Hughes    Message text

## 2022-09-15 RX ORDER — CLONAZEPAM 0.5 MG/1
0.25 TABLET ORAL
Qty: 20 TABLET | Refills: 0 | Status: SHIPPED | OUTPATIENT
Start: 2022-09-15 | End: 2022-10-31

## 2022-10-10 ENCOUNTER — OFFICE VISIT (OUTPATIENT)
Dept: CARDIOLOGY | Facility: OTHER | Age: 82
End: 2022-10-10
Attending: INTERNAL MEDICINE
Payer: COMMERCIAL

## 2022-10-10 VITALS
TEMPERATURE: 97.7 F | HEART RATE: 68 BPM | WEIGHT: 214.3 LBS | OXYGEN SATURATION: 97 % | BODY MASS INDEX: 35.66 KG/M2 | DIASTOLIC BLOOD PRESSURE: 78 MMHG | SYSTOLIC BLOOD PRESSURE: 123 MMHG

## 2022-10-10 DIAGNOSIS — I49.1 ATRIAL ECTOPY: ICD-10-CM

## 2022-10-10 DIAGNOSIS — R01.1 HEART MURMUR: ICD-10-CM

## 2022-10-10 DIAGNOSIS — I49.3 PVC'S (PREMATURE VENTRICULAR CONTRACTIONS): ICD-10-CM

## 2022-10-10 DIAGNOSIS — I65.23 BILATERAL CAROTID ARTERY STENOSIS: ICD-10-CM

## 2022-10-10 DIAGNOSIS — I47.10 PSVT (PAROXYSMAL SUPRAVENTRICULAR TACHYCARDIA) (H): ICD-10-CM

## 2022-10-10 DIAGNOSIS — R00.2 PALPITATIONS: Primary | ICD-10-CM

## 2022-10-10 DIAGNOSIS — K44.9 HIATAL HERNIA: ICD-10-CM

## 2022-10-10 DIAGNOSIS — I34.0 NON-RHEUMATIC MITRAL REGURGITATION: ICD-10-CM

## 2022-10-10 DIAGNOSIS — I51.89 DIASTOLIC DYSFUNCTION: ICD-10-CM

## 2022-10-10 DIAGNOSIS — N18.31 STAGE 3A CHRONIC KIDNEY DISEASE (H): ICD-10-CM

## 2022-10-10 DIAGNOSIS — R07.89 OTHER CHEST PAIN: ICD-10-CM

## 2022-10-10 DIAGNOSIS — I35.1 NONRHEUMATIC AORTIC VALVE INSUFFICIENCY: ICD-10-CM

## 2022-10-10 DIAGNOSIS — U07.1 COVID-19 VIRUS INFECTION: ICD-10-CM

## 2022-10-10 DIAGNOSIS — I36.1 NON-RHEUMATIC TRICUSPID VALVE INSUFFICIENCY: ICD-10-CM

## 2022-10-10 DIAGNOSIS — E78.2 MIXED HYPERLIPIDEMIA: ICD-10-CM

## 2022-10-10 PROCEDURE — G0463 HOSPITAL OUTPT CLINIC VISIT: HCPCS

## 2022-10-10 PROCEDURE — 99214 OFFICE O/P EST MOD 30 MIN: CPT | Performed by: INTERNAL MEDICINE

## 2022-10-10 ASSESSMENT — PAIN SCALES - GENERAL: PAINLEVEL: WORST PAIN (10)

## 2022-10-10 NOTE — NURSING NOTE
"Chief Complaint   Patient presents with     Follow Up       Initial /78 (BP Location: Right arm)   Pulse 68   Temp 97.7  F (36.5  C) (Tympanic)   Wt 97.2 kg (214 lb 4.8 oz)   SpO2 97%   BMI 35.66 kg/m   Estimated body mass index is 35.66 kg/m  as calculated from the following:    Height as of 9/8/22: 1.651 m (5' 5\").    Weight as of this encounter: 97.2 kg (214 lb 4.8 oz).  Medication Reconciliation: complete  Jesusita So LPN    "

## 2022-10-10 NOTE — PATIENT INSTRUCTIONS
Thank you for allowing Dr. Hughes and our  team to participate in your care. Please call our office at [phone] with scheduling questions or if you need to cancel or change your appointment. With any other questions or concerns you may call [facility] cardiology nurse at [phone].       If you experience chest pain, chest pressure, chest tightness, shortness of breath, fainting, lightheadedness, nausea, vomiting, or other concerning symptoms, please report to the Emergency Department or call 911. These symptoms may be emergent, and best treated in the Emergency Department.    You will be scheduled for an appointment to have a Zio Patch placed on the skin.  This monitor will be worn for 14 days.  This is a device that will monitor your heart rate, and rhythm. The hospital scheduling department will contact you to schedule this appointment, and educate you on the use of this patch.    Follow up after testing.

## 2022-10-10 NOTE — PROGRESS NOTES
[facility] HEART CARE   CARDIOLOGY PROGRESS NOTE     Chief Complaint   Patient presents with     Follow Up          Diagnosis:  1.  Diastolic dysfunction, grade 1 on 2/21/19, off diuretics.  2.  Bilateral carotid artery atherosclerosis at less than 50%, US from 2/21/19.  3.  Moderate AI on 2/24/22. Mild/moderate on 1/28/2021.  4.  Trace to mild mitral regurgitation on 1/28/2021.  5.  Trace to mild tricuspid valve insufficiency.  6.  COPD-minimal on 9/9/14. H/O second hand smoke from mom who smoked 3/day.  1/day. No primary tobacco usage.   7.  Morbid obesity with BMI of 35.  8.  Hyperlipidemia-controlled.  9.  Atrial ectopy.  10.  Palpitations.  11.  PSVT.  12.  PVC's.  13.  On statin therapy.  14.  Hypertriglyceridemia-uncontrolled.  15.  Moderate hiatal hernia on 3/18/2020 on a CTA of the chest.  16.  COVID-19 (+) on 10/30/20.  17.  Vitamin D deficiency at 17 on 1/28/15.      Assessment/Plan:    1.    Palpitations: Happens regularly and surrounds anxiety/panic attacks.  Her daughter describes her as having significant anxiety with panic attacks.  We reviewed her Zio patch in 2018.  No A. fib, heart block, or V. tach.  Patient had SVE, VE, and other dysrhythmias.  Will obtain another Zio patch to determine if any changes are occurring.  2.  Valvular issues: Has moderate AI with mild to moderate MR.  Patient concerned.  Findings reviewed using the heart model in the room.  She is concerned about her heart.  Patient reassured.  Echocardiogram planned for tomorrow, 10/11/2022.  We will follow-up after testing.  3.  Follow-up after echocardiogram      Interval history:  Dinorah is doing well.  She is with her daughter Feng today.  She described as having significant anxiety with panic attacks.  She has been having palpitations regularly.  She was concerned she has had a heart attack but not had anginal symptoms.  She has not any chest pain, chest tightness, or chest discomfort.  She describes having a rather  extended episode last night but was notably anxious with panic attacks per her daughter.  Her daughter believes that this is more anxiety related.  We did discuss her Zio patch from 2018.  At that time, she had SVE and VE.  She had x1 episode of VT lasting 5 beats and 26 runs of SVT lasting up to 20 beats.  Findings reviewed, patient reassured.  She does not have a history of atrial fibrillation.  She is aware that she is found to have A. fib, she would be at high risk for stroke.  She also continues to be concerned about her valves.  She does not have any shortness of breath, swelling, or syncope.  She has no other issues or concerns.    HPI:    She has been concerned with a heart murmur as well as bilateral carotid artery stenosis.  She had echocardiogram completed on 2/21/19 as well as an ultrasound of the carotids on 2/21/19.  She is here for those results.       Previously, we reviewed her Zio patch results from 12/31/18 which showed rare PVC's, x1 run of NSVT 5 beats, PAC's, and x26 episodes of PSVT lasting up to 20 beats.     She also has a history of mild carotid artery disease at less than 50% with mild plaquing on 12/1/16.  She does not have a personal history of smoking but was around secondhand smoke in the past.  She had a ultrasound of her carotids on 2/20/19 that showed atherosclerotic plaquing in both carotid bifurcations.  There was no significant hemo-dynamic stenosis.     Her echocardiogram from 2/21/19 showed an EF of 65-70%, grade 1 diastolic dysfunction, mild left atrial enlargement, mild to moderate aortic insufficiency, trace to mild tricuspid insufficiency, and trace to mild mitral insufficiency.  She is concerned about her valvular insufficiency.  She will have an echocardiogram in approximately 2 years to follow-up on the mild to moderate aortic insufficiency.      Relevant testing:  ECHO on 2/24/22:  Left ventricular size, wall motion and function are normal. The ejection fraction is  60-65%.  Global right ventricular function is normal.  Mild to moderate mitral insufficiency is present.  Moderate aortic insufficiency is present.  The inferior vena cava is normal.  No pericardial effusion is present.  Compared to prior imaging on 1/28/2021 There is mildly increased PI, MR and AI.   The subtle change is confirmed on visual inspection.    ECHO on 1/28/21:  No pericardial effusion is present.  Global and regional left ventricular function is normal with an EF of 55-60%.  The right ventricle is normal size.  Global right ventricular function is normal.  Both atria appear normal.  Mild mitral insufficiency is present.  The aortic valve is tricuspid.  Mild to moderate aortic insufficiency is present.  AI unchanged from previous ECHO 2/21/19  The mean gradient across the aortic valve is5.1 mmHg.  Trace tricuspid insufficiency is present.  Trace pulmonic insufficiency is present.  The aorta root is normal.    ECHO on 2/21/19:  No pericardial effusion is present.  Global and regional left ventricular function is hyperkinetic with an EF of  65-70%.  Grade I or early diastolic dysfunction.  The right ventricle is normal size.  Global right ventricular function is normal.  Mild left atrial enlargement is present.  Trace to mild mitral insufficiency is present.  The aortic valve is tricuspid.  Mild to moderate aortic insufficiency is present.  Trace to mild tricuspid insufficiency is present.  The peak velocity of the tricuspid regurgitant jet is not obtainable.  The pulmonic valve is normal.  The aorta root is normal.    Zio patch from 12/31/2018:  The enrollment period was from 12/31/18 through 1/7/19.  There were 6 days and 12 hours of analysis time available for review.  The minimum heart rate was 49, average heart rate 66 and maximum heart rate 200 bpm.  The patient has underlying sinus rhythm throughout.  There is no significant bradycardia pauses or heart block seen.  There were very rare isolated  PVC's.  There was a 5 beat run of ventricular tachycardia.  With an average heart rate of 113 bpm.  This was the only run.  There are rare PAC's seen.  Less than 1% of total beats.  There were 26 episodes of a supraventricular tachycardia greater than 4 beats.  The longest was for 20 beats with an average heart rate of 108 bpm.  The fastest was for 11 beats with a maximum heart rate of 200 bpm but an average heart rate of 134 bpm.  Review of some of these tracings show that it likely is an atrial tachycardia  There were 3 triggered events all 3 of these strips do have PAC's seen.  1 of them had a very slower run of supraventricular tachycardia average heart rate of 113 bpm.  There were 3 diary entries with the symptom of skipped irregular beats.  Review of the associated strips show all had sinus rhythm to them had supraventricular beats.    US carotids on 2/21/22:  No ultrasound evidence of hemo-dynamically significant stenosis.   Small volume of calcified plaque again seen in the left internal  carotid artery.          ICD-10-CM    1. Palpitations  R00.2 Leadless EKG Monitor 8 to 14 Days      2. PSVT (paroxysmal supraventricular tachycardia) (H)  I47.1 Leadless EKG Monitor 8 to 14 Days      3. PVC's (premature ventricular contractions)  I49.3 Leadless EKG Monitor 8 to 14 Days      4. Other chest pain  R07.89       5. Mixed hyperlipidemia  E78.2       6. Tricuspid valve insufficiency  I36.1       7. Mitral regurgitation  I34.0       8. Diastolic dysfunction grade 1 on 2/21/19  I51.89       9. Bilateral carotid artery stenosis at less than 50% on 12/1/16  I65.23       10. Aortic valve insufficiency  I35.1       11. Atrial ectopy  I49.1       12. Heart murmur  R01.1       13. Hiatal hernia  K44.9       14. Stage 3a chronic kidney disease (H)  N18.31       15. 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    Anterior Segment V 4 cm nodule abutting liver surface by US 1/4/2015      Obesity, Class II, BMI 35-39.9 1/4/2015    BMI 35.9 with comorbidities = MORBID obesity     Osteoarthritis of right hip 10/07/2004     Osteoarthrosis 06/14/2012     Otitis externa 10/04/2011     Prediabetes 08/01/2011       Past Surgical History:   Procedure Laterality Date     ARTHROPLASTY KNEE Left 9/9/2019    Procedure: LEFT TOTAL KNEE ARTHROPLASTY S/N;  Surgeon: Trevor Alonzo MD;  Location: [facility] OR     ARTHROSCOPY KNEE Left 3/21/2019    Procedure: LEFT  KNEE ARTHROSCOPY, partial medial menisectomy;  Surgeon: Esteban Wills MD;  Location: [facility] OR     CLOSED REDUCTION WRIST Right 1952 x 2    long arm cast (same time as elbow fx)     CLOSED RX ELBOW DISLOCATION Right 1952    CR/long cast of fracture     EXCISE MASS FOOT Left 8/16/2021    Procedure: LEFT ANKLE MASS EXCISION;  Surgeon: Trevor Alonzo MD;  Location: [facility] OR     HC INJ EPIDURAL LUMBAR/SACRAL W/WO CONTRAST Bilateral 5/2013    facet injections; prev 2012     LAPAROSCOPIC CHOLECYSTECTOMY N/A 1/4/2015    Procedure: LAPAROSCOPIC CHOLECYSTECTOMY;  Surgeon: Brittney العلي MD;  Location: [facility] OR     TONSILLECTOMY       ZZC TOTAL KNEE ARTHROPLASTY Left 09/09/2019    Dr Alonzo       Allergies   Allergen Reactions     Chocolate Hives     Lemon Flavor Hives     Lime [Calcium Oxysulfide] Hives     Metal [Staples]      Orange Fruit [Citrus] Hives     Strawberry Hives     Adhesive Tape      Band-aids     Codeine Hives      Patient can tolerate oxycodone & dilaudid     Erythromycin Hives     ERYTHROMYCIN BASE     Food      Tomato, grapefruit, oranges.     Grapefruit [Extra Strength Grapefruit]      GRAPEFRUIT     Morphine Hives     Pt. Reports had hives     Penicillins Hives     Ranitidine GI Disturbance     Sulfa Drugs      SULFONAMIDE ANTIBIOTICS      Tomato      Xyzal [Levocetirizine] Hives       Current Outpatient Medications   Medication Sig Dispense Refill     acetaminophen (TYLENOL) 325 MG tablet Take 325-650 mg by mouth every 6 hours as needed for mild pain       Calcium-Magnesium-Vitamin D (CALCIUM 1200+D3 PO) Take by mouth daily       clonazePAM (KLONOPIN) 0.5 MG tablet Take 0.5 tablets (0.25 mg) by mouth nightly as needed for anxiety 20 tablet 0     clonazePAM (KLONOPIN) 1 MG tablet TAKE 1/4 TO 1/2 (ONE-FOURTH TO ONE-HALF) TABLET BY MOUTH EVERY 8 HOURS AS NEEDED 15 tablet 0     FISH OIL Take 1 capsule by mouth daily        ipratropium - albuterol 0.5 mg/2.5 mg/3 mL (DUONEB) 0.5-2.5 (3) MG/3ML neb solution Take 1 vial (3 mLs) by nebulization every 6 hours as needed for shortness of breath / dyspnea or wheezing 3 mL 1     PARoxetine (PAXIL) 40 MG tablet Take 1 tablet by mouth once daily 90 tablet 1     simvastatin (ZOCOR) 40 MG tablet TAKE 1 TABLET BY MOUTH AT BEDTIME 90 tablet 0     VITAMIN D, CHOLECALCIFEROL, PO Take 2,000 Units by mouth daily         Social History     Socioeconomic History     Marital status: Single     Spouse name: Not on file     Number of children: 4     Years of education: 12     Highest education level: Not on file   Occupational History     Occupation: personal care attendant   Tobacco Use     Smoking status: Never     Smokeless tobacco: Never   Vaping Use     Vaping Use: Never used   Substance and Sexual Activity     Alcohol use: No     Drug use: No     Sexual activity: Never   Other Topics Concern      Service Not Asked     Blood Transfusions Yes     Caffeine Concern Yes     Comment: >32 oz  soda/day     Occupational Exposure Not Asked     Hobby Hazards Not Asked     Sleep Concern Yes     Comment: insomnia     Stress Concern Not Asked     Weight Concern Not Asked     Special Diet Not Asked     Back Care Not Asked     Exercise Not Asked     Bike Helmet Not Asked     Seat Belt Not Asked     Self-Exams Not Asked     Parent/sibling w/ CABG, MI or angioplasty before 65F 55M? No   Social History Narrative     Not on file     Social Determinants of Health     Financial Resource Strain: Not on file   Food Insecurity: Not on file   Transportation Needs: Not on file   Physical Activity: Not on file   Stress: Not on file   Social Connections: Not on file   Intimate Partner Violence: Not on file   Housing Stability: Not on file       LAB RESULTS:   Orders Only on 02/10/2021   Component Date Value Ref Range Status     Sodium 02/10/2021 139  133 - 144 mmol/L Final     Potassium 02/10/2021 4.0  3.4 - 5.3 mmol/L Final     Chloride 02/10/2021 108  94 - 109 mmol/L Final     Carbon Dioxide 02/10/2021 30  20 - 32 mmol/L Final     Anion Gap 02/10/2021 1* 3 - 14 mmol/L Final     Glucose 02/10/2021 104* 70 - 99 mg/dL Final     Urea Nitrogen 02/10/2021 15  7 - 30 mg/dL Final     Creatinine 02/10/2021 1.00  0.52 - 1.04 mg/dL Final     GFR Estimate 02/10/2021 54* >60 mL/min/[1.73_m2] Final     GFR Estimate If Black 02/10/2021 62  >60 mL/min/[1.73_m2] Final     Calcium 02/10/2021 9.5  8.5 - 10.1 mg/dL Final     Hemoglobin A1C 02/10/2021 5.0  0 - 5.6 % Final     ALT 02/10/2021 27  0 - 50 U/L Final     AST 02/10/2021 20  0 - 45 U/L Final     Cholesterol 02/10/2021 177  <200 mg/dL Final     Triglycerides 02/10/2021 166* <150 mg/dL Final     HDL Cholesterol 02/10/2021 53  >49 mg/dL Final     LDL Cholesterol Calculated 02/10/2021 91  <100 mg/dL Final     Non HDL Cholesterol 02/10/2021 124  <130 mg/dL Final     Estimated Average Glucose 02/10/2021 97  mg/dL Final        Review of systems: Negative except that which was noted in the  HPI.    Physical examination:  /78 (BP Location: Right arm)   Pulse 68   Temp 97.7  F (36.5  C) (Tympanic)   Wt 97.2 kg (214 lb 4.8 oz)   SpO2 97%   BMI 35.66 kg/m      GENERAL APPEARANCE: healthy, alert and no distress  HEENT: no icterus, no xanthelasmas, normal pupil size and reaction, no cyanosis.  NECK: no adenopathy, no asymmetry, masses.  CHEST: lungs clear to auscultation - no rales, rhonchi or wheezes, no use of accessory muscles, no retractions, respirations are unlabored, normal respiratory rate  CARDIOVASCULAR: regular rhythm, normal S1 with physiologic split S2, no S3 or S4 and no murmur, click or rub  EXTREMITIES: no clubbing, cyanosis or edema  NEURO: alert and oriented normal speech, and affect  VASC: No vascular bruits heard.  SKIN: no ecchymoses, no rashes        Thank you for allowing me to participate in the care of your patient. Please do not hesitate to contact me if you have any questions.     Ha Hughes, DO

## 2022-10-11 ENCOUNTER — HOSPITAL ENCOUNTER (OUTPATIENT)
Dept: CARDIOLOGY | Facility: HOSPITAL | Age: 82
Discharge: HOME | End: 2022-10-11
Attending: INTERNAL MEDICINE | Admitting: STUDENT IN AN ORGANIZED HEALTH CARE EDUCATION/TRAINING PROGRAM
Payer: COMMERCIAL

## 2022-10-11 DIAGNOSIS — I51.89 DIASTOLIC DYSFUNCTION: ICD-10-CM

## 2022-10-11 DIAGNOSIS — I35.1 NONRHEUMATIC AORTIC VALVE INSUFFICIENCY: ICD-10-CM

## 2022-10-11 DIAGNOSIS — I34.0 NON-RHEUMATIC MITRAL REGURGITATION: ICD-10-CM

## 2022-10-11 DIAGNOSIS — R01.1 HEART MURMUR: ICD-10-CM

## 2022-10-11 DIAGNOSIS — R07.89 OTHER CHEST PAIN: ICD-10-CM

## 2022-10-11 DIAGNOSIS — I36.1 NON-RHEUMATIC TRICUSPID VALVE INSUFFICIENCY: Primary | ICD-10-CM

## 2022-10-11 DIAGNOSIS — I36.1 NON-RHEUMATIC TRICUSPID VALVE INSUFFICIENCY: ICD-10-CM

## 2022-10-11 DIAGNOSIS — R00.2 PALPITATIONS: ICD-10-CM

## 2022-10-11 LAB — LVEF ECHO: NORMAL

## 2022-10-11 PROCEDURE — 93306 TTE W/DOPPLER COMPLETE: CPT

## 2022-10-12 ENCOUNTER — HOSPITAL ENCOUNTER (OUTPATIENT)
Dept: CARDIOLOGY | Facility: HOSPITAL | Age: 82
Discharge: HOME | End: 2022-10-12
Attending: INTERNAL MEDICINE | Admitting: INTERNAL MEDICINE
Payer: COMMERCIAL

## 2022-10-12 DIAGNOSIS — R00.2 PALPITATIONS: ICD-10-CM

## 2022-10-12 DIAGNOSIS — I49.3 PVC'S (PREMATURE VENTRICULAR CONTRACTIONS): ICD-10-CM

## 2022-10-12 DIAGNOSIS — I47.10 PSVT (PAROXYSMAL SUPRAVENTRICULAR TACHYCARDIA) (H): ICD-10-CM

## 2022-10-12 PROCEDURE — 93248 EXT ECG>7D<15D REV&INTERPJ: CPT | Performed by: INTERNAL MEDICINE

## 2022-10-12 PROCEDURE — 93246 EXT ECG>7D<15D RECORDING: CPT

## 2022-10-21 ENCOUNTER — TELEPHONE (OUTPATIENT)
Dept: CARDIOLOGY | Facility: OTHER | Age: 82
End: 2022-10-21

## 2022-10-21 NOTE — TELEPHONE ENCOUNTER
Patient called [facility] she has a  come in and she thinks they threw away the box to send the zio patch back into.     She wants writer to text unable to reach out this way.     She will need to call [facility] at [phone] and tell them her issue.     Called [facility] of instructions.

## 2022-10-26 ENCOUNTER — NURSE TRIAGE (OUTPATIENT)
Dept: FAMILY MEDICINE | Facility: OTHER | Age: 82
End: 2022-10-26

## 2022-10-26 ENCOUNTER — LAB (OUTPATIENT)
Dept: LAB | Facility: OTHER | Age: 82
End: 2022-10-26
Payer: COMMERCIAL

## 2022-10-26 DIAGNOSIS — R35.0 URINARY FREQUENCY: ICD-10-CM

## 2022-10-26 DIAGNOSIS — R35.0 URINARY FREQUENCY: Primary | ICD-10-CM

## 2022-10-26 LAB
ALBUMIN UR-MCNC: NEGATIVE MG/DL
APPEARANCE UR: ABNORMAL
BACTERIA #/AREA URNS HPF: ABNORMAL /HPF
BILIRUB UR QL STRIP: NEGATIVE
COLOR UR AUTO: ABNORMAL
GLUCOSE UR STRIP-MCNC: NEGATIVE MG/DL
HGB UR QL STRIP: ABNORMAL
KETONES UR STRIP-MCNC: NEGATIVE MG/DL
LEUKOCYTE ESTERASE UR QL STRIP: ABNORMAL
MUCOUS THREADS #/AREA URNS LPF: PRESENT /LPF
NITRATE UR QL: NEGATIVE
PH UR STRIP: 5.5 [PH] (ref 4.7–8)
RBC URINE: 3 /HPF
SP GR UR STRIP: 1.01 (ref 1–1.03)
SQUAMOUS EPITHELIAL: 6 /HPF
TRANSITIONAL EPI: 1 /HPF
UROBILINOGEN UR STRIP-MCNC: NORMAL MG/DL
WBC CLUMPS #/AREA URNS HPF: PRESENT /HPF
WBC URINE: >182 /HPF

## 2022-10-26 PROCEDURE — 81001 URINALYSIS AUTO W/SCOPE: CPT | Mod: ZL

## 2022-10-26 PROCEDURE — 87086 URINE CULTURE/COLONY COUNT: CPT | Mod: ZL

## 2022-10-26 RX ORDER — GRANULES FOR ORAL 3 G/1
3 POWDER ORAL ONCE
Qty: 1 PACKET | Refills: 0 | Status: CANCELLED | OUTPATIENT
Start: 2022-10-26 | End: 2022-10-26

## 2022-10-26 NOTE — TELEPHONE ENCOUNTER
"Writer made contact with patient via phone.  Pt states her phone service is \"terrible\"   She cannot \"leave her toilet\"      Pended to PCP to advise on Lab only for UA  "

## 2022-10-26 NOTE — TELEPHONE ENCOUNTER
"Patient asking if she can have a lab only visit d/t transportation issues?    Pended UA & triage to PCP to review & advise        T:  [phone]    Reason for Disposition    Age > 50 years    Additional Information    Negative: Shock suspected (e.g., cold/pale/clammy skin, too weak to stand, low BP, rapid pulse)    Negative: Sounds like a life-threatening emergency to the triager    Negative: Unable to urinate (or only a few drops) and bladder feels very full    Negative: Vomiting    Negative: Patient sounds very sick or weak to the triager    Negative: SEVERE pain with urination    Negative: Fever > 100.4 F (38.0 C)    Negative: Side (flank) or lower back pain present    Answer Assessment - Initial Assessment Questions  1. SEVERITY: \"How bad is the pain?\"  (e.g., Scale 1-10; mild, moderate, or severe)    - MILD (1-3): complains slightly about urination hurting    - MODERATE (4-7): interferes with normal activities      - SEVERE (8-10): excruciating, unwilling or unable to urinate because of the pain       moderate  2. FREQUENCY: \"How many times have you had painful urination today?\"       Greater than 10x yesterday.  Today, sitting on portable toilet, letting stream \"just come out\"  3. PATTERN: \"Is pain present every time you urinate or just sometimes?\"       constant  4. ONSET: \"When did the painful urination start?\"       Yesterday afternoon  5. FEVER: \"Do you have a fever?\" If Yes, ask: \"What is your temperature, how was it measured, and when did it start?\"      no  6. PAST UTI: \"Have you had a urine infection before?\" If Yes, ask: \"When was the last time?\" and \"What happened that time?\"       A couple in lifetime  7. CAUSE: \"What do you think is causing the painful urination?\"  (e.g., UTI, scratch, Herpes sore)      UTI  8. OTHER SYMPTOMS: \"Do you have any other symptoms?\" (e.g., flank pain, vaginal discharge, genital sores, urgency, blood in urine)      none  9. PREGNANCY: \"Is there any chance you are " "pregnant?\" \"When was your last menstrual period?\"      no    Protocols used: URINATION PAIN - FEMALE-A-[place]      "

## 2022-10-26 NOTE — TELEPHONE ENCOUNTER
Yanique Mar MD  You 57 minutes ago (1:51 PM)     RS  I would prefer to see her but I can sign off on a U/A provided she can come in and provide us a specimen on site.     Yanique

## 2022-10-26 NOTE — TELEPHONE ENCOUNTER
Patient calling back to check on request for UA only.  Triaged below.    PCP is out  Pended to covering Provider to review & advise

## 2022-10-26 NOTE — TELEPHONE ENCOUNTER
Work-In on 10/26 @ 1:40pm      LVM for patient to confirm if she can come-in.     PCP may approve UA only - if so, cancel this appt

## 2022-10-27 ENCOUNTER — TELEPHONE (OUTPATIENT)
Dept: FAMILY MEDICINE | Facility: OTHER | Age: 82
End: 2022-10-27

## 2022-10-27 DIAGNOSIS — R35.0 URINARY FREQUENCY: Primary | ICD-10-CM

## 2022-10-27 RX ORDER — GRANULES FOR ORAL 3 G/1
3 POWDER ORAL ONCE
Qty: 1 PACKET | Refills: 0 | Status: SHIPPED | OUTPATIENT
Start: 2022-10-27 | End: 2022-10-27

## 2022-10-27 NOTE — RESULT ENCOUNTER NOTE
I actually did send that in yesterday.  Fosfomycin is pending authorization.  Should be approved soon?

## 2022-10-28 ENCOUNTER — HOSPITAL ENCOUNTER (EMERGENCY)
Facility: HOSPITAL | Age: 82
Discharge: HOME | End: 2022-10-28
Attending: INTERNAL MEDICINE | Admitting: INTERNAL MEDICINE
Payer: COMMERCIAL

## 2022-10-28 ENCOUNTER — TELEPHONE (OUTPATIENT)
Dept: FAMILY MEDICINE | Facility: OTHER | Age: 82
End: 2022-10-28

## 2022-10-28 VITALS
HEART RATE: 65 BPM | RESPIRATION RATE: 18 BRPM | SYSTOLIC BLOOD PRESSURE: 139 MMHG | TEMPERATURE: 98 F | DIASTOLIC BLOOD PRESSURE: 85 MMHG | OXYGEN SATURATION: 95 %

## 2022-10-28 DIAGNOSIS — N30.00 ACUTE CYSTITIS WITHOUT HEMATURIA: ICD-10-CM

## 2022-10-28 LAB — BACTERIA UR CULT: NORMAL

## 2022-10-28 PROCEDURE — 99283 EMERGENCY DEPT VISIT LOW MDM: CPT

## 2022-10-28 PROCEDURE — 99283 EMERGENCY DEPT VISIT LOW MDM: CPT | Performed by: INTERNAL MEDICINE

## 2022-10-28 PROCEDURE — 250N000013 HC RX MED GY IP 250 OP 250 PS 637: Performed by: INTERNAL MEDICINE

## 2022-10-28 RX ORDER — CIPROFLOXACIN 500 MG/1
500 TABLET, FILM COATED ORAL ONCE
Status: COMPLETED | OUTPATIENT
Start: 2022-10-28 | End: 2022-10-28

## 2022-10-28 RX ORDER — CIPROFLOXACIN 500 MG/1
500 TABLET, FILM COATED ORAL 2 TIMES DAILY
Qty: 10 TABLET | Refills: 0 | Status: SHIPPED | OUTPATIENT
Start: 2022-10-28 | End: 2022-11-03

## 2022-10-28 RX ADMIN — CIPROFLOXACIN HYDROCHLORIDE 500 MG: 500 TABLET, FILM COATED ORAL at 23:27

## 2022-10-28 ASSESSMENT — ACTIVITIES OF DAILY LIVING (ADL): ADLS_ACUITY_SCORE: 35

## 2022-10-28 NOTE — TELEPHONE ENCOUNTER
Received PA from [employer] for Fosfomycin Tromethamine 3GM packets. Submitted on CMM. Waiting for a response.

## 2022-10-28 NOTE — TELEPHONE ENCOUNTER
Received APPROVAL from [employer] for Fosfomycin Tromethamine 3GM packets. Effective 09/28/2022-10/28/2023. Forms scanned to [facility].

## 2022-10-29 NOTE — ED NOTES
Patient states she had a urine sample taken on Tuesday to check for a UTI because she was having burning with urination and frequency. She states she got the results yesterday and was not started on anything. She states she is here because she wants something for the UTI. She states she doesn't have the burning now, but still has the frequency.

## 2022-10-29 NOTE — ED TRIAGE NOTES
States diagnosed this past Tuesday with a UTI and was not started on anything. She denies having burning with urination now, but still has frequency.      Triage Assessment     Row Name 10/28/22 [id number]       Triage Assessment (Adult)    Airway WDL WDL

## 2022-10-30 DIAGNOSIS — F41.9 ANXIETY: ICD-10-CM

## 2022-10-31 ENCOUNTER — ALLIED HEALTH/NURSE VISIT (OUTPATIENT)
Dept: FAMILY MEDICINE | Facility: OTHER | Age: 82
End: 2022-10-31
Attending: STUDENT IN AN ORGANIZED HEALTH CARE EDUCATION/TRAINING PROGRAM
Payer: COMMERCIAL

## 2022-10-31 VITALS — DIASTOLIC BLOOD PRESSURE: 60 MMHG | SYSTOLIC BLOOD PRESSURE: 130 MMHG

## 2022-10-31 DIAGNOSIS — I15.9 SECONDARY HYPERTENSION: Primary | ICD-10-CM

## 2022-10-31 PROCEDURE — G0463 HOSPITAL OUTPT CLINIC VISIT: HCPCS

## 2022-10-31 RX ORDER — CLONAZEPAM 0.5 MG/1
TABLET ORAL
Qty: 20 TABLET | Refills: 0 | Status: SHIPPED | OUTPATIENT
Start: 2022-10-31 | End: 2022-12-28

## 2022-10-31 ASSESSMENT — ENCOUNTER SYMPTOMS
PALPITATIONS: 0
HEADACHES: 0
NUMBNESS: 0
DIZZINESS: 0
ABDOMINAL PAIN: 0
BLOOD IN STOOL: 0
ANAL BLEEDING: 0
CHEST TIGHTNESS: 0
LIGHT-HEADEDNESS: 0
ABDOMINAL DISTENTION: 0
COUGH: 0
ARTHRALGIAS: 0
CHILLS: 0
MYALGIAS: 0
VOICE CHANGE: 0
WOUND: 0
COLOR CHANGE: 0
CONFUSION: 0
FREQUENCY: 1
HEMATURIA: 0
NECK STIFFNESS: 0
DYSURIA: 1
NAUSEA: 0
FEVER: 0
FLANK PAIN: 0
VOMITING: 0
DIAPHORESIS: 0
BACK PAIN: 0
SHORTNESS OF BREATH: 0
WHEEZING: 0
NECK PAIN: 0

## 2022-10-31 NOTE — TELEPHONE ENCOUNTER
clonazePAM (KLONOPIN) 0.5 MG tablet      Last Written Prescription Date:  9-15-22  Last Fill Quantity: 20,   # refills: 0  Last Office Visit: 9-8-22  Future Office visit:    Next 5 appointments (look out 90 days)    Nov 07, 2022 10:00 AM  (Arrive by 9:45 AM)  Return Visit with Ha Hughes DO  [facility] ([facility] [address]  [place] [address]  [phone]   Nov 10, 2022  1:00 PM  (Arrive by 12:45 PM)  SHORT with Yanique Mar MD  [facility] ([facility] ) [address]  [place] [address]  [phone]           Routing refill request to provider for review/approval because:  Drug not on the [facility], P or [facility] refill protocol or controlled substance

## 2022-11-01 NOTE — ED PROVIDER NOTES
History     Chief Complaint   Patient presents with     UTI     The history is provided by the patient.   Dysuria  Pain quality:  Aching  Pain severity:  Mild  Timing:  Constant  Chronicity:  Recurrent  Associated symptoms: no abdominal pain, no fever, no flank pain, no nausea and no vomiting          Allergies:  Allergies   Allergen Reactions     Chocolate Hives     Lemon Flavor Hives     Lime [Calcium Oxysulfide] Hives     Metal [Staples]      Orange Fruit [Citrus] Hives     Strawberry Hives     Adhesive Tape      Band-aids     Codeine Hives     Patient can tolerate oxycodone & dilaudid     Erythromycin Hives     ERYTHROMYCIN BASE     Food      Tomato, grapefruit, oranges.     Grapefruit [Extra Strength Grapefruit]      GRAPEFRUIT     Morphine Hives     Pt. Reports had hives     Penicillins Hives     Ranitidine GI Disturbance     Sulfa Drugs      SULFONAMIDE ANTIBIOTICS      Tomato      Xyzal [Levocetirizine] Hives       Problem List:    Patient Active Problem List    Diagnosis Date Noted     Other chest pain 10/20/2021     Priority: Medium     Hiatal hernia 07/28/2021     Priority: Medium     Chronic, continuous use of opioids 05/17/2021     Priority: Medium     Stage 3a chronic kidney disease (H) 03/22/2021     Priority: Medium     COVID-19 virus infection on 10/30/20 11/24/2020     Priority: Medium     10-       Other neutropenia (H) 08/04/2020     Priority: Medium     Paresthesias 02/13/2020     Priority: Medium     Status post total left knee replacement 09/09/2019     Priority: Medium     Aortic valve insufficiency 03/06/2019     Priority: Medium     Mitral regurgitation 03/06/2019     Priority: Medium     Tricuspid valve insufficiency 03/06/2019     Priority: Medium     Diastolic dysfunction grade 1 on 2/21/19 03/06/2019     Priority: Medium     Hypertriglyceridemia 03/06/2019     Priority: Medium     Palpitations 02/13/2019     Priority: Medium     PVC's (premature ventricular contractions)  02/13/2019     Priority: Medium     Atrial ectopy 02/13/2019     Priority: Medium     PSVT (paroxysmal supraventricular tachycardia) (H) 02/13/2019     Priority: Medium     COPD, mild on 9/9/14 02/13/2019     Priority: Medium     Bilateral carotid artery stenosis at less than 50% on 12/1/16 02/13/2019     Priority: Medium     Mixed hyperlipidemia 02/13/2019     Priority: Medium     On statin therapy 02/13/2019     Priority: Medium     Heart murmur 02/13/2019     Priority: Medium     Morbid obesity (H) 06/01/2017     Priority: Medium     ACP (advance care planning) 04/28/2016     Priority: Medium     Advance Care Planning 4/28/2016: ACP Review of Chart / Resources Provided:  Reviewed chart for advance care plan.  Dinorah WILLIAM Lim has no plan or code status on file. Discussed available resources and provided with information. Confirmed code status reflects current choices pending further ACP discussions.  Confirmed/documented legally designated decision makers.  Added by Aditi Gandhi             Anxiety 06/23/2014     Priority: Medium     Lung density on x-ray 07/23/2013     Priority: Medium     Osteoarthritis 06/14/2012     Priority: Medium     Problem list name updated by automated process. Provider to review       Advanced care planning/counseling discussion 01/13/2012     Priority: Medium     Abnormal glucose 08/01/2011     Priority: Medium     Hgb A1c 5.7 on 1/4/2015  Problem list name updated by automated process. Provider to review       Osteoarthritis of right hip 10/07/2004     Priority: Medium     Urticaria 06/01/2004     Priority: Medium     Problem list name updated by automated process. 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     Obesity, Class II, BMI 35-39.9 1/4/2015     Osteoarthritis of right hip 10/07/2004     Osteoarthrosis 06/14/2012     Otitis externa 10/04/2011     Prediabetes 08/01/2011       Past Surgical History:    Past Surgical History:   Procedure Laterality Date     ARTHROPLASTY KNEE Left 9/9/2019    Procedure: LEFT TOTAL KNEE ARTHROPLASTY S/N;  Surgeon: Trevor Alonzo MD;  Location: [facility]     ARTHROSCOPY KNEE Left 3/21/2019    Procedure: LEFT  KNEE ARTHROSCOPY, partial medial menisectomy;  Surgeon: Etseban Wills MD;  Location: [facility]     CLOSED REDUCTION WRIST Right 1952 x 2    long arm cast (same time as elbow fx)     CLOSED RX ELBOW DISLOCATION Right 1952    CR/long cast of fracture     EXCISE MASS FOOT Left 8/16/2021    Procedure: LEFT ANKLE MASS EXCISION;  Surgeon: Trevor Alonzo MD;  Location: [facility]     HC INJ EPIDURAL LUMBAR/SACRAL W/WO CONTRAST Bilateral 5/2013    facet injections; prev 2012     LAPAROSCOPIC CHOLECYSTECTOMY N/A 1/4/2015    Procedure: LAPAROSCOPIC CHOLECYSTECTOMY;  Surgeon: Brittney العلي MD;  Location: [facility]     TONSILLECTOMY       ZZC TOTAL KNEE ARTHROPLASTY Left 09/09/2019    Dr Alonzo       Family History:    Family History   Problem Relation Age of Onset     Heart Disease Brother         atrial fibrillation     Atrial fibrillation Brother      Other - See Comments Mother         emphysema     Heart Disease Father 92        CHF     Cancer Paternal Grandfather         lung cancer     Cancer Maternal Uncle         lung cancer     Chronic Obstructive Pulmonary Disease Daughter      Emphysema Daughter      Other - See Comments Daughter         benign breast lesion     Esophagitis Daughter        Social History:  Marital Status:  Single [1]  Social History     Tobacco Use     Smoking status: Never     Smokeless tobacco:  Never   Vaping Use     Vaping Use: Never used   Substance Use Topics     Alcohol use: No     Drug use: No        Medications:    acetaminophen (TYLENOL) 325 MG tablet  Calcium-Magnesium-Vitamin D (CALCIUM 1200+D3 PO)  ciprofloxacin (CIPRO) 500 MG tablet  clonazePAM (KLONOPIN) 1 MG tablet  FISH OIL  ipratropium - albuterol 0.5 mg/2.5 mg/3 mL (DUONEB) 0.5-2.5 (3) MG/3ML neb solution  PARoxetine (PAXIL) 40 MG tablet  simvastatin (ZOCOR) 40 MG tablet  VITAMIN D, CHOLECALCIFEROL, PO  clonazePAM (KLONOPIN) 0.5 MG tablet          Review of Systems   Constitutional: Negative for chills, diaphoresis and fever.   HENT: Negative for voice change.    Eyes: Negative for visual disturbance.   Respiratory: Negative for cough, chest tightness, shortness of breath and wheezing.    Cardiovascular: Negative for chest pain, palpitations and leg swelling.   Gastrointestinal: Negative for abdominal distention, abdominal pain, anal bleeding, blood in stool, nausea and vomiting.   Genitourinary: Positive for dysuria and frequency. Negative for decreased urine volume, flank pain and hematuria.   Musculoskeletal: Negative for arthralgias, back pain, gait problem, myalgias, neck pain and neck stiffness.   Skin: Negative for color change, pallor, rash and wound.   Neurological: Negative for dizziness, syncope, light-headedness, numbness and headaches.   Psychiatric/Behavioral: Negative for confusion and suicidal ideas.       Physical Exam   BP: 155/85  Pulse: 65  Temp: 98  F (36.7  C)  Resp: 18  SpO2: 95 %      Physical Exam  Vitals and nursing note reviewed.   Constitutional:       Appearance: She is well-developed and well-nourished.   HENT:      Head: Normocephalic and atraumatic.      Mouth/Throat:      Pharynx: No oropharyngeal exudate.   Eyes:      Conjunctiva/sclera: Conjunctivae normal.      Pupils: Pupils are equal, round, and reactive to light.   Neck:      Thyroid: No thyromegaly.      Vascular: No JVD.      Trachea: No tracheal  deviation.   Cardiovascular:      Rate and Rhythm: Normal rate and regular rhythm.      Pulses: Intact distal pulses.      Heart sounds: Normal heart sounds. No murmur heard.    No friction rub. No gallop.   Pulmonary:      Effort: Pulmonary effort is normal. No respiratory distress.      Breath sounds: Normal breath sounds. No stridor. No wheezing or rales.   Chest:      Chest wall: No tenderness.   Abdominal:      General: Bowel sounds are normal. There is no distension.      Palpations: Abdomen is soft. There is no mass.      Tenderness: There is no abdominal tenderness. There is no guarding or rebound.   Musculoskeletal:         General: No tenderness or edema. Normal range of motion.      Cervical back: Normal range of motion and neck supple.   Lymphadenopathy:      Cervical: No cervical adenopathy.   Skin:     General: Skin is warm and dry.      Coloration: Skin is not pale.      Findings: No erythema or rash.   Neurological:      Mental Status: She is alert and oriented to person, place, and time.   Psychiatric:         Behavior: Behavior normal.         ED Course                 Procedures                  No results found for this or any previous visit (from the past 24 hour(s)).    Medications   ciprofloxacin (CIPRO) tablet 500 mg (500 mg Oral Given 10/28/22 2327)       Assessments & Plan (with Medical Decision Making)   Mild urinary symptoms  UA was done outpatient that was positive  cipro started  Follow-up with PCP  I have reviewed the nursing notes.    I have reviewed the findings, diagnosis, plan and need for follow up with the patient.      Discharge Medication List as of 10/28/2022 11:29 PM      START taking these medications    Details   ciprofloxacin (CIPRO) 500 MG tablet Take 1 tablet (500 mg) by mouth 2 times daily for 5 days, Disp-10 tablet, R-0, E-Prescribe             Final diagnoses:   Acute cystitis without hematuria       10/28/2022   [facility]     Jamaal Varghese MD  10/31/22  1919

## 2022-11-03 ENCOUNTER — HOSPITAL ENCOUNTER (EMERGENCY)
Facility: HOSPITAL | Age: 82
Discharge: HOME | End: 2022-11-03
Attending: PHYSICIAN ASSISTANT | Admitting: PHYSICIAN ASSISTANT
Payer: COMMERCIAL

## 2022-11-03 ENCOUNTER — APPOINTMENT (OUTPATIENT)
Dept: CT IMAGING | Facility: HOSPITAL | Age: 82
End: 2022-11-03
Attending: PHYSICIAN ASSISTANT
Payer: COMMERCIAL

## 2022-11-03 VITALS
SYSTOLIC BLOOD PRESSURE: 146 MMHG | TEMPERATURE: 97.8 F | OXYGEN SATURATION: 97 % | HEART RATE: 82 BPM | DIASTOLIC BLOOD PRESSURE: 90 MMHG | RESPIRATION RATE: 18 BRPM

## 2022-11-03 DIAGNOSIS — S00.11XA CONTUSION OF RIGHT EYEBROW, INITIAL ENCOUNTER: ICD-10-CM
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
## 2022-11-03 DIAGNOSIS — S16.1XXA CERVICAL MUSCLE STRAIN, INITIAL ENCOUNTER: ICD-10-CM

## 2022-11-03 PROCEDURE — 99213 OFFICE O/P EST LOW 20 MIN: CPT | Performed by: PHYSICIAN ASSISTANT

## 2022-11-03 PROCEDURE — 70450 CT HEAD/BRAIN W/O DYE: CPT

## 2022-11-03 PROCEDURE — 72125 CT NECK SPINE W/O DYE: CPT

## 2022-11-03 PROCEDURE — G0463 HOSPITAL OUTPT CLINIC VISIT: HCPCS

## 2022-11-03 ASSESSMENT — ENCOUNTER SYMPTOMS
SHORTNESS OF BREATH: 0
EYE REDNESS: 0
NAUSEA: 0
FLANK PAIN: 0
NECK PAIN: 1
VOMITING: 0
TREMORS: 0
HEADACHES: 0
SORE THROAT: 0
FACIAL SWELLING: 1
EYE PAIN: 0
FACIAL ASYMMETRY: 1
COUGH: 0
AGITATION: 0
BACK PAIN: 0
SEIZURES: 0
FEVER: 0

## 2022-11-03 ASSESSMENT — ACTIVITIES OF DAILY LIVING (ADL): ADLS_ACUITY_SCORE: 35

## 2022-11-04 NOTE — ED TRIAGE NOTES
Pt presents with pain to her right side of her head when she fell going up her stairs due to 0 streetlights being on. States she has right neck pain also.

## 2022-11-04 NOTE — ED PROVIDER NOTES
"  History     Chief Complaint   Patient presents with     Head Injury     HPI  Dinorah Lim is a 82 year old female who reports that she fell while going up stairs hitting the right side of her head.  She reports she tripped on the steps because there was \"no street lights were on\".  He is also complaining of some right sided muscular neck pain.  Denies any numbness or tingling.  She reports she had her right knee and right shoulder as well but denies the need to image this.  She is not on any blood thinners.  She did not lose any consciousness.  She noticed some swelling to her right eyebrow.    Allergies:  Allergies   Allergen Reactions     Chocolate Hives     Lemon Flavor Hives     Lime [Calcium Oxysulfide] Hives     Metal [Staples]      Orange Fruit [Citrus] Hives     Strawberry Hives     Adhesive Tape      Band-aids     Codeine Hives     Patient can tolerate oxycodone & dilaudid     Erythromycin Hives     ERYTHROMYCIN BASE     Food      Tomato, grapefruit, oranges.     Grapefruit [Extra Strength Grapefruit]      GRAPEFRUIT     Morphine Hives     Pt. Reports had hives     Penicillins Hives     Ranitidine GI Disturbance     Sulfa Drugs      SULFONAMIDE ANTIBIOTICS      Tomato      Xyzal [Levocetirizine] Hives       Problem List:    Patient Active Problem List    Diagnosis Date Noted     Other chest pain 10/20/2021     Priority: Medium     Hiatal hernia 07/28/2021     Priority: Medium     Chronic, continuous use of opioids 05/17/2021     Priority: Medium     Stage 3a chronic kidney disease (H) 03/22/2021     Priority: Medium     COVID-19 virus infection on 10/30/20 11/24/2020     Priority: Medium     10-       Other neutropenia (H) 08/04/2020     Priority: Medium     Paresthesias 02/13/2020     Priority: Medium     Status post total left knee replacement 09/09/2019     Priority: Medium     Aortic valve insufficiency 03/06/2019     Priority: Medium     Mitral regurgitation 03/06/2019     Priority: Medium "     Tricuspid valve insufficiency 03/06/2019     Priority: Medium     Diastolic dysfunction grade 1 on 2/21/19 03/06/2019     Priority: Medium     Hypertriglyceridemia 03/06/2019     Priority: Medium     Palpitations 02/13/2019     Priority: Medium     PVC's (premature ventricular contractions) 02/13/2019     Priority: Medium     Atrial ectopy 02/13/2019     Priority: Medium     PSVT (paroxysmal supraventricular tachycardia) (H) 02/13/2019     Priority: Medium     COPD, mild on 9/9/14 02/13/2019     Priority: Medium     Bilateral carotid artery stenosis at less than 50% on 12/1/16 02/13/2019     Priority: Medium     Mixed hyperlipidemia 02/13/2019     Priority: Medium     On statin therapy 02/13/2019     Priority: Medium     Heart murmur 02/13/2019     Priority: Medium     Morbid obesity (H) 06/01/2017     Priority: Medium     ACP (advance care planning) 04/28/2016     Priority: Medium     Advance Care Planning 4/28/2016: ACP Review of Chart / Resources Provided:  Reviewed chart for advance care plan.  Dinorah Lim has no plan or code status on file. Discussed available resources and provided with information. Confirmed code status reflects current choices pending further ACP discussions.  Confirmed/documented legally designated decision makers.  Added by Aditi Gandhi             Anxiety 06/23/2014     Priority: Medium     Lung density on x-ray 07/23/2013     Priority: Medium     Osteoarthritis 06/14/2012     Priority: Medium     Problem list name updated by automated process. Provider to review       Advanced care planning/counseling discussion 01/13/2012     Priority: Medium     Abnormal glucose 08/01/2011     Priority: Medium     Hgb A1c 5.7 on 1/4/2015  Problem list name updated by automated process. Provider to review       Osteoarthritis of right hip 10/07/2004     Priority: Medium     Urticaria 06/01/2004     Priority: Medium     Problem list name updated by automated process. 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     Obesity, Class II, BMI 35-39.9 1/4/2015     Osteoarthritis of right hip 10/07/2004     Osteoarthrosis 06/14/2012     Otitis externa 10/04/2011     Prediabetes 08/01/2011       Past Surgical History:    Past Surgical History:   Procedure Laterality Date     ARTHROPLASTY KNEE Left 9/9/2019    Procedure: LEFT TOTAL KNEE ARTHROPLASTY S/N;  Surgeon: Trevor Alonzo MD;  Location: [facility]     ARTHROSCOPY KNEE Left 3/21/2019    Procedure: LEFT  KNEE ARTHROSCOPY, partial medial menisectomy;  Surgeon: Esteban Wills MD;  Location: [facility]     CLOSED REDUCTION WRIST Right 1952 x 2    long arm cast (same time as elbow fx)     CLOSED RX ELBOW DISLOCATION Right 1952    CR/long cast of fracture     EXCISE MASS FOOT Left 8/16/2021    Procedure: LEFT ANKLE MASS EXCISION;  Surgeon: Trevor Alonzo MD;  Location: [facility]     HC INJ EPIDURAL LUMBAR/SACRAL W/WO CONTRAST Bilateral 5/2013    facet injections; prev 2012     LAPAROSCOPIC CHOLECYSTECTOMY N/A 1/4/2015    Procedure: LAPAROSCOPIC CHOLECYSTECTOMY;  Surgeon: Brittney العلي MD;  Location: [facility]     TONSILLECTOMY       ZZC TOTAL KNEE ARTHROPLASTY Left 09/09/2019    Dr Alonzo       Family History:    Family History   Problem Relation Age of Onset     Heart Disease Brother         atrial fibrillation     Atrial fibrillation Brother      Other - See Comments Mother         emphysema     Heart Disease Father 92        CHF     Cancer Paternal Grandfather         lung cancer     Cancer Maternal Uncle         lung cancer      Chronic Obstructive Pulmonary Disease Daughter      Emphysema Daughter      Other - See Comments Daughter         benign breast lesion     Esophagitis Daughter        Social History:  Marital Status:  Single [1]  Social History     Tobacco Use     Smoking status: Never     Smokeless tobacco: Never   Vaping Use     Vaping Use: Never used   Substance Use Topics     Alcohol use: No     Drug use: No        Medications:    acetaminophen (TYLENOL) 325 MG tablet  Calcium-Magnesium-Vitamin D (CALCIUM 1200+D3 PO)  clonazePAM (KLONOPIN) 0.5 MG tablet  clonazePAM (KLONOPIN) 1 MG tablet  FISH OIL  ipratropium - albuterol 0.5 mg/2.5 mg/3 mL (DUONEB) 0.5-2.5 (3) MG/3ML neb solution  PARoxetine (PAXIL) 40 MG tablet  simvastatin (ZOCOR) 40 MG tablet  VITAMIN D, CHOLECALCIFEROL, PO          Review of Systems   Constitutional: Negative for fever.   HENT: Positive for facial swelling. Negative for sore throat.         Right eyebrow swelling   Eyes: Negative for pain and redness.   Respiratory: Negative for cough and shortness of breath.    Gastrointestinal: Negative for nausea and vomiting.   Genitourinary: Negative for flank pain.   Musculoskeletal: Positive for neck pain. Negative for back pain.   Skin: Negative for pallor.   Neurological: Positive for facial asymmetry. Negative for tremors, seizures and headaches.        Right eyebrow swelling   Psychiatric/Behavioral: Negative for agitation.   All other systems reviewed and are negative.      Physical Exam   BP: 146/90  Pulse: 82  Temp: 97.8  F (36.6  C)  Resp: 18  SpO2: 97 %      Physical Exam  Vitals and nursing note reviewed.   Constitutional:       General: She is not in acute distress.     Appearance: Normal appearance. She is not ill-appearing or toxic-appearing.   HENT:      Head: Normocephalic.        Comments: Minimal right upper eyebrow swelling     Nose: Nose normal.   Eyes:      General: No scleral icterus.     Extraocular Movements: Extraocular movements intact.    Neck:      Trachea: No tracheal deviation.      Comments: Neck with minimal muscular tenderness.  She is noted to be moving her head without any signs of pain.  No crepitus.  No spinous process tenderness.  Cardiovascular:      Rate and Rhythm: Normal rate.   Pulmonary:      Effort: Pulmonary effort is normal. No respiratory distress.      Breath sounds: No stridor.      Comments: SaO2 is 97% on room air.  She does not appear to be in any respiratory distress.  No tachypnea.  Musculoskeletal:         General: Normal range of motion.      Cervical back: Normal range of motion. Signs of trauma present. No rigidity or crepitus. Muscular tenderness present. No pain with movement or spinous process tenderness. Normal range of motion.      Comments: She is noted to be moving her right arm with no signs of impairment.  CMS x4   Skin:     General: Skin is warm and dry.      Coloration: Skin is not jaundiced or pale.   Neurological:      General: No focal deficit present.      Mental Status: She is alert and oriented to person, place, and time.   Psychiatric:         Attention and Perception: Attention normal.         Mood and Affect: Mood normal.         ED Course     CT of the patient's head shows no acute findings.    CT cervical area shows no acute fracture.  Multilevel cervical spondylosis.      No results found for this or any previous visit (from the past 24 hour(s)).    Medications - No data to display    Assessments & Plan (with Medical Decision Making)     I have reviewed the nursing notes.    I have reviewed the findings, diagnosis, plan and need for follow up with the patient.      New Prescriptions    No medications on file       Final diagnoses:   Fall on stairs, initial encounter   Contusion of right eyebrow, initial encounter   Cervical muscle strain, initial encounter     Dinorah Lim is a 82 year old female who reports that she fell while going up stairs hitting the right side of her head.  She reports  "she tripped on the steps because there was \"no street lights were on\".  He is also complaining of some right sided muscular neck pain.  Denies any numbness or tingling.  She reports she had her right knee and right shoulder as well but denies the need to image this.  She is not on any blood thinners.  She did not lose any consciousness.  She noticed some swelling to her right eyebrow.  Physical exam does show some right eyebrow swelling and contusion.  And some cervical muscle tenderness.  CT of the patient's head and neck show no acute findings which is reassuring.  I discussed ice to help reduce swelling in her right eye.  Continue to monitor return if there is any concerns for further evaluation as needed.  .      11/3/2022   [facility]     Terry Partida PA-C  11/03/22 0015    "

## 2022-11-04 NOTE — ED TRIAGE NOTES
Patient presents from a fall up her stairs. States she tripped up them as she doesn't have a light. Hit her knee as well as her shoulder and above her right eye as she hit it on the door of her house. She is not on blood thinner and did not loose consciousness

## 2022-11-06 NOTE — PROGRESS NOTES
[facility]   CARDIOLOGY PROGRESS NOTE     Chief Complaint   Patient presents with     Follow Up          Diagnosis:  1.  Diastolic dysfunction, grade 1 on 2/21/19, off diuretics.  2.  Carotid artery dz. <50%, US from 8/4/21.  3.  Moderate AI on 2/24/22 and 10/11/22. Mild/moderate on 1/28/2021.  4.  MR-trace to mild on 1/28/2021.  5.  TR-mild on 10/11/22.  6.  COPD-minimal on 9/9/14. H/O second hand smoke from mom who smoked 3/day.  1/day. No primary tobacco usage.   7.  Morbid obesity with BMI of 35.  8.  Hyperlipidemia-controlled.  9.  Atrial ectopy.  10.  Palpitations.  11.  PSVT.  12.  PVC's.  13.  On statin therapy.  14.  Hypertriglyceridemia-controlled.  15.  Moderate hiatal hernia on 3/18/2020 on a CTA of the chest.  16.  COVID-19 (+) on 10/30/20.  17.  Vitamin D deficiency at 17 on 1/28/15.      Assessment/Plan:    1.  Palpitations: Happens regularly and surrounds anxiety/panic attacks.  Her daughter describes her as having significant anxiety with panic attacks.  We reviewed her Zio patch in 2018.  No A. fib, heart block, or V. tach.  Patient had SVE, VE, and other dysrhythmias.  Repeat Zio patch on 10/12/2022.  Shows x2 episodes of VT lasting up to 6 beats and x121 episodes of SVT lasting up to 22.6 seconds.  No A. fib, pauses, or heart block.  Findings discussed, patient reassured.  Patient declined medication even though she is having frequent palpitations.  2.  Valvular issues: Has moderate AI with mild  MR.  Patient concerned.  Findings reviewed using the heart model in the room.  She is concerned about her heart.  We will plan for an echocardiogram in 1 year with follow-up after.  Not having shortness of breath or peripheral edema.    3.  Follow-up 1 year after completion of echocardiogram      Interval history:  Dinorah is doing well.  She is with her daughter Feng today.  She described as having significant anxiety with panic attacks.  She has been having palpitations regularly.  She  was concerned she has had a heart attack but not had anginal symptoms.  She has not any chest pain, chest tightness, or chest discomfort. Her daughter believes that her symptoms are more anxiety related.  We did discuss her Zio patch from 2018.  At that time, she had SVE and VE.  She had x1 episode of VT lasting x5 beats and x26 runs of SVT lasting up to 20 beats.  Findings reviewed, patient reassured.  She does not have a history of A. fib.  Had repeat Zio patch on 10/12/2022.  Found evidence of x2 VT lasting up to 6 beats.  x121 episodes of SVT lasting up to 22.6 seconds.  Also, had SVE and VE.  Finding discussed.  Patient reassured.  Discussed potentially starting medications but declined.  She continues to be concerned about valvular issues.  Her echocardiogram on 10/11/2022 is relatively unchanged from her last echocardiogram.  She had moderate AI with mild TR/MI.  Otherwise, her EF is normal without other significant abnormalities.  We will plan for an echocardiogram in 1 year.  We will cancel her appointment in April as scheduled.  We will see her in 1 year after completion of echocardiogram.    HPI:    She has been concerned with a heart murmur as well as bilateral carotid artery stenosis.  She had echocardiogram completed on 2/21/19 as well as an ultrasound of the carotids on 2/21/19.  She is here for those results.       Previously, we reviewed her Zio patch results from 12/31/18 which showed rare PVC's, x1 run of NSVT 5 beats, PAC's, and x26 episodes of PSVT lasting up to 20 beats.     She also has a history of mild carotid artery disease at less than 50% with mild plaquing on 12/1/16.  She does not have a personal history of smoking but was around secondhand smoke in the past.  She had a ultrasound of her carotids on 2/20/19 that showed atherosclerotic plaquing in both carotid bifurcations.  There was no significant hemo-dynamic stenosis.     Her echocardiogram from 2/21/19 showed an EF of 65-70%, grade  1 diastolic dysfunction, mild left atrial enlargement, mild to moderate aortic insufficiency, trace to mild tricuspid insufficiency, and trace to mild mitral insufficiency.  She is concerned about her valvular insufficiency.  She will have an echocardiogram in approximately 2 years to follow-up on the mild to moderate aortic insufficiency.      Relevant testing:  Echocardiogram on 10/11/2022:  Left ventricular size, wall motion and function are normal. The ejection  fraction is 55-60%.  The right ventricle is normal size. Global right ventricular function is normal.  Moderate aortic insufficiency is present.  Right ventricular systolic pressure is 40 mmHg above the right atrial pressure.  IVC diameter <2.1 cm collapsing >50% with sniff suggests a normal RA pressure  of 3 mmHg.    ECHO on 2/24/22:  Left ventricular size, wall motion and function are normal. The ejection fraction is 60-65%.  Global right ventricular function is normal.  Mild to moderate mitral insufficiency is present.  Moderate aortic insufficiency is present.  The inferior vena cava is normal.  No pericardial effusion is present.  Compared to prior imaging on 1/28/2021 There is mildly increased PI, MR and AI.   The subtle change is confirmed on visual inspection.    ECHO on 1/28/21:  No pericardial effusion is present.  Global and regional left ventricular function is normal with an EF of 55-60%.  The right ventricle is normal size.  Global right ventricular function is normal.  Both atria appear normal.  Mild mitral insufficiency is present.  The aortic valve is tricuspid.  Mild to moderate aortic insufficiency is present.  AI unchanged from previous ECHO 2/21/19  The mean gradient across the aortic valve is 5.1 mmHg.  Trace tricuspid insufficiency is present.  Trace pulmonic insufficiency is present.  The aorta root is normal.    ECHO on 2/21/19:  No pericardial effusion is present.  Global and regional left ventricular function is hyperkinetic  with an EF of  65-70%.  Grade I or early diastolic dysfunction.  The right ventricle is normal size.  Global right ventricular function is normal.  Mild left atrial enlargement is present.  Trace to mild mitral insufficiency is present.  The aortic valve is tricuspid.  Mild to moderate aortic insufficiency is present.  Trace to mild tricuspid insufficiency is present.  The peak velocity of the tricuspid regurgitant jet is not obtainable.  The pulmonic valve is normal.  The aorta root is normal.    Zio patch from 12/31/2018:  The enrollment period was from 12/31/18 through 1/7/19.  There were 6 days and 12 hours of analysis time available for review.  The minimum heart rate was 49, average heart rate 66 and maximum heart rate 200 bpm.  The patient has underlying sinus rhythm throughout.  There is no significant bradycardia pauses or heart block seen.  There were very rare isolated PVC's.  There was a 5 beat run of ventricular tachycardia.  With an average heart rate of 113 bpm.  This was the only run.  There are rare PAC's seen.  Less than 1% of total beats.  There were 26 episodes of a supraventricular tachycardia greater than 4 beats.  The longest was for 20 beats with an average heart rate of 108 bpm.  The fastest was for 11 beats with a maximum heart rate of 200 bpm but an average heart rate of 134 bpm.  Review of some of these tracings show that it likely is an atrial tachycardia  There were 3 triggered events all 3 of these strips do have PAC's seen.  1 of them had a very slower run of supraventricular tachycardia average heart rate of 113 bpm.  There were 3 diary entries with the symptom of skipped irregular beats.  Review of the associated strips show all had sinus rhythm to them had supraventricular beats.    US carotids on 2/21/22:  No ultrasound evidence of hemo-dynamically significant stenosis.   Small volume of calcified plaque again seen in the left internal  carotid artery.          ICD-10-CM    1.  Palpitations  R00.2       2. Mitral regurgitation  I34.0 Echocardiogram Complete      3. PVC's (premature ventricular contractions)  I49.3       4. PSVT (paroxysmal supraventricular tachycardia) (H)  I47.1       5. Tricuspid valve insufficiency  I36.1 Echocardiogram Complete      6. Heart murmur  R01.1 Echocardiogram Complete      7. Diastolic dysfunction grade 1 on 2/21/19  I51.89 Echocardiogram Complete      8. Bilateral carotid artery stenosis at less than 50% on 12/1/16  I65.23       9. Atrial ectopy  I49.1       10. Aortic valve insufficiency  I35.1       11. Stage 3a chronic kidney disease (H)  N18.31       12. On statin therapy  Z79.899       13. Mixed hyperlipidemia  E78.2       14. Morbid obesity (H)  E66.01       15. 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    Anterior Segment V 4 cm nodule abutting liver surface by US 1/4/2015      Obesity, Class II, BMI 35-39.9 1/4/2015    BMI 35.9 with comorbidities = MORBID obesity     Osteoarthritis of right hip 10/07/2004     Osteoarthrosis 06/14/2012     Otitis externa 10/04/2011     Prediabetes 08/01/2011       Past Surgical History:   Procedure Laterality Date     ARTHROPLASTY KNEE Left 9/9/2019    Procedure: LEFT TOTAL KNEE ARTHROPLASTY S/N;  Surgeon: Trevor Alonzo MD;  Location: [facility]     ARTHROSCOPY KNEE Left 3/21/2019    Procedure: LEFT  KNEE ARTHROSCOPY, partial medial menisectomy;  Surgeon:  Esteban Wills MD;  Location: [facility]     CLOSED REDUCTION WRIST Right 1952 x 2    long arm cast (same time as elbow fx)     CLOSED RX ELBOW DISLOCATION Right 1952    CR/long cast of fracture     EXCISE MASS FOOT Left 8/16/2021    Procedure: LEFT ANKLE MASS EXCISION;  Surgeon: Trevor Alonzo MD;  Location: [facility]     HC INJ EPIDURAL LUMBAR/SACRAL W/WO CONTRAST Bilateral 5/2013    facet injections; prev 2012     LAPAROSCOPIC CHOLECYSTECTOMY N/A 1/4/2015    Procedure: LAPAROSCOPIC CHOLECYSTECTOMY;  Surgeon: Brittney العلي MD;  Location: [facility]     TONSILLECTOMY       ZZC TOTAL KNEE ARTHROPLASTY Left 09/09/2019    Dr Alonzo       Allergies   Allergen Reactions     Chocolate Hives     Lemon Flavor Hives     Lime [Calcium Oxysulfide] Hives     Metal [Staples]      Orange Fruit [Citrus] Hives     Strawberry Hives     Adhesive Tape      Band-aids     Codeine Hives     Patient can tolerate oxycodone & dilaudid     Erythromycin Hives     ERYTHROMYCIN BASE     Food      Tomato, grapefruit, oranges.     Grapefruit [Extra Strength Grapefruit]      GRAPEFRUIT     Morphine Hives     Pt. Reports had hives     Penicillins Hives     Ranitidine GI Disturbance     Sulfa Drugs      SULFONAMIDE ANTIBIOTICS      Tomato      Xyzal [Levocetirizine] Hives       Current Outpatient Medications   Medication Sig Dispense Refill     acetaminophen (TYLENOL) 325 MG tablet Take 325-650 mg by mouth every 6 hours as needed for mild pain       Calcium-Magnesium-Vitamin D (CALCIUM 1200+D3 PO) Take by mouth daily       clonazePAM (KLONOPIN) 0.5 MG tablet TAKE 1/2 (ONE-HALF) TABLET BY MOUTH NIGHTLY AS NEEDED FOR ANXIETY 20 tablet 0     clonazePAM (KLONOPIN) 1 MG tablet TAKE 1/4 TO 1/2 (ONE-FOURTH TO ONE-HALF) TABLET BY MOUTH EVERY 8 HOURS AS NEEDED 15 tablet 0     FISH OIL Take 1 capsule by mouth daily        ipratropium - albuterol 0.5 mg/2.5 mg/3 mL (DUONEB) 0.5-2.5 (3) MG/3ML neb solution Take 1 vial (3 mLs) by nebulization every 6 hours as  needed for shortness of breath / dyspnea or wheezing 3 mL 1     PARoxetine (PAXIL) 40 MG tablet Take 1 tablet by mouth once daily 90 tablet 1     simvastatin (ZOCOR) 40 MG tablet TAKE 1 TABLET BY MOUTH AT BEDTIME 90 tablet 0     VITAMIN D, CHOLECALCIFEROL, PO Take 2,000 Units by mouth daily         Social History     Socioeconomic History     Marital status: Single     Spouse name: Not on file     Number of children: 4     Years of education: 12     Highest education level: Not on file   Occupational History     Occupation: personal care attendant   Tobacco Use     Smoking status: Never     Smokeless tobacco: Never   Vaping Use     Vaping Use: Never used   Substance and Sexual Activity     Alcohol use: No     Drug use: No     Sexual activity: Never   Other Topics Concern      Service Not Asked     Blood Transfusions Yes     Caffeine Concern Yes     Comment: >32 oz soda/day     Occupational Exposure Not Asked     Hobby Hazards Not Asked     Sleep Concern Yes     Comment: insomnia     Stress Concern Not Asked     Weight Concern Not Asked     Special Diet Not Asked     Back Care Not Asked     Exercise Not Asked     Bike Helmet Not Asked     Seat Belt Not Asked     Self-Exams Not Asked     Parent/sibling w/ CABG, MI or angioplasty before 65F 55M? No   Social History Narrative     Not on file     Social Determinants of Health     Financial Resource Strain: Not on file   Food Insecurity: Not on file   Transportation Needs: Not on file   Physical Activity: Not on file   Stress: Not on file   Social Connections: Not on file   Intimate Partner Violence: Not on file   Housing Stability: Not on file       LAB RESULTS:   Orders Only on 02/10/2021   Component Date Value Ref Range Status     Sodium 02/10/2021 139  133 - 144 mmol/L Final     Potassium 02/10/2021 4.0  3.4 - 5.3 mmol/L Final     Chloride 02/10/2021 108  94 - 109 mmol/L Final     Carbon Dioxide 02/10/2021 30  20 - 32 mmol/L Final     Anion Gap 02/10/2021 1* 3  "- 14 mmol/L Final     Glucose 02/10/2021 104* 70 - 99 mg/dL Final     Urea Nitrogen 02/10/2021 15  7 - 30 mg/dL Final     Creatinine 02/10/2021 1.00  0.52 - 1.04 mg/dL Final     GFR Estimate 02/10/2021 54* >60 mL/min/[1.73_m2] Final     GFR Estimate If Black 02/10/2021 62  >60 mL/min/[1.73_m2] Final     Calcium 02/10/2021 9.5  8.5 - 10.1 mg/dL Final     Hemoglobin A1C 02/10/2021 5.0  0 - 5.6 % Final     ALT 02/10/2021 27  0 - 50 U/L Final     AST 02/10/2021 20  0 - 45 U/L Final     Cholesterol 02/10/2021 177  <200 mg/dL Final     Triglycerides 02/10/2021 166* <150 mg/dL Final     HDL Cholesterol 02/10/2021 53  >49 mg/dL Final     LDL Cholesterol Calculated 02/10/2021 91  <100 mg/dL Final     Non HDL Cholesterol 02/10/2021 124  <130 mg/dL Final     Estimated Average Glucose 02/10/2021 97  mg/dL Final        Review of systems: Negative except that which was noted in the HPI.    Physical examination:  /77   Pulse 76   Temp 98.2  F (36.8  C) (Tympanic)   Ht 1.651 m (5' 5\")   Wt 98.4 kg (216 lb 14.4 oz)   SpO2 95%   BMI 36.09 kg/m      GENERAL APPEARANCE: healthy, alert and no distress  HEENT: no icterus, no xanthelasmas, normal pupil size and reaction, no cyanosis.  NECK: no adenopathy, no asymmetry, masses.  CHEST: lungs clear to auscultation - no rales, rhonchi or wheezes, no use of accessory muscles, no retractions, respirations are unlabored, normal respiratory rate  CARDIOVASCULAR: regular rhythm, normal S1 with physiologic split S2, no S3 or S4 and no murmur, click or rub  EXTREMITIES: no clubbing, cyanosis or edema  NEURO: alert and oriented normal speech, and affect  VASC: No vascular bruits heard.  SKIN: no ecchymoses, no rashes.    Total time spent on day of visit, including review of tests, obtaining/reviewing separately obtained history, ordering medications/tests/procedures, communicating with PCP/consultants, and documenting in electronic medical record: 25 minutes.           Thank you for " allowing me to participate in the care of your patient. Please do not hesitate to contact me if you have any questions.     Ha Hughes, DO

## 2022-11-07 ENCOUNTER — OFFICE VISIT (OUTPATIENT)
Dept: CARDIOLOGY | Facility: OTHER | Age: 82
End: 2022-11-07
Attending: INTERNAL MEDICINE
Payer: COMMERCIAL

## 2022-11-07 VITALS
TEMPERATURE: 98.2 F | HEIGHT: 65 IN | BODY MASS INDEX: 36.14 KG/M2 | DIASTOLIC BLOOD PRESSURE: 77 MMHG | SYSTOLIC BLOOD PRESSURE: 130 MMHG | HEART RATE: 76 BPM | WEIGHT: 216.9 LBS | OXYGEN SATURATION: 95 %

## 2022-11-07 DIAGNOSIS — I35.1 NONRHEUMATIC AORTIC VALVE INSUFFICIENCY: ICD-10-CM

## 2022-11-07 DIAGNOSIS — I49.3 PVC'S (PREMATURE VENTRICULAR CONTRACTIONS): ICD-10-CM

## 2022-11-07 DIAGNOSIS — E78.2 MIXED HYPERLIPIDEMIA: ICD-10-CM

## 2022-11-07 DIAGNOSIS — Z79.899 ON STATIN THERAPY: ICD-10-CM

## 2022-11-07 DIAGNOSIS — I51.89 DIASTOLIC DYSFUNCTION: ICD-10-CM

## 2022-11-07 DIAGNOSIS — I47.10 PSVT (PAROXYSMAL SUPRAVENTRICULAR TACHYCARDIA) (H): ICD-10-CM

## 2022-11-07 DIAGNOSIS — R00.2 PALPITATIONS: Primary | ICD-10-CM

## 2022-11-07 DIAGNOSIS — I49.1 ATRIAL ECTOPY: ICD-10-CM

## 2022-11-07 DIAGNOSIS — R01.1 HEART MURMUR: ICD-10-CM

## 2022-11-07 DIAGNOSIS — N18.31 STAGE 3A CHRONIC KIDNEY DISEASE (H): ICD-10-CM

## 2022-11-07 DIAGNOSIS — E66.01 MORBID OBESITY (H): ICD-10-CM

## 2022-11-07 DIAGNOSIS — I36.1 NON-RHEUMATIC TRICUSPID VALVE INSUFFICIENCY: ICD-10-CM

## 2022-11-07 DIAGNOSIS — I65.23 BILATERAL CAROTID ARTERY STENOSIS: ICD-10-CM

## 2022-11-07 DIAGNOSIS — J44.9 COPD, MILD (H): ICD-10-CM

## 2022-11-07 DIAGNOSIS — I34.0 NON-RHEUMATIC MITRAL REGURGITATION: ICD-10-CM

## 2022-11-07 PROCEDURE — G0463 HOSPITAL OUTPT CLINIC VISIT: HCPCS

## 2022-11-07 PROCEDURE — 99214 OFFICE O/P EST MOD 30 MIN: CPT | Performed by: INTERNAL MEDICINE

## 2022-11-07 ASSESSMENT — PAIN SCALES - GENERAL: PAINLEVEL: WORST PAIN (10)

## 2022-11-07 NOTE — PATIENT INSTRUCTIONS
Thank you for allowing Dr. Hughes and our  team to participate in your care. Please call our office at [phone] with scheduling questions or if you need to cancel or change your appointment. With any other questions or concerns you may call [facility] cardiology nurse at [phone].       If you experience chest pain, chest pressure, chest tightness, shortness of breath, fainting, lightheadedness, nausea, vomiting, or other concerning symptoms, please report to the Emergency Department or call 911. These symptoms may be emergent, and best treated in the Emergency Department.    Follow up in 1 year

## 2022-11-07 NOTE — NURSING NOTE
"Chief Complaint   Patient presents with     Follow Up       Initial /77   Pulse 76   Temp 98.2  F (36.8  C) (Tympanic)   Ht 1.651 m (5' 5\")   Wt 98.4 kg (216 lb 14.4 oz)   SpO2 95%   BMI 36.09 kg/m   Estimated body mass index is 36.09 kg/m  as calculated from the following:    Height as of this encounter: 1.651 m (5' 5\").    Weight as of this encounter: 98.4 kg (216 lb 14.4 oz).  Medication Reconciliation: complete  Jesusita So LPN    "

## 2022-11-08 NOTE — PROGRESS NOTES
"  Assessment & Plan     Age-related osteoporosis without current pathological fracture  - alendronate (FOSAMAX) 70 MG tablet; Take 1 tablet (70 mg) by mouth every 7 days  - TSH; Future  - TSH    Chronic low back pain  Consider PT    Vitamin D deficiency  Check vitamin D to optimize bone health  - Vitamin D Deficiency; Future  - Vitamin D Deficiency    Chronic fatigue, unspecified  Will check thyroid.  Likely age related, physical deconditioning.  - TSH; Future  - TSH        Return in about 6 months (around 5/10/2023) for Routine preventive.    Yanique Mar MD  [facility] - REI Bill is a 82 year old female, presenting for the following health issues:  Back Pain      HPI      35 year old grandson is the PCA.  Sore since her fall  Going to be seeing Dr. Alonzo for shoulder injection  Had a fall outside of her home recently.  She is recovering from the fall, pain is improved.      Stretching helps with the pain- heat and cold helps.    Chronic/Recurring Back Pain Follow Up      Where is your back pain located? (Select all that apply) low back both    How would you describe your back pain?  dull ache and shooting    Where does your back pain spread? nowhere    Since your last clinic visit for back pain, how has your pain changed? unchanged    Does your back pain interfere with your job? Not applicable    Since your last visit, have you tried any new treatment? No    Review of Systems   Constitutional, HEENT, cardiovascular, pulmonary, gi and gu systems are negative, except as otherwise noted.      Objective    /60 (BP Location: Right arm, Patient Position: Chair)   Pulse 70   Temp 98.6  F (37  C)   Resp 16   Ht 1.651 m (5' 5\")   Wt 98 kg (216 lb)   SpO2 96%   BMI 35.94 kg/m    Body mass index is 35.94 kg/m .  Physical Exam   GENERAL: healthy, alert and no distress  EYES: Eyes grossly normal to inspection, PERRL and conjunctivae and sclerae normal  HENT: ear canals and " TM's normal, nose and mouth without ulcers or lesions  NECK: no adenopathy, no asymmetry, masses, or scars and thyroid normal to palpation  RESP: lungs clear to auscultation - no rales, rhonchi or wheezes  CV: regular rate and rhythm, normal S1 S2, no S3 or S4, no murmur, click or rub, no peripheral edema and peripheral pulses strong  ABDOMEN: soft, nontender, no hepatosplenomegaly, no masses and bowel sounds normal  MS: no gross musculoskeletal defects noted, no edema  SKIN: no suspicious lesions or rashes  NEURO: Normal strength and tone, mentation intact and speech normal  PSYCH: mentation appears normal, affect normal/bright

## 2022-11-10 ENCOUNTER — OFFICE VISIT (OUTPATIENT)
Dept: FAMILY MEDICINE | Facility: OTHER | Age: 82
End: 2022-11-10
Attending: STUDENT IN AN ORGANIZED HEALTH CARE EDUCATION/TRAINING PROGRAM
Payer: COMMERCIAL

## 2022-11-10 VITALS
RESPIRATION RATE: 16 BRPM | SYSTOLIC BLOOD PRESSURE: 130 MMHG | BODY MASS INDEX: 35.99 KG/M2 | TEMPERATURE: 98.6 F | WEIGHT: 216 LBS | HEART RATE: 70 BPM | OXYGEN SATURATION: 96 % | DIASTOLIC BLOOD PRESSURE: 60 MMHG | HEIGHT: 65 IN

## 2022-11-10 DIAGNOSIS — R53.82 CHRONIC FATIGUE, UNSPECIFIED: ICD-10-CM

## 2022-11-10 DIAGNOSIS — M54.50 CHRONIC LOW BACK PAIN, UNSPECIFIED BACK PAIN LATERALITY, UNSPECIFIED WHETHER SCIATICA PRESENT: ICD-10-CM

## 2022-11-10 DIAGNOSIS — M81.0 AGE-RELATED OSTEOPOROSIS WITHOUT CURRENT PATHOLOGICAL FRACTURE: Primary | ICD-10-CM

## 2022-11-10 DIAGNOSIS — E55.9 VITAMIN D DEFICIENCY: ICD-10-CM

## 2022-11-10 DIAGNOSIS — G89.29 CHRONIC LOW BACK PAIN, UNSPECIFIED BACK PAIN LATERALITY, UNSPECIFIED WHETHER SCIATICA PRESENT: ICD-10-CM

## 2022-11-10 LAB — TSH SERPL DL<=0.005 MIU/L-ACNC: 0.73 UIU/ML (ref 0.3–4.2)

## 2022-11-10 PROCEDURE — 84443 ASSAY THYROID STIM HORMONE: CPT | Mod: ZL | Performed by: STUDENT IN AN ORGANIZED HEALTH CARE EDUCATION/TRAINING PROGRAM

## 2022-11-10 PROCEDURE — 99214 OFFICE O/P EST MOD 30 MIN: CPT | Performed by: STUDENT IN AN ORGANIZED HEALTH CARE EDUCATION/TRAINING PROGRAM

## 2022-11-10 PROCEDURE — 82306 VITAMIN D 25 HYDROXY: CPT | Mod: ZL | Performed by: STUDENT IN AN ORGANIZED HEALTH CARE EDUCATION/TRAINING PROGRAM

## 2022-11-10 PROCEDURE — G0463 HOSPITAL OUTPT CLINIC VISIT: HCPCS

## 2022-11-10 PROCEDURE — 36415 COLL VENOUS BLD VENIPUNCTURE: CPT | Mod: ZL | Performed by: STUDENT IN AN ORGANIZED HEALTH CARE EDUCATION/TRAINING PROGRAM

## 2022-11-10 RX ORDER — ALENDRONATE SODIUM 70 MG/1
70 TABLET ORAL
Qty: 12 TABLET | Refills: 1 | Status: SHIPPED | OUTPATIENT
Start: 2022-11-10 | End: 2023-02-23

## 2022-11-10 ASSESSMENT — PAIN SCALES - GENERAL: PAINLEVEL: MODERATE PAIN (5)

## 2022-11-10 NOTE — NURSING NOTE
"Chief Complaint   Patient presents with     Back Pain       Initial /60 (BP Location: Right arm, Patient Position: Chair)   Pulse 70   Temp 98.6  F (37  C)   Resp 16   Ht 1.651 m (5' 5\")   Wt 98 kg (216 lb)   SpO2 96%   BMI 35.94 kg/m   Estimated body mass index is 35.94 kg/m  as calculated from the following:    Height as of this encounter: 1.651 m (5' 5\").    Weight as of this encounter: 98 kg (216 lb).  Medication Reconciliation: complete  Catalina Rodriguez LPN    "

## 2022-11-12 LAB — DEPRECATED CALCIDIOL+CALCIFEROL SERPL-MC: 27 UG/L (ref 20–75)

## 2022-11-14 ENCOUNTER — TRANSFERRED RECORDS (OUTPATIENT)
Dept: HEALTH INFORMATION MANAGEMENT | Facility: CLINIC | Age: 82
End: 2022-11-14

## 2022-11-21 ENCOUNTER — TELEPHONE (OUTPATIENT)
Dept: FAMILY MEDICINE | Facility: OTHER | Age: 82
End: 2022-11-21

## 2022-11-21 DIAGNOSIS — M25.551 PAIN IN JOINT INVOLVING RIGHT PELVIC REGION AND THIGH: Primary | Chronic | ICD-10-CM

## 2022-11-21 NOTE — TELEPHONE ENCOUNTER
Patient calls to request referral to [facility] for R side sciatic pain  States she made an prosper't for tomorrow, 11/22/22.  [facility] states she need a new referral from her PCP    Pended to PCP to review    Fax # for [facility]:  [phone]

## 2022-11-22 ENCOUNTER — TRANSFERRED RECORDS (OUTPATIENT)
Dept: HEALTH INFORMATION MANAGEMENT | Facility: CLINIC | Age: 82
End: 2022-11-22

## 2022-11-28 ENCOUNTER — MEDICAL CORRESPONDENCE (OUTPATIENT)
Dept: HEALTH INFORMATION MANAGEMENT | Facility: HOSPITAL | Age: 82
End: 2022-11-28

## 2022-11-28 ENCOUNTER — HOSPITAL ENCOUNTER (EMERGENCY)
Facility: HOSPITAL | Age: 82
Discharge: HOME | End: 2022-11-28
Attending: NURSE PRACTITIONER | Admitting: NURSE PRACTITIONER
Payer: COMMERCIAL

## 2022-11-28 VITALS
WEIGHT: 215 LBS | DIASTOLIC BLOOD PRESSURE: 83 MMHG | BODY MASS INDEX: 35.82 KG/M2 | OXYGEN SATURATION: 96 % | TEMPERATURE: 98.2 F | SYSTOLIC BLOOD PRESSURE: 146 MMHG | HEIGHT: 65 IN | RESPIRATION RATE: 16 BRPM | HEART RATE: 65 BPM

## 2022-11-28 DIAGNOSIS — L50.9 URTICARIAL RASH: Primary | ICD-10-CM

## 2022-11-28 DIAGNOSIS — L29.9 ITCHING: ICD-10-CM

## 2022-11-28 PROCEDURE — 99213 OFFICE O/P EST LOW 20 MIN: CPT | Performed by: NURSE PRACTITIONER

## 2022-11-28 PROCEDURE — G0463 HOSPITAL OUTPT CLINIC VISIT: HCPCS | Mod: 25

## 2022-11-28 PROCEDURE — 96372 THER/PROPH/DIAG INJ SC/IM: CPT | Performed by: NURSE PRACTITIONER

## 2022-11-28 PROCEDURE — 250N000011 HC RX IP 250 OP 636: Performed by: NURSE PRACTITIONER

## 2022-11-28 RX ORDER — METHYLPREDNISOLONE SODIUM SUCCINATE 40 MG/ML
40 INJECTION, POWDER, LYOPHILIZED, FOR SOLUTION INTRAMUSCULAR; INTRAVENOUS ONCE
Status: COMPLETED | OUTPATIENT
Start: 2022-11-28 | End: 2022-11-28

## 2022-11-28 RX ADMIN — METHYLPREDNISOLONE SODIUM SUCCINATE 40 MG: 40 INJECTION, POWDER, FOR SOLUTION INTRAMUSCULAR; INTRAVENOUS at 20:10

## 2022-11-28 ASSESSMENT — ENCOUNTER SYMPTOMS
FEVER: 0
CHILLS: 0
SHORTNESS OF BREATH: 0
CHEST TIGHTNESS: 0

## 2022-11-29 NOTE — ED PROVIDER NOTES
History     Chief Complaint   Patient presents with     Rash     HPI  Dinorah Lim is a 82 year old female who presents to urgent care with concerns of hives.  Patient tells me that she started getting hives yesterday.  They are mostly on her hips, knees and arms.  She has a history of getting hives and usually gets a steroid shot for them.  She denies any trouble breathing, chest pain/chest tightness or throat discomfort.  She believes that this time it her hives are being caused by exposure to dog dander.  She notes that the side of the hip where she has the hives is where her dog likes to lie on her.  She has not taken anything for it.  Patient tells me that if she waits too long she ends up having hives from head to toe.    Allergies:  Allergies   Allergen Reactions     Chocolate Hives     Lemon Flavor Hives     Lime [Calcium Oxysulfide] Hives     Metal [Staples]      Orange Fruit [Citrus] Hives     Strawberry Hives     Adhesive Tape      Band-aids     Codeine Hives     Patient can tolerate oxycodone & dilaudid     Erythromycin Hives     ERYTHROMYCIN BASE     Food      Tomato, grapefruit, oranges.     Grapefruit [Extra Strength Grapefruit]      GRAPEFRUIT     Morphine Hives     Pt. Reports had hives     Penicillins Hives     Ranitidine GI Disturbance     Sulfa Drugs      SULFONAMIDE ANTIBIOTICS      Tomato      Xyzal [Levocetirizine] Hives       Problem List:    Patient Active Problem List    Diagnosis Date Noted     Other chest pain 10/20/2021     Priority: Medium     Hiatal hernia 07/28/2021     Priority: Medium     Chronic, continuous use of opioids 05/17/2021     Priority: Medium     Stage 3a chronic kidney disease (H) 03/22/2021     Priority: Medium     COVID-19 virus infection on 10/30/20 11/24/2020     Priority: Medium     10-       Other neutropenia (H) 08/04/2020     Priority: Medium     Paresthesias 02/13/2020     Priority: Medium     Status post total left knee replacement 09/09/2019      Priority: Medium     Aortic valve insufficiency 03/06/2019     Priority: Medium     Mitral regurgitation 03/06/2019     Priority: Medium     Tricuspid valve insufficiency 03/06/2019     Priority: Medium     Diastolic dysfunction grade 1 on 2/21/19 03/06/2019     Priority: Medium     Hypertriglyceridemia 03/06/2019     Priority: Medium     Palpitations 02/13/2019     Priority: Medium     PVC's (premature ventricular contractions) 02/13/2019     Priority: Medium     Atrial ectopy 02/13/2019     Priority: Medium     PSVT (paroxysmal supraventricular tachycardia) (H) 02/13/2019     Priority: Medium     COPD, mild on 9/9/14 02/13/2019     Priority: Medium     Bilateral carotid artery stenosis at less than 50% on 12/1/16 02/13/2019     Priority: Medium     Mixed hyperlipidemia 02/13/2019     Priority: Medium     On statin therapy 02/13/2019     Priority: Medium     Heart murmur 02/13/2019     Priority: Medium     Morbid obesity (H) 06/01/2017     Priority: Medium     ACP (advance care planning) 04/28/2016     Priority: Medium     Advance Care Planning 4/28/2016: ACP Review of Chart / Resources Provided:  Reviewed chart for advance care plan.  Dinorah Lim has no plan or code status on file. Discussed available resources and provided with information. Confirmed code status reflects current choices pending further ACP discussions.  Confirmed/documented legally designated decision makers.  Added by Aditi Gandhi             Anxiety 06/23/2014     Priority: Medium     Lung density on x-ray 07/23/2013     Priority: Medium     Osteoarthritis 06/14/2012     Priority: Medium     Problem list name updated by automated process. Provider to review       Advanced care planning/counseling discussion 01/13/2012     Priority: Medium     Abnormal glucose 08/01/2011     Priority: Medium     Hgb A1c 5.7 on 1/4/2015  Problem list name updated by automated process. Provider to review       Osteoarthritis of right hip 10/07/2004      Priority: Medium     Urticaria 06/01/2004     Priority: Medium     Problem list name updated by automated process. 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     Obesity, Class II, BMI 35-39.9 1/4/2015     Osteoarthritis of right hip 10/07/2004     Osteoarthrosis 06/14/2012     Otitis externa 10/04/2011     Prediabetes 08/01/2011       Past Surgical History:    Past Surgical History:   Procedure Laterality Date     ARTHROPLASTY KNEE Left 9/9/2019    Procedure: LEFT TOTAL KNEE ARTHROPLASTY S/N;  Surgeon: Trevor Alonzo MD;  Location: [facility]     ARTHROSCOPY KNEE Left 3/21/2019    Procedure: LEFT  KNEE ARTHROSCOPY, partial medial menisectomy;  Surgeon: Esteban Wills MD;  Location: [facility]     CLOSED REDUCTION WRIST Right 1952 x 2    long arm cast (same time as elbow fx)     CLOSED RX ELBOW DISLOCATION Right 1952    CR/long cast of fracture     EXCISE MASS FOOT Left 8/16/2021    Procedure: LEFT ANKLE MASS EXCISION;  Surgeon: Trevor Alonzo MD;  Location: [facility]     HC INJ EPIDURAL LUMBAR/SACRAL W/WO CONTRAST Bilateral 5/2013    facet injections; prev 2012     LAPAROSCOPIC CHOLECYSTECTOMY N/A 1/4/2015    Procedure: LAPAROSCOPIC CHOLECYSTECTOMY;  Surgeon: Brittney العلي MD;  Location: [facility]     TONSILLECTOMY       ZC TOTAL KNEE ARTHROPLASTY Left 09/09/2019    Dr Alonzo       Family History:    Family History   Problem Relation Age of Onset     Heart Disease Brother         atrial fibrillation     Atrial fibrillation Brother      Other - See Comments Mother         emphysema  "    Heart Disease Father 92        CHF     Cancer Paternal Grandfather         lung cancer     Cancer Maternal Uncle         lung cancer     Chronic Obstructive Pulmonary Disease Daughter      Emphysema Daughter      Other - See Comments Daughter         benign breast lesion     Esophagitis Daughter        Social History:  Marital Status:  Single [1]  Social History     Tobacco Use     Smoking status: Never     Smokeless tobacco: Never   Vaping Use     Vaping Use: Never used   Substance Use Topics     Alcohol use: No     Drug use: No        Medications:    acetaminophen (TYLENOL) 325 MG tablet  alendronate (FOSAMAX) 70 MG tablet  Calcium-Magnesium-Vitamin D (CALCIUM 1200+D3 PO)  clonazePAM (KLONOPIN) 0.5 MG tablet  clonazePAM (KLONOPIN) 1 MG tablet  FISH OIL  ipratropium - albuterol 0.5 mg/2.5 mg/3 mL (DUONEB) 0.5-2.5 (3) MG/3ML neb solution  PARoxetine (PAXIL) 40 MG tablet  simvastatin (ZOCOR) 40 MG tablet  VITAMIN D, CHOLECALCIFEROL, PO          Review of Systems   Constitutional: Negative for chills and fever.   Respiratory: Negative for chest tightness and shortness of breath.    Skin: Positive for rash.   All other systems reviewed and are negative.      Physical Exam   BP: 146/83  Pulse: 65  Temp: 98.2  F (36.8  C)  Resp: 16  Height: 165.1 cm (5' 5\")  Weight: 97.5 kg (215 lb)  SpO2: 96 %      Physical Exam  Vitals and nursing note reviewed.   Constitutional:       Appearance: Normal appearance. She is not ill-appearing or toxic-appearing.   HENT:      Head: Atraumatic.   Eyes:      Pupils: Pupils are equal, round, and reactive to light.   Cardiovascular:      Rate and Rhythm: Normal rate.   Pulmonary:      Effort: Pulmonary effort is normal.   Musculoskeletal:         General: Normal range of motion.      Cervical back: Neck supple.   Skin:     General: Skin is warm and dry.      Capillary Refill: Capillary refill takes less than 2 seconds.      Findings: Rash present. Rash is urticarial.             Comments: " Patient also itching to her back but no rash appreciated at this time.   Neurological:      Mental Status: She is alert and oriented to person, place, and time.         ED Course                 Procedures       No results found for this or any previous visit (from the past 24 hour(s)).    Medications   methylPREDNISolone sodium succinate (solu-MEDROL) injection 40 mg (has no administration in time range)       Assessments & Plan (with Medical Decision Making)     I have reviewed the nursing notes.    82-year-old female who presented for evaluation of urticarial rash that started yesterday.  Patient believes that it is from dog hair dander.  She does have an urticarial rash to her arms and right hip.  Talking in full sentences.  Respirations are nonlabored.  The patient states that she normally gets a steroid shot.  She was given Solu-Medrol during this visit.  Also recommended taking an over-the-counter antihistamine such as cetirizine to also help with the itching and hives.  Return to urgent care or emergency department for any concerning symptoms.    I have reviewed the findings, diagnosis, plan and need for follow up with the patient.  This document was prepared using a combination of typing and voice generated software.  While every attempt was made for accuracy, spelling and grammatical errors may exist.    New Prescriptions    No medications on file       Final diagnoses:   Urticarial rash   Itching       11/28/2022   [facility]     Mpofu, Naraudence, CNP  11/28/22 2006

## 2022-11-29 NOTE — DISCHARGE INSTRUCTIONS
Consider taking cetirizine (Zyrtec) which is an over-the-counter antihistamine that can help with itching.    Follow-up with your doctor as needed.    Return to emergency department for worsening or concerning symptoms.

## 2022-11-29 NOTE — ED TRIAGE NOTES
Pt presents with hives on hip, legs, and arms. Pt states has had this before and was given Prednisone. Aron fever/chills.

## 2022-11-29 NOTE — ED TRIAGE NOTES
"Pt c/o rash on her hips and arms that started yesterday on her arm and hips. Pt reports this happens about once a year. Pt reports \"if I don't take care of it right away I'll get hives from head to toe.\" Pt denies any airway or breathing problems.       "

## 2022-12-23 DIAGNOSIS — E78.5 HYPERLIPIDEMIA LDL GOAL <100: ICD-10-CM

## 2022-12-26 DIAGNOSIS — F41.9 ANXIETY: ICD-10-CM

## 2022-12-27 RX ORDER — SIMVASTATIN 40 MG
TABLET ORAL
Qty: 90 TABLET | Refills: 0 | Status: SHIPPED | OUTPATIENT
Start: 2022-12-27 | End: 2023-03-24

## 2022-12-27 NOTE — TELEPHONE ENCOUNTER
simvastatin (ZOCOR) 40 MG tablet      Last Written Prescription Date:  9-13-22  Last Fill Quantity: 90,   # refills: 0  Last Office Visit: 11-10-22  Future Office visit:       Routing refill request to provider for review/approval because:   LDL on file in past 12 months

## 2022-12-28 RX ORDER — CLONAZEPAM 0.5 MG/1
TABLET ORAL
Qty: 20 TABLET | Refills: 0 | Status: SHIPPED | OUTPATIENT
Start: 2022-12-28 | End: 2023-03-06

## 2022-12-28 NOTE — TELEPHONE ENCOUNTER
Klonopin      Last Written Prescription Date:  10.31.22  Last Fill Quantity: #20,   # refills: 0  Last Office Visit: 11.10.22  Future Office visit:       Routing refill request to provider for review/approval because:  Drug not on the [facility], P or [facility] refill protocol or controlled substance

## 2023-01-03 ENCOUNTER — NURSE TRIAGE (OUTPATIENT)
Dept: FAMILY MEDICINE | Facility: OTHER | Age: 83
End: 2023-01-03

## 2023-01-03 DIAGNOSIS — F41.9 ANXIETY: ICD-10-CM

## 2023-01-03 RX ORDER — PAROXETINE 40 MG/1
TABLET, FILM COATED ORAL
Qty: 90 TABLET | Refills: 1 | Status: SHIPPED | OUTPATIENT
Start: 2023-01-03 | End: 2023-07-24

## 2023-01-03 NOTE — TELEPHONE ENCOUNTER
" Protocol advises home care for a sore throat and cough that started two days ago.   Home care advice given per protocol.   Patient scheduled future appointment with PCP.   Patient advised to call back if symptoms worsen. Patient verbalized understanding.   Next 5 appointments (look out 90 days)    Jan 23, 2023  3:00 PM  (Arrive by 2:45 PM)  SHORT with Yanique Mar MD  [facility] ([facility] ) [address] [place] [address]  [phone]            Reason for Disposition    [1] Sore throat with cough/cold symptoms AND [2] present < 5 days    Additional Information    Negative: SEVERE difficulty breathing (e.g., struggling for each breath, speaks in single words, stridor)    Negative: Sounds like a life-threatening emergency to the triager    Negative: [1] Diagnosed strep throat AND [2] taking antibiotic AND [3] symptoms continue    Negative: Throat culture results, call about    Negative: Productive cough is main symptom    Negative: Non-productive cough is main symptom    Negative: Hoarseness is main symptom    Negative: Runny nose is main symptom    Negative: Uvula swelling is main symptom    Negative: [1] Drooling or spitting out saliva (because can't swallow) AND [2] normal breathing    Negative: Unable to open mouth completely    Negative: [1] Difficulty breathing AND [2] not severe    Negative: Fever > 104 F (40 C)    Negative: [1] Refuses to drink anything AND [2] for > 12 hours    Negative: [1] Drinking very little AND [2] dehydration suspected (e.g., no urine > 12 hours, very dry mouth, very lightheaded)    Negative: Patient sounds very sick or weak to the triager    Negative: SEVERE (e.g., excruciating) throat pain    Negative: [1] Pus on tonsils (back of throat) AND [2]  fever AND [3] swollen neck lymph nodes (\"glands\")    Negative: [1] Rash AND [2] widespread (especially chest and abdomen)    Negative: Earache also present    Negative: Fever present > 3 " "days (72 hours)    Negative: Diabetes mellitus or weak immune system (e.g., HIV positive, cancer chemo, splenectomy, organ transplant, chronic steroids)    Negative: History of rheumatic fever    Negative: [1] Adult is leaving on a trip AND [2] requests an antibiotic NOW    Negative: [1] Positive throat culture or rapid strep test (according to lab, PCP, caller, etc.) AND [2] NO  standing order to call in prescription for antibiotic    Negative: [1] Exposure to family member (or spouse or boyfriend/girlfriend) with test-proven strep AND [2] within last 10 days    Negative: [1] Sore throat is the only symptom AND [2] present > 48 hours    Negative: [1] Sore throat with cough/cold symptoms AND [2] present > 5 days    Negative: [1] Sore throat is the only symptom AND [2] sore throat present < 48 hours    Answer Assessment - Initial Assessment Questions  1. ONSET: \"When did the throat start hurting?\" (Hours or days ago)       Couple days ago  2. SEVERITY: \"How bad is the sore throat?\" (Scale 1-10; mild, moderate or severe)    - MILD (1-3):  doesn't interfere with eating or normal activities    - MODERATE (4-7): interferes with eating some solids and normal activities    - SEVERE (8-10):  excruciating pain, interferes with most normal activities    - SEVERE DYSPHAGIA: can't swallow liquids, drooling      4-5/10  3. STREP EXPOSURE: \"Has there been any exposure to strep within the past week?\" If Yes, ask: \"What type of contact occurred?\"       No   4.  VIRAL SYMPTOMS: \"Are there any symptoms of a cold, such as a runny nose, cough, hoarse voice or red eyes?\"       Cough and earache  5. FEVER: \"Do you have a fever?\" If Yes, ask: \"What is your temperature, how was it measured, and when did it start?\"      No   6. PUS ON THE TONSILS: \"Is there pus on the tonsils in the back of your throat?\"      Does not have tonsils   7. OTHER SYMPTOMS: \"Do you have any other symptoms?\" (e.g., difficulty breathing, headache, rash)      No " "  8. PREGNANCY: \"Is there any chance you are pregnant?\" \"When was your last menstrual period?\"      No    Protocols used: SORE THROAT-A-AH      "

## 2023-01-03 NOTE — TELEPHONE ENCOUNTER
PARoxetine (PAXIL) 40 MG tablet      Last Written Prescription Date:  6/13/22  Last Fill Quantity: 90,   # refills: 1  Last Office Visit: 11/10/22  Future Office visit:       Routing refill request to provider for review/approval because:

## 2023-01-09 ENCOUNTER — TELEPHONE (OUTPATIENT)
Dept: CARDIOLOGY | Facility: OTHER | Age: 83
End: 2023-01-09

## 2023-01-09 ENCOUNTER — NURSE TRIAGE (OUTPATIENT)
Dept: FAMILY MEDICINE | Facility: OTHER | Age: 83
End: 2023-01-09

## 2023-01-09 DIAGNOSIS — R35.0 URINARY FREQUENCY: Primary | ICD-10-CM

## 2023-01-09 NOTE — TELEPHONE ENCOUNTER
Patient called clinic back in regards to message below.    Write spoke with primary provider. Provider willing sign for UA lab only. UA pended.     Patient scheduled for lab only appointment 01/10/23.  Patient states she will call patient financial services tomorrow morning to see out of pocket cost for UA.

## 2023-01-09 NOTE — TELEPHONE ENCOUNTER
She does not have insurance until February 1 st and she is wondering due to she knows she had Urinary track infection. Is it safe to take AZO over the counter?

## 2023-01-09 NOTE — TELEPHONE ENCOUNTER
"Frequency onset a few days ago, subsided then returned today  Patient states she does not have insurance until February  Has OTC AZO product.  Already contacted Cardiology to see if its recommended to take considering she has leaking heart valves.        Also, writer sent patient to financial services to inquire as to cost of lab/OV & for possible financial assistance      Answer Assessment - Initial Assessment Questions  1. SYMPTOM: \"What's the main symptom you're concerned about?\" (e.g., frequency, incontinence)      Frequency    2. ONSET: \"When did the  frequency  start?\"      Past few days    3. PAIN: \"Is there any pain?\" If Yes, ask: \"How bad is it?\" (Scale: 1-10; mild, moderate, severe)      Described as discomfort, \"funny sensation\"    4. CAUSE: \"What do you think is causing the symptoms?\"      UTI    5. OTHER SYMPTOMS: \"Do you have any other symptoms?\" (e.g., fever, flank pain, blood in urine, pain with urination)      None    Protocols used: URINARY SYMPTOMS-A-OH      "

## 2023-01-10 ENCOUNTER — LAB (OUTPATIENT)
Dept: LAB | Facility: OTHER | Age: 83
End: 2023-01-10
Payer: COMMERCIAL

## 2023-01-10 ENCOUNTER — TELEPHONE (OUTPATIENT)
Dept: FAMILY MEDICINE | Facility: OTHER | Age: 83
End: 2023-01-10

## 2023-01-10 DIAGNOSIS — R35.0 URINARY FREQUENCY: Primary | ICD-10-CM

## 2023-01-10 DIAGNOSIS — N30.01 ACUTE CYSTITIS WITH HEMATURIA: Primary | ICD-10-CM

## 2023-01-10 LAB
ALBUMIN UR-MCNC: 30 MG/DL
APPEARANCE UR: ABNORMAL
BACTERIA #/AREA URNS HPF: ABNORMAL /HPF
BILIRUB UR QL STRIP: NEGATIVE
COLOR UR AUTO: YELLOW
GLUCOSE UR STRIP-MCNC: NEGATIVE MG/DL
HGB UR QL STRIP: ABNORMAL
KETONES UR STRIP-MCNC: NEGATIVE MG/DL
LEUKOCYTE ESTERASE UR QL STRIP: ABNORMAL
MUCOUS THREADS #/AREA URNS LPF: PRESENT /LPF
NITRATE UR QL: NEGATIVE
PH UR STRIP: 5.5 [PH] (ref 4.7–8)
RBC URINE: 15 /HPF
SP GR UR STRIP: 1.01 (ref 1–1.03)
SQUAMOUS EPITHELIAL: 2 /HPF
UROBILINOGEN UR STRIP-MCNC: NORMAL MG/DL
WBC CLUMPS #/AREA URNS HPF: PRESENT /HPF
WBC URINE: >182 /HPF

## 2023-01-10 PROCEDURE — 87186 SC STD MICRODIL/AGAR DIL: CPT | Mod: ZL

## 2023-01-10 PROCEDURE — 81001 URINALYSIS AUTO W/SCOPE: CPT | Mod: ZL

## 2023-01-10 RX ORDER — CIPROFLOXACIN 500 MG/1
500 TABLET, FILM COATED ORAL 2 TIMES DAILY
Qty: 10 TABLET | Refills: 0 | Status: SHIPPED | OUTPATIENT
Start: 2023-01-10 | End: 2023-01-30

## 2023-01-10 NOTE — TELEPHONE ENCOUNTER
"Pt calling and wants to know what RX is going to be sent for her urine.    Updated CX is pending.Please review labs.She states she is allergic to everything.    In October she got one that started with a \"C\" and it worked wonders.      Please advise.    She will have to call back because calls do not come into her phone.        Carla No RN    "

## 2023-01-10 NOTE — TELEPHONE ENCOUNTER
This may be a better question for her primary.  Can we send this question to her primary?  Thanks in advance!     Dr. Hughes       Im sorry she was wondering from a heart perspective. That is why she is asking.                  Ha Hughes, Jesusita Hugo LPN  Caller: Unspecified (Yesterday,  2:59 PM)  This medication is used to relieve symptoms caused by irritation of the urinary tract such as pain, burning, and the feeling of needing to urinate urgently or frequently. This drug does not treat the cause of the urinary irritation, but it can help relieve the symptoms while while on something such as an antibiotic.  It will not treat the infection.  It will not reduce the infection.  It simply for symptomatic relief which should his insufficient treatment for the UTI.  Meaning, the infection can get worse with this type of treatment.     Dr. Hughes

## 2023-01-11 DIAGNOSIS — N30.01 ACUTE CYSTITIS WITH HEMATURIA: Primary | ICD-10-CM

## 2023-01-11 RX ORDER — CIPROFLOXACIN 500 MG/1
500 TABLET, FILM COATED ORAL 2 TIMES DAILY
Qty: 10 TABLET | Refills: 0 | Status: SHIPPED | OUTPATIENT
Start: 2023-01-11 | End: 2023-01-30

## 2023-01-11 NOTE — TELEPHONE ENCOUNTER
Patient returned call and notified on provider's recommendation. Patient verbalized understanding.

## 2023-01-12 LAB — BACTERIA UR CULT: ABNORMAL

## 2023-01-27 ENCOUNTER — TELEPHONE (OUTPATIENT)
Dept: FAMILY MEDICINE | Facility: OTHER | Age: 83
End: 2023-01-27

## 2023-01-27 NOTE — TELEPHONE ENCOUNTER
Pt called and she has written down that she has appt on 2.7.2023 at 0705.    Updated her no appt noted.    Appt below.    Next 5 appointments (look out 90 days)    Apr 18, 2023  8:00 AM  (Arrive by 7:45 AM)  Office Visit with Yanique Mar MD  [facility] [place] ([facility] [place] ) [address]  [place] [address]  [phone]        Ok to keep this above and no Feb appt needed?      Please advise [phone]    Call back

## 2023-01-27 NOTE — TELEPHONE ENCOUNTER
It's okay as long as there is nothing acute going on.  If patient has something acute going on which she would like addressed, we can fit her in sooner.

## 2023-01-30 ENCOUNTER — HOSPITAL ENCOUNTER (EMERGENCY)
Facility: HOSPITAL | Age: 83
Discharge: HOME | End: 2023-01-30
Attending: NURSE PRACTITIONER | Admitting: NURSE PRACTITIONER
Payer: COMMERCIAL

## 2023-01-30 ENCOUNTER — APPOINTMENT (OUTPATIENT)
Dept: GENERAL RADIOLOGY | Facility: HOSPITAL | Age: 83
End: 2023-01-30
Attending: NURSE PRACTITIONER
Payer: COMMERCIAL

## 2023-01-30 VITALS
HEART RATE: 63 BPM | TEMPERATURE: 97.6 F | DIASTOLIC BLOOD PRESSURE: 77 MMHG | OXYGEN SATURATION: 97 % | SYSTOLIC BLOOD PRESSURE: 156 MMHG | RESPIRATION RATE: 16 BRPM

## 2023-01-30 DIAGNOSIS — S62.629A CLOSED AVULSION FRACTURE OF MIDDLE PHALANX OF FINGER, INITIAL ENCOUNTER: Primary | ICD-10-CM

## 2023-01-30 PROCEDURE — 29125 APPL SHORT ARM SPLINT STATIC: CPT | Performed by: NURSE PRACTITIONER

## 2023-01-30 PROCEDURE — 73130 X-RAY EXAM OF HAND: CPT | Mod: LT

## 2023-01-30 PROCEDURE — 271N000006 HC CAST/SPLINT FIBERGLASS

## 2023-01-30 PROCEDURE — 999N000104 HC STATISTIC NO CHARGE

## 2023-01-30 PROCEDURE — 29125 APPL SHORT ARM SPLINT STATIC: CPT

## 2023-01-30 ASSESSMENT — ENCOUNTER SYMPTOMS: MYALGIAS: 1

## 2023-01-30 ASSESSMENT — ACTIVITIES OF DAILY LIVING (ADL): ADLS_ACUITY_SCORE: 35

## 2023-01-31 ENCOUNTER — TRANSFERRED RECORDS (OUTPATIENT)
Dept: HEALTH INFORMATION MANAGEMENT | Facility: CLINIC | Age: 83
End: 2023-01-31

## 2023-01-31 NOTE — ED PROVIDER NOTES
History     Chief Complaint   Patient presents with     Hand Pain     HPI  Dinorah Lim is a 82 year old female who presents ambulatory to urgent care for evaluation of left little finger injury.  Patient tells me that around 1730 tonight she slipped on ice and fell landing on her buttocks.  She thinks she hit her finger on something as she fell.  She reports the pain, bruising and swelling to her left little finger particularly at the PIP joint.  Decreased range of motion to this finger.  No paresthesias.  No history of surgeries to this finger or hand.    Allergies:  Allergies   Allergen Reactions     Chocolate Hives     Lemon Flavor Hives     Lime [Calcium Oxysulfide] Hives     Metal [Staples]      Orange Fruit [Citrus] Hives     Strawberry Hives     Adhesive Tape      Band-aids     Codeine Hives     Patient can tolerate oxycodone & dilaudid     Erythromycin Hives     ERYTHROMYCIN BASE     Food      Tomato, grapefruit, oranges.     Grapefruit [Extra Strength Grapefruit]      GRAPEFRUIT     Morphine Hives     Pt. Reports had hives     Penicillins Hives     Ranitidine GI Disturbance     Sulfa Drugs      SULFONAMIDE ANTIBIOTICS      Tomato      Xyzal [Levocetirizine] Hives       Problem List:    Patient Active Problem List    Diagnosis Date Noted     Other chest pain 10/20/2021     Priority: Medium     Hiatal hernia 07/28/2021     Priority: Medium     Chronic, continuous use of opioids 05/17/2021     Priority: Medium     Stage 3a chronic kidney disease (H) 03/22/2021     Priority: Medium     COVID-19 virus infection on 10/30/20 11/24/2020     Priority: Medium     10-       Other neutropenia (H) 08/04/2020     Priority: Medium     Paresthesias 02/13/2020     Priority: Medium     Status post total left knee replacement 09/09/2019     Priority: Medium     Aortic valve insufficiency 03/06/2019     Priority: Medium     Mitral regurgitation 03/06/2019     Priority: Medium     Tricuspid valve insufficiency  03/06/2019     Priority: Medium     Diastolic dysfunction grade 1 on 2/21/19 03/06/2019     Priority: Medium     Hypertriglyceridemia 03/06/2019     Priority: Medium     Palpitations 02/13/2019     Priority: Medium     PVC's (premature ventricular contractions) 02/13/2019     Priority: Medium     Atrial ectopy 02/13/2019     Priority: Medium     PSVT (paroxysmal supraventricular tachycardia) (H) 02/13/2019     Priority: Medium     COPD, mild on 9/9/14 02/13/2019     Priority: Medium     Bilateral carotid artery stenosis at less than 50% on 12/1/16 02/13/2019     Priority: Medium     Mixed hyperlipidemia 02/13/2019     Priority: Medium     On statin therapy 02/13/2019     Priority: Medium     Heart murmur 02/13/2019     Priority: Medium     Morbid obesity (H) 06/01/2017     Priority: Medium     ACP (advance care planning) 04/28/2016     Priority: Medium     Advance Care Planning 4/28/2016: ACP Review of Chart / Resources Provided:  Reviewed chart for advance care plan.  Dinorah Lim has no plan or code status on file. Discussed available resources and provided with information. Confirmed code status reflects current choices pending further ACP discussions.  Confirmed/documented legally designated decision makers.  Added by Aditi Gandhi             Anxiety 06/23/2014     Priority: Medium     Lung density on x-ray 07/23/2013     Priority: Medium     Osteoarthritis 06/14/2012     Priority: Medium     Problem list name updated by automated process. Provider to review       Advanced care planning/counseling discussion 01/13/2012     Priority: Medium     Abnormal glucose 08/01/2011     Priority: Medium     Hgb A1c 5.7 on 1/4/2015  Problem list name updated by automated process. Provider to review       Osteoarthritis of right hip 10/07/2004     Priority: Medium     Urticaria 06/01/2004     Priority: Medium     Problem list name updated by automated process. 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     Obesity, Class II, BMI 35-39.9 1/4/2015     Osteoarthritis of right hip 10/07/2004     Osteoarthrosis 06/14/2012     Otitis externa 10/04/2011     Prediabetes 08/01/2011       Past Surgical History:    Past Surgical History:   Procedure Laterality Date     ARTHROPLASTY KNEE Left 9/9/2019    Procedure: LEFT TOTAL KNEE ARTHROPLASTY S/N;  Surgeon: Trevor Alonzo MD;  Location: [facility]     ARTHROSCOPY KNEE Left 3/21/2019    Procedure: LEFT  KNEE ARTHROSCOPY, partial medial menisectomy;  Surgeon: Esteban Wills MD;  Location: [facility]     CLOSED REDUCTION WRIST Right 1952 x 2    long arm cast (same time as elbow fx)     CLOSED RX ELBOW DISLOCATION Right 1952    CR/long cast of fracture     EXCISE MASS FOOT Left 8/16/2021    Procedure: LEFT ANKLE MASS EXCISION;  Surgeon: Trevor Alonzo MD;  Location: [facility]     HC INJ EPIDURAL LUMBAR/SACRAL W/WO CONTRAST Bilateral 5/2013    facet injections; prev 2012     LAPAROSCOPIC CHOLECYSTECTOMY N/A 1/4/2015    Procedure: LAPAROSCOPIC CHOLECYSTECTOMY;  Surgeon: Brittney العلي MD;  Location: [facility]     TONSILLECTOMY       ZZC TOTAL KNEE ARTHROPLASTY Left 09/09/2019    Dr Alonzo       Family History:    Family History   Problem Relation Age of Onset     Heart Disease Brother         atrial fibrillation     Atrial fibrillation Brother      Other - See Comments Mother         emphysema     Heart Disease Father 92        CHF     Cancer Paternal Grandfather         lung cancer     Cancer Maternal Uncle         lung cancer     Chronic Obstructive Pulmonary  Disease Daughter      Emphysema Daughter      Other - See Comments Daughter         benign breast lesion     Esophagitis Daughter        Social History:  Marital Status:  Single [1]  Social History     Tobacco Use     Smoking status: Never     Smokeless tobacco: Never   Vaping Use     Vaping Use: Never used   Substance Use Topics     Alcohol use: No     Drug use: No        Medications:    acetaminophen (TYLENOL) 325 MG tablet  alendronate (FOSAMAX) 70 MG tablet  Calcium-Magnesium-Vitamin D (CALCIUM 1200+D3 PO)  clonazePAM (KLONOPIN) 0.5 MG tablet  FISH OIL  ipratropium - albuterol 0.5 mg/2.5 mg/3 mL (DUONEB) 0.5-2.5 (3) MG/3ML neb solution  PARoxetine (PAXIL) 40 MG tablet  simvastatin (ZOCOR) 40 MG tablet  VITAMIN D, CHOLECALCIFEROL, PO  clonazePAM (KLONOPIN) 1 MG tablet          Review of Systems   Musculoskeletal: Positive for myalgias.   All other systems reviewed and are negative.      Physical Exam   BP: 160/85  Pulse: 62  Temp: 97.6  F (36.4  C)  Resp: 16  SpO2: 97 %      Physical Exam  Vitals and nursing note reviewed.   Constitutional:       Appearance: Normal appearance. She is not ill-appearing or toxic-appearing.   HENT:      Head: Atraumatic.   Eyes:      Pupils: Pupils are equal, round, and reactive to light.   Cardiovascular:      Rate and Rhythm: Normal rate.   Pulmonary:      Effort: Pulmonary effort is normal.   Musculoskeletal:         General: Swelling present. No deformity.      Cervical back: Neck supple.      Comments: Mild bruising, swelling and tenderness to palpation mostly at the PIP joint.  Decreased range of motion at the PIP joint.  Cap refill less than 2 seconds.  Left radial pulse 2+.   Skin:     General: Skin is warm and dry.      Capillary Refill: Capillary refill takes less than 2 seconds.      Findings: Bruising present.   Neurological:      Mental Status: She is alert and oriented to person, place, and time.         ED Course                 [facility]  Application    Date/Time: 1/30/2023 9:22 PM  Performed by: Bisi Castelan CNP  Authorized by: Bisi Castelan CNP     Risks, benefits and alternatives discussed.      PRE-PROCEDURE DETAILS     Sensation:  Normal    Skin color:  Pink    PROCEDURE DETAILS     Laterality:  Left    Location:  Finger    Finger:  L small finger    Splint type:  Volar short arm    Supplies:  Cotton padding, elastic bandage and Ortho-Glass    POST PROCEDURE DETAILS     Pain:  Improved    Sensation:  Normal    Skin color:  Pink      PROCEDURE  Describe Procedure: Left little finger and ring finger were amrik taped prior to volar splint being applied.  Patient Tolerance:  Patient tolerated the procedure well with no immediate complications                Results for orders placed or performed during the hospital encounter of 01/30/23 (from the past 24 hour(s))   XR Hand Left G/E 3 Views    Narrative    Exam: XR HAND LEFT G/E 3 VIEWS     History:Female, age 82 years, pain, bruising of the left pinky from a  fall on the ice    Comparison:  No relevant prior imaging.    Technique: Three views are submitted.    Findings: Bones are osteopenic/osteoporotic. There is a mildly  displaced avulsion fracture arising from the dorsal aspect of the  middle phalanx at the proximal interphalangeal joint, best seen on the  lateral projection. No evidence of dislocation.      Moderate to moderately severe degenerative changes seen throughout the  wrist and hand, most severe at the first carpometacarpal joint.           Impression    Impression:  Mildly displaced, comminuted intra-articular avulsion fracture arising  from the dorsal aspect of the middle phalanx of the little finger at  the proximal interphalangeal joint, seen best seen on the lateral  projection.     Generalized osteopenia/osteoporosis with degenerative changes  suggesting erosive osteoarthritis.    RADHA MAHONEY MD         SYSTEM ID:  [id number]       Medications - No data to  display    Assessments & Plan (with Medical Decision Making)     I have reviewed the nursing notes.    82-year-old female that presented for evaluation of left little finger pain and swelling following an injury.  Patient has swelling and tenderness to palpation most significant at the PIP joint of the left little finger.  Decreased range of motion at this joint.  Cap refill less than 2 seconds.  Left radial pulses 2+.  X-ray shows a mildly displaced comminuted intra-articular avulsion fracture from middle phalanx of the little finger.  Patient was placed in a volar splint.  [facility] and actually tells me that she has an appointment scheduled to see Dr. Alonzo in the next few days.  Advised her to get her finger evaluated during that appointment.  Take Tylenol as needed for pain.  Apply ice packs over the splint.  Return to urgent care emergency department for any worsening or concerning symptoms.    I have reviewed the findings, diagnosis, plan and need for follow up with the patient.  This document was prepared using a combination of typing and voice generated software.  While every attempt was made for accuracy, spelling and grammatical errors may exist.    Medical Decision Making  The patient's presentation is strongly suggestive of an acute and uncomplicated illness or injury.    The patient's evaluation involved:  ordering and/or review of 1 test(s) in this encounter (see separate area of note for details)    The patient's management involved only low risk treatment.        Discharge Medication List as of 1/30/2023  9:09 PM          Final diagnoses:   Closed avulsion fracture of middle phalanx of finger, initial encounter       1/30/2023   [facility]     Mpofu, Prudence, CNP  01/30/23 [id number]

## 2023-01-31 NOTE — ED TRIAGE NOTES
Pt presents with c/o left pinky finger pain after pt slipped on ice and hurt her finger.  Pt denies hitting head, LOC, or blood thinner use.

## 2023-01-31 NOTE — ED TRIAGE NOTES
Patient presents to urgent care for left pinky pain after patient slipped on ice and hurt her finger around 1730 tonight. Patient c/o of pain and bruising.

## 2023-01-31 NOTE — DISCHARGE INSTRUCTIONS
Keep the splint in place.  Apply ice packs over the splint.  Elevate your hand and wrist.    Tylenol as needed for pain.    Follow-up with your orthopedic surgeon for further evaluation of your finger.    Return to urgent care or emergency department for any worsening or concerning symptoms.

## 2023-02-07 ENCOUNTER — TRANSFERRED RECORDS (OUTPATIENT)
Dept: HEALTH INFORMATION MANAGEMENT | Facility: CLINIC | Age: 83
End: 2023-02-07

## 2023-02-16 ENCOUNTER — TELEPHONE (OUTPATIENT)
Dept: CARDIOLOGY | Facility: OTHER | Age: 83
End: 2023-02-16

## 2023-02-16 NOTE — TELEPHONE ENCOUNTER
2 weeks ago she stated her heart use to skip one beat before it was suppose to and she states now it stopped.     Should she be worried.     Jesusita So, ALEXN

## 2023-02-16 NOTE — TELEPHONE ENCOUNTER
Ha Hughes, DO  Jesusita So, LPN  Caller: Unspecified (Today, 10:54 AM)  You are correct.  Sorry about that.  No, I do not think she should be worried.  She had a Zio patch in October that did not show any concerning rhythms.  However, if patient is concerned and wanting to investigate further, I can order a Zio patch.  But no, I do not think she should be concerned.     Dr. Hughes

## 2023-02-21 ENCOUNTER — TRANSFERRED RECORDS (OUTPATIENT)
Dept: HEALTH INFORMATION MANAGEMENT | Facility: CLINIC | Age: 83
End: 2023-02-21

## 2023-02-23 ENCOUNTER — HOSPITAL ENCOUNTER (EMERGENCY)
Facility: HOSPITAL | Age: 83
Discharge: HOME | End: 2023-02-23
Attending: NURSE PRACTITIONER | Admitting: NURSE PRACTITIONER
Payer: COMMERCIAL

## 2023-02-23 ENCOUNTER — APPOINTMENT (OUTPATIENT)
Dept: GENERAL RADIOLOGY | Facility: HOSPITAL | Age: 83
End: 2023-02-23
Attending: NURSE PRACTITIONER
Payer: COMMERCIAL

## 2023-02-23 VITALS
HEART RATE: 84 BPM | RESPIRATION RATE: 16 BRPM | DIASTOLIC BLOOD PRESSURE: 65 MMHG | SYSTOLIC BLOOD PRESSURE: 143 MMHG | TEMPERATURE: 97.7 F | OXYGEN SATURATION: 96 %

## 2023-02-23 DIAGNOSIS — J20.9 ACUTE BRONCHITIS: ICD-10-CM

## 2023-02-23 PROCEDURE — 99213 OFFICE O/P EST LOW 20 MIN: CPT | Performed by: NURSE PRACTITIONER

## 2023-02-23 PROCEDURE — G0463 HOSPITAL OUTPT CLINIC VISIT: HCPCS | Mod: 25

## 2023-02-23 PROCEDURE — 71046 X-RAY EXAM CHEST 2 VIEWS: CPT

## 2023-02-23 RX ORDER — CIPROFLOXACIN 500 MG/1
500 TABLET, FILM COATED ORAL 2 TIMES DAILY
Qty: 10 TABLET | Refills: 0 | Status: SHIPPED | OUTPATIENT
Start: 2023-02-23 | End: 2023-02-28

## 2023-02-23 ASSESSMENT — ENCOUNTER SYMPTOMS
APPETITE CHANGE: 0
NAUSEA: 0
MYALGIAS: 0
CHILLS: 0
DIARRHEA: 0
FEVER: 0
SHORTNESS OF BREATH: 1
EYES NEGATIVE: 1
ACTIVITY CHANGE: 1
RHINORRHEA: 0
SORE THROAT: 0
HEADACHES: 0
COUGH: 1
FATIGUE: 0
VOMITING: 0

## 2023-02-23 ASSESSMENT — ACTIVITIES OF DAILY LIVING (ADL): ADLS_ACUITY_SCORE: 35

## 2023-02-23 NOTE — ED NOTES
Pt presents with having a intermittent cough for about a week and sometimes feels SOB before and after coughing.  Pt just wants to make sure she doesn't have pneumonia. Took covid test which was negative.  No fever. Pt gets SOB sometimes after walking. Non-productive cough.  Pt is not a smoker.

## 2023-02-23 NOTE — ED PROVIDER NOTES
History     Chief Complaint   Patient presents with     Cough     HPI  Dinorah Lim is a 82 year old female who is with a 9-day history of a non-productive cough, accompanied with difficulty catching her breath.  Does her DuoNebs once a day at home.  Otherwise, no OTC medications have been taken.  No known sick contacts.  Non-smoker.  Subjected to secondhand smoke as a child.  History of mild COPD.  Has not had COVID vaccination or other recent vaccinations.  Denies fevers, chills, nausea, vomiting, diarrhea, and headaches.    Allergies:  Allergies   Allergen Reactions     Chocolate Hives     Lemon Flavor Hives     Lime [Calcium Oxysulfide] Hives     Metal [Staples]      Orange Fruit [Citrus] Hives     Strawberry Hives     Adhesive Tape      Band-aids     Codeine Hives     Patient can tolerate oxycodone & dilaudid     Erythromycin Hives     ERYTHROMYCIN BASE     Food      Tomato, grapefruit, oranges.     Grapefruit [Extra Strength Grapefruit]      GRAPEFRUIT     Morphine Hives     Pt. Reports had hives     Penicillins Hives     Ranitidine GI Disturbance     Sulfa Drugs      SULFONAMIDE ANTIBIOTICS      Tomato      Xyzal [Levocetirizine] Hives       Problem List:    Patient Active Problem List    Diagnosis Date Noted     Other chest pain 10/20/2021     Priority: Medium     Hiatal hernia 07/28/2021     Priority: Medium     Chronic, continuous use of opioids 05/17/2021     Priority: Medium     Stage 3a chronic kidney disease (H) 03/22/2021     Priority: Medium     COVID-19 virus infection on 10/30/20 11/24/2020     Priority: Medium     10-       Other neutropenia (H) 08/04/2020     Priority: Medium     Paresthesias 02/13/2020     Priority: Medium     Status post total left knee replacement 09/09/2019     Priority: Medium     Aortic valve insufficiency 03/06/2019     Priority: Medium     Mitral regurgitation 03/06/2019     Priority: Medium     Tricuspid valve insufficiency 03/06/2019     Priority: Medium      Diastolic dysfunction grade 1 on 2/21/19 03/06/2019     Priority: Medium     Hypertriglyceridemia 03/06/2019     Priority: Medium     Palpitations 02/13/2019     Priority: Medium     PVC's (premature ventricular contractions) 02/13/2019     Priority: Medium     Atrial ectopy 02/13/2019     Priority: Medium     PSVT (paroxysmal supraventricular tachycardia) (H) 02/13/2019     Priority: Medium     COPD, mild on 9/9/14 02/13/2019     Priority: Medium     Bilateral carotid artery stenosis at less than 50% on 12/1/16 02/13/2019     Priority: Medium     Mixed hyperlipidemia 02/13/2019     Priority: Medium     On statin therapy 02/13/2019     Priority: Medium     Heart murmur 02/13/2019     Priority: Medium     Morbid obesity (H) 06/01/2017     Priority: Medium     ACP (advance care planning) 04/28/2016     Priority: Medium     Advance Care Planning 4/28/2016: ACP Review of Chart / Resources Provided:  Reviewed chart for advance care plan.  Dinorah Lim has no plan or code status on file. Discussed available resources and provided with information. Confirmed code status reflects current choices pending further ACP discussions.  Confirmed/documented legally designated decision makers.  Added by Aditi Gandhi             Anxiety 06/23/2014     Priority: Medium     Lung density on x-ray 07/23/2013     Priority: Medium     Osteoarthritis 06/14/2012     Priority: Medium     Problem list name updated by automated process. Provider to review       Advanced care planning/counseling discussion 01/13/2012     Priority: Medium     Abnormal glucose 08/01/2011     Priority: Medium     Hgb A1c 5.7 on 1/4/2015  Problem list name updated by automated process. Provider to review       Osteoarthritis of right hip 10/07/2004     Priority: Medium     Urticaria 06/01/2004     Priority: Medium     Problem list name updated by automated process. 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     Obesity, Class II, BMI 35-39.9 1/4/2015     Osteoarthritis of right hip 10/07/2004     Osteoarthrosis 06/14/2012     Otitis externa 10/04/2011     Prediabetes 08/01/2011       Past Surgical History:    Past Surgical History:   Procedure Laterality Date     ARTHROPLASTY KNEE Left 9/9/2019    Procedure: LEFT TOTAL KNEE ARTHROPLASTY S/N;  Surgeon: Trevor Alonzo MD;  Location: [facility]     ARTHROSCOPY KNEE Left 3/21/2019    Procedure: LEFT  KNEE ARTHROSCOPY, partial medial menisectomy;  Surgeon: Esteban Wills MD;  Location: [facility]     CLOSED REDUCTION WRIST Right 1952 x 2    long arm cast (same time as elbow fx)     CLOSED RX ELBOW DISLOCATION Right 1952    CR/long cast of fracture     EXCISE MASS FOOT Left 8/16/2021    Procedure: LEFT ANKLE MASS EXCISION;  Surgeon: Trevor Alonzo MD;  Location: [facility]     HC INJ EPIDURAL LUMBAR/SACRAL W/WO CONTRAST Bilateral 5/2013    facet injections; prev 2012     LAPAROSCOPIC CHOLECYSTECTOMY N/A 1/4/2015    Procedure: LAPAROSCOPIC CHOLECYSTECTOMY;  Surgeon: Brittney العلي MD;  Location: [facility]     TONSILLECTOMY       ZZC TOTAL KNEE ARTHROPLASTY Left 09/09/2019    Dr Alonzo       Family History:    Family History   Problem Relation Age of Onset     Heart Disease Brother         atrial fibrillation     Atrial fibrillation Brother      Other - See Comments Mother         emphysema     Heart Disease Father 92        CHF     Cancer Paternal Grandfather         lung cancer     Cancer Maternal Uncle         lung cancer     Chronic Obstructive Pulmonary Disease Daughter      Emphysema Daughter       Other - See Comments Daughter         benign breast lesion     Esophagitis Daughter        Social History:  Marital Status:  Single [1]  Social History     Tobacco Use     Smoking status: Never     Smokeless tobacco: Never   Vaping Use     Vaping Use: Never used   Substance Use Topics     Alcohol use: No     Drug use: No        Medications:    acetaminophen (TYLENOL) 325 MG tablet  Calcium-Magnesium-Vitamin D (CALCIUM 1200+D3 PO)  ciprofloxacin (CIPRO) 500 MG tablet  clonazePAM (KLONOPIN) 0.5 MG tablet  clonazePAM (KLONOPIN) 1 MG tablet  FISH OIL  ipratropium - albuterol 0.5 mg/2.5 mg/3 mL (DUONEB) 0.5-2.5 (3) MG/3ML neb solution  PARoxetine (PAXIL) 40 MG tablet  simvastatin (ZOCOR) 40 MG tablet  VITAMIN D, CHOLECALCIFEROL, PO          Review of Systems   Constitutional: Positive for activity change. Negative for appetite change, chills, fatigue and fever.   HENT: Negative for ear pain, rhinorrhea and sore throat.    Eyes: Negative.    Respiratory: Positive for cough and shortness of breath.    Cardiovascular: Negative for chest pain.   Gastrointestinal: Negative for diarrhea, nausea and vomiting.   Genitourinary: Negative.    Musculoskeletal: Negative for myalgias.   Skin: Negative.    Neurological: Negative for headaches.       Physical Exam   BP: 143/65  Pulse: 84  Temp: 97.7  F (36.5  C)  Resp: 16  SpO2: 96 %      Physical Exam  Vitals and nursing note reviewed.   Constitutional:       General: She is not in acute distress.     Appearance: She is overweight.   Cardiovascular:      Rate and Rhythm: Normal rate and regular rhythm.      Heart sounds: Normal heart sounds. No murmur heard.  Pulmonary:      Effort: Pulmonary effort is normal. No respiratory distress.      Breath sounds: Normal breath sounds. No wheezing or rales.   Skin:     General: Skin is warm and dry.   Neurological:      Mental Status: She is alert and oriented to person, place, and time.   Psychiatric:         Behavior: Behavior normal.          ED Course                 Procedures             Results for orders placed or performed during the hospital encounter of 02/23/23 (from the past 24 hour(s))   Chest XR,  PA & LAT    Narrative    PROCEDURE:  XR CHEST 2 VIEWS    HISTORY: cough for ten days. hard to get a breath. hx copd, .    COMPARISON:  6/3/2022    FINDINGS:  The cardiomediastinal contours are stable. A hiatal hernia is  redemonstrated. The trachea is midline.  No focal consolidation, effusion or pneumothorax.  Interstitial  thickening is chronic.  Osteopenia or osteoporosis. No subdiaphragmatic free air.      Impression    IMPRESSION:      Emphysema. No discrete consolidation or effusion.      JEAN PIERRE BARBER MD         SYSTEM ID:  [id number]       Medications - No data to display    Assessments & Plan (with Medical Decision Making)     I have reviewed the nursing notes.    I have reviewed the findings, diagnosis, plan and need for follow up with the patient.    (J20.9) Acute bronchitis  Comment: 82 year old female who is with a 9-day history of a non-productive cough, accompanied with difficulty catching her breath.  Does her DuoNebs once a day at home.  Otherwise, no OTC medications have been taken.  No known sick contacts.  Non-smoker.  Subjected to secondhand smoke as a child.  History of mild COPD.  Has not had COVID vaccination or other recent vaccinations.  Denies fevers, chills, nausea, vomiting, diarrhea, and headaches.    MDM: NHT. (no murmur auscultated) lungs CTA    Chest x-ray reviewed and per radiology;Emphysema. No discrete consolidation or effusion.     Has taken ciprofloxacin before.  Has multiple reactions and allergies and knows she can take ciprofloxacin safely    Plan: Ciprofloxacin twice daily for 5 days.  Education provided and/or discussed for this/these medication and bronchitis.  -Increase fluids.   -Complete all antibiotics even if feeling better.    -Taking antibiotics with food may decrease the symptoms, of an  upset stomach, that can occur when taking antibiotics. Antibiotics frequently cause diarrhea. Probiotics or yogurt may help prevent or decrease these symptoms.   -Return to be reevaluated if symptoms do not improve, or worsen.  These discharge instructions and medications were reviewed with her and understanding verbalized.    This document was prepared using a combination of typing and voice generated software.  While every attempt was made for accuracy, spelling and grammatical errors may exist.    Medical Decision Making  The patient's presentation was of low complexity (an acute and uncomplicated illness or injury).    The patient's evaluation involved:  ordering and/or review of 1 test(s) in this encounter (see separate area of note for details)    The patient's management necessitated moderate risk (prescription drug management including medications given in the ED).    Discharge Medication List as of 2/23/2023  3:12 PM      START taking these medications    Details   ciprofloxacin (CIPRO) 500 MG tablet Take 1 tablet (500 mg) by mouth 2 times daily for 5 days, Disp-10 tablet, R-0, E-Prescribe             Final diagnoses:   Acute bronchitis       2/23/2023   [facility]       Bozena Ortiz, CNP  02/23/23 1521

## 2023-02-23 NOTE — DISCHARGE INSTRUCTIONS
-Increase fluids.   -Complete all antibiotics even if feeling better.    -Taking antibiotics with food may decrease the symptoms, of an upset stomach, that can occur when taking antibiotics. Antibiotics frequently cause diarrhea. Probiotics or yogurt may help prevent or decrease these symptoms.   -Return to be reevaluated if symptoms do not improve, or worsen.     [facility]   Met with pt and family to discuss plans for HC.  Pt to be discharged home 03 28 18   and has agreed to have [facility] follow with services of SN, PT, OT and HHA.  Central Intake processing referral.  Pt and family verbalized understanding that initial visit is scheduled for  03 29 18 or 03 30 18.   Pt has 24 hour phone number for [facility] for any questions or concerns.

## 2023-02-23 NOTE — ED TRIAGE NOTES
Pt presents with c/o cough x 4 days, denies fevers. Denies CP, SOB. Pt requesting chest x-ray.

## 2023-03-03 DIAGNOSIS — F41.9 ANXIETY: ICD-10-CM

## 2023-03-03 NOTE — TELEPHONE ENCOUNTER
clonazePAM (KLONOPIN) 0.5 MG table  Last Written Prescription Date:  12-28-22  Last Fill Quantity: 20,   # refills: 0  Last Office Visit: 11-10-22  Future Office visit:    Next 5 appointments (look out 90 days)    Apr 18, 2023  8:00 AM  (Arrive by 7:45 AM)  Office Visit with Yanique Mar MD  [facility] ([facility] ) [address]  [place] [address]  [phone]           Routing refill request to provider for review/approval because:  Drug not on the [facility], [facility] or [facility] refill protocol or controlled substance

## 2023-03-06 RX ORDER — CLONAZEPAM 0.5 MG/1
TABLET ORAL
Qty: 20 TABLET | Refills: 0 | Status: SHIPPED | OUTPATIENT
Start: 2023-03-06 | End: 2023-05-09

## 2023-03-13 ENCOUNTER — TRANSFERRED RECORDS (OUTPATIENT)
Dept: HEALTH INFORMATION MANAGEMENT | Facility: CLINIC | Age: 83
End: 2023-03-13

## 2023-03-14 ENCOUNTER — APPOINTMENT (OUTPATIENT)
Dept: GENERAL RADIOLOGY | Facility: HOSPITAL | Age: 83
End: 2023-03-14
Payer: COMMERCIAL

## 2023-03-14 ENCOUNTER — HOSPITAL ENCOUNTER (EMERGENCY)
Facility: HOSPITAL | Age: 83
Discharge: HOME | End: 2023-03-14
Payer: COMMERCIAL

## 2023-03-14 VITALS
TEMPERATURE: 98.2 F | DIASTOLIC BLOOD PRESSURE: 82 MMHG | OXYGEN SATURATION: 98 % | SYSTOLIC BLOOD PRESSURE: 143 MMHG | RESPIRATION RATE: 18 BRPM | HEART RATE: 71 BPM

## 2023-03-14 DIAGNOSIS — J40 BRONCHITIS: ICD-10-CM

## 2023-03-14 LAB
BASOPHILS # BLD AUTO: 0 10E3/UL (ref 0–0.2)
BASOPHILS NFR BLD AUTO: 1 %
EOSINOPHIL # BLD AUTO: 0 10E3/UL (ref 0–0.7)
EOSINOPHIL NFR BLD AUTO: 1 %
ERYTHROCYTE [DISTWIDTH] IN BLOOD BY AUTOMATED COUNT: 13.1 % (ref 10–15)
HCT VFR BLD AUTO: 39.7 % (ref 35–47)
HGB BLD-MCNC: 12.7 G/DL (ref 11.7–15.7)
HOLD SPECIMEN: NORMAL
IMM GRANULOCYTES # BLD: 0 10E3/UL
IMM GRANULOCYTES NFR BLD: 0 %
LYMPHOCYTES # BLD AUTO: 0.9 10E3/UL (ref 0.8–5.3)
LYMPHOCYTES NFR BLD AUTO: 29 %
MCH RBC QN AUTO: 29.7 PG (ref 26.5–33)
MCHC RBC AUTO-ENTMCNC: 32 G/DL (ref 31.5–36.5)
MCV RBC AUTO: 93 FL (ref 78–100)
MONOCYTES # BLD AUTO: 0.3 10E3/UL (ref 0–1.3)
MONOCYTES NFR BLD AUTO: 10 %
NEUTROPHILS # BLD AUTO: 1.8 10E3/UL (ref 1.6–8.3)
NEUTROPHILS NFR BLD AUTO: 59 %
NRBC # BLD AUTO: 0 10E3/UL
NRBC BLD AUTO-RTO: 0 /100
PLATELET # BLD AUTO: 152 10E3/UL (ref 150–450)
RBC # BLD AUTO: 4.27 10E6/UL (ref 3.8–5.2)
WBC # BLD AUTO: 3 10E3/UL (ref 4–11)

## 2023-03-14 PROCEDURE — G0463 HOSPITAL OUTPT CLINIC VISIT: HCPCS | Mod: 25

## 2023-03-14 PROCEDURE — 85025 COMPLETE CBC W/AUTO DIFF WBC: CPT

## 2023-03-14 PROCEDURE — 36415 COLL VENOUS BLD VENIPUNCTURE: CPT

## 2023-03-14 PROCEDURE — 99213 OFFICE O/P EST LOW 20 MIN: CPT

## 2023-03-14 PROCEDURE — 71046 X-RAY EXAM CHEST 2 VIEWS: CPT

## 2023-03-14 RX ORDER — IPRATROPIUM BROMIDE AND ALBUTEROL SULFATE 2.5; .5 MG/3ML; MG/3ML
1 SOLUTION RESPIRATORY (INHALATION) EVERY 6 HOURS PRN
Qty: 90 ML | Refills: 0 | Status: SHIPPED | OUTPATIENT
Start: 2023-03-14 | End: 2024-09-16

## 2023-03-14 RX ORDER — PREDNISONE 20 MG/1
TABLET ORAL
Qty: 10 TABLET | Refills: 0 | Status: SHIPPED | OUTPATIENT
Start: 2023-03-14 | End: 2023-04-14

## 2023-03-14 ASSESSMENT — ENCOUNTER SYMPTOMS
CHILLS: 0
COUGH: 1
ABDOMINAL PAIN: 0
DYSURIA: 0
CHEST TIGHTNESS: 0
FEVER: 0
DIARRHEA: 0
SHORTNESS OF BREATH: 1
SORE THROAT: 0
ACTIVITY CHANGE: 0
NAUSEA: 0
EYE PAIN: 0
VOMITING: 0
APPETITE CHANGE: 0

## 2023-03-15 NOTE — ED PROVIDER NOTES
History     Chief Complaint   Patient presents with     Cough     HPI  Dinorah Lim is a 82 year old female who presents to the urgent care with a worsening cough and shortness of breath for the last two weeks. She was seen on 2/23 and dx with bronchitis and given cipro. She was starting to feel better until two weeks ago/ She denies fevers, chills, n/v/d, chest pain, abd pain, and dizziness. She has a hx of COPD and has been using her nebs once daily. She has not had any recent steroids. No OTC medications taken.     Allergies:  Allergies   Allergen Reactions     Chocolate Hives     Lemon Flavor Hives     Lime [Calcium Oxysulfide] Hives     Metal [Staples]      Orange Fruit [Citrus] Hives     Strawberry Hives     Adhesive Tape      Band-aids     Codeine Hives     Patient can tolerate oxycodone & dilaudid     Erythromycin Hives     ERYTHROMYCIN BASE     Food      Tomato, grapefruit, oranges.     Grapefruit [Extra Strength Grapefruit]      GRAPEFRUIT     Morphine Hives     Pt. Reports had hives     Penicillins Hives     Ranitidine GI Disturbance     Sulfa Drugs      SULFONAMIDE ANTIBIOTICS      Tomato      Xyzal [Levocetirizine] Hives       Problem List:    Patient Active Problem List    Diagnosis Date Noted     Other chest pain 10/20/2021     Priority: Medium     Hiatal hernia 07/28/2021     Priority: Medium     Chronic, continuous use of opioids 05/17/2021     Priority: Medium     Stage 3a chronic kidney disease (H) 03/22/2021     Priority: Medium     COVID-19 virus infection on 10/30/20 11/24/2020     Priority: Medium     10-       Other neutropenia (H) 08/04/2020     Priority: Medium     Paresthesias 02/13/2020     Priority: Medium     Status post total left knee replacement 09/09/2019     Priority: Medium     Aortic valve insufficiency 03/06/2019     Priority: Medium     Mitral regurgitation 03/06/2019     Priority: Medium     Tricuspid valve insufficiency 03/06/2019     Priority: Medium      Diastolic dysfunction grade 1 on 2/21/19 03/06/2019     Priority: Medium     Hypertriglyceridemia 03/06/2019     Priority: Medium     Palpitations 02/13/2019     Priority: Medium     PVC's (premature ventricular contractions) 02/13/2019     Priority: Medium     Atrial ectopy 02/13/2019     Priority: Medium     PSVT (paroxysmal supraventricular tachycardia) (H) 02/13/2019     Priority: Medium     COPD, mild on 9/9/14 02/13/2019     Priority: Medium     Bilateral carotid artery stenosis at less than 50% on 12/1/16 02/13/2019     Priority: Medium     Mixed hyperlipidemia 02/13/2019     Priority: Medium     On statin therapy 02/13/2019     Priority: Medium     Heart murmur 02/13/2019     Priority: Medium     Morbid obesity (H) 06/01/2017     Priority: Medium     ACP (advance care planning) 04/28/2016     Priority: Medium     Advance Care Planning 4/28/2016: ACP Review of Chart / Resources Provided:  Reviewed chart for advance care plan.  Dinorah Lim has no plan or code status on file. Discussed available resources and provided with information. Confirmed code status reflects current choices pending further ACP discussions.  Confirmed/documented legally designated decision makers.  Added by Aditi Gandhi             Anxiety 06/23/2014     Priority: Medium     Lung density on x-ray 07/23/2013     Priority: Medium     Osteoarthritis 06/14/2012     Priority: Medium     Problem list name updated by automated process. Provider to review       Advanced care planning/counseling discussion 01/13/2012     Priority: Medium     Abnormal glucose 08/01/2011     Priority: Medium     Hgb A1c 5.7 on 1/4/2015  Problem list name updated by automated process. Provider to review       Osteoarthritis of right hip 10/07/2004     Priority: Medium     Urticaria 06/01/2004     Priority: Medium     Problem list name updated by automated process. 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     Obesity, Class II, BMI 35-39.9 1/4/2015     Osteoarthritis of right hip 10/07/2004     Osteoarthrosis 06/14/2012     Otitis externa 10/04/2011     Prediabetes 08/01/2011       Past Surgical History:    Past Surgical History:   Procedure Laterality Date     ARTHROPLASTY KNEE Left 9/9/2019    Procedure: LEFT TOTAL KNEE ARTHROPLASTY S/N;  Surgeon: Trevor Alonzo MD;  Location: [facility]     ARTHROSCOPY KNEE Left 3/21/2019    Procedure: LEFT  KNEE ARTHROSCOPY, partial medial menisectomy;  Surgeon: Esteban Wills MD;  Location: [facility]     CLOSED REDUCTION WRIST Right 1952 x 2    long arm cast (same time as elbow fx)     CLOSED RX ELBOW DISLOCATION Right 1952    CR/long cast of fracture     EXCISE MASS FOOT Left 8/16/2021    Procedure: LEFT ANKLE MASS EXCISION;  Surgeon: Trevor Alonzo MD;  Location: [facility]     HC INJ EPIDURAL LUMBAR/SACRAL W/WO CONTRAST Bilateral 5/2013    facet injections; prev 2012     LAPAROSCOPIC CHOLECYSTECTOMY N/A 1/4/2015    Procedure: LAPAROSCOPIC CHOLECYSTECTOMY;  Surgeon: Brittney العلي MD;  Location: [facility]     TONSILLECTOMY       ZZC TOTAL KNEE ARTHROPLASTY Left 09/09/2019    Dr Alonzo       Family History:    Family History   Problem Relation Age of Onset     Heart Disease Brother         atrial fibrillation     Atrial fibrillation Brother      Other - See Comments Mother         emphysema     Heart Disease Father 92        CHF     Cancer Paternal Grandfather         lung cancer     Cancer Maternal Uncle         lung cancer     Chronic Obstructive Pulmonary Disease Daughter      Emphysema Daughter       Other - See Comments Daughter         benign breast lesion     Esophagitis Daughter        Social History:  Marital Status:  Single [1]  Social History     Tobacco Use     Smoking status: Never     Smokeless tobacco: Never   Vaping Use     Vaping Use: Never used   Substance Use Topics     Alcohol use: No     Drug use: No        Medications:    acetaminophen (TYLENOL) 325 MG tablet  Calcium-Magnesium-Vitamin D (CALCIUM 1200+D3 PO)  clonazePAM (KLONOPIN) 0.5 MG tablet  clonazePAM (KLONOPIN) 1 MG tablet  FISH OIL  ipratropium - albuterol 0.5 mg/2.5 mg/3 mL (DUONEB) 0.5-2.5 (3) MG/3ML neb solution  ipratropium - albuterol 0.5 mg/2.5 mg/3 mL (DUONEB) 0.5-2.5 (3) MG/3ML neb solution  PARoxetine (PAXIL) 40 MG tablet  predniSONE (DELTASONE) 20 MG tablet  simvastatin (ZOCOR) 40 MG tablet  VITAMIN D, CHOLECALCIFEROL, PO          Review of Systems   Constitutional: Negative for activity change, appetite change, chills and fever.   HENT: Negative for congestion, ear pain and sore throat.    Eyes: Negative for pain.   Respiratory: Positive for cough and shortness of breath. Negative for chest tightness.    Cardiovascular: Negative for chest pain.   Gastrointestinal: Negative for abdominal pain, diarrhea, nausea and vomiting.   Genitourinary: Negative for dysuria.   All other systems reviewed and are negative.      Physical Exam   BP: 143/82  Pulse: 71  Temp: 98.2  F (36.8  C)  Resp: 18  SpO2: 98 %      Physical Exam  Vitals and nursing note reviewed.   Constitutional:       General: She is not in acute distress.     Appearance: She is ill-appearing.   HENT:      Right Ear: Hearing, tympanic membrane, ear canal and external ear normal. There is no impacted cerumen. Tympanic membrane is not erythematous.      Left Ear: Hearing, tympanic membrane, ear canal and external ear normal. There is no impacted cerumen. Tympanic membrane is not erythematous.      Nose: Nose normal. No congestion or rhinorrhea.      Mouth/Throat:       Mouth: Mucous membranes are moist.      Pharynx: Oropharynx is clear. No oropharyngeal exudate or posterior oropharyngeal erythema.   Eyes:      General:         Right eye: No discharge.         Left eye: No discharge.   Cardiovascular:      Rate and Rhythm: Normal rate and regular rhythm.      Pulses: Normal pulses.      Heart sounds: Normal heart sounds, S1 normal and S2 normal.   Pulmonary:      Effort: Pulmonary effort is normal. No respiratory distress.      Breath sounds: Normal breath sounds. No stridor. No wheezing or rhonchi.   Musculoskeletal:      Cervical back: Normal range of motion.   Lymphadenopathy:      Cervical: No cervical adenopathy.   Skin:     General: Skin is warm and dry.   Neurological:      Mental Status: She is alert.         ED Course                 Procedures                Results for orders placed or performed during the hospital encounter of 03/14/23 (from the past 24 hour(s))   Chest XR,  PA & LAT    Narrative    Exam:  XR CHEST 2 VIEWS    HISTORY: worsening cough, hx of copd, rule out pneumonia.    COMPARISON:  2/23/2023, 6/3/2022    FINDINGS:     The cardiomediastinal contours are unchanged.  A large hiatal hernia  is again noted.    No focal consolidation, effusion, or pneumothorax.      No acute osseous abnormality.       Impression    IMPRESSION:      No acute cardiopulmonary process.      PAOLA PEREZ MD         SYSTEM ID:  [id number]   CBC with platelets differential    Narrative    The following orders were created for panel order CBC with platelets differential.  Procedure                               Abnormality         Status                     ---------                               -----------         ------                     CBC with platelets and d...[[id number]]  Abnormal            Final result                 Please view results for these tests on the individual orders.   CBC with platelets and differential   Result Value Ref Range    WBC Count 3.0 (L) 4.0 -  11.0 10e3/uL    RBC Count 4.27 3.80 - 5.20 10e6/uL    Hemoglobin 12.7 11.7 - 15.7 g/dL    Hematocrit 39.7 35.0 - 47.0 %    MCV 93 78 - 100 fL    MCH 29.7 26.5 - 33.0 pg    MCHC 32.0 31.5 - 36.5 g/dL    RDW 13.1 10.0 - 15.0 %    Platelet Count 152 150 - 450 10e3/uL    % Neutrophils 59 %    % Lymphocytes 29 %    % Monocytes 10 %    % Eosinophils 1 %    % Basophils 1 %    % Immature Granulocytes 0 %    NRBCs per 100 WBC 0 <1 /100    Absolute Neutrophils 1.8 1.6 - 8.3 10e3/uL    Absolute Lymphocytes 0.9 0.8 - 5.3 10e3/uL    Absolute Monocytes 0.3 0.0 - 1.3 10e3/uL    Absolute Eosinophils 0.0 0.0 - 0.7 10e3/uL    Absolute Basophils 0.0 0.0 - 0.2 10e3/uL    Absolute Immature Granulocytes 0.0 <=0.4 10e3/uL    Absolute NRBCs 0.0 10e3/uL   Extra Tube    Narrative    The following orders were created for panel order Extra Tube.  Procedure                               Abnormality         Status                     ---------                               -----------         ------                     Extra Blue Top Tube[[id number]]                              In process                 Extra Red Top Tube[[id number]]                               In process                 Extra Green Top (Lithium...[[id number]]                      In process                 Extra Heparinized Syringe[[id number]]                        In process                   Please view results for these tests on the individual orders.       Medications - No data to display    Assessments & Plan (with Medical Decision Making)     I have reviewed the nursing notes.    I have reviewed the findings, diagnosis, plan and need for follow up with the patient.  Dinorah Lim is a 82 year old female who presents to the urgent care with a worsening cough and shortness of breath for the last two weeks. She was seen on 2/23 and dx with bronchitis and given cipro. She was starting to feel better until two weeks ago/ She denies fevers, chills, n/v/d, chest pain, abd  pain, and dizziness. She has a hx of COPD and has been using her nebs once daily. She has not had any recent steroids. No OTC medications taken.     (J40) Bronchitis  Plan: prednisone daily for five days. duoneb refilled. Tylenol and ibuprofen as needed for pain. Consider flonase and mucinex. Increase fluids. Follow up with PCP as needed. Return with any chest pain, shortness of breath, or concerns. Understanding verbalized.     MDM; CXR: No acute cardiopulmonary process. No focal consolidation, effusion, or pneumothorax, per radiologist.   CBC: WBC count of 3, no leukocytosis.   VSS and afebrile. Lungs clear, heart tones regular. Landy denied improvement after course of abx. With her normal WBC count and absence of fevers, more suspicious for viral illness. Discussed flonase and mucinex with Landy, but she is apprehensive to try these OTC medications as she frequently develops hives from medications. Rx for prednisone. She has tolerated this in the past.         New Prescriptions    IPRATROPIUM - ALBUTEROL 0.5 MG/2.5 MG/3 ML (DUONEB) 0.5-2.5 (3) MG/3ML NEB SOLUTION    Take 1 vial (3 mLs) by nebulization every 6 hours as needed for shortness of breath, wheezing or cough    PREDNISONE (DELTASONE) 20 MG TABLET    Take two tablets (= 40mg) each day for 5 (five) days       Final diagnoses:   Bronchitis       3/14/2023   [facility]     Kasey Cage NP  03/14/23 2048

## 2023-03-15 NOTE — ED TRIAGE NOTES
Pt reports to urgent care with c/o having a hard time breathing pt just finished cipro due to bronchitis pt doesn't think its all gone and requested a chest xray to rule it out due to continued cough.

## 2023-03-15 NOTE — DISCHARGE INSTRUCTIONS
You can consider flonase nasal spray and mucinex to help with your congestion. If you do, make sure to push fluids.     Prednisone daily for 5 days. Take in the morning.     Return with any chest pain, shortness of breath, or concerns.

## 2023-03-16 ENCOUNTER — TELEPHONE (OUTPATIENT)
Dept: FAMILY MEDICINE | Facility: OTHER | Age: 83
End: 2023-03-16

## 2023-03-16 NOTE — TELEPHONE ENCOUNTER
The WBC is low, but looking back, patient has historically been low.  I cannot explain the cause of this, but it appears to be chronic.  Please ask her if she has any of the following symptoms: Fevers, chills, night sweats, loss of appetite, unintential weight loss, persistent fatigue or weakness, frequent or severe infections.  Also with cancer such as leukemia, we would expect a very high number for the WBC.  Also, there would be low red blood cell and low platelets and those are normal.  I would not worry.     Yanique

## 2023-03-16 NOTE — TELEPHONE ENCOUNTER
Patient calling regarding lab results from 3/14.    Patient was seen in UC on 3/14.    Patient has questions regarding her EBC. Reports this was a 3.  Patient concerned with this as she googled low WBC and read this can be related to cancer and other health conditions.   Patient also has questions about her lab results and what the percentages mean (Neutrophils, immmature granulocytes).    Patient requesting provider advice.   Thank you.

## 2023-03-17 NOTE — TELEPHONE ENCOUNTER
Nurse called patient this morning and notified on provider's recommendation below.   Patient verbalized understanding.     Patient denies any fevers, chills, nights sweats, loss off appetite, unintentional weight loss, persistent fatigue or weakness, and frequent or severe infections.

## 2023-03-23 DIAGNOSIS — E78.5 HYPERLIPIDEMIA LDL GOAL <100: ICD-10-CM

## 2023-03-24 RX ORDER — SIMVASTATIN 40 MG
TABLET ORAL
Qty: 90 TABLET | Refills: 0 | Status: SHIPPED | OUTPATIENT
Start: 2023-03-24 | End: 2023-06-29

## 2023-03-24 NOTE — TELEPHONE ENCOUNTER
Zocor       Last Written Prescription Date:  12/27/22  Last Fill Quantity: 90,   # refills: 0  Last Office Visit: 11/10/22  Future Office visit:    Next 5 appointments (look out 90 days)    Apr 18, 2023  8:00 AM  (Arrive by 7:45 AM)  Office Visit with Yanique Mar MD  [facility] [place] ([facility] [place] ) [address]  [place] [address]  [phone]

## 2023-03-24 NOTE — TELEPHONE ENCOUNTER
Statins Protocol Failed 03/23/2023 09:13 AM   Protocol Details  LDL on file in past 12 months     Carla No RN

## 2023-04-14 ENCOUNTER — HOSPITAL ENCOUNTER (EMERGENCY)
Facility: HOSPITAL | Age: 83
Discharge: HOME | End: 2023-04-14
Attending: PHYSICIAN ASSISTANT | Admitting: PHYSICIAN ASSISTANT
Payer: COMMERCIAL

## 2023-04-14 VITALS
SYSTOLIC BLOOD PRESSURE: 151 MMHG | DIASTOLIC BLOOD PRESSURE: 82 MMHG | TEMPERATURE: 99.2 F | HEART RATE: 77 BPM | RESPIRATION RATE: 18 BRPM | OXYGEN SATURATION: 97 %

## 2023-04-14 DIAGNOSIS — J06.9 VIRAL UPPER RESPIRATORY TRACT INFECTION: ICD-10-CM

## 2023-04-14 LAB
FLUAV RNA SPEC QL NAA+PROBE: NEGATIVE
FLUBV RNA RESP QL NAA+PROBE: NEGATIVE
GROUP A STREP BY PCR: NOT DETECTED
RSV RNA SPEC NAA+PROBE: NEGATIVE
SARS-COV-2 RNA RESP QL NAA+PROBE: NEGATIVE

## 2023-04-14 PROCEDURE — 99213 OFFICE O/P EST LOW 20 MIN: CPT | Mod: CS | Performed by: PHYSICIAN ASSISTANT

## 2023-04-14 PROCEDURE — G0463 HOSPITAL OUTPT CLINIC VISIT: HCPCS

## 2023-04-14 PROCEDURE — 250N000013 HC RX MED GY IP 250 OP 250 PS 637: Performed by: PHYSICIAN ASSISTANT

## 2023-04-14 PROCEDURE — 87651 STREP A DNA AMP PROBE: CPT | Performed by: PHYSICIAN ASSISTANT

## 2023-04-14 PROCEDURE — 87637 SARSCOV2&INF A&B&RSV AMP PRB: CPT | Performed by: PHYSICIAN ASSISTANT

## 2023-04-14 PROCEDURE — C9803 HOPD COVID-19 SPEC COLLECT: HCPCS

## 2023-04-14 RX ORDER — ACETAMINOPHEN 325 MG/1
975 TABLET ORAL ONCE
Status: COMPLETED | OUTPATIENT
Start: 2023-04-14 | End: 2023-04-14

## 2023-04-14 RX ADMIN — ACETAMINOPHEN 975 MG: 325 TABLET ORAL at 20:52

## 2023-04-14 ASSESSMENT — ENCOUNTER SYMPTOMS
SINUS PRESSURE: 0
RHINORRHEA: 0
SINUS PAIN: 0
SORE THROAT: 1

## 2023-04-15 NOTE — ED PROVIDER NOTES
History     Chief Complaint   Patient presents with     Pharyngitis     Otalgia     HPI  Dinorah Lim is a 82 year old female who presents to the urgent care today for evaluation of upper respiratory symptoms.  Onset of symptoms yesterday.  Patient endorses sore throat and right ear pain since that time.  Denies cough, congestion, shortness of breath, wheezing, fatigue, body aches, nausea/vomiting, fever, chills, sweats, ill contacts or other concerns.  No aggravating or alleviating factors.  Has been using OTC treatments without significant relief.   Last dose of Tylenol this morning.      Allergies:  Allergies   Allergen Reactions     Chocolate Hives     Lemon Flavor Hives     Lime [Calcium Oxysulfide] Hives     Metal [Staples]      Orange Fruit [Citrus] Hives     Strawberry Hives     Adhesive Tape      Band-aids     Codeine Hives     Patient can tolerate oxycodone & dilaudid     Erythromycin Hives     ERYTHROMYCIN BASE     Food      Tomato, grapefruit, oranges.     Grapefruit [Extra Strength Grapefruit]      GRAPEFRUIT     Morphine Hives     Pt. Reports had hives     Penicillins Hives     Ranitidine GI Disturbance     Sulfa Drugs      SULFONAMIDE ANTIBIOTICS      Tomato      Xyzal [Levocetirizine] Hives       Problem List:    Patient Active Problem List    Diagnosis Date Noted     Other chest pain 10/20/2021     Priority: Medium     Hiatal hernia 07/28/2021     Priority: Medium     Chronic, continuous use of opioids 05/17/2021     Priority: Medium     Stage 3a chronic kidney disease (H) 03/22/2021     Priority: Medium     COVID-19 virus infection on 10/30/20 11/24/2020     Priority: Medium     10-       Other neutropenia (H) 08/04/2020     Priority: Medium     Paresthesias 02/13/2020     Priority: Medium     Status post total left knee replacement 09/09/2019     Priority: Medium     Aortic valve insufficiency 03/06/2019     Priority: Medium     Mitral regurgitation 03/06/2019     Priority: Medium      Tricuspid valve insufficiency 03/06/2019     Priority: Medium     Diastolic dysfunction grade 1 on 2/21/19 03/06/2019     Priority: Medium     Hypertriglyceridemia 03/06/2019     Priority: Medium     Palpitations 02/13/2019     Priority: Medium     PVC's (premature ventricular contractions) 02/13/2019     Priority: Medium     Atrial ectopy 02/13/2019     Priority: Medium     PSVT (paroxysmal supraventricular tachycardia) (H) 02/13/2019     Priority: Medium     COPD, mild on 9/9/14 02/13/2019     Priority: Medium     Bilateral carotid artery stenosis at less than 50% on 12/1/16 02/13/2019     Priority: Medium     Mixed hyperlipidemia 02/13/2019     Priority: Medium     On statin therapy 02/13/2019     Priority: Medium     Heart murmur 02/13/2019     Priority: Medium     Morbid obesity (H) 06/01/2017     Priority: Medium     ACP (advance care planning) 04/28/2016     Priority: Medium     Advance Care Planning 4/28/2016: ACP Review of Chart / Resources Provided:  Reviewed chart for advance care plan.  Dinorah Lim has no plan or code status on file. Discussed available resources and provided with information. Confirmed code status reflects current choices pending further ACP discussions.  Confirmed/documented legally designated decision makers.  Added by Aditi Gandhi             Anxiety 06/23/2014     Priority: Medium     Lung density on x-ray 07/23/2013     Priority: Medium     Osteoarthritis 06/14/2012     Priority: Medium     Problem list name updated by automated process. Provider to review       Advanced care planning/counseling discussion 01/13/2012     Priority: Medium     Abnormal glucose 08/01/2011     Priority: Medium     Hgb A1c 5.7 on 1/4/2015  Problem list name updated by automated process. Provider to review       Osteoarthritis of right hip 10/07/2004     Priority: Medium     Urticaria 06/01/2004     Priority: Medium     Problem list name updated by automated process. 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     Obesity, Class II, BMI 35-39.9 1/4/2015     Osteoarthritis of right hip 10/07/2004     Osteoarthrosis 06/14/2012     Otitis externa 10/04/2011     Prediabetes 08/01/2011       Past Surgical History:    Past Surgical History:   Procedure Laterality Date     ARTHROPLASTY KNEE Left 9/9/2019    Procedure: LEFT TOTAL KNEE ARTHROPLASTY S/N;  Surgeon: Trevor Alonzo MD;  Location: [facility]     ARTHROSCOPY KNEE Left 3/21/2019    Procedure: LEFT  KNEE ARTHROSCOPY, partial medial menisectomy;  Surgeon: Esteban Wills MD;  Location: [facility]     CLOSED REDUCTION WRIST Right 1952 x 2    long arm cast (same time as elbow fx)     CLOSED RX ELBOW DISLOCATION Right 1952    CR/long cast of fracture     EXCISE MASS FOOT Left 8/16/2021    Procedure: LEFT ANKLE MASS EXCISION;  Surgeon: Trevor Alonzo MD;  Location: [facility]     HC INJ EPIDURAL LUMBAR/SACRAL W/WO CONTRAST Bilateral 5/2013    facet injections; prev 2012     LAPAROSCOPIC CHOLECYSTECTOMY N/A 1/4/2015    Procedure: LAPAROSCOPIC CHOLECYSTECTOMY;  Surgeon: Brittney العلي MD;  Location: [facility]     TONSILLECTOMY       ZZC TOTAL KNEE ARTHROPLASTY Left 09/09/2019    Dr Alonzo       Family History:    Family History   Problem Relation Age of Onset     Heart Disease Brother         atrial fibrillation     Atrial fibrillation Brother      Other - See Comments Mother         emphysema     Heart Disease Father 92        CHF     Cancer Paternal Grandfather         lung cancer     Cancer Maternal Uncle         lung cancer      Chronic Obstructive Pulmonary Disease Daughter      Emphysema Daughter      Other - See Comments Daughter         benign breast lesion     Esophagitis Daughter        Social History:  Marital Status:  Single [1]  Social History     Tobacco Use     Smoking status: Never     Smokeless tobacco: Never   Vaping Use     Vaping status: Never Used   Substance Use Topics     Alcohol use: No     Drug use: No        Medications:    acetaminophen (TYLENOL) 325 MG tablet  Calcium-Magnesium-Vitamin D (CALCIUM 1200+D3 PO)  clonazePAM (KLONOPIN) 0.5 MG tablet  FISH OIL  ipratropium - albuterol 0.5 mg/2.5 mg/3 mL (DUONEB) 0.5-2.5 (3) MG/3ML neb solution  PARoxetine (PAXIL) 40 MG tablet  simvastatin (ZOCOR) 40 MG tablet  VITAMIN D, CHOLECALCIFEROL, PO  clonazePAM (KLONOPIN) 1 MG tablet  ipratropium - albuterol 0.5 mg/2.5 mg/3 mL (DUONEB) 0.5-2.5 (3) MG/3ML neb solution          Review of Systems   HENT: Positive for ear pain and sore throat. Negative for congestion, rhinorrhea, sinus pressure and sinus pain.    All other systems reviewed and are negative.      Physical Exam   BP: 151/82  Pulse: 77  Temp: 99.2  F (37.3  C)  Resp: 18  SpO2: 97 %      Physical Exam  Constitutional:       General: She is not in acute distress.     Appearance: She is well-developed. She is obese. She is not ill-appearing.   HENT:      Head: Normocephalic and atraumatic.      Right Ear: Tympanic membrane and ear canal normal. Tympanic membrane is not erythematous.      Left Ear: Tympanic membrane and ear canal normal. Tympanic membrane is not erythematous.      Nose: Nose normal. No congestion or rhinorrhea.      Mouth/Throat:      Mouth: Mucous membranes are moist. No oral lesions.      Pharynx: Uvula midline. Posterior oropharyngeal erythema present. No pharyngeal swelling, oropharyngeal exudate or uvula swelling.      Tonsils: No tonsillar exudate. 0 on the right. 0 on the left.   Eyes:      General:         Right eye: No discharge.         Left eye: No  discharge.      Conjunctiva/sclera: Conjunctivae normal.   Cardiovascular:      Rate and Rhythm: Normal rate and regular rhythm.      Heart sounds: Normal heart sounds. No murmur heard.  Pulmonary:      Effort: Pulmonary effort is normal. No respiratory distress.      Breath sounds: Normal breath sounds. No wheezing or rhonchi.   Musculoskeletal:      Cervical back: Normal range of motion.   Lymphadenopathy:      Cervical: No cervical adenopathy.   Skin:     General: Skin is warm and dry.      Findings: No rash.   Neurological:      General: No focal deficit present.      Mental Status: She is alert.   Psychiatric:         Mood and Affect: Mood normal.         Behavior: Behavior normal.         ED Course                 Procedures             Critical Care time:               Results for orders placed or performed during the hospital encounter of 04/14/23 (from the past 24 hour(s))   Group A Streptococcus PCR Throat Swab    Specimen: Throat; Swab   Result Value Ref Range    Group A strep by PCR Not Detected Not Detected    Narrative    The Xpert Xpress Strep A test, performed on the [employer], is a rapid, qualitative in vitro diagnostic test for the detection of Streptococcus pyogenes (Group A ß-hemolytic Streptococcus, Strep A) in throat swab specimens from patients with signs and symptoms of pharyngitis. The Xpert Xpress Strep A test can be used as an aid in the diagnosis of Group A Streptococcal pharyngitis. The assay is not intended to monitor treatment for Group A Streptococcus infections. The Xpert Xpress Strep A test utilizes an automated real-time polymerase chain reaction (PCR) to detect Streptococcus pyogenes DNA.       Medications   acetaminophen (TYLENOL) tablet 975 mg (975 mg Oral $Given 4/14/23 2052)       Assessments & Plan (with Medical Decision Making)   82-year-old female presented to the urgent care today with a 1 day history of sore throat, right ear pain without other  accompanying symptoms.  Denies known ill contacts.  Has been eating and drinking normally.  Patient states negative COVID test at home.  Rapid strep test was negative here today.  Discussed results with patient and she wanted to proceed with COVID/influenza/RSV testing.  Those results are pending, will notify patient when available.  Tylenol was given in urgent care for patient comfort.  Discussed  Viral URI, continued OTC treatments, supportive cares, signs and symptoms of worsening, return criteria and patient verbalized understanding.  She is in agreement with the plan.  She will schedule an appointment with her primary provider with any persistent symptoms.  Return to ER/urgent care with any new, severe, worsening symptoms or other concerns.     I have reviewed the nursing notes.    I have reviewed the findings, diagnosis, plan and need for follow up with the patient.          Medical Decision Making  The patient's presentation was of low complexity (an acute and uncomplicated illness or injury).    The patient's evaluation involved:  ordering and/or review of 2 test(s) in this encounter (see separate area of note for details)    The patient's management necessitated only low risk treatment.        New Prescriptions    No medications on file       Final diagnoses:   Viral upper respiratory tract infection       4/14/2023   [facility]     Jeannette Cameron PA-C  04/14/23 2056

## 2023-04-15 NOTE — DISCHARGE INSTRUCTIONS
Rapid strep negative today.  COVID/influenza/RSV test is pending.  We will notify you of results when available.    Tylenol was given here in the urgent care tonight.  I recommend you take 1000 mg 3 times daily to help with discomfort.    Push fluids, rest.    Follow-up with your primary provider in 1 week with persistent symptoms.    May return to the emergency department or urgent care with any new or worsening symptoms, other concerns.

## 2023-04-15 NOTE — RESULT ENCOUNTER NOTE
Please notify patient of negative COVID/Flu/RSV testing.    Continue current plan of care, follow up with primary provider with any persistent symptoms.

## 2023-04-17 ENCOUNTER — APPOINTMENT (OUTPATIENT)
Dept: GENERAL RADIOLOGY | Facility: HOSPITAL | Age: 83
End: 2023-04-17
Attending: NURSE PRACTITIONER
Payer: COMMERCIAL

## 2023-04-17 ENCOUNTER — HOSPITAL ENCOUNTER (EMERGENCY)
Facility: HOSPITAL | Age: 83
Discharge: HOME | End: 2023-04-17
Attending: NURSE PRACTITIONER | Admitting: NURSE PRACTITIONER
Payer: COMMERCIAL

## 2023-04-17 VITALS
RESPIRATION RATE: 18 BRPM | TEMPERATURE: 98.3 F | HEART RATE: 82 BPM | DIASTOLIC BLOOD PRESSURE: 58 MMHG | OXYGEN SATURATION: 96 % | SYSTOLIC BLOOD PRESSURE: 104 MMHG

## 2023-04-17 DIAGNOSIS — J06.9 URI (UPPER RESPIRATORY INFECTION): ICD-10-CM

## 2023-04-17 PROCEDURE — 99213 OFFICE O/P EST LOW 20 MIN: CPT | Performed by: NURSE PRACTITIONER

## 2023-04-17 PROCEDURE — 71046 X-RAY EXAM CHEST 2 VIEWS: CPT

## 2023-04-17 PROCEDURE — G0463 HOSPITAL OUTPT CLINIC VISIT: HCPCS | Mod: 25

## 2023-04-17 RX ORDER — BENZONATATE 100 MG/1
200 CAPSULE ORAL 3 TIMES DAILY PRN
Qty: 18 CAPSULE | Refills: 0 | Status: SHIPPED | OUTPATIENT
Start: 2023-04-17 | End: 2023-05-05

## 2023-04-17 ASSESSMENT — ENCOUNTER SYMPTOMS
CHILLS: 0
SHORTNESS OF BREATH: 1
HEADACHES: 1
DIARRHEA: 0
SORE THROAT: 0
NAUSEA: 0
RHINORRHEA: 0
EYES NEGATIVE: 1
VOMITING: 0
DIZZINESS: 0
FEVER: 1
COUGH: 1
MYALGIAS: 0
ACTIVITY CHANGE: 1
LIGHT-HEADEDNESS: 0

## 2023-04-17 ASSESSMENT — ACTIVITIES OF DAILY LIVING (ADL): ADLS_ACUITY_SCORE: 35

## 2023-04-17 NOTE — ED PROVIDER NOTES
History     Chief Complaint   Patient presents with     Cough     HPI  Dinorah Lim is a 82 year old female who is accompanied per her grandson.  She presents with a 4-day history of low-grade fever at home, cough, mild shortness of breath, and a headache from coughing.  Has been improving and many of her symptoms have resolved at this time.  Is utilizing DuoNebs and acetaminophen that does help decrease her symptoms/discomfort.  Has a history of valve insufficiency and is concerned she may have pneumonia.  Was seen in urgent care 3 days ago and had an negative Multiplex nasopharyngeal swab at that time.  Has not had COVID vaccinations or recent vaccines.  No known sick contacts.  Non-smoker.  Denies chills, nausea, vomiting, diarrhea, body aches, dizziness, and lightheadedness.    Allergies:  Allergies   Allergen Reactions     Chocolate Hives     Lemon Flavor Hives     Lime [Calcium Oxysulfide] Hives     Metal [Staples]      Orange Fruit [Citrus] Hives     Strawberry Hives     Adhesive Tape      Band-aids     Codeine Hives     Patient can tolerate oxycodone & dilaudid     Erythromycin Hives     ERYTHROMYCIN BASE     Food      Tomato, grapefruit, oranges.     Grapefruit [Extra Strength Grapefruit]      GRAPEFRUIT     Morphine Hives     Pt. Reports had hives     Penicillins Hives     Ranitidine GI Disturbance     Sulfa Drugs      SULFONAMIDE ANTIBIOTICS      Tomato      Xyzal [Levocetirizine] Hives       Problem List:    Patient Active Problem List    Diagnosis Date Noted     Other chest pain 10/20/2021     Priority: Medium     Hiatal hernia 07/28/2021     Priority: Medium     Chronic, continuous use of opioids 05/17/2021     Priority: Medium     Stage 3a chronic kidney disease (H) 03/22/2021     Priority: Medium     COVID-19 virus infection on 10/30/20 11/24/2020     Priority: Medium     10-       Other neutropenia (H) 08/04/2020     Priority: Medium     Paresthesias 02/13/2020     Priority: Medium      Status post total left knee replacement 09/09/2019     Priority: Medium     Aortic valve insufficiency 03/06/2019     Priority: Medium     Mitral regurgitation 03/06/2019     Priority: Medium     Tricuspid valve insufficiency 03/06/2019     Priority: Medium     Diastolic dysfunction grade 1 on 2/21/19 03/06/2019     Priority: Medium     Hypertriglyceridemia 03/06/2019     Priority: Medium     Palpitations 02/13/2019     Priority: Medium     PVC's (premature ventricular contractions) 02/13/2019     Priority: Medium     Atrial ectopy 02/13/2019     Priority: Medium     PSVT (paroxysmal supraventricular tachycardia) (H) 02/13/2019     Priority: Medium     COPD, mild on 9/9/14 02/13/2019     Priority: Medium     Bilateral carotid artery stenosis at less than 50% on 12/1/16 02/13/2019     Priority: Medium     Mixed hyperlipidemia 02/13/2019     Priority: Medium     On statin therapy 02/13/2019     Priority: Medium     Heart murmur 02/13/2019     Priority: Medium     Morbid obesity (H) 06/01/2017     Priority: Medium     ACP (advance care planning) 04/28/2016     Priority: Medium     Advance Care Planning 4/28/2016: ACP Review of Chart / Resources Provided:  Reviewed chart for advance care plan.  Dinorah WILLIAM Lim has no plan or code status on file. Discussed available resources and provided with information. Confirmed code status reflects current choices pending further ACP discussions.  Confirmed/documented legally designated decision makers.  Added by Aditi Gandhi             Anxiety 06/23/2014     Priority: Medium     Lung density on x-ray 07/23/2013     Priority: Medium     Osteoarthritis 06/14/2012     Priority: Medium     Problem list name updated by automated process. Provider to review       Advanced care planning/counseling discussion 01/13/2012     Priority: Medium     Abnormal glucose 08/01/2011     Priority: Medium     Hgb A1c 5.7 on 1/4/2015  Problem list name updated by automated process. Provider to  review       Osteoarthritis of right hip 10/07/2004     Priority: Medium     Urticaria 06/01/2004     Priority: Medium     Problem list name updated by automated process. 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     Obesity, Class II, BMI 35-39.9 1/4/2015     Osteoarthritis of right hip 10/07/2004     Osteoarthrosis 06/14/2012     Otitis externa 10/04/2011     Prediabetes 08/01/2011       Past Surgical History:    Past Surgical History:   Procedure Laterality Date     ARTHROPLASTY KNEE Left 9/9/2019    Procedure: LEFT TOTAL KNEE ARTHROPLASTY S/N;  Surgeon: Trevor Alonzo MD;  Location: [facility]     ARTHROSCOPY KNEE Left 3/21/2019    Procedure: LEFT  KNEE ARTHROSCOPY, partial medial menisectomy;  Surgeon: Esteban Wills MD;  Location: [facility]     CLOSED REDUCTION WRIST Right 1952 x 2    long arm cast (same time as elbow fx)     CLOSED RX ELBOW DISLOCATION Right 1952    CR/long cast of fracture     EXCISE MASS FOOT Left 8/16/2021    Procedure: LEFT ANKLE MASS EXCISION;  Surgeon: Trevor Alonzo MD;  Location: [facility]     HC INJ EPIDURAL LUMBAR/SACRAL W/WO CONTRAST Bilateral 5/2013    facet injections; prev 2012     LAPAROSCOPIC CHOLECYSTECTOMY N/A 1/4/2015    Procedure: LAPAROSCOPIC CHOLECYSTECTOMY;  Surgeon: Brittney العلي MD;  Location: [facility]     TONSILLECTOMY       ZZC TOTAL KNEE ARTHROPLASTY Left 09/09/2019    Dr Alonzo       Family History:    Family History   Problem Relation Age of Onset     Heart Disease Brother         atrial fibrillation     Atrial fibrillation  Brother      Other - See Comments Mother         emphysema     Heart Disease Father 92        CHF     Cancer Paternal Grandfather         lung cancer     Cancer Maternal Uncle         lung cancer     Chronic Obstructive Pulmonary Disease Daughter      Emphysema Daughter      Other - See Comments Daughter         benign breast lesion     Esophagitis Daughter        Social History:  Marital Status:  Single [1]  Social History     Tobacco Use     Smoking status: Never     Smokeless tobacco: Never   Vaping Use     Vaping status: Never Used   Substance Use Topics     Alcohol use: No     Drug use: No        Medications:    acetaminophen (TYLENOL) 325 MG tablet  benzonatate (TESSALON) 100 MG capsule  Calcium-Magnesium-Vitamin D (CALCIUM 1200+D3 PO)  clonazePAM (KLONOPIN) 0.5 MG tablet  clonazePAM (KLONOPIN) 1 MG tablet  FISH OIL  ipratropium - albuterol 0.5 mg/2.5 mg/3 mL (DUONEB) 0.5-2.5 (3) MG/3ML neb solution  ipratropium - albuterol 0.5 mg/2.5 mg/3 mL (DUONEB) 0.5-2.5 (3) MG/3ML neb solution  PARoxetine (PAXIL) 40 MG tablet  simvastatin (ZOCOR) 40 MG tablet  VITAMIN D, CHOLECALCIFEROL, PO          Review of Systems   Constitutional: Positive for activity change and fever (low grade at home). Negative for chills.   HENT: Negative for ear pain (resolved), rhinorrhea and sore throat (resolved).    Eyes: Negative.    Respiratory: Positive for cough and shortness of breath.    Gastrointestinal: Negative for diarrhea, nausea and vomiting.   Genitourinary: Negative.    Musculoskeletal: Negative for myalgias.   Skin: Negative.    Neurological: Positive for headaches (from coughing). Negative for dizziness and light-headedness.       Physical Exam   BP: 159/76  Pulse: 82  Temp: 98.3  F (36.8  C)  Resp: 18  SpO2: 96 %      Physical Exam  Vitals and nursing note reviewed.   Constitutional:       General: She is not in acute distress.     Appearance: She is overweight.   HENT:      Head: Normocephalic.      Right Ear: Tympanic  membrane and ear canal normal.      Left Ear: Tympanic membrane and ear canal normal.      Nose: Nose normal.      Right Sinus: No maxillary sinus tenderness or frontal sinus tenderness.      Left Sinus: No maxillary sinus tenderness or frontal sinus tenderness.      Mouth/Throat:      Lips: Pink.      Mouth: Mucous membranes are moist.      Pharynx: Uvula midline. No posterior oropharyngeal erythema.   Eyes:      Conjunctiva/sclera: Conjunctivae normal.   Cardiovascular:      Rate and Rhythm: Normal rate and regular rhythm.      Heart sounds: Normal heart sounds. No murmur heard.  Pulmonary:      Effort: Pulmonary effort is normal. No respiratory distress.      Breath sounds: Normal air entry. Examination of the left-lower field reveals rales. Rales (Fine) present. No wheezing.   Lymphadenopathy:      Cervical: No cervical adenopathy.   Skin:     General: Skin is warm and dry.   Neurological:      Mental Status: She is alert and oriented to person, place, and time.   Psychiatric:         Behavior: Behavior normal.         ED Course                 Procedures             Results for orders placed or performed during the hospital encounter of 04/17/23 (from the past 24 hour(s))   Chest XR,  PA & LAT    Narrative    PROCEDURE: XR CHEST 2 VIEWS 4/17/2023 3:10 PM    HISTORY: crackles/rhonchi LLL. cough, covid test neg three days ago    COMPARISONS: 3/14/2023.    TECHNIQUE: 2 views.    FINDINGS: Heart is stable in size. No acute infiltrate or effusion is  seen.    There is a fairly large hiatal hernia. There is a right convex  scoliosis with degenerative change in the spine. Surgical clips are  seen in the right upper quadrant.         Impression    IMPRESSION:   1. No acute infiltrate.  2. Hiatal hernia as seen previously.    NAVID TOLEDO MD         SYSTEM ID:  [id number]       Medications - No data to display    Assessments & Plan (with Medical Decision Making)     I have reviewed the nursing notes.    I have  reviewed the findings, diagnosis, plan and need for follow up with the patient.  (J06.9) URI (upper respiratory infection)  Comment: 82 year old female who is accompanied per her grandson.  She presents with a 4-day history of low-grade fever at home, cough, mild shortness of breath, and a headache from coughing.  Has been improving and many of her symptoms have resolved at this time.  Is utilizing DuoNebs and acetaminophen that does help decrease her symptoms/discomfort.  Has a history of valve insufficiency and is concerned she may have pneumonia.  Was seen in urgent care 3 days ago and had an negative Multiplex nasopharyngeal swab at that time.  Has not had COVID vaccinations or recent vaccines.  No known sick contacts.  Non-smoker.  Denies chills, nausea, vomiting, diarrhea, body aches, dizziness, and lightheadedness.    MDM: NHT. Lungs CTA with the exception of fine crackles heard left lower lobe    Chest x-ray reviewed and per radiology;  1.  No acute infiltrate  2.  Hiatal hernia as seen previously    Plan: Tessalon Perles 3 times daily as needed.  Education provided and/or discussed for this/these medication and viral URI.  Treat symptoms conservatively with acetaminophen and  ibuprofen (if applicable) for fevers, body aches, and headaches, guaifenesin and/or honey for cough. May use chest rubs for sore throat and congestion, hot and cold liquids may help decrease sore throat and help you feel better. Increase fluids. Loratadine (Claritin) or cetirizine (Zyrtec) 20 mg  daily for ten to fourteen days to see if symptoms lessen or resolve. If the medication seems to help you may take 10 mg daily on an ongoing basis.  May buy over the counter.   Return to be reevaluated by ER/UC or your primary care provider if symptoms worsen, you develop breathing difficulties, or you do not improve in a reasonable time frame. It can take several days for a cough to resolve. It can take ten to fourteen days for upper  respiratory symptoms to resolve.   These discharge instructions and medications were reviewed with her and her greandson and understanding verbalized.    This document was prepared using a combination of typing and voice generated software.  While every attempt was made for accuracy, spelling and grammatical errors may exist.    Medical Decision Making  The patient's presentation was of low complexity (an acute and uncomplicated illness or injury).    The patient's evaluation involved:  ordering and/or review of 1 test(s) in this encounter (see separate area of note for details)    The patient's management necessitated moderate risk (prescription drug management including medications given in the ED).    Discharge Medication List as of 4/17/2023  3:20 PM          Final diagnoses:   URI (upper respiratory infection)       4/17/2023   [facility]       Bozena Ortiz, CNP  04/17/23 1535

## 2023-04-17 NOTE — DISCHARGE INSTRUCTIONS
Increase oral intake, cool mist vaporizer as needed, rest, avoid sharing utensils, practice good hand washing techniques, cover mouth when you cough and sneeze. Over the counter medications such as ibuprofen and/or acetaminophen for fever and generalized aches and pains. Tylenol 650 to 1000 mg every four to six hours as needed (not to exceed more than 4000 mg in a 24 hour period). May use interchangeably. Mucinex (guaifenesin) for cough. Chest rubs such as Garrison's or Mentholatum may help reduce sore throat symptoms.  Chloraseptic spray for sore throat or menthol lozenges may be helpful for sore throat. Be reevaluated if symptoms persist longer than 10 - 14 days or worsen and if there is no improvement in 72 hours or worsening of symptoms.  Increase fluids.     Loratadine (Claritin) or cetirizine (Zyrtec) 20 mg  daily for ten to fourteen days to see if symptoms lessen or resolve. If the medication seems to help you may take 10 mg daily on an ongoing basis.  May buy over the counter.    Fluids, herbs, and foods for sore throat relief -- Adjusting the temperature and texture of foods and beverages may provide local relief of sore throat pain. While data showing benefit are quite limited, these approaches are intuitive. We typically advise these measures since they are likely to be safe with minimal adverse effect, and patients often describe relief of symptoms.  We suggest hydration with frozen (eg, ice or popsicles) or heated liquids (eg, teas, soups), rather than room temperature or refrigerated fluids in patient with significant sore throat pain. Very cold foods can have a numbing-like effect that temporarily reduces or alleviates the pain of swallowing. Ice cubes or frozen popsicles facilitate hydration; ice cream and frozen yogurt provide caloric intake.  Warm fluids and foods, including teas, soups, and soft non-irritating foods, may be better tolerated by patients with throat pain than irritating foods (eg,  rough-textured or spicy foods) or fluids at room temperatures. Foods that coat the throat, including honey and hard candies, can facilitate intake of calories while temporarily relieving throat pain.

## 2023-04-17 NOTE — ED TRIAGE NOTES
Pt presents with c/o being in on Friday and had the multiplex done which came back negative and is starting to have a productive cough, with greenish yellow sputum. Pt states would like a chest XR done.   Pt has not taken any otc meds.

## 2023-05-01 ENCOUNTER — OFFICE VISIT (OUTPATIENT)
Dept: FAMILY MEDICINE | Facility: OTHER | Age: 83
End: 2023-05-01
Attending: STUDENT IN AN ORGANIZED HEALTH CARE EDUCATION/TRAINING PROGRAM
Payer: COMMERCIAL

## 2023-05-01 VITALS
WEIGHT: 216 LBS | BODY MASS INDEX: 35.94 KG/M2 | TEMPERATURE: 98.5 F | RESPIRATION RATE: 18 BRPM | SYSTOLIC BLOOD PRESSURE: 112 MMHG | OXYGEN SATURATION: 97 % | HEART RATE: 68 BPM | DIASTOLIC BLOOD PRESSURE: 64 MMHG

## 2023-05-01 DIAGNOSIS — M81.0 AGE RELATED OSTEOPOROSIS, UNSPECIFIED PATHOLOGICAL FRACTURE PRESENCE: ICD-10-CM

## 2023-05-01 DIAGNOSIS — Z00.00 ENCOUNTER FOR MEDICARE ANNUAL WELLNESS EXAM: Primary | ICD-10-CM

## 2023-05-01 DIAGNOSIS — R21 RASH: ICD-10-CM

## 2023-05-01 PROCEDURE — 99397 PER PM REEVAL EST PAT 65+ YR: CPT | Performed by: STUDENT IN AN ORGANIZED HEALTH CARE EDUCATION/TRAINING PROGRAM

## 2023-05-01 PROCEDURE — G0463 HOSPITAL OUTPT CLINIC VISIT: HCPCS

## 2023-05-01 RX ORDER — TRIAMCINOLONE ACETONIDE 0.25 MG/G
OINTMENT TOPICAL 2 TIMES DAILY
Qty: 80 G | Refills: 1 | Status: SHIPPED | OUTPATIENT
Start: 2023-05-01 | End: 2024-09-16

## 2023-05-01 RX ORDER — ALENDRONATE SODIUM 70 MG/1
70 TABLET ORAL
Qty: 12 TABLET | Refills: 1 | Status: SHIPPED | OUTPATIENT
Start: 2023-05-01 | End: 2023-11-27

## 2023-05-01 ASSESSMENT — ANXIETY QUESTIONNAIRES
8. IF YOU CHECKED OFF ANY PROBLEMS, HOW DIFFICULT HAVE THESE MADE IT FOR YOU TO DO YOUR WORK, TAKE CARE OF THINGS AT HOME, OR GET ALONG WITH OTHER PEOPLE?: NOT DIFFICULT AT ALL
GAD7 TOTAL SCORE: 3
5. BEING SO RESTLESS THAT IT IS HARD TO SIT STILL: NOT AT ALL
3. WORRYING TOO MUCH ABOUT DIFFERENT THINGS: SEVERAL DAYS
4. TROUBLE RELAXING: NOT AT ALL
1. FEELING NERVOUS, ANXIOUS, OR ON EDGE: SEVERAL DAYS
7. FEELING AFRAID AS IF SOMETHING AWFUL MIGHT HAPPEN: NOT AT ALL
7. FEELING AFRAID AS IF SOMETHING AWFUL MIGHT HAPPEN: NOT AT ALL
GAD7 TOTAL SCORE: 3
IF YOU CHECKED OFF ANY PROBLEMS ON THIS QUESTIONNAIRE, HOW DIFFICULT HAVE THESE PROBLEMS MADE IT FOR YOU TO DO YOUR WORK, TAKE CARE OF THINGS AT HOME, OR GET ALONG WITH OTHER PEOPLE: NOT DIFFICULT AT ALL
6. BECOMING EASILY ANNOYED OR IRRITABLE: NOT AT ALL
2. NOT BEING ABLE TO STOP OR CONTROL WORRYING: SEVERAL DAYS

## 2023-05-01 ASSESSMENT — ENCOUNTER SYMPTOMS
EYE PAIN: 0
CHILLS: 0
CONSTIPATION: 0
COUGH: 1
DYSURIA: 0
HEMATURIA: 0
SHORTNESS OF BREATH: 0
PALPITATIONS: 0
PARESTHESIAS: 0
WEAKNESS: 0
FEVER: 0
DIZZINESS: 0
DIARRHEA: 0
HEARTBURN: 0
ABDOMINAL PAIN: 0
BREAST MASS: 0
FREQUENCY: 0
NERVOUS/ANXIOUS: 0
JOINT SWELLING: 0
SORE THROAT: 1
ARTHRALGIAS: 1
NAUSEA: 0
HEADACHES: 0
MYALGIAS: 0
HEMATOCHEZIA: 0

## 2023-05-01 ASSESSMENT — ACTIVITIES OF DAILY LIVING (ADL)
CURRENT_FUNCTION: LAUNDRY REQUIRES ASSISTANCE
CURRENT_FUNCTION: PREPARING MEALS REQUIRES ASSISTANCE
CURRENT_FUNCTION: BATHING REQUIRES ASSISTANCE
CURRENT_FUNCTION: HOUSEWORK REQUIRES ASSISTANCE

## 2023-05-01 ASSESSMENT — PAIN SCALES - GENERAL: PAINLEVEL: NO PAIN (0)

## 2023-05-01 NOTE — PATIENT INSTRUCTIONS
Patient Education   Personalized Prevention Plan  You are due for the preventive services outlined below.  Your care team is available to assist you in scheduling these services.  If you have already completed any of these items, please share that information with your care team to update in your medical record.  Health Maintenance Due   Topic Date Due     COVID-19 Vaccine (1) Never done     Annual Wellness Visit  08/06/2021     Flu Vaccine (1) 09/01/2022     Cholesterol Lab  10/28/2022     Osteoporosis Screening  04/29/2023       Exercise for a Healthier Heart  You may wonder how you can improve the health of your heart. If you re thinking about exercise, you re on the right track. You don t need to become an athlete. But you do need a certain amount of brisk exercise to help strengthen your heart. If you have been diagnosed with a heart condition, your healthcare provider may advise exercise to help your condition. To help make exercise a habit, choose safe, fun activities.      Exercise with a friend. When activity is fun, you're more likely to stick with it.     Before you start  Check with your healthcare provider before starting an exercise program. This is especially important if you haven't been active for a while. It's also important if you have a long-term (chronic) health problem such as heart disease, diabetes, or obesity. Also check with your provider if you're at high risk for having these problems.   Why exercise?  Exercising regularly offers many healthy rewards. It can help you do all of these:     Improve your blood cholesterol level to help prevent further heart trouble.    Lower your blood pressure to help prevent a stroke or heart attack.    Control diabetes or reduce your risk of getting this disease.    Improve your heart and lung function.    Reach and stay at a healthy weight.    Make your muscles stronger so you can stay active.    Prevent falls and fractures by slowing the loss of bone  mass (osteoporosis).    Manage stress better.    Improve your sense of self and your body image.  Exercise tips      Ease into your routine. Set small goals. Then build on them. Talk with your healthcare provider first before starting an exercise routine if you're not sure what your activity level should be.    Exercise on most days. Aim for a total of at least 150 minutes (2 hours and 30 minutes) or more of moderate-intensity aerobic activity each week. You could also do 75 minutes (1 hour and 15 minutes) or more of vigorous-intensity aerobic activity each week. Or try for a combination of both. Moderate activity means that you breathe heavier and your heart rate increases, but you can still talk. Think about doing at least 30 minutes of moderate exercise, 5 times a week. It's OK to work up to the 30-minute period over time. Examples of moderate-intensity activity are brisk walking, gardening, and water aerobics.    Step up your daily activity level.  Along with your exercise program, try being more active the whole day. Walk instead of drive. Or park further away so that you take more steps each day. Do more household tasks or yard work. You may not be able to meet the advised amount of physical activity. But doing some moderate- or vigorous-intensity aerobic activity can help reduce your risk for heart disease. Your healthcare provider can help you figure out what is best for you.    Choose 1 or more activities you enjoy.  Walking is one of the easiest things you can do. You can also try swimming, riding a bike, dancing, or taking an exercise class.    Call 911  Call 911 right away if any of these occur:     Chest pain that doesn't go away quickly with rest    New burning, tightness, pressure, or heaviness in your chest, neck, shoulders, back, or arms    Abnormal or severe shortness of breath    A very fast or irregular heartbeat (palpitations)    Fainting  When to call your healthcare provider  Call your  healthcare provider if you have any of these:     Dizziness or lightheadedness    Mild shortness of breath or chest pain    Increased or new joint or muscle pain    [employer] last reviewed this educational content on 7/1/2022 2000-2022 [employer]. All rights reserved. This information is not intended as a substitute for professional medical care. Always follow your healthcare professional's instructions.          Understanding USDA MyPlate  The USDA has guidelines to help you make healthy food choices. These are called MyPlate. MyPlate shows the food groups that make up healthy meals using the image of a place setting. Before you eat, think about the healthiest choices for what to put on your plate or in your cup or bowl. To learn more about building a healthy plate, visit www.choosemyplate.gov.     The food groups    Fruits. Any fruit or 100% fruit juice counts as part of the Fruit Group. Fruits may be fresh, canned, frozen, or dried, and may be whole, cut-up, or pureed. Make 1/2 of your plate fruits and vegetables.    Vegetables. Any vegetable or 100% vegetable juice counts as a member of the Vegetable Group. Vegetables may be fresh, frozen, canned, or dried. They can be served raw or cooked and may be whole, cut-up, or mashed. Make 1/2 of your plate fruits and vegetables.    Grains. All foods made from grains are part of the Grains Group. These include wheat, rice, oats, cornmeal, and barley. Grains are often used to make foods such as bread, pasta, oatmeal, cereal, tortillas, and grits. Grains should be no more than 1/4 of your plate. At least half of your grains should be whole grains.    Protein. This group includes meat, poultry, seafood, beans and peas, eggs, processed soy products (such as tofu), nuts (including nut butters), and seeds. Make protein choices no more than 1/4 of your plate. Meat and poultry choices should be lean or low fat.    Dairy. The Dairy Group includes all fluid milk  products and foods made from milk that contain calcium, such as yogurt and cheese. (Foods that have little calcium, such as cream, butter, and cream cheese, are not part of this group.) Most dairy choices should be low-fat or fat-free.    Oils. Oils aren't a food group, but they do contain essential nutrients. However it's important to watch your intake of oils. These are fats that are liquid at room temperature. They include canola, corn, olive, soybean, vegetable, and sunflower oil. Foods that are mainly oil include mayonnaise, certain salad dressings, and soft margarines. You likely already get your daily oil allowance from the foods you eat.  Things to limit  Eating healthy also means limiting these things in your diet:    Salt (sodium). Many processed foods have a lot of sodium. To keep sodium intake down, eat fresh vegetables, meats, poultry, and seafood when possible. Purchase low-sodium, reduced-sodium, or no-salt-added food products at the store. And don't add salt to your meals at home. Instead, season them with herbs and spices such as dill, oregano, cumin, and paprika. Or try adding flavor with lemon or lime zest and juice.    Saturated fat. Saturated fats are most often found in animal products such as beef, pork, and chicken. They are often solid at room temperature, such as butter. To reduce your saturated fat intake, choose leaner cuts of meat and poultry. And try healthier cooking methods such as grilling, broiling, roasting, or baking. For a simple lower-fat swap, use plain nonfat yogurt instead of mayonnaise when making potato salad or macaroni salad.    Added sugars. These are sugars added to foods. They are in foods such as ice cream, candy, soda, fruit drinks, sports drinks, energy drinks, cookies, pastries, jams, and syrups. Cut down on added sugars by sharing sweet treats with a family member or friend. You can also choose fruit for dessert, and drink water or other unsweetened  [employer] last reviewed this educational content on 6/1/2020 2000-2022 [employer]. All rights reserved. This information is not intended as a substitute for professional medical care. Always follow your healthcare professional's instructions.        Activities of Daily Living    Your Health Risk Assessment indicates you have difficulties with activities of daily living such as housework, bathing, preparing meals, taking medication, etc. Please make a follow up appointment for us to address this issue in more detail.    Preventing Falls at Home  A person can fall for many reasons. Older adults may fall because reaction time slows down as we age. Your muscles and joints may get stiff, weak, or less flexible because of illness, medicines, or a physical condition.   Other health problems that make falls more likely include:     Arthritis    Dizziness or lightheadedness when you stand up (orthostatic hypotension)    History of a stroke    Dizziness    Anemia    Certain medicines taken for mental illness or to control blood pressure.    Problems with balance or gait    Bladder or urinary problems    History of falling    Changes in vision (vision impairment)    Changes in thinking skills and memory (cognitive impairment)  Injuries from a fall can include serious injuries such as broken bones, dislocated joints, internal bleeding and cuts. Injuries like these can limit your independence.   Prevention tips  To help prevent falls and fall-related injuries, follow the tips below.    Floors  To make floors safer:     Put nonskid pads under area rugs.    Remove small rugs.    Replace worn floor coverings.    Tack carpets firmly to each step on carpeted stairs. Put nonskid strips on the edges of uncarpeted stairs.    Keep floors and stairs free of clutter and cords.    Arrange furniture so there are clear pathways.    Clean up any spills right away.  Bathrooms    To make bathrooms safer:     Install  grab bars in the tub or shower.    Apply nonskid strips or put a nonskid rubber mat in the tub or shower.    Sit on a bath chair to bathe.    Use bathmats with nonskid backing.  Lighting  To improve visibility in your home:      Keep a flashlight in each room. Or put a lamp next to the bed within easy reach.    Put nightlights in the bedrooms, hallways, kitchen, and bathrooms.    Make sure all stairways have good lighting.    Take your time when going up and down stairs.    Put handrails on both sides of stairs and in walkways for more support. To prevent injury to your wrist or arm, don t use handrails to pull yourself up.    Install grab bars to pull yourself up.    Move or rearrange items that you use often. This will make them easier to find or reach.    Look at your home to find any safety hazards. Especially look at doorways, walkways, and the driveway. Remove or repair any safety problems that you find.  Other changes to make    Look around to find any safety hazards. Look closely at doorways, walkways, and the driveway. Remove or repair any safety problems that you find.    Wear shoes that fit well.    Take your time when going up and down stairs.    Put handrails on both sides of stairs and in walkways for more support. To prevent injury to your wrist or arm, don t use handrails to pull yourself up.    Install grab bars wherever needed to pull yourself up.    Arrange items that you use often. This will make them easier to find or reach.    [employer] last reviewed this educational content on 3/1/2020    2511-4506 [employer]. All rights reserved. This information is not intended as a substitute for professional medical care. Always follow your healthcare professional's instructions.

## 2023-05-01 NOTE — PROGRESS NOTES
"SUBJECTIVE:   Landy is a 82 year old who presents for Preventive Visit.      5/1/2023    11:25 AM   Additional Questions   Roomed by Kaley BRISCOE   Patient has been advised of split billing requirements and indicates understanding: Yes  Are you in the first 12 months of your Medicare coverage?  No    Getting over cold.  Feels much better since ER visit.  Still has some post-nasal drip  Seeing Dr. Alonzo for her knees tomorrow.    Subtle rash on the anterior of chest.  Il defined borders and soft red with some scattered papules.  Feels good to rub it. Sometimes it burns.  Patch on anterior chest- rubs with pads of fingers.      Healthy Habits:     In general, how would you rate your overall health?  Good    Frequency of exercise:  None    Do you usually eat at least 4 servings of fruit and vegetables a day, include whole grains    & fiber and avoid regularly eating high fat or \"junk\" foods?  No    Taking medications regularly:  Yes    Medication side effects:  None    Ability to successfully perform activities of daily living:  Preparing meals requires assistance, housework requires assistance, bathing requires assistance and laundry requires assistance    Home Safety:  No safety concerns identified    Hearing Impairment:  No hearing concerns    In the past 6 months, have you been bothered by leaking of urine?  No    In general, how would you rate your overall mental or emotional health?  Good      PHQ-2 Total Score: 1    Additional concerns today:  No      Have you ever done Advance Care Planning? (For example, a Health Directive, POLST, or a discussion with a medical provider or your loved ones about your wishes): No, advance care planning information given to patient to review.  Patient plans to discuss their wishes with loved ones or provider.        Fall risk  Fallen 2 or more times in the past year?: No  Any fall with injury in the past year?: Yes  Timed Up and Go Test (>13.5 is fall risk; contact physician) : " 7  click delete button to remove this line now  36 year grandson is her PCA.  Lives with her and helps her 7 days a week. Still drives around.  Doesn't like to cook, has grandson does the cooking.    Fell in the beginning of winter.  Missed the top step going up and hit the front door.  Did go to the ED and work-up was fine.      Cognitive Screening   1) Repeat 3 items (Leader, Season, Table)    2) Clock draw: NORMAL  3) 3 item recall: Recalls 2 objects   Results: NORMAL clock, 1-2 items recalled: COGNITIVE IMPAIRMENT LESS LIKELY    Do you have sleep apnea, excessive snoring or daytime drowsiness?: no    Reviewed and updated as needed this visit by clinical staff   Tobacco  Allergies  Meds              Reviewed and updated as needed this visit by Provider                 Social History     Tobacco Use     Smoking status: Never     Smokeless tobacco: Never   Vaping Use     Vaping status: Never Used   Substance Use Topics     Alcohol use: No           5/1/2023    11:18 AM   Alcohol Use   Prescreen: >3 drinks/day or >7 drinks/week? No     Do you have a current opioid prescription? No  Do you use any other controlled substances or medications that are not prescribed by a provider? None        COPD Follow-Up    Overall, how are your COPD symptoms since your last clinic visit?  No change    How much fatigue or shortness of breath do you have when you are walking?  Less than usual    How much shortness of breath do you have when you are resting?  None    How often do you cough? Never    Have you noticed any change in your sputum/phlegm?  No    Have you experienced a recent fever? No    Please describe how far you can walk without stopping to rest:  Less than 1 block    How many flights of stairs are you able to walk up without stopping?  2  Have you had any Emergency Room Visits, Urgent Care Visits, or Hospital Admissions because of your COPD since your last office visit?  Yes      How many times have you gone to the  ED, Urgent Care, or been hospitalized for COPD since your last clinic visit?  2    History   Smoking Status     Never   Smokeless Tobacco     Never     No results found for: FEV1, SCG3YGW      Current providers sharing in care for this patient include:  Patient Care Team:  Yanique Mar MD as PCP - General (Family Medicine)  Ha Hughes, DO as MD (Cardiology)  Ha Hughes, DO as Assigned Heart and Vascular Provider  Marlen Elias, RN as Registered Nurse (Cardiology)  Yanique Mar MD as Assigned PCP    The following health maintenance items are reviewed in Epic and correct as of today:  Health Maintenance   Topic Date Due     COVID-19 Vaccine (1) Never done     MEDICARE ANNUAL WELLNESS VISIT  08/06/2021     INFLUENZA VACCINE (1) 09/01/2022     LIPID  10/28/2022     DEXA  04/29/2023     BMP  06/22/2023     MICROALBUMIN  06/22/2023     PHQ-9  06/22/2023     HEMOGLOBIN  03/14/2024     ANDREI ASSESSMENT  05/01/2024     FALL RISK ASSESSMENT  05/01/2024     ADVANCE CARE PLANNING  06/07/2026     DTAP/TDAP/TD IMMUNIZATION (3 - Td or Tdap) 09/25/2027     SPIROMETRY  Completed     COPD ACTION PLAN  Completed     PHQ-2 (once per calendar year)  Completed     URINALYSIS  Completed     Pneumococcal Vaccine: 65+ Years  Addressed     ZOSTER IMMUNIZATION  Addressed     IPV IMMUNIZATION  Aged Out     MENINGITIS IMMUNIZATION  Aged Out     URINE DRUG SCREEN  Discontinued     COLORECTAL CANCER SCREENING  Discontinued     TREATMENT AGREEMENT FOR CHRONIC PAIN MANAGEMENT  Discontinued       Mammogram Screening - Patient over age 75, has elected to discontinue screenings.  Pertinent mammograms are reviewed under the imaging tab.    Review of Systems   Constitutional: Negative for chills and fever.   HENT: Positive for congestion, ear pain and sore throat. Negative for hearing loss.    Eyes: Negative for pain and visual disturbance.   Respiratory: Positive for cough. Negative for shortness of breath.    Cardiovascular:  "Positive for peripheral edema. Negative for chest pain and palpitations.   Gastrointestinal: Negative for abdominal pain, constipation, diarrhea, heartburn, hematochezia and nausea.   Breasts:  Negative for tenderness, breast mass and discharge.   Genitourinary: Negative for dysuria, frequency, genital sores, hematuria, pelvic pain, urgency, vaginal bleeding and vaginal discharge.   Musculoskeletal: Positive for arthralgias. Negative for joint swelling and myalgias.   Skin: Positive for rash.   Neurological: Negative for dizziness, weakness, headaches and paresthesias.   Psychiatric/Behavioral: Positive for mood changes. The patient is not nervous/anxious.      Constitutional, HEENT, cardiovascular, pulmonary, gi and gu systems are negative, except as otherwise noted.    OBJECTIVE:   /64   Pulse 68   Temp 98.5  F (36.9  C) (Tympanic)   Resp 18   Wt 98 kg (216 lb)   SpO2 97%   BMI 35.94 kg/m   Estimated body mass index is 35.94 kg/m  as calculated from the following:    Height as of 11/28/22: 1.651 m (5' 5\").    Weight as of this encounter: 98 kg (216 lb).  Physical Exam  GENERAL: healthy, alert and no distress  EYES: Eyes grossly normal to inspection, PERRL and conjunctivae and sclerae normal  HENT: ear canals and TM's normal, nose and mouth without ulcers or lesions  NECK: no adenopathy, no asymmetry, masses, or scars and thyroid normal to palpation  RESP: lungs clear to auscultation - no rales, rhonchi or wheezes  BREAST: normal without masses, tenderness or nipple discharge and no palpable axillary masses or adenopathy  CV: regular rate and rhythm, normal S1 S2, no S3 or S4, no murmur, click or rub, no peripheral edema and peripheral pulses strong  ABDOMEN: soft, nontender, no hepatosplenomegaly, no masses and bowel sounds normal  MS: no gross musculoskeletal defects noted, no edema  SKIN: no suspicious lesions or rashes  NEURO: Normal strength and tone, mentation intact and speech normal  PSYCH: " "mentation appears normal, affect normal/bright    Diagnostic Test Results:  Labs reviewed in Epic    ASSESSMENT / PLAN:       ICD-10-CM    1. Encounter for Medicare annual wellness exam  Z00.00 Hemoglobin A1c     Lipid Profile (Chol, Trig, HDL, LDL calc)     CBC with platelets and differential      2. Rash  R21 triamcinolone (KENALOG) 0.025 % external ointment     ESR: Erythrocyte sedimentation rate     CRP, inflammation      3. Age related osteoporosis, unspecified pathological fracture presence  M81.0 alendronate (FOSAMAX) 70 MG tablet          COUNSELING:  Reviewed preventive health counseling, as reflected in patient instructions       Regular exercise       Healthy diet/nutrition       Vision screening       Hearing screening       Dental care       Bladder control       Fall risk prevention      BMI:   Estimated body mass index is 35.94 kg/m  as calculated from the following:    Height as of 11/28/22: 1.651 m (5' 5\").    Weight as of this encounter: 98 kg (216 lb).   Weight management plan: Discussed healthy diet and exercise guidelines    She reports that she has never smoked. She has never used smokeless tobacco.      Reviewed patients plan of care and provided an AVS. The Basic Care Plan (routine screening as documented in Health Maintenance) for Dinorah meets the Care Plan requirement. This Care Plan has been established and reviewed with the Patient.          Yanique Mar MD  [facility] [place]    "

## 2023-05-02 ENCOUNTER — MEDICAL CORRESPONDENCE (OUTPATIENT)
Dept: HEALTH INFORMATION MANAGEMENT | Facility: HOSPITAL | Age: 83
End: 2023-05-02

## 2023-05-02 ENCOUNTER — TRANSFERRED RECORDS (OUTPATIENT)
Dept: HEALTH INFORMATION MANAGEMENT | Facility: CLINIC | Age: 83
End: 2023-05-02

## 2023-05-02 ENCOUNTER — LAB (OUTPATIENT)
Dept: LAB | Facility: OTHER | Age: 83
End: 2023-05-02
Payer: COMMERCIAL

## 2023-05-02 DIAGNOSIS — Z00.00 ENCOUNTER FOR MEDICARE ANNUAL WELLNESS EXAM: ICD-10-CM

## 2023-05-02 DIAGNOSIS — R21 RASH: ICD-10-CM

## 2023-05-02 LAB
BASOPHILS # BLD AUTO: 0 10E3/UL (ref 0–0.2)
BASOPHILS NFR BLD AUTO: 1 %
CHOLEST SERPL-MCNC: 151 MG/DL
CRP SERPL-MCNC: <3 MG/L
EOSINOPHIL # BLD AUTO: 0.1 10E3/UL (ref 0–0.7)
EOSINOPHIL NFR BLD AUTO: 3 %
ERYTHROCYTE [DISTWIDTH] IN BLOOD BY AUTOMATED COUNT: 13.4 % (ref 10–15)
ERYTHROCYTE [SEDIMENTATION RATE] IN BLOOD BY WESTERGREN METHOD: 14 MM/HR (ref 0–30)
EST. AVERAGE GLUCOSE BLD GHB EST-MCNC: 111 MG/DL
HBA1C MFR BLD: 5.5 %
HCT VFR BLD AUTO: 41.6 % (ref 35–47)
HDLC SERPL-MCNC: 53 MG/DL
HGB BLD-MCNC: 13.6 G/DL (ref 11.7–15.7)
IMM GRANULOCYTES # BLD: 0 10E3/UL
IMM GRANULOCYTES NFR BLD: 0 %
LDLC SERPL CALC-MCNC: 76 MG/DL
LYMPHOCYTES # BLD AUTO: 1.4 10E3/UL (ref 0.8–5.3)
LYMPHOCYTES NFR BLD AUTO: 30 %
MCH RBC QN AUTO: 29.7 PG (ref 26.5–33)
MCHC RBC AUTO-ENTMCNC: 32.7 G/DL (ref 31.5–36.5)
MCV RBC AUTO: 91 FL (ref 78–100)
MONOCYTES # BLD AUTO: 0.4 10E3/UL (ref 0–1.3)
MONOCYTES NFR BLD AUTO: 9 %
NEUTROPHILS # BLD AUTO: 2.7 10E3/UL (ref 1.6–8.3)
NEUTROPHILS NFR BLD AUTO: 57 %
NONHDLC SERPL-MCNC: 98 MG/DL
NRBC # BLD AUTO: 0 10E3/UL
NRBC BLD AUTO-RTO: 0 /100
PLATELET # BLD AUTO: 175 10E3/UL (ref 150–450)
RBC # BLD AUTO: 4.58 10E6/UL (ref 3.8–5.2)
TRIGL SERPL-MCNC: 110 MG/DL
WBC # BLD AUTO: 4.8 10E3/UL (ref 4–11)

## 2023-05-02 PROCEDURE — 36415 COLL VENOUS BLD VENIPUNCTURE: CPT | Mod: ZL

## 2023-05-02 PROCEDURE — 85025 COMPLETE CBC W/AUTO DIFF WBC: CPT | Mod: ZL

## 2023-05-02 PROCEDURE — 80061 LIPID PANEL: CPT | Mod: ZL

## 2023-05-02 PROCEDURE — 85652 RBC SED RATE AUTOMATED: CPT | Mod: ZL

## 2023-05-02 PROCEDURE — 86140 C-REACTIVE PROTEIN: CPT | Mod: ZL

## 2023-05-02 PROCEDURE — 83036 HEMOGLOBIN GLYCOSYLATED A1C: CPT | Mod: ZL

## 2023-05-08 DIAGNOSIS — F41.9 ANXIETY: ICD-10-CM

## 2023-05-09 RX ORDER — CLONAZEPAM 0.5 MG/1
TABLET ORAL
Qty: 20 TABLET | Refills: 0 | Status: SHIPPED | OUTPATIENT
Start: 2023-05-09 | End: 2023-07-06

## 2023-05-09 NOTE — TELEPHONE ENCOUNTER
Klonopin      Last Written Prescription Date:  3.6.23  Last Fill Quantity: #20,   # refills: 0  Last Office Visit: 5.1.23  Future Office visit:    Next 5 appointments (look out 90 days)    Jun 05, 2023  8:00 AM  (Arrive by 7:45 AM)  PHYSICAL with Yanique Mar MD  [facility] ([facility] ) [address] [place] [address] [place]  [place] [address]  [phone]           Routing refill request to provider for review/approval because:  Drug not on the [facility], [facility] or [facility] refill protocol or controlled substance

## 2023-05-23 ENCOUNTER — TRANSFERRED RECORDS (OUTPATIENT)
Dept: HEALTH INFORMATION MANAGEMENT | Facility: CLINIC | Age: 83
End: 2023-05-23

## 2023-05-26 ENCOUNTER — HOSPITAL ENCOUNTER (OUTPATIENT)
Facility: HOSPITAL | Age: 83
Discharge: HOME | End: 2023-05-26
Attending: RADIOLOGY | Admitting: RADIOLOGY
Payer: COMMERCIAL

## 2023-05-26 ASSESSMENT — ACTIVITIES OF DAILY LIVING (ADL): ADLS_ACUITY_SCORE: 35

## 2023-06-05 ENCOUNTER — OFFICE VISIT (OUTPATIENT)
Dept: FAMILY MEDICINE | Facility: OTHER | Age: 83
End: 2023-06-05
Attending: STUDENT IN AN ORGANIZED HEALTH CARE EDUCATION/TRAINING PROGRAM
Payer: COMMERCIAL

## 2023-06-05 VITALS
HEART RATE: 63 BPM | RESPIRATION RATE: 16 BRPM | WEIGHT: 215 LBS | OXYGEN SATURATION: 96 % | BODY MASS INDEX: 35.82 KG/M2 | SYSTOLIC BLOOD PRESSURE: 116 MMHG | HEIGHT: 65 IN | DIASTOLIC BLOOD PRESSURE: 68 MMHG | TEMPERATURE: 97.3 F

## 2023-06-05 DIAGNOSIS — R21 RASH: Primary | ICD-10-CM

## 2023-06-05 DIAGNOSIS — R25.2 LEG CRAMPS: ICD-10-CM

## 2023-06-05 DIAGNOSIS — M81.0 AGE RELATED OSTEOPOROSIS, UNSPECIFIED PATHOLOGICAL FRACTURE PRESENCE: ICD-10-CM

## 2023-06-05 PROCEDURE — 99213 OFFICE O/P EST LOW 20 MIN: CPT | Performed by: STUDENT IN AN ORGANIZED HEALTH CARE EDUCATION/TRAINING PROGRAM

## 2023-06-05 PROCEDURE — G0463 HOSPITAL OUTPT CLINIC VISIT: HCPCS

## 2023-06-05 ASSESSMENT — ANXIETY QUESTIONNAIRES
4. TROUBLE RELAXING: NOT AT ALL
1. FEELING NERVOUS, ANXIOUS, OR ON EDGE: NOT AT ALL
6. BECOMING EASILY ANNOYED OR IRRITABLE: NOT AT ALL
5. BEING SO RESTLESS THAT IT IS HARD TO SIT STILL: NOT AT ALL
3. WORRYING TOO MUCH ABOUT DIFFERENT THINGS: NOT AT ALL
2. NOT BEING ABLE TO STOP OR CONTROL WORRYING: NOT AT ALL
GAD7 TOTAL SCORE: 0
7. FEELING AFRAID AS IF SOMETHING AWFUL MIGHT HAPPEN: NOT AT ALL
GAD7 TOTAL SCORE: 0

## 2023-06-05 ASSESSMENT — PATIENT HEALTH QUESTIONNAIRE - PHQ9: SUM OF ALL RESPONSES TO PHQ QUESTIONS 1-9: 0

## 2023-06-05 ASSESSMENT — PAIN SCALES - GENERAL: PAINLEVEL: NO PAIN (0)

## 2023-06-05 NOTE — PROGRESS NOTES
"  Assessment & Plan     Age related osteoporosis, unspecified pathological fracture presence  Has started weekly fosamax 4 weeks ago and tolerating well with no GI side effects, etc  Most recent DEXA scan on 04/29/2021 showing osteoporosis with lowest BMD in the lumbar spine.  T score of -2.7  Weight bearing exercises  Continue vitamin D/calcium supplementation  Plan to repeat DEXA scan about 2 years from now to monitor progress.      Rash  Resolved with OTC hydrocortisone.    Leg cramp  Right leg only.  There are distal visible varicosities which I think may be contributing.    Discussed hydration  Can try OTC vitamin supplement    Return in about 1 year (around 6/5/2024) for Routine preventive.    Yanique Mar MD  [facility] Bill is a 82 year old, presenting for the following health issues:  Derm Problem        5/1/2023    11:25 AM   Additional Questions   Roomed by Kaley DOWD       Concern - Rash  Onset: 5/1/23  Description: Rash  Intensity: moderate  Progression of Symptoms:  Resolved  Accompanying Signs & Symptoms: None  Previous history of similar problem: no  Precipitating factors:        Worsened by: NA  Alleviating factors:        Improved by: cortisone 10  Therapies tried and outcome: Crotisone 10      Review of Systems   Constitutional, HEENT, cardiovascular, pulmonary, GI, , musculoskeletal, neuro, skin, endocrine and psych systems are negative, except as otherwise noted.      Objective    /68 (BP Location: Left arm, Patient Position: Sitting, Cuff Size: Adult Large)   Pulse 63   Temp 97.3  F (36.3  C) (Tympanic)   Resp 16   Ht 1.651 m (5' 5\")   Wt 97.5 kg (215 lb)   SpO2 96%   BMI 35.78 kg/m    Body mass index is 35.78 kg/m .  Physical Exam   GENERAL: healthy, alert and no distress  EYES: Eyes grossly normal to inspection, PERRL and conjunctivae and sclerae normal  RESP: lungs clear to auscultation - no rales, rhonchi or wheezes  CV: " regular rate and rhythm, normal S1 S2, no S3 or S4, no murmur, click or rub, no peripheral edema and peripheral pulses strong  MS: no gross musculoskeletal defects noted, no edema  SKIN: no suspicious lesions or rashes  NEURO: Normal strength and tone, mentation intact and speech normal  PSYCH: mentation appears normal, affect normal/bright

## 2023-06-09 ENCOUNTER — HOSPITAL ENCOUNTER (OUTPATIENT)
Facility: HOSPITAL | Age: 83
Discharge: HOME | End: 2023-06-09
Attending: RADIOLOGY | Admitting: RADIOLOGY
Payer: COMMERCIAL

## 2023-06-09 ENCOUNTER — HOSPITAL ENCOUNTER (OUTPATIENT)
Dept: INTERVENTIONAL RADIOLOGY/VASCULAR | Facility: HOSPITAL | Age: 83
Discharge: HOME | End: 2023-06-09
Attending: ORTHOPAEDIC SURGERY | Admitting: RADIOLOGY
Payer: COMMERCIAL

## 2023-06-09 PROCEDURE — 255N000002 HC RX 255 OP 636: Performed by: RADIOLOGY

## 2023-06-09 PROCEDURE — 77002 NEEDLE LOCALIZATION BY XRAY: CPT

## 2023-06-09 PROCEDURE — 250N000011 HC RX IP 250 OP 636: Performed by: RADIOLOGY

## 2023-06-09 PROCEDURE — 250N000009 HC RX 250: Performed by: RADIOLOGY

## 2023-06-09 RX ORDER — IOPAMIDOL 612 MG/ML
50 INJECTION, SOLUTION INTRAVASCULAR ONCE
Status: COMPLETED | OUTPATIENT
Start: 2023-06-09 | End: 2023-06-09

## 2023-06-09 RX ORDER — LIDOCAINE HYDROCHLORIDE 10 MG/ML
10 INJECTION, SOLUTION EPIDURAL; INFILTRATION; INTRACAUDAL; PERINEURAL ONCE
Status: COMPLETED | OUTPATIENT
Start: 2023-06-09 | End: 2023-06-09

## 2023-06-09 RX ORDER — TRIAMCINOLONE ACETONIDE 40 MG/ML
40 INJECTION, SUSPENSION INTRA-ARTICULAR; INTRAMUSCULAR ONCE
Status: COMPLETED | OUTPATIENT
Start: 2023-06-09 | End: 2023-06-09

## 2023-06-09 RX ADMIN — TRIAMCINOLONE ACETONIDE 40 MG: 40 INJECTION, SUSPENSION INTRA-ARTICULAR; INTRAMUSCULAR at 14:57

## 2023-06-09 RX ADMIN — LIDOCAINE HYDROCHLORIDE 6 ML: 10 INJECTION, SOLUTION EPIDURAL; INFILTRATION; INTRACAUDAL; PERINEURAL at 14:57

## 2023-06-09 RX ADMIN — IOPAMIDOL 5 ML: 612 INJECTION, SOLUTION INTRAVENOUS at 14:57

## 2023-06-09 ASSESSMENT — ACTIVITIES OF DAILY LIVING (ADL): ADLS_ACUITY_SCORE: 35

## 2023-06-09 NOTE — DISCHARGE INSTRUCTIONS
Cell number on file:    Telephone Information:   Mobile [phone]     Is it ok to leave a message at this number(s)? Yes    Dr Hu completed your procedure on 6/9/2023.    Current Pain Level (0-10 Scale): 5/10  Post Pain Level (0-10):  0/10    Radiology Discharge instructions for Steroid Injection    Activity Level:     Do not do any heavy activity or exercise for 24 hours.   Do not drive for 4 hours after your injection.  Diet:   Return to your normal diet.  Medications:   If you have stopped taking your Aspirin, Coumadin/Warfarin, Ibuprofen, or any   other blood thinner for this procedure you may resume in the morning unless   your primary care provider has given you other instructions.    Diabetics may see an increase in blood sugar after steroid injections. If you are concerned about your blood sugar, please contact your family doctor.    Site Care:  Remove the bandage and bathe or shower the morning after the procedure.      Please allow two weeks to experience improvement in your pain.  If you have any further issues, please contact your provider.    Call your Primary Care Provider if you have the following (if your primary care provider is not available please seek emergency care):   Nausea with vomiting   Severe headache   Drowsiness or confusion   Redness or drainage at the injection or puncture site   Temperature over 101 degrees F   Other concerns   Worsening back pain   Stiff neck

## 2023-06-12 ENCOUNTER — APPOINTMENT (OUTPATIENT)
Dept: CT IMAGING | Facility: HOSPITAL | Age: 83
End: 2023-06-12
Attending: PHYSICIAN ASSISTANT
Payer: COMMERCIAL

## 2023-06-12 ENCOUNTER — HOSPITAL ENCOUNTER (EMERGENCY)
Facility: HOSPITAL | Age: 83
Discharge: HOME | End: 2023-06-12
Attending: PHYSICIAN ASSISTANT | Admitting: PHYSICIAN ASSISTANT
Payer: COMMERCIAL

## 2023-06-12 VITALS
BODY MASS INDEX: 35.82 KG/M2 | HEART RATE: 63 BPM | RESPIRATION RATE: 14 BRPM | OXYGEN SATURATION: 96 % | SYSTOLIC BLOOD PRESSURE: 136 MMHG | TEMPERATURE: 97.7 F | DIASTOLIC BLOOD PRESSURE: 68 MMHG | WEIGHT: 215 LBS | HEIGHT: 65 IN

## 2023-06-12 DIAGNOSIS — E83.52 HYPERCALCEMIA: ICD-10-CM

## 2023-06-12 DIAGNOSIS — R82.81 PYURIA: ICD-10-CM

## 2023-06-12 DIAGNOSIS — R11.0 NAUSEA: ICD-10-CM

## 2023-06-12 LAB
ALBUMIN SERPL BCG-MCNC: 4.1 G/DL (ref 3.5–5.2)
ALBUMIN UR-MCNC: NEGATIVE MG/DL
ALP SERPL-CCNC: 90 U/L (ref 35–104)
ALT SERPL W P-5'-P-CCNC: 12 U/L (ref 10–35)
ANION GAP SERPL CALCULATED.3IONS-SCNC: 9 MMOL/L (ref 7–15)
APPEARANCE UR: CLEAR
AST SERPL W P-5'-P-CCNC: 17 U/L (ref 10–35)
BASOPHILS # BLD AUTO: 0 10E3/UL (ref 0–0.2)
BASOPHILS NFR BLD AUTO: 0 %
BILIRUB SERPL-MCNC: 0.6 MG/DL
BILIRUB UR QL STRIP: NEGATIVE
BUN SERPL-MCNC: 17.2 MG/DL (ref 8–23)
CALCIUM SERPL-MCNC: 10.5 MG/DL (ref 8.8–10.2)
CHLORIDE SERPL-SCNC: 102 MMOL/L (ref 98–107)
COLOR UR AUTO: ABNORMAL
CREAT SERPL-MCNC: 0.98 MG/DL (ref 0.51–0.95)
DEPRECATED HCO3 PLAS-SCNC: 25 MMOL/L (ref 22–29)
EOSINOPHIL # BLD AUTO: 0 10E3/UL (ref 0–0.7)
EOSINOPHIL NFR BLD AUTO: 0 %
ERYTHROCYTE [DISTWIDTH] IN BLOOD BY AUTOMATED COUNT: 13.3 % (ref 10–15)
GFR SERPL CREATININE-BSD FRML MDRD: 58 ML/MIN/1.73M2
GLUCOSE SERPL-MCNC: 118 MG/DL (ref 70–99)
GLUCOSE UR STRIP-MCNC: NEGATIVE MG/DL
HCT VFR BLD AUTO: 43.5 % (ref 35–47)
HGB BLD-MCNC: 14.2 G/DL (ref 11.7–15.7)
HGB UR QL STRIP: NEGATIVE
HOLD SPECIMEN: NORMAL
IMM GRANULOCYTES # BLD: 0 10E3/UL
IMM GRANULOCYTES NFR BLD: 0 %
KETONES UR STRIP-MCNC: NEGATIVE MG/DL
LEUKOCYTE ESTERASE UR QL STRIP: ABNORMAL
LYMPHOCYTES # BLD AUTO: 1.1 10E3/UL (ref 0.8–5.3)
LYMPHOCYTES NFR BLD AUTO: 23 %
MCH RBC QN AUTO: 29.9 PG (ref 26.5–33)
MCHC RBC AUTO-ENTMCNC: 32.6 G/DL (ref 31.5–36.5)
MCV RBC AUTO: 92 FL (ref 78–100)
MONOCYTES # BLD AUTO: 0.4 10E3/UL (ref 0–1.3)
MONOCYTES NFR BLD AUTO: 8 %
MUCOUS THREADS #/AREA URNS LPF: PRESENT /LPF
NEUTROPHILS # BLD AUTO: 3.3 10E3/UL (ref 1.6–8.3)
NEUTROPHILS NFR BLD AUTO: 69 %
NITRATE UR QL: NEGATIVE
NRBC # BLD AUTO: 0 10E3/UL
NRBC BLD AUTO-RTO: 0 /100
PH UR STRIP: 5 [PH] (ref 4.7–8)
PLATELET # BLD AUTO: 142 10E3/UL (ref 150–450)
POTASSIUM SERPL-SCNC: 4.2 MMOL/L (ref 3.4–5.3)
PROT SERPL-MCNC: 6.6 G/DL (ref 6.4–8.3)
RBC # BLD AUTO: 4.75 10E6/UL (ref 3.8–5.2)
RBC URINE: 1 /HPF
SODIUM SERPL-SCNC: 136 MMOL/L (ref 136–145)
SP GR UR STRIP: 1.01 (ref 1–1.03)
SQUAMOUS EPITHELIAL: 2 /HPF
UROBILINOGEN UR STRIP-MCNC: NORMAL MG/DL
WBC # BLD AUTO: 4.8 10E3/UL (ref 4–11)
WBC URINE: 7 /HPF

## 2023-06-12 PROCEDURE — 99284 EMERGENCY DEPT VISIT MOD MDM: CPT | Performed by: PHYSICIAN ASSISTANT

## 2023-06-12 PROCEDURE — 70450 CT HEAD/BRAIN W/O DYE: CPT

## 2023-06-12 PROCEDURE — 36415 COLL VENOUS BLD VENIPUNCTURE: CPT | Performed by: PHYSICIAN ASSISTANT

## 2023-06-12 PROCEDURE — 99284 EMERGENCY DEPT VISIT MOD MDM: CPT | Mod: 25

## 2023-06-12 PROCEDURE — 80053 COMPREHEN METABOLIC PANEL: CPT | Performed by: PHYSICIAN ASSISTANT

## 2023-06-12 PROCEDURE — 87086 URINE CULTURE/COLONY COUNT: CPT | Performed by: PHYSICIAN ASSISTANT

## 2023-06-12 PROCEDURE — 85004 AUTOMATED DIFF WBC COUNT: CPT | Performed by: PHYSICIAN ASSISTANT

## 2023-06-12 PROCEDURE — 81001 URINALYSIS AUTO W/SCOPE: CPT | Performed by: PHYSICIAN ASSISTANT

## 2023-06-12 ASSESSMENT — ENCOUNTER SYMPTOMS
CHEST TIGHTNESS: 0
COUGH: 0
ABDOMINAL PAIN: 0
FATIGUE: 0
VOMITING: 0
DIARRHEA: 0
NAUSEA: 1
BRUISES/BLEEDS EASILY: 0
SHORTNESS OF BREATH: 0
BLOOD IN STOOL: 0
MUSCULOSKELETAL NEGATIVE: 1
CHILLS: 0
APPETITE CHANGE: 1
ACTIVITY CHANGE: 0
DIAPHORESIS: 0
FEVER: 0

## 2023-06-12 ASSESSMENT — ACTIVITIES OF DAILY LIVING (ADL): ADLS_ACUITY_SCORE: 35

## 2023-06-12 NOTE — ED NOTES
Pt reports feeling better and is ready to go home. Ambulated to the bathroom without difficulty. Urine sent to lab. Updated provider.

## 2023-06-12 NOTE — ED NOTES
"Patient able to ambulate to room 11 without difficulty. Pt eating cheese crackers. Pt reports receiving an \"injection in her right hip\" on Friday. Pt reports a few hours after receiving injection her face felt flushed, she felt \"funny in her head,\" and nauseated. She denies any injuries or trauma. Denies headache. Denies any vision changes. Steady gait. Negative BE FAST. Denies the room spinning. Denies chest pain.   "

## 2023-06-12 NOTE — ED PROVIDER NOTES
"  History     Chief Complaint   Patient presents with     Dizziness     Nausea     The history is provided by the patient.     Dinorah Lim is a 82 year old female who presented to the emergency department ambulatory along with family for evaluation of \"a funny feeling in my head.\"  Patient also reports of intermittent nausea.  The head sensation is also intermittent.  She presented to the emergency department eating cheese its.  On my evaluation the patient is pleasant and talkative and watching TV.  She reports that after receiving the hip injection 3 days ago she began to experience the symptoms.  She has no visual complaints.  Denies any significant headaches.  Denies any unilateral leg weakness.  Denies any difficulty speaking or walking.    Allergies:  Allergies   Allergen Reactions     Chocolate Hives     Lemon Flavor Hives     Lime [Lime, Sulfurated (Calcium Polysulfide)] Hives     Metal [Staples]      Orange Fruit [Citrus] Hives     Strawberry Extract Hives     Adhesive Tape      Band-aids     Codeine Hives     Patient can tolerate oxycodone & dilaudid     Erythromycin Hives     ERYTHROMYCIN BASE     Food      Tomato, grapefruit, oranges.     Grapefruit [Extra Strength Grapefruit]      GRAPEFRUIT     Morphine Hives     Pt. Reports had hives     Penicillins Hives     Ranitidine GI Disturbance     Sulfa Antibiotics      SULFONAMIDE ANTIBIOTICS      Tomato      Xyzal [Levocetirizine] Hives       Problem List:    Patient Active Problem List    Diagnosis Date Noted     Other chest pain 10/20/2021     Priority: Medium     Hiatal hernia 07/28/2021     Priority: Medium     Chronic, continuous use of opioids 05/17/2021     Priority: Medium     Stage 3a chronic kidney disease (H) 03/22/2021     Priority: Medium     COVID-19 virus infection on 10/30/20 11/24/2020     Priority: Medium     10-       Other neutropenia (H) 08/04/2020     Priority: Medium     Paresthesias 02/13/2020     Priority: Medium     " Status post total left knee replacement 09/09/2019     Priority: Medium     Aortic valve insufficiency 03/06/2019     Priority: Medium     Mitral regurgitation 03/06/2019     Priority: Medium     Tricuspid valve insufficiency 03/06/2019     Priority: Medium     Diastolic dysfunction grade 1 on 2/21/19 03/06/2019     Priority: Medium     Hypertriglyceridemia 03/06/2019     Priority: Medium     Palpitations 02/13/2019     Priority: Medium     PVC's (premature ventricular contractions) 02/13/2019     Priority: Medium     Atrial ectopy 02/13/2019     Priority: Medium     PSVT (paroxysmal supraventricular tachycardia) (H) 02/13/2019     Priority: Medium     COPD, mild on 9/9/14 02/13/2019     Priority: Medium     Bilateral carotid artery stenosis at less than 50% on 12/1/16 02/13/2019     Priority: Medium     Mixed hyperlipidemia 02/13/2019     Priority: Medium     On statin therapy 02/13/2019     Priority: Medium     Heart murmur 02/13/2019     Priority: Medium     Morbid obesity (H) 06/01/2017     Priority: Medium     ACP (advance care planning) 04/28/2016     Priority: Medium     Advance Care Planning 4/28/2016: ACP Review of Chart / Resources Provided:  Reviewed chart for advance care plan.  Dinorah WILLIAM Lim has no plan or code status on file. Discussed available resources and provided with information. Confirmed code status reflects current choices pending further ACP discussions.  Confirmed/documented legally designated decision makers.  Added by Aditi Gandhi             Anxiety 06/23/2014     Priority: Medium     Lung density on x-ray 07/23/2013     Priority: Medium     Osteoarthritis 06/14/2012     Priority: Medium     Problem list name updated by automated process. Provider to review       Advanced care planning/counseling discussion 01/13/2012     Priority: Medium     Abnormal glucose 08/01/2011     Priority: Medium     Hgb A1c 5.7 on 1/4/2015  Problem list name updated by automated process. Provider to  review       Osteoarthritis of right hip 10/07/2004     Priority: Medium     Urticaria 06/01/2004     Priority: Medium     Problem list name updated by automated process. 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     Obesity, Class II, BMI 35-39.9 1/4/2015     Osteoarthritis of right hip 10/07/2004     Osteoarthrosis 06/14/2012     Otitis externa 10/04/2011     Prediabetes 08/01/2011       Past Surgical History:    Past Surgical History:   Procedure Laterality Date     ARTHROPLASTY KNEE Left 9/9/2019    Procedure: LEFT TOTAL KNEE ARTHROPLASTY S/N;  Surgeon: Trevor Alonzo MD;  Location: [facility]     ARTHROSCOPY KNEE Left 3/21/2019    Procedure: LEFT  KNEE ARTHROSCOPY, partial medial menisectomy;  Surgeon: Esteban Wills MD;  Location: [facility]     CLOSED REDUCTION WRIST Right 1952 x 2    long arm cast (same time as elbow fx)     CLOSED RX ELBOW DISLOCATION Right 1952    CR/long cast of fracture     EXCISE MASS FOOT Left 8/16/2021    Procedure: LEFT ANKLE MASS EXCISION;  Surgeon: Trevor Alonzo MD;  Location: [facility]     HC INJ EPIDURAL LUMBAR/SACRAL W/WO CONTRAST Bilateral 5/2013    facet injections; prev 2012     LAPAROSCOPIC CHOLECYSTECTOMY N/A 1/4/2015    Procedure: LAPAROSCOPIC CHOLECYSTECTOMY;  Surgeon: Brittney العلي MD;  Location: [facility]     TONSILLECTOMY       ZZC TOTAL KNEE ARTHROPLASTY Left 09/09/2019    Dr Alonzo       Family History:    Family History   Problem Relation Age of Onset     Heart Disease Brother         atrial fibrillation     Atrial fibrillation  "Brother      Other - See Comments Mother         emphysema     Heart Disease Father 92        CHF     Cancer Paternal Grandfather         lung cancer     Cancer Maternal Uncle         lung cancer     Chronic Obstructive Pulmonary Disease Daughter      Emphysema Daughter      Other - See Comments Daughter         benign breast lesion     Esophagitis Daughter        Social History:  Marital Status:  Single [1]  Social History     Tobacco Use     Smoking status: Never     Smokeless tobacco: Never   Vaping Use     Vaping status: Never Used   Substance Use Topics     Alcohol use: No     Drug use: No        Medications:    acetaminophen (TYLENOL) 325 MG tablet  alendronate (FOSAMAX) 70 MG tablet  Calcium-Magnesium-Vitamin D (CALCIUM 1200+D3 PO)  clonazePAM (KLONOPIN) 0.5 MG tablet  clonazePAM (KLONOPIN) 1 MG tablet  FISH OIL  ipratropium - albuterol 0.5 mg/2.5 mg/3 mL (DUONEB) 0.5-2.5 (3) MG/3ML neb solution  ipratropium - albuterol 0.5 mg/2.5 mg/3 mL (DUONEB) 0.5-2.5 (3) MG/3ML neb solution  PARoxetine (PAXIL) 40 MG tablet  simvastatin (ZOCOR) 40 MG tablet  triamcinolone (KENALOG) 0.025 % external ointment  VITAMIN D, CHOLECALCIFEROL, PO          Review of Systems   Constitutional: Positive for appetite change. Negative for activity change, chills, diaphoresis, fatigue and fever.   Respiratory: Negative for cough, chest tightness and shortness of breath.    Cardiovascular: Negative for chest pain.   Gastrointestinal: Positive for nausea. Negative for abdominal pain, blood in stool, diarrhea and vomiting.        Denies any hematochezia, melena, hematemesis.   Genitourinary: Negative.    Musculoskeletal: Negative.    Skin: Negative.    Neurological:        See HPI   Hematological: Does not bruise/bleed easily.       Physical Exam   BP: 155/91  Pulse: 63  Temp: 98.2  F (36.8  C)  Resp: 18  Height: 165.1 cm (5' 5\")  Weight: 97.5 kg (215 lb)  SpO2: 96 %      Physical Exam  Vitals and nursing note reviewed.   Constitutional:  "      General: She is not in acute distress.     Appearance: Normal appearance. She is not ill-appearing, toxic-appearing or diaphoretic.      Comments: This is a pleasant and talkative 82-year-old female found in Fowlers position in no distress.  Watching TV and eating crackers   HENT:      Head: Normocephalic and atraumatic.      Comments: No evidence of head injury  Cardiovascular:      Rate and Rhythm: Normal rate and regular rhythm.   Pulmonary:      Effort: Pulmonary effort is normal.   Musculoskeletal:      Cervical back: Normal range of motion and neck supple.   Skin:     General: Skin is warm and dry.      Capillary Refill: Capillary refill takes less than 2 seconds.   Neurological:      General: No focal deficit present.      Mental Status: She is alert and oriented to person, place, and time.      Comments: Cranial nerve examination: revealed that for cranial nerve   II: the pupils were reactive and the visual field were full  III, IV, and VI, the extraocular movements were full.    V: facial sensation intact bilateral   VII: facial movements are symmetric  VIII: hearing intact to voice  IX & X: the soft palate rises symmetrically   XI: shoulder movements are symmetric  XII: tongue is midline    Neurological examination:  That the patient was awake and alert, the attention, orientation, concentration, language, memory and fund of knowledge were all normal.  The patient had no neglect or apraxia.    Normal speech          ED Course              ED Course as of 06/12/23 1530   Mon Jun 12, 2023   1358 No significant change in platelets.     Procedures              Critical Care time:  none               Results for orders placed or performed during the hospital encounter of 06/12/23 (from the past 24 hour(s))   CBC with platelets differential    Narrative    The following orders were created for panel order CBC with platelets differential.  Procedure                               Abnormality         Status                      ---------                               -----------         ------                     CBC with platelets and d...[[id number]]  Abnormal            Final result                 Please view results for these tests on the individual orders.   Comprehensive metabolic panel   Result Value Ref Range    Sodium 136 136 - 145 mmol/L    Potassium 4.2 3.4 - 5.3 mmol/L    Chloride 102 98 - 107 mmol/L    Carbon Dioxide (CO2) 25 22 - 29 mmol/L    Anion Gap 9 7 - 15 mmol/L    Urea Nitrogen 17.2 8.0 - 23.0 mg/dL    Creatinine 0.98 (H) 0.51 - 0.95 mg/dL    Calcium 10.5 (H) 8.8 - 10.2 mg/dL    Glucose 118 (H) 70 - 99 mg/dL    Alkaline Phosphatase 90 35 - 104 U/L    AST 17 10 - 35 U/L    ALT 12 10 - 35 U/L    Protein Total 6.6 6.4 - 8.3 g/dL    Albumin 4.1 3.5 - 5.2 g/dL    Bilirubin Total 0.6 <=1.2 mg/dL    GFR Estimate 58 (L) >60 mL/min/1.73m2   CBC with platelets and differential   Result Value Ref Range    WBC Count 4.8 4.0 - 11.0 10e3/uL    RBC Count 4.75 3.80 - 5.20 10e6/uL    Hemoglobin 14.2 11.7 - 15.7 g/dL    Hematocrit 43.5 35.0 - 47.0 %    MCV 92 78 - 100 fL    MCH 29.9 26.5 - 33.0 pg    MCHC 32.6 31.5 - 36.5 g/dL    RDW 13.3 10.0 - 15.0 %    Platelet Count 142 (L) 150 - 450 10e3/uL    % Neutrophils 69 %    % Lymphocytes 23 %    % Monocytes 8 %    % Eosinophils 0 %    % Basophils 0 %    % Immature Granulocytes 0 %    NRBCs per 100 WBC 0 <1 /100    Absolute Neutrophils 3.3 1.6 - 8.3 10e3/uL    Absolute Lymphocytes 1.1 0.8 - 5.3 10e3/uL    Absolute Monocytes 0.4 0.0 - 1.3 10e3/uL    Absolute Eosinophils 0.0 0.0 - 0.7 10e3/uL    Absolute Basophils 0.0 0.0 - 0.2 10e3/uL    Absolute Immature Granulocytes 0.0 <=0.4 10e3/uL    Absolute NRBCs 0.0 10e3/uL   Extra Tube    Narrative    The following orders were created for panel order Extra Tube.  Procedure                               Abnormality         Status                     ---------                               -----------         ------                      Extra Blue Top Tube[[id number]]                              Final result               Extra Red Top Tube[[id number]]                               Final result               Extra Heparinized Syringe[[id number]]                        Final result                 Please view results for these tests on the individual orders.   Extra Blue Top Tube   Result Value Ref Range    Hold Specimen JIC    Extra Red Top Tube   Result Value Ref Range    Hold Specimen JIC    Extra Heparinized Syringe   Result Value Ref Range    Hold Specimen JIC    CT Head w/o Contrast    Narrative    Exam: CT HEAD W/O CONTRAST      Exam reason: dizziness    Technique:   Axial images of the head obtained without contrast. Coronal and  sagittal reformations were generated. This CT was performed using one  or more of the following dose reduction techniques: automated exposure  control, adjustment of the mA and/or kV according to patient size,  and/or use of iterative reconstruction technique.    Comparison: 11/3/2022, 3/8/2022       Findings:      Parenchyma: No evidence of intraparenchymal hemorrhage, mass, acute  cortical infarct or prior infarct in a major vascular territory.      Mild patchy periventricular and deep white matter hypoattenuation,  nonspecific but likely due to chronic microvascular ischemic change.    No midline shift. Basal cisterns are patent.    Extra-axial spaces: No extra-axial fluid collection or hemorrhage.     Ventricles: Normal.  Paranasal sinuses: Clear.   Mastoid air cells: Clear.    Osseous: No acute findings.  Orbits: Normal.    Soft tissues: Unremarkable.       Impression    Impression:    No acute intracranial abnormality.      PAOLA PEREZ MD         SYSTEM ID:  [id number]   UA with Microscopic reflex to Culture    Specimen: Urine, Clean Catch   Result Value Ref Range    Color Urine Light Yellow Colorless, Straw, Light Yellow, Yellow    Appearance Urine Clear Clear    Glucose Urine Negative Negative mg/dL     Bilirubin Urine Negative Negative    Ketones Urine Negative Negative mg/dL    Specific Gravity Urine 1.013 1.003 - 1.035    Blood Urine Negative Negative    pH Urine 5.0 4.7 - 8.0    Protein Albumin Urine Negative Negative mg/dL    Urobilinogen Urine Normal Normal, 2.0 mg/dL    Nitrite Urine Negative Negative    Leukocyte Esterase Urine Moderate (A) Negative    Mucus Urine Present (A) None Seen /LPF    RBC Urine 1 <=2 /HPF    WBC Urine 7 (H) <=5 /HPF    Squamous Epithelials Urine 2 (H) <=1 /HPF    Narrative    Urine Culture ordered based on laboratory criteria       Medications - No data to display    Assessments & Plan (with Medical Decision Making)   82-year-old female with vague complaints that do not appear to be catastrophic at this time.  Found several times watching TV pleasant and talkative.  Examination is unremarkable.  Laboratory evaluation shows mild hypercalcemia that can be followed as an outpatient.  Imaging of the brain is unremarkable.  Mild pyuria that can be followed with urine culture.  I do not believe that antibiotic treatment at this time is in her best medical interest given the fact that she is quite adamant and only receiving ciprofloxacin.  I did advise the patient that she has received IV Ancef during previous surgery and likely can tolerate a broad class of cephalosporins.  At any rate, the patient can be followed in the clinic and return here for any other questions or concerns.  The patient does not appear to be experiencing an emergent or catastrophic intracranial etiology.  At this time I can find no reasonable or compelling medical indication for further evaluation on an emergent basis.    This document was prepared using a combination of typing and voice generated software.  While every attempt was made for accuracy, spelling and grammatical errors may exist.      I have reviewed the nursing notes.    I have reviewed the findings, diagnosis, plan and need for follow up with the  patient.           Medical Decision Making  The patient's presentation was of moderate complexity (an undiagnosed new problem with uncertain diagnosis).    The patient's evaluation involved:  ordering and/or review of 3+ test(s) in this encounter (Labs and CT scan)    The patient's management necessitated only low risk treatment.        New Prescriptions    No medications on file       Final diagnoses:   Hypercalcemia   Nausea   Pyuria       6/12/2023   [facility]     Ovidio Alvarado PA-C  06/12/23 1529       Ovidio Alvarado PA-C  06/12/23 1531

## 2023-06-12 NOTE — ED NOTES
Patient given verbal and written discharge instructions. Patient verbalized understanding of discharge instructions. Pt denies nausea at this time. Afebrile. Up ambulating without difficulty.

## 2023-06-12 NOTE — DISCHARGE INSTRUCTIONS
I am not sure what's causing your symptoms today.  Your laboratory evaluation showed mild elevation of your calcium levels but were otherwise mostly unchanged.  The image of your brain showed no concerning findings.  Follow-up in the clinic this week for recheck.  Return here for any new or worsening symptoms.    You have mild abnormality on your urinalysis today and I do not believe that treating it with ciprofloxacin would be entirely appropriate this time.  We will follow the culture and if it returns positive for a specific bacteria then we will treat.

## 2023-06-12 NOTE — ED TRIAGE NOTES
Patient reports light headedness x3 days as well as nausea, no vomiting. No vision changes, does not feel like room is spinning. Steady gait. Dizziness is constant. Mild headache as well. Patient reports receiving pain injection on Friday and developed symptoms several hours later and is wondering if this is a reaction to medication. No new meds otherwise. No recent illness/injury or falls.

## 2023-06-13 ENCOUNTER — TELEPHONE (OUTPATIENT)
Dept: FAMILY MEDICINE | Facility: OTHER | Age: 83
End: 2023-06-13

## 2023-06-13 NOTE — TELEPHONE ENCOUNTER
Emergency Department and Urgent Care Follow-up      Reason for ER/UC visit: reaction to injection, nausea, dizziness  o Date seen: 6/12/23      New or Worsening symptoms:  no       Prescription Received/Picked up from Pharmacy?: No   o Medications started? NA  o Any questions or issues regarding your prescription?: NA      Follow-up Results or Labs that are pending: No      Questions or concerns?: no      ER Recommends Follow-up by: follow up with PCP      RN Recommendations: Follow up with PCP or covering provider.   Appointment scheduled:   Next 5 appointments (look out 90 days)    Yassine 15, 2023  3:00 PM  (Arrive by 2:45 PM)  SHORT with Yanique Mar MD  [facility] ([facility] [place] ) [address]  [place] [address]  [phone]      o   o     If you start feeling worse, or have any further questions, please feel free to contact Nurse Triage at [phone].  If needing immediate medical attention at any time please call 911/Go to the ER.

## 2023-06-14 LAB — BACTERIA UR CULT: NORMAL

## 2023-06-14 NOTE — PROGRESS NOTES
"  Assessment & Plan     Dizziness  Seen in the ED for this.  Work-up essential unremarkable.  Symptoms have resolved.      Visit for screening mammogram  She tells me she would like to continue with screening mammograms    Post Medication Reconciliation Status:       MEDICATIONS:  Continue current medications without change    No follow-ups on file.    Yanique Mar MD  [facility] - REI Bill is a 82 year old, presenting for the following health issues:  ER F/U        5/1/2023    11:25 AM   Additional Questions   Roomed by Kaley BRISCOE     HPI       Dizzy after getting a steroid injection into the hip  ED/UC Followup:    Facility:  [facility]   Date of visit: 6/12/2023  Reason for visit: dizziness and nausea   Current Status: not nauseas or dizzy any more     Review of Systems   Constitutional, HEENT, cardiovascular, pulmonary, gi and gu systems are negative, except as otherwise noted.      Objective    /65 (BP Location: Left arm, Patient Position: Sitting, Cuff Size: Adult Large)   Pulse 70   Temp 98.3  F (36.8  C) (Tympanic)   Ht 1.651 m (5' 5\")   Wt 96.7 kg (213 lb 1.6 oz)   SpO2 97%   BMI 35.46 kg/m    Body mass index is 35.46 kg/m .  Physical Exam   GENERAL: healthy, alert and no distress  EYES: Eyes grossly normal to inspection, PERRL and conjunctivae and sclerae normal  HENT: ear canals and TM's normal, nose and mouth without ulcers or lesions  NECK: no adenopathy, no asymmetry, masses, or scars and thyroid normal to palpation  RESP: lungs clear to auscultation - no rales, rhonchi or wheezes  CV: regular rate and rhythm, normal S1 S2, no S3 or S4, no murmur, click or rub, no peripheral edema and peripheral pulses strong  ABDOMEN: soft, nontender, no hepatosplenomegaly, no masses and bowel sounds normal  MS: no gross musculoskeletal defects noted, no edema  SKIN: no suspicious lesions or rashes  NEURO: Normal strength and tone, mentation intact and speech " normal  PSYCH: mentation appears normal, affect normal/bright

## 2023-06-15 ENCOUNTER — OFFICE VISIT (OUTPATIENT)
Dept: FAMILY MEDICINE | Facility: OTHER | Age: 83
End: 2023-06-15
Attending: STUDENT IN AN ORGANIZED HEALTH CARE EDUCATION/TRAINING PROGRAM
Payer: COMMERCIAL

## 2023-06-15 VITALS
HEART RATE: 70 BPM | WEIGHT: 213.1 LBS | BODY MASS INDEX: 35.5 KG/M2 | DIASTOLIC BLOOD PRESSURE: 65 MMHG | HEIGHT: 65 IN | OXYGEN SATURATION: 97 % | TEMPERATURE: 98.3 F | SYSTOLIC BLOOD PRESSURE: 131 MMHG

## 2023-06-15 DIAGNOSIS — Z12.31 VISIT FOR SCREENING MAMMOGRAM: ICD-10-CM

## 2023-06-15 DIAGNOSIS — R42 DIZZINESS: Primary | ICD-10-CM

## 2023-06-15 PROCEDURE — G0463 HOSPITAL OUTPT CLINIC VISIT: HCPCS

## 2023-06-15 PROCEDURE — 99213 OFFICE O/P EST LOW 20 MIN: CPT | Performed by: STUDENT IN AN ORGANIZED HEALTH CARE EDUCATION/TRAINING PROGRAM

## 2023-06-15 ASSESSMENT — ANXIETY QUESTIONNAIRES
6. BECOMING EASILY ANNOYED OR IRRITABLE: MORE THAN HALF THE DAYS
3. WORRYING TOO MUCH ABOUT DIFFERENT THINGS: SEVERAL DAYS
2. NOT BEING ABLE TO STOP OR CONTROL WORRYING: NOT AT ALL
GAD7 TOTAL SCORE: 6
4. TROUBLE RELAXING: NOT AT ALL
1. FEELING NERVOUS, ANXIOUS, OR ON EDGE: NEARLY EVERY DAY
IF YOU CHECKED OFF ANY PROBLEMS ON THIS QUESTIONNAIRE, HOW DIFFICULT HAVE THESE PROBLEMS MADE IT FOR YOU TO DO YOUR WORK, TAKE CARE OF THINGS AT HOME, OR GET ALONG WITH OTHER PEOPLE: NOT DIFFICULT AT ALL
7. FEELING AFRAID AS IF SOMETHING AWFUL MIGHT HAPPEN: NOT AT ALL
5. BEING SO RESTLESS THAT IT IS HARD TO SIT STILL: NOT AT ALL
GAD7 TOTAL SCORE: 6

## 2023-06-15 ASSESSMENT — PAIN SCALES - GENERAL: PAINLEVEL: NO PAIN (0)

## 2023-06-21 ENCOUNTER — ANCILLARY PROCEDURE (OUTPATIENT)
Dept: MAMMOGRAPHY | Facility: OTHER | Age: 83
End: 2023-06-21
Attending: STUDENT IN AN ORGANIZED HEALTH CARE EDUCATION/TRAINING PROGRAM
Payer: COMMERCIAL

## 2023-06-21 ENCOUNTER — TELEPHONE (OUTPATIENT)
Dept: MAMMOGRAPHY | Facility: OTHER | Age: 83
End: 2023-06-21

## 2023-06-21 DIAGNOSIS — Z12.31 VISIT FOR SCREENING MAMMOGRAM: ICD-10-CM

## 2023-06-21 PROCEDURE — 77067 SCR MAMMO BI INCL CAD: CPT | Mod: TC

## 2023-06-29 DIAGNOSIS — E78.5 HYPERLIPIDEMIA LDL GOAL <100: ICD-10-CM

## 2023-06-29 RX ORDER — SIMVASTATIN 40 MG
TABLET ORAL
Qty: 90 TABLET | Refills: 2 | Status: SHIPPED | OUTPATIENT
Start: 2023-06-29 | End: 2024-01-23

## 2023-07-05 DIAGNOSIS — F41.9 ANXIETY: ICD-10-CM

## 2023-07-06 RX ORDER — CLONAZEPAM 0.5 MG/1
TABLET ORAL
Qty: 20 TABLET | Refills: 0 | Status: SHIPPED | OUTPATIENT
Start: 2023-07-06 | End: 2023-08-31

## 2023-07-06 NOTE — TELEPHONE ENCOUNTER
clonazePAM 0.5 MG Oral Tablet          Last Written Prescription Date:  5/9/23  Last Fill Quantity: 20,   # refills: 0  Last Office Visit: 6/15/23  Future Office visit:       Routing refill request to provider for review/approval because:    Drug not on the [facility], [facility] or [facility] refill protocol or controlled substance

## 2023-07-11 ENCOUNTER — OFFICE VISIT (OUTPATIENT)
Dept: FAMILY MEDICINE | Facility: OTHER | Age: 83
End: 2023-07-11
Attending: STUDENT IN AN ORGANIZED HEALTH CARE EDUCATION/TRAINING PROGRAM
Payer: COMMERCIAL

## 2023-07-11 VITALS
DIASTOLIC BLOOD PRESSURE: 74 MMHG | OXYGEN SATURATION: 95 % | BODY MASS INDEX: 35.16 KG/M2 | HEIGHT: 65 IN | SYSTOLIC BLOOD PRESSURE: 130 MMHG | TEMPERATURE: 98.1 F | HEART RATE: 71 BPM | WEIGHT: 211 LBS

## 2023-07-11 DIAGNOSIS — R10.13 EPIGASTRIC PAIN: ICD-10-CM

## 2023-07-11 DIAGNOSIS — R14.0 BLOATING: Primary | ICD-10-CM

## 2023-07-11 LAB
ALBUMIN SERPL BCG-MCNC: 3.9 G/DL (ref 3.5–5.2)
ALP SERPL-CCNC: 97 U/L (ref 35–104)
ALT SERPL W P-5'-P-CCNC: 19 U/L (ref 0–50)
ANION GAP SERPL CALCULATED.3IONS-SCNC: 10 MMOL/L (ref 7–15)
AST SERPL W P-5'-P-CCNC: 18 U/L (ref 0–45)
BASOPHILS # BLD AUTO: 0 10E3/UL (ref 0–0.2)
BASOPHILS NFR BLD AUTO: 1 %
BILIRUB SERPL-MCNC: 0.5 MG/DL
BUN SERPL-MCNC: 17.9 MG/DL (ref 8–23)
CALCIUM SERPL-MCNC: 10 MG/DL (ref 8.8–10.2)
CHLORIDE SERPL-SCNC: 107 MMOL/L (ref 98–107)
CREAT SERPL-MCNC: 1.01 MG/DL (ref 0.51–0.95)
CRP SERPL-MCNC: <3 MG/L
DEPRECATED HCO3 PLAS-SCNC: 25 MMOL/L (ref 22–29)
EOSINOPHIL # BLD AUTO: 0.1 10E3/UL (ref 0–0.7)
EOSINOPHIL NFR BLD AUTO: 2 %
ERYTHROCYTE [DISTWIDTH] IN BLOOD BY AUTOMATED COUNT: 13.7 % (ref 10–15)
ERYTHROCYTE [SEDIMENTATION RATE] IN BLOOD BY WESTERGREN METHOD: 12 MM/HR (ref 0–30)
GFR SERPL CREATININE-BSD FRML MDRD: 56 ML/MIN/1.73M2
GLUCOSE SERPL-MCNC: 95 MG/DL (ref 70–99)
HCT VFR BLD AUTO: 39.2 % (ref 35–47)
HGB BLD-MCNC: 12.8 G/DL (ref 11.7–15.7)
IMM GRANULOCYTES # BLD: 0 10E3/UL
IMM GRANULOCYTES NFR BLD: 0 %
LIPASE SERPL-CCNC: 59 U/L (ref 13–60)
LYMPHOCYTES # BLD AUTO: 0.9 10E3/UL (ref 0.8–5.3)
LYMPHOCYTES NFR BLD AUTO: 26 %
MCH RBC QN AUTO: 30.2 PG (ref 26.5–33)
MCHC RBC AUTO-ENTMCNC: 32.7 G/DL (ref 31.5–36.5)
MCV RBC AUTO: 93 FL (ref 78–100)
MONOCYTES # BLD AUTO: 0.4 10E3/UL (ref 0–1.3)
MONOCYTES NFR BLD AUTO: 12 %
NEUTROPHILS # BLD AUTO: 2 10E3/UL (ref 1.6–8.3)
NEUTROPHILS NFR BLD AUTO: 59 %
NRBC # BLD AUTO: 0 10E3/UL
NRBC BLD AUTO-RTO: 0 /100
PLATELET # BLD AUTO: 135 10E3/UL (ref 150–450)
POTASSIUM SERPL-SCNC: 4.3 MMOL/L (ref 3.4–5.3)
PROT SERPL-MCNC: 6.1 G/DL (ref 6.4–8.3)
RBC # BLD AUTO: 4.24 10E6/UL (ref 3.8–5.2)
SODIUM SERPL-SCNC: 142 MMOL/L (ref 136–145)
WBC # BLD AUTO: 3.5 10E3/UL (ref 4–11)

## 2023-07-11 PROCEDURE — 99213 OFFICE O/P EST LOW 20 MIN: CPT | Performed by: STUDENT IN AN ORGANIZED HEALTH CARE EDUCATION/TRAINING PROGRAM

## 2023-07-11 PROCEDURE — 82947 ASSAY GLUCOSE BLOOD QUANT: CPT | Mod: ZL | Performed by: STUDENT IN AN ORGANIZED HEALTH CARE EDUCATION/TRAINING PROGRAM

## 2023-07-11 PROCEDURE — 83690 ASSAY OF LIPASE: CPT | Mod: ZL | Performed by: STUDENT IN AN ORGANIZED HEALTH CARE EDUCATION/TRAINING PROGRAM

## 2023-07-11 PROCEDURE — 84155 ASSAY OF PROTEIN SERUM: CPT | Mod: ZL | Performed by: STUDENT IN AN ORGANIZED HEALTH CARE EDUCATION/TRAINING PROGRAM

## 2023-07-11 PROCEDURE — 36415 COLL VENOUS BLD VENIPUNCTURE: CPT | Mod: ZL | Performed by: STUDENT IN AN ORGANIZED HEALTH CARE EDUCATION/TRAINING PROGRAM

## 2023-07-11 PROCEDURE — 82310 ASSAY OF CALCIUM: CPT | Mod: ZL | Performed by: STUDENT IN AN ORGANIZED HEALTH CARE EDUCATION/TRAINING PROGRAM

## 2023-07-11 PROCEDURE — 85652 RBC SED RATE AUTOMATED: CPT | Mod: ZL | Performed by: STUDENT IN AN ORGANIZED HEALTH CARE EDUCATION/TRAINING PROGRAM

## 2023-07-11 PROCEDURE — 86140 C-REACTIVE PROTEIN: CPT | Mod: ZL | Performed by: STUDENT IN AN ORGANIZED HEALTH CARE EDUCATION/TRAINING PROGRAM

## 2023-07-11 PROCEDURE — 85004 AUTOMATED DIFF WBC COUNT: CPT | Mod: ZL | Performed by: STUDENT IN AN ORGANIZED HEALTH CARE EDUCATION/TRAINING PROGRAM

## 2023-07-11 PROCEDURE — G0463 HOSPITAL OUTPT CLINIC VISIT: HCPCS | Performed by: STUDENT IN AN ORGANIZED HEALTH CARE EDUCATION/TRAINING PROGRAM

## 2023-07-11 RX ORDER — SIMETHICONE 125 MG
125 TABLET,CHEWABLE ORAL
Qty: 60 TABLET | Refills: 0 | Status: SHIPPED | OUTPATIENT
Start: 2023-07-11 | End: 2024-10-03

## 2023-07-11 ASSESSMENT — PAIN SCALES - GENERAL: PAINLEVEL: NO PAIN (0)

## 2023-07-11 NOTE — PROGRESS NOTES
Assessment & Plan     Bloating  No significant symptoms.  No red flag symptoms.  ROS is unremarkable.  Physical exam is reassuring as well  - simethicone (MYLICON) 125 MG chewable tablet; Take 1 tablet (125 mg) by mouth two times daily    Epigastric pain  Mild, present on deep palpation.  Will check some labs to ensure nothing serious.  Physical exam is reassuring.  Discussed with patient no need for imaging at this time  - CBC with platelets and differential; Future  - Comprehensive metabolic panel; Future  - ESR: Erythrocyte sedimentation rate; Future  - CRP, inflammation; Future  - Lipase; Future  - CBC with platelets and differential  - Comprehensive metabolic panel  - ESR: Erythrocyte sedimentation rate  - CRP, inflammation  - Lipase    Return in about 1 week (around 7/18/2023) for Follow up.    Yanique Mar MD  [facility] - REI Bill is a 82 year old, presenting for the following health issues:  Abdominal Pain        6/15/2023     3:03 PM   Additional Questions   Roomed by darin ortega   Accompanied by self     HPI     Fullness in the abdomen, nausea- no vomiting.  Since last Friday.  No diarrhea.  No constipation.  Normal stool.  No heart burn.  Np CP no shortness of breath.  Stays the same.  Nausea comes and goes.  Tender in the epigastric region.  No flank pain.  Feels bloated.  Burps a lot.   Nothing for the bloating or gas.  Going to the bathroom a lot.     No recent antibiotics- No changes to the food eaten.  No recent travel anywhere.  No irritable bowel syndrome  Moderate     Abdominal/Flank Pain  Onset/Duration: about a week ago   Description:   Character: no pain- just feels full and sometimes back hurts.   Location: mid stomach   Radiation: N/A  Intensity: mild  Progression of Symptoms:  same  Accompanying Signs & Symptoms:  Fever/Chills: No  Gas/Bloating: YES- gas  Nausea: YES  Vomitting: No  Diarrhea: No  Constipation: No  Dysuria or Hematuria: No  History:  "  Trauma: No  Previous similar pain: No  Previous tests done: none  Precipitating factors:   Does the pain change with:     Food: YES- feels like food gets stuck when she eats- feels full fast     Bowel Movement: No    Urination: No   Other factors:  No  Therapies tried and outcome: None  No LMP recorded. Patient is postmenopausal.      Review of Systems   Constitutional, HEENT, cardiovascular, pulmonary, gi and gu systems are negative, except as otherwise noted.      Objective    /74 (BP Location: Left arm, Patient Position: Sitting, Cuff Size: Adult Regular)   Pulse 71   Temp 98.1  F (36.7  C) (Tympanic)   Ht 1.651 m (5' 5\")   Wt 95.7 kg (211 lb)   SpO2 95%   BMI 35.11 kg/m    Body mass index is 35.11 kg/m .  Physical Exam   GENERAL: healthy, alert and no distress  EYES: Eyes grossly normal to inspection, PERRL and conjunctivae and sclerae normal  HENT: ear canals and TM's normal, nose and mouth without ulcers or lesions  NECK: no adenopathy, no asymmetry, masses, or scars and thyroid normal to palpation  RESP: lungs clear to auscultation - no rales, rhonchi or wheezes  CV: regular rate and rhythm, normal S1 S2, no S3 or S4, no murmur, click or rub, no peripheral edema and peripheral pulses strong  ABDOMEN: soft, nontender, no hepatosplenomegaly, no masses and bowel sounds normal  MS: no gross musculoskeletal defects noted, no edema  SKIN: no suspicious lesions or rashes  NEURO: Normal strength and tone, mentation intact and speech normal  PSYCH: mentation appears normal, affect normal/bright              "

## 2023-07-24 ENCOUNTER — OFFICE VISIT (OUTPATIENT)
Dept: FAMILY MEDICINE | Facility: OTHER | Age: 83
End: 2023-07-24
Attending: STUDENT IN AN ORGANIZED HEALTH CARE EDUCATION/TRAINING PROGRAM
Payer: COMMERCIAL

## 2023-07-24 VITALS
WEIGHT: 213.1 LBS | DIASTOLIC BLOOD PRESSURE: 68 MMHG | TEMPERATURE: 97.4 F | BODY MASS INDEX: 35.5 KG/M2 | HEART RATE: 63 BPM | HEIGHT: 65 IN | SYSTOLIC BLOOD PRESSURE: 126 MMHG | OXYGEN SATURATION: 96 %

## 2023-07-24 DIAGNOSIS — F41.9 ANXIETY: ICD-10-CM

## 2023-07-24 DIAGNOSIS — R10.84 ABDOMINAL PAIN, GENERALIZED: Primary | ICD-10-CM

## 2023-07-24 PROCEDURE — 99213 OFFICE O/P EST LOW 20 MIN: CPT | Performed by: STUDENT IN AN ORGANIZED HEALTH CARE EDUCATION/TRAINING PROGRAM

## 2023-07-24 PROCEDURE — G0463 HOSPITAL OUTPT CLINIC VISIT: HCPCS | Performed by: STUDENT IN AN ORGANIZED HEALTH CARE EDUCATION/TRAINING PROGRAM

## 2023-07-24 RX ORDER — PAROXETINE 40 MG/1
40 TABLET, FILM COATED ORAL DAILY
Qty: 90 TABLET | Refills: 1 | Status: SHIPPED | OUTPATIENT
Start: 2023-07-24 | End: 2024-03-20

## 2023-07-24 ASSESSMENT — ANXIETY QUESTIONNAIRES
4. TROUBLE RELAXING: NOT AT ALL
5. BEING SO RESTLESS THAT IT IS HARD TO SIT STILL: NOT AT ALL
1. FEELING NERVOUS, ANXIOUS, OR ON EDGE: NEARLY EVERY DAY
GAD7 TOTAL SCORE: 7
3. WORRYING TOO MUCH ABOUT DIFFERENT THINGS: MORE THAN HALF THE DAYS
6. BECOMING EASILY ANNOYED OR IRRITABLE: MORE THAN HALF THE DAYS
7. FEELING AFRAID AS IF SOMETHING AWFUL MIGHT HAPPEN: NOT AT ALL
GAD7 TOTAL SCORE: 7
2. NOT BEING ABLE TO STOP OR CONTROL WORRYING: NOT AT ALL

## 2023-07-24 ASSESSMENT — PAIN SCALES - GENERAL: PAINLEVEL: NO PAIN (0)

## 2023-07-24 NOTE — PROGRESS NOTES
Assessment & Plan     Anxiety  Patient's anxiety is overall well controlled.  She is tolerating Paxil well with no significant side effects.    In addition to medication, I think she would benefit from counseling.  I think she has quite a bit of anxiety over physical symptoms.  Possible illness anxiety disorder as she has been here for numerous physical concerns and everything has routinely appeared to be reassuring.    - PARoxetine (PAXIL) 40 MG tablet; Take 1 tablet (40 mg) by mouth daily  - Adult Mental Health  Referral; Future    Abdominal pain, generalized  Resolved now.  Patient is very well appearing today.  Her vital signs are stable and her physical exam is very reassuring to me  Discussed red flag symptoms to monitor for.    Return in about 4 months (around 11/24/2023) for Follow up.    Yanique Mar MD  [facility] - REI Bill is a 82 year old, presenting for the following health issues:  Follow Up        7/11/2023     9:31 AM   Additional Questions   Roomed by darin ortega   Accompanied by self     HPI     Carried in a heavy tote and pain came on the next day.  Panicked and got out the [place] which helped.  Getting up out of bed and felt it.  With more movement, got better.  Slept on a memory foam mattress and says she will never sleep on one again.  No neurological symptoms.  Yesterday, heart was pounding.  36 year old grandson is going through her things.  Anxious because of mess in her house.      Pain History:  When did you first notice your pain? 7/10/2023   Have you seen anyone else for your pain? No  How has your pain affected your ability to work? None   Where in your body do you have pain?   Abdominal/Flank Pain follow up   Onset/Duration: 7/10/2023  Description:   Character: doesn't have it today N/A  Location: upper stomach   Radiation: None  Intensity: mild  Progression of Symptoms:  improving  Accompanying Signs & Symptoms:  Fever/Chills:  "No  Gas/Bloating: No  Nausea: No  Vomitting: No  Diarrhea: No  Constipation: No  Dysuria or Hematuria: No  History:   Trauma: No  Previous similar pain: No  Previous tests done: none  Precipitating factors:   Does the pain change with:     Food: YES- makes it worse     Bowel Movement: No    Urination: No   Other factors:  No  Therapies tried and outcome: heating pad   Patient stated she is having pain in middle of back/ bra line now. Rated It a 10 this morning when she got up, stated the pain comes right through he front to under the breasts area But is going away now    Review of Systems   Constitutional, HEENT, cardiovascular, pulmonary, gi and gu systems are negative, except as otherwise noted.      Objective    /68 (BP Location: Right arm, Patient Position: Sitting, Cuff Size: Adult Large)   Pulse 63   Temp 97.4  F (36.3  C) (Tympanic)   Ht 1.651 m (5' 5\")   Wt 96.7 kg (213 lb 1.6 oz)   SpO2 96%   BMI 35.46 kg/m    Body mass index is 35.46 kg/m .  Physical Exam   GENERAL: healthy, alert and no distress  EYES: Eyes grossly normal to inspection, PERRL and conjunctivae and sclerae normal  HENT: ear canals and TM's normal, nose and mouth without ulcers or lesions  NECK: no adenopathy, no asymmetry, masses, or scars and thyroid normal to palpation  RESP: lungs clear to auscultation - no rales, rhonchi or wheezes  CV: regular rate and rhythm, normal S1 S2, no S3 or S4, no murmur, click or rub, no peripheral edema and peripheral pulses strong  ABDOMEN: soft, nontender, no hepatosplenomegaly, no masses and bowel sounds normal  MS: no gross musculoskeletal defects noted, no edema  SKIN: no suspicious lesions or rashes  NEURO: Normal strength and tone, mentation intact and speech normal  PSYCH: mentation appears normal, affect normal/bright              "

## 2023-08-01 ENCOUNTER — TRANSFERRED RECORDS (OUTPATIENT)
Dept: HEALTH INFORMATION MANAGEMENT | Facility: HOSPITAL | Age: 83
End: 2023-08-01

## 2023-08-01 ENCOUNTER — MEDICAL CORRESPONDENCE (OUTPATIENT)
Dept: MRI IMAGING | Facility: HOSPITAL | Age: 83
End: 2023-08-01

## 2023-08-21 NOTE — TELEPHONE ENCOUNTER
This was last filled on 7/3/18 for #60. Please advise if we are able to refill for patient?    [Cervical Pap Smear] : cervical Pap smear [Liquid Base] : liquid base [GC Chlamydia Culture] : GC Chlamydia Culture [Tolerated Well] : the patient tolerated the procedure well [No Complications] : there were no complications

## 2023-08-30 DIAGNOSIS — F41.9 ANXIETY: ICD-10-CM

## 2023-08-31 RX ORDER — CLONAZEPAM 0.5 MG/1
TABLET ORAL
Qty: 20 TABLET | Refills: 0 | Status: SHIPPED | OUTPATIENT
Start: 2023-08-31 | End: 2023-10-10

## 2023-08-31 NOTE — TELEPHONE ENCOUNTER
clonazePAM (KLONOPIN) 0.5 MG tablet       Last Written Prescription Date:  7/6/2023  Last Fill Quantity: 20,   # refills: 0  Last Office Visit: 7/24/2023  Future Office visit:    Next 5 appointments (look out 90 days)      Sep 13, 2023  3:30 PM  (Arrive by 3:15 PM)  Return Visit with Ha Hughes DO  [facility] [place] ([facility] [place] ) [address]  [place] [address]  [phone]     Nov 24, 2023  8:00 AM  (Arrive by 7:45 AM)  SHORT with Yanique Mar MD  [facility] [place] ([facility] [place] ) [address]  [place] [address]  [phone]     Nov 27, 2023  8:30 AM  (Arrive by 8:15 AM)  Return Visit with Ha Hughes DO  [facility] [place] ([facility] [place] ) [address]  [place] [address]  [phone]

## 2023-09-05 ENCOUNTER — HOSPITAL ENCOUNTER (EMERGENCY)
Facility: HOSPITAL | Age: 83
Discharge: HOME | End: 2023-09-05
Attending: PHYSICIAN ASSISTANT | Admitting: PHYSICIAN ASSISTANT
Payer: COMMERCIAL

## 2023-09-05 ENCOUNTER — APPOINTMENT (OUTPATIENT)
Dept: ULTRASOUND IMAGING | Facility: HOSPITAL | Age: 83
End: 2023-09-05
Attending: PHYSICIAN ASSISTANT
Payer: COMMERCIAL

## 2023-09-05 VITALS
WEIGHT: 213.19 LBS | HEART RATE: 83 BPM | OXYGEN SATURATION: 97 % | TEMPERATURE: 98.4 F | RESPIRATION RATE: 16 BRPM | DIASTOLIC BLOOD PRESSURE: 58 MMHG | BODY MASS INDEX: 35.52 KG/M2 | HEIGHT: 65 IN | SYSTOLIC BLOOD PRESSURE: 145 MMHG

## 2023-09-05 DIAGNOSIS — S80.12XA HEMATOMA OF LEFT LOWER EXTREMITY, INITIAL ENCOUNTER: ICD-10-CM

## 2023-09-05 PROCEDURE — 99213 OFFICE O/P EST LOW 20 MIN: CPT | Performed by: PHYSICIAN ASSISTANT

## 2023-09-05 PROCEDURE — 93971 EXTREMITY STUDY: CPT | Mod: LT

## 2023-09-05 PROCEDURE — G0463 HOSPITAL OUTPT CLINIC VISIT: HCPCS | Mod: 25

## 2023-09-05 ASSESSMENT — ACTIVITIES OF DAILY LIVING (ADL): ADLS_ACUITY_SCORE: 35

## 2023-09-05 NOTE — DISCHARGE INSTRUCTIONS
"Landy, there is no clot seen in your leg! This is great.     It is possible there was a small \"Baker's cyst\" in this area that ruptured and has caused the bruising/itching. No evidence that this persists or has worrisome bleeding.     -Please try applying a topical voltaren (diclofenac) gel to the area 2-3 times per day  -Warm compresses can also help with this  -Staying active is great for your varicose veins, too!    Thank you for coming in today  "

## 2023-09-05 NOTE — ED TRIAGE NOTES
Patient says she has a black and blue spot behind her left knee and it has a lump in the middle.     Triage Assessment       Row Name 09/05/23 9924       Triage Assessment (Adult)    Airway WDL WDL       Respiratory WDL    Respiratory WDL WDL       Skin Circulation/Temperature WDL    Skin Circulation/Temperature WDL WDL       Cardiac WDL    Cardiac WDL WDL       Peripheral/Neurovascular WDL    Peripheral Neurovascular WDL WDL       Cognitive/Neuro/Behavioral WDL    Cognitive/Neuro/Behavioral WDL WDL

## 2023-09-05 NOTE — ED PROVIDER NOTES
History     Chief Complaint   Patient presents with    leg problem     HPI  Dinorah Lim is a 82 year old female who presents to  with concerns of discoloration and pruritus to upper left posterior calf. States she noticed this in the last day. No known injuries, does not recall anything striking this area. She noticed just an itchy sensation-- has applied topical diphenhydramine with minor relief .Not on bloodthinners. She notes a h/o b/l varicose veins starting around age of 12 yo. No prior h/o DVT. No known h/o Baker's cyst.     No dyspnea, no cough, no dizziness.     Allergies:  Allergies   Allergen Reactions    Chocolate Hives    Lemon Flavor Hives    Lime [Lime, Sulfurated (Calcium Polysulfide)] Hives    Metal [Staples]     Orange Fruit [Citrus] Hives    Strawberry Extract Hives    Adhesive Tape      Band-aids    Codeine Hives     Patient can tolerate oxycodone & dilaudid    Erythromycin Hives     ERYTHROMYCIN BASE    Food      Tomato, grapefruit, oranges.    Grapefruit [Extra Strength Grapefruit]      GRAPEFRUIT    Morphine Hives     Pt. Reports had hives    Penicillins Hives    Ranitidine GI Disturbance    Sulfa Antibiotics      SULFONAMIDE ANTIBIOTICS     Tomato     Xyzal [Levocetirizine] Hives       Problem List:    Patient Active Problem List    Diagnosis Date Noted    Other chest pain 10/20/2021     Priority: Medium    Hiatal hernia 07/28/2021     Priority: Medium    Chronic, continuous use of opioids 05/17/2021     Priority: Medium    Stage 3a chronic kidney disease (H) 03/22/2021     Priority: Medium    COVID-19 virus infection on 10/30/20 11/24/2020     Priority: Medium     10-      Other neutropenia (H) 08/04/2020     Priority: Medium    Paresthesias 02/13/2020     Priority: Medium    Status post total left knee replacement 09/09/2019     Priority: Medium    Aortic valve insufficiency 03/06/2019     Priority: Medium    Mitral regurgitation 03/06/2019     Priority: Medium    Tricuspid  valve insufficiency 03/06/2019     Priority: Medium    Diastolic dysfunction grade 1 on 2/21/19 03/06/2019     Priority: Medium    Hypertriglyceridemia 03/06/2019     Priority: Medium    Palpitations 02/13/2019     Priority: Medium    PVC's (premature ventricular contractions) 02/13/2019     Priority: Medium    Atrial ectopy 02/13/2019     Priority: Medium    PSVT (paroxysmal supraventricular tachycardia) (H) 02/13/2019     Priority: Medium    COPD, mild on 9/9/14 02/13/2019     Priority: Medium    Bilateral carotid artery stenosis at less than 50% on 12/1/16 02/13/2019     Priority: Medium    Mixed hyperlipidemia 02/13/2019     Priority: Medium    On statin therapy 02/13/2019     Priority: Medium    Heart murmur 02/13/2019     Priority: Medium    Morbid obesity (H) 06/01/2017     Priority: Medium    ACP (advance care planning) 04/28/2016     Priority: Medium     Advance Care Planning 4/28/2016: ACP Review of Chart / Resources Provided:  Reviewed chart for advance care plan.  Dinorah Lim has no plan or code status on file. Discussed available resources and provided with information. Confirmed code status reflects current choices pending further ACP discussions.  Confirmed/documented legally designated decision makers.  Added by Aditi Gandhi            Anxiety 06/23/2014     Priority: Medium    Lung density on x-ray 07/23/2013     Priority: Medium    Osteoarthritis 06/14/2012     Priority: Medium     Problem list name updated by automated process. Provider to review      Advanced care planning/counseling discussion 01/13/2012     Priority: Medium    Abnormal glucose 08/01/2011     Priority: Medium     Hgb A1c 5.7 on 1/4/2015  Problem list name updated by automated process. Provider to review      Osteoarthritis of right hip 10/07/2004     Priority: Medium    Urticaria 06/01/2004     Priority: Medium     Problem list name updated by automated process. 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    Obesity, Class II, BMI 35-39.9 1/4/2015    Osteoarthritis of right hip 10/07/2004    Osteoarthrosis 06/14/2012    Otitis externa 10/04/2011    Prediabetes 08/01/2011       Past Surgical History:    Past Surgical History:   Procedure Laterality Date    ARTHROPLASTY KNEE Left 9/9/2019    Procedure: LEFT TOTAL KNEE ARTHROPLASTY S/N;  Surgeon: Trevor Alonzo MD;  Location: [facility]    ARTHROSCOPY KNEE Left 3/21/2019    Procedure: LEFT  KNEE ARTHROSCOPY, partial medial menisectomy;  Surgeon: Esteban Wills MD;  Location: [facility]    CLOSED REDUCTION WRIST Right 1952 x 2    long arm cast (same time as elbow fx)    CLOSED RX ELBOW DISLOCATION Right 1952    CR/long cast of fracture    EXCISE MASS FOOT Left 8/16/2021    Procedure: LEFT ANKLE MASS EXCISION;  Surgeon: Trevor Alonzo MD;  Location: [facility]    HC INJ EPIDURAL LUMBAR/SACRAL W/WO CONTRAST Bilateral 5/2013    facet injections; prev 2012    LAPAROSCOPIC CHOLECYSTECTOMY N/A 1/4/2015    Procedure: LAPAROSCOPIC CHOLECYSTECTOMY;  Surgeon: Brittney العلي MD;  Location: [facility] OR    TONSILLECTOMY      ZZC TOTAL KNEE ARTHROPLASTY Left 09/09/2019    Dr Alonzo       Family History:    Family History   Problem Relation Age of Onset    Heart Disease Brother         atrial fibrillation    Atrial fibrillation Brother     Other - See Comments Mother         emphysema    Heart Disease Father 92        CHF    Cancer Paternal Grandfather         lung cancer    Cancer Maternal Uncle         lung cancer    Chronic Obstructive Pulmonary Disease Daughter     Emphysema  "Daughter     Other - See Comments Daughter         benign breast lesion    Esophagitis Daughter        Social History:  Marital Status:  Single [1]  Social History     Tobacco Use    Smoking status: Never    Smokeless tobacco: Never   Vaping Use    Vaping Use: Never used   Substance Use Topics    Alcohol use: No    Drug use: No        Medications:    clonazePAM (KLONOPIN) 0.5 MG tablet  acetaminophen (TYLENOL) 325 MG tablet  alendronate (FOSAMAX) 70 MG tablet  Calcium-Magnesium-Vitamin D (CALCIUM 1200+D3 PO)  clonazePAM (KLONOPIN) 1 MG tablet  FISH OIL  ipratropium - albuterol 0.5 mg/2.5 mg/3 mL (DUONEB) 0.5-2.5 (3) MG/3ML neb solution  ipratropium - albuterol 0.5 mg/2.5 mg/3 mL (DUONEB) 0.5-2.5 (3) MG/3ML neb solution  PARoxetine (PAXIL) 40 MG tablet  simethicone (MYLICON) 125 MG chewable tablet  simvastatin (ZOCOR) 40 MG tablet  triamcinolone (KENALOG) 0.025 % external ointment  VITAMIN D, CHOLECALCIFEROL, PO          Review of Systems   All other systems reviewed and are negative.      Physical Exam   BP: 145/58  Pulse: 83  Temp: 98.4  F (36.9  C)  Resp: 16  Height: 165.1 cm (5' 5\")  Weight: 96.7 kg (213 lb 3 oz)  SpO2: 97 %      Physical Exam  Vitals and nursing note reviewed.   Constitutional:       General: She is not in acute distress.     Appearance: Normal appearance. She is not ill-appearing, toxic-appearing or diaphoretic.   HENT:      Head: Normocephalic and atraumatic.   Cardiovascular:      Rate and Rhythm: Normal rate and regular rhythm.   Pulmonary:      Effort: Pulmonary effort is normal.      Breath sounds: Normal breath sounds.   Musculoskeletal:      Comments: Minor induration to left upper calf, with violaceous crescent shaped hematoma to upper calf. Firm, cord-like area of swelling without any erythema or warmth. No palpable popliteal cyst. Distal pulses intact. Right shin with significant varicosities.    Neurological:      General: No focal deficit present.      Mental Status: She is alert " and oriented to person, place, and time.         ED Course                 Procedures         Results for orders placed or performed during the hospital encounter of 09/05/23 (from the past 24 hour(s))   US Lower Extremity Venous Duplex Left    Narrative    Procedure: US LOWER EXTREMITY VENOUS DUPLEX LEFT    HISTORY:  superficial phlebitis, r/o DVT.    TECHNIQUE: Grayscale, color Doppler and compression views of the deep  venous structures of the left lower extremity.    COMPARISON: None.    FINDINGS:    Interrogation of the deep venous structures from the left common  femoral vein through the veins of the proximal calf demonstrate normal  grayscale appearance, compressibility and augmentable color Doppler  flow.    Contusion of the calf in the area of bruising is suggested.      Impression    IMPRESSION:     No evidence of left lower extremity DVT.      JEAN PIERRE BARBER MD         SYSTEM ID:  [id number]       Medications - No data to display    Assessments & Plan (with Medical Decision Making)     I have reviewed the nursing notes.    I have reviewed the findings, diagnosis, plan and need for follow up with the patient.    Landy presented to urgent care for evaluation of itchiness and discoloration to left upper posterior calf.  No known trauma.  Given the atraumatic ecchymosis with some increase in induration and swelling to left upper thigh, elected to obtain a venous duplex ultrasound of the left lower extremity.  This returned without any evidence of DVT.  Findings pointed towards hematoma or contusion.  Discussed with Landy that she may have sustained minor trauma to this area that resulted in the hematoma.  Also in consideration is a very small, ruptured Baker's cyst without any continued bleeding.  Recommended symptomatic management including topical diclofenac, warm compresses to help this heal.  Encouraged her to return with any significant worsening.  She was in agreement and  understanding.          Discharge Medication List as of 9/5/2023  3:57 PM          Final diagnoses:   Hematoma of left lower extremity, initial encounter       9/5/2023   [facility]       Asmita Gonzalez PA-C  09/05/23 [id number]

## 2023-09-05 NOTE — ED TRIAGE NOTES
Pt presents with c/o a black and blue spot behind the left knee with a lump in the middle. Pt noticed it about an hour ago. States the lump in the middle that itches. Unsure if there was an injury. No otc meds.      Triage Assessment       Row Name 09/05/23 1448       Triage Assessment (Adult)    Airway WDL WDL       Respiratory WDL    Respiratory WDL WDL       Skin Circulation/Temperature WDL    Skin Circulation/Temperature WDL WDL       Cardiac WDL    Cardiac WDL WDL       Peripheral/Neurovascular WDL    Peripheral Neurovascular WDL WDL       Cognitive/Neuro/Behavioral WDL    Cognitive/Neuro/Behavioral WDL WDL

## 2023-09-12 ENCOUNTER — HOSPITAL ENCOUNTER (OUTPATIENT)
Dept: CARDIOLOGY | Facility: HOSPITAL | Age: 83
Discharge: HOME | End: 2023-09-12
Attending: INTERNAL MEDICINE | Admitting: INTERNAL MEDICINE
Payer: COMMERCIAL

## 2023-09-12 DIAGNOSIS — I36.1 NON-RHEUMATIC TRICUSPID VALVE INSUFFICIENCY: ICD-10-CM

## 2023-09-12 DIAGNOSIS — I51.89 DIASTOLIC DYSFUNCTION: ICD-10-CM

## 2023-09-12 DIAGNOSIS — I34.0 NON-RHEUMATIC MITRAL REGURGITATION: ICD-10-CM

## 2023-09-12 DIAGNOSIS — R01.1 HEART MURMUR: ICD-10-CM

## 2023-09-12 LAB
BI-PLANE LVEF ECHO: NORMAL
LVEF ECHO: NORMAL

## 2023-09-12 PROCEDURE — 93306 TTE W/DOPPLER COMPLETE: CPT | Mod: 26 | Performed by: INTERNAL MEDICINE

## 2023-09-12 PROCEDURE — 93306 TTE W/DOPPLER COMPLETE: CPT

## 2023-09-12 NOTE — PROGRESS NOTES
[facility] HEART CARE   CARDIOLOGY PROGRESS NOTE     Chief Complaint   Patient presents with    Heart Problem     Follow up           Diagnosis:  1.  CHF-diastolic. Grade 2 on 9/12/23. Grade 1 on 2/21/19, off diuretics.  2.  Carotid artery dz. No sign on 2/21/22. <50%, US from 8/4/21.  3.  Moderate AI on 2/24/22 and 10/11/22. Mild/moderate on 1/28/2021.  4.  MR-trace to mild on 1/28/2021.  5.  TR-mild on 10/11/22.  6.  COPD-minimal on 9/9/14. H/O second hand smoke from mom who smoked 3/day.  1/day. No primary tobacco usage.   7.  Morbid obesity with BMI of 35.  8.  Hyperlipidemia-controlled.  9.  Atrial ectopy.  10.  Palpitations.  11.  PSVT.  12.  PVC's.  13.  On statin therapy.  14.  Hypertriglyceridemia-controlled.  15.  Moderate hiatal hernia on 3/18/2020 on a CTA of the chest.  16.  COVID-19 (+) on 10/30/20.  17.  Vitamin D deficiency at 17 on 1/28/15.      Assessment/Plan:    1.  CHF: Discussed her most recent echocardiogram showing grade 2 diastolic dysfunction.  Has had other echocardiograms but the only one to definitively diagnose the severity is from 2019.  At that time, she had grade 1.  She is concerned with the progression of severity of heart failure.  Mildly enough, she denies shortness of breath or swelling.  Briefly, we discussed SGLT2 inhibitors/Entresto.  Because she is not having symptoms, these medications were not initiated.  Could consider in the future.  She denies significant obstructive sleep symptoms.  Caution with salt consumption, fluid consumption, and recommended she increase activity and wear self on a daily basis.  2.  MR: Mild to moderate.  Findings discussed discussed.  Repeat echocardiogram in 1 year.  Continue to follow.  3.  AI: Mild to moderate.  Findings discussed.  Repeat echocardiogram 1 year.  Continue to follow.  4.  Hyperlipidemia: Continue Zocor 40 mg at bedtime.  5.  Patient working on weight loss.  6.  Follow-up in 1 year or sooner if issues.      Interval  history:  See above.    HPI:    She has been concerned with a heart murmur as well as bilateral carotid artery stenosis.  She had echocardiogram completed on 2/21/19 as well as an ultrasound of the carotids on 2/21/19.  She is here for those results.       Previously, we reviewed her Zio patch results from 12/31/18 which showed rare PVC's, x1 run of NSVT 5 beats, PAC's, and x26 episodes of PSVT lasting up to 20 beats.     She also has a history of mild carotid artery disease at less than 50% with mild plaquing on 12/1/16.  She does not have a personal history of smoking but was around secondhand smoke in the past.  She had a ultrasound of her carotids on 2/20/19 that showed atherosclerotic plaquing in both carotid bifurcations.  There was no significant hemo-dynamic stenosis.     Her echocardiogram from 2/21/19 showed an EF of 65-70%, grade 1 diastolic dysfunction, mild left atrial enlargement, mild to moderate aortic insufficiency, trace to mild tricuspid insufficiency, and trace to mild mitral insufficiency.  She is concerned about her valvular insufficiency.  She will have an echocardiogram in approximately 2 years to follow-up on the mild to moderate aortic insufficiency.      Relevant testing:  ECHO on 9/12/23:  Global and regional left ventricular function is normal with an EF of 60-65%.  Grade II or moderate diastolic dysfunction.  Global right ventricular function is normal.  Mild to moderate mitral insufficiency is present.  Mild to moderate aortic insufficiency is present.  IVC diameter <2.1 cm collapsing >50% with sniff suggests a normal RA pressure of 3 mmHg.  No pericardial effusion is present.  No significant changes noted.    Zio patch on 10/12/22:  Worn for 12 days and 8 hr's.  After removing artifact, total time was 12 days and 7 hr's. Placed on 10/12/2022 at 11:30 AM and completed on 10/24/2022 at 7:06 PM.  Underlying rhythm was sinus.  Hrt rate ranged from 52 bpm, maximum heart rate of 197 bmp,  averaging 68 bmp.  No significant bradycardia, pauses, Mobitz type II or 3rd degree heart block.  No atrial fibrillation on this study.  x132 triggered events and x14 diary entries.  These corresponded to SVE, VE, SVT, and sinus rhythm.  x2 runs of VT lasting up to 6 beats with a maximum heart rate of 160 bmp.    x121 runs of SVT lasting up to 22.6 sec's with a maximum heart rate of 197 bmp.  Rare, <1% of PAC's, atrial couplets, atrial triplets, ventricular couplets, and ventricular triplets.  Occasional PVC's at 2.3%.  + episodes of ventricular bigeminy lasting up to 7.3 sec's.  0 episodes of ventricular trigeminy lasting.     Echocardiogram on 10/11/2022:  Left ventricular size, wall motion and function are normal. The ejection  fraction is 55-60%.  The right ventricle is normal size. Global right ventricular function is normal.  Moderate aortic insufficiency is present.  Right ventricular systolic pressure is 40 mmHg above the right atrial pressure.  IVC diameter <2.1 cm collapsing >50% with sniff suggests a normal RA pressure  of 3 mmHg.    ECHO on 2/24/22:  Left ventricular size, wall motion and function are normal. The ejection fraction is 60-65%.  Global right ventricular function is normal.  Mild to moderate mitral insufficiency is present.  Moderate aortic insufficiency is present.  The inferior vena cava is normal.  No pericardial effusion is present.  Compared to prior imaging on 1/28/2021 There is mildly increased PI, MR and AI.   The subtle change is confirmed on visual inspection.    ECHO on 1/28/21:  No pericardial effusion is present.  Global and regional left ventricular function is normal with an EF of 55-60%.  The right ventricle is normal size.  Global right ventricular function is normal.  Both atria appear normal.  Mild mitral insufficiency is present.  The aortic valve is tricuspid.  Mild to moderate aortic insufficiency is present.  AI unchanged from previous ECHO 2/21/19  The mean gradient  across the aortic valve is 5.1 mmHg.  Trace tricuspid insufficiency is present.  Trace pulmonic insufficiency is present.  The aorta root is normal.    ECHO on 2/21/19:  No pericardial effusion is present.  Global and regional left ventricular function is hyperkinetic with an EF of  65-70%.  Grade I or early diastolic dysfunction.  The right ventricle is normal size.  Global right ventricular function is normal.  Mild left atrial enlargement is present.  Trace to mild mitral insufficiency is present.  The aortic valve is tricuspid.  Mild to moderate aortic insufficiency is present.  Trace to mild tricuspid insufficiency is present.  The peak velocity of the tricuspid regurgitant jet is not obtainable.  The pulmonic valve is normal.  The aorta root is normal.    Zio patch from 12/31/2018:  The enrollment period was from 12/31/18 through 1/7/19.  There were 6 days and 12 hours of analysis time available for review.  The minimum heart rate was 49, average heart rate 66 and maximum heart rate 200 bpm.  The patient has underlying sinus rhythm throughout.  There is no significant bradycardia pauses or heart block seen.  There were very rare isolated PVC's.  There was a 5 beat run of ventricular tachycardia.  With an average heart rate of 113 bpm.  This was the only run.  There are rare PAC's seen.  Less than 1% of total beats.  There were 26 episodes of a supraventricular tachycardia greater than 4 beats.  The longest was for 20 beats with an average heart rate of 108 bpm.  The fastest was for 11 beats with a maximum heart rate of 200 bpm but an average heart rate of 134 bpm.  Review of some of these tracings show that it likely is an atrial tachycardia  There were 3 triggered events all 3 of these strips do have PAC's seen.  1 of them had a very slower run of supraventricular tachycardia average heart rate of 113 bpm.  There were 3 diary entries with the symptom of skipped irregular beats.  Review of the associated  strips show all had sinus rhythm to them had supraventricular beats.    US carotids on 2/21/22:  No ultrasound evidence of hemo-dynamically significant stenosis.   Small volume of calcified plaque again seen in the left internal  carotid artery.        No diagnosis found.      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    Anterior Segment V 4 cm nodule abutting liver surface by US 1/4/2015     Obesity, Class II, BMI 35-39.9 1/4/2015    BMI 35.9 with comorbidities = MORBID obesity    Osteoarthritis of right hip 10/07/2004    Osteoarthrosis 06/14/2012    Otitis externa 10/04/2011    Prediabetes 08/01/2011       Past Surgical History:   Procedure Laterality Date    ARTHROPLASTY KNEE Left 9/9/2019    Procedure: LEFT TOTAL KNEE ARTHROPLASTY S/N;  Surgeon: Trevor Alonzo MD;  Location: [facility] OR    ARTHROSCOPY KNEE Left 3/21/2019    Procedure: LEFT  KNEE ARTHROSCOPY, partial medial menisectomy;  Surgeon: Esteban Wills MD;  Location: [facility] OR    CLOSED REDUCTION WRIST Right 1952 x 2    long arm cast (same time as elbow fx)    CLOSED RX ELBOW DISLOCATION Right 1952    CR/long cast of fracture    EXCISE MASS FOOT Left 8/16/2021    Procedure: LEFT ANKLE MASS EXCISION;  Surgeon: Trevor Alonzo MD;  Location: [facility] OR    HC INJ EPIDURAL LUMBAR/SACRAL W/WO CONTRAST Bilateral 5/2013    facet injections; prev 2012    LAPAROSCOPIC CHOLECYSTECTOMY N/A 1/4/2015    Procedure: LAPAROSCOPIC CHOLECYSTECTOMY;  Surgeon: Zohra  Brittney FONTAINE MD;  Location: [facility] OR    TONSILLECTOMY      ZZC TOTAL KNEE ARTHROPLASTY Left 09/09/2019    Dr Alonzo       Allergies   Allergen Reactions    Chocolate Hives    Lemon Flavor Hives    Lime [Lime, Sulfurated (Calcium Polysulfide)] Hives    Metal [Staples]     Orange Fruit [Citrus] Hives    Strawberry Extract Hives    Adhesive Tape      Band-aids    Codeine Hives     Patient can tolerate oxycodone & dilaudid    Erythromycin Hives     ERYTHROMYCIN BASE    Food      Tomato, grapefruit, oranges.    Grapefruit [Extra Strength Grapefruit]      GRAPEFRUIT    Morphine Hives     Pt. Reports had hives    Penicillins Hives    Ranitidine GI Disturbance    Sulfa Antibiotics      SULFONAMIDE ANTIBIOTICS     Tomato     Xyzal [Levocetirizine] Hives       Current Outpatient Medications   Medication Sig Dispense Refill    acetaminophen (TYLENOL) 325 MG tablet Take 325-650 mg by mouth every 6 hours as needed for mild pain      alendronate (FOSAMAX) 70 MG tablet Take 1 tablet (70 mg) by mouth every 7 days 12 tablet 1    Calcium-Magnesium-Vitamin D (CALCIUM 1200+D3 PO) Take by mouth daily      clonazePAM (KLONOPIN) 0.5 MG tablet TAKE 1/2 (ONE-HALF) TABLET BY MOUTH NIGHTLY AS NEEDED FOR ANXIETY 20 tablet 0    clonazePAM (KLONOPIN) 1 MG tablet TAKE 1/4 TO 1/2 (ONE-FOURTH TO ONE-HALF) TABLET BY MOUTH EVERY 8 HOURS AS NEEDED 15 tablet 0    FISH OIL Take 1 capsule by mouth daily       ipratropium - albuterol 0.5 mg/2.5 mg/3 mL (DUONEB) 0.5-2.5 (3) MG/3ML neb solution Take 1 vial (3 mLs) by nebulization every 6 hours as needed for shortness of breath, wheezing or cough 90 mL 0    ipratropium - albuterol 0.5 mg/2.5 mg/3 mL (DUONEB) 0.5-2.5 (3) MG/3ML neb solution Take 1 vial (3 mLs) by nebulization every 6 hours as needed for shortness of breath / dyspnea or wheezing 3 mL 1    PARoxetine (PAXIL) 40 MG tablet Take 1 tablet (40 mg) by mouth daily 90 tablet 1    simethicone (MYLICON) 125 MG chewable tablet Take 1 tablet (125 mg) by mouth  two times daily 60 tablet 0    simvastatin (ZOCOR) 40 MG tablet TAKE 1 TABLET BY MOUTH AT BEDTIME 90 tablet 2    triamcinolone (KENALOG) 0.025 % external ointment Apply topically 2 times daily 80 g 1    VITAMIN D, CHOLECALCIFEROL, PO Take 2,000 Units by mouth daily         Social History     Socioeconomic History    Marital status: Single     Spouse name: Not on file    Number of children: 4    Years of education: 12    Highest education level: Not on file   Occupational History    Occupation: personal care attendant   Tobacco Use    Smoking status: Never    Smokeless tobacco: Never   Vaping Use    Vaping Use: Never used   Substance and Sexual Activity    Alcohol use: No    Drug use: No    Sexual activity: Never   Other Topics Concern     Service Not Asked    Blood Transfusions Yes    Caffeine Concern Yes     Comment: >32 oz soda/day    Occupational Exposure Not Asked    Hobby Hazards Not Asked    Sleep Concern Yes     Comment: insomnia    Stress Concern Not Asked    Weight Concern Not Asked    Special Diet Not Asked    Back Care Not Asked    Exercise Not Asked    Bike Helmet Not Asked    Seat Belt Not Asked    Self-Exams Not Asked    Parent/sibling w/ CABG, MI or angioplasty before 65F 55M? No   Social History Narrative    Not on file     Social Determinants of Health     Financial Resource Strain: Not on file   Food Insecurity: Not on file   Transportation Needs: Not on file   Physical Activity: Not on file   Stress: Not on file   Social Connections: Not on file   Intimate Partner Violence: Not on file   Housing Stability: Not on file       LAB RESULTS:   Orders Only on 02/10/2021   Component Date Value Ref Range Status    Sodium 02/10/2021 139  133 - 144 mmol/L Final    Potassium 02/10/2021 4.0  3.4 - 5.3 mmol/L Final    Chloride 02/10/2021 108  94 - 109 mmol/L Final    Carbon Dioxide 02/10/2021 30  20 - 32 mmol/L Final    Anion Gap 02/10/2021 1* 3 - 14 mmol/L Final    Glucose 02/10/2021 104* 70 - 99  "mg/dL Final    Urea Nitrogen 02/10/2021 15  7 - 30 mg/dL Final    Creatinine 02/10/2021 1.00  0.52 - 1.04 mg/dL Final    GFR Estimate 02/10/2021 54* >60 mL/min/[1.73_m2] Final    GFR Estimate If Black 02/10/2021 62  >60 mL/min/[1.73_m2] Final    Calcium 02/10/2021 9.5  8.5 - 10.1 mg/dL Final    Hemoglobin A1C 02/10/2021 5.0  0 - 5.6 % Final    ALT 02/10/2021 27  0 - 50 U/L Final    AST 02/10/2021 20  0 - 45 U/L Final    Cholesterol 02/10/2021 177  <200 mg/dL Final    Triglycerides 02/10/2021 166* <150 mg/dL Final    HDL Cholesterol 02/10/2021 53  >49 mg/dL Final    LDL Cholesterol Calculated 02/10/2021 91  <100 mg/dL Final    Non HDL Cholesterol 02/10/2021 124  <130 mg/dL Final    Estimated Average Glucose 02/10/2021 97  mg/dL Final        Review of systems: Negative except that which was noted in the HPI.    Physical examination:  /75   Pulse 70   Ht 1.651 m (5' 5\")   Wt 98.9 kg (218 lb)   SpO2 96%   BMI 36.28 kg/m      GENERAL APPEARANCE: healthy, alert and no distress  CHEST: lungs clear to auscultation - no rales, rhonchi or wheezes, no use of accessory muscles, no retractions, respirations are unlabored, normal respiratory rate  CARDIOVASCULAR: regular rhythm, normal S1 with physiologic split S2, no S3 or S4 and no murmur, click or rub  EXTREMITIES: no clubbing, cyanosis or edema      Total time spent on day of visit, including review of tests, obtaining/reviewing separately obtained history, ordering medications/tests/procedures, communicating with PCP/consultants, and documenting in electronic medical record: 40 minutes.           Thank you for allowing me to participate in the care of your patient. Please do not hesitate to contact me if you have any questions.     Ha Hughes, DO        "

## 2023-09-13 ENCOUNTER — OFFICE VISIT (OUTPATIENT)
Dept: CARDIOLOGY | Facility: OTHER | Age: 83
End: 2023-09-13
Attending: INTERNAL MEDICINE
Payer: COMMERCIAL

## 2023-09-13 VITALS
HEIGHT: 65 IN | WEIGHT: 218 LBS | BODY MASS INDEX: 36.32 KG/M2 | SYSTOLIC BLOOD PRESSURE: 121 MMHG | HEART RATE: 70 BPM | DIASTOLIC BLOOD PRESSURE: 75 MMHG | OXYGEN SATURATION: 96 %

## 2023-09-13 DIAGNOSIS — R00.2 PALPITATIONS: ICD-10-CM

## 2023-09-13 DIAGNOSIS — I51.89 DIASTOLIC DYSFUNCTION: ICD-10-CM

## 2023-09-13 DIAGNOSIS — N18.31 STAGE 3A CHRONIC KIDNEY DISEASE (H): ICD-10-CM

## 2023-09-13 DIAGNOSIS — I49.1 ATRIAL ECTOPY: ICD-10-CM

## 2023-09-13 DIAGNOSIS — I34.0 NON-RHEUMATIC MITRAL REGURGITATION: ICD-10-CM

## 2023-09-13 DIAGNOSIS — I49.3 PVC'S (PREMATURE VENTRICULAR CONTRACTIONS): ICD-10-CM

## 2023-09-13 DIAGNOSIS — E78.1 HYPERTRIGLYCERIDEMIA: ICD-10-CM

## 2023-09-13 DIAGNOSIS — I65.23 BILATERAL CAROTID ARTERY STENOSIS: ICD-10-CM

## 2023-09-13 DIAGNOSIS — K44.9 HIATAL HERNIA: ICD-10-CM

## 2023-09-13 DIAGNOSIS — R07.89 OTHER CHEST PAIN: ICD-10-CM

## 2023-09-13 DIAGNOSIS — E78.2 MIXED HYPERLIPIDEMIA: ICD-10-CM

## 2023-09-13 DIAGNOSIS — I47.10 PSVT (PAROXYSMAL SUPRAVENTRICULAR TACHYCARDIA) (H): ICD-10-CM

## 2023-09-13 DIAGNOSIS — J44.9 COPD, MILD (H): ICD-10-CM

## 2023-09-13 DIAGNOSIS — I36.1 NON-RHEUMATIC TRICUSPID VALVE INSUFFICIENCY: ICD-10-CM

## 2023-09-13 DIAGNOSIS — E66.01 MORBID OBESITY (H): Primary | ICD-10-CM

## 2023-09-13 PROCEDURE — G0463 HOSPITAL OUTPT CLINIC VISIT: HCPCS | Performed by: INTERNAL MEDICINE

## 2023-09-13 PROCEDURE — 99215 OFFICE O/P EST HI 40 MIN: CPT | Performed by: INTERNAL MEDICINE

## 2023-09-13 ASSESSMENT — PAIN SCALES - GENERAL: PAINLEVEL: NO PAIN (0)

## 2023-09-13 NOTE — PATIENT INSTRUCTIONS
Thank you for allowing Dr. Hughes and our  team to participate in your care. Please call our office at [phone] with scheduling questions or if you need to cancel or change your appointment. With any other questions or concerns you may call [facility] cardiology nurse at [phone].       If you experience chest pain, chest pressure, chest tightness, shortness of breath, fainting, lightheadedness, nausea, vomiting, or other concerning symptoms, please report to the Emergency Department or call 911. These symptoms may be emergent, and best treated in the Emergency Department.    Follow up in 1 year. Make sure your Echocardiogram is complete.

## 2023-09-21 ENCOUNTER — DOCUMENTATION ONLY (OUTPATIENT)
Dept: OTHER | Facility: CLINIC | Age: 83
End: 2023-09-21
Payer: COMMERCIAL

## 2023-10-08 DIAGNOSIS — F41.9 ANXIETY: ICD-10-CM

## 2023-10-09 NOTE — TELEPHONE ENCOUNTER
Klonopin      Last Written Prescription Date:  8/31/23  Last Fill Quantity: 20,   # refills: 0  Last Office Visit: 7/24/23  Future Office visit:    Next 5 appointments (look out 90 days)      Nov 24, 2023  8:00 AM  (Arrive by 7:45 AM)  SHORT with Yanique Mar MD  [facility] ([facility] [place] ) [address]  [place] [address]  [phone]             Routing refill request to provider for review/approval because:

## 2023-10-10 RX ORDER — CLONAZEPAM 0.5 MG/1
TABLET ORAL
Qty: 20 TABLET | Refills: 0 | Status: SHIPPED | OUTPATIENT
Start: 2023-10-10 | End: 2023-11-13

## 2023-10-22 ENCOUNTER — HOSPITAL ENCOUNTER (EMERGENCY)
Facility: HOSPITAL | Age: 83
Discharge: HOME | End: 2023-10-22
Attending: PHYSICIAN ASSISTANT | Admitting: PHYSICIAN ASSISTANT
Payer: COMMERCIAL

## 2023-10-22 ENCOUNTER — APPOINTMENT (OUTPATIENT)
Dept: GENERAL RADIOLOGY | Facility: HOSPITAL | Age: 83
End: 2023-10-22
Attending: PHYSICIAN ASSISTANT
Payer: COMMERCIAL

## 2023-10-22 VITALS
BODY MASS INDEX: 35.49 KG/M2 | OXYGEN SATURATION: 96 % | HEART RATE: 67 BPM | SYSTOLIC BLOOD PRESSURE: 146 MMHG | RESPIRATION RATE: 18 BRPM | HEIGHT: 65 IN | WEIGHT: 213 LBS | TEMPERATURE: 97.8 F | DIASTOLIC BLOOD PRESSURE: 66 MMHG

## 2023-10-22 DIAGNOSIS — G89.29 CHRONIC MIDLINE LOW BACK PAIN WITHOUT SCIATICA: ICD-10-CM

## 2023-10-22 DIAGNOSIS — M54.50 CHRONIC MIDLINE LOW BACK PAIN WITHOUT SCIATICA: ICD-10-CM

## 2023-10-22 DIAGNOSIS — R07.89 ATYPICAL CHEST PAIN: ICD-10-CM

## 2023-10-22 LAB
ALBUMIN SERPL BCG-MCNC: 4.2 G/DL (ref 3.5–5.2)
ALP SERPL-CCNC: 105 U/L (ref 35–104)
ALT SERPL W P-5'-P-CCNC: 12 U/L (ref 0–50)
ANION GAP SERPL CALCULATED.3IONS-SCNC: 10 MMOL/L (ref 7–15)
AST SERPL W P-5'-P-CCNC: 19 U/L (ref 0–45)
BASO+EOS+MONOS # BLD AUTO: ABNORMAL 10*3/UL
BASO+EOS+MONOS NFR BLD AUTO: ABNORMAL %
BASOPHILS # BLD AUTO: 0 10E3/UL (ref 0–0.2)
BASOPHILS NFR BLD AUTO: 1 %
BILIRUB SERPL-MCNC: 0.5 MG/DL
BUN SERPL-MCNC: 18.6 MG/DL (ref 8–23)
CALCIUM SERPL-MCNC: 10.4 MG/DL (ref 8.8–10.2)
CHLORIDE SERPL-SCNC: 104 MMOL/L (ref 98–107)
CREAT SERPL-MCNC: 0.96 MG/DL (ref 0.51–0.95)
DEPRECATED HCO3 PLAS-SCNC: 25 MMOL/L (ref 22–29)
EGFRCR SERPLBLD CKD-EPI 2021: 59 ML/MIN/1.73M2
EOSINOPHIL # BLD AUTO: 0.1 10E3/UL (ref 0–0.7)
EOSINOPHIL NFR BLD AUTO: 2 %
ERYTHROCYTE [DISTWIDTH] IN BLOOD BY AUTOMATED COUNT: 13.1 % (ref 10–15)
GLUCOSE SERPL-MCNC: 94 MG/DL (ref 70–99)
HCT VFR BLD AUTO: 40.6 % (ref 35–47)
HGB BLD-MCNC: 12.9 G/DL (ref 11.7–15.7)
HOLD SPECIMEN: NORMAL
IMM GRANULOCYTES # BLD: 0 10E3/UL
IMM GRANULOCYTES NFR BLD: 0 %
LYMPHOCYTES # BLD AUTO: 0.9 10E3/UL (ref 0.8–5.3)
LYMPHOCYTES NFR BLD AUTO: 28 %
MCH RBC QN AUTO: 29.8 PG (ref 26.5–33)
MCHC RBC AUTO-ENTMCNC: 31.8 G/DL (ref 31.5–36.5)
MCV RBC AUTO: 94 FL (ref 78–100)
MONOCYTES # BLD AUTO: 0.3 10E3/UL (ref 0–1.3)
MONOCYTES NFR BLD AUTO: 10 %
NEUTROPHILS # BLD AUTO: 1.9 10E3/UL (ref 1.6–8.3)
NEUTROPHILS NFR BLD AUTO: 59 %
NRBC # BLD AUTO: 0 10E3/UL
NRBC BLD AUTO-RTO: 0 /100
PLATELET # BLD AUTO: 155 10E3/UL (ref 150–450)
POTASSIUM SERPL-SCNC: 4.2 MMOL/L (ref 3.4–5.3)
PROT SERPL-MCNC: 6.7 G/DL (ref 6.4–8.3)
RBC # BLD AUTO: 4.33 10E6/UL (ref 3.8–5.2)
SODIUM SERPL-SCNC: 139 MMOL/L (ref 135–145)
TROPONIN T SERPL HS-MCNC: 11 NG/L
WBC # BLD AUTO: 3.1 10E3/UL (ref 4–11)

## 2023-10-22 PROCEDURE — G0463 HOSPITAL OUTPT CLINIC VISIT: HCPCS | Mod: 25

## 2023-10-22 PROCEDURE — 85025 COMPLETE CBC W/AUTO DIFF WBC: CPT | Performed by: PHYSICIAN ASSISTANT

## 2023-10-22 PROCEDURE — 99214 OFFICE O/P EST MOD 30 MIN: CPT | Performed by: PHYSICIAN ASSISTANT

## 2023-10-22 PROCEDURE — 71046 X-RAY EXAM CHEST 2 VIEWS: CPT

## 2023-10-22 PROCEDURE — 84484 ASSAY OF TROPONIN QUANT: CPT | Performed by: PHYSICIAN ASSISTANT

## 2023-10-22 PROCEDURE — 36415 COLL VENOUS BLD VENIPUNCTURE: CPT | Performed by: PHYSICIAN ASSISTANT

## 2023-10-22 PROCEDURE — 93005 ELECTROCARDIOGRAM TRACING: CPT

## 2023-10-22 PROCEDURE — 80053 COMPREHEN METABOLIC PANEL: CPT | Performed by: PHYSICIAN ASSISTANT

## 2023-10-22 PROCEDURE — 93010 ELECTROCARDIOGRAM REPORT: CPT | Mod: 77 | Performed by: INTERNAL MEDICINE

## 2023-10-22 ASSESSMENT — ACTIVITIES OF DAILY LIVING (ADL): ADLS_ACUITY_SCORE: 35

## 2023-10-22 NOTE — ED PROVIDER NOTES
"  History     Chief Complaint   Patient presents with    Back Pain     HPI  Dinorah Lim is a 83 year old female who presents with two concerns today. First, she noticed three episodes of sharp, brief, left sided chest pain today and became concerned it was due to her \"leaky valves.\" States she was \"sitting doing nothing\" when the pain occurred earlier today. Last episode was 1 hour PTA. Pain lasted a couple seconds. No alleviating or aggravating factors. No associated symptoms. Denies dyspnea, cough, or exertional symptoms. She just saw her cardiologist Dr. Hughes last month who she see's for h/o diastolic HF, mild non-obstructive CAD per angio 2016, and mild-moderate aortic insufficiency. Per his note, it was felt that her cardiac issues were stable. She denies any symptoms currently.     Second, she has had chronic low back pain unchanged over the last 3 months for which she has seen her PCP for already, currently under the care of a chiropractor. She doesn't feel any improvement yet since starting with her chiropractor. Denies groin paraesthesias or loss of bowel/bladder control.     Allergies:  Allergies   Allergen Reactions    Chocolate Hives    Lemon Flavor Hives    Lime [Lime, Sulfurated (Calcium Polysulfide)] Hives    Metal [Staples]     Orange Fruit [Citrus] Hives    Strawberry Extract Hives    Adhesive Tape      Band-aids    Codeine Hives     Patient can tolerate oxycodone & dilaudid    Erythromycin Hives     ERYTHROMYCIN BASE    Food      Tomato, grapefruit, oranges.    Grapefruit [Extra Strength Grapefruit]      GRAPEFRUIT    Morphine Hives     Pt. Reports had hives    Penicillins Hives    Ranitidine GI Disturbance    Sulfa Antibiotics      SULFONAMIDE ANTIBIOTICS     Tomato     Xyzal [Levocetirizine] Hives       Problem List:    Patient Active Problem List    Diagnosis Date Noted    Other chest pain 10/20/2021     Priority: Medium    Hiatal hernia 07/28/2021     Priority: Medium    Chronic, " continuous use of opioids 05/17/2021     Priority: Medium    Stage 3a chronic kidney disease (H) 03/22/2021     Priority: Medium    COVID-19 virus infection on 10/30/20 11/24/2020     Priority: Medium     10-      Other neutropenia (H24) 08/04/2020     Priority: Medium    Paresthesias 02/13/2020     Priority: Medium    Status post total left knee replacement 09/09/2019     Priority: Medium    Aortic valve insufficiency 03/06/2019     Priority: Medium    Mitral regurgitation 03/06/2019     Priority: Medium    Tricuspid valve insufficiency 03/06/2019     Priority: Medium    Diastolic dysfunction grade 1 on 2/21/19 03/06/2019     Priority: Medium    Hypertriglyceridemia 03/06/2019     Priority: Medium    Palpitations 02/13/2019     Priority: Medium    PVC's (premature ventricular contractions) 02/13/2019     Priority: Medium    Atrial ectopy 02/13/2019     Priority: Medium    PSVT (paroxysmal supraventricular tachycardia) 02/13/2019     Priority: Medium    COPD, mild on 9/9/14 02/13/2019     Priority: Medium    Bilateral carotid artery stenosis at less than 50% on 12/1/16 02/13/2019     Priority: Medium    Mixed hyperlipidemia 02/13/2019     Priority: Medium    On statin therapy 02/13/2019     Priority: Medium    Heart murmur 02/13/2019     Priority: Medium    Morbid obesity (H) 06/01/2017     Priority: Medium    ACP (advance care planning) 04/28/2016     Priority: Medium     Advance Care Planning 4/28/2016: ACP Review of Chart / Resources Provided:  Reviewed chart for advance care plan.  Dinorah Lim has no plan or code status on file. Discussed available resources and provided with information. Confirmed code status reflects current choices pending further ACP discussions.  Confirmed/documented legally designated decision makers.  Added by Aditi Gandhi            Anxiety 06/23/2014     Priority: Medium    Lung density on x-ray 07/23/2013     Priority: Medium    Osteoarthritis 06/14/2012     Priority:  Medium     Problem list name updated by automated process. Provider to review      Advanced care planning/counseling discussion 01/13/2012     Priority: Medium    Abnormal glucose 08/01/2011     Priority: Medium     Hgb A1c 5.7 on 1/4/2015  Problem list name updated by automated process. Provider to review      Osteoarthritis of right hip 10/07/2004     Priority: Medium    Urticaria 06/01/2004     Priority: Medium     Problem list name updated by automated process. 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    Obesity, Class II, BMI 35-39.9 1/4/2015    Osteoarthritis of right hip 10/07/2004    Osteoarthrosis 06/14/2012    Otitis externa 10/04/2011    Prediabetes 08/01/2011       Past Surgical History:    Past Surgical History:   Procedure Laterality Date    ARTHROPLASTY KNEE Left 9/9/2019    Procedure: LEFT TOTAL KNEE ARTHROPLASTY S/N;  Surgeon: Trevor Alonzo MD;  Location: [facility]    ARTHROSCOPY KNEE Left 3/21/2019    Procedure: LEFT  KNEE ARTHROSCOPY, partial medial menisectomy;  Surgeon: Esteban Wills MD;  Location: [facility]    CLOSED REDUCTION WRIST Right 1952 x 2    long arm cast (same time as elbow fx)    CLOSED RX ELBOW DISLOCATION Right 1952    CR/long cast of fracture    EXCISE MASS FOOT Left 8/16/2021    Procedure: LEFT ANKLE MASS EXCISION;  Surgeon: Trevor Alonzo MD;  Location: [facility]    HC INJ EPIDURAL LUMBAR/SACRAL W/WO CONTRAST Bilateral 5/2013    facet injections; prev 2012    LAPAROSCOPIC CHOLECYSTECTOMY N/A 1/4/2015    Procedure: LAPAROSCOPIC CHOLECYSTECTOMY;   "Surgeon: Brittney العلي MD;  Location: [facility]    TONSILLECTOMY      ZZC TOTAL KNEE ARTHROPLASTY Left 09/09/2019    Dr Alonzo       Family History:    Family History   Problem Relation Age of Onset    Heart Disease Brother         atrial fibrillation    Atrial fibrillation Brother     Other - See Comments Mother         emphysema    Heart Disease Father 92        CHF    Cancer Paternal Grandfather         lung cancer    Cancer Maternal Uncle         lung cancer    Chronic Obstructive Pulmonary Disease Daughter     Emphysema Daughter     Other - See Comments Daughter         benign breast lesion    Esophagitis Daughter        Social History:  Marital Status:  Single [1]  Social History     Tobacco Use    Smoking status: Never    Smokeless tobacco: Never   Vaping Use    Vaping Use: Never used   Substance Use Topics    Alcohol use: No    Drug use: No        Medications:    acetaminophen (TYLENOL) 325 MG tablet  alendronate (FOSAMAX) 70 MG tablet  Calcium-Magnesium-Vitamin D (CALCIUM 1200+D3 PO)  clonazePAM (KLONOPIN) 0.5 MG tablet  clonazePAM (KLONOPIN) 1 MG tablet  FISH OIL  ipratropium - albuterol 0.5 mg/2.5 mg/3 mL (DUONEB) 0.5-2.5 (3) MG/3ML neb solution  ipratropium - albuterol 0.5 mg/2.5 mg/3 mL (DUONEB) 0.5-2.5 (3) MG/3ML neb solution  PARoxetine (PAXIL) 40 MG tablet  simethicone (MYLICON) 125 MG chewable tablet  simvastatin (ZOCOR) 40 MG tablet  triamcinolone (KENALOG) 0.025 % external ointment  VITAMIN D, CHOLECALCIFEROL, PO          Review of Systems   All other systems reviewed and are negative.      Physical Exam   BP: 146/66  Pulse: 67  Temp: 97.8  F (36.6  C)  Resp: 18  Height: 165.1 cm (5' 5\")  Weight: 96.6 kg (213 lb)  SpO2: 96 %      Physical Exam  Vitals and nursing note reviewed.   Constitutional:       General: She is not in acute distress.     Appearance: She is well-developed. She is not diaphoretic.   HENT:      Head: Normocephalic and atraumatic.      Right Ear: External ear normal.      Left " Ear: External ear normal.      Nose: Nose normal.      Mouth/Throat:      Pharynx: No oropharyngeal exudate.   Eyes:      General: No scleral icterus.        Right eye: No discharge.         Left eye: No discharge.      Conjunctiva/sclera: Conjunctivae normal.      Pupils: Pupils are equal, round, and reactive to light.   Cardiovascular:      Rate and Rhythm: Normal rate and regular rhythm.      Heart sounds: Normal heart sounds. No murmur heard.     No friction rub. No gallop.   Pulmonary:      Effort: Pulmonary effort is normal. No respiratory distress.      Breath sounds: Normal breath sounds. No wheezing or rales.   Chest:      Chest wall: No tenderness.   Abdominal:      General: Bowel sounds are normal.      Palpations: Abdomen is soft.      Tenderness: There is no abdominal tenderness.   Musculoskeletal:      Cervical back: Normal, normal range of motion and neck supple.      Thoracic back: Normal.      Lumbar back: Tenderness (Bialteral muscular lumbar tenderness.) present. No swelling, edema, deformity, signs of trauma or bony tenderness. Normal range of motion. Negative right straight leg raise test and negative left straight leg raise test.   Lymphadenopathy:      Cervical: No cervical adenopathy.   Skin:     General: Skin is warm and dry.      Coloration: Skin is not pale.      Findings: No erythema or rash.   Neurological:      Mental Status: She is alert and oriented to person, place, and time.      Cranial Nerves: No cranial nerve deficit.      Coordination: Coordination normal.   Psychiatric:         Behavior: Behavior normal.         Thought Content: Thought content normal.         Judgment: Judgment normal.         ED Course                 Procedures                Results for orders placed or performed during the hospital encounter of 10/22/23 (from the past 24 hour(s))   EKG 12 lead   Result Value Ref Range    Systolic Blood Pressure  mmHg    Diastolic Blood Pressure  mmHg    Ventricular Rate 60  BPM    Atrial Rate 60 BPM    IA Interval 144 ms    QRS Duration 80 ms     ms    QTc 400 ms    P Axis 36 degrees    R AXIS 25 degrees    T Axis 27 degrees    Interpretation ECG       Sinus rhythm  Normal ECG  No previous ECGs available     Troponin T, High Sensitivity   Result Value Ref Range    Troponin T, High Sensitivity 11 <=14 ng/L   CBC with platelets differential    Narrative    The following orders were created for panel order CBC with platelets differential.  Procedure                               Abnormality         Status                     ---------                               -----------         ------                     CBC with platelets and d...[[id number]]  Abnormal            Final result                 Please view results for these tests on the individual orders.   Comprehensive metabolic panel   Result Value Ref Range    Sodium 139 135 - 145 mmol/L    Potassium 4.2 3.4 - 5.3 mmol/L    Carbon Dioxide (CO2) 25 22 - 29 mmol/L    Anion Gap 10 7 - 15 mmol/L    Urea Nitrogen 18.6 8.0 - 23.0 mg/dL    Creatinine 0.96 (H) 0.51 - 0.95 mg/dL    GFR Estimate 59 (L) >60 mL/min/1.73m2    Calcium 10.4 (H) 8.8 - 10.2 mg/dL    Chloride 104 98 - 107 mmol/L    Glucose 94 70 - 99 mg/dL    Alkaline Phosphatase 105 (H) 35 - 104 U/L    AST 19 0 - 45 U/L    ALT 12 0 - 50 U/L    Protein Total 6.7 6.4 - 8.3 g/dL    Albumin 4.2 3.5 - 5.2 g/dL    Bilirubin Total 0.5 <=1.2 mg/dL   CBC with platelets and differential   Result Value Ref Range    WBC Count 3.1 (L) 4.0 - 11.0 10e3/uL    RBC Count 4.33 3.80 - 5.20 10e6/uL    Hemoglobin 12.9 11.7 - 15.7 g/dL    Hematocrit 40.6 35.0 - 47.0 %    MCV 94 78 - 100 fL    MCH 29.8 26.5 - 33.0 pg    MCHC 31.8 31.5 - 36.5 g/dL    RDW 13.1 10.0 - 15.0 %    Platelet Count 155 150 - 450 10e3/uL    % Neutrophils 59 %    % Lymphocytes 28 %    % Monocytes 10 %    Mids % (Monos, Eos, Basos)      % Eosinophils 2 %    % Basophils 1 %    % Immature Granulocytes 0 %    NRBCs per 100 WBC 0  <1 /100    Absolute Neutrophils 1.9 1.6 - 8.3 10e3/uL    Absolute Lymphocytes 0.9 0.8 - 5.3 10e3/uL    Absolute Monocytes 0.3 0.0 - 1.3 10e3/uL    Mids Abs (Monos, Eos, Basos)      Absolute Eosinophils 0.1 0.0 - 0.7 10e3/uL    Absolute Basophils 0.0 0.0 - 0.2 10e3/uL    Absolute Immature Granulocytes 0.0 <=0.4 10e3/uL    Absolute NRBCs 0.0 10e3/uL   Extra Tube    Narrative    The following orders were created for panel order Extra Tube.  Procedure                               Abnormality         Status                     ---------                               -----------         ------                     Extra Blue Top Tube[[id number]]                              Final result               Extra Red Top Tube[[id number]]                               Final result               Extra Heparinized Syringe[[id number]]                        Final result                 Please view results for these tests on the individual orders.   Extra Blue Top Tube   Result Value Ref Range    Hold Specimen JIC    Extra Red Top Tube   Result Value Ref Range    Hold Specimen JIC    Extra Heparinized Syringe   Result Value Ref Range    Hold Specimen JIC    Chest XR,  PA & LAT    Narrative    PROCEDURE:  XR CHEST 2 VIEWS    HISTORY: left sided sharp chest pain    COMPARISON:  Chest radiograph 4/17/2023, 3/14/2023    FINDINGS: PA and lateral chest radiographs    Cardiomediastinal silhouette is within normal limits. There is  calcific aortic atherosclerosis.  No focal consolidation, effusion or pneumothorax.    No suspicious osseous lesion or subdiaphragmatic free air.  Moderate size hiatal hernia.      Impression    IMPRESSION:    No acute cardiopulmonary process.    BRUCE CH MD         SYSTEM ID:  [id number]       Medications - No data to display    Assessments & Plan (with Medical Decision Making)   Cardiac work up is unremarkable with ECG SR without acute ST-T changes. Troponin negative. CXR negative for acute cardiopulmonary  process. Asymptomatic here. VS are WNL. Doubt PE. No pleuritic symptoms. Discussed her mild to moderate aortic insufficiency should not cause brief, sharp chest pain, this is more likely to be musculoskeletal related as she was picking up her grand kids a lot last evening during her birthday party. She is reassured. As for her chronic low back pain, she has had no worsening symptoms as of late and no new trauma, so imaging not indicated today. She declines any medications for her pain. She plans to continue with her chiropractor and will follow up with her primary care provider in November as scheduled to discuss this further.     Plan:  Work up of your heart today was normal.   Continue your current medications as prescribed.   Follow up with primary care in 2-3 days for re-check.   Return here sooner with any new or worsening symptoms.     I have reviewed the nursing notes.    I have reviewed the findings, diagnosis, plan and need for follow up with the patient.      Discharge Medication List as of 10/22/2023  2:44 PM          Final diagnoses:   Atypical chest pain   Chronic midline low back pain without sciatica       10/22/2023   [facility]

## 2023-10-22 NOTE — ED TRIAGE NOTES
"Pt presents with back pain w/ emphasis on low back and shoulders. Pt reports having \"leaky heart valves\" reports chest pain is intermittent. Pt pointed to area on chest, area located directly above heart. Pt reports heart murmer.         "

## 2023-10-22 NOTE — DISCHARGE INSTRUCTIONS
Work up of your heart today was normal.   Continue your current medications as prescribed.   Follow up with primary care in 2-3 days for re-check.   Return here sooner with any new or worsening symptoms.

## 2023-10-26 LAB
ATRIAL RATE - MUSE: 60 BPM
DIASTOLIC BLOOD PRESSURE - MUSE: NORMAL MMHG
INTERPRETATION ECG - MUSE: NORMAL
P AXIS - MUSE: 36 DEGREES
PR INTERVAL - MUSE: 144 MS
QRS DURATION - MUSE: 80 MS
QT - MUSE: 400 MS
QTC - MUSE: 400 MS
R AXIS - MUSE: 25 DEGREES
SYSTOLIC BLOOD PRESSURE - MUSE: NORMAL MMHG
T AXIS - MUSE: 27 DEGREES
VENTRICULAR RATE- MUSE: 60 BPM

## 2023-11-13 ENCOUNTER — TRANSFERRED RECORDS (OUTPATIENT)
Dept: HEALTH INFORMATION MANAGEMENT | Facility: CLINIC | Age: 83
End: 2023-11-13
Payer: COMMERCIAL

## 2023-11-13 DIAGNOSIS — F41.9 ANXIETY: ICD-10-CM

## 2023-11-13 RX ORDER — CLONAZEPAM 0.5 MG/1
TABLET ORAL
Qty: 20 TABLET | Refills: 0 | Status: SHIPPED | OUTPATIENT
Start: 2023-11-13 | End: 2024-01-23

## 2023-11-13 NOTE — TELEPHONE ENCOUNTER
Klonopin      Last Written Prescription Date:  10.10.23  Last Fill Quantity: #20,   # refills: 0  Last Office Visit: 7.24.23  Future Office visit:    Next 5 appointments (look out 90 days)      Nov 24, 2023  8:00 AM  (Arrive by 7:45 AM)  SHORT with Yanqiue Mar MD  [facility] [address]  [place] [address]  [phone]             Routing refill request to provider for review/approval because:  Drug not on the [facility], [facility] or [facility] refill protocol or controlled substance

## 2023-11-27 ENCOUNTER — OFFICE VISIT (OUTPATIENT)
Dept: FAMILY MEDICINE | Facility: OTHER | Age: 83
End: 2023-11-27
Attending: STUDENT IN AN ORGANIZED HEALTH CARE EDUCATION/TRAINING PROGRAM
Payer: COMMERCIAL

## 2023-11-27 VITALS
BODY MASS INDEX: 35.04 KG/M2 | TEMPERATURE: 98.5 F | HEART RATE: 62 BPM | DIASTOLIC BLOOD PRESSURE: 65 MMHG | SYSTOLIC BLOOD PRESSURE: 122 MMHG | OXYGEN SATURATION: 98 % | HEIGHT: 65 IN | WEIGHT: 210.3 LBS

## 2023-11-27 DIAGNOSIS — M81.0 AGE RELATED OSTEOPOROSIS, UNSPECIFIED PATHOLOGICAL FRACTURE PRESENCE: ICD-10-CM

## 2023-11-27 DIAGNOSIS — F43.22 ADJUSTMENT DISORDER WITH ANXIOUS MOOD: Primary | ICD-10-CM

## 2023-11-27 PROCEDURE — G0463 HOSPITAL OUTPT CLINIC VISIT: HCPCS

## 2023-11-27 PROCEDURE — 99213 OFFICE O/P EST LOW 20 MIN: CPT | Performed by: STUDENT IN AN ORGANIZED HEALTH CARE EDUCATION/TRAINING PROGRAM

## 2023-11-27 RX ORDER — ALENDRONATE SODIUM 70 MG/1
70 TABLET ORAL
Qty: 12 TABLET | Refills: 1 | Status: SHIPPED | OUTPATIENT
Start: 2023-11-27 | End: 2024-05-06

## 2023-11-27 ASSESSMENT — ANXIETY QUESTIONNAIRES
7. FEELING AFRAID AS IF SOMETHING AWFUL MIGHT HAPPEN: NOT AT ALL
1. FEELING NERVOUS, ANXIOUS, OR ON EDGE: NEARLY EVERY DAY
2. NOT BEING ABLE TO STOP OR CONTROL WORRYING: NEARLY EVERY DAY
4. TROUBLE RELAXING: NOT AT ALL
GAD7 TOTAL SCORE: 10
5. BEING SO RESTLESS THAT IT IS HARD TO SIT STILL: NOT AT ALL
3. WORRYING TOO MUCH ABOUT DIFFERENT THINGS: NEARLY EVERY DAY
6. BECOMING EASILY ANNOYED OR IRRITABLE: SEVERAL DAYS
GAD7 TOTAL SCORE: 10
IF YOU CHECKED OFF ANY PROBLEMS ON THIS QUESTIONNAIRE, HOW DIFFICULT HAVE THESE PROBLEMS MADE IT FOR YOU TO DO YOUR WORK, TAKE CARE OF THINGS AT HOME, OR GET ALONG WITH OTHER PEOPLE: NOT DIFFICULT AT ALL

## 2023-11-27 ASSESSMENT — PATIENT HEALTH QUESTIONNAIRE - PHQ9
SUM OF ALL RESPONSES TO PHQ QUESTIONS 1-9: 7
10. IF YOU CHECKED OFF ANY PROBLEMS, HOW DIFFICULT HAVE THESE PROBLEMS MADE IT FOR YOU TO DO YOUR WORK, TAKE CARE OF THINGS AT HOME, OR GET ALONG WITH OTHER PEOPLE: NOT DIFFICULT AT ALL
SUM OF ALL RESPONSES TO PHQ QUESTIONS 1-9: 7

## 2023-11-27 ASSESSMENT — PAIN SCALES - GENERAL: PAINLEVEL: NO PAIN (0)

## 2023-11-27 NOTE — PROGRESS NOTES
"  Assessment & Plan     Age related osteoporosis, unspecified pathological fracture presence  Tolerating well, started this medication in June of 2023.    We will plan to repeat DEXA in 2-3 years to evaluate for improvement  - alendronate (FOSAMAX) 70 MG tablet; Take 1 tablet (70 mg) by mouth every 7 days    Adjustment disorder with anxious mood  On Paroxetine 40 mg and klonopin 0.5 mg PRN  Recommend CBT in addition to medication therapy.  Patient overall doing well     BMI:   Estimated body mass index is 35 kg/m  as calculated from the following:    Height as of this encounter: 1.651 m (5' 5\").    Weight as of this encounter: 95.4 kg (210 lb 4.8 oz).   Weight management plan: Discussed healthy diet and exercise guidelines    Return in about 3 months (around 2/27/2024).    Yanique Mar MD  [facility] Bill is a 83 year old, presenting for the following health issues:  Depression and Anxiety        11/27/2023     2:57 PM   Additional Questions   Roomed by Rosi WISEMAN LPN   Accompanied by self       HPI     Depressed with grandson.  Apparently he ordered some liquid adderall and has been behaving strangely.  He has been talking to himself.  She tells me that she is mostly depressed about her grandson.  Feels that she is coping well.  She has family support.    Depression and Anxiety Follow-Up    Social History     Tobacco Use    Smoking status: Never    Smokeless tobacco: Never   Vaping Use    Vaping Use: Never used   Substance Use Topics    Alcohol use: No    Drug use: No         6/22/2022     9:54 AM 6/5/2023     8:02 AM 11/27/2023     3:05 PM   PHQ   PHQ-9 Total Score 3 0 7   Q9: Thoughts of better off dead/self-harm past 2 weeks Not at all Not at all Not at all         6/15/2023     3:05 PM 7/24/2023     8:28 AM 11/27/2023     3:06 PM   ANDREI-7 SCORE   Total Score   10 (moderate anxiety)   Total Score 6 7 10         11/27/2023     3:05 PM   Last PHQ-9   1.  Little interest " "or pleasure in doing things 1   2.  Feeling down, depressed, or hopeless 3   3.  Trouble falling or staying asleep, or sleeping too much 1   4.  Feeling tired or having little energy 1   5.  Poor appetite or overeating 0   6.  Feeling bad about yourself 1   7.  Trouble concentrating 0   8.  Moving slowly or restless 0   Q9: Thoughts of better off dead/self-harm past 2 weeks 0   PHQ-9 Total Score 7       Suicide Assessment Five-step Evaluation and Treatment (SAFE-T)    How many servings of fruits and vegetables do you eat daily?  0-1  On average, how many sweetened beverages do you drink each day (Examples: soda, juice, sweet tea, etc.  Do NOT count diet or artificially sweetened beverages)?   0  How many days per week do you exercise enough to make your heart beat faster? 5  How many minutes a day do you exercise enough to make your heart beat faster? 10 - 19  How many days per week do you miss taking your medication? 0    Review of Systems   Constitutional, HEENT, cardiovascular, pulmonary, gi and gu systems are negative, except as otherwise noted.      Objective    /65 (BP Location: Left arm, Patient Position: Sitting, Cuff Size: Adult Large)   Pulse 62   Temp 98.5  F (36.9  C) (Tympanic)   Ht 1.651 m (5' 5\")   Wt 95.4 kg (210 lb 4.8 oz)   SpO2 98%   BMI 35.00 kg/m    Body mass index is 35 kg/m .  Physical Exam   GENERAL: healthy, alert and no distress  EYES: Eyes grossly normal to inspection, PERRL and conjunctivae and sclerae normal  HENT: ear canals and TM's normal, nose and mouth without ulcers or lesions  NECK: no adenopathy, no asymmetry, masses, or scars and thyroid normal to palpation  RESP: lungs clear to auscultation - no rales, rhonchi or wheezes  CV: regular rate and rhythm, normal S1 S2, no S3 or S4, no murmur, click or rub, no peripheral edema and peripheral pulses strong  ABDOMEN: soft, nontender, no hepatosplenomegaly, no masses and bowel sounds normal  MS: no gross musculoskeletal " defects noted, no edema  SKIN: no suspicious lesions or rashes  NEURO: Normal strength and tone, mentation intact and speech normal  PSYCH: mentation appears normal, affect normal/bright                Answers submitted by the patient for this visit:  Patient Health Questionnaire (Submitted on 11/27/2023)  If you checked off any problems, how difficult have these problems made it for you to do your work, take care of things at home, or get along with other people?: Not difficult at all  PHQ9 TOTAL SCORE: 7  ANDREI-7 (Submitted on 11/27/2023)  ANDREI 7 TOTAL SCORE: 10

## 2023-12-13 ENCOUNTER — TELEPHONE (OUTPATIENT)
Dept: CARDIOLOGY | Facility: OTHER | Age: 83
End: 2023-12-13
Payer: COMMERCIAL

## 2023-12-13 DIAGNOSIS — I51.89 DIASTOLIC DYSFUNCTION: ICD-10-CM

## 2023-12-13 DIAGNOSIS — I34.0 NON-RHEUMATIC MITRAL REGURGITATION: ICD-10-CM

## 2023-12-13 DIAGNOSIS — I36.1 NON-RHEUMATIC TRICUSPID VALVE INSUFFICIENCY: ICD-10-CM

## 2023-12-13 DIAGNOSIS — R07.89 OTHER CHEST PAIN: Primary | ICD-10-CM

## 2023-12-13 DIAGNOSIS — I35.1 NONRHEUMATIC AORTIC VALVE INSUFFICIENCY: ICD-10-CM

## 2023-12-13 NOTE — TELEPHONE ENCOUNTER
Ha Hughes, DO sent to Alma Gomez LPN  Caller: Unspecified (Today, 10:25 AM)  I did order the echocardiogram.  I suggested the echocardiogram be completed in approximately a month.    Dr. Hughes.    Spoke with patient agrees to plan.

## 2023-12-13 NOTE — TELEPHONE ENCOUNTER
Patient would like the order for the Echo placed as a future order. She will schedule it for when she is well.

## 2023-12-13 NOTE — TELEPHONE ENCOUNTER
Ha Hughes, DO sent to Alma Gomez LPN  Caller: Unspecified (Today, 10:25 AM)  Is unlikely but not impossible to COVID could affect her valves.  However, if she would like, we could do an echocardiogram in the future once she has recovered from COVID.  If she like an echocardiogram in the future, let me know and I will place that order.    Dr. Hughes

## 2023-12-14 ENCOUNTER — TELEPHONE (OUTPATIENT)
Dept: FAMILY MEDICINE | Facility: OTHER | Age: 83
End: 2023-12-14

## 2023-12-14 NOTE — TELEPHONE ENCOUNTER
4:43 PM    Reason for Call: OVERBOOK    Patient is having the following symptoms: Patient had to cancel her appointment on 12/14/23 she had covid19 and she is calling to reschedule and she would like to be seen next week for leg and medications.    The patient is requesting an appointment for next week with Dr Mar.    Was an appointment offered for this call? No  If yes : Appointment type              Date    Preferred method for responding to this message: Telephone Call  What is your phone number ? [phone]    If we cannot reach you directly, may we leave a detailed response at the number you provided? Yes    Can this message wait until your PCP/provider returns, if unavailable today? No,

## 2023-12-14 NOTE — TELEPHONE ENCOUNTER
Discussed Covid+ symptoms  Fever resolved 2 days ago  C/o productive cough  Allergic to many OTC meds  Recommended warm fluids with added honey  OTC lozenges or hard candy  Increase humidity  Adequate food & fluid intake  Rest    Recommended ED/UC with any acute/rapid decline in condition.  Call back to the clinic with any further questions/concerns  Patient relayed understanding  No further concerns at calls end.

## 2023-12-15 NOTE — TELEPHONE ENCOUNTER
Called and spoke with patient. Patient is rescheduled for 12/21/2023 at 300. Patient had no further questions at the end of the call.

## 2023-12-26 ENCOUNTER — APPOINTMENT (OUTPATIENT)
Dept: GENERAL RADIOLOGY | Facility: HOSPITAL | Age: 83
End: 2023-12-26
Attending: NURSE PRACTITIONER
Payer: COMMERCIAL

## 2023-12-26 ENCOUNTER — HOSPITAL ENCOUNTER (EMERGENCY)
Facility: HOSPITAL | Age: 83
Discharge: HOME | End: 2023-12-26
Attending: NURSE PRACTITIONER | Admitting: NURSE PRACTITIONER
Payer: COMMERCIAL

## 2023-12-26 VITALS
TEMPERATURE: 98.8 F | SYSTOLIC BLOOD PRESSURE: 125 MMHG | HEIGHT: 65 IN | OXYGEN SATURATION: 96 % | RESPIRATION RATE: 16 BRPM | BODY MASS INDEX: 35.25 KG/M2 | DIASTOLIC BLOOD PRESSURE: 55 MMHG | HEART RATE: 62 BPM | WEIGHT: 211.6 LBS

## 2023-12-26 DIAGNOSIS — J40 BRONCHITIS DUE TO COVID-19 VIRUS: ICD-10-CM

## 2023-12-26 DIAGNOSIS — U07.1 BRONCHITIS DUE TO COVID-19 VIRUS: ICD-10-CM

## 2023-12-26 DIAGNOSIS — R05.9 COUGH: Primary | ICD-10-CM

## 2023-12-26 PROCEDURE — 71046 X-RAY EXAM CHEST 2 VIEWS: CPT

## 2023-12-26 PROCEDURE — G0463 HOSPITAL OUTPT CLINIC VISIT: HCPCS | Mod: 25

## 2023-12-26 PROCEDURE — 99213 OFFICE O/P EST LOW 20 MIN: CPT | Performed by: NURSE PRACTITIONER

## 2023-12-26 ASSESSMENT — ENCOUNTER SYMPTOMS
SHORTNESS OF BREATH: 0
CHILLS: 0
FEVER: 0
APPETITE CHANGE: 0
CHEST TIGHTNESS: 0
COUGH: 1
PALPITATIONS: 0

## 2023-12-26 ASSESSMENT — ACTIVITIES OF DAILY LIVING (ADL): ADLS_ACUITY_SCORE: 35

## 2023-12-27 NOTE — DISCHARGE INSTRUCTIONS
Your chest x-ray does not show any pneumonia.  Take over-the-counter medications as discussed.  Tylenol or ibuprofen as needed for pain.    Follow-up with your doctor for reevaluation if no improvement in symptoms.    Return to urgent care or emergency department for any worsening or concerning symptoms.

## 2023-12-27 NOTE — ED PROVIDER NOTES
History     Chief Complaint   Patient presents with    Cough     Covid 3 weeks ago       HPI  Dinorah Lim is a 83 year old female who presents with her daughter for evaluation of a nonproductive cough.  Patient developed URI symptoms over 2 weeks ago and subsequently tested positive for COVID-19 infection.  She continues to have a cough accompanied by some postnasal drainage.  She denies chest pain, shortness of breath, fever or chills.  No abdominal pain, nausea, vomiting or diarrhea.  She is not taking anything specific for her cough.  No current tobacco use.  No known history of asthma.  She tells me that she was once told that she has COPD but does not believe this to be true.  She is concerned that she has pneumonia or bronchitis so she presented here.    Allergies:  Allergies   Allergen Reactions    Chocolate Hives    Lemon Flavor Hives    Lime [Lime, Sulfurated (Calcium Polysulfide)] Hives    Metal [Staples]     Orange Fruit [Citrus] Hives    Strawberry Extract Hives    Adhesive Tape      Band-aids    Codeine Hives     Patient can tolerate oxycodone & dilaudid    Erythromycin Hives     ERYTHROMYCIN BASE    Food      Tomato, grapefruit, oranges.    Grapefruit [Extra Strength Grapefruit]      GRAPEFRUIT    Morphine Hives     Pt. Reports had hives    Penicillins Hives    Ranitidine GI Disturbance    Sulfa Antibiotics      SULFONAMIDE ANTIBIOTICS     Tomato     Xyzal [Levocetirizine] Hives       Problem List:    Patient Active Problem List    Diagnosis Date Noted    Other chest pain 10/20/2021     Priority: Medium    Hiatal hernia 07/28/2021     Priority: Medium    Chronic, continuous use of opioids 05/17/2021     Priority: Medium    Stage 3a chronic kidney disease (H) 03/22/2021     Priority: Medium    COVID-19 virus infection on 10/30/20 11/24/2020     Priority: Medium     10-      Other neutropenia (H24) 08/04/2020     Priority: Medium    Paresthesias 02/13/2020     Priority: Medium    Status post  total left knee replacement 09/09/2019     Priority: Medium    Aortic valve insufficiency 03/06/2019     Priority: Medium    Mitral regurgitation 03/06/2019     Priority: Medium    Tricuspid valve insufficiency 03/06/2019     Priority: Medium    Diastolic dysfunction grade 1 on 2/21/19 03/06/2019     Priority: Medium    Hypertriglyceridemia 03/06/2019     Priority: Medium    Palpitations 02/13/2019     Priority: Medium    PVC's (premature ventricular contractions) 02/13/2019     Priority: Medium    Atrial ectopy 02/13/2019     Priority: Medium    PSVT (paroxysmal supraventricular tachycardia) 02/13/2019     Priority: Medium    COPD, mild on 9/9/14 02/13/2019     Priority: Medium    Bilateral carotid artery stenosis at less than 50% on 12/1/16 02/13/2019     Priority: Medium    Mixed hyperlipidemia 02/13/2019     Priority: Medium    On statin therapy 02/13/2019     Priority: Medium    Heart murmur 02/13/2019     Priority: Medium    Morbid obesity (H) 06/01/2017     Priority: Medium    ACP (advance care planning) 04/28/2016     Priority: Medium     Advance Care Planning 4/28/2016: ACP Review of Chart / Resources Provided:  Reviewed chart for advance care plan.  Dinorah Lim has no plan or code status on file. Discussed available resources and provided with information. Confirmed code status reflects current choices pending further ACP discussions.  Confirmed/documented legally designated decision makers.  Added by Aditi Gandhi            Anxiety 06/23/2014     Priority: Medium    Lung density on x-ray 07/23/2013     Priority: Medium    Osteoarthritis 06/14/2012     Priority: Medium     Problem list name updated by automated process. Provider to review      Advanced care planning/counseling discussion 01/13/2012     Priority: Medium    Abnormal glucose 08/01/2011     Priority: Medium     Hgb A1c 5.7 on 1/4/2015  Problem list name updated by automated process. Provider to review      Osteoarthritis of right hip  10/07/2004     Priority: Medium    Urticaria 06/01/2004     Priority: Medium     Problem list name updated by automated process. 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    Obesity, Class II, BMI 35-39.9 1/4/2015    Osteoarthritis of right hip 10/07/2004    Osteoarthrosis 06/14/2012    Otitis externa 10/04/2011    Prediabetes 08/01/2011       Past Surgical History:    Past Surgical History:   Procedure Laterality Date    ARTHROPLASTY KNEE Left 9/9/2019    Procedure: LEFT TOTAL KNEE ARTHROPLASTY S/N;  Surgeon: Trevor Alonzo MD;  Location: [facility]    ARTHROSCOPY KNEE Left 3/21/2019    Procedure: LEFT  KNEE ARTHROSCOPY, partial medial menisectomy;  Surgeon: Esteban Wills MD;  Location: [facility]    CLOSED REDUCTION WRIST Right 1952 x 2    long arm cast (same time as elbow fx)    CLOSED RX ELBOW DISLOCATION Right 1952    CR/long cast of fracture    EXCISE MASS FOOT Left 8/16/2021    Procedure: LEFT ANKLE MASS EXCISION;  Surgeon: Trevor Alonzo MD;  Location: [facility]    HC INJ EPIDURAL LUMBAR/SACRAL W/WO CONTRAST Bilateral 5/2013    facet injections; prev 2012    LAPAROSCOPIC CHOLECYSTECTOMY N/A 1/4/2015    Procedure: LAPAROSCOPIC CHOLECYSTECTOMY;  Surgeon: Brittney العلي MD;  Location: [facility]    TONSILLECTOMY      ZC TOTAL KNEE ARTHROPLASTY Left 09/09/2019    Dr Alonzo       Family History:    Family History   Problem Relation Age of Onset    Heart Disease Brother         atrial fibrillation    Atrial fibrillation Brother     Other - See Comments Mother         emphysema    Heart  "Disease Father 92        CHF    Cancer Paternal Grandfather         lung cancer    Cancer Maternal Uncle         lung cancer    Chronic Obstructive Pulmonary Disease Daughter     Emphysema Daughter     Other - See Comments Daughter         benign breast lesion    Esophagitis Daughter        Social History:  Marital Status:  Single [1]  Social History     Tobacco Use    Smoking status: Never    Smokeless tobacco: Never   Vaping Use    Vaping Use: Never used   Substance Use Topics    Alcohol use: No    Drug use: No        Medications:    acetaminophen (TYLENOL) 325 MG tablet  Calcium-Magnesium-Vitamin D (CALCIUM 1200+D3 PO)  clonazePAM (KLONOPIN) 0.5 MG tablet  FISH OIL  PARoxetine (PAXIL) 40 MG tablet  simethicone (MYLICON) 125 MG chewable tablet  simvastatin (ZOCOR) 40 MG tablet  triamcinolone (KENALOG) 0.025 % external ointment  VITAMIN D, CHOLECALCIFEROL, PO  alendronate (FOSAMAX) 70 MG tablet  ipratropium - albuterol 0.5 mg/2.5 mg/3 mL (DUONEB) 0.5-2.5 (3) MG/3ML neb solution  ipratropium - albuterol 0.5 mg/2.5 mg/3 mL (DUONEB) 0.5-2.5 (3) MG/3ML neb solution          Review of Systems   Constitutional:  Negative for appetite change, chills and fever.   Respiratory:  Positive for cough. Negative for chest tightness and shortness of breath.    Cardiovascular:  Negative for chest pain, palpitations and leg swelling.   All other systems reviewed and are negative.      Physical Exam   BP: 125/55  Pulse: 62  Temp: 98.8  F (37.1  C)  Resp: 16  Height: 165.1 cm (5' 5\")  Weight: 96 kg (211 lb 9.6 oz)  SpO2: 96 %      Physical Exam  Vitals and nursing note reviewed.   Constitutional:       General: She is not in acute distress.     Appearance: Normal appearance. She is well-developed. She is not ill-appearing, toxic-appearing or diaphoretic.      Comments: Pleasantly talkative female found seated upright in seat.  No apparent respiratory distress.   HENT:      Head: Normocephalic and atraumatic.      Right Ear: Tympanic " membrane and ear canal normal.      Left Ear: Tympanic membrane and ear canal normal.      Nose: Nose normal.      Mouth/Throat:      Mouth: Mucous membranes are moist.   Eyes:      Conjunctiva/sclera: Conjunctivae normal.   Cardiovascular:      Rate and Rhythm: Normal rate and regular rhythm.      Heart sounds: Normal heart sounds.   Pulmonary:      Effort: Pulmonary effort is normal. No respiratory distress.      Breath sounds: Normal breath sounds. No stridor. No wheezing, rhonchi or rales.      Comments: Respirations are even and nonlabored.  Speech is clear.  Oxygen saturation 96% on room air.  Bilateral lung fields are clear to auscultation.  Musculoskeletal:      Cervical back: Normal range of motion and neck supple.   Skin:     General: Skin is warm and dry.      Coloration: Skin is not pale.   Neurological:      Mental Status: She is alert and oriented to person, place, and time.         ED Course                 Procedures             Results for orders placed or performed during the hospital encounter of 12/26/23 (from the past 24 hour(s))   XR Chest 2 Views    Narrative    PROCEDURE:  XR CHEST 2 VIEWS    HISTORY:  cough x 3 weeks, tested positive for covid19 2 weeks ago.  Concern for pneumonia.     COMPARISON:  None.    FINDINGS:   The cardiac silhouette is normal in size. The pulmonary vasculature is  normal.  The lungs are clear. No pleural effusion or pneumothorax.  There is a moderate size hiatal hernia.      Impression    IMPRESSION:  No acute cardiopulmonary disease.      RAFIA SIEGEL MD         SYSTEM ID:  [id number]       Medications - No data to display    Assessments & Plan (with Medical Decision Making)   This is a well-appearing 83-year-old female that presented requesting a chest x-ray to rule out pneumonia.  Diagnosed with COVID-19 infection about 2 weeks ago with symptoms have been ongoing for about 3 weeks.  She continues to have a nonproductive cough along with some postnasal drainage.   Respirations are even and nonlabored.  Heart rate and rhythm are regular.  Oxygen saturation is 96% on room air.  Speech is clear.  Patient is pleasantly talkative.  No apparent respiratory distress during this exam.  The chest x-ray was negative for acute findings.  No hiatal hernia was noted per radiologist reading.    I discussed all findings with patient.  Discussed with patient that she may continue to have a lingering cough due to the COVID-19 infection.  I offered short course of steroids her cough for findings consistent with bronchitis.  Using shared decision making, no antibiotics to be prescribed.  Additionally, patient states she does not want any medication at this time due to her long list of allergies.  She tells me she does have an appointment with her primary doctor on 1/4/2024.  At this time I recommended continued supportive cares at home.  Follow-up with primary doctor next month as scheduled and she can be reevaluated for her cough if it still persisting and concerning for her.  She was advised to return to urgent care/ED for any worsening or concerning symptoms.    I have reviewed the nursing notes.    I have reviewed the findings, diagnosis, plan and need for follow up with the patient.  This document was prepared using a combination of typing and voice generated software.  While every attempt was made for accuracy, spelling and grammatical errors may exist.         Discharge Medication List as of 12/26/2023  7:25 PM          Final diagnoses:   Cough   Bronchitis due to COVID-19 virus       12/26/2023   [facility]       Mpofu, Milonce, CNP  12/26/23 1952

## 2023-12-27 NOTE — ED TRIAGE NOTES
Patient presents to urgent care with her oldest daughter.   Patient had COVID 3 weeks ago. Patient still has the cough and would like an xray to see if she has anything going on with her lungs.

## 2024-01-04 ENCOUNTER — OFFICE VISIT (OUTPATIENT)
Dept: FAMILY MEDICINE | Facility: OTHER | Age: 84
End: 2024-01-04
Attending: STUDENT IN AN ORGANIZED HEALTH CARE EDUCATION/TRAINING PROGRAM
Payer: COMMERCIAL

## 2024-01-04 VITALS
HEIGHT: 65 IN | HEART RATE: 63 BPM | OXYGEN SATURATION: 96 % | SYSTOLIC BLOOD PRESSURE: 125 MMHG | TEMPERATURE: 97.8 F | WEIGHT: 207.6 LBS | BODY MASS INDEX: 34.59 KG/M2 | DIASTOLIC BLOOD PRESSURE: 60 MMHG

## 2024-01-04 DIAGNOSIS — M81.0 AGE RELATED OSTEOPOROSIS, UNSPECIFIED PATHOLOGICAL FRACTURE PRESENCE: ICD-10-CM

## 2024-01-04 DIAGNOSIS — J44.9 COPD, MILD (H): ICD-10-CM

## 2024-01-04 DIAGNOSIS — M16.11 ARTHRITIS OF RIGHT HIP: Primary | ICD-10-CM

## 2024-01-04 PROCEDURE — G0463 HOSPITAL OUTPT CLINIC VISIT: HCPCS

## 2024-01-04 PROCEDURE — 99213 OFFICE O/P EST LOW 20 MIN: CPT | Performed by: STUDENT IN AN ORGANIZED HEALTH CARE EDUCATION/TRAINING PROGRAM

## 2024-01-04 ASSESSMENT — PAIN SCALES - GENERAL: PAINLEVEL: NO PAIN (0)

## 2024-01-04 NOTE — PROGRESS NOTES
Assessment & Plan     Arthritis of right hip  Patient has right hip pain.  Previously received a right hip intra-articular injection in May of 2023. She is going to contact the office of Dr. Trevor Alonzo to have this repeated as it did give her significant relief in the past.  I did tell her to contact the office of Dr. Alonzo.  We will pend referral if needed for this    Age related osteoporosis, unspecified pathological fracture presence  She has osteoporosis and we initiated fosamax.  She stopped taking it after noticing that the medication had sodium in it and worried how it might affect her blood pressure.  I told her that this medicine does not have large amount of sodium and that weekly dosing would be inconsequential.  Patient agrees to restart her medication    COPD, mild (H)  Needs replacement parts for her nebulizer machine  - Nebulizer and Supplies Order for DME - ONLY FOR DME    Return in about 5 months (around 6/4/2024).    Yanique Mar MD  [facility] REI Bill is a 83 year old, presenting for the following health issues:  Recheck Medication and Musculoskeletal Problem        1/4/2024     3:22 PM   Additional Questions   Roomed by Rosi WISEMAN LPN   Accompanied by self       HPI     Medication Followup of Fosamax  Taking Medication as prescribed: no  Side Effects:  She is supposed to cut back on salt.   Medication Helping Symptoms:  not applicable    Medication Followup of Klonopin  Taking Medication as prescribed: yes  Side Effects:  None  Medication Helping Symptoms:  yes    Concern - would like to see ortho for her R leg      Review of Systems   Constitutional, HEENT, cardiovascular, pulmonary, GI, , musculoskeletal, neuro, skin, endocrine and psych systems are negative, except as otherwise noted.      Objective    /60 (BP Location: Left arm, Patient Position: Sitting, Cuff Size: Adult Regular)   Pulse 63   Temp 97.8  F (36.6  C) (Tympanic)   Ht  "1.651 m (5' 5\")   Wt 94.2 kg (207 lb 9.6 oz)   SpO2 96%   BMI 34.55 kg/m    Body mass index is 34.55 kg/m .  Physical Exam   GENERAL: healthy, alert and no distress  EYES: Eyes grossly normal to inspection, PERRL and conjunctivae and sclerae normal  HENT: ear canals and TM's normal, nose and mouth without ulcers or lesions  NECK: no adenopathy, no asymmetry, masses, or scars and thyroid normal to palpation  RESP: lungs clear to auscultation - no rales, rhonchi or wheezes  CV: regular rate and rhythm, normal S1 S2, no S3 or S4, no murmur, click or rub, no peripheral edema and peripheral pulses strong  ABDOMEN: soft, nontender, no hepatosplenomegaly, no masses and bowel sounds normal  MS: no gross musculoskeletal defects noted, no edema  SKIN: no suspicious lesions or rashes  NEURO: Normal strength and tone, mentation intact and speech normal  PSYCH: mentation appears normal, affect normal/bright              "

## 2024-01-08 ENCOUNTER — TRANSFERRED RECORDS (OUTPATIENT)
Dept: HEALTH INFORMATION MANAGEMENT | Facility: HOSPITAL | Age: 84
End: 2024-01-08

## 2024-01-12 ENCOUNTER — HOSPITAL ENCOUNTER (OUTPATIENT)
Dept: CARDIOLOGY | Facility: HOSPITAL | Age: 84
Discharge: HOME | End: 2024-01-12
Attending: INTERNAL MEDICINE | Admitting: INTERNAL MEDICINE
Payer: COMMERCIAL

## 2024-01-12 DIAGNOSIS — I35.1 NONRHEUMATIC AORTIC VALVE INSUFFICIENCY: ICD-10-CM

## 2024-01-12 DIAGNOSIS — I51.89 DIASTOLIC DYSFUNCTION: ICD-10-CM

## 2024-01-12 DIAGNOSIS — I34.0 NON-RHEUMATIC MITRAL REGURGITATION: ICD-10-CM

## 2024-01-12 DIAGNOSIS — I36.1 NON-RHEUMATIC TRICUSPID VALVE INSUFFICIENCY: ICD-10-CM

## 2024-01-12 DIAGNOSIS — R07.89 OTHER CHEST PAIN: ICD-10-CM

## 2024-01-12 LAB
BI-PLANE LVEF ECHO: NORMAL
LVEF ECHO: NORMAL

## 2024-01-12 PROCEDURE — 93306 TTE W/DOPPLER COMPLETE: CPT

## 2024-01-12 PROCEDURE — 93306 TTE W/DOPPLER COMPLETE: CPT | Mod: 26 | Performed by: INTERNAL MEDICINE

## 2024-01-15 DIAGNOSIS — I51.89 DIASTOLIC DYSFUNCTION: ICD-10-CM

## 2024-01-15 DIAGNOSIS — R01.1 HEART MURMUR: ICD-10-CM

## 2024-01-15 DIAGNOSIS — I34.0 NON-RHEUMATIC MITRAL REGURGITATION: ICD-10-CM

## 2024-01-15 DIAGNOSIS — I35.1 NONRHEUMATIC AORTIC VALVE INSUFFICIENCY: ICD-10-CM

## 2024-01-15 DIAGNOSIS — I36.1 NON-RHEUMATIC TRICUSPID VALVE INSUFFICIENCY: ICD-10-CM

## 2024-01-15 DIAGNOSIS — R07.89 OTHER CHEST PAIN: Primary | ICD-10-CM

## 2024-01-15 DIAGNOSIS — I49.1 ATRIAL ECTOPY: ICD-10-CM

## 2024-01-23 DIAGNOSIS — F41.9 ANXIETY: ICD-10-CM

## 2024-01-23 DIAGNOSIS — E78.5 HYPERLIPIDEMIA LDL GOAL <100: ICD-10-CM

## 2024-01-23 RX ORDER — CLONAZEPAM 0.5 MG/1
TABLET ORAL
Qty: 20 TABLET | Refills: 0 | Status: SHIPPED | OUTPATIENT
Start: 2024-01-23 | End: 2024-03-20

## 2024-01-23 RX ORDER — SIMVASTATIN 40 MG
TABLET ORAL
Qty: 90 TABLET | Refills: 1 | Status: SHIPPED | OUTPATIENT
Start: 2024-01-23 | End: 2024-08-14

## 2024-01-23 NOTE — TELEPHONE ENCOUNTER
Simvastatin      Last Written Prescription Date:  6/29/23  Last Fill Quantity: 90,   # refills: 2  Last Office Visit: 1/4/24  Future Office visit:       Routing refill request to provider for review/approval because:

## 2024-01-23 NOTE — TELEPHONE ENCOUNTER
Klonopin      Last Written Prescription Date:  11/13/23  Last Fill Quantity: 20,   # refills: 0  Last Office Visit: 1/4/24  Future Office visit:       Routing refill request to provider for review/approval because:

## 2024-02-09 DIAGNOSIS — R06.00 DYSPNEA, UNSPECIFIED TYPE: Primary | ICD-10-CM

## 2024-02-09 RX ORDER — IPRATROPIUM BROMIDE AND ALBUTEROL SULFATE 2.5; .5 MG/3ML; MG/3ML
SOLUTION RESPIRATORY (INHALATION)
Qty: 90 ML | Refills: 1 | Status: SHIPPED | OUTPATIENT
Start: 2024-02-09 | End: 2024-09-16

## 2024-02-09 NOTE — TELEPHONE ENCOUNTER
Duoneb  Last Written Prescription Date: 3/14/23  Last Fill Quantity: 90 ml # of Refills: 0  Last Office Visit: 1/4/24

## 2024-02-09 NOTE — TELEPHONE ENCOUNTER
Writer called patient.   Patient states she is out of duoneb.  Med was prescribed when patient was seen in UC/ED on 3/14/23.

## 2024-02-09 NOTE — TELEPHONE ENCOUNTER
Thanks for checking on this.  You're the best.  I appreciate you doing all the calling and verifying.  Order sent.  :)

## 2024-02-13 ENCOUNTER — OFFICE VISIT (OUTPATIENT)
Dept: FAMILY MEDICINE | Facility: OTHER | Age: 84
End: 2024-02-13
Attending: STUDENT IN AN ORGANIZED HEALTH CARE EDUCATION/TRAINING PROGRAM
Payer: COMMERCIAL

## 2024-02-13 VITALS
DIASTOLIC BLOOD PRESSURE: 77 MMHG | HEART RATE: 60 BPM | SYSTOLIC BLOOD PRESSURE: 134 MMHG | WEIGHT: 211.5 LBS | OXYGEN SATURATION: 96 % | BODY MASS INDEX: 35.2 KG/M2 | TEMPERATURE: 98.2 F

## 2024-02-13 DIAGNOSIS — R79.9 ABNORMAL FINDING OF BLOOD CHEMISTRY, UNSPECIFIED: ICD-10-CM

## 2024-02-13 DIAGNOSIS — R20.2 PARESTHESIAS: ICD-10-CM

## 2024-02-13 DIAGNOSIS — H69.91 DYSFUNCTION OF RIGHT EUSTACHIAN TUBE: Primary | ICD-10-CM

## 2024-02-13 DIAGNOSIS — M17.11 PRIMARY OSTEOARTHRITIS OF RIGHT KNEE: ICD-10-CM

## 2024-02-13 DIAGNOSIS — R60.0 LOWER EXTREMITY EDEMA: ICD-10-CM

## 2024-02-13 DIAGNOSIS — R73.9 HYPERGLYCEMIA, UNSPECIFIED: ICD-10-CM

## 2024-02-13 LAB
ALBUMIN SERPL BCG-MCNC: 4.1 G/DL (ref 3.5–5.2)
ALP SERPL-CCNC: 104 U/L (ref 40–150)
ALT SERPL W P-5'-P-CCNC: 13 U/L (ref 0–50)
ANION GAP SERPL CALCULATED.3IONS-SCNC: 9 MMOL/L (ref 7–15)
AST SERPL W P-5'-P-CCNC: 20 U/L (ref 0–45)
BASOPHILS # BLD AUTO: 0 10E3/UL (ref 0–0.2)
BASOPHILS NFR BLD AUTO: 1 %
BILIRUB SERPL-MCNC: 0.5 MG/DL
BUN SERPL-MCNC: 18.3 MG/DL (ref 8–23)
CALCIUM SERPL-MCNC: 10.1 MG/DL (ref 8.8–10.2)
CHLORIDE SERPL-SCNC: 106 MMOL/L (ref 98–107)
CREAT SERPL-MCNC: 1 MG/DL (ref 0.51–0.95)
CRP SERPL-MCNC: <3 MG/L
DEPRECATED HCO3 PLAS-SCNC: 25 MMOL/L (ref 22–29)
EGFRCR SERPLBLD CKD-EPI 2021: 56 ML/MIN/1.73M2
EOSINOPHIL # BLD AUTO: 0.1 10E3/UL (ref 0–0.7)
EOSINOPHIL NFR BLD AUTO: 1 %
ERYTHROCYTE [DISTWIDTH] IN BLOOD BY AUTOMATED COUNT: 13.4 % (ref 10–15)
ERYTHROCYTE [SEDIMENTATION RATE] IN BLOOD BY WESTERGREN METHOD: 15 MM/HR (ref 0–30)
EST. AVERAGE GLUCOSE BLD GHB EST-MCNC: 103 MG/DL
FERRITIN SERPL-MCNC: 45 NG/ML (ref 11–328)
GLUCOSE SERPL-MCNC: 85 MG/DL (ref 70–99)
HBA1C MFR BLD: 5.2 %
HCT VFR BLD AUTO: 39.4 % (ref 35–47)
HGB BLD-MCNC: 12.8 G/DL (ref 11.7–15.7)
IMM GRANULOCYTES # BLD: 0 10E3/UL
IMM GRANULOCYTES NFR BLD: 0 %
LYMPHOCYTES # BLD AUTO: 1.4 10E3/UL (ref 0.8–5.3)
LYMPHOCYTES NFR BLD AUTO: 31 %
MAGNESIUM SERPL-MCNC: 2.3 MG/DL (ref 1.7–2.3)
MCH RBC QN AUTO: 29.5 PG (ref 26.5–33)
MCHC RBC AUTO-ENTMCNC: 32.5 G/DL (ref 31.5–36.5)
MCV RBC AUTO: 91 FL (ref 78–100)
MONOCYTES # BLD AUTO: 0.5 10E3/UL (ref 0–1.3)
MONOCYTES NFR BLD AUTO: 11 %
NEUTROPHILS # BLD AUTO: 2.5 10E3/UL (ref 1.6–8.3)
NEUTROPHILS NFR BLD AUTO: 56 %
NRBC # BLD AUTO: 0 10E3/UL
NRBC BLD AUTO-RTO: 0 /100
PLATELET # BLD AUTO: 174 10E3/UL (ref 150–450)
POTASSIUM SERPL-SCNC: 4.4 MMOL/L (ref 3.4–5.3)
PROT SERPL-MCNC: 6.6 G/DL (ref 6.4–8.3)
RBC # BLD AUTO: 4.34 10E6/UL (ref 3.8–5.2)
SODIUM SERPL-SCNC: 140 MMOL/L (ref 135–145)
TSH SERPL DL<=0.005 MIU/L-ACNC: 1.97 UIU/ML (ref 0.3–4.2)
VIT D+METAB SERPL-MCNC: 31 NG/ML (ref 20–50)
WBC # BLD AUTO: 4.4 10E3/UL (ref 4–11)

## 2024-02-13 PROCEDURE — 82306 VITAMIN D 25 HYDROXY: CPT | Mod: ZL | Performed by: STUDENT IN AN ORGANIZED HEALTH CARE EDUCATION/TRAINING PROGRAM

## 2024-02-13 PROCEDURE — 84443 ASSAY THYROID STIM HORMONE: CPT | Mod: ZL | Performed by: STUDENT IN AN ORGANIZED HEALTH CARE EDUCATION/TRAINING PROGRAM

## 2024-02-13 PROCEDURE — 83735 ASSAY OF MAGNESIUM: CPT | Mod: ZL | Performed by: STUDENT IN AN ORGANIZED HEALTH CARE EDUCATION/TRAINING PROGRAM

## 2024-02-13 PROCEDURE — 85652 RBC SED RATE AUTOMATED: CPT | Mod: ZL | Performed by: STUDENT IN AN ORGANIZED HEALTH CARE EDUCATION/TRAINING PROGRAM

## 2024-02-13 PROCEDURE — 80053 COMPREHEN METABOLIC PANEL: CPT | Mod: ZL | Performed by: STUDENT IN AN ORGANIZED HEALTH CARE EDUCATION/TRAINING PROGRAM

## 2024-02-13 PROCEDURE — 99214 OFFICE O/P EST MOD 30 MIN: CPT | Performed by: STUDENT IN AN ORGANIZED HEALTH CARE EDUCATION/TRAINING PROGRAM

## 2024-02-13 PROCEDURE — 36415 COLL VENOUS BLD VENIPUNCTURE: CPT | Mod: ZL | Performed by: STUDENT IN AN ORGANIZED HEALTH CARE EDUCATION/TRAINING PROGRAM

## 2024-02-13 PROCEDURE — 85025 COMPLETE CBC W/AUTO DIFF WBC: CPT | Mod: ZL | Performed by: STUDENT IN AN ORGANIZED HEALTH CARE EDUCATION/TRAINING PROGRAM

## 2024-02-13 PROCEDURE — 86140 C-REACTIVE PROTEIN: CPT | Mod: ZL | Performed by: STUDENT IN AN ORGANIZED HEALTH CARE EDUCATION/TRAINING PROGRAM

## 2024-02-13 PROCEDURE — G0463 HOSPITAL OUTPT CLINIC VISIT: HCPCS

## 2024-02-13 PROCEDURE — 83036 HEMOGLOBIN GLYCOSYLATED A1C: CPT | Mod: ZL | Performed by: STUDENT IN AN ORGANIZED HEALTH CARE EDUCATION/TRAINING PROGRAM

## 2024-02-13 PROCEDURE — 82728 ASSAY OF FERRITIN: CPT | Mod: ZL | Performed by: STUDENT IN AN ORGANIZED HEALTH CARE EDUCATION/TRAINING PROGRAM

## 2024-02-13 RX ORDER — FLUTICASONE PROPIONATE 50 MCG
1 SPRAY, SUSPENSION (ML) NASAL DAILY
Qty: 15.8 ML | Refills: 0 | Status: SHIPPED | OUTPATIENT
Start: 2024-02-13 | End: 2024-10-03

## 2024-02-13 RX ORDER — GABAPENTIN 100 MG/1
100 CAPSULE ORAL
Qty: 60 CAPSULE | Refills: 0 | Status: SHIPPED | OUTPATIENT
Start: 2024-02-13 | End: 2024-10-03

## 2024-02-13 ASSESSMENT — PAIN SCALES - GENERAL: PAINLEVEL: NO PAIN (0)

## 2024-02-13 NOTE — PROGRESS NOTES
Assessment & Plan     Dysfunction of right eustachian tube  Ears appear normal on my exam.  I do believe the ear symptoms are related to eustachian tube dysfunction.   We will trial flonase for a few weeks to see if this will relieve her symptoms  - fluticasone (FLONASE) 50 MCG/ACT nasal spray; Spray 1 spray into both nostrils daily    Paresthesias  Ddx includes restless legs, bilateral OA, neuropathy?  We will trial very low dose gabapentin at night as this is when symptoms prevail  Will update labs to assess for reversible/treatable causes  - Comprehensive metabolic panel; Future  - CBC with platelets and differential; Future  - Vitamin D Deficiency; Future  - Magnesium; Future  - TSH with free T4 reflex; Future  - Ferritin; Future  - CRP, inflammation; Future  - ESR: Erythrocyte sedimentation rate; Future  - gabapentin (NEURONTIN) 100 MG capsule; Take 1 capsule (100 mg) by mouth nightly as needed for neuropathic pain  - Hemoglobin A1c; Future  - Comprehensive metabolic panel  - CBC with platelets and differential  - Vitamin D Deficiency  - Magnesium  - TSH with free T4 reflex  - Ferritin  - CRP, inflammation  - ESR: Erythrocyte sedimentation rate  - Hemoglobin A1c    Body mass index (BMI) 35.0-35.9, adult  - Vitamin D Deficiency; Future  - Vitamin D Deficiency    Abnormal finding of blood chemistry, unspecified  See above  - Ferritin; Future  - Ferritin    Hyperglycemia, unspecified  See above, paresthesias  - Hemoglobin A1c; Future  - Hemoglobin A1c    Primary osteoarthritis of right knee  OA of right knee. S/P replacement of left knee.   Consider injections although she tells me she has had a lot of them  Seems to have a tendency to fall as the right knee seems to be unstable.  She would benefit from PT.  Will also send for knee brace.    - Ankle/Knee Bracing Supplies Order Hinged Knee Brace; Right    Lower extremity edema  No edema on my exam today and she is without exertional symptoms: no exertional CP,  SOB, palpitations, dizziness, lightheadedness, vision changes.  No orthopnea or PND and lungs and heart sound normal on my exam today  Continue to elevated legs as much as possible      25 minutes spent by me on the date of the encounter doing chart review, history and exam, documentation and further activities per the note      Subjective   Landy is a 83 year old, presenting for the following health issues:  Leg Swelling and Ear Problem        2/13/2024     8:54 AM   Additional Questions   Roomed by Kenneth Leija   Accompanied by None         2/13/2024     8:54 AM   Patient Reported Additional Medications   Patient reports taking the following new medications None     HPI     Last night leg was swollen  Swelling started around suppertime.   The day before, working in bedroom upstairs.  Thought that maybe she was on her knees to much.  Pain in both knees   Burning deep inside the legs.  Every night and sometimes during the day  She tells me she elevates her legs   Ate tuna fish salad- got stuck in her hiatal hernia and she vomited it up and then was blowing noodles out of her nose.    Burning for months- a good 6 months.      Concern -  Bilateral leg edema   Onset: Chronic    Description: Patient states that she has bilateral leg swelling. Wants to make sure thing are ok or see what she can do for it  Intensity: severe  Progression of Symptoms:  same and waxing and waning  Accompanying Signs & Symptoms: Swelling   Previous history of similar problem: No   Precipitating factors:        Worsened by: Unknown   Alleviating factors:        Improved by: No  Therapies tried and outcome: Lidocaine cream and biofreeze     Concern - Right ear pain  Onset: 1 month ago   Description: Patient states that she was eating tuna salad and got stuck in her hiatal hernia and it came back up and then she was blowing noodles for 1-2 days. Since that happen patients reports she feels like she has water in her right ear with pain but no  pressure. Blowing the nose is when she feels the water the most and has the pain   Intensity: mild  Progression of Symptoms:  same and waxing and waning  Accompanying Signs & Symptoms: Pain and pressure   Previous history of similar problem: None   Precipitating factors:        Worsened by: Blowing her nose   Alleviating factors:        Improved by: No   Therapies tried and outcome: None        Review of Systems  Constitutional, HEENT, cardiovascular, pulmonary, GI, , musculoskeletal, neuro, skin, endocrine and psych systems are negative, except as otherwise noted.      Objective    /77 (BP Location: Right arm, Patient Position: Sitting, Cuff Size: Adult Large)   Pulse 60   Temp 98.2  F (36.8  C) (Tympanic)   Wt 95.9 kg (211 lb 8 oz)   SpO2 96%   BMI 35.20 kg/m    Body mass index is 35.2 kg/m .  Physical Exam   GENERAL: alert and no distress  EYES: Eyes grossly normal to inspection, PERRL and conjunctivae and sclerae normal  HENT: ear canals and TM's normal, nose and mouth without ulcers or lesions  NECK: no adenopathy, no asymmetry, masses, or scars  RESP: lungs clear to auscultation - no rales, rhonchi or wheezes  CV: regular rate and rhythm, normal S1 S2, no S3 or S4, no murmur, click or rub, no peripheral edema  MS: no gross musculoskeletal defects noted, no edema.  Lateral joint line tenderness of the right knee.  Crepitus on passive extension of the right knee  SKIN: no suspicious lesions or rashes  NEURO: Normal strength and tone, mentation intact and speech normal  PSYCH: mentation appears normal, affect normal/bright         Signed Electronically by: Yanique Mar MD

## 2024-03-14 ENCOUNTER — ANCILLARY PROCEDURE (OUTPATIENT)
Dept: GENERAL RADIOLOGY | Facility: OTHER | Age: 84
End: 2024-03-14
Attending: STUDENT IN AN ORGANIZED HEALTH CARE EDUCATION/TRAINING PROGRAM
Payer: COMMERCIAL

## 2024-03-14 ENCOUNTER — OFFICE VISIT (OUTPATIENT)
Dept: FAMILY MEDICINE | Facility: OTHER | Age: 84
End: 2024-03-14
Attending: STUDENT IN AN ORGANIZED HEALTH CARE EDUCATION/TRAINING PROGRAM
Payer: COMMERCIAL

## 2024-03-14 VITALS
HEART RATE: 61 BPM | BODY MASS INDEX: 35.28 KG/M2 | TEMPERATURE: 97.2 F | DIASTOLIC BLOOD PRESSURE: 60 MMHG | OXYGEN SATURATION: 95 % | WEIGHT: 212 LBS | SYSTOLIC BLOOD PRESSURE: 110 MMHG

## 2024-03-14 DIAGNOSIS — M23.51 CHRONIC INSTABILITY OF RIGHT KNEE: ICD-10-CM

## 2024-03-14 DIAGNOSIS — F41.9 ANXIETY: ICD-10-CM

## 2024-03-14 DIAGNOSIS — R60.0 LOWER EXTREMITY EDEMA: ICD-10-CM

## 2024-03-14 DIAGNOSIS — H69.91 DYSFUNCTION OF RIGHT EUSTACHIAN TUBE: ICD-10-CM

## 2024-03-14 DIAGNOSIS — M81.0 AGE-RELATED OSTEOPOROSIS WITHOUT CURRENT PATHOLOGICAL FRACTURE: ICD-10-CM

## 2024-03-14 DIAGNOSIS — M23.51 CHRONIC INSTABILITY OF RIGHT KNEE: Primary | ICD-10-CM

## 2024-03-14 PROCEDURE — 73562 X-RAY EXAM OF KNEE 3: CPT | Mod: TC,RT

## 2024-03-14 PROCEDURE — 99213 OFFICE O/P EST LOW 20 MIN: CPT | Performed by: STUDENT IN AN ORGANIZED HEALTH CARE EDUCATION/TRAINING PROGRAM

## 2024-03-14 PROCEDURE — G0463 HOSPITAL OUTPT CLINIC VISIT: HCPCS

## 2024-03-14 RX ORDER — CLONAZEPAM 0.5 MG/1
TABLET ORAL
Qty: 20 TABLET | Refills: 0 | Status: CANCELLED | OUTPATIENT
Start: 2024-03-14

## 2024-03-14 ASSESSMENT — PAIN SCALES - GENERAL: PAINLEVEL: NO PAIN (0)

## 2024-03-14 NOTE — PROGRESS NOTES
Assessment & Plan     Chronic instability of right knee  Will check XR first  - XR Knee Right 3 Views (Clinic Performed); Future  - Miscellaneous Order for DME - ONLY FOR DME    Anxiety  Stable    Age-related osteoporosis without current pathological fracture  Due for DEXA scan  - DX Bone Density; Future    Dysfunction of right eustachian tube  No acute issues    Lower extremity edema  Stable      15 minutes spent by me on the date of the encounter doing chart review, history and exam, documentation and further activities per the note      Return in about 3 months (around 6/14/2024) for Follow up.    Subjective   Landy is a 83 year old, presenting for the following health issues:  Follow Up        3/14/2024     8:09 AM   Additional Questions   Roomed by Sherif Doty   Accompanied by Self         3/14/2024     8:09 AM   Patient Reported Additional Medications   Patient reports taking the following new medications none     HPI     Patient would like to have a DEXA scan done.     Right knee gives out when she sit  Had a brace but it would pinch on the inner side.  Was wearing the biggest they had.   Had left knee replacement about 3 years ago.  She  tells me she had a tough recovery.  The left knee is doing better but still hurts.    Had knee injection into the knee by PA  She tells me she is working through the pain.  Was wearing 4X- got a black and blue khadijah it would squeeze the soft tissue on the inner aspect of her knee    Positive Tate test on the right side.    Using klonipin- takes 1/2 of a pill twice a week.  Only since the baby has been born.    Great granddaughter was born on March 6th- Was supposed to be born May 31st.  1 pound 12 oz 13.5 inches.    Does not take fosamax.        Concern - Ear Pain   Onset: 2-3 weeks ago   Description: States that every time she blew her nose she would develop an ear ache. States it was happening when she was blowing out noodle from her nose.   Intensity: No longer has  ear ache   Progression of Symptoms:  improving  Accompanying Signs & Symptoms: None  Previous history of similar problem: None  Precipitating factors:        Worsened by: Blowing her nose   Alleviating factors:        Improved by: None  Therapies tried and outcome: None        Review of Systems  Constitutional, HEENT, cardiovascular, pulmonary, GI, , musculoskeletal, neuro, skin, endocrine and psych systems are negative, except as otherwise noted.      Objective    /60 (BP Location: Right arm, Patient Position: Sitting, Cuff Size: Adult Large)   Pulse 61   Temp 97.2  F (36.2  C) (Tympanic)   Wt 96.2 kg (212 lb)   SpO2 95%   BMI 35.28 kg/m    Body mass index is 35.28 kg/m .  Physical Exam   GENERAL: alert and no distress  EYES: Eyes grossly normal to inspection, PERRL and conjunctivae and sclerae normal  HENT: ear canals and TM's normal, nose and mouth without ulcers or lesions  NECK: no adenopathy, no asymmetry, masses, or scars  RESP: lungs clear to auscultation - no rales, rhonchi or wheezes  CV: regular rate and rhythm, normal S1 S2, no S3 or S4, no murmur, click or rub, no peripheral edema  ABDOMEN: soft, nontender, no hepatosplenomegaly, no masses and bowel sounds normal  MS: no gross musculoskeletal defects noted, no edema  SKIN: no suspicious lesions or rashes  NEURO: Normal strength and tone, mentation intact and speech normal  PSYCH: mentation appears normal, affect normal/bright          Signed Electronically by: Yanique Mar MD

## 2024-03-16 ENCOUNTER — HOSPITAL ENCOUNTER (EMERGENCY)
Facility: HOSPITAL | Age: 84
Discharge: HOME | End: 2024-03-16
Attending: PHYSICIAN ASSISTANT | Admitting: PHYSICIAN ASSISTANT
Payer: COMMERCIAL

## 2024-03-16 VITALS
RESPIRATION RATE: 18 BRPM | OXYGEN SATURATION: 98 % | HEART RATE: 66 BPM | DIASTOLIC BLOOD PRESSURE: 64 MMHG | SYSTOLIC BLOOD PRESSURE: 138 MMHG | TEMPERATURE: 98 F

## 2024-03-16 DIAGNOSIS — M54.50 ACUTE BILATERAL LOW BACK PAIN WITHOUT SCIATICA: Primary | ICD-10-CM

## 2024-03-16 PROCEDURE — G0463 HOSPITAL OUTPT CLINIC VISIT: HCPCS

## 2024-03-16 PROCEDURE — 250N000013 HC RX MED GY IP 250 OP 250 PS 637: Performed by: PHYSICIAN ASSISTANT

## 2024-03-16 PROCEDURE — 99213 OFFICE O/P EST LOW 20 MIN: CPT | Performed by: PHYSICIAN ASSISTANT

## 2024-03-16 RX ADMIN — TIZANIDINE 4 MG: 4 TABLET ORAL at 20:43

## 2024-03-16 ASSESSMENT — COLUMBIA-SUICIDE SEVERITY RATING SCALE - C-SSRS
1. IN THE PAST MONTH, HAVE YOU WISHED YOU WERE DEAD OR WISHED YOU COULD GO TO SLEEP AND NOT WAKE UP?: NO
6. HAVE YOU EVER DONE ANYTHING, STARTED TO DO ANYTHING, OR PREPARED TO DO ANYTHING TO END YOUR LIFE?: NO
2. HAVE YOU ACTUALLY HAD ANY THOUGHTS OF KILLING YOURSELF IN THE PAST MONTH?: NO

## 2024-03-16 ASSESSMENT — ENCOUNTER SYMPTOMS: BACK PAIN: 1

## 2024-03-17 NOTE — ED PROVIDER NOTES
History     Chief Complaint   Patient presents with    Back Pain     HPI  Dinorah Lim is a 83 year old female who presents to urgent care with complaint of low back pain.  Patient states this past Wednesday she was bent over cutting flowers when she felt a tightness in her lower back.  She states that she has had tightness and pain that is progressively gotten worse since Wednesday.  She denies any previous back problems.  She currently denies any urinary retention, incontinence of bowel or bladder, saddle anesthesia, or radiating pain down either leg.    Allergies:  Allergies   Allergen Reactions    Chocolate Hives    Lemon Flavor Hives    Lime [Lime, Sulfurated (Calcium Polysulfide)] Hives    Metal [Staples]     Orange Fruit [Citrus] Hives    Strawberry Extract Hives    Adhesive Tape      Band-aids    Codeine Hives     Patient can tolerate oxycodone & dilaudid    Erythromycin Hives     ERYTHROMYCIN BASE    Food      Tomato, grapefruit, oranges.    Grapefruit [Extra Strength Grapefruit]      GRAPEFRUIT    Morphine Hives     Pt. Reports had hives    Penicillins Hives    Ranitidine GI Disturbance    Sulfa Antibiotics      SULFONAMIDE ANTIBIOTICS     Tomato     Xyzal [Levocetirizine] Hives       Problem List:    Patient Active Problem List    Diagnosis Date Noted    Other chest pain 10/20/2021     Priority: Medium    Hiatal hernia 07/28/2021     Priority: Medium    Chronic, continuous use of opioids 05/17/2021     Priority: Medium    Stage 3a chronic kidney disease (H) 03/22/2021     Priority: Medium    COVID-19 virus infection on 10/30/20 11/24/2020     Priority: Medium     10-      Other neutropenia (H24) 08/04/2020     Priority: Medium    Paresthesias 02/13/2020     Priority: Medium    Status post total left knee replacement 09/09/2019     Priority: Medium    Aortic valve insufficiency 03/06/2019     Priority: Medium    Mitral regurgitation 03/06/2019     Priority: Medium    Tricuspid valve  insufficiency 03/06/2019     Priority: Medium    Diastolic dysfunction grade 1 on 2/21/19 03/06/2019     Priority: Medium    Hypertriglyceridemia 03/06/2019     Priority: Medium    Palpitations 02/13/2019     Priority: Medium    PVC's (premature ventricular contractions) 02/13/2019     Priority: Medium    Atrial ectopy 02/13/2019     Priority: Medium    PSVT (paroxysmal supraventricular tachycardia) 02/13/2019     Priority: Medium    COPD, mild on 9/9/14 02/13/2019     Priority: Medium    Bilateral carotid artery stenosis at less than 50% on 12/1/16 02/13/2019     Priority: Medium    Mixed hyperlipidemia 02/13/2019     Priority: Medium    On statin therapy 02/13/2019     Priority: Medium    Heart murmur 02/13/2019     Priority: Medium    Morbid obesity (H) 06/01/2017     Priority: Medium    ACP (advance care planning) 04/28/2016     Priority: Medium     Advance Care Planning 4/28/2016: ACP Review of Chart / Resources Provided:  Reviewed chart for advance care plan.  Dinorah Lim has no plan or code status on file. Discussed available resources and provided with information. Confirmed code status reflects current choices pending further ACP discussions.  Confirmed/documented legally designated decision makers.  Added by Aditi Gandhi            Anxiety 06/23/2014     Priority: Medium    Lung density on x-ray 07/23/2013     Priority: Medium    Osteoarthritis 06/14/2012     Priority: Medium     Problem list name updated by automated process. Provider to review      Advanced care planning/counseling discussion 01/13/2012     Priority: Medium    Abnormal glucose 08/01/2011     Priority: Medium     Hgb A1c 5.7 on 1/4/2015  Problem list name updated by automated process. Provider to review      Osteoarthritis of right hip 10/07/2004     Priority: Medium    Urticaria 06/01/2004     Priority: Medium     Problem list name updated by automated process. 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    Obesity, Class II, BMI 35-39.9 1/4/2015    Osteoarthritis of right hip 10/07/2004    Osteoarthrosis 06/14/2012    Otitis externa 10/04/2011    Prediabetes 08/01/2011       Past Surgical History:    Past Surgical History:   Procedure Laterality Date    ARTHROPLASTY KNEE Left 9/9/2019    Procedure: LEFT TOTAL KNEE ARTHROPLASTY S/N;  Surgeon: Trevor Alonzo MD;  Location: [facility]    ARTHROSCOPY KNEE Left 3/21/2019    Procedure: LEFT  KNEE ARTHROSCOPY, partial medial menisectomy;  Surgeon: Esteban Wills MD;  Location: [facility]    CLOSED REDUCTION WRIST Right 1952 x 2    long arm cast (same time as elbow fx)    CLOSED RX ELBOW DISLOCATION Right 1952    CR/long cast of fracture    EXCISE MASS FOOT Left 8/16/2021    Procedure: LEFT ANKLE MASS EXCISION;  Surgeon: Trevor Alonzo MD;  Location: [facility]    HC INJ EPIDURAL LUMBAR/SACRAL W/WO CONTRAST Bilateral 5/2013    facet injections; prev 2012    LAPAROSCOPIC CHOLECYSTECTOMY N/A 1/4/2015    Procedure: LAPAROSCOPIC CHOLECYSTECTOMY;  Surgeon: Brittney العلي MD;  Location: [facility]    TONSILLECTOMY      ZZC TOTAL KNEE ARTHROPLASTY Left 09/09/2019    Dr Alonzo       Family History:    Family History   Problem Relation Age of Onset    Heart Disease Brother         atrial fibrillation    Atrial fibrillation Brother     Other - See Comments Mother         emphysema    Heart Disease Father 92        CHF    Cancer Paternal Grandfather         lung cancer    Cancer Maternal Uncle         lung cancer    Chronic Obstructive Pulmonary Disease Daughter     Emphysema Daughter      Other - See Comments Daughter         benign breast lesion    Esophagitis Daughter        Social History:  Marital Status:  Single [1]  Social History     Tobacco Use    Smoking status: Never     Passive exposure: Never    Smokeless tobacco: Never   Vaping Use    Vaping Use: Never used   Substance Use Topics    Alcohol use: No    Drug use: No        Medications:    tiZANidine (ZANAFLEX) 4 MG tablet  acetaminophen (TYLENOL) 325 MG tablet  alendronate (FOSAMAX) 70 MG tablet  Calcium-Magnesium-Vitamin D (CALCIUM 1200+D3 PO)  clonazePAM (KLONOPIN) 0.5 MG tablet  FISH OIL  fluticasone (FLONASE) 50 MCG/ACT nasal spray  gabapentin (NEURONTIN) 100 MG capsule  ipratropium - albuterol 0.5 mg/2.5 mg/3 mL (DUONEB) 0.5-2.5 (3) MG/3ML neb solution  ipratropium - albuterol 0.5 mg/2.5 mg/3 mL (DUONEB) 0.5-2.5 (3) MG/3ML neb solution  ipratropium - albuterol 0.5 mg/2.5 mg/3 mL (DUONEB) 0.5-2.5 (3) MG/3ML neb solution  PARoxetine (PAXIL) 40 MG tablet  simethicone (MYLICON) 125 MG chewable tablet  simvastatin (ZOCOR) 40 MG tablet  triamcinolone (KENALOG) 0.025 % external ointment  VITAMIN D, CHOLECALCIFEROL, PO          Review of Systems   Musculoskeletal:  Positive for back pain.   All other systems reviewed and are negative.      Physical Exam   BP: 138/64  Pulse: 66  Temp: 98  F (36.7  C)  Resp: 18  SpO2: 98 %      Physical Exam  Vitals and nursing note reviewed.   Constitutional:       General: She is not in acute distress.     Appearance: Normal appearance. She is not ill-appearing or toxic-appearing.   Cardiovascular:      Rate and Rhythm: Regular rhythm.      Heart sounds: Normal heart sounds.   Pulmonary:      Breath sounds: Normal breath sounds. No stridor.   Musculoskeletal:      Thoracic back: No tenderness.      Lumbar back: Spasms and tenderness present.        Back:    Neurological:      Mental Status: She is oriented to person, place, and time.         ED Course        Procedures             Critical Care time:                No results found for this or any previous visit (from the past 24 hour(s)).    Medications   tiZANidine (ZANAFLEX) tablet 4 mg (4 mg Oral $Given 3/16/24 2043)       Assessments & Plan (with Medical Decision Making)   #1.  Low back pain    Discussed exam findings with patient.  This is likely a little back muscle strain.  Patient would like to follow-up with an orthopedic spine specialist and [facility]  She is given a prescription for Zanaflex.  She is also encouraged to utilize Tylenol and ice and rotate with heat.  Any additional concerns in the meantime she can return to emergency department or follow-up with primary care provider.  Patient verbalized understanding and agreement to plan.      I have reviewed the nursing notes.    I have reviewed the findings, diagnosis, plan and need for follow up with the patient.            New Prescriptions    TIZANIDINE (ZANAFLEX) 4 MG TABLET    Take 1 tablet (4 mg) by mouth 2 times daily as needed for muscle spasms (MUSCLE SPASM)       Final diagnoses:   Acute bilateral low back pain without sciatica       3/16/2024   [facility]       Edward Damon PA-C  03/16/24 2052

## 2024-03-17 NOTE — ED TRIAGE NOTES
Pt presents today with c/o back pain, happened 2-3 days ago. Thinks it from doing yard work.

## 2024-03-18 DIAGNOSIS — F41.9 ANXIETY: ICD-10-CM

## 2024-03-19 NOTE — TELEPHONE ENCOUNTER
Klonopin      Last Written Prescription Date:  1/23/24  Last Fill Quantity: 20,   # refills: 0  Last Office Visit: 3/14/24  Future Office visit:    Next 5 appointments (look out 90 days)      Jun 14, 2024  8:00 AM  (Arrive by 7:45 AM)  SHORT with Yan[facility] Mar [facility]  [facility] [address]  [place] [address]  [phone]             Routing refill request to provider for review/approval because:      Paxil      Last Written Prescription Date:  7/24/23  Last Fill Quantity: 90,   # refills: 1  Last Office Visit: 3/14/24  Future Office visit:    Next 5 appointments (look out 90 days)      Jun 14, 2024  8:00 AM  (Arrive by 7:45 AM)  SHORT with Yan[facility] Mar MD  [facility] ([facility] ) [address]  [place] [address]  [phone]             Routing refill request to provider for review/approval because:

## 2024-03-20 RX ORDER — PAROXETINE 40 MG/1
40 TABLET, FILM COATED ORAL DAILY
Qty: 90 TABLET | Refills: 3 | Status: SHIPPED | OUTPATIENT
Start: 2024-03-20

## 2024-03-20 RX ORDER — CLONAZEPAM 0.5 MG/1
TABLET ORAL
Qty: 20 TABLET | Refills: 0 | Status: SHIPPED | OUTPATIENT
Start: 2024-03-20 | End: 2024-04-30

## 2024-03-25 ENCOUNTER — TRANSFERRED RECORDS (OUTPATIENT)
Dept: HEALTH INFORMATION MANAGEMENT | Facility: CLINIC | Age: 84
End: 2024-03-25
Payer: COMMERCIAL

## 2024-03-26 ENCOUNTER — MEDICAL CORRESPONDENCE (OUTPATIENT)
Dept: MRI IMAGING | Facility: HOSPITAL | Age: 84
End: 2024-03-26

## 2024-03-29 ENCOUNTER — OFFICE VISIT (OUTPATIENT)
Dept: FAMILY MEDICINE | Facility: OTHER | Age: 84
End: 2024-03-29
Attending: STUDENT IN AN ORGANIZED HEALTH CARE EDUCATION/TRAINING PROGRAM
Payer: COMMERCIAL

## 2024-03-29 VITALS
BODY MASS INDEX: 35.48 KG/M2 | OXYGEN SATURATION: 97 % | HEART RATE: 63 BPM | SYSTOLIC BLOOD PRESSURE: 136 MMHG | DIASTOLIC BLOOD PRESSURE: 64 MMHG | WEIGHT: 213.2 LBS | TEMPERATURE: 97.7 F

## 2024-03-29 DIAGNOSIS — M81.0 AGE RELATED OSTEOPOROSIS, UNSPECIFIED PATHOLOGICAL FRACTURE PRESENCE: ICD-10-CM

## 2024-03-29 DIAGNOSIS — F43.23 SITUATIONAL MIXED ANXIETY AND DEPRESSIVE DISORDER: Primary | ICD-10-CM

## 2024-03-29 PROCEDURE — G0463 HOSPITAL OUTPT CLINIC VISIT: HCPCS

## 2024-03-29 PROCEDURE — 99214 OFFICE O/P EST MOD 30 MIN: CPT | Performed by: STUDENT IN AN ORGANIZED HEALTH CARE EDUCATION/TRAINING PROGRAM

## 2024-03-29 ASSESSMENT — PAIN SCALES - GENERAL: PAINLEVEL: NO PAIN (0)
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
## 2024-04-05 ENCOUNTER — HOSPITAL ENCOUNTER (OUTPATIENT)
Dept: BONE DENSITY | Facility: HOSPITAL | Age: 84
Discharge: HOME | End: 2024-04-05
Attending: STUDENT IN AN ORGANIZED HEALTH CARE EDUCATION/TRAINING PROGRAM | Admitting: STUDENT IN AN ORGANIZED HEALTH CARE EDUCATION/TRAINING PROGRAM
Payer: COMMERCIAL

## 2024-04-05 DIAGNOSIS — M81.0 AGE-RELATED OSTEOPOROSIS WITHOUT CURRENT PATHOLOGICAL FRACTURE: ICD-10-CM

## 2024-04-05 PROCEDURE — 77080 DXA BONE DENSITY AXIAL: CPT

## 2024-04-09 ENCOUNTER — TELEPHONE (OUTPATIENT)
Dept: FAMILY MEDICINE | Facility: OTHER | Age: 84
End: 2024-04-09

## 2024-04-09 NOTE — TELEPHONE ENCOUNTER
11:48 AM    Reason for Call: Phone Call    Description: Patient called to request information on her test results from 03/05. Could those results be brought to the  for her to ? She is unable to receive phone calls on her cell phone.    Was an appointment offered for this call? No  If yes : Appointment type              Date    Preferred method for responding to this message: Letter  What is your phone number ?    If we cannot reach you directly, may we leave a detailed response at the number you provided?  NA Patient wants results sent to  for .    Can this message wait until your PCP/provider returns, if available today? Letitia Alan

## 2024-04-10 NOTE — TELEPHONE ENCOUNTER
Patient requesting results be mailed as she cannot receive phone calls.   [address] [id number]

## 2024-04-10 NOTE — TELEPHONE ENCOUNTER
Pt asking for result of Dexa Scan 4/5  Provider needs to review & advise  PCP is out of the office  Pended to covering Provider to review & advise

## 2024-04-10 NOTE — TELEPHONE ENCOUNTER
Call - still has osteoporosis -very  slight worse. Pt is on Vitamin D and Calcium and Fosamax.  Pt to continue meds and keep as active as possible  Fairly maxed out on treatment. Follow-up with PCP to discuss more if has more questions.

## 2024-04-12 ENCOUNTER — HOSPITAL ENCOUNTER (EMERGENCY)
Facility: HOSPITAL | Age: 84
Discharge: HOME | End: 2024-04-12
Payer: COMMERCIAL

## 2024-04-12 VITALS
RESPIRATION RATE: 18 BRPM | SYSTOLIC BLOOD PRESSURE: 137 MMHG | HEART RATE: 71 BPM | TEMPERATURE: 98.5 F | DIASTOLIC BLOOD PRESSURE: 82 MMHG | OXYGEN SATURATION: 95 %

## 2024-04-12 DIAGNOSIS — N39.0 UTI (URINARY TRACT INFECTION): ICD-10-CM

## 2024-04-12 LAB
ALBUMIN UR-MCNC: 30 MG/DL
APPEARANCE UR: ABNORMAL
BACTERIA #/AREA URNS HPF: ABNORMAL /HPF
BILIRUB UR QL STRIP: NEGATIVE
COLOR UR AUTO: YELLOW
GLUCOSE UR STRIP-MCNC: NEGATIVE MG/DL
HGB UR QL STRIP: ABNORMAL
KETONES UR STRIP-MCNC: NEGATIVE MG/DL
LEUKOCYTE ESTERASE UR QL STRIP: ABNORMAL
MUCOUS THREADS #/AREA URNS LPF: PRESENT /LPF
NITRATE UR QL: POSITIVE
PH UR STRIP: 5.5 [PH] (ref 4.7–8)
RBC URINE: 19 /HPF
SP GR UR STRIP: 1.02 (ref 1–1.03)
SQUAMOUS EPITHELIAL: 24 /HPF
UROBILINOGEN UR STRIP-MCNC: NORMAL MG/DL
WBC CLUMPS #/AREA URNS HPF: PRESENT /HPF
WBC URINE: >182 /HPF

## 2024-04-12 PROCEDURE — 81003 URINALYSIS AUTO W/O SCOPE: CPT

## 2024-04-12 PROCEDURE — 250N000013 HC RX MED GY IP 250 OP 250 PS 637

## 2024-04-12 PROCEDURE — 99213 OFFICE O/P EST LOW 20 MIN: CPT

## 2024-04-12 PROCEDURE — G0463 HOSPITAL OUTPT CLINIC VISIT: HCPCS

## 2024-04-12 RX ORDER — CIPROFLOXACIN 500 MG/1
500 TABLET, FILM COATED ORAL ONCE
Status: COMPLETED | OUTPATIENT
Start: 2024-04-12 | End: 2024-04-12

## 2024-04-12 RX ORDER — CIPROFLOXACIN 500 MG/1
500 TABLET, FILM COATED ORAL 2 TIMES DAILY
Qty: 14 TABLET | Refills: 0 | Status: SHIPPED | OUTPATIENT
Start: 2024-04-12 | End: 2024-04-19

## 2024-04-12 RX ADMIN — CIPROFLOXACIN 500 MG: 500 TABLET, FILM COATED ORAL at 21:01

## 2024-04-12 ASSESSMENT — ENCOUNTER SYMPTOMS
NAUSEA: 0
VOMITING: 0
DIARRHEA: 0
DYSURIA: 0
FLANK PAIN: 0
FEVER: 0
FREQUENCY: 1
APPETITE CHANGE: 0
ACTIVITY CHANGE: 0
ABDOMINAL PAIN: 0
HEMATURIA: 0
CHILLS: 0
DIFFICULTY URINATING: 0

## 2024-04-12 ASSESSMENT — ACTIVITIES OF DAILY LIVING (ADL)
ADLS_ACUITY_SCORE: 38
ADLS_ACUITY_SCORE: 38

## 2024-04-13 NOTE — ED PROVIDER NOTES
History     Chief Complaint   Patient presents with    Urinary Frequency     HPI  Dinorah Lim is a 83 year old female who presents to the urgent care with a 2 day history of urinary frequency. She denies fevers, chills, abd pain, n/v/d, and back pain. No recent abx or OTC medications. Denies significant history of UTIs.     Allergies:  Allergies   Allergen Reactions    Chocolate Hives    Lemon Flavor Hives    Lime [Lime, Sulfurated (Calcium Polysulfide)] Hives    Metal [Staples]     Orange Fruit [Citrus] Hives    Strawberry Extract Hives    Adhesive Tape      Band-aids    Codeine Hives     Patient can tolerate oxycodone & dilaudid    Erythromycin Hives     ERYTHROMYCIN BASE    Food      Tomato, grapefruit, oranges.    Grapefruit [Extra Strength Grapefruit]      GRAPEFRUIT    Morphine Hives     Pt. Reports had hives    Penicillins Hives    Ranitidine GI Disturbance    Sulfa Antibiotics      SULFONAMIDE ANTIBIOTICS     Tomato     Xyzal [Levocetirizine] Hives       Problem List:    Patient Active Problem List    Diagnosis Date Noted    Other chest pain 10/20/2021     Priority: Medium    Hiatal hernia 07/28/2021     Priority: Medium    Chronic, continuous use of opioids 05/17/2021     Priority: Medium    Stage 3a chronic kidney disease (H) 03/22/2021     Priority: Medium    COVID-19 virus infection on 10/30/20 11/24/2020     Priority: Medium     10-      Other neutropenia (H24) 08/04/2020     Priority: Medium    Paresthesias 02/13/2020     Priority: Medium    Status post total left knee replacement 09/09/2019     Priority: Medium    Aortic valve insufficiency 03/06/2019     Priority: Medium    Mitral regurgitation 03/06/2019     Priority: Medium    Tricuspid valve insufficiency 03/06/2019     Priority: Medium    Diastolic dysfunction grade 1 on 2/21/19 03/06/2019     Priority: Medium    Hypertriglyceridemia 03/06/2019     Priority: Medium    Palpitations 02/13/2019     Priority: Medium    PVC's (premature  ventricular contractions) 02/13/2019     Priority: Medium    Atrial ectopy 02/13/2019     Priority: Medium    PSVT (paroxysmal supraventricular tachycardia) (H24) 02/13/2019     Priority: Medium    COPD, mild on 9/9/14 02/13/2019     Priority: Medium    Bilateral carotid artery stenosis at less than 50% on 12/1/16 02/13/2019     Priority: Medium    Mixed hyperlipidemia 02/13/2019     Priority: Medium    On statin therapy 02/13/2019     Priority: Medium    Heart murmur 02/13/2019     Priority: Medium    Morbid obesity (H) 06/01/2017     Priority: Medium    ACP (advance care planning) 04/28/2016     Priority: Medium     Advance Care Planning 4/28/2016: ACP Review of Chart / Resources Provided:  Reviewed chart for advance care plan.  Dinorah WILLIAM Lim has no plan or code status on file. Discussed available resources and provided with information. Confirmed code status reflects current choices pending further ACP discussions.  Confirmed/documented legally designated decision makers.  Added by Aditi Gandhi            Anxiety 06/23/2014     Priority: Medium    Lung density on x-ray 07/23/2013     Priority: Medium    Osteoarthritis 06/14/2012     Priority: Medium     Problem list name updated by automated process. Provider to review      Advanced care planning/counseling discussion 01/13/2012     Priority: Medium    Abnormal glucose 08/01/2011     Priority: Medium     Hgb A1c 5.7 on 1/4/2015  Problem list name updated by automated process. Provider to review      Osteoarthritis of right hip 10/07/2004     Priority: Medium    Urticaria 06/01/2004     Priority: Medium     Problem list name updated by automated process. 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    Obesity, Class II, BMI 35-39.9 1/4/2015    Osteoarthritis of right hip 10/07/2004    Osteoarthrosis 06/14/2012    Otitis externa 10/04/2011    Prediabetes 08/01/2011       Past Surgical History:    Past Surgical History:   Procedure Laterality Date    ARTHROPLASTY KNEE Left 9/9/2019    Procedure: LEFT TOTAL KNEE ARTHROPLASTY S/N;  Surgeon: Trevor Alonzo MD;  Location: [facility]    ARTHROSCOPY KNEE Left 3/21/2019    Procedure: LEFT  KNEE ARTHROSCOPY, partial medial menisectomy;  Surgeon: Esteban Wills MD;  Location: [facility]    CLOSED REDUCTION WRIST Right 1952 x 2    long arm cast (same time as elbow fx)    CLOSED RX ELBOW DISLOCATION Right 1952    CR/long cast of fracture    EXCISE MASS FOOT Left 8/16/2021    Procedure: LEFT ANKLE MASS EXCISION;  Surgeon: Trevor Alonzo MD;  Location: [facility]    HC INJ EPIDURAL LUMBAR/SACRAL W/WO CONTRAST Bilateral 5/2013    facet injections; prev 2012    LAPAROSCOPIC CHOLECYSTECTOMY N/A 1/4/2015    Procedure: LAPAROSCOPIC CHOLECYSTECTOMY;  Surgeon: Brittney العلي MD;  Location: [facility]    TONSILLECTOMY      ZZC TOTAL KNEE ARTHROPLASTY Left 09/09/2019    Dr Alonzo       Family History:    Family History   Problem Relation Age of Onset    Heart Disease Brother         atrial fibrillation    Atrial fibrillation Brother     Other - See Comments Mother         emphysema    Heart Disease Father 92        CHF    Cancer Paternal Grandfather         lung cancer    Cancer Maternal Uncle         lung cancer    Chronic Obstructive Pulmonary Disease Daughter     Emphysema Daughter     Other - See Comments Daughter         benign breast lesion    Esophagitis Daughter        Social History:  Marital Status:  Single [1]  Social History     Tobacco Use    Smoking status: Never     Passive exposure: Never    Smokeless  tobacco: Never   Vaping Use    Vaping status: Never Used   Substance Use Topics    Alcohol use: No    Drug use: No        Medications:    acetaminophen (TYLENOL) 325 MG tablet  alendronate (FOSAMAX) 70 MG tablet  Calcium-Magnesium-Vitamin D (CALCIUM 1200+D3 PO)  ciprofloxacin (CIPRO) 500 MG tablet  clonazePAM (KLONOPIN) 0.5 MG tablet  FISH OIL  fluticasone (FLONASE) 50 MCG/ACT nasal spray  gabapentin (NEURONTIN) 100 MG capsule  PARoxetine (PAXIL) 40 MG tablet  simethicone (MYLICON) 125 MG chewable tablet  simvastatin (ZOCOR) 40 MG tablet  VITAMIN D, CHOLECALCIFEROL, PO  ipratropium - albuterol 0.5 mg/2.5 mg/3 mL (DUONEB) 0.5-2.5 (3) MG/3ML neb solution  ipratropium - albuterol 0.5 mg/2.5 mg/3 mL (DUONEB) 0.5-2.5 (3) MG/3ML neb solution  ipratropium - albuterol 0.5 mg/2.5 mg/3 mL (DUONEB) 0.5-2.5 (3) MG/3ML neb solution  triamcinolone (KENALOG) 0.025 % external ointment          Review of Systems   Constitutional:  Negative for activity change, appetite change, chills and fever.   Gastrointestinal:  Negative for abdominal pain, diarrhea, nausea and vomiting.   Genitourinary:  Positive for frequency. Negative for decreased urine volume, difficulty urinating, dysuria, flank pain and hematuria.   All other systems reviewed and are negative.      Physical Exam   BP: 137/82  Pulse: 71  Temp: 98.5  F (36.9  C)  Resp: 18  SpO2: 95 %      Physical Exam  Vitals and nursing note reviewed.   Constitutional:       General: She is not in acute distress.     Appearance: Normal appearance. She is not ill-appearing, toxic-appearing or diaphoretic.   Cardiovascular:      Rate and Rhythm: Normal rate and regular rhythm.      Pulses: Normal pulses.      Heart sounds: Normal heart sounds.   Pulmonary:      Effort: Pulmonary effort is normal.      Breath sounds: Normal breath sounds.   Abdominal:      General: Abdomen is flat.      Palpations: Abdomen is soft.      Tenderness: There is no abdominal tenderness. There is no right CVA  tenderness or left CVA tenderness.   Neurological:      Mental Status: She is alert.         ED Course        Procedures         Results for orders placed or performed during the hospital encounter of 04/12/24 (from the past 24 hour(s))   UA with Microscopic reflex to Culture    Specimen: Urine, Midstream   Result Value Ref Range    Color Urine Yellow Colorless, Straw, Light Yellow, Yellow    Appearance Urine Slightly Cloudy (A) Clear    Glucose Urine Negative Negative mg/dL    Bilirubin Urine Negative Negative    Ketones Urine Negative Negative mg/dL    Specific Gravity Urine 1.022 1.003 - 1.035    Blood Urine Moderate (A) Negative    pH Urine 5.5 4.7 - 8.0    Protein Albumin Urine 30 (A) Negative mg/dL    Urobilinogen Urine Normal Normal, 2.0 mg/dL    Nitrite Urine Positive (A) Negative    Leukocyte Esterase Urine Large (A) Negative    Bacteria Urine Few (A) None Seen /HPF    WBC Clumps Urine Present (A) None Seen /HPF    Mucus Urine Present (A) None Seen /LPF    RBC Urine 19 (H) <=2 /HPF    WBC Urine >182 (H) <=5 /HPF    Squamous Epithelials Urine 24 (H) <=1 /HPF       Medications   ciprofloxacin (CIPRO) tablet 500 mg (has no administration in time range)       Assessments & Plan (with Medical Decision Making)     I have reviewed the nursing notes.    I have reviewed the findings, diagnosis, plan and need for follow up with the patient.  Dinorah Lim is a 83 year old female who presents to the urgent care with a 2 day history of urinary frequency. She denies fevers, chills, abd pain, n/v/d, and back pain. No recent abx or OTC medications. Denies significant history of UTIs.    MDM: vital signs normal, afebrile. Lungs clear, heart tones regular. Abd soft, no CVA tenderness. She is not ill appearing. No noted distress. UA positive for infection. Culture pending. Multiple allergies. Has tolerated cipro in the past. Discussed black box warning. First dose given in the UC. Supportive measures and return precautions  discussed. She is in agreement with plan.     (N39.0) UTI (urinary tract infection)  Plan: Antibiotics 2 times daily for 7 days. Yogurt or probiotic while taking.   Push fluids.   Return with any abdominal pain, fevers, vomiting, or other concerns.   Follow up in the clinic next week for a recheck. Understanding verbalized.       New Prescriptions    CIPROFLOXACIN (CIPRO) 500 MG TABLET    Take 1 tablet (500 mg) by mouth 2 times daily for 7 days       Final diagnoses:   UTI (urinary tract infection)       4/12/2024   [facility]       Kasey Cage NP  04/12/24 2053

## 2024-04-13 NOTE — DISCHARGE INSTRUCTIONS
Antibiotics 2 times daily for 7 days. Yogurt or probiotic while taking.   Push fluids.   Return with any abdominal pain, fevers, vomiting, or other concerns.   Follow up in the clinic next week for a recheck.

## 2024-04-13 NOTE — ED TRIAGE NOTES
Pt presents with c/o having increased urinary frequency that started 2 days ago   Pt denies any other symptoms   No otc meds taken today

## 2024-04-15 ENCOUNTER — TELEPHONE (OUTPATIENT)
Dept: FAMILY MEDICINE | Facility: OTHER | Age: 84
End: 2024-04-15

## 2024-04-15 NOTE — TELEPHONE ENCOUNTER
4:41 PM    Reason for Call: OVERBOOK    Patient is UC follow up / recheck     The patient is requesting an appointment for 0416 OR 04/17 with Zaid.    Was an appointment offered for this call? Yes  If yes : Appointment type First              Date 05/01      Preferred method for responding to this message: Telephone Call  What is your phone number? [phone]    If we cannot reach you directly, may we leave a detailed response at the number you provided? Yes    Can this message wait until your PCP/provider returns, if unavailable today? Not applicable    Nadja Salinas

## 2024-04-16 ENCOUNTER — OFFICE VISIT (OUTPATIENT)
Dept: FAMILY MEDICINE | Facility: OTHER | Age: 84
End: 2024-04-16
Attending: STUDENT IN AN ORGANIZED HEALTH CARE EDUCATION/TRAINING PROGRAM
Payer: COMMERCIAL

## 2024-04-16 VITALS
WEIGHT: 207.56 LBS | OXYGEN SATURATION: 97 % | HEIGHT: 65 IN | DIASTOLIC BLOOD PRESSURE: 64 MMHG | TEMPERATURE: 98.3 F | BODY MASS INDEX: 34.58 KG/M2 | RESPIRATION RATE: 16 BRPM | SYSTOLIC BLOOD PRESSURE: 123 MMHG | HEART RATE: 55 BPM

## 2024-04-16 DIAGNOSIS — N30.01 ACUTE CYSTITIS WITH HEMATURIA: Primary | ICD-10-CM

## 2024-04-16 PROCEDURE — 99213 OFFICE O/P EST LOW 20 MIN: CPT | Performed by: STUDENT IN AN ORGANIZED HEALTH CARE EDUCATION/TRAINING PROGRAM

## 2024-04-16 PROCEDURE — G0463 HOSPITAL OUTPT CLINIC VISIT: HCPCS | Performed by: STUDENT IN AN ORGANIZED HEALTH CARE EDUCATION/TRAINING PROGRAM

## 2024-04-16 ASSESSMENT — PAIN SCALES - GENERAL: PAINLEVEL: NO PAIN (0)

## 2024-04-16 NOTE — PROGRESS NOTES
"  Assessment & Plan     Acute cystitis with hematuria  Seen in urgent care and started on cipro 500 mg BID  Symptoms are significantly improved.  No red flag signs or symptoms currently  Continue concurrently with probiotic/yoghurt.  She denies any side effects from the medication- no joint or tendon issues.    Continue antibiotics with end date of 4/19/2024      10 minutes spent by me on the date of the encounter doing chart review, history and exam, documentation and further activities per the note    MED REC REQUIRED  Post Medication Reconciliation Status: discharge medications reconciled, continue medications without change      No follow-ups on file.    Natividad Bill is a 83 year old, presenting for the following health issues:  ER F/U        4/16/2024    10:46 AM   Additional Questions   Roomed by Kassie Cross   Accompanied by None         4/16/2024    10:46 AM   Patient Reported Additional Medications   Patient reports taking the following new medications None     HPI     ED/UC Followup:    Facility:  [facility]   Date of visit: 4/12/2024  Reason for visit: UTI   Current Status: Patient reports feeling good, but the Cipro is causing some itching all over her body.     Went to UC with symptoms of urinary frequency.  Frequency is gone.  No back or flank pain.  Denies any fevers or chills. No N/V/D.  Continues to take antibiotics.  She is not taking probiotics.  Denies any diarrhea        Review of Systems  Constitutional, neuro, ENT, endocrine, pulmonary, cardiac, gastrointestinal, genitourinary, musculoskeletal, integument and psychiatric systems are negative, except as otherwise noted.      Objective    /64 (BP Location: Left arm, Patient Position: Sitting, Cuff Size: Adult Large)   Pulse 55   Temp 98.3  F (36.8  C) (Tympanic)   Resp 16   Ht 1.651 m (5' 5\")   Wt 94.1 kg (207 lb 9 oz)   SpO2 97%   BMI 34.54 kg/m    Body mass index is 34.54 kg/m .  Physical Exam   GENERAL: alert and no " distress  EYES: Eyes grossly normal to inspection, PERRL and conjunctivae and sclerae normal  HENT: ear canals and TM's normal, nose and mouth without ulcers or lesions  NECK: no adenopathy, no asymmetry, masses, or scars  RESP: lungs clear to auscultation - no rales, rhonchi or wheezes  CV: regular rate and rhythm, normal S1 S2, no S3 or S4, no murmur, click or rub, no peripheral edema  ABDOMEN: soft, nontender, no hepatosplenomegaly, no masses and bowel sounds normal.  Np suprapubic or CVA tenderness  MS: no gross musculoskeletal defects noted, no edema  SKIN: no suspicious lesions or rashes  NEURO: Normal strength and tone, mentation intact and speech normal  PSYCH: mentation appears normal, affect normal/bright          Signed Electronically by: Yanique Mar MD

## 2024-04-24 DIAGNOSIS — F41.9 ANXIETY: ICD-10-CM

## 2024-04-25 NOTE — TELEPHONE ENCOUNTER
Klonopin       Last Written Prescription Date:  3/20/2024  Last Fill Quantity: 20,   # refills: 0  Last Office Visit: 4/16/24  Future Office visit:    Next 5 appointments (look out 90 days)      Jun 14, 2024  8:00 AM  (Arrive by 7:45 AM)  Provider Visit with Yanique Mar MD  [facility] [place] ([facility] [place] ) [address]  [place] [address]  [phone]

## 2024-04-30 RX ORDER — CLONAZEPAM 0.5 MG/1
TABLET ORAL
Qty: 20 TABLET | Refills: 0 | Status: SHIPPED | OUTPATIENT
Start: 2024-04-30 | End: 2024-05-31

## 2024-05-03 ENCOUNTER — TELEPHONE (OUTPATIENT)
Dept: FAMILY MEDICINE | Facility: OTHER | Age: 84
End: 2024-05-03

## 2024-05-03 NOTE — TELEPHONE ENCOUNTER
1:36 PM    Reason for Call: Phone Call    Description: Patient requests call back to advise on medication before MRI because claustrophopic.     Was an appointment offered for this call? No  If yes : Appointment type              Date    Preferred method for responding to this message: Telephone Call  What is your phone number ? [phone]    If we cannot reach you directly, may we leave a detailed response at the number you provided? Yes    Can this message wait until your PCP/provider returns, if available today? YES    Sarah Alan

## 2024-05-06 ENCOUNTER — HOSPITAL ENCOUNTER (EMERGENCY)
Facility: HOSPITAL | Age: 84
Discharge: HOME | End: 2024-05-06
Attending: PHYSICIAN ASSISTANT | Admitting: PHYSICIAN ASSISTANT
Payer: COMMERCIAL

## 2024-05-06 ENCOUNTER — APPOINTMENT (OUTPATIENT)
Dept: GENERAL RADIOLOGY | Facility: HOSPITAL | Age: 84
End: 2024-05-06
Attending: PHYSICIAN ASSISTANT
Payer: COMMERCIAL

## 2024-05-06 VITALS
TEMPERATURE: 99.3 F | OXYGEN SATURATION: 96 % | RESPIRATION RATE: 16 BRPM | HEART RATE: 71 BPM | DIASTOLIC BLOOD PRESSURE: 70 MMHG | SYSTOLIC BLOOD PRESSURE: 114 MMHG

## 2024-05-06 DIAGNOSIS — Z87.09 HISTORY OF COPD: ICD-10-CM

## 2024-05-06 DIAGNOSIS — J40 BRONCHITIS: ICD-10-CM

## 2024-05-06 PROCEDURE — G0463 HOSPITAL OUTPT CLINIC VISIT: HCPCS | Mod: 25

## 2024-05-06 PROCEDURE — 71046 X-RAY EXAM CHEST 2 VIEWS: CPT

## 2024-05-06 PROCEDURE — 99213 OFFICE O/P EST LOW 20 MIN: CPT | Performed by: PHYSICIAN ASSISTANT

## 2024-05-06 RX ORDER — FERROUS SULFATE 325(65) MG
325 TABLET ORAL
COMMUNITY

## 2024-05-06 RX ORDER — PREDNISONE 20 MG/1
TABLET ORAL
Qty: 10 TABLET | Refills: 0 | Status: SHIPPED | OUTPATIENT
Start: 2024-05-06 | End: 2024-06-30

## 2024-05-06 ASSESSMENT — ACTIVITIES OF DAILY LIVING (ADL)
ADLS_ACUITY_SCORE: 38
ADLS_ACUITY_SCORE: 36

## 2024-05-06 ASSESSMENT — ENCOUNTER SYMPTOMS
CHEST TIGHTNESS: 1
HEADACHES: 0
FEVER: 0
WHEEZING: 0
NEUROLOGICAL NEGATIVE: 1
PALPITATIONS: 0
FATIGUE: 0
COUGH: 1
SHORTNESS OF BREATH: 1
MYALGIAS: 0

## 2024-05-06 NOTE — DISCHARGE INSTRUCTIONS
MUST recheck with: ANY overt shortness of breath, fevers, or fatigue.  Short burst of prednisone. Complete 3 full days ideally, but typical course is 5 day. Start tomorrow morning.   I would: neb 3-4 times daily for up to 5 days and see how things are. Back here/ER if you feel worse overall and nebs are not helping.   10 deep breaths 4-5 times daily to keep fresh air to the stiff lung bases - prevent pneumonia (with history COPD)

## 2024-05-06 NOTE — ED PROVIDER NOTES
History     Chief Complaint   Patient presents with    Cough     HPI  Dinorah Lim is a 83 year old female who presents with increased cough, sensation SOB. Denies SOB at rest and was able to rake up the yard, but she is concerned about COPD/pneumonia, prompting visit. Landy denies any fevers, ST, HA, myalgia. She has been using nebs 1x daily as prescribed and seems to help at the time. Cough is productive at times. No wheezing.     Allergies:  Allergies   Allergen Reactions    Chocolate Hives    Lemon Flavor Hives    Lime [Lime, Sulfurated (Calcium Polysulfide)] Hives    Metal [Staples]     Orange Fruit [Citrus] Hives    Strawberry Extract Hives    Adhesive Tape      Band-aids    Codeine Hives     Patient can tolerate oxycodone & dilaudid    Erythromycin Hives     ERYTHROMYCIN BASE    Food      Tomato, grapefruit, oranges.    Grapefruit [Extra Strength Grapefruit]      GRAPEFRUIT    Morphine Hives     Pt. Reports had hives    Penicillins Hives    Ranitidine GI Disturbance    Sulfa Antibiotics      SULFONAMIDE ANTIBIOTICS     Tomato     Xyzal [Levocetirizine] Hives       Problem List:    Patient Active Problem List    Diagnosis Date Noted    Other chest pain 10/20/2021     Priority: Medium    Hiatal hernia 07/28/2021     Priority: Medium    Chronic, continuous use of opioids 05/17/2021     Priority: Medium    Stage 3a chronic kidney disease (H) 03/22/2021     Priority: Medium    COVID-19 virus infection on 10/30/20 11/24/2020     Priority: Medium     10-      Other neutropenia (H24) 08/04/2020     Priority: Medium    Paresthesias 02/13/2020     Priority: Medium    Status post total left knee replacement 09/09/2019     Priority: Medium    Aortic valve insufficiency 03/06/2019     Priority: Medium    Mitral regurgitation 03/06/2019     Priority: Medium    Tricuspid valve insufficiency 03/06/2019     Priority: Medium    Diastolic dysfunction grade 1 on 2/21/19 03/06/2019     Priority: Medium     Hypertriglyceridemia 03/06/2019     Priority: Medium    Palpitations 02/13/2019     Priority: Medium    PVC's (premature ventricular contractions) 02/13/2019     Priority: Medium    Atrial ectopy 02/13/2019     Priority: Medium    PSVT (paroxysmal supraventricular tachycardia) (H24) 02/13/2019     Priority: Medium    COPD, mild on 9/9/14 02/13/2019     Priority: Medium    Bilateral carotid artery stenosis at less than 50% on 12/1/16 02/13/2019     Priority: Medium    Mixed hyperlipidemia 02/13/2019     Priority: Medium    On statin therapy 02/13/2019     Priority: Medium    Heart murmur 02/13/2019     Priority: Medium    Morbid obesity (H) 06/01/2017     Priority: Medium    ACP (advance care planning) 04/28/2016     Priority: Medium     Advance Care Planning 4/28/2016: ACP Review of Chart / Resources Provided:  Reviewed chart for advance care plan.  Dinorah Lim has no plan or code status on file. Discussed available resources and provided with information. Confirmed code status reflects current choices pending further ACP discussions.  Confirmed/documented legally designated decision makers.  Added by Aditi Gandhi            Anxiety 06/23/2014     Priority: Medium    Lung density on x-ray 07/23/2013     Priority: Medium    Osteoarthritis 06/14/2012     Priority: Medium     Problem list name updated by automated process. Provider to review      Advanced care planning/counseling discussion 01/13/2012     Priority: Medium    Abnormal glucose 08/01/2011     Priority: Medium     Hgb A1c 5.7 on 1/4/2015  Problem list name updated by automated process. Provider to review      Osteoarthritis of right hip 10/07/2004     Priority: Medium    Urticaria 06/01/2004     Priority: Medium     Problem list name updated by automated process. 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    Obesity, Class II, BMI 35-39.9 1/4/2015    Osteoarthritis of right hip 10/07/2004    Osteoarthrosis 06/14/2012    Otitis externa 10/04/2011    Prediabetes 08/01/2011       Past Surgical History:    Past Surgical History:   Procedure Laterality Date    ARTHROPLASTY KNEE Left 9/9/2019    Procedure: LEFT TOTAL KNEE ARTHROPLASTY S/N;  Surgeon: Trevor Alonzo MD;  Location: [facility]    ARTHROSCOPY KNEE Left 3/21/2019    Procedure: LEFT  KNEE ARTHROSCOPY, partial medial menisectomy;  Surgeon: Esteban Wills MD;  Location: [facility]    CLOSED REDUCTION WRIST Right 1952 x 2    long arm cast (same time as elbow fx)    CLOSED RX ELBOW DISLOCATION Right 1952    CR/long cast of fracture    EXCISE MASS FOOT Left 8/16/2021    Procedure: LEFT ANKLE MASS EXCISION;  Surgeon: Trevor Alonzo MD;  Location: [facility]    HC INJ EPIDURAL LUMBAR/SACRAL W/WO CONTRAST Bilateral 5/2013    facet injections; prev 2012    LAPAROSCOPIC CHOLECYSTECTOMY N/A 1/4/2015    Procedure: LAPAROSCOPIC CHOLECYSTECTOMY;  Surgeon: Brittney العلي MD;  Location: [facility]    TONSILLECTOMY      ZZC TOTAL KNEE ARTHROPLASTY Left 09/09/2019    Dr Alonzo       Family History:    Family History   Problem Relation Age of Onset    Heart Disease Brother         atrial fibrillation    Atrial fibrillation Brother     Other - See Comments Mother         emphysema    Heart Disease Father 92        CHF    Cancer Paternal Grandfather         lung cancer    Cancer Maternal Uncle         lung cancer    Chronic Obstructive Pulmonary Disease Daughter     Emphysema Daughter     Other - See Comments Daughter         benign breast lesion    Esophagitis Daughter        Social History:  Marital  Status:  Single [1]  Social History     Tobacco Use    Smoking status: Never     Passive exposure: Never    Smokeless tobacco: Never   Vaping Use    Vaping status: Never Used   Substance Use Topics    Alcohol use: No    Drug use: No        Medications:    acetaminophen (TYLENOL) 325 MG tablet  Calcium-Magnesium-Vitamin D (CALCIUM 1200+D3 PO)  clonazePAM (KLONOPIN) 0.5 MG tablet  Cranberry 125 MG TABS  ferrous sulfate (FEROSUL) 325 (65 Fe) MG tablet  FISH OIL  ipratropium - albuterol 0.5 mg/2.5 mg/3 mL (DUONEB) 0.5-2.5 (3) MG/3ML neb solution  PARoxetine (PAXIL) 40 MG tablet  predniSONE (DELTASONE) 20 MG tablet  simvastatin (ZOCOR) 40 MG tablet  VITAMIN D, CHOLECALCIFEROL, PO  fluticasone (FLONASE) 50 MCG/ACT nasal spray  gabapentin (NEURONTIN) 100 MG capsule  ipratropium - albuterol 0.5 mg/2.5 mg/3 mL (DUONEB) 0.5-2.5 (3) MG/3ML neb solution  ipratropium - albuterol 0.5 mg/2.5 mg/3 mL (DUONEB) 0.5-2.5 (3) MG/3ML neb solution  simethicone (MYLICON) 125 MG chewable tablet  triamcinolone (KENALOG) 0.025 % external ointment          Review of Systems   Constitutional:  Negative for fatigue and fever.   HENT:  Positive for congestion.    Respiratory:  Positive for cough, chest tightness and shortness of breath. Negative for wheezing.    Cardiovascular:  Negative for chest pain and palpitations.   Musculoskeletal:  Negative for myalgias.   Neurological: Negative.  Negative for headaches.       Physical Exam   BP: 139/85  Pulse: 71  Temp: 99.3  F (37.4  C)  Resp: 16  SpO2: 96 %      Physical Exam  Vitals and nursing note reviewed.   Constitutional:       General: She is not in acute distress.     Appearance: She is not toxic-appearing.   HENT:      Mouth/Throat:      Mouth: Mucous membranes are moist.   Eyes:      Conjunctiva/sclera: Conjunctivae normal.   Cardiovascular:      Rate and Rhythm: Normal rate and regular rhythm.      Heart sounds: Murmur (systolic) heard.   Pulmonary:      Effort: Pulmonary effort is normal.       Breath sounds: Normal breath sounds. No wheezing or rales.   Skin:     General: Skin is warm and dry.   Neurological:      Mental Status: She is alert and oriented to person, place, and time.         ED Course       Results for orders placed or performed during the hospital encounter of 05/06/24 (from the past 24 hour(s))   Chest XR,  PA & LAT    Narrative    Exam:  XR CHEST 2 VIEWS    HISTORY: cough.    COMPARISON:  12/26/2023    FINDINGS:     The cardiomediastinal contours are unchanged. A hiatal hernia is again  demonstrated..      No focal consolidation, effusion, or pneumothorax.      No acute osseous abnormality.       Impression    IMPRESSION:      No acute cardiopulmonary process.      PAOLA PEREZ MD         SYSTEM ID:  [id number]       Medications - No data to display    Assessments & Plan (with Medical Decision Making)     I have reviewed the nursing notes.  I have reviewed the findings, diagnosis, plan and need for follow up with the patient.    Discharge Medication List as of 5/6/2024  4:55 PM        START taking these medications    Details   predniSONE (DELTASONE) 20 MG tablet Take two tablets (= 40mg) each day for 5 (five) days. Take in the morning with food., Disp-10 tablet, R-0, E-Prescribe             Final diagnoses:   Bronchitis   History of COPD   Has MR/procedure coming up, so XR ordered to r/o pneumonia. No obvious infiltrate/consolidation, rad read above. Discussed Dx COPD flare and treatment options. Since Pt denies systemic Sx and feels overall well, will increase nebs for a few days and burst of steroid. She will seek care here/ER with ANY worsening prior to scheduled visits.     5/6/2024   [facility]       Storm Sneed PA  05/06/24 0714

## 2024-05-06 NOTE — ED TRIAGE NOTES
Naren PINA assessed patient in triage and determined patient Urgent Care appropriate. Will be seen in Urgent Care.

## 2024-05-07 NOTE — TELEPHONE ENCOUNTER
LVM for Patient          Dr. Mar~  Pt has ordered clonazepam 0.5mg.  Would you recommend to take prior to the MRI?  If yes, please list dosing instructions    Thank you!    Ronda

## 2024-05-13 ENCOUNTER — HOSPITAL ENCOUNTER (OUTPATIENT)
Dept: MRI IMAGING | Facility: HOSPITAL | Age: 84
Discharge: HOME | End: 2024-05-13
Attending: STUDENT IN AN ORGANIZED HEALTH CARE EDUCATION/TRAINING PROGRAM | Admitting: STUDENT IN AN ORGANIZED HEALTH CARE EDUCATION/TRAINING PROGRAM
Payer: COMMERCIAL

## 2024-05-13 DIAGNOSIS — M54.50 LOW BACK PAIN: ICD-10-CM

## 2024-05-13 PROCEDURE — 72148 MRI LUMBAR SPINE W/O DYE: CPT

## 2024-05-18 ENCOUNTER — HOSPITAL ENCOUNTER (EMERGENCY)
Facility: HOSPITAL | Age: 84
Discharge: HOME | End: 2024-05-18
Attending: PHYSICIAN ASSISTANT | Admitting: PHYSICIAN ASSISTANT
Payer: COMMERCIAL

## 2024-05-18 VITALS
DIASTOLIC BLOOD PRESSURE: 76 MMHG | HEART RATE: 62 BPM | TEMPERATURE: 98.1 F | SYSTOLIC BLOOD PRESSURE: 135 MMHG | RESPIRATION RATE: 18 BRPM | OXYGEN SATURATION: 97 %

## 2024-05-18 DIAGNOSIS — H92.01 OTALGIA, RIGHT: ICD-10-CM

## 2024-05-18 PROCEDURE — G0463 HOSPITAL OUTPT CLINIC VISIT: HCPCS

## 2024-05-18 PROCEDURE — 99213 OFFICE O/P EST LOW 20 MIN: CPT | Performed by: PHYSICIAN ASSISTANT

## 2024-05-18 NOTE — DISCHARGE INSTRUCTIONS
Your exam was normal today. I'm not sure what is causing your ear pain. Sometimes a few days prior to shingles, symptoms similar to yours may occur. So be sure to watch for any lesions developing to your face, ear, and scalp and return here if you notice any. Otherwise, continue to monitor and if symptoms worsen or change, please return here.

## 2024-05-18 NOTE — ED PROVIDER NOTES
History     Chief Complaint   Patient presents with    Otalgia     HPI  Dinorah Lmi is a 83 year old female who presents with right ear pain x 3 days. States the pain is a dull ache and intermittent. No pain with movement of the ear. Denies allergy symptoms. No drainage. No URI symptoms.     Allergies:  Allergies   Allergen Reactions    Chocolate Hives    Lemon Flavor Hives    Lime [Lime, Sulfurated (Calcium Polysulfide)] Hives    Metal [Staples]     Orange Fruit [Citrus] Hives    Strawberry Extract Hives    Adhesive Tape      Band-aids    Codeine Hives     Patient can tolerate oxycodone & dilaudid    Erythromycin Hives     ERYTHROMYCIN BASE    Food      Tomato, grapefruit, oranges.    Grapefruit [Extra Strength Grapefruit]      GRAPEFRUIT    Morphine Hives     Pt. Reports had hives    Penicillins Hives    Ranitidine GI Disturbance    Sulfa Antibiotics      SULFONAMIDE ANTIBIOTICS     Tomato     Xyzal [Levocetirizine] Hives       Problem List:    Patient Active Problem List    Diagnosis Date Noted    Other chest pain 10/20/2021     Priority: Medium    Hiatal hernia 07/28/2021     Priority: Medium    Chronic, continuous use of opioids 05/17/2021     Priority: Medium    Stage 3a chronic kidney disease (H) 03/22/2021     Priority: Medium    COVID-19 virus infection on 10/30/20 11/24/2020     Priority: Medium     10-      Other neutropenia (H24) 08/04/2020     Priority: Medium    Paresthesias 02/13/2020     Priority: Medium    Status post total left knee replacement 09/09/2019     Priority: Medium    Aortic valve insufficiency 03/06/2019     Priority: Medium    Mitral regurgitation 03/06/2019     Priority: Medium    Tricuspid valve insufficiency 03/06/2019     Priority: Medium    Diastolic dysfunction grade 1 on 2/21/19 03/06/2019     Priority: Medium    Hypertriglyceridemia 03/06/2019     Priority: Medium    Palpitations 02/13/2019     Priority: Medium    PVC's (premature ventricular contractions)  02/13/2019     Priority: Medium    Atrial ectopy 02/13/2019     Priority: Medium    PSVT (paroxysmal supraventricular tachycardia) (H24) 02/13/2019     Priority: Medium    COPD, mild on 9/9/14 02/13/2019     Priority: Medium    Bilateral carotid artery stenosis at less than 50% on 12/1/16 02/13/2019     Priority: Medium    Mixed hyperlipidemia 02/13/2019     Priority: Medium    On statin therapy 02/13/2019     Priority: Medium    Heart murmur 02/13/2019     Priority: Medium    Morbid obesity (H) 06/01/2017     Priority: Medium    ACP (advance care planning) 04/28/2016     Priority: Medium     Advance Care Planning 4/28/2016: ACP Review of Chart / Resources Provided:  Reviewed chart for advance care plan.  Dinorah WILLIAM Lim has no plan or code status on file. Discussed available resources and provided with information. Confirmed code status reflects current choices pending further ACP discussions.  Confirmed/documented legally designated decision makers.  Added by Aditi Gandhi            Anxiety 06/23/2014     Priority: Medium    Lung density on x-ray 07/23/2013     Priority: Medium    Osteoarthritis 06/14/2012     Priority: Medium     Problem list name updated by automated process. Provider to review      Advanced care planning/counseling discussion 01/13/2012     Priority: Medium    Abnormal glucose 08/01/2011     Priority: Medium     Hgb A1c 5.7 on 1/4/2015  Problem list name updated by automated process. Provider to review      Osteoarthritis of right hip 10/07/2004     Priority: Medium    Urticaria 06/01/2004     Priority: Medium     Problem list name updated by automated process. 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    Obesity, Class II, BMI 35-39.9 1/4/2015    Osteoarthritis of right hip 10/07/2004    Osteoarthrosis 06/14/2012    Otitis externa 10/04/2011    Prediabetes 08/01/2011       Past Surgical History:    Past Surgical History:   Procedure Laterality Date    ARTHROPLASTY KNEE Left 9/9/2019    Procedure: LEFT TOTAL KNEE ARTHROPLASTY S/N;  Surgeon: Trevor Alonzo MD;  [facility]    ARTHROSCOPY KNEE Left 3/21/2019    Procedure: LEFT  KNEE ARTHROSCOPY, partial medial menisectomy;  Surgeon: Esteban Wills MD;  Location: [facility]    CLOSED REDUCTION WRIST Right 1952 x 2    long arm cast (same time as elbow fx)    CLOSED RX ELBOW DISLOCATION Right 1952    CR/long cast of fracture    EXCISE MASS FOOT Left 8/16/2021    Procedure: LEFT ANKLE MASS EXCISION;  Surgeon: Trevor Alonzo MD;  Location: [facility]    HC INJ EPIDURAL LUMBAR/SACRAL W/WO CONTRAST Bilateral 5/2013    facet injections; prev 2012    LAPAROSCOPIC CHOLECYSTECTOMY N/A 1/4/2015    Procedure: LAPAROSCOPIC CHOLECYSTECTOMY;  Surgeon: Brittney العلي MD;  Location: [facility]    TONSILLECTOMY      ZZC TOTAL KNEE ARTHROPLASTY Left 09/09/2019    Dr Alonzo       Family History:    Family History   Problem Relation Age of Onset    Heart Disease Brother         atrial fibrillation    Atrial fibrillation Brother     Other - See Comments Mother         emphysema    Heart Disease Father 92        CHF    Cancer Paternal Grandfather         lung cancer    Cancer Maternal Uncle         lung cancer    Chronic Obstructive Pulmonary Disease Daughter     Emphysema Daughter     Other - See Comments Daughter         benign breast lesion    Esophagitis Daughter        Social History:  Marital Status:  Single [1]  Social History     Tobacco Use    Smoking status: Never     Passive exposure: Never    Smokeless tobacco: Never   Vaping  Use    Vaping status: Never Used   Substance Use Topics    Alcohol use: No    Drug use: No        Medications:    clonazePAM (KLONOPIN) 0.5 MG tablet  ipratropium - albuterol 0.5 mg/2.5 mg/3 mL (DUONEB) 0.5-2.5 (3) MG/3ML neb solution  PARoxetine (PAXIL) 40 MG tablet  simvastatin (ZOCOR) 40 MG tablet  acetaminophen (TYLENOL) 325 MG tablet  Calcium-Magnesium-Vitamin D (CALCIUM 1200+D3 PO)  Cranberry 125 MG TABS  ferrous sulfate (FEROSUL) 325 (65 Fe) MG tablet  FISH OIL  fluticasone (FLONASE) 50 MCG/ACT nasal spray  gabapentin (NEURONTIN) 100 MG capsule  ipratropium - albuterol 0.5 mg/2.5 mg/3 mL (DUONEB) 0.5-2.5 (3) MG/3ML neb solution  ipratropium - albuterol 0.5 mg/2.5 mg/3 mL (DUONEB) 0.5-2.5 (3) MG/3ML neb solution  predniSONE (DELTASONE) 20 MG tablet  simethicone (MYLICON) 125 MG chewable tablet  triamcinolone (KENALOG) 0.025 % external ointment  VITAMIN D, CHOLECALCIFEROL, PO          Review of Systems   All other systems reviewed and are negative.      Physical Exam   BP: 160/74  Pulse: 62  Temp: 98.1  F (36.7  C)  Resp: 18  SpO2: 97 %      Physical Exam  Vitals and nursing note reviewed.   Constitutional:       General: She is not in acute distress.     Appearance: She is well-developed. She is not diaphoretic.   HENT:      Head: Normocephalic and atraumatic.      Right Ear: Tympanic membrane, ear canal and external ear normal.      Left Ear: Tympanic membrane, ear canal and external ear normal.      Nose: Nose normal.      Mouth/Throat:      Pharynx: No oropharyngeal exudate.   Eyes:      General: No scleral icterus.        Right eye: No discharge.         Left eye: No discharge.      Conjunctiva/sclera: Conjunctivae normal.      Pupils: Pupils are equal, round, and reactive to light.   Cardiovascular:      Rate and Rhythm: Normal rate and regular rhythm.      Heart sounds: Normal heart sounds. No murmur heard.     No friction rub. No gallop.   Pulmonary:      Effort: Pulmonary effort is normal. No  respiratory distress.      Breath sounds: Normal breath sounds. No wheezing or rales.   Chest:      Chest wall: No tenderness.   Abdominal:      General: Bowel sounds are normal.      Palpations: Abdomen is soft.      Tenderness: There is no abdominal tenderness.   Musculoskeletal:      Cervical back: Normal range of motion and neck supple.   Lymphadenopathy:      Cervical: No cervical adenopathy.   Skin:     General: Skin is warm and dry.      Coloration: Skin is not pale.      Findings: No erythema or rash.   Neurological:      Mental Status: She is alert and oriented to person, place, and time.      Cranial Nerves: No cranial nerve deficit.      Coordination: Coordination normal.   Psychiatric:         Behavior: Behavior normal.         Thought Content: Thought content normal.         Judgment: Judgment normal.         ED Course        Procedures            No results found for this or any previous visit (from the past 24 hour(s)).    Medications - No data to display    Assessments & Plan (with Medical Decision Making)   Pt is well appearing and in NAD. Normal exam. Unclear cause of her symptoms today. Advised she continue to monitor her symptoms and return here if worse or if lesions develop as we discussed sometimes shingles can cause ear pain a few days prior to lesions developing. She was discharged home in good condition following.     Plan:  Your exam was normal today. I'm not sure what is causing your ear pain. Sometimes a few days prior to shingles, symptoms similar to yours may occur. So be sure to watch for any lesions developing to your face, ear, and scalp and return here if you notice any. Otherwise, continue to monitor and if symptoms worsen or change, please return here.       I have reviewed the nursing notes.    I have reviewed the findings, diagnosis, plan and need for follow up with the patient  New Prescriptions    No medications on file       Final diagnoses:   Otalgia, right       5/18/2024    [facility]

## 2024-05-25 DIAGNOSIS — F41.9 ANXIETY: ICD-10-CM

## 2024-05-28 NOTE — TELEPHONE ENCOUNTER
Klonopin      Last Written Prescription Date:  4/30/24  Last Fill Quantity: 20,   # refills: 0  Last Office Visit: 4/16/24  Future Office visit:    Next 5 appointments (look out 90 days)      Jun 14, 2024  8:00 AM  (Arrive by 7:45 AM)  Provider Visit with Yanique Mar MD  [facility] ([facility] ) [address]  [place] [address]  [phone]             Routing refill request to provider for review/approval because:

## 2024-05-31 RX ORDER — CLONAZEPAM 0.5 MG/1
TABLET ORAL
Qty: 20 TABLET | Refills: 0 | Status: SHIPPED | OUTPATIENT
Start: 2024-05-31 | End: 2024-07-11

## 2024-06-10 ENCOUNTER — TRANSFERRED RECORDS (OUTPATIENT)
Dept: HEALTH INFORMATION MANAGEMENT | Facility: CLINIC | Age: 84
End: 2024-06-10
Payer: COMMERCIAL

## 2024-06-10 ENCOUNTER — OFFICE VISIT (OUTPATIENT)
Dept: PODIATRY | Facility: OTHER | Age: 84
End: 2024-06-10
Attending: PODIATRIST
Payer: COMMERCIAL

## 2024-06-10 VITALS
OXYGEN SATURATION: 97 % | HEART RATE: 67 BPM | SYSTOLIC BLOOD PRESSURE: 118 MMHG | TEMPERATURE: 99.1 F | DIASTOLIC BLOOD PRESSURE: 74 MMHG

## 2024-06-10 DIAGNOSIS — N18.31 STAGE 3A CHRONIC KIDNEY DISEASE (H): ICD-10-CM

## 2024-06-10 DIAGNOSIS — M21.42 PES PLANUS OF BOTH FEET: ICD-10-CM

## 2024-06-10 DIAGNOSIS — R60.0 BILATERAL LOWER EXTREMITY EDEMA: Primary | ICD-10-CM

## 2024-06-10 DIAGNOSIS — M21.41 PES PLANUS OF BOTH FEET: ICD-10-CM

## 2024-06-10 PROCEDURE — G0463 HOSPITAL OUTPT CLINIC VISIT: HCPCS

## 2024-06-10 PROCEDURE — 99203 OFFICE O/P NEW LOW 30 MIN: CPT | Performed by: PODIATRIST

## 2024-06-10 ASSESSMENT — PAIN SCALES - GENERAL: PAINLEVEL: SEVERE PAIN (6)

## 2024-06-10 NOTE — PROGRESS NOTES
Chief complaint: Patient presents with:  Musculoskeletal Problem: Foot pain      History of Present Illness: This 83 year old female is seen at the request of No ref. provider found for evaluation and suggestions of management of elongated toenails and swelling of the legs. She also says her feet turn when she is walking and she is wearing out her shoes. She would like do know if her insurance will cover for her to have her toenails trimmed. She is also wondering if she can get orthopedic shoes to prevent her feet from turning in and she is not sure how else she may manage her edema. She wears compression socks most days.    No further pedal complaints today.      GFR Estimate   Date Value Ref Range Status   02/13/2024 56 (L) >60 mL/min/1.73m2 Final   06/01/2021 65 >60 mL/min/[1.73_m2] Final     Comment:     Non  GFR Calc  Starting 12/18/2018, serum creatinine based estimated GFR (eGFR) will be   calculated using the Chronic Kidney Disease Epidemiology Collaboration   (CKD-EPI) equation.           Lab Results   Component Value Date    A1C 5.2 02/13/2024    A1C 5.5 05/02/2023    A1C 5.1 06/22/2022    A1C 5.1 10/28/2021    A1C 5.3 04/26/2021    A1C 5.0 02/10/2021    A1C 5.3 10/27/2020    A1C 5.2 08/06/2020    A1C 5.0 11/07/2019           /74 (BP Location: Left arm, Patient Position: Sitting, Cuff Size: Adult Regular)   Pulse 67   Temp 99.1  F (37.3  C) (Tympanic)   SpO2 97%     Patient Active Problem List   Diagnosis    Osteoarthritis of right hip    Abnormal glucose    Osteoarthritis    Lung density on x-ray    Advanced care planning/counseling discussion    Anxiety    Urticaria    ACP (advance care planning)    Morbid obesity (H)    Palpitations    PVC's (premature ventricular contractions)    Atrial ectopy    PSVT (paroxysmal supraventricular tachycardia) (H24)    COPD, mild on 9/9/14    Bilateral carotid artery stenosis at less than 50% on 12/1/16    Mixed hyperlipidemia    On statin  therapy    Heart murmur    Aortic valve insufficiency    Mitral regurgitation    Tricuspid valve insufficiency    Diastolic dysfunction grade 1 on 2/21/19    Hypertriglyceridemia    Status post total left knee replacement    Paresthesias    Other neutropenia (H24)    COVID-19 virus infection on 10/30/20    Stage 3a chronic kidney disease (H)    Chronic, continuous use of opioids    Hiatal hernia    Other chest pain       Past Surgical History:   Procedure Laterality Date    ARTHROPLASTY KNEE Left 9/9/2019    Procedure: LEFT TOTAL KNEE ARTHROPLASTY S/N;  Surgeon: Trevor Alonzo MD;  Location: [facility]    ARTHROSCOPY KNEE Left 3/21/2019    Procedure: LEFT  KNEE ARTHROSCOPY, partial medial menisectomy;  Surgeon: Esteban Wills MD;  Location: [facility]    CLOSED REDUCTION WRIST Right 1952 x 2    long arm cast (same time as elbow fx)    CLOSED RX ELBOW DISLOCATION Right 1952    CR/long cast of fracture    EXCISE MASS FOOT Left 8/16/2021    Procedure: LEFT ANKLE MASS EXCISION;  Surgeon: Trevor Alonzo MD;  Location: [facility]    HC INJ EPIDURAL LUMBAR/SACRAL W/WO CONTRAST Bilateral 5/2013    facet injections; prev 2012    LAPAROSCOPIC CHOLECYSTECTOMY N/A 1/4/2015    Procedure: LAPAROSCOPIC CHOLECYSTECTOMY;  Surgeon: Brittney العلي MD;  Location: [facility]    TONSILLECTOMY      ZZC TOTAL KNEE ARTHROPLASTY Left 09/09/2019    Dr Alonzo       Current Outpatient Medications   Medication Sig Dispense Refill    acetaminophen (TYLENOL) 325 MG tablet Take 325-650 mg by mouth every 6 hours as needed for mild pain      Calcium-Magnesium-Vitamin D (CALCIUM 1200+D3 PO) Take by mouth daily      clonazePAM (KLONOPIN) 0.5 MG tablet TAKE 1/2 (ONE-HALF) TABLET BY MOUTH NIGHTLY AS NEEDED FOR ANXIETY 20 tablet 0    Cranberry 125 MG TABS       ferrous sulfate (FEROSUL) 325 (65 Fe) MG tablet Take 325 mg by mouth daily (with breakfast)      FISH OIL Take 1 capsule by mouth daily       ipratropium - albuterol 0.5 mg/2.5 mg/3 mL (DUONEB) 0.5-2.5 (3)  MG/3ML neb solution Take 1 vial (3 mLs) by nebulization every 6 hours as needed for shortness of breath / dyspnea or wheezing 3 mL 1    PARoxetine (PAXIL) 40 MG tablet Take 1 tablet by mouth once daily 90 tablet 3    predniSONE (DELTASONE) 20 MG tablet Take two tablets (= 40mg) each day for 5 (five) days. Take in the morning with food. 10 tablet 0    simethicone (MYLICON) 125 MG chewable tablet Take 1 tablet (125 mg) by mouth two times daily 60 tablet 0    simvastatin (ZOCOR) 40 MG tablet TAKE 1 TABLET BY MOUTH AT BEDTIME 90 tablet 1    VITAMIN D, CHOLECALCIFEROL, PO Take 2,000 Units by mouth daily      fluticasone (FLONASE) 50 MCG/ACT nasal spray Spray 1 spray into both nostrils daily (Patient not taking: Reported on 4/16/2024) 15.8 mL 0    gabapentin (NEURONTIN) 100 MG capsule Take 1 capsule (100 mg) by mouth nightly as needed for neuropathic pain (Patient not taking: Reported on 4/16/2024) 60 capsule 0    ipratropium - albuterol 0.5 mg/2.5 mg/3 mL (DUONEB) 0.5-2.5 (3) MG/3ML neb solution INHALE 1 VIAL BY NEBULIZATION EVERY 6 HOURS AS NEEDED FOR SHORTNESS OF BREATH, WHEEZING OR COUGH (Patient not taking: Reported on 4/16/2024) 90 mL 1    ipratropium - albuterol 0.5 mg/2.5 mg/3 mL (DUONEB) 0.5-2.5 (3) MG/3ML neb solution Take 1 vial (3 mLs) by nebulization every 6 hours as needed for shortness of breath, wheezing or cough (Patient not taking: Reported on 4/16/2024) 90 mL 0    triamcinolone (KENALOG) 0.025 % external ointment Apply topically 2 times daily (Patient not taking: Reported on 4/16/2024) 80 g 1     No current facility-administered medications for this visit.          Allergies   Allergen Reactions    Chocolate Hives    Lemon Flavor Hives    Lime [Lime, Sulfurated (Calcium Polysulfide)] Hives    Metal [Staples]     Orange Fruit [Citrus] Hives    Strawberry Extract Hives    Adhesive Tape      Band-aids    Codeine Hives     Patient can tolerate oxycodone & dilaudid    Erythromycin Hives     ERYTHROMYCIN BASE     Food      Tomato, grapefruit, oranges.    Grapefruit [Extra Strength Grapefruit]      GRAPEFRUIT    Morphine Hives     Pt. Reports had hives    Penicillins Hives    Ranitidine GI Disturbance    Sulfa Antibiotics      SULFONAMIDE ANTIBIOTICS     Tomato     Xyzal [Levocetirizine] Hives       Family History   Problem Relation Age of Onset    Heart Disease Brother         atrial fibrillation    Atrial fibrillation Brother     Other - See Comments Mother         emphysema    Heart Disease Father 92        CHF    Cancer Paternal Grandfather         lung cancer    Cancer Maternal Uncle         lung cancer    Chronic Obstructive Pulmonary Disease Daughter     Emphysema Daughter     Other - See Comments Daughter         benign breast lesion    Esophagitis Daughter        Social History     Socioeconomic History    Marital status: Single     Spouse name: None    Number of children: 4    Years of education: 12    Highest education level: None   Occupational History    Occupation: personal care attendant   Tobacco Use    Smoking status: Never     Passive exposure: Never    Smokeless tobacco: Never   Vaping Use    Vaping status: Never Used   Substance and Sexual Activity    Alcohol use: No    Drug use: No    Sexual activity: Never   Other Topics Concern    Blood Transfusions Yes    Caffeine Concern Yes     Comment: >32 oz soda/day    Sleep Concern Yes     Comment: insomnia    Parent/sibling w/ CABG, MI or angioplasty before 65F 55M? No     Social Determinants of Health     Financial Resource Strain: Low Risk  (1/4/2024)    Financial Resource Strain     Within the past 12 months, have you or your family members you live with been unable to get utilities (heat, electricity) when it was really needed?: No   Food Insecurity: Low Risk  (1/4/2024)    Food Insecurity     Within the past 12 months, did you worry that your food would run out before you got money to buy more?: No     Within the past 12 months, did the food you bought  just not last and you didn t have money to get more?: No   Transportation Needs: Low Risk  (1/4/2024)    Transportation Needs     Within the past 12 months, has lack of transportation kept you from medical appointments, getting your medicines, non-medical meetings or appointments, work, or from getting things that you need?: No   Interpersonal Safety: Low Risk  (11/27/2023)    Interpersonal Safety     Do you feel physically and emotionally safe where you currently live?: Yes     Within the past 12 months, have you been hit, slapped, kicked or otherwise physically hurt by someone?: No     Within the past 12 months, have you been humiliated or emotionally abused in other ways by your partner or ex-partner?: No   Housing Stability: Low Risk  (1/4/2024)    Housing Stability     Do you have housing? : Yes     Are you worried about losing your housing?: No       ROS: 10 point ROS neg other than the symptoms noted above in the HPI.  EXAM  Constitutional: healthy, alert, and no distress    Psychiatric: mentation appears normal and affect normal/bright    VASCULAR:  -Dorsalis pedis pulse +1/4 b/l  -Posterior tibial pulse +1/4 b/l  -Capillary refill time < 3 seconds to b/l hallux  -Hair growth Absent to b/l anterior legs and ankles  -Varicosities and telangiectasias to foot, bilaterally   -Mild-to-moderate 2+ to 3+ pitting edema to foot and ankle, bilaterally   NEURO:  -Light touch sensation intact to b/l plantar forefoot  DERM:  -Skin temperature within normal limits to bilateral foot  -Toenails elongated, thickened, dystrophic and discolored x 10  MSK:  -Pronation of bilateral medial midfoot when patient is standing  -Muscle strength of ankles +5/5 for dorsiflexion, plantarflexion, ABDUction and ADDuction b/l    ============================================================    ASSESSMENT:  (R60.0) Bilateral lower extremity edema  (primary encounter diagnosis)    (N18.31) Stage 3a chronic kidney disease (H)    (M21.41,   M21.42) Pes planus of both feet        PLAN:  -Patient evaluated and examined. Treatment options discussed with no educational barriers noted.    -Discussed toenail debridement with patient. She wanted her toenails debrided, but she wants to make sure it is covered by her insurance. She is advised to call her insurance and ask. She may call the clinic if she wants the toenails trimmed. It is not known if her insurance will cover this or not.    CKD: Patient's CKDis managed by their PCP. The kidney function appears to be stable. Patient's last GFR was 56 on 02/13/2024. The reduced kidney function contributes to increased edema in the foot.     ------------------    Lower extremity edema:  -Discussed LOWER EXTREMITY edema including etiology and treatment options.  -Patient has multiple telangiectasias and varicosities. This usually indicates incompetence of the valves in the veins that help push blood flow back to the heart. Blood pools in the legs and causes inflammation.   -Blood pooling can can brown discoloration to the foot, ankle or leg (hemosiderin deposition).  -Chronic LOWER EXTREMITY edema can cause pain or open ulcers.  -Treatment includes elevation of the legs above the level of the heart. Treatment of the inflammation is like wearing glasses in that it's not reversing the problem that is causing the edema, but it is decreasing the inflammation during the time of treatment.  -Patient advised to avoid idle standing and attempt to keep moving when standing. The muscles in the legs act as natural pumps to promote blood flow back to the heart.  -When sitting, attempt to elevate legs above the level of the heart.  -No additional elevation is required when sleeping flat on a bed. Compression socks should also be removed at night when lying down flat on a bed.    ------------------------------------------------    Pes planus:  -Discussed pes planus and treatment options. Also discussed biomechanics and how this  can cause knee, hip, and/or low back pain. Also discussed the potential progression of 1st MTPJ arthritic changes, posterior tibial tendon pain, and hammertoes. Patient currently has no foot pain and he may not develop foot pain, but it is not uncommon for flat feet to affect proximal joints. In particular, the most recent complaint is pronation when walking which causes her feet to wear down her shoes and this causes discomfort in her knees. Orthopaedic shoes may not be covered by insurance, but an orthotic may potentially be an option to fit in the shoes. She is in agreement with trying this.    -Stability Shoe Gear: This involves wearing a solid tennis shoe that bends at the toe, but has a solid midfoot portion for the shoe that does not bend or twist in half, and has a solid heel contour.     -Custom inserts -- these will be fit for for the patient per patient request with the orthotist, Ronda Jacques, at [facility] to reduce the pronation of the bilateral foot.    -This is an acute, uncomplicated illness/injury with OTC treatment options reviewed.      -Patient in agreement with the above treatment plan and all of patient's questions were answered.      Return to clinic as needed per patient request        Lisa Ruvalcaba DPM

## 2024-06-14 ENCOUNTER — OFFICE VISIT (OUTPATIENT)
Dept: FAMILY MEDICINE | Facility: OTHER | Age: 84
End: 2024-06-14
Attending: STUDENT IN AN ORGANIZED HEALTH CARE EDUCATION/TRAINING PROGRAM
Payer: COMMERCIAL

## 2024-06-14 VITALS
OXYGEN SATURATION: 97 % | HEIGHT: 65 IN | WEIGHT: 211 LBS | SYSTOLIC BLOOD PRESSURE: 120 MMHG | RESPIRATION RATE: 18 BRPM | TEMPERATURE: 98.6 F | BODY MASS INDEX: 35.16 KG/M2 | HEART RATE: 62 BPM | DIASTOLIC BLOOD PRESSURE: 70 MMHG

## 2024-06-14 DIAGNOSIS — M25.561 CHRONIC PAIN OF RIGHT KNEE: Primary | ICD-10-CM

## 2024-06-14 DIAGNOSIS — R41.3 MEMORY LOSS: ICD-10-CM

## 2024-06-14 DIAGNOSIS — G89.29 CHRONIC PAIN OF RIGHT KNEE: Primary | ICD-10-CM

## 2024-06-14 DIAGNOSIS — N18.31 STAGE 3A CHRONIC KIDNEY DISEASE (H): ICD-10-CM

## 2024-06-14 DIAGNOSIS — R60.0 BILATERAL LOWER EXTREMITY EDEMA: ICD-10-CM

## 2024-06-14 DIAGNOSIS — I87.2 VENOUS (PERIPHERAL) INSUFFICIENCY: ICD-10-CM

## 2024-06-14 DIAGNOSIS — M81.0 AGE-RELATED OSTEOPOROSIS WITHOUT CURRENT PATHOLOGICAL FRACTURE: ICD-10-CM

## 2024-06-14 PROCEDURE — 99214 OFFICE O/P EST MOD 30 MIN: CPT | Performed by: STUDENT IN AN ORGANIZED HEALTH CARE EDUCATION/TRAINING PROGRAM

## 2024-06-14 PROCEDURE — G0463 HOSPITAL OUTPT CLINIC VISIT: HCPCS

## 2024-06-14 RX ORDER — CARBOXYMETHYLCELLULOSE SODIUM, GLYCERIN, POLYSORBATE 80 5; 10; 5 MG/ML; MG/ML; MG/ML
0.5 SOLUTION/ DROPS OPHTHALMIC 3 TIMES DAILY PRN
COMMUNITY
Start: 2024-05-22

## 2024-06-14 ASSESSMENT — PAIN SCALES - GENERAL: PAINLEVEL: WORST PAIN (10)

## 2024-06-14 NOTE — PROGRESS NOTES
"  Assessment & Plan     Chronic pain of right knee  Had recent XR of the right knee on 3/14 showing osteoarthritis  Was seen by Dr. Alonzo and had injection just over a month ago which provided some relief but now pain is back    Age-related osteoporosis without current pathological fracture  Discussed treatment options.  Patient is OK with reclast.    Bilateral lower extremity edema  Mild on my exam today, about 1+ pitting edema to bilateral lower extremities.  Recommend compression stockings to legs bilaterally.  CMS intact and pulses are strong in both lower extremities bilaterally.  If not improved with conservative measures, will add PRN lasix for better control  - Miscellaneous Order for DME - (Use only if a more specific DME order does not already exist    Venous (peripheral) insufficiency  Varicose veins on my exam      BMI  Estimated body mass index is 35.11 kg/m  as calculated from the following:    Height as of this encounter: 1.651 m (5' 5\").    Weight as of this encounter: 95.7 kg (211 lb).         No follow-ups on file.    Natividad Bill is a 83 year old, presenting for the following health issues:  Arthritis        6/14/2024     7:47 AM   Additional Questions   Roomed by Rosi Cano LPN, CCP, MHP   Accompanied by self     HPI     She tells me her right knee has been bothering her more.   Screams out at night sometimes.  She is having   Difficulty with stairs- has to pull self up   Tops of legs feel weak  Has problems with initiating walking.   Hurts behind the knee and all through the knee.  Feels hot, has been doing cool packs and keeping her leg elevated.  Has had an injection into the knee just over a month ago with Dr. Alonzo. Feels that the injection has worn off.      Patient      Review of Systems  Constitutional, HEENT, cardiovascular, pulmonary, GI, , musculoskeletal, neuro, skin, endocrine and psych systems are negative, except as otherwise noted.      Objective    /70 (BP " "Location: Right arm, Patient Position: Sitting, Cuff Size: Adult Large)   Pulse 62   Temp 98.6  F (37  C) (Tympanic)   Resp 18   Ht 1.651 m (5' 5\")   Wt 95.7 kg (211 lb)   SpO2 97%   BMI 35.11 kg/m    Body mass index is 35.11 kg/m .  Physical Exam   GENERAL: alert and no distress  EYES: Eyes grossly normal to inspection, PERRL and conjunctivae and sclerae normal  HENT: ear canals and TM's normal, nose and mouth without ulcers or lesions  NECK: no adenopathy, no asymmetry, masses, or scars  RESP: lungs clear to auscultation - no rales, rhonchi or wheezes  CV: regular rate and rhythm, normal S1 S2, no S3 or S4, no murmur, click or rub, 1+ edema of the right lower extremity  ABDOMEN: soft, nontender, no hepatosplenomegaly, no masses and bowel sounds normal  MS: no gross musculoskeletal defects noted, no edema.  Limited ROM in the right knee 2/2 pain, crepitus present.   SKIN: no suspicious lesions or rashes  NEURO: Normal strength and tone, mentation intact and speech normal  PSYCH: mentation appears normal, affect normal/bright           Signed Electronically by: Yanique Mar MD    "

## 2024-06-30 ENCOUNTER — HOSPITAL ENCOUNTER (EMERGENCY)
Facility: HOSPITAL | Age: 84
Discharge: HOME | End: 2024-06-30
Attending: PHYSICIAN ASSISTANT | Admitting: PHYSICIAN ASSISTANT
Payer: COMMERCIAL

## 2024-06-30 ENCOUNTER — APPOINTMENT (OUTPATIENT)
Dept: GENERAL RADIOLOGY | Facility: HOSPITAL | Age: 84
End: 2024-06-30
Attending: PHYSICIAN ASSISTANT
Payer: COMMERCIAL

## 2024-06-30 VITALS
RESPIRATION RATE: 16 BRPM | DIASTOLIC BLOOD PRESSURE: 75 MMHG | SYSTOLIC BLOOD PRESSURE: 123 MMHG | TEMPERATURE: 98.1 F | OXYGEN SATURATION: 96 % | HEART RATE: 60 BPM

## 2024-06-30 DIAGNOSIS — M25.561 ACUTE PAIN OF RIGHT KNEE: ICD-10-CM

## 2024-06-30 PROCEDURE — G0463 HOSPITAL OUTPT CLINIC VISIT: HCPCS

## 2024-06-30 PROCEDURE — 99213 OFFICE O/P EST LOW 20 MIN: CPT | Performed by: PHYSICIAN ASSISTANT

## 2024-06-30 PROCEDURE — 73562 X-RAY EXAM OF KNEE 3: CPT | Mod: RT

## 2024-06-30 RX ORDER — PREDNISONE 20 MG/1
TABLET ORAL
Qty: 10 TABLET | Refills: 0 | Status: SHIPPED | OUTPATIENT
Start: 2024-06-30 | End: 2024-09-16

## 2024-06-30 ASSESSMENT — ACTIVITIES OF DAILY LIVING (ADL): ADLS_ACUITY_SCORE: 38

## 2024-07-01 NOTE — ED PROVIDER NOTES
History     Chief Complaint   Patient presents with    Knee Pain     HPI  Dinorah Lim is a 83 year old female who presents with worsening of her chronic right knee pain over the last month. States her knee cap clicks when she bends her knee and her knee feels hot. Denies injury. H/o osteoarthritis and TKA of left knee.     Allergies:  Allergies   Allergen Reactions    Chocolate Hives    Lemon Flavor Hives    Lime [Lime, Sulfurated (Calcium Polysulfide)] Hives    Metal [Staples]     Orange Fruit [Citrus] Hives    Strawberry Extract Hives    Adhesive Tape      Band-aids    Codeine Hives     Patient can tolerate oxycodone & dilaudid    Erythromycin Hives     ERYTHROMYCIN BASE    Food      Tomato, grapefruit, oranges.    Grapefruit [Extra Strength Grapefruit]      GRAPEFRUIT    Morphine Hives     Pt. Reports had hives    Penicillins Hives    Ranitidine GI Disturbance    Sulfa Antibiotics      SULFONAMIDE ANTIBIOTICS     Tomato     Xyzal [Levocetirizine] Hives       Problem List:    Patient Active Problem List    Diagnosis Date Noted    Other chest pain 10/20/2021     Priority: Medium    Hiatal hernia 07/28/2021     Priority: Medium    Chronic, continuous use of opioids 05/17/2021     Priority: Medium    Stage 3a chronic kidney disease (H) 03/22/2021     Priority: Medium    COVID-19 virus infection on 10/30/20 11/24/2020     Priority: Medium     10-      Other neutropenia (H24) 08/04/2020     Priority: Medium    Paresthesias 02/13/2020     Priority: Medium    Status post total left knee replacement 09/09/2019     Priority: Medium    Aortic valve insufficiency 03/06/2019     Priority: Medium    Mitral regurgitation 03/06/2019     Priority: Medium    Tricuspid valve insufficiency 03/06/2019     Priority: Medium    Diastolic dysfunction grade 1 on 2/21/19 03/06/2019     Priority: Medium    Hypertriglyceridemia 03/06/2019     Priority: Medium    Palpitations 02/13/2019     Priority: Medium    PVC's (premature  ventricular contractions) 02/13/2019     Priority: Medium    Atrial ectopy 02/13/2019     Priority: Medium    PSVT (paroxysmal supraventricular tachycardia) (H24) 02/13/2019     Priority: Medium    COPD, mild on 9/9/14 02/13/2019     Priority: Medium    Bilateral carotid artery stenosis at less than 50% on 12/1/16 02/13/2019     Priority: Medium    Mixed hyperlipidemia 02/13/2019     Priority: Medium    On statin therapy 02/13/2019     Priority: Medium    Heart murmur 02/13/2019     Priority: Medium    Morbid obesity (H) 06/01/2017     Priority: Medium    ACP (advance care planning) 04/28/2016     Priority: Medium     Advance Care Planning 4/28/2016: ACP Review of Chart / Resources Provided:  Reviewed chart for advance care plan.  Dinorah WILLIAM Lim has no plan or code status on file. Discussed available resources and provided with information. Confirmed code status reflects current choices pending further ACP discussions.  Confirmed/documented legally designated decision makers.  Added by Aditi Gandhi            Anxiety 06/23/2014     Priority: Medium    Lung density on x-ray 07/23/2013     Priority: Medium    Osteoarthritis 06/14/2012     Priority: Medium     Problem list name updated by automated process. Provider to review      Advanced care planning/counseling discussion 01/13/2012     Priority: Medium    Abnormal glucose 08/01/2011     Priority: Medium     Hgb A1c 5.7 on 1/4/2015  Problem list name updated by automated process. Provider to review      Osteoarthritis of right hip 10/07/2004     Priority: Medium    Urticaria 06/01/2004     Priority: Medium     Problem list name updated by automated process. 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    Obesity, Class II, BMI 35-39.9 1/4/2015    Osteoarthritis of right hip 10/07/2004    Osteoarthrosis 06/14/2012    Otitis externa 10/04/2011    Prediabetes 08/01/2011       Past Surgical History:    Past Surgical History:   Procedure Laterality Date    ARTHROPLASTY KNEE Left 9/9/2019    Procedure: LEFT TOTAL KNEE ARTHROPLASTY S/N;  Surgeon: Trevor Alonzo MD;  Location: [facility]    ARTHROSCOPY KNEE Left 3/21/2019    Procedure: LEFT  KNEE ARTHROSCOPY, partial medial menisectomy;  Surgeon: Esteban Wills MD;  Location: [facility]    CLOSED REDUCTION WRIST Right 1952 x 2    long arm cast (same time as elbow fx)    CLOSED RX ELBOW DISLOCATION Right 1952    CR/long cast of fracture    EXCISE MASS FOOT Left 8/16/2021    Procedure: LEFT ANKLE MASS EXCISION;  Surgeon: Trevor Alonzo MD;  Location: [facility]    HC INJ EPIDURAL LUMBAR/SACRAL W/WO CONTRAST Bilateral 5/2013    facet injections; prev 2012    LAPAROSCOPIC CHOLECYSTECTOMY N/A 1/4/2015    Procedure: LAPAROSCOPIC CHOLECYSTECTOMY;  Surgeon: Brittney العلي MD;  Location: [facility]    TONSILLECTOMY      ZZC TOTAL KNEE ARTHROPLASTY Left 09/09/2019    Dr Alonzo       Family History:    Family History   Problem Relation Age of Onset    Heart Disease Brother         atrial fibrillation    Atrial fibrillation Brother     Other - See Comments Mother         emphysema    Heart Disease Father 92        CHF    Cancer Paternal Grandfather         lung cancer    Cancer Maternal Uncle         lung cancer    Chronic Obstructive Pulmonary Disease Daughter     Emphysema Daughter     Other - See Comments Daughter         benign breast lesion    Esophagitis Daughter        Social History:  Marital Status:  Single [1]  Social History     Tobacco Use    Smoking status: Never     Passive exposure: Never    Smokeless  tobacco: Never   Vaping Use    Vaping status: Never Used   Substance Use Topics    Alcohol use: No    Drug use: No        Medications:    predniSONE (DELTASONE) 20 MG tablet  acetaminophen (TYLENOL) 325 MG tablet  Calcium-Magnesium-Vitamin D (CALCIUM 1200+D3 PO)  clonazePAM (KLONOPIN) 0.5 MG tablet  Cranberry 125 MG TABS  ferrous sulfate (FEROSUL) 325 (65 Fe) MG tablet  FISH OIL  fluticasone (FLONASE) 50 MCG/ACT nasal spray  gabapentin (NEURONTIN) 100 MG capsule  ipratropium - albuterol 0.5 mg/2.5 mg/3 mL (DUONEB) 0.5-2.5 (3) MG/3ML neb solution  ipratropium - albuterol 0.5 mg/2.5 mg/3 mL (DUONEB) 0.5-2.5 (3) MG/3ML neb solution  ipratropium - albuterol 0.5 mg/2.5 mg/3 mL (DUONEB) 0.5-2.5 (3) MG/3ML neb solution  PARoxetine (PAXIL) 40 MG tablet  REFRESH DIGITAL 0.5-1-0.5 % SOLN  simethicone (MYLICON) 125 MG chewable tablet  simvastatin (ZOCOR) 40 MG tablet  triamcinolone (KENALOG) 0.025 % external ointment  VITAMIN D, CHOLECALCIFEROL, PO          Review of Systems   All other systems reviewed and are negative.      Physical Exam   BP: 123/75  Pulse: 60  Temp: 98.1  F (36.7  C)  Resp: 16  SpO2: 96 %      Physical Exam  Vitals and nursing note reviewed.   Constitutional:       Appearance: Normal appearance.   Cardiovascular:      Rate and Rhythm: Normal rate.      Pulses: Normal pulses.   Pulmonary:      Effort: Pulmonary effort is normal.   Musculoskeletal:      Right knee: Crepitus present. No swelling or erythema. Normal range of motion. Tenderness present over the medial joint line and lateral joint line.   Skin:     General: Skin is warm.   Neurological:      Mental Status: She is alert.         ED Course        Procedures                Results for orders placed or performed during the hospital encounter of 06/30/24 (from the past 24 hour(s))   XR Knee Right 3 Views    Narrative    Exam: XR KNEE RIGHT 3 VIEWS    Technique: Right knee, 3 Views    Comparison: 3/14/2024    Exam reason: medial right knee pain, h/o  arthritis    Findings:  No acute fracture or dislocation. Moderate medial compartment and mild  lateral compartment joint space narrowing. Small tricompartment  osteophytes.     Soft tissues appear normal.      Impression    Impression:  No acute fracture or dislocation.    PAOLA PEREZ MD         SYSTEM ID:  [id number]       Medications - No data to display    Assessments & Plan (with Medical Decision Making)   Exam consistent with flare of osteoarthritis of right knee. X-ray does not show any acute fracture or dislocation. No signs of infection on exam. Recommended prednisone 5 day course for pain relief which she agrees to. She has follow up scheduled with ortho. She was discharged home following in stable condition.     Plan:  Take the Prednisone as prescribed for your knee pain.   Ice, elevate, and rest.   Follow up with ortho as planned.   Return here sooner with any new or worsening symptoms.       I have reviewed the nursing notes.    I have reviewed the findings, diagnosis, plan and need for follow up with the patient.      New Prescriptions    PREDNISONE (DELTASONE) 20 MG TABLET    Take two tablets (= 40mg) each day for 5 (five) days       Final diagnoses:   Acute pain of right knee       6/30/2024   [facility]

## 2024-07-01 NOTE — DISCHARGE INSTRUCTIONS
Take the Prednisone as prescribed for your knee pain.   Ice, elevate, and rest.   Follow up with ortho as planned.   Return here sooner with any new or worsening symptoms.

## 2024-07-07 ENCOUNTER — HEALTH MAINTENANCE LETTER (OUTPATIENT)
Age: 84
End: 2024-07-07

## 2024-07-09 DIAGNOSIS — F41.9 ANXIETY: ICD-10-CM

## 2024-07-09 NOTE — TELEPHONE ENCOUNTER
clonazePAM 0.5 MG Oral Tablet       Last Written Prescription Date:  05/31/2024  Last Fill Quantity: 20,   # refills: 0  Last Office Visit: 06/14/2024  Future Office visit:    Next 5 appointments (look out 90 days)      Sep 16, 2024 3:00 PM  (Arrive by 2:45 PM)  Return Visit with Ha Hughes DO  [facility] ([facility] ) [address] [address]  [phone]     Sep 16, 2024 4:00 PM  (Arrive by 3:45 PM)  Provider Visit with Yanique Mar MD  [facility] ([facility] ) [address]  [facility] [address]  [phone]             Routing refill request to provider for review/approval because:    Kimberly Boecker, RN

## 2024-07-11 ENCOUNTER — TRANSFERRED RECORDS (OUTPATIENT)
Dept: HEALTH INFORMATION MANAGEMENT | Facility: CLINIC | Age: 84
End: 2024-07-11
Payer: COMMERCIAL

## 2024-07-11 RX ORDER — CLONAZEPAM 0.5 MG/1
TABLET ORAL
Qty: 20 TABLET | Refills: 0 | Status: SHIPPED | OUTPATIENT
Start: 2024-07-11 | End: 2024-08-16

## 2024-08-06 ENCOUNTER — TRANSFERRED RECORDS (OUTPATIENT)
Dept: HEALTH INFORMATION MANAGEMENT | Facility: CLINIC | Age: 84
End: 2024-08-06
Payer: COMMERCIAL

## 2024-08-07 ENCOUNTER — HOSPITAL ENCOUNTER (EMERGENCY)
Facility: HOSPITAL | Age: 84
Discharge: HOME | End: 2024-08-07
Attending: NURSE PRACTITIONER | Admitting: NURSE PRACTITIONER
Payer: COMMERCIAL

## 2024-08-07 VITALS
RESPIRATION RATE: 19 BRPM | HEART RATE: 66 BPM | HEIGHT: 65 IN | OXYGEN SATURATION: 97 % | WEIGHT: 210.98 LBS | BODY MASS INDEX: 35.15 KG/M2 | DIASTOLIC BLOOD PRESSURE: 69 MMHG | SYSTOLIC BLOOD PRESSURE: 122 MMHG | TEMPERATURE: 98.8 F

## 2024-08-07 DIAGNOSIS — R22.0 FACIAL SWELLING: Primary | ICD-10-CM

## 2024-08-07 PROCEDURE — G0463 HOSPITAL OUTPT CLINIC VISIT: HCPCS

## 2024-08-07 PROCEDURE — 99213 OFFICE O/P EST LOW 20 MIN: CPT | Performed by: NURSE PRACTITIONER

## 2024-08-07 ASSESSMENT — ENCOUNTER SYMPTOMS: FACIAL SWELLING: 1

## 2024-08-07 ASSESSMENT — COLUMBIA-SUICIDE SEVERITY RATING SCALE - C-SSRS
6. HAVE YOU EVER DONE ANYTHING, STARTED TO DO ANYTHING, OR PREPARED TO DO ANYTHING TO END YOUR LIFE?: NO
2. HAVE YOU ACTUALLY HAD ANY THOUGHTS OF KILLING YOURSELF IN THE PAST MONTH?: NO
1. IN THE PAST MONTH, HAVE YOU WISHED YOU WERE DEAD OR WISHED YOU COULD GO TO SLEEP AND NOT WAKE UP?: NO

## 2024-08-07 ASSESSMENT — ACTIVITIES OF DAILY LIVING (ADL): ADLS_ACUITY_SCORE: 38

## 2024-08-08 NOTE — ED PROVIDER NOTES
History     Chief Complaint   Patient presents with    Facial Swelling     HPI  Dinorah Lim is a 83 year old female who presents to urgent care for evaluation of some facial swelling that she has noticed below both eyes over the last couple of days.  Patient tells me that she went to the park and sat on a bench and ate a hot dog.  Shortly after this is when she noticed that the mild swelling and noticed in the redness.  It is not gotten worse.  No trouble breathing.  No pain.  No itching.  She states she feels warm to the touch.  No known fevers at home.  No fever during this visit.  She has been applying cold compresses.      Of note she tells me that aside from these last couple of days she does have a history of getting some redness to the exact same areas.  Possibly related to her glasses that she wears but not always.    Patient tells me that she feels warm to the touch but denies any fevers at home.  She has not taken any antipyretics prior to this arrival.  She is afebrile during this visit.  She tells me that her belly is warm.  No abdominal pain, N/V/D.    Allergies:  Allergies   Allergen Reactions    Chocolate Hives    Lemon Flavor Hives    Lime [Lime, Sulfurated (Calcium Polysulfide)] Hives    Metal [Staples]     Orange Fruit [Citrus] Hives    Strawberry Extract Hives    Adhesive Tape      Band-aids    Codeine Hives     Patient can tolerate oxycodone & dilaudid    Erythromycin Hives     ERYTHROMYCIN BASE    Food      Tomato, grapefruit, oranges.    Grapefruit [Extra Strength Grapefruit]      GRAPEFRUIT    Morphine Hives     Pt. Reports had hives    Penicillins Hives    Ranitidine GI Disturbance    Sulfa Antibiotics      SULFONAMIDE ANTIBIOTICS     Tomato     Xyzal [Levocetirizine] Hives       Problem List:    Patient Active Problem List    Diagnosis Date Noted    Other chest pain 10/20/2021     Priority: Medium    Hiatal hernia 07/28/2021     Priority: Medium    Chronic, continuous use of opioids  05/17/2021     Priority: Medium    Stage 3a chronic kidney disease (H) 03/22/2021     Priority: Medium    COVID-19 virus infection on 10/30/20 11/24/2020     Priority: Medium     10-      Other neutropenia (H24) 08/04/2020     Priority: Medium    Paresthesias 02/13/2020     Priority: Medium    Status post total left knee replacement 09/09/2019     Priority: Medium    Aortic valve insufficiency 03/06/2019     Priority: Medium    Mitral regurgitation 03/06/2019     Priority: Medium    Tricuspid valve insufficiency 03/06/2019     Priority: Medium    Diastolic dysfunction grade 1 on 2/21/19 03/06/2019     Priority: Medium    Hypertriglyceridemia 03/06/2019     Priority: Medium    Palpitations 02/13/2019     Priority: Medium    PVC's (premature ventricular contractions) 02/13/2019     Priority: Medium    Atrial ectopy 02/13/2019     Priority: Medium    PSVT (paroxysmal supraventricular tachycardia) (H24) 02/13/2019     Priority: Medium    COPD, mild on 9/9/14 02/13/2019     Priority: Medium    Bilateral carotid artery stenosis at less than 50% on 12/1/16 02/13/2019     Priority: Medium    Mixed hyperlipidemia 02/13/2019     Priority: Medium    On statin therapy 02/13/2019     Priority: Medium    Heart murmur 02/13/2019     Priority: Medium    Morbid obesity (H) 06/01/2017     Priority: Medium    ACP (advance care planning) 04/28/2016     Priority: Medium     Advance Care Planning 4/28/2016: ACP Review of Chart / Resources Provided:  Reviewed chart for advance care plan.  Dinorah Lim has no plan or code status on file. Discussed available resources and provided with information. Confirmed code status reflects current choices pending further ACP discussions.  Confirmed/documented legally designated decision makers.  Added by Aditi Gandhi            Anxiety 06/23/2014     Priority: Medium    Lung density on x-ray 07/23/2013     Priority: Medium    Osteoarthritis 06/14/2012     Priority: Medium     Problem list  name updated by automated process. Provider to review      Advanced care planning/counseling discussion 01/13/2012     Priority: Medium    Abnormal glucose 08/01/2011     Priority: Medium     Hgb A1c 5.7 on 1/4/2015  Problem list name updated by automated process. Provider to review      Osteoarthritis of right hip 10/07/2004     Priority: Medium    Urticaria 06/01/2004     Priority: Medium     Problem list name updated by automated process. 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    Obesity, Class II, BMI 35-39.9 1/4/2015    Osteoarthritis of right hip 10/07/2004    Osteoarthrosis 06/14/2012    Otitis externa 10/04/2011    Prediabetes 08/01/2011       Past Surgical History:    Past Surgical History:   Procedure Laterality Date    ARTHROPLASTY KNEE Left 9/9/2019    Procedure: LEFT TOTAL KNEE ARTHROPLASTY S/N;  Surgeon: Trevor Alonzo MD;  [facility]: [facility]    ARTHROSCOPY KNEE Left 3/21/2019    Procedure: LEFT  KNEE ARTHROSCOPY, partial medial menisectomy;  Surgeon: Esteban Wills MD;  Location: [facility]    CLOSED REDUCTION WRIST Right 1952 x 2    long arm cast (same time as elbow fx)    CLOSED RX ELBOW DISLOCATION Right 1952    CR/long cast of fracture    EXCISE MASS FOOT Left 8/16/2021    Procedure: LEFT ANKLE MASS EXCISION;  Surgeon: Trevor Alonzo MD;  Location: [facility]    HC INJ EPIDURAL LUMBAR/SACRAL W/WO CONTRAST Bilateral 5/2013    facet injections; prev 2012    LAPAROSCOPIC CHOLECYSTECTOMY N/A 1/4/2015    Procedure: LAPAROSCOPIC CHOLECYSTECTOMY;  Surgeon: Brittney العلي  "MD;  Location: [facility]    TONSILLECTOMY      ZZC TOTAL KNEE ARTHROPLASTY Left 09/09/2019    Dr Alonzo       Family History:    Family History   Problem Relation Age of Onset    Heart Disease Brother         atrial fibrillation    Atrial fibrillation Brother     Other - See Comments Mother         emphysema    Heart Disease Father 92        CHF    Cancer Paternal Grandfather         lung cancer    Cancer Maternal Uncle         lung cancer    Chronic Obstructive Pulmonary Disease Daughter     Emphysema Daughter     Other - See Comments Daughter         benign breast lesion    Esophagitis Daughter        Social History:  Marital Status:  Single [1]  Social History     Tobacco Use    Smoking status: Never     Passive exposure: Never    Smokeless tobacco: Never   Vaping Use    Vaping status: Never Used   Substance Use Topics    Alcohol use: No    Drug use: No        Medications:    acetaminophen (TYLENOL) 325 MG tablet  Calcium-Magnesium-Vitamin D (CALCIUM 1200+D3 PO)  clonazePAM (KLONOPIN) 0.5 MG tablet  Cranberry 125 MG TABS  ferrous sulfate (FEROSUL) 325 (65 Fe) MG tablet  FISH OIL  fluticasone (FLONASE) 50 MCG/ACT nasal spray  gabapentin (NEURONTIN) 100 MG capsule  ipratropium - albuterol 0.5 mg/2.5 mg/3 mL (DUONEB) 0.5-2.5 (3) MG/3ML neb solution  ipratropium - albuterol 0.5 mg/2.5 mg/3 mL (DUONEB) 0.5-2.5 (3) MG/3ML neb solution  ipratropium - albuterol 0.5 mg/2.5 mg/3 mL (DUONEB) 0.5-2.5 (3) MG/3ML neb solution  PARoxetine (PAXIL) 40 MG tablet  predniSONE (DELTASONE) 20 MG tablet  REFRESH DIGITAL 0.5-1-0.5 % SOLN  simethicone (MYLICON) 125 MG chewable tablet  simvastatin (ZOCOR) 40 MG tablet  triamcinolone (KENALOG) 0.025 % external ointment  VITAMIN D, CHOLECALCIFEROL, PO          Review of Systems   HENT:  Positive for facial swelling.    All other systems reviewed and are negative.      Physical Exam   BP: 122/69  Pulse: 66  Temp: 98.8  F (37.1  C)  Resp: 19  Height: 165.1 cm (5' 5\")  Weight: 95.7 kg (210 lb 15.7 " oz)  SpO2: 97 %      Physical Exam  Vitals and nursing note reviewed.   Constitutional:       Appearance: Normal appearance. She is not ill-appearing or toxic-appearing.   HENT:      Head: Atraumatic.        Nose: Nose normal.      Mouth/Throat:      Mouth: Mucous membranes are moist.      Pharynx: No oropharyngeal exudate or posterior oropharyngeal erythema.      Comments: No oral swelling.  Eyes:      General:         Right eye: No discharge.         Left eye: No discharge.      Extraocular Movements: Extraocular movements intact.      Conjunctiva/sclera: Conjunctivae normal.      Pupils: Pupils are equal, round, and reactive to light.   Cardiovascular:      Rate and Rhythm: Normal rate and regular rhythm.      Heart sounds: Murmur heard.   Pulmonary:      Effort: Pulmonary effort is normal. No respiratory distress.      Breath sounds: Normal breath sounds.   Abdominal:      General: Bowel sounds are normal.      Palpations: Abdomen is soft.      Tenderness: There is no abdominal tenderness. There is no guarding or rebound.      Comments: No tenderness to palpation to entire abdomen.  No rashes, erythema, bruising appreciated.  Abdomen does not feel significantly warm to the touch in comparison to other parts of her body.   Musculoskeletal:      Cervical back: Neck supple.   Skin:     General: Skin is warm and dry.      Coloration: Skin is not pale.   Neurological:      Mental Status: She is alert and oriented to person, place, and time.         ED Course        Procedures       No results found for this or any previous visit (from the past 24 hour(s)).    Medications - No data to display    Assessments & Plan (with Medical Decision Making)   83-year-old female that presented for evaluation of some mild facial swelling that she first noticed 2 days ago when she went to the park.  On exam she has of some mild facial swelling with very mild redness to both areas under both eyes.  Area is not warm or tender to the  touch.  There is no oral swelling.  No findings concerning angioedema or similar.  Pleasantly talkative female in no apparent respiratory distress.  PERRLA.  EOMs intact.  No findings consistent with conjunctivitis or periorbital conjunctivitis.     Interestingly when she does put her eyeglasses on the bottom of eyeglasses sit in the exact same areas that she has the mild swelling and redness.  Patient also reports history of similar symptoms in the past but does not believe it is always related to her glasses.  Symptoms always resolve on their own.   Discussed with patient that there is low concern for an infection on her face at this time.  Could likely be an allergic reaction to something she could have been exposed to when she was outside at the park.  Also considered some irritation from her glasses considering they sit in the exact same area she is concerned about.  At this time I recommended taking Zyrtec and continue with cold compresses..  Follow-up with primary doctor if no improvement in symptoms.  Return to urgent care or emergency department for any worsening or concerning symptoms.    I have reviewed the nursing notes.    I have reviewed the findings, diagnosis, plan and need for follow up with the patient.  This document was prepared using a combination of typing and voice generated software.  While every attempt was made for accuracy, spelling and grammatical errors may exist.         Discharge Medication List as of 8/7/2024  8:26 PM          Final diagnoses:   Facial swelling       8/7/2024   [facility]       Bisi Castelan, CNP  08/08/24 0921

## 2024-08-08 NOTE — ED TRIAGE NOTES
C/o eye swelling    Started 2 days ago  States that its warm, slight swelling noted  Denies any vision changes

## 2024-08-08 NOTE — ED TRIAGE NOTES
OTF Cage CNP assessed patient in triage and determined patient Urgent Care appropriate. Will be seen in Urgent Care.

## 2024-08-08 NOTE — DISCHARGE INSTRUCTIONS
No significant signs of infection.  It almost appears like this something that irritated both of those areas that are slightly swollen and red.  Recommended taking cetirizine (Zyrtec).  Continue with the cold compresses.    Follow-up with your primary doctor if no improvement in symptoms.  Return to urgent care or emergency room for any worsening or concerning symptoms.

## 2024-08-12 ENCOUNTER — HOSPITAL ENCOUNTER (EMERGENCY)
Facility: HOSPITAL | Age: 84
Discharge: HOME | End: 2024-08-12
Attending: NURSE PRACTITIONER | Admitting: NURSE PRACTITIONER
Payer: COMMERCIAL

## 2024-08-12 ENCOUNTER — APPOINTMENT (OUTPATIENT)
Dept: CT IMAGING | Facility: HOSPITAL | Age: 84
End: 2024-08-12
Attending: NURSE PRACTITIONER
Payer: COMMERCIAL

## 2024-08-12 VITALS
HEART RATE: 60 BPM | RESPIRATION RATE: 16 BRPM | DIASTOLIC BLOOD PRESSURE: 82 MMHG | TEMPERATURE: 98.6 F | OXYGEN SATURATION: 97 % | SYSTOLIC BLOOD PRESSURE: 135 MMHG

## 2024-08-12 DIAGNOSIS — H57.9 EYE PRESSURE: ICD-10-CM

## 2024-08-12 DIAGNOSIS — E78.5 HYPERLIPIDEMIA LDL GOAL <100: ICD-10-CM

## 2024-08-12 LAB
ALBUMIN SERPL BCG-MCNC: 3.9 G/DL (ref 3.5–5.2)
ALP SERPL-CCNC: 108 U/L (ref 40–150)
ALT SERPL W P-5'-P-CCNC: 11 U/L (ref 0–50)
ANION GAP SERPL CALCULATED.3IONS-SCNC: 7 MMOL/L (ref 7–15)
AST SERPL W P-5'-P-CCNC: 14 U/L (ref 0–45)
BILIRUB SERPL-MCNC: 0.5 MG/DL
BUN SERPL-MCNC: 18.6 MG/DL (ref 8–23)
CALCIUM SERPL-MCNC: 10.4 MG/DL (ref 8.8–10.4)
CHLORIDE SERPL-SCNC: 106 MMOL/L (ref 98–107)
CREAT SERPL-MCNC: 1.09 MG/DL (ref 0.51–0.95)
EGFRCR SERPLBLD CKD-EPI 2021: 51 ML/MIN/1.73M2
ERYTHROCYTE [DISTWIDTH] IN BLOOD BY AUTOMATED COUNT: 13.2 % (ref 10–15)
GLUCOSE SERPL-MCNC: 127 MG/DL (ref 70–99)
HCO3 SERPL-SCNC: 26 MMOL/L (ref 22–29)
HCT VFR BLD AUTO: 41.5 % (ref 35–47)
HGB BLD-MCNC: 13.1 G/DL (ref 11.7–15.7)
HOLD SPECIMEN: NORMAL
MCH RBC QN AUTO: 29.1 PG (ref 26.5–33)
MCHC RBC AUTO-ENTMCNC: 31.6 G/DL (ref 31.5–36.5)
MCV RBC AUTO: 92 FL (ref 78–100)
PLATELET # BLD AUTO: 169 10E3/UL (ref 150–450)
POTASSIUM SERPL-SCNC: 4.6 MMOL/L (ref 3.4–5.3)
PROT SERPL-MCNC: 6.1 G/DL (ref 6.4–8.3)
RBC # BLD AUTO: 4.5 10E6/UL (ref 3.8–5.2)
SODIUM SERPL-SCNC: 139 MMOL/L (ref 135–145)
WBC # BLD AUTO: 4.3 10E3/UL (ref 4–11)

## 2024-08-12 PROCEDURE — 70450 CT HEAD/BRAIN W/O DYE: CPT

## 2024-08-12 PROCEDURE — 80053 COMPREHEN METABOLIC PANEL: CPT | Performed by: NURSE PRACTITIONER

## 2024-08-12 PROCEDURE — 80053 COMPREHEN METABOLIC PANEL: CPT | Performed by: STUDENT IN AN ORGANIZED HEALTH CARE EDUCATION/TRAINING PROGRAM

## 2024-08-12 PROCEDURE — 99285 EMERGENCY DEPT VISIT HI MDM: CPT | Mod: 25

## 2024-08-12 PROCEDURE — 36415 COLL VENOUS BLD VENIPUNCTURE: CPT | Performed by: STUDENT IN AN ORGANIZED HEALTH CARE EDUCATION/TRAINING PROGRAM

## 2024-08-12 PROCEDURE — 99284 EMERGENCY DEPT VISIT MOD MDM: CPT | Performed by: NURSE PRACTITIONER

## 2024-08-12 PROCEDURE — 85027 COMPLETE CBC AUTOMATED: CPT | Performed by: STUDENT IN AN ORGANIZED HEALTH CARE EDUCATION/TRAINING PROGRAM

## 2024-08-12 PROCEDURE — 70496 CT ANGIOGRAPHY HEAD: CPT

## 2024-08-12 PROCEDURE — 85027 COMPLETE CBC AUTOMATED: CPT | Performed by: NURSE PRACTITIONER

## 2024-08-12 PROCEDURE — 80061 LIPID PANEL: CPT

## 2024-08-12 PROCEDURE — 250N000011 HC RX IP 250 OP 636: Performed by: NURSE PRACTITIONER

## 2024-08-12 RX ORDER — IOPAMIDOL 755 MG/ML
67 INJECTION, SOLUTION INTRAVASCULAR ONCE
Status: COMPLETED | OUTPATIENT
Start: 2024-08-12 | End: 2024-08-12

## 2024-08-12 RX ADMIN — IOPAMIDOL 67 ML: 755 INJECTION, SOLUTION INTRAVENOUS at 20:55

## 2024-08-12 ASSESSMENT — ENCOUNTER SYMPTOMS
EYES NEGATIVE: 1
RESPIRATORY NEGATIVE: 1
ENDOCRINE NEGATIVE: 1
ALLERGIC/IMMUNOLOGIC NEGATIVE: 1
CONSTITUTIONAL NEGATIVE: 1
GASTROINTESTINAL NEGATIVE: 1
NEUROLOGICAL NEGATIVE: 1
HEMATOLOGIC/LYMPHATIC NEGATIVE: 1
CARDIOVASCULAR NEGATIVE: 1
PSYCHIATRIC NEGATIVE: 1
NECK PAIN: 1

## 2024-08-12 ASSESSMENT — ACTIVITIES OF DAILY LIVING (ADL)
ADLS_ACUITY_SCORE: 38
ADLS_ACUITY_SCORE: 38

## 2024-08-12 NOTE — ED TRIAGE NOTES
SILVANA Silvestre assessed patient in triage and determined patient not Urgent Care appropriate. Will be seen in Emergency Department.

## 2024-08-12 NOTE — ED TRIAGE NOTES
"Patient presents with c/o left \"ear feels funny/painful/like there is water in there.\" Patient also reports intermittent dizziness, non reproducible with movement, no nystagmus noted.    [facility] provider preformed bilateral ear exam, both ears- WNL.      BEFAST- negative.         "

## 2024-08-13 ENCOUNTER — TELEPHONE (OUTPATIENT)
Dept: FAMILY MEDICINE | Facility: OTHER | Age: 84
End: 2024-08-13

## 2024-08-13 NOTE — ED PROVIDER NOTES
"  History     Chief Complaint   Patient presents with    Dizziness     HPI  Dinorah Lim is a 83 year old individual with history of anxiety, COPD, carotid artery stenosis, aortic valve insufficiency, mitral regurg, tricuspid valve insufficiency, stage III chronic kidney disease, comes in for dizziness.  Patient states she has had a \"pressure behind her eyes\" and left ear fullness for the past 2 days.  States it is intermittent.  Due to this patient comes in.  No fever or chills.  No visual disturbance.  Denies dizziness or lightheadedness.  States that it is a \"pressure behind her eyes\".  No chest pain, weaknesses, paresthesias reported.    Allergies:  Allergies   Allergen Reactions    Chocolate Hives    Lemon Flavor Hives    Lime [Lime, Sulfurated (Calcium Polysulfide)] Hives    Metal [Staples]     Orange Fruit [Citrus] Hives    Strawberry Extract Hives    Adhesive Tape      Band-aids    Codeine Hives     Patient can tolerate oxycodone & dilaudid    Erythromycin Hives     ERYTHROMYCIN BASE    Food      Tomato, grapefruit, oranges.    Grapefruit [Extra Strength Grapefruit]      GRAPEFRUIT    Morphine Hives     Pt. Reports had hives    Penicillins Hives    Ranitidine GI Disturbance    Sulfa Antibiotics      SULFONAMIDE ANTIBIOTICS     Tomato     Xyzal [Levocetirizine] Hives       Problem List:    Patient Active Problem List    Diagnosis Date Noted    Other chest pain 10/20/2021     Priority: Medium    Hiatal hernia 07/28/2021     Priority: Medium    Chronic, continuous use of opioids 05/17/2021     Priority: Medium    Stage 3a chronic kidney disease (H) 03/22/2021     Priority: Medium    COVID-19 virus infection on 10/30/20 11/24/2020     Priority: Medium     10-      Other neutropenia (H24) 08/04/2020     Priority: Medium    Paresthesias 02/13/2020     Priority: Medium    Status post total left knee replacement 09/09/2019     Priority: Medium    Aortic valve insufficiency 03/06/2019     Priority: Medium "    Mitral regurgitation 03/06/2019     Priority: Medium    Tricuspid valve insufficiency 03/06/2019     Priority: Medium    Diastolic dysfunction grade 1 on 2/21/19 03/06/2019     Priority: Medium    Hypertriglyceridemia 03/06/2019     Priority: Medium    Palpitations 02/13/2019     Priority: Medium    PVC's (premature ventricular contractions) 02/13/2019     Priority: Medium    Atrial ectopy 02/13/2019     Priority: Medium    PSVT (paroxysmal supraventricular tachycardia) (H24) 02/13/2019     Priority: Medium    COPD, mild on 9/9/14 02/13/2019     Priority: Medium    Bilateral carotid artery stenosis at less than 50% on 12/1/16 02/13/2019     Priority: Medium    Mixed hyperlipidemia 02/13/2019     Priority: Medium    On statin therapy 02/13/2019     Priority: Medium    Heart murmur 02/13/2019     Priority: Medium    Morbid obesity (H) 06/01/2017     Priority: Medium    ACP (advance care planning) 04/28/2016     Priority: Medium     Advance Care Planning 4/28/2016: ACP Review of Chart / Resources Provided:  Reviewed chart for advance care plan.  Dinorah Lim has no plan or code status on file. Discussed available resources and provided with information. Confirmed code status reflects current choices pending further ACP discussions.  Confirmed/documented legally designated decision makers.  Added by Aditi Gandhi            Anxiety 06/23/2014     Priority: Medium    Lung density on x-ray 07/23/2013     Priority: Medium    Osteoarthritis 06/14/2012     Priority: Medium     Problem list name updated by automated process. Provider to review      Advanced care planning/counseling discussion 01/13/2012     Priority: Medium    Abnormal glucose 08/01/2011     Priority: Medium     Hgb A1c 5.7 on 1/4/2015  Problem list name updated by automated process. Provider to review      Osteoarthritis of right hip 10/07/2004     Priority: Medium    Urticaria 06/01/2004     Priority: Medium     Problem list name updated by  automated process. 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    Obesity, Class II, BMI 35-39.9 1/4/2015    Osteoarthritis of right hip 10/07/2004    Osteoarthrosis 06/14/2012    Otitis externa 10/04/2011    Prediabetes 08/01/2011       Past Surgical History:    Past Surgical History:   Procedure Laterality Date    ARTHROPLASTY KNEE Left 9/9/2019    Procedure: LEFT TOTAL KNEE ARTHROPLASTY S/N;  Surgeon: Trevor Alonzo MD;  Location: [facility]    ARTHROSCOPY KNEE Left 3/21/2019    Procedure: LEFT  KNEE ARTHROSCOPY, partial medial menisectomy;  Surgeon: Esteban Wills MD;  Location: [facility]    CLOSED REDUCTION WRIST Right 1952 x 2    long arm cast (same time as elbow fx)    CLOSED RX ELBOW DISLOCATION Right 1952    CR/long cast of fracture    EXCISE MASS FOOT Left 8/16/2021    Procedure: LEFT ANKLE MASS EXCISION;  Surgeon: Trevor Alonzo MD;  Location: [facility] OR    HC INJ EPIDURAL LUMBAR/SACRAL W/WO CONTRAST Bilateral 5/2013    facet injections; prev 2012    LAPAROSCOPIC CHOLECYSTECTOMY N/A 1/4/2015    Procedure: LAPAROSCOPIC CHOLECYSTECTOMY;  Surgeon: Brittney العلي MD;  Location: [facility] OR    TONSILLECTOMY      ZZC TOTAL KNEE ARTHROPLASTY Left 09/09/2019    Dr Alonzo       Family History:    Family History   Problem Relation Age of Onset    Heart Disease Brother         atrial fibrillation    Atrial fibrillation Brother     Other - See Comments Mother         emphysema    Heart Disease Father 92        CHF    Cancer Paternal Grandfather         lung cancer    Cancer Maternal Uncle          lung cancer    Chronic Obstructive Pulmonary Disease Daughter     Emphysema Daughter     Other - See Comments Daughter         benign breast lesion    Esophagitis Daughter        Social History:  Marital Status:  Single [1]  Social History     Tobacco Use    Smoking status: Never     Passive exposure: Never    Smokeless tobacco: Never   Vaping Use    Vaping status: Never Used   Substance Use Topics    Alcohol use: No    Drug use: No        Medications:    acetaminophen (TYLENOL) 325 MG tablet  Calcium-Magnesium-Vitamin D (CALCIUM 1200+D3 PO)  clonazePAM (KLONOPIN) 0.5 MG tablet  Cranberry 125 MG TABS  ferrous sulfate (FEROSUL) 325 (65 Fe) MG tablet  FISH OIL  fluticasone (FLONASE) 50 MCG/ACT nasal spray  gabapentin (NEURONTIN) 100 MG capsule  ipratropium - albuterol 0.5 mg/2.5 mg/3 mL (DUONEB) 0.5-2.5 (3) MG/3ML neb solution  ipratropium - albuterol 0.5 mg/2.5 mg/3 mL (DUONEB) 0.5-2.5 (3) MG/3ML neb solution  ipratropium - albuterol 0.5 mg/2.5 mg/3 mL (DUONEB) 0.5-2.5 (3) MG/3ML neb solution  PARoxetine (PAXIL) 40 MG tablet  predniSONE (DELTASONE) 20 MG tablet  REFRESH DIGITAL 0.5-1-0.5 % SOLN  simethicone (MYLICON) 125 MG chewable tablet  simvastatin (ZOCOR) 40 MG tablet  triamcinolone (KENALOG) 0.025 % external ointment  VITAMIN D, CHOLECALCIFEROL, PO          Review of Systems   Constitutional: Negative.    HENT: Negative.     Eyes: Negative.    Respiratory: Negative.     Cardiovascular: Negative.    Gastrointestinal: Negative.    Endocrine: Negative.    Genitourinary: Negative.    Musculoskeletal:  Positive for neck pain (Right sided).   Skin: Negative.    Allergic/Immunologic: Negative.    Neurological: Negative.    Hematological: Negative.    Psychiatric/Behavioral: Negative.         Physical Exam   BP: 109/65  Pulse: 62  Temp: 98.6  F (37  C)  Resp: 16  SpO2: 94 %      GENERAL APPEARANCE:  The patient is a 83 year old well-developed, well-nourished individual in no acute distress that appears as stated  age.  HEENT:  Normocephalic and atraumatic.  No scleral icterus.  Pupils are equal, round, and reactive to light.  No conjunctival injection is noted.  Oropharynx is clear.  Uvula is midline and without swelling.  Mouth revealed no lesions.  Voice is clear and without muffling.  Bilateral nares clear of drainage.  Tympanic membranes are clear.  NECK:  Supple.  Trachea is midline.  No carotid bruits present on auscultation.  LUNGS:  Breathing is easy.  Breath sounds are equal and clear bilaterally.  No wheezes, rhonchi, or rales.  HEART:  Regular rate and rhythm with normal S1 and S2.  No murmurs, gallops, or rubs.  MENTAL STATUS:   The patient was alert and oriented to person, place, time and purpose. Registration and recall intact. No difficulty with concentration.   CRANIAL NERVES:  PERRL. EOMI; no nystagmus.  Full visual fields.  Trapezius and sternocleidomastoid are full strength. Tongue was midline and protrudes midline. Uvula was midline and raises midline. Facial sensation was intact to pain and light touch at all distributions. No speech disturbance. Hearing intact to conversation and whisper.  No facial asymmetry.  MOTOR: Strength was 5/5 at upper extremities, lower extremities and trunk.  No drift.  Speed and dexterity was unremarkable. Bulk and tone were unremarkable. There was no evidence of atrophy/atrophy of intrinsic hand muscles or foot muscles.  No abnormal movements or fasciculations were observed.  SENSORY:  Sensation intact to pain and light touch at all distributions.   No neglect.  REFLEXES: 2+ throughout.  There was no clonus present.  Toes down-going.  CEREBELLAR FUNCTIONING: No difficulty with finger-to-nose, finger tapping and heel-to-shin tasks. No dysmetria or dysdiadochokinesia observed.  PSYCH:   Euthymic affect. Thought content unremarkable.    SKIN:  Warm, dry, and well perfused.  Good turgor.  No lesions, nodules, or rashes are noted.  No bruising noted.      Comment: Discrepancies  between my note and notes on behalf of the nursing team or other care providers are secondary to my findings reflecting my physical examination and questioning of the patient.  Any conflicting information provided is not in line with my examination of the patient.       ED Course     ED Course as of 08/12/24 2156   Mon Aug 12, 2024   1827 Labs ordered while patient in [facility].   2008 In to see patient and history/physical completed.    2009 CT of head and CT angio head and neck ordered.   2022 Negative HINT exam   2151 CT of head and CT angio head and neck show no acute abnormalities.  Patient is feeling better.  Vital signs normal.  Neurological examination normal.  For this reason we will discharge home for close follow-up with PCP.  Return precautions given.                 Results for orders placed or performed during the hospital encounter of 08/12/24 (from the past 24 hour(s))   [place] Draw    Narrative    The following orders were created for panel order [place] Draw.  Procedure                               Abnormality         Status                     ---------                               -----------         ------                     Extra Blue Top Tube[[id number]]                              Final result               Extra Red Top Tube[[id number]]                               Final result               Extra Green Top (Lithium...[[id number]]                                                 Extra Purple Top Tube[[id number]]                                                       Extra Green Top (Lithium...[[id number]]                                                   Please view results for these tests on the individual orders.   CBC with platelets   Result Value Ref Range    WBC Count 4.3 4.0 - 11.0 10e3/uL    RBC Count 4.50 3.80 - 5.20 10e6/uL    Hemoglobin 13.1 11.7 - 15.7 g/dL    Hematocrit 41.5 35.0 - 47.0 %    MCV 92 78 - 100 fL    MCH 29.1 26.5 - 33.0 pg    MCHC 31.6 31.5 - 36.5 g/dL    RDW 13.2 10.0 -  15.0 %    Platelet Count 169 150 - 450 10e3/uL   Comprehensive metabolic panel   Result Value Ref Range    Sodium 139 135 - 145 mmol/L    Potassium 4.6 3.4 - 5.3 mmol/L    Carbon Dioxide (CO2) 26 22 - 29 mmol/L    Anion Gap 7 7 - 15 mmol/L    Urea Nitrogen 18.6 8.0 - 23.0 mg/dL    Creatinine 1.09 (H) 0.51 - 0.95 mg/dL    GFR Estimate 51 (L) >60 mL/min/1.73m2    Calcium 10.4 8.8 - 10.4 mg/dL    Chloride 106 98 - 107 mmol/L    Glucose 127 (H) 70 - 99 mg/dL    Alkaline Phosphatase 108 40 - 150 U/L    AST 14 0 - 45 U/L    ALT 11 0 - 50 U/L    Protein Total 6.1 (L) 6.4 - 8.3 g/dL    Albumin 3.9 3.5 - 5.2 g/dL    Bilirubin Total 0.5 <=1.2 mg/dL   Extra Blue Top Tube   Result Value Ref Range    Hold Specimen JIC    Extra Red Top Tube   Result Value Ref Range    Hold Specimen JIC    Extra Tube    Narrative    The following orders were created for panel order Extra Tube.  Procedure                               Abnormality         Status                     ---------                               -----------         ------                     Extra Heparinized Syringe[[id number]]                        Final result                 Please view results for these tests on the individual orders.   Extra Heparinized Syringe   Result Value Ref Range    Hold Specimen JIC      CT of head without contrast:  No acute intracranial abnormality.    CT angio head and neck with contrast:  1.  Mild stenosis of the bilateral common carotid arteries and proximal left ICA.  No stenosis in the right ICA.  2.  Patent bilateral vertebral arteries.  3.  Degenerative changes of the cervical spine.    Medications   sodium chloride (PF) 0.9% PF flush 100 mL (100 mLs Intravenous $Given 8/12/24 2055)   iopamidol (ISOVUE-370) solution 67 mL (67 mLs Intravenous $Given 8/12/24 2055)       Assessments & Plan (with Medical Decision Making)     I have reviewed the nursing notes.    I have reviewed the findings, diagnosis, plan and need for follow up with the  patient.    Summary:  Patient presents to the ER today eye pressure.  Potential diagnosis which have been considered and evaluated include CVA, TIA, injury or abnormality, electrolyte abnormality, orthostasis, central vertigo, peripheral vertigo, as well as others. Many of these have been excluded using the various modalities and assessment as noted on the chart. At the present time, the diagnosis given seems to be the most likely eye present bilaterally from possible migraine.  Upon arrival, vitals signs are normal.  The patient is alert and oriented.  Neurological examination normal.  Cardiac and respiratory examination normal.  No acute findings on HEENT examination.  Lab work was obtained showing WBC of 4.3 with hemoglobin of 13.1.  Electrolytes and renal functions benign.  HINT exam showed no acute abnormality.  Patient does have some tenderness behind her right neck with improvement of symptoms with palpation.  For concerns of vertebral artery dissection did do CT of head and CT angio head and neck which showed no acute abnormalities.  At this time patient feeling better.  Vital signs, physical examination, lab work, imaging normal.  Will have patient follow-up with PCP for reevaluation.  Return to ER if new or worsening symptoms.  Patient verbalized understand agrees to plan of care.  Patient discharged home.        Critical Care Time: None    Impression and plan discussed with patient. Questions answered, concerns addressed, indications for urgent re-evaluation reviewed, and  given. Patient/Parent/Caregiver agree with treatment plan and have no further questions at this time.  AVS provided at discharge.    This note was created by the Dragon Voice Dictation System. Inadvertent typographical errors, due to software recognition problems, may still exist.             New Prescriptions    No medications on file       Final diagnoses:   Eye pressure       8/12/2024   [facility]        Esteban Walton, APRN CNP  08/12/24 1248

## 2024-08-13 NOTE — TELEPHONE ENCOUNTER
Appointment scheduled:    Next 5 appointments (look out 90 days)      Sep 16, 2024 3:00 PM  (Arrive by 2:45 PM)  Return Visit with Ha Hughes DO  [facility] [place] ([facility] [place] ) [address]  [place] [address]  [phone]     Sep 16, 2024 4:00 PM  (Arrive by 3:45 PM)  Provider Visit with Yanique Mar MD  [facility] ([facility] [place] ) [address]  [facility] [address]  [phone]                           OR  Pended to Provider to review & advise for [employer] ?      Emergency Department and Urgent Care Follow-up Visit    Reason for ER/UC visit:   dizziness    Date seen:   8/12    New or Worsening symptoms:    A little bit dizzy    Prescription Received/Picked up from Pharmacy?   NA    Follow-up Results or Labs that are pending:   No      Recommended ED/UC with any acute/rapid decline in condition.  Call back to the clinic with any further questions/concerns  Patient relayed understanding  No further concerns at calls end.

## 2024-08-13 NOTE — TELEPHONE ENCOUNTER
Symptom or reason needing to speak to RN: ER [facility]      Best number to return call: [phone]     Best time to return call: Anytime

## 2024-08-13 NOTE — DISCHARGE INSTRUCTIONS
Pain control:   If your past medical conditions, allergies, current medications, or current status does not prevent you from using acetaminophen and/or ibuprofen, use the following:   Acetaminophen 650-1000 mg every 6 hours as needed for pain in addition to ibuprofen 400 mg every 6 hours as needed for pain.  Take these two medications together if wanted.    Remember that these are for AS NEEDED.  If not needed, do not take.       Follow-up with your primary care provider for reevaluation.  Contact your primary care provider if you have any questions or concerns.  Do not hesitate to return to the ER if any new or worsening symptoms.     Please read the attached instructions (if any).  They highlight more specific treatments and interventions for you at home.              Thank you for letting me participate in your care and wish you a fast and uneventful recovery,    Esteban CHASE CNP    Do not hesitate to contact me with questions or concerns.  [email or web address]

## 2024-08-14 DIAGNOSIS — E78.5 HYPERLIPIDEMIA LDL GOAL <100: ICD-10-CM

## 2024-08-14 RX ORDER — SIMVASTATIN 40 MG
TABLET ORAL
Qty: 90 TABLET | Refills: 0 | Status: SHIPPED | OUTPATIENT
Start: 2024-08-14

## 2024-08-14 NOTE — TELEPHONE ENCOUNTER
Zocor 40 mg       Last Written Prescription Date:  1/23/24  Last Fill Quantity: 90,   # refills: 1  Last Office Visit: 6/14/24  Future Office visit:    Next 5 appointments (look out 90 days)      Sep 16, 2024 3:00 PM  (Arrive by 2:45 PM)  Return Visit with Ha Hughes DO  [facility] ([facility] [place] ) [address]  [place] [address]  [phone]     Sep 16, 2024 4:00 PM  (Arrive by 3:45 PM)  Provider Visit with Yanique Mar MD  [facility] ([facility] [place] ) [address] [facility] [address]  [phone]             Routing refill request to provider for review/approval because:  Antihyperlipidemic agents Failed      LDL on file in the past 12 months

## 2024-08-15 ENCOUNTER — OFFICE VISIT (OUTPATIENT)
Dept: FAMILY MEDICINE | Facility: OTHER | Age: 84
End: 2024-08-15
Attending: STUDENT IN AN ORGANIZED HEALTH CARE EDUCATION/TRAINING PROGRAM
Payer: COMMERCIAL

## 2024-08-15 VITALS
RESPIRATION RATE: 16 BRPM | BODY MASS INDEX: 35.07 KG/M2 | HEIGHT: 65 IN | WEIGHT: 210.5 LBS | SYSTOLIC BLOOD PRESSURE: 106 MMHG | HEART RATE: 63 BPM | TEMPERATURE: 98.5 F | OXYGEN SATURATION: 98 % | DIASTOLIC BLOOD PRESSURE: 68 MMHG

## 2024-08-15 DIAGNOSIS — R42 DIZZINESS: ICD-10-CM

## 2024-08-15 DIAGNOSIS — G89.29 CHRONIC PAIN OF RIGHT KNEE: Primary | ICD-10-CM

## 2024-08-15 DIAGNOSIS — F41.9 ANXIETY: ICD-10-CM

## 2024-08-15 DIAGNOSIS — E78.5 HYPERLIPIDEMIA LDL GOAL <100: ICD-10-CM

## 2024-08-15 DIAGNOSIS — M25.561 CHRONIC PAIN OF RIGHT KNEE: Primary | ICD-10-CM

## 2024-08-15 DIAGNOSIS — R60.0 BILATERAL LOWER EXTREMITY EDEMA: ICD-10-CM

## 2024-08-15 LAB
CHOLEST SERPL-MCNC: 158 MG/DL
HDLC SERPL-MCNC: 49 MG/DL
LDLC SERPL CALC-MCNC: 85 MG/DL
NONHDLC SERPL-MCNC: 109 MG/DL
TRIGL SERPL-MCNC: 119 MG/DL

## 2024-08-15 PROCEDURE — 99213 OFFICE O/P EST LOW 20 MIN: CPT | Performed by: STUDENT IN AN ORGANIZED HEALTH CARE EDUCATION/TRAINING PROGRAM

## 2024-08-15 PROCEDURE — G0463 HOSPITAL OUTPT CLINIC VISIT: HCPCS

## 2024-08-15 RX ORDER — CLONAZEPAM 0.5 MG/1
TABLET ORAL
Qty: 20 TABLET | Refills: 0 | Status: CANCELLED | OUTPATIENT
Start: 2024-08-15

## 2024-08-15 ASSESSMENT — PAIN SCALES - GENERAL: PAINLEVEL: NO PAIN (0)

## 2024-08-15 NOTE — PROGRESS NOTES
"  Assessment & Plan     Anxiety  Stable condition.  Still using Klonipin occassionally and takes 1/2 tablet of 0.5 mg   She is aware of the risks and dangers of long term use and she is trying to minimize their use.    Chronic pain of right knee  Well controlled    Bilateral lower extremity edema  Stable.  No appreciable edema on my exam today.      Hyperlipidemia LDL goal <100  She requested repeat labs.    LDL is very good at 85.  - Lipid Profile (Chol, Trig, HDL, LDL calc); Future    Dizziness  Resolved.  No issues or concerns related to this    MED REC REQUIRED  Post Medication Reconciliation Status: discharge medications reconciled, continue medications without change  BMI  Estimated body mass index is 35.03 kg/m  as calculated from the following:    Height as of this encounter: 1.651 m (5' 5\").    Weight as of this encounter: 95.5 kg (210 lb 8 oz).   Weight management plan: Discussed healthy diet and exercise guidelines        No follow-ups on file.    Natividad Bill is a 83 year old, presenting for the following health issues:  ER F/U (Dizziness, eye pressure)    HPI     ED/UC Followup:    Facility:  [facility]  Date of visit: 2024  Reason for visit: Dizziness, eye pressure  Current Status: No dizziness, still having a bit of pressure in the eyes.    2 cousins  the same day last week Monday.  Wednesday another relative had .  Has had a very stressful last few days.  No  yet.   Dizziness has resolved.  Will be getting different glasses- will be seeing       Review of Systems  Constitutional, HEENT, cardiovascular, pulmonary, GI, , musculoskeletal, neuro, skin, endocrine and psych systems are negative, except as otherwise noted.      Objective    /68   Pulse 63   Temp 98.5  F (36.9  C) (Tympanic)   Resp 16   Ht 1.651 m (5' 5\")   Wt 95.5 kg (210 lb 8 oz)   SpO2 98%   BMI 35.03 kg/m    Body mass index is 35.03 kg/m .  Physical Exam   GENERAL: alert and no " distress  EYES: Eyes grossly normal to inspection, PERRL and conjunctivae and sclerae normal  HENT: ear canals and TM's normal, nose and mouth without ulcers or lesions  NECK: no adenopathy, no asymmetry, masses, or scars  RESP: lungs clear to auscultation - no rales, rhonchi or wheezes  CV: regular rate and rhythm, normal S1 S2, no S3 or S4, no murmur, click or rub, no peripheral edema  ABDOMEN: soft, nontender, no hepatosplenomegaly, no masses and bowel sounds normal  MS: no gross musculoskeletal defects noted, no edema  SKIN: no suspicious lesions or rashes  NEURO: Normal strength and tone, mentation intact and speech normal  PSYCH: mentation appears normal, affect normal/bright          Signed Electronically by: Yanique Mar MD

## 2024-08-16 DIAGNOSIS — F41.9 ANXIETY: ICD-10-CM

## 2024-08-16 RX ORDER — CLONAZEPAM 0.5 MG/1
TABLET ORAL
Qty: 20 TABLET | Refills: 0 | Status: SHIPPED | OUTPATIENT
Start: 2024-08-16 | End: 2024-09-24

## 2024-08-16 NOTE — TELEPHONE ENCOUNTER
Clonazepam 0.5 mg       Last Written Prescription Date:  07/11/2024  Last Fill Quantity: 20,   # refills: 0  Last Office Visit: 08/15/2024  Future Office visit:    Next 5 appointments (look out 90 days)      Sep 16, 2024 3:00 PM  (Arrive by 2:45 PM)  Return Visit with Ha Hughes DO  [facility] [place] ([facility] [place] ) [address]  [place] [address]  [phone]             Routing refill request to provider for review/approval because:  Drug not on the [facility], [facility] or [facility] refill protocol or controlled substance

## 2024-09-11 ENCOUNTER — HOSPITAL ENCOUNTER (OUTPATIENT)
Dept: CARDIOLOGY | Facility: HOSPITAL | Age: 84
Discharge: HOME | End: 2024-09-11
Attending: INTERNAL MEDICINE | Admitting: INTERNAL MEDICINE
Payer: COMMERCIAL

## 2024-09-11 DIAGNOSIS — I36.1 NON-RHEUMATIC TRICUSPID VALVE INSUFFICIENCY: ICD-10-CM

## 2024-09-11 DIAGNOSIS — I51.89 DIASTOLIC DYSFUNCTION: ICD-10-CM

## 2024-09-11 DIAGNOSIS — I34.0 NON-RHEUMATIC MITRAL REGURGITATION: ICD-10-CM

## 2024-09-11 LAB — LVEF ECHO: NORMAL

## 2024-09-11 PROCEDURE — 93306 TTE W/DOPPLER COMPLETE: CPT | Mod: 26 | Performed by: INTERNAL MEDICINE

## 2024-09-11 PROCEDURE — 93306 TTE W/DOPPLER COMPLETE: CPT

## 2024-09-14 NOTE — TELEPHONE ENCOUNTER
On 7/19/21 - Called pt for the 3rd time to try & setup for a Covid swab for DOS (see below)    Called pt to schedule a Covid swab test for pt upcoming procedure, I left a message to call [phone].    DOS 8-16-21 @ [facility] w/ Dr. Alonzo  (swab date 8-12-21)    
[id number]

## 2024-09-16 ENCOUNTER — OFFICE VISIT (OUTPATIENT)
Dept: CARDIOLOGY | Facility: OTHER | Age: 84
End: 2024-09-16
Attending: INTERNAL MEDICINE
Payer: COMMERCIAL

## 2024-09-16 VITALS
BODY MASS INDEX: 35.49 KG/M2 | RESPIRATION RATE: 20 BRPM | SYSTOLIC BLOOD PRESSURE: 135 MMHG | DIASTOLIC BLOOD PRESSURE: 74 MMHG | OXYGEN SATURATION: 96 % | HEART RATE: 69 BPM | WEIGHT: 213 LBS | HEIGHT: 65 IN

## 2024-09-16 DIAGNOSIS — I49.1 ATRIAL ECTOPY: ICD-10-CM

## 2024-09-16 DIAGNOSIS — E78.1 HYPERTRIGLYCERIDEMIA: ICD-10-CM

## 2024-09-16 DIAGNOSIS — I51.89 DIASTOLIC DYSFUNCTION: ICD-10-CM

## 2024-09-16 DIAGNOSIS — I34.0 NON-RHEUMATIC MITRAL REGURGITATION: ICD-10-CM

## 2024-09-16 DIAGNOSIS — R01.1 HEART MURMUR: Primary | ICD-10-CM

## 2024-09-16 DIAGNOSIS — I49.3 PVC'S (PREMATURE VENTRICULAR CONTRACTIONS): ICD-10-CM

## 2024-09-16 DIAGNOSIS — I47.10 PSVT (PAROXYSMAL SUPRAVENTRICULAR TACHYCARDIA) (H): ICD-10-CM

## 2024-09-16 DIAGNOSIS — I36.1 NON-RHEUMATIC TRICUSPID VALVE INSUFFICIENCY: ICD-10-CM

## 2024-09-16 DIAGNOSIS — I35.1 NONRHEUMATIC AORTIC VALVE INSUFFICIENCY: ICD-10-CM

## 2024-09-16 DIAGNOSIS — I65.23 BILATERAL CAROTID ARTERY STENOSIS: ICD-10-CM

## 2024-09-16 DIAGNOSIS — R00.2 PALPITATIONS: ICD-10-CM

## 2024-09-16 DIAGNOSIS — E78.2 MIXED HYPERLIPIDEMIA: ICD-10-CM

## 2024-09-16 DIAGNOSIS — E66.01 MORBID OBESITY (H): ICD-10-CM

## 2024-09-16 DIAGNOSIS — N18.31 STAGE 3A CHRONIC KIDNEY DISEASE (H): ICD-10-CM

## 2024-09-16 PROCEDURE — G0463 HOSPITAL OUTPT CLINIC VISIT: HCPCS

## 2024-09-16 PROCEDURE — 99214 OFFICE O/P EST MOD 30 MIN: CPT | Performed by: INTERNAL MEDICINE

## 2024-09-16 ASSESSMENT — PAIN SCALES - GENERAL: PAINLEVEL: EXTREME PAIN (8)

## 2024-09-16 NOTE — PATIENT INSTRUCTIONS
Thank you for allowing Dr HEIDI Hughes and our  team to participate in your care. Please call our office at [phone] with scheduling questions or if you need to cancel or change your appointment. With any other questions or concerns you may call cardiology nurse at  [phone].       If you experience chest pain, chest pressure, chest tightness, shortness of breath, fainting, lightheadedness, nausea, vomiting, or other concerning symptoms, please report to the Emergency Department or call 911. These symptoms may be emergent, and best treated in the Emergency Department.

## 2024-09-16 NOTE — PROGRESS NOTES
[facility] HEART [facility]   CARDIOLOGY PROGRESS NOTE     Chief Complaint   Patient presents with    Follow Up          Diagnosis:  1.  CHF-diastolic.    -Grade 1 on 9/11/2024.  -Grade 2 on 9/12/23.   -Grade 1 on 2/21/19, off diuretics.  2.  Carotid artery dz.   -No sign on 2/21/22.   -<50%, US from 8/4/21.  3.  AI.  -Moderate on 2/24/22 and 10/11/22.   -Mild/moderate on 1/28/2021.  4.  MR.  -Trace to mild on 1/28/2021.  5.  TR.  -Mild on 10/11/22.  6.  COPD-minimal on 9/9/14.   -H/O second hand smoke from mom who smoked 3/day.  1/day. No primary tobacco usage.   7.  Morbid obesity.    -BMI of 35.  8.  Hyperlipidemia-controlled.  9.  Atrial ectopy.  10.  Palpitations.  11.  PSVT.  12.  PVC's.  13.  On statin therapy.  14.  Hypertriglyceridemia-controlled.  15.  Hiatal hernia.   -Moderate on 3/18/2020 on a CTA of the chest.  16.  COVID-19.   -(+) on 10/30/20.  17.  Vitamin D deficiency.    -17 on 1/28/15.      Assessment/Plan:    1.  CHF: Has mild peripheral edema as the day goes on.  No edema today presently on evaluation.  Most recent echocardiogram was on 9/11/2024.  Showed grade 1 diastolic dysfunction.  Not currently on heart failure medications, including diuretic.  Overall, she is stable.  No changes.  Previously, her echocardiogram from 9/12/2023 showed grade 2 diastolic dysfunction.  Discussed salt restriction, fluid restriction, and daily weights.  Also, suggested she increase her activity.  She is concerned about heart failure.  Briefly, we discussed SGLT2 inhibitors/Entresto.  Because she is not having symptoms, these medications were not initiated.  Could consider in the future.  She denies significant obstructive sleep symptoms.   2.  MR: Now normal on echocardiogram from 9/11/2024.  Previously follow-up it was mildly to moderately leaky on 1/15/2024.  Discussed that findings have improved.  Follow-up 1 year.  3.  AI: Mild to moderate.  This is unchanged from her last echocardiogram in 2023.  Discussed  these findings.  Plan for repeat echocardiogram in 1 year.  4.  Hyperlipidemia: Continue Zocor 40 mg at bedtime.  5.  Hyperlipidemia: Reviewed her panel.  Stable.  No changes.  6.  Follow-up in 6 months.  Echocardiogram in 1 year.      Interval history:  See above.    HPI:    She has been concerned with a heart murmur as well as bilateral carotid artery stenosis.  She had echocardiogram completed on 2/21/19 as well as an ultrasound of the carotids on 2/21/19.  She is here for those results.       Previously, we reviewed her Zio patch results from 12/31/18 which showed rare PVC's, x1 run of NSVT 5 beats, PAC's, and x26 episodes of PSVT lasting up to 20 beats.     She also has a history of mild carotid artery disease at less than 50% with mild plaquing on 12/1/16.  She does not have a personal history of smoking but was around secondhand smoke in the past.  She had a ultrasound of her carotids on 2/20/19 that showed atherosclerotic plaquing in both carotid bifurcations.  There was no significant hemo-dynamic stenosis.     Her echocardiogram from 2/21/19 showed an EF of 65-70%, grade 1 diastolic dysfunction, mild left atrial enlargement, mild to moderate aortic insufficiency, trace to mild tricuspid insufficiency, and trace to mild mitral insufficiency.  She is concerned about her valvular insufficiency.  She will have an echocardiogram in approximately 2 years to follow-up on the mild to moderate aortic insufficiency.      Relevant testing:  Echocardiogram on 9/11/2024:  Left ventricular size, wall motion and function are normal. The ejection  fraction is 60-65%.  Grade I or early diastolic dysfunction.   Right ventricular function, chamber size, wall motion, and thickness are normal.  Mild to moderate aortic insufficiency.  The inferior vena cava is normal.  This study was compared with the study from 1/12/24. No significant change in AI.    ECHO on 9/12/23:  Global and regional left ventricular function is normal  with an EF of 60-65%.  Grade II or moderate diastolic dysfunction.  Global right ventricular function is normal.  Mild to moderate mitral insufficiency is present.  Mild to moderate aortic insufficiency is present.  IVC diameter <2.1 cm collapsing >50% with sniff suggests a normal RA pressure of 3 mmHg.  No pericardial effusion is present.  No significant changes noted.    Zio patch on 10/12/22:  Worn for 12 days and 8 hr's.  After removing artifact, total time was 12 days and 7 hr's. Placed on 10/12/2022 at 11:30 AM and completed on 10/24/2022 at 7:06 PM.  Underlying rhythm was sinus.  Hrt rate ranged from 52 bpm, maximum heart rate of 197 bmp, averaging 68 bmp.  No significant bradycardia, pauses, Mobitz type II or 3rd degree heart block.  No atrial fibrillation on this study.  x132 triggered events and x14 diary entries.  These corresponded to SVE, VE, SVT, and sinus rhythm.  x2 runs of VT lasting up to 6 beats with a maximum heart rate of 160 bmp.    x121 runs of SVT lasting up to 22.6 sec's with a maximum heart rate of 197 bmp.  Rare, <1% of PAC's, atrial couplets, atrial triplets, ventricular couplets, and ventricular triplets.  Occasional PVC's at 2.3%.  + episodes of ventricular bigeminy lasting up to 7.3 sec's.  0 episodes of ventricular trigeminy lasting.     Echocardiogram on 10/11/2022:  Left ventricular size, wall motion and function are normal. The ejection  fraction is 55-60%.  The right ventricle is normal size. Global right ventricular function is normal.  Moderate aortic insufficiency is present.  Right ventricular systolic pressure is 40 mmHg above the right atrial pressure.  IVC diameter <2.1 cm collapsing >50% with sniff suggests a normal RA pressure  of 3 mmHg.    ECHO on 2/24/22:  Left ventricular size, wall motion and function are normal. The ejection fraction is 60-65%.  Global right ventricular function is normal.  Mild to moderate mitral insufficiency is present.  Moderate aortic  insufficiency is present.  The inferior vena cava is normal.  No pericardial effusion is present.  Compared to prior imaging on 1/28/2021 There is mildly increased PI, MR and AI.   The subtle change is confirmed on visual inspection.    ECHO on 1/28/21:  No pericardial effusion is present.  Global and regional left ventricular function is normal with an EF of 55-60%.  The right ventricle is normal size.  Global right ventricular function is normal.  Both atria appear normal.  Mild mitral insufficiency is present.  The aortic valve is tricuspid.  Mild to moderate aortic insufficiency is present.  AI unchanged from previous ECHO 2/21/19  The mean gradient across the aortic valve is 5.1 mmHg.  Trace tricuspid insufficiency is present.  Trace pulmonic insufficiency is present.  The aorta root is normal.    ECHO on 2/21/19:  No pericardial effusion is present.  Global and regional left ventricular function is hyperkinetic with an EF of  65-70%.  Grade I or early diastolic dysfunction.  The right ventricle is normal size.  Global right ventricular function is normal.  Mild left atrial enlargement is present.  Trace to mild mitral insufficiency is present.  The aortic valve is tricuspid.  Mild to moderate aortic insufficiency is present.  Trace to mild tricuspid insufficiency is present.  The peak velocity of the tricuspid regurgitant jet is not obtainable.  The pulmonic valve is normal.  The aorta root is normal.    Zio patch from 12/31/2018:  The enrollment period was from 12/31/18 through 1/7/19.  There were 6 days and 12 hours of analysis time available for review.  The minimum heart rate was 49, average heart rate 66 and maximum heart rate 200 bpm.  The patient has underlying sinus rhythm throughout.  There is no significant bradycardia pauses or heart block seen.  There were very rare isolated PVC's.  There was a 5 beat run of ventricular tachycardia.  With an average heart rate of 113 bpm.  This was the only  run.  There are rare PAC's seen.  Less than 1% of total beats.  There were 26 episodes of a supraventricular tachycardia greater than 4 beats.  The longest was for 20 beats with an average heart rate of 108 bpm.  The fastest was for 11 beats with a maximum heart rate of 200 bpm but an average heart rate of 134 bpm.  Review of some of these tracings show that it likely is an atrial tachycardia  There were 3 triggered events all 3 of these strips do have PAC's seen.  1 of them had a very slower run of supraventricular tachycardia average heart rate of 113 bpm.  There were 3 diary entries with the symptom of skipped irregular beats.  Review of the associated strips show all had sinus rhythm to them had supraventricular beats.    US carotids on 2/21/22:  No ultrasound evidence of hemo-dynamically significant stenosis.   Small volume of calcified plaque again seen in the left internal  carotid artery.          ICD-10-CM    1. Heart murmur  R01.1 Echocardiogram Complete      2. Diastolic dysfunction grade 1 on 2/21/19  I51.89 Echocardiogram Complete      3. Mitral regurgitation  I34.0 Echocardiogram Complete      4. Tricuspid valve insufficiency  I36.1 Echocardiogram Complete      5. Aortic valve insufficiency  I35.1 Echocardiogram Complete      6. Bilateral carotid artery stenosis at less than 50% on 12/1/16  I65.23       7. PVC's (premature ventricular contractions)  I49.3       8. Palpitations  R00.2       9. Atrial ectopy  I49.1 Echocardiogram Complete      10. Stage 3a chronic kidney disease (H)  N18.31       11. Morbid obesity (H)  E66.01       12. Mixed hyperlipidemia  E78.2       13. Hypertriglyceridemia  E78.1       14. 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    Anterior Segment V 4 cm nodule abutting liver surface by US 1/4/2015     Obesity, Class II, BMI 35-39.9 1/4/2015    BMI 35.9 with comorbidities = MORBID obesity    Osteoarthritis of right hip 10/07/2004    Osteoarthrosis 06/14/2012    Otitis externa 10/04/2011    Prediabetes 08/01/2011       Past Surgical History:   Procedure Laterality Date    ARTHROPLASTY KNEE Left 9/9/2019    Procedure: LEFT TOTAL KNEE ARTHROPLASTY S/N;  Surgeon: Trevor Alonzo MD;  Location: [facility] OR    ARTHROSCOPY KNEE Left 3/21/2019    Procedure: LEFT  KNEE ARTHROSCOPY, partial medial menisectomy;  Surgeon: Esteban Wills MD;  Location: [facility] OR    CLOSED REDUCTION WRIST Right 1952 x 2    long arm cast (same time as elbow fx)    CLOSED RX ELBOW DISLOCATION Right 1952    CR/long cast of fracture    EXCISE MASS FOOT Left 8/16/2021    Procedure: LEFT ANKLE MASS EXCISION;  Surgeon: Trevor Alonzo MD;  Location: [facility] OR    HC INJ EPIDURAL LUMBAR/SACRAL W/WO CONTRAST Bilateral 5/2013    facet injections; prev 2012    LAPAROSCOPIC CHOLECYSTECTOMY N/A 1/4/2015    Procedure: LAPAROSCOPIC CHOLECYSTECTOMY;  Surgeon: Brittney العلي MD;  Location: [facility] OR    TONSILLECTOMY      ZZC TOTAL KNEE ARTHROPLASTY Left 09/09/2019    Dr Alonzo       Allergies   Allergen Reactions    Chocolate Hives    Lemon Flavor Hives    Lime [Lime, Sulfurated (Calcium Polysulfide)] Hives    Metal [Staples]     Orange Fruit [Citrus] Hives    Strawberry Extract Hives    Adhesive Tape      Band-aids    Codeine Hives     Patient can tolerate oxycodone & dilaudid    Erythromycin Hives     ERYTHROMYCIN BASE    Food      Tomato, grapefruit,  oranges.    Grapefruit [Extra Strength Grapefruit]      GRAPEFRUIT    Morphine Hives     Pt. Reports had hives    Penicillins Hives    Ranitidine GI Disturbance    Sulfa Antibiotics      SULFONAMIDE ANTIBIOTICS     Tomato     Xyzal [Levocetirizine] Hives       Current Outpatient Medications   Medication Sig Dispense Refill    acetaminophen (TYLENOL) 325 MG tablet Take 325-650 mg by mouth every 6 hours as needed for mild pain      Calcium-Magnesium-Vitamin D (CALCIUM 1200+D3 PO) Take by mouth daily      clonazePAM (KLONOPIN) 0.5 MG tablet TAKE 1/2 (ONE-HALF) TABLET BY MOUTH NIGHTLY AS NEEDED FOR ANXIETY 20 tablet 0    Cranberry 125 MG TABS       ferrous sulfate (FEROSUL) 325 (65 Fe) MG tablet Take 325 mg by mouth daily (with breakfast)      FISH OIL Take 1 capsule by mouth daily       ipratropium - albuterol 0.5 mg/2.5 mg/3 mL (DUONEB) 0.5-2.5 (3) MG/3ML neb solution Take 1 vial (3 mLs) by nebulization every 6 hours as needed for shortness of breath / dyspnea or wheezing 3 mL 1    PARoxetine (PAXIL) 40 MG tablet Take 1 tablet by mouth once daily 90 tablet 3    simvastatin (ZOCOR) 40 MG tablet TAKE 1 TABLET BY MOUTH AT BEDTIME 90 tablet 0    VITAMIN D, CHOLECALCIFEROL, PO Take 2,000 Units by mouth daily      fluticasone (FLONASE) 50 MCG/ACT nasal spray Spray 1 spray into both nostrils daily (Patient not taking: Reported on 4/16/2024) 15.8 mL 0    gabapentin (NEURONTIN) 100 MG capsule Take 1 capsule (100 mg) by mouth nightly as needed for neuropathic pain (Patient not taking: Reported on 4/16/2024) 60 capsule 0    REFRESH DIGITAL 0.5-1-0.5 % SOLN Place 0.5 % into both eyes 3 times daily as needed (as needed)      simethicone (MYLICON) 125 MG chewable tablet Take 1 tablet (125 mg) by mouth two times daily (Patient not taking: Reported on 8/15/2024) 60 tablet 0       Social History     Socioeconomic History    Marital status: Single     Spouse name: Not on file    Number of children: 4    Years of education: 12     Highest education level: Not on file   Occupational History    Occupation: personal care attendant   Tobacco Use    Smoking status: Never     Passive exposure: Never    Smokeless tobacco: Never   Vaping Use    Vaping status: Never Used   Substance and Sexual Activity    Alcohol use: No    Drug use: No    Sexual activity: Never   Other Topics Concern     Service Not Asked    Blood Transfusions Yes    Caffeine Concern Yes     Comment: >32 oz soda/day    Occupational Exposure Not Asked    Hobby Hazards Not Asked    Sleep Concern Yes     Comment: insomnia    Stress Concern Not Asked    Weight Concern Not Asked    Special Diet Not Asked    Back Care Not Asked    Exercise Not Asked    Bike Helmet Not Asked    Seat Belt Not Asked    Self-Exams Not Asked    Parent/sibling w/ CABG, MI or angioplasty before 65F 55M? No   Social History Narrative    Not on file     Social Determinants of Health     Financial Resource Strain: Low Risk  (1/4/2024)    Financial Resource Strain     Within the past 12 months, have you or your family members you live with been unable to get utilities (heat, electricity) when it was really needed?: No   Food Insecurity: Low Risk  (1/4/2024)    Food Insecurity     Within the past 12 months, did you worry that your food would run out before you got money to buy more?: No     Within the past 12 months, did the food you bought just not last and you didn t have money to get more?: No   Transportation Needs: Low Risk  (1/4/2024)    Transportation Needs     Within the past 12 months, has lack of transportation kept you from medical appointments, getting your medicines, non-medical meetings or appointments, work, or from getting things that you need?: No   Physical Activity: Not on file   Stress: Not on file   Social Connections: Not on file   Interpersonal Safety: Low Risk  (6/14/2024)    Interpersonal Safety     Do you feel physically and emotionally safe where you currently live?: Yes     Within  "the past 12 months, have you been hit, slapped, kicked or otherwise physically hurt by someone?: No     Within the past 12 months, have you been humiliated or emotionally abused in other ways by your partner or ex-partner?: No   Housing Stability: Low Risk  (1/4/2024)    Housing Stability     Do you have housing? : Yes     Are you worried about losing your housing?: No       LAB RESULTS:   Orders Only on 02/10/2021   Component Date Value Ref Range Status    Sodium 02/10/2021 139  133 - 144 mmol/L Final    Potassium 02/10/2021 4.0  3.4 - 5.3 mmol/L Final    Chloride 02/10/2021 108  94 - 109 mmol/L Final    Carbon Dioxide 02/10/2021 30  20 - 32 mmol/L Final    Anion Gap 02/10/2021 1* 3 - 14 mmol/L Final    Glucose 02/10/2021 104* 70 - 99 mg/dL Final    Urea Nitrogen 02/10/2021 15  7 - 30 mg/dL Final    Creatinine 02/10/2021 1.00  0.52 - 1.04 mg/dL Final    GFR Estimate 02/10/2021 54* >60 mL/min/[1.73_m2] Final    GFR Estimate If Black 02/10/2021 62  >60 mL/min/[1.73_m2] Final    Calcium 02/10/2021 9.5  8.5 - 10.1 mg/dL Final    Hemoglobin A1C 02/10/2021 5.0  0 - 5.6 % Final    ALT 02/10/2021 27  0 - 50 U/L Final    AST 02/10/2021 20  0 - 45 U/L Final    Cholesterol 02/10/2021 177  <200 mg/dL Final    Triglycerides 02/10/2021 166* <150 mg/dL Final    HDL Cholesterol 02/10/2021 53  >49 mg/dL Final    LDL Cholesterol Calculated 02/10/2021 91  <100 mg/dL Final    Non HDL Cholesterol 02/10/2021 124  <130 mg/dL Final    Estimated Average Glucose 02/10/2021 97  mg/dL Final        Review of systems: Negative except that which was noted in the HPI.    Physical examination:  /74   Pulse 69   Resp 20   Ht 1.651 m (5' 5\")   Wt 96.6 kg (213 lb)   SpO2 96%   BMI 35.45 kg/m      GENERAL APPEARANCE: healthy, alert and no distress  CHEST: lungs clear to auscultation - no rales, rhonchi or wheezes, no use of accessory muscles, no retractions, respirations are unlabored, normal respiratory rate  CARDIOVASCULAR: regular " rhythm, normal S1 with physiologic split S2, no S3 or S4 and no murmur, click or rub  EXTREMITIES: no clubbing, cyanosis or edema      Total time spent on day of visit, including review of tests, obtaining/reviewing separately obtained history, ordering medications/tests/procedures, communicating with PCP/consultants, and documenting in electronic medical record: 20 minutes.           Thank you for allowing me to participate in the care of your patient. Please do not hesitate to contact me if you have any questions.     Ha Hughes, DO

## 2024-09-23 DIAGNOSIS — F41.9 ANXIETY: ICD-10-CM

## 2024-09-23 NOTE — TELEPHONE ENCOUNTER
Klonopin  Last Written Prescription Date: 8/16/24  Last Fill Quantity: 20 # of Refills: 0  Last Office Visit: 8/15/24

## 2024-09-24 RX ORDER — CLONAZEPAM 0.5 MG/1
TABLET ORAL
Qty: 20 TABLET | Refills: 0 | Status: SHIPPED | OUTPATIENT
Start: 2024-09-24

## 2024-10-01 DIAGNOSIS — L30.9 DERMATITIS: Primary | ICD-10-CM

## 2024-10-01 RX ORDER — FLUOCINONIDE TOPICAL SOLUTION USP, 0.05% 0.5 MG/ML
SOLUTION TOPICAL
Qty: 60 ML | Refills: 3 | Status: SHIPPED | OUTPATIENT
Start: 2024-10-01

## 2024-10-02 ENCOUNTER — OFFICE VISIT (OUTPATIENT)
Dept: PSYCHOLOGY | Facility: OTHER | Age: 84
End: 2024-10-02
Attending: COUNSELOR
Payer: COMMERCIAL

## 2024-10-02 ENCOUNTER — TELEPHONE (OUTPATIENT)
Dept: PSYCHOLOGY | Facility: OTHER | Age: 84
End: 2024-10-02

## 2024-10-02 DIAGNOSIS — F41.9 ANXIETY AND DEPRESSION: Primary | ICD-10-CM

## 2024-10-02 DIAGNOSIS — F32.A ANXIETY AND DEPRESSION: Primary | ICD-10-CM

## 2024-10-02 PROCEDURE — 90837 PSYTX W PT 60 MINUTES: CPT | Performed by: COUNSELOR

## 2024-10-02 NOTE — TELEPHONE ENCOUNTER
9:58 AM    Reason for Call: Phone Call    Description: Patient called requesting appointment today with Nima Ayala be a telephone appointment. Attempted to reach team to discuss if patient can be seen over phone. No answer. Patient states they are not feeling well and would prefer to be seen over the phone. Visit is a new appointment. Please reach out to patient to discuss.     Was an appointment offered for this call? No  If yes : Appointment type              Date    Preferred method for responding to this message: Telephone Call  What is your phone number ?[phone]     If we cannot reach you directly, may we leave a detailed response at the number you provided? Yes    Can this message wait until your PCP/provider returns, if available today? Not applicable    Gwendolyn Murray

## 2024-10-03 ENCOUNTER — HOSPITAL ENCOUNTER (EMERGENCY)
Facility: HOSPITAL | Age: 84
Discharge: HOME | End: 2024-10-03
Attending: STUDENT IN AN ORGANIZED HEALTH CARE EDUCATION/TRAINING PROGRAM | Admitting: STUDENT IN AN ORGANIZED HEALTH CARE EDUCATION/TRAINING PROGRAM
Payer: COMMERCIAL

## 2024-10-03 ENCOUNTER — APPOINTMENT (OUTPATIENT)
Dept: GENERAL RADIOLOGY | Facility: HOSPITAL | Age: 84
End: 2024-10-03
Attending: STUDENT IN AN ORGANIZED HEALTH CARE EDUCATION/TRAINING PROGRAM
Payer: COMMERCIAL

## 2024-10-03 VITALS
TEMPERATURE: 98.2 F | WEIGHT: 214.7 LBS | DIASTOLIC BLOOD PRESSURE: 69 MMHG | BODY MASS INDEX: 35.73 KG/M2 | OXYGEN SATURATION: 97 % | RESPIRATION RATE: 16 BRPM | SYSTOLIC BLOOD PRESSURE: 153 MMHG | HEART RATE: 66 BPM

## 2024-10-03 DIAGNOSIS — S52.502A CLOSED FRACTURE OF DISTAL END OF LEFT RADIUS, UNSPECIFIED FRACTURE MORPHOLOGY, INITIAL ENCOUNTER: ICD-10-CM

## 2024-10-03 DIAGNOSIS — S52.612A CLOSED DISPLACED FRACTURE OF STYLOID PROCESS OF LEFT ULNA, INITIAL ENCOUNTER: ICD-10-CM

## 2024-10-03 PROCEDURE — 999N000065 XR WRIST PORT LEFT 2 VIEWS: Mod: LT

## 2024-10-03 PROCEDURE — 25605 CLTX DST RDL FX/EPHYS SEP W/: CPT | Mod: LT | Performed by: STUDENT IN AN ORGANIZED HEALTH CARE EDUCATION/TRAINING PROGRAM

## 2024-10-03 PROCEDURE — 73110 X-RAY EXAM OF WRIST: CPT | Mod: LT

## 2024-10-03 PROCEDURE — 250N000011 HC RX IP 250 OP 636: Mod: JZ | Performed by: STUDENT IN AN ORGANIZED HEALTH CARE EDUCATION/TRAINING PROGRAM

## 2024-10-03 PROCEDURE — 99283 EMERGENCY DEPT VISIT LOW MDM: CPT | Performed by: STUDENT IN AN ORGANIZED HEALTH CARE EDUCATION/TRAINING PROGRAM

## 2024-10-03 RX ORDER — OXYCODONE HYDROCHLORIDE 5 MG/1
5 TABLET ORAL EVERY 6 HOURS PRN
Qty: 10 TABLET | Refills: 0 | Status: SHIPPED | OUTPATIENT
Start: 2024-10-03 | End: 2024-10-06 | Stop reason: SINTOL

## 2024-10-03 RX ORDER — OXYCODONE HYDROCHLORIDE 5 MG/1
5 TABLET ORAL EVERY 6 HOURS PRN
Qty: 10 TABLET | Refills: 0 | Status: SHIPPED | OUTPATIENT
Start: 2024-10-03 | End: 2024-10-03

## 2024-10-03 RX ORDER — BUPIVACAINE HYDROCHLORIDE 5 MG/ML
10 INJECTION, SOLUTION EPIDURAL; INTRACAUDAL ONCE
Status: COMPLETED | OUTPATIENT
Start: 2024-10-03 | End: 2024-10-03

## 2024-10-03 RX ADMIN — BUPIVACAINE HYDROCHLORIDE 50 MG: 5 INJECTION, SOLUTION EPIDURAL; INTRACAUDAL; PERINEURAL at 18:12

## 2024-10-03 ASSESSMENT — COLUMBIA-SUICIDE SEVERITY RATING SCALE - C-SSRS
2. HAVE YOU ACTUALLY HAD ANY THOUGHTS OF KILLING YOURSELF IN THE PAST MONTH?: NO
6. HAVE YOU EVER DONE ANYTHING, STARTED TO DO ANYTHING, OR PREPARED TO DO ANYTHING TO END YOUR LIFE?: NO
1. IN THE PAST MONTH, HAVE YOU WISHED YOU WERE DEAD OR WISHED YOU COULD GO TO SLEEP AND NOT WAKE UP?: NO

## 2024-10-03 ASSESSMENT — ACTIVITIES OF DAILY LIVING (ADL)
ADLS_ACUITY_SCORE: 38
ADLS_ACUITY_SCORE: 38

## 2024-10-03 NOTE — ED TRIAGE NOTES
Pt presents with c/o falling backwards onto pavement, doesn't think she hit her head, denies neck pain, denies being on any blood thinners, Pt just c/o left wrist pain.

## 2024-10-04 ENCOUNTER — TRANSFERRED RECORDS (OUTPATIENT)
Dept: HEALTH INFORMATION MANAGEMENT | Facility: CLINIC | Age: 84
End: 2024-10-04
Payer: COMMERCIAL

## 2024-10-04 NOTE — ED PROVIDER NOTES
[facility]  ED Provider Note    Chief Complaint   Patient presents with    Fall    Wrist Pain     History:  Dinorah Lmi is a 83 year old female with wrist pain after mechanical fall.  She fell backwards landed on her butt and back did not strike her head she did reach out and cushion her fall with her wrist.  Her wrist on the left side now hurts.  She states she has no other injuries.  No other complaints    Review of Systems   Performed; see HPI for pertinent positives and negatives.     Medical history, surgical history, and social history was reviewed.  Nursing documentation, triage note, and vitals were reviewed.    Vitals:  BP: 161/81  Pulse: 64  Temp: 98.2  F (36.8  C)  Resp: 16  Weight: 97.4 kg (214 lb 11.2 oz)  SpO2: 97 %    Physical Exam:  Constitutional: Alert and conversant. NAD   HENT: NCAT   Eyes: Normal pupils   Neck: supple   CV: No pallor  Pulmonary/Chest: Non-labored respirations  Abdominal: non-distended   MSK: HOWELL.  Swelling and deformity of the left wrist.  No open wounds.  Able to wiggle fingers sensation intact  Neuro: Alert and appropriate   Skin: Warm and dry. No diaphoresis. No rashes on exposed skin    Psych: Appropriate mood and affect       MDM:      ED Course as of 10/03/24 1913   Thu Oct 03, 2024   1904 83 female fall.  On exam appears to be isolated injury of the rest left wrist.  Pain relieved with bupivacaine hematoma block.  X-ray demonstrates comminuted fracture.  Reduction performed repeat x-rays pending.       Procedures:  [facility]    -Fracture    Date/Time: 10/3/2024 7:05 PM    Performed by: Garry Warner MD  Authorized by: Garry Warner MD    Risks, benefits and alternatives discussed.      INJURY      Injury location:  Forearm    Forearm fracture type: distal radial      Forearm fracture type comment:  And ulnar styloid    PRE PROCEDURE ASSESSMENT      Neurological function: normal      Distal perfusion: normal      Range of motion: reduced       ANESTHESIA (see MAR for exact dosages)      Anesthesia method:  Local infiltration (hematoma block)    Local anesthetic:  Bupivacaine 0.5% w/o epi    PROCEDURE DETAILS:     Manipulation performed: yes      Skin traction used: yes      Skeletal traction used: no      Pin inserted: no      Reduction successful: yes      X-ray confirmed reduction: yes      Immobilization:  Splint    Splint type:  Sugar tong    Supplies used:  Ortho-Glass    POST PROCEDURE ASSESSMENT      Neurological function: normal      Distal perfusion: normal      Patient will be referred for a decision regarding definitive fracture treatment (non-operative vs operative).    PROCEDURE    Patient Tolerance:  Patient tolerated the procedure well with no immediate complications          Impression:  Final diagnoses:   Closed fracture of distal end of left radius, unspecified fracture morphology, initial encounter   Closed displaced fracture of styloid process of left ulna, initial encounter            Garry Warner MD  10/03/24 [id number]

## 2024-10-04 NOTE — DISCHARGE INSTRUCTIONS
Return the emergency department for worsening symptoms or new concerning symptoms.  Follow-up with your  orthopedic surgeon in 1 week or so.  Call to schedule an appointment or a follow-up at your previously scheduled appointment.  Use ibuprofen and Tylenol for pain control if those have been approved for you by your primary care provider use the oxycodone for breakthrough pain if the cast is too tight your fingers are turning cold blue or white losing sensation you may need to loosen the wrap.  If this does not fix it return to the emergency department for.

## 2024-10-06 ENCOUNTER — HOSPITAL ENCOUNTER (EMERGENCY)
Facility: HOSPITAL | Age: 84
Discharge: HOME | End: 2024-10-06
Attending: PHYSICIAN ASSISTANT | Admitting: PHYSICIAN ASSISTANT
Payer: COMMERCIAL

## 2024-10-06 VITALS
TEMPERATURE: 98.3 F | DIASTOLIC BLOOD PRESSURE: 84 MMHG | OXYGEN SATURATION: 96 % | HEART RATE: 70 BPM | RESPIRATION RATE: 18 BRPM | SYSTOLIC BLOOD PRESSURE: 138 MMHG

## 2024-10-06 DIAGNOSIS — S62.102A CLOSED FRACTURE OF LEFT WRIST, INITIAL ENCOUNTER: ICD-10-CM

## 2024-10-06 PROCEDURE — 271N000006 HC CAST/SPLINT FIBERGLASS

## 2024-10-06 PROCEDURE — 29105 APPLICATION LONG ARM SPLINT: CPT

## 2024-10-06 PROCEDURE — 999N000104 HC STATISTIC NO CHARGE

## 2024-10-06 PROCEDURE — 29105 APPLICATION LONG ARM SPLINT: CPT | Performed by: PHYSICIAN ASSISTANT

## 2024-10-06 RX ORDER — HYDROCODONE BITARTRATE AND ACETAMINOPHEN 5; 325 MG/1; MG/1
1 TABLET ORAL EVERY 6 HOURS PRN
Qty: 6 TABLET | Refills: 0 | Status: SHIPPED | OUTPATIENT
Start: 2024-10-06 | End: 2024-10-09

## 2024-10-06 ASSESSMENT — COLUMBIA-SUICIDE SEVERITY RATING SCALE - C-SSRS
6. HAVE YOU EVER DONE ANYTHING, STARTED TO DO ANYTHING, OR PREPARED TO DO ANYTHING TO END YOUR LIFE?: NO
1. IN THE PAST MONTH, HAVE YOU WISHED YOU WERE DEAD OR WISHED YOU COULD GO TO SLEEP AND NOT WAKE UP?: NO
2. HAVE YOU ACTUALLY HAD ANY THOUGHTS OF KILLING YOURSELF IN THE PAST MONTH?: NO

## 2024-10-06 ASSESSMENT — ACTIVITIES OF DAILY LIVING (ADL): ADLS_ACUITY_SCORE: 38

## 2024-10-06 NOTE — ED PROVIDER NOTES
History     Chief Complaint   Patient presents with    Cast Repair     HPI  Dinorah Lim is a 83 year old female who was seen on our ED 3 days ago for distal left radius and ulna fracture s/p reduction, splinted, who returns today for re-splinting as the splint is causing discomfort to her elbow and pinky finger. She also notes the oxycodone prescribed made her nauseous and would like hydrocodone instead as she has tolerated this well in the past. She has an appt with [facility] Dr. Alonzo on Wednesday.     Allergies:  Allergies   Allergen Reactions    Chocolate Hives    Lemon Flavor Hives    Lime [Lime, Sulfurated (Calcium Polysulfide)] Hives    Metal [Staples]     Orange Fruit [Citrus] Hives    Strawberry Extract Hives    Adhesive Tape      Band-aids    Codeine Hives     Patient can tolerate oxycodone & dilaudid    Erythromycin Hives     ERYTHROMYCIN BASE    Food      Tomato, grapefruit, oranges.    Grapefruit [Extra Strength Grapefruit]      GRAPEFRUIT    Morphine Hives     Pt. Reports had hives    Penicillins Hives    Ranitidine GI Disturbance    Sulfa Antibiotics      SULFONAMIDE ANTIBIOTICS     Tomato     Xyzal [Levocetirizine] Hives       Problem List:    Patient Active Problem List    Diagnosis Date Noted    Other chest pain 10/20/2021     Priority: Medium    Hiatal hernia 07/28/2021     Priority: Medium    Chronic, continuous use of opioids 05/17/2021     Priority: Medium    Stage 3a chronic kidney disease (H) 03/22/2021     Priority: Medium    COVID-19 virus infection on 10/30/20 11/24/2020     Priority: Medium     10-      Other neutropenia (H) 08/04/2020     Priority: Medium    Paresthesias 02/13/2020     Priority: Medium    Status post total left knee replacement 09/09/2019     Priority: Medium    Aortic valve insufficiency 03/06/2019     Priority: Medium    Mitral regurgitation 03/06/2019     Priority: Medium    Tricuspid valve insufficiency 03/06/2019     Priority: Medium    Diastolic dysfunction  grade 1 on 2/21/19 03/06/2019     Priority: Medium    Hypertriglyceridemia 03/06/2019     Priority: Medium    Palpitations 02/13/2019     Priority: Medium    PVC's (premature ventricular contractions) 02/13/2019     Priority: Medium    Atrial ectopy 02/13/2019     Priority: Medium    PSVT (paroxysmal supraventricular tachycardia) (H) 02/13/2019     Priority: Medium    COPD, mild on 9/9/14 02/13/2019     Priority: Medium    Bilateral carotid artery stenosis at less than 50% on 12/1/16 02/13/2019     Priority: Medium    Mixed hyperlipidemia 02/13/2019     Priority: Medium    On statin therapy 02/13/2019     Priority: Medium    Heart murmur 02/13/2019     Priority: Medium    Morbid obesity (H) 06/01/2017     Priority: Medium    ACP (advance care planning) 04/28/2016     Priority: Medium     Advance Care Planning 4/28/2016: ACP Review of Chart / Resources Provided:  Reviewed chart for advance care plan.  Dinorah WILLIAM Lim has no plan or code status on file. Discussed available resources and provided with information. Confirmed code status reflects current choices pending further ACP discussions.  Confirmed/documented legally designated decision makers.  Added by Aditi Gandhi            Anxiety 06/23/2014     Priority: Medium    Lung density on x-ray 07/23/2013     Priority: Medium    Osteoarthritis 06/14/2012     Priority: Medium     Problem list name updated by automated process. Provider to review      Advanced care planning/counseling discussion 01/13/2012     Priority: Medium    Abnormal glucose 08/01/2011     Priority: Medium     Hgb A1c 5.7 on 1/4/2015  Problem list name updated by automated process. Provider to review      Osteoarthritis of right hip 10/07/2004     Priority: Medium    Urticaria 06/01/2004     Priority: Medium     Problem list name updated by automated process. 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    Obesity, Class II, BMI 35-39.9 1/4/2015    Osteoarthritis of right hip 10/07/2004    Osteoarthrosis 06/14/2012    Otitis externa 10/04/2011    Prediabetes 08/01/2011       Past Surgical History:    Past Surgical History:   Procedure Laterality Date    ARTHROPLASTY KNEE Left 9/9/2019    Procedure: LEFT TOTAL KNEE ARTHROPLASTY S/N;  Surgeon: Trevor Alonzo MD;  Location: [facility]    ARTHROSCOPY KNEE Left 3/21/2019    Procedure: LEFT  KNEE ARTHROSCOPY, partial medial menisectomy;  Surgeon: Esteban Wills MD;  Location: [facility]    CLOSED REDUCTION WRIST Right 1952 x 2    long arm cast (same time as elbow fx)    CLOSED RX ELBOW DISLOCATION Right 1952    CR/long cast of fracture    EXCISE MASS FOOT Left 8/16/2021    Procedure: LEFT ANKLE MASS EXCISION;  Surgeon: Trevor Alonzo MD;  Location: [facility]    HC INJ EPIDURAL LUMBAR/SACRAL W/WO CONTRAST Bilateral 5/2013    facet injections; prev 2012    LAPAROSCOPIC CHOLECYSTECTOMY N/A 1/4/2015    Procedure: LAPAROSCOPIC CHOLECYSTECTOMY;  Surgeon: Brittney العلي MD;  Location: [facility]    TONSILLECTOMY      ZZC TOTAL KNEE ARTHROPLASTY Left 09/09/2019    Dr Alonzo       Family History:    Family History   Problem Relation Age of Onset    Heart Disease Brother         atrial fibrillation    Atrial fibrillation Brother     Other - See Comments Mother         emphysema    Heart Disease Father 92        CHF    Cancer Paternal Grandfather         lung cancer    Cancer Maternal Uncle         lung cancer    Chronic Obstructive Pulmonary Disease Daughter     Emphysema Daughter     Other - See Comments Daughter         benign breast lesion     Esophagitis Daughter        Social History:  Marital Status:  Single [1]  Social History     Tobacco Use    Smoking status: Never     Passive exposure: Never    Smokeless tobacco: Never   Vaping Use    Vaping status: Never Used   Substance Use Topics    Alcohol use: No    Drug use: No        Medications:    HYDROcodone-acetaminophen (NORCO) 5-325 MG tablet  acetaminophen (TYLENOL) 325 MG tablet  Calcium-Magnesium-Vitamin D (CALCIUM 1200+D3 PO)  clonazePAM (KLONOPIN) 0.5 MG tablet  Cranberry 125 MG TABS  ferrous sulfate (FEROSUL) 325 (65 Fe) MG tablet  FISH OIL  fluocinonide (LIDEX) 0.05 % external solution  ipratropium - albuterol 0.5 mg/2.5 mg/3 mL (DUONEB) 0.5-2.5 (3) MG/3ML neb solution  PARoxetine (PAXIL) 40 MG tablet  REFRESH DIGITAL 0.5-1-0.5 % SOLN  simvastatin (ZOCOR) 40 MG tablet  VITAMIN D, CHOLECALCIFEROL, PO          Review of Systems   All other systems reviewed and are negative.      Physical Exam   BP: 138/84  Pulse: 70  Temp: 98.3  F (36.8  C)  Resp: 18  SpO2: 96 %      Physical Exam  Vitals and nursing note reviewed.   Constitutional:       Appearance: Normal appearance.   HENT:      Head: Normocephalic and atraumatic.   Cardiovascular:      Rate and Rhythm: Normal rate.      Pulses: Normal pulses.   Pulmonary:      Effort: Pulmonary effort is normal.   Musculoskeletal:      Left wrist: Swelling (Moderate swelling with ecchymosis. CMS itnact.) and tenderness present. Decreased range of motion.   Skin:     General: Skin is warm.      Capillary Refill: Capillary refill takes less than 2 seconds.   Neurological:      Mental Status: She is alert.         ED Course        [facility]    Splint Application    Date/Time: 10/6/2024 5:22 PM    Performed by: Breanne Trujillo PA-C  Authorized by: Breanne Trujillo PA-C    Risks, benefits and alternatives discussed.      PRE-PROCEDURE DETAILS     Sensation:  Normal    PROCEDURE DETAILS     Laterality:  Left    Location:  Wrist    Wrist:  L wrist     Strapping: no      Cast type:  Long arm    Splint type:  Sugar tong    Supplies:  Ortho-Glass, cotton padding and elastic bandage    POST PROCEDURE DETAILS     Pain:  Improved    Sensation:  Normal      PROCEDURE    Patient Tolerance:  Patient tolerated the procedure well with no immediate complications         No results found for this or any previous visit (from the past 24 hour(s)).    Medications - No data to display    Assessments & Plan (with Medical Decision Making)   New orthoglass splint applied, pt notes this is comfortable and is happy with result. She was also given a sling for comfort. Moderate swelling and ecchymosis noted. CMS intact. No evidence of compartment syndrome. Oxycodone changed to hydrocodone as she could not tolerate the oxy. She has follow up with ortho scheduled for Wednesday as this fracture will require surgical fixation. She was discharged home following in stable condition.     Plan:  Follow up as planned with [facility] on Wednesday.   Wear the splint until then.   Continue elevation and ice.   Take the Norco (Hydrocodone) as prescribed for pain.   Return the oxycodone to the pharmacy given it caused you nausea. Do not take any longer.       I have reviewed the nursing notes.    I have reviewed the findings, diagnosis, plan and need for follow up with the patient.    New Prescriptions    HYDROCODONE-ACETAMINOPHEN (NORCO) 5-325 MG TABLET    Take 1 tablet by mouth every 6 hours as needed for severe pain.       Final diagnoses:   Closed fracture of left wrist, initial encounter       10/6/2024   [facility]

## 2024-10-06 NOTE — DISCHARGE INSTRUCTIONS
Follow up as planned with [facility] on Wednesday.   Wear the splint until then.   Continue elevation and ice.   Take the Norco (Hydrocodone) as prescribed for pain.   Return the oxycodone to the pharmacy given it caused you nausea. Do not take any longer.

## 2024-10-07 ENCOUNTER — HOSPITAL ENCOUNTER (EMERGENCY)
Facility: HOSPITAL | Age: 84
Discharge: HOME | End: 2024-10-07
Attending: INTERNAL MEDICINE | Admitting: INTERNAL MEDICINE
Payer: COMMERCIAL

## 2024-10-07 VITALS
TEMPERATURE: 97.5 F | SYSTOLIC BLOOD PRESSURE: 182 MMHG | DIASTOLIC BLOOD PRESSURE: 73 MMHG | RESPIRATION RATE: 18 BRPM | HEART RATE: 60 BPM | OXYGEN SATURATION: 97 %

## 2024-10-07 DIAGNOSIS — Z47.89 CAST DISCOMFORT: ICD-10-CM

## 2024-10-07 PROCEDURE — 99282 EMERGENCY DEPT VISIT SF MDM: CPT | Mod: 25 | Performed by: INTERNAL MEDICINE

## 2024-10-07 PROCEDURE — 29105 APPLICATION LONG ARM SPLINT: CPT | Mod: LT | Performed by: PHYSICIAN ASSISTANT

## 2024-10-07 PROCEDURE — 99282 EMERGENCY DEPT VISIT SF MDM: CPT | Performed by: INTERNAL MEDICINE

## 2024-10-07 ASSESSMENT — ACTIVITIES OF DAILY LIVING (ADL): ADLS_ACUITY_SCORE: 36

## 2024-10-07 ASSESSMENT — ENCOUNTER SYMPTOMS
SHORTNESS OF BREATH: 0
ABDOMINAL PAIN: 0
COUGH: 0
FEVER: 0

## 2024-10-07 NOTE — ED PROVIDER NOTES
History     Chief Complaint   Patient presents with    Cast Problem     The history is provided by the patient.     Dinorah Lim is a 83 year old female who came for feeling pressure and pain at her elbow due to splint placement for wrist fracture 4 days ago.     Allergies:  Allergies   Allergen Reactions    Chocolate Hives    Lemon Flavor Hives    Lime [Lime, Sulfurated (Calcium Polysulfide)] Hives    Metal [Staples]     Orange Fruit [Citrus] Hives    Strawberry Extract Hives    Adhesive Tape      Band-aids    Codeine Hives     Patient can tolerate oxycodone & dilaudid    Erythromycin Hives     ERYTHROMYCIN BASE    Food      Tomato, grapefruit, oranges.    Grapefruit [Extra Strength Grapefruit]      GRAPEFRUIT    Morphine Hives     Pt. Reports had hives    Penicillins Hives    Ranitidine GI Disturbance    Sulfa Antibiotics      SULFONAMIDE ANTIBIOTICS     Tomato     Xyzal [Levocetirizine] Hives       Problem List:    Patient Active Problem List    Diagnosis Date Noted    Other chest pain 10/20/2021     Priority: Medium    Hiatal hernia 07/28/2021     Priority: Medium    Chronic, continuous use of opioids 05/17/2021     Priority: Medium    Stage 3a chronic kidney disease (H) 03/22/2021     Priority: Medium    COVID-19 virus infection on 10/30/20 11/24/2020     Priority: Medium     10-      Other neutropenia (H) 08/04/2020     Priority: Medium    Paresthesias 02/13/2020     Priority: Medium    Status post total left knee replacement 09/09/2019     Priority: Medium    Aortic valve insufficiency 03/06/2019     Priority: Medium    Mitral regurgitation 03/06/2019     Priority: Medium    Tricuspid valve insufficiency 03/06/2019     Priority: Medium    Diastolic dysfunction grade 1 on 2/21/19 03/06/2019     Priority: Medium    Hypertriglyceridemia 03/06/2019     Priority: Medium    Palpitations 02/13/2019     Priority: Medium    PVC's (premature ventricular contractions) 02/13/2019     Priority: Medium     Atrial ectopy 02/13/2019     Priority: Medium    PSVT (paroxysmal supraventricular tachycardia) (H) 02/13/2019     Priority: Medium    COPD, mild on 9/9/14 02/13/2019     Priority: Medium    Bilateral carotid artery stenosis at less than 50% on 12/1/16 02/13/2019     Priority: Medium    Mixed hyperlipidemia 02/13/2019     Priority: Medium    On statin therapy 02/13/2019     Priority: Medium    Heart murmur 02/13/2019     Priority: Medium    Morbid obesity (H) 06/01/2017     Priority: Medium    ACP (advance care planning) 04/28/2016     Priority: Medium     Advance Care Planning 4/28/2016: ACP Review of Chart / Resources Provided:  Reviewed chart for advance care plan.  Dinorah WILLIAM Lim has no plan or code status on file. Discussed available resources and provided with information. Confirmed code status reflects current choices pending further ACP discussions.  Confirmed/documented legally designated decision makers.  Added by Aditi Gandhi            Anxiety 06/23/2014     Priority: Medium    Lung density on x-ray 07/23/2013     Priority: Medium    Osteoarthritis 06/14/2012     Priority: Medium     Problem list name updated by automated process. Provider to review      Advanced care planning/counseling discussion 01/13/2012     Priority: Medium    Abnormal glucose 08/01/2011     Priority: Medium     Hgb A1c 5.7 on 1/4/2015  Problem list name updated by automated process. Provider to review      Osteoarthritis of right hip 10/07/2004     Priority: Medium    Urticaria 06/01/2004     Priority: Medium     Problem list name updated by automated process. 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    Obesity, Class II, BMI 35-39.9 1/4/2015    Osteoarthritis of right hip 10/07/2004    Osteoarthrosis 06/14/2012    Otitis externa 10/04/2011    Prediabetes 08/01/2011       Past Surgical History:    Past Surgical History:   Procedure Laterality Date    ARTHROPLASTY KNEE Left 9/9/2019    Procedure: LEFT TOTAL KNEE ARTHROPLASTY S/N;  Surgeon: Trevor Alonzo MD;  [facility]    ARTHROSCOPY KNEE Left 3/21/2019    Procedure: LEFT  KNEE ARTHROSCOPY, partial medial menisectomy;  Surgeon: Esteban Wills MD;  [facility]    CLOSED REDUCTION WRIST Right 1952 x 2    long arm cast (same time as elbow fx)    CLOSED RX ELBOW DISLOCATION Right 1952    CR/long cast of fracture    EXCISE MASS FOOT Left 8/16/2021    Procedure: LEFT ANKLE MASS EXCISION;  Surgeon: Trevor Alonzo MD;  Location: [facility]    HC INJ EPIDURAL LUMBAR/SACRAL W/WO CONTRAST Bilateral 5/2013    facet injections; prev 2012    LAPAROSCOPIC CHOLECYSTECTOMY N/A 1/4/2015    Procedure: LAPAROSCOPIC CHOLECYSTECTOMY;  Surgeon: Brittney العلي MD;  Location: [facility]    TONSILLECTOMY      ZZC TOTAL KNEE ARTHROPLASTY Left 09/09/2019    Dr Alonzo       Family History:    Family History   Problem Relation Age of Onset    Heart Disease Brother         atrial fibrillation    Atrial fibrillation Brother     Other - See Comments Mother         emphysema    Heart Disease Father 92        CHF    Cancer Paternal Grandfather         lung cancer    Cancer Maternal Uncle         lung cancer    Chronic Obstructive Pulmonary Disease Daughter     Emphysema Daughter     Other - See Comments Daughter         benign breast lesion    Esophagitis Daughter        Social History:  Marital Status:  Single [1]  Social History     Tobacco Use    Smoking status: Never     Passive exposure: Never    Smokeless tobacco: Never   Vaping Use    Vaping status: Never Used   Substance Use  Topics    Alcohol use: No    Drug use: No        Medications:    HYDROcodone-acetaminophen (NORCO) 5-325 MG tablet  acetaminophen (TYLENOL) 325 MG tablet  Calcium-Magnesium-Vitamin D (CALCIUM 1200+D3 PO)  clonazePAM (KLONOPIN) 0.5 MG tablet  Cranberry 125 MG TABS  ferrous sulfate (FEROSUL) 325 (65 Fe) MG tablet  FISH OIL  fluocinonide (LIDEX) 0.05 % external solution  ipratropium - albuterol 0.5 mg/2.5 mg/3 mL (DUONEB) 0.5-2.5 (3) MG/3ML neb solution  PARoxetine (PAXIL) 40 MG tablet  REFRESH DIGITAL 0.5-1-0.5 % SOLN  simvastatin (ZOCOR) 40 MG tablet  VITAMIN D, CHOLECALCIFEROL, PO          Review of Systems   Constitutional:  Negative for fever.   Respiratory:  Negative for cough and shortness of breath.    Cardiovascular:  Negative for chest pain.   Gastrointestinal:  Negative for abdominal pain.   Skin:  Negative for rash.   All other systems reviewed and are negative.      Physical Exam   BP: (!) 182/73  Pulse: 60  Temp: 97.5  F (36.4  C)  Resp: 18  SpO2: 97 %      Physical Exam  Constitutional:       General: She is not in acute distress.     Appearance: She is not diaphoretic.   HENT:      Head: Atraumatic.   Eyes:      Pupils: Pupils are equal, round, and reactive to light.   Cardiovascular:      Rate and Rhythm: Regular rhythm.      Heart sounds: Normal heart sounds.   Pulmonary:      Effort: No respiratory distress.      Breath sounds: Normal breath sounds.   Chest:      Chest wall: No tenderness.   Abdominal:      General: Bowel sounds are normal.      Palpations: Abdomen is soft.      Tenderness: There is no abdominal tenderness.   Musculoskeletal:         General: No tenderness. Normal range of motion.      Cervical back: No tenderness.      Thoracic back: No tenderness.      Lumbar back: No tenderness.   Skin:     Findings: No abrasion or laceration.   Neurological:      Mental Status: She is alert and oriented to person, place, and time.         ED Course        Procedures                  Results for  "orders placed or performed during the hospital encounter of 10/06/24 (from the past 24 hour(s))   Splint Application    Narrative    Breanne Trujillo PA-C     10/6/2024  5:24 PM  [facility]    Splint Application    Date/Time: 10/6/2024 5:22 PM    Performed by: Breanne Trujillo PA-C  Authorized by: Breanne Trujillo PA-C    Risks, benefits and alternatives discussed.      PRE-PROCEDURE DETAILS     Sensation:  Normal    PROCEDURE DETAILS     Laterality:  Left    Location:  Wrist    Wrist:  L wrist    Strapping: no      Cast type:  Long arm    Splint type:  Sugar tong    Supplies:  Ortho-Glass, cotton padding and elastic bandage    POST PROCEDURE DETAILS     Pain:  Improved    Sensation:  Normal      PROCEDURE    Patient Tolerance:  Patient tolerated the procedure well with no immediate   complications       Medications - No data to display    Assessments & Plan (with Medical Decision Making)   Cast discomfort a the level of left elbow  Pt refused placing a new splint that involves the elbow  I explained smaller splints are not as stable as longer splints that involve the elbow  She stated\" I will be careful I will see my ortho doctor tomorrow\"  Volar splint was placed , felt much more comfortable  Follow-up with ortho clinic.    I have reviewed the nursing notes.    I have reviewed the findings, diagnosis, plan and need for follow up with the patient.          New Prescriptions    No medications on file       Final diagnoses:   Cast discomfort       10/7/2024   [facility]       Jamaal Varghese MD  10/07/24 0340    "

## 2024-10-07 NOTE — DISCHARGE INSTRUCTIONS
[facility]  [address] [place] [address]  [phone]

## 2024-10-07 NOTE — ED TRIAGE NOTES
"\"I was in earlier to get my cast fixed, but it's still bothering me\"     Triage Assessment (Adult)       Row Name 10/07/24 9010          Triage Assessment    Airway WDL WDL        Respiratory WDL    Respiratory WDL WDL        Peripheral/Neurovascular WDL    Peripheral Neurovascular WDL WDL        Cognitive/Neuro/Behavioral WDL    Cognitive/Neuro/Behavioral WDL WDL                     "

## 2024-10-08 ENCOUNTER — TRANSFERRED RECORDS (OUTPATIENT)
Dept: HEALTH INFORMATION MANAGEMENT | Facility: CLINIC | Age: 84
End: 2024-10-08
Payer: COMMERCIAL

## 2024-10-15 ENCOUNTER — TRANSFERRED RECORDS (OUTPATIENT)
Dept: HEALTH INFORMATION MANAGEMENT | Facility: CLINIC | Age: 84
End: 2024-10-15
Payer: COMMERCIAL

## 2024-10-21 ENCOUNTER — OFFICE VISIT (OUTPATIENT)
Dept: CARDIOLOGY | Facility: OTHER | Age: 84
End: 2024-10-21
Attending: INTERNAL MEDICINE
Payer: COMMERCIAL

## 2024-10-21 VITALS
RESPIRATION RATE: 20 BRPM | OXYGEN SATURATION: 96 % | WEIGHT: 210 LBS | SYSTOLIC BLOOD PRESSURE: 133 MMHG | DIASTOLIC BLOOD PRESSURE: 67 MMHG | HEART RATE: 64 BPM | BODY MASS INDEX: 34.95 KG/M2

## 2024-10-21 DIAGNOSIS — E78.1 HYPERTRIGLYCERIDEMIA: ICD-10-CM

## 2024-10-21 DIAGNOSIS — I49.3 PVC'S (PREMATURE VENTRICULAR CONTRACTIONS): ICD-10-CM

## 2024-10-21 DIAGNOSIS — I65.23 BILATERAL CAROTID ARTERY STENOSIS: ICD-10-CM

## 2024-10-21 DIAGNOSIS — I34.0 NON-RHEUMATIC MITRAL REGURGITATION: ICD-10-CM

## 2024-10-21 DIAGNOSIS — I51.89 DIASTOLIC DYSFUNCTION: Primary | ICD-10-CM

## 2024-10-21 DIAGNOSIS — I10 BENIGN ESSENTIAL HYPERTENSION: ICD-10-CM

## 2024-10-21 DIAGNOSIS — R00.2 PALPITATIONS: ICD-10-CM

## 2024-10-21 DIAGNOSIS — E66.01 MORBID OBESITY (H): ICD-10-CM

## 2024-10-21 DIAGNOSIS — E78.2 MIXED HYPERLIPIDEMIA: ICD-10-CM

## 2024-10-21 DIAGNOSIS — I36.1 NON-RHEUMATIC TRICUSPID VALVE INSUFFICIENCY: ICD-10-CM

## 2024-10-21 DIAGNOSIS — N18.31 STAGE 3A CHRONIC KIDNEY DISEASE (H): ICD-10-CM

## 2024-10-21 DIAGNOSIS — I35.1 NONRHEUMATIC AORTIC VALVE INSUFFICIENCY: ICD-10-CM

## 2024-10-21 DIAGNOSIS — I47.10 PSVT (PAROXYSMAL SUPRAVENTRICULAR TACHYCARDIA) (H): ICD-10-CM

## 2024-10-21 LAB
ATRIAL RATE - MUSE: 64 BPM
DIASTOLIC BLOOD PRESSURE - MUSE: NORMAL MMHG
INTERPRETATION ECG - MUSE: NORMAL
P AXIS - MUSE: 35 DEGREES
PR INTERVAL - MUSE: 156 MS
QRS DURATION - MUSE: 74 MS
QT - MUSE: 392 MS
QTC - MUSE: 404 MS
R AXIS - MUSE: 10 DEGREES
SYSTOLIC BLOOD PRESSURE - MUSE: NORMAL MMHG
T AXIS - MUSE: 10 DEGREES
VENTRICULAR RATE- MUSE: 64 BPM

## 2024-10-21 PROCEDURE — 99214 OFFICE O/P EST MOD 30 MIN: CPT | Performed by: INTERNAL MEDICINE

## 2024-10-21 PROCEDURE — 93005 ELECTROCARDIOGRAM TRACING: CPT | Performed by: INTERNAL MEDICINE

## 2024-10-21 PROCEDURE — 93010 ELECTROCARDIOGRAM REPORT: CPT | Performed by: INTERNAL MEDICINE

## 2024-10-21 PROCEDURE — G0463 HOSPITAL OUTPT CLINIC VISIT: HCPCS

## 2024-10-21 RX ORDER — HYDROCODONE BITARTRATE AND ACETAMINOPHEN 5; 325 MG/1; MG/1
1 TABLET ORAL EVERY 6 HOURS PRN
COMMUNITY
Start: 2024-10-17

## 2024-10-21 RX ORDER — HYDROCHLOROTHIAZIDE 25 MG/1
25 TABLET ORAL DAILY
Qty: 90 TABLET | Refills: 3 | Status: SHIPPED | OUTPATIENT
Start: 2024-10-21

## 2024-10-21 ASSESSMENT — PAIN SCALES - GENERAL: PAINLEVEL: WORST PAIN (10)

## 2024-10-21 NOTE — PATIENT INSTRUCTIONS
Thank you for allowing Dr HEIDI Hughes and our  team to participate in your care. Please call our office at [phone] with scheduling questions or if you need to cancel or change your appointment. With any other questions or concerns you may call cardiology nurse at  [phone].       If you experience chest pain, chest pressure, chest tightness, shortness of breath, fainting, lightheadedness, nausea, vomiting, or other concerning symptoms, please report to the Emergency Department or call 911. These symptoms may be emergent, and best treated in the Emergency Department.

## 2024-10-21 NOTE — PROGRESS NOTES
[facility] HE[facility]   CARDIOLOGY PROGRESS NOTE     Chief Complaint   Patient presents with    Follow Up          Diagnosis:  1.  CHF-diastolic.    -Grade 1 on 9/11/2024.  -Grade 2 on 9/12/23.   -Grade 1 on 2/21/19, off diuretics.  2.  Carotid artery dz.   -No sign on 2/21/22.   -<50%, US from 8/4/21.  3.  AI.  -Moderate on 2/24/22 and 10/11/22.   -Mild/moderate on 1/28/2021.  4.  MR.  -Trace to mild on 1/28/2021.  5.  TR.  -Mild on 10/11/22.  6.  COPD-minimal on 9/9/14.   -H/O second hand smoke from mom who smoked 3/day.  1/day. No primary tobacco usage.   7.  Morbid obesity.    -BMI of 35.  8.  Hyperlipidemia-controlled.  9.  Atrial ectopy.  10.  Palpitations.  11.  PSVT.  12.  PVC's.  13.  On statin therapy.  14.  Hypertriglyceridemia-controlled.  15.  Hiatal hernia.   -Moderate on 3/18/2020 on a CTA of the chest.  16.  COVID-19.   -(+) on 10/30/20.  17.  Vitamin D deficiency.    -17 on 1/28/15.      Assessment/Plan:    1.  Mechanical fall: Fell as she tripped over a threshold entering the [employer] store.  Patient concerned about valvular issues.  Reviewed her previous echocardiogram.  Noted to have mild to moderate aortic insufficiency.  Patient concerned that the fall may have disrupted her valves.  Patient reassured.  No testing planned at this point.  2.  Hypertension: Uncontrolled.  Blood pressure today was 162/70.  Blood pressures have been elevated.  Will start on hydrochlorothiazide 25 mg once daily with history of diastolic dysfunction and high normal potassium.  3.  Carotid artery disease: No significant disease on 2/21/2022.  4.  Hyperlipidemia: Continue Zocor 40 mg at bedtime.  5.  Hyperlipidemia: Reviewed her panel.  Stable.  No changes.  6.  Follow-up in 6 months with echocardiogram planned in 1 year.  Started on hydrochlorothiazide 25 mg once daily.      Interval history:  See above.    HPI:    She has been concerned with a heart murmur as well as bilateral carotid artery stenosis.  She had  echocardiogram completed on 2/21/19 as well as an ultrasound of the carotids on 2/21/19.  She is here for those results.       Previously, we reviewed her Zio patch results from 12/31/18 which showed rare PVC's, x1 run of NSVT 5 beats, PAC's, and x26 episodes of PSVT lasting up to 20 beats.     She also has a history of mild carotid artery disease at less than 50% with mild plaquing on 12/1/16.  She does not have a personal history of smoking but was around secondhand smoke in the past.  She had a ultrasound of her carotids on 2/20/19 that showed atherosclerotic plaquing in both carotid bifurcations.  There was no significant hemo-dynamic stenosis.     Her echocardiogram from 2/21/19 showed an EF of 65-70%, grade 1 diastolic dysfunction, mild left atrial enlargement, mild to moderate aortic insufficiency, trace to mild tricuspid insufficiency, and trace to mild mitral insufficiency.  She is concerned about her valvular insufficiency.  She will have an echocardiogram in approximately 2 years to follow-up on the mild to moderate aortic insufficiency.      Relevant testing:  Echocardiogram on 9/11/2024:  Left ventricular size, wall motion and function are normal. The ejection  fraction is 60-65%.  Grade I or early diastolic dysfunction.   Right ventricular function, chamber size, wall motion, and thickness are normal.  Mild to moderate aortic insufficiency.  The inferior vena cava is normal.  This study was compared with the study from 1/12/24. No significant change in AI.    ECHO on 9/12/23:  Global and regional left ventricular function is normal with an EF of 60-65%.  Grade II or moderate diastolic dysfunction.  Global right ventricular function is normal.  Mild to moderate mitral insufficiency is present.  Mild to moderate aortic insufficiency is present.  IVC diameter <2.1 cm collapsing >50% with sniff suggests a normal RA pressure of 3 mmHg.  No pericardial effusion is present.  No significant changes  noted.    Zio patch on 10/12/22:  Worn for 12 days and 8 hr's.  After removing artifact, total time was 12 days and 7 hr's. Placed on 10/12/2022 at 11:30 AM and completed on 10/24/2022 at 7:06 PM.  Underlying rhythm was sinus.  Hrt rate ranged from 52 bpm, maximum heart rate of 197 bmp, averaging 68 bmp.  No significant bradycardia, pauses, Mobitz type II or 3rd degree heart block.  No atrial fibrillation on this study.  x132 triggered events and x14 diary entries.  These corresponded to SVE, VE, SVT, and sinus rhythm.  x2 runs of VT lasting up to 6 beats with a maximum heart rate of 160 bmp.    x121 runs of SVT lasting up to 22.6 sec's with a maximum heart rate of 197 bmp.  Rare, <1% of PAC's, atrial couplets, atrial triplets, ventricular couplets, and ventricular triplets.  Occasional PVC's at 2.3%.  + episodes of ventricular bigeminy lasting up to 7.3 sec's.  0 episodes of ventricular trigeminy lasting.     Echocardiogram on 10/11/2022:  Left ventricular size, wall motion and function are normal. The ejection  fraction is 55-60%.  The right ventricle is normal size. Global right ventricular function is normal.  Moderate aortic insufficiency is present.  Right ventricular systolic pressure is 40 mmHg above the right atrial pressure.  IVC diameter <2.1 cm collapsing >50% with sniff suggests a normal RA pressure  of 3 mmHg.    ECHO on 2/24/22:  Left ventricular size, wall motion and function are normal. The ejection fraction is 60-65%.  Global right ventricular function is normal.  Mild to moderate mitral insufficiency is present.  Moderate aortic insufficiency is present.  The inferior vena cava is normal.  No pericardial effusion is present.  Compared to prior imaging on 1/28/2021 There is mildly increased PI, MR and AI.   The subtle change is confirmed on visual inspection.    ECHO on 1/28/21:  No pericardial effusion is present.  Global and regional left ventricular function is normal with an EF of 55-60%.  The  right ventricle is normal size.  Global right ventricular function is normal.  Both atria appear normal.  Mild mitral insufficiency is present.  The aortic valve is tricuspid.  Mild to moderate aortic insufficiency is present.  AI unchanged from previous ECHO 2/21/19  The mean gradient across the aortic valve is 5.1 mmHg.  Trace tricuspid insufficiency is present.  Trace pulmonic insufficiency is present.  The aorta root is normal.    ECHO on 2/21/19:  No pericardial effusion is present.  Global and regional left ventricular function is hyperkinetic with an EF of  65-70%.  Grade I or early diastolic dysfunction.  The right ventricle is normal size.  Global right ventricular function is normal.  Mild left atrial enlargement is present.  Trace to mild mitral insufficiency is present.  The aortic valve is tricuspid.  Mild to moderate aortic insufficiency is present.  Trace to mild tricuspid insufficiency is present.  The peak velocity of the tricuspid regurgitant jet is not obtainable.  The pulmonic valve is normal.  The aorta root is normal.    Zio patch from 12/31/2018:  The enrollment period was from 12/31/18 through 1/7/19.  There were 6 days and 12 hours of analysis time available for review.  The minimum heart rate was 49, average heart rate 66 and maximum heart rate 200 bpm.  The patient has underlying sinus rhythm throughout.  There is no significant bradycardia pauses or heart block seen.  There were very rare isolated PVC's.  There was a 5 beat run of ventricular tachycardia.  With an average heart rate of 113 bpm.  This was the only run.  There are rare PAC's seen.  Less than 1% of total beats.  There were 26 episodes of a supraventricular tachycardia greater than 4 beats.  The longest was for 20 beats with an average heart rate of 108 bpm.  The fastest was for 11 beats with a maximum heart rate of 200 bpm but an average heart rate of 134 bpm.  Review of some of these tracings show that it likely is an  atrial tachycardia  There were 3 triggered events all 3 of these strips do have PAC's seen.  1 of them had a very slower run of supraventricular tachycardia average heart rate of 113 bpm.  There were 3 diary entries with the symptom of skipped irregular beats.  Review of the associated strips show all had sinus rhythm to them had supraventricular beats.    US carotids on 2/21/22:  No ultrasound evidence of hemo-dynamically significant stenosis.   Small volume of calcified plaque again seen in the left internal  carotid artery.          ICD-10-CM    1. Diastolic dysfunction  I51.89 EKG 12-lead complete w/read - (Clinic Performed)     hydrochlorothiazide (HYDRODIURIL) 25 MG tablet      2. Non-rheumatic mitral regurgitation  I34.0 EKG 12-lead complete w/read - (Clinic Performed)      3. Non-rheumatic tricuspid valve insufficiency  I36.1 EKG 12-lead complete w/read - (Clinic Performed)      4. Nonrheumatic aortic valve insufficiency  I35.1 EKG 12-lead complete w/read - (Clinic Performed)      5. Bilateral carotid artery stenosis  I65.23 EKG 12-lead complete w/read - (Clinic Performed)      6. PVC's (premature ventricular contractions)  I49.3 EKG 12-lead complete w/read - (Clinic Performed)      7. Palpitations  R00.2 EKG 12-lead complete w/read - (Clinic Performed)      8. Stage 3a chronic kidney disease (H)  N18.31 hydrochlorothiazide (HYDRODIURIL) 25 MG tablet      9. PSVT (paroxysmal supraventricular tachycardia) (H)  I47.10       10. Hypertriglyceridemia  E78.1       11. Mixed hyperlipidemia  E78.2       12. Morbid obesity (H)  E66.01       13. Benign essential hypertension  I10 hydrochlorothiazide (HYDRODIURIL) 25 MG tablet              Past Medical History:   Diagnosis Date    Anxiety 08/01/2011    Cholecystolithiasis 1/4/2015    noted on US 1/4/2015 --> cholecystectomy    Chronic kidney disease (CKD), stage III (moderate) (H) 2013    Early,unknown eitiology, Dr Vilchis    Chronic kidney disease (CKD), stage III  (moderate) (H) 1/4/2015    Early,unknown eitiology, Dr Vilchis     Cough 07/02/2001    Hiatal hernia 12/28/2014    Seen on chest CT    Hyperlipidemia 02/23/2001    Idiopathic hives since age 8 06/01/2004    Major depression 10/04/2011    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    Anterior Segment V 4 cm nodule abutting liver surface by US 1/4/2015     Obesity, Class II, BMI 35-39.9 1/4/2015    BMI 35.9 with comorbidities = MORBID obesity    Osteoarthritis of right hip 10/07/2004    Osteoarthrosis 06/14/2012    Otitis externa 10/04/2011    Prediabetes 08/01/2011       Past Surgical History:   Procedure Laterality Date    ARTHROPLASTY KNEE Left 9/9/2019    Procedure: LEFT TOTAL KNEE ARTHROPLASTY S/N;  Surgeon: Trevor Alonzo MD;  Location: [facility]    ARTHROSCOPY KNEE Left 3/21/2019    Procedure: LEFT  KNEE ARTHROSCOPY, partial medial menisectomy;  Surgeon: Esteban Wills MD;  Location: [facility]    CLOSED REDUCTION WRIST Right 1952 x 2    long arm cast (same time as elbow fx)    CLOSED RX ELBOW DISLOCATION Right 1952    CR/long cast of fracture    EXCISE MASS FOOT Left 8/16/2021    Procedure: LEFT ANKLE MASS EXCISION;  Surgeon: Trevor Alonzo MD;  Location: [facility]    HC INJ EPIDURAL LUMBAR/SACRAL W/WO CONTRAST Bilateral 5/2013    facet injections; prev 2012    LAPAROSCOPIC CHOLECYSTECTOMY N/A 1/4/2015    Procedure: LAPAROSCOPIC CHOLECYSTECTOMY;  Surgeon: Brittney العلي MD;  Location: [facility]    TONSILLECTOMY      ZZC TOTAL KNEE ARTHROPLASTY Left 09/09/2019    Dr Alonzo       Allergies   Allergen Reactions    Chocolate Hives    Lemon Flavor Hives    Lime [Lime, Sulfurated (Calcium Polysulfide)] Hives    Metal [Staples]     Orange Fruit [Citrus] Hives    Strawberry Extract Hives    Adhesive Tape      Band-aids    Codeine Hives     Patient can tolerate oxycodone & dilaudid    Erythromycin Hives     ERYTHROMYCIN BASE    Food      Tomato, grapefruit, oranges.    Grapefruit [Extra Strength Grapefruit]       GRAPEFRUIT    Morphine Hives     Pt. Reports had hives    Penicillins Hives    Ranitidine GI Disturbance    Sulfa Antibiotics      SULFONAMIDE ANTIBIOTICS     Tomato     Xyzal [Levocetirizine] Hives       Current Outpatient Medications   Medication Sig Dispense Refill    acetaminophen (TYLENOL) 325 MG tablet Take 325-650 mg by mouth every 6 hours as needed for mild pain      Calcium-Magnesium-Vitamin D (CALCIUM 1200+D3 PO) Take by mouth daily      clonazePAM (KLONOPIN) 0.5 MG tablet TAKE 1/2 (ONE-HALF) TABLET BY MOUTH NIGHTLY AS NEEDED FOR ANXIETY 20 tablet 0    Cranberry 125 MG TABS       ferrous sulfate (FEROSUL) 325 (65 Fe) MG tablet Take 325 mg by mouth daily (with breakfast)      FISH OIL Take 1 capsule by mouth daily       fluocinonide (LIDEX) 0.05 % external solution Apply to back of scalp nightly for itch 60 mL 3    hydrochlorothiazide (HYDRODIURIL) 25 MG tablet Take 1 tablet (25 mg) by mouth daily. 90 tablet 3    HYDROcodone-acetaminophen (NORCO) 5-325 MG tablet Take 1 tablet by mouth every 6 hours as needed for moderate to severe pain.      ipratropium - albuterol 0.5 mg/2.5 mg/3 mL (DUONEB) 0.5-2.5 (3) MG/3ML neb solution Take 1 vial (3 mLs) by nebulization every 6 hours as needed for shortness of breath / dyspnea or wheezing 3 mL 1    PARoxetine (PAXIL) 40 MG tablet Take 1 tablet by mouth once daily 90 tablet 3    REFRESH DIGITAL 0.5-1-0.5 % SOLN Place 0.5 % into both eyes 3 times daily as needed (as needed)      simvastatin (ZOCOR) 40 MG tablet TAKE 1 TABLET BY MOUTH AT BEDTIME 90 tablet 0    VITAMIN D, CHOLECALCIFEROL, PO Take 2,000 Units by mouth daily         Social History     Socioeconomic History    Marital status: Single     Spouse name: Not on file    Number of children: 4    Years of education: 12    Highest education level: Not on file   Occupational History    Occupation: personal care attendant   Tobacco Use    Smoking status: Never     Passive exposure: Never    Smokeless tobacco: Never    Vaping Use    Vaping status: Never Used   Substance and Sexual Activity    Alcohol use: No    Drug use: No    Sexual activity: Never   Other Topics Concern     Service Not Asked    Blood Transfusions Yes    Caffeine Concern Yes     Comment: >32 oz soda/day    Occupational Exposure Not Asked    Hobby Hazards Not Asked    Sleep Concern Yes     Comment: insomnia    Stress Concern Not Asked    Weight Concern Not Asked    Special Diet Not Asked    Back Care Not Asked    Exercise Not Asked    Bike Helmet Not Asked    Seat Belt Not Asked    Self-Exams Not Asked    Parent/sibling w/ CABG, MI or angioplasty before 65F 55M? No   Social History Narrative    Not on file     Social Determinants of Health     Financial Resource Strain: Low Risk  (1/4/2024)    Financial Resource Strain     Within the past 12 months, have you or your family members you live with been unable to get utilities (heat, electricity) when it was really needed?: No   Food Insecurity: Low Risk  (1/4/2024)    Food Insecurity     Within the past 12 months, did you worry that your food would run out before you got money to buy more?: No     Within the past 12 months, did the food you bought just not last and you didn t have money to get more?: No   Transportation Needs: Low Risk  (1/4/2024)    Transportation Needs     Within the past 12 months, has lack of transportation kept you from medical appointments, getting your medicines, non-medical meetings or appointments, work, or from getting things that you need?: No   Physical Activity: Not on file   Stress: Not on file   Social Connections: Not on file   Interpersonal Safety: Low Risk  (6/14/2024)    Interpersonal Safety     Do you feel physically and emotionally safe where you currently live?: Yes     Within the past 12 months, have you been hit, slapped, kicked or otherwise physically hurt by someone?: No     Within the past 12 months, have you been humiliated or emotionally abused in other ways by  your partner or ex-partner?: No   Housing Stability: Low Risk  (1/4/2024)    Housing Stability     Do you have housing? : Yes     Are you worried about losing your housing?: No       LAB RESULTS:   Orders Only on 02/10/2021   Component Date Value Ref Range Status    Sodium 02/10/2021 139  133 - 144 mmol/L Final    Potassium 02/10/2021 4.0  3.4 - 5.3 mmol/L Final    Chloride 02/10/2021 108  94 - 109 mmol/L Final    Carbon Dioxide 02/10/2021 30  20 - 32 mmol/L Final    Anion Gap 02/10/2021 1* 3 - 14 mmol/L Final    Glucose 02/10/2021 104* 70 - 99 mg/dL Final    Urea Nitrogen 02/10/2021 15  7 - 30 mg/dL Final    Creatinine 02/10/2021 1.00  0.52 - 1.04 mg/dL Final    GFR Estimate 02/10/2021 54* >60 mL/min/[1.73_m2] Final    GFR Estimate If Black 02/10/2021 62  >60 mL/min/[1.73_m2] Final    Calcium 02/10/2021 9.5  8.5 - 10.1 mg/dL Final    Hemoglobin A1C 02/10/2021 5.0  0 - 5.6 % Final    ALT 02/10/2021 27  0 - 50 U/L Final    AST 02/10/2021 20  0 - 45 U/L Final    Cholesterol 02/10/2021 177  <200 mg/dL Final    Triglycerides 02/10/2021 166* <150 mg/dL Final    HDL Cholesterol 02/10/2021 53  >49 mg/dL Final    LDL Cholesterol Calculated 02/10/2021 91  <100 mg/dL Final    Non HDL Cholesterol 02/10/2021 124  <130 mg/dL Final    Estimated Average Glucose 02/10/2021 97  mg/dL Final        Review of systems: Negative except that which was noted in the HPI.    Physical examination:  /67   Pulse 64   Resp 20   Wt 95.3 kg (210 lb)   SpO2 96%   BMI 34.95 kg/m      GENERAL APPEARANCE: healthy, alert and no distress  CHEST: lungs clear to auscultation.  CARDIOVASCULAR: regular rhythm, normal S1 with physiologic split S2, no S3 or S4 and no murmur, click or rub  EXTREMITIES: no clubbing, cyanosis but with minimal peripheral edema.      Total time spent on day of visit, including review of tests, obtaining/reviewing separately obtained history, ordering medications/tests/procedures, communicating with PCP/consultants, and  documenting in electronic medical record: 20 minutes.           Thank you for allowing me to participate in the care of your patient. Please do not hesitate to contact me if you have any questions.     Ha Hughes, DO

## 2024-11-05 ENCOUNTER — TRANSFERRED RECORDS (OUTPATIENT)
Dept: HEALTH INFORMATION MANAGEMENT | Facility: CLINIC | Age: 84
End: 2024-11-05
Payer: COMMERCIAL

## 2024-11-10 DIAGNOSIS — F41.9 ANXIETY: ICD-10-CM

## 2024-11-11 RX ORDER — CLONAZEPAM 0.5 MG/1
TABLET ORAL
Qty: 20 TABLET | Refills: 0 | Status: SHIPPED | OUTPATIENT
Start: 2024-11-11

## 2024-11-11 NOTE — TELEPHONE ENCOUNTER
Klonopin     Last Written Prescription Date:  09/24/24  Last Fill Quantity: 20,   # refills: 0  Last Office Visit: 08/15/24  Future Office visit:    Next 5 appointments (look out 90 days)      Nov 15, 2024 8:00 AM  (Arrive by 7:45 AM)  Provider Visit with Yanique Mar MD  [facility] ([facility] ) [address] [place], [place] [address] [place] [address]  [phone]     Dec 03, 2024 2:00 PM  (Arrive by 1:45 PM)  Return Visit with MITCHEL Melgar  [facility] ([facility] ) [address] [place] [id number] [phone]

## 2024-11-12 ENCOUNTER — TRANSFERRED RECORDS (OUTPATIENT)
Dept: HEALTH INFORMATION MANAGEMENT | Facility: CLINIC | Age: 84
End: 2024-11-12
Payer: COMMERCIAL

## 2024-11-14 NOTE — PROGRESS NOTES
Assessment & Plan     Chronic pain of right knee  S/P injection.  Doing better s/p injection  Follows with Dr. Alonzo at [facility].      Stage 3a chronic kidney disease (H)  Clinically stable  Continue caution with NSAIDs    Left wrist pain  Relatively recent fall on 10/3 and noted to have comminuted fracture of distal radius with mild volar angulation and mildly displaced ulnar styloid fracture.  Follows with [facility].  Range of motion is significantly improved in the LUE (Right handed).  Still not able to do much with her left hand- hand  is significantly weaker compared to right hand  Due to CKD, caution with NSAIDs but can try PRN topical voltaren  Continue with supportive wrist brace  - diclofenac (VOLTAREN) 1 % topical gel; Apply 2 g topically 4 times daily.    Essential HTN  BP well controlled and in target range.    She does have home BP cuff which recorded 130s systolic today.  She has not started HCTZ 25 mg daily per Dr. Hughes  I do not think she needs additional BP medication but she does need to bring her home BP cuff with her to her next visit for comparison    Hyperlipidemia  Very well controlled  Most recent LDL of 85 which is acceptable.        Return in about 3 months (around 2/15/2025).    Natividad Bill is a 84 year old, presenting for the following health issues:  Lipids, Anxiety, and Chronic Disease Management        11/15/2024     7:44 AM   Additional Questions   Roomed by Kassie Cross   Accompanied by None         11/15/2024     7:44 AM   Patient Reported Additional Medications   Patient reports taking the following new medications None     HPI     Hyperlipidemia Follow-Up    Are you regularly taking any medication or supplement to lower your cholesterol?   Yes- simvastatin 40 MG   Are you having muscle aches or other side effects that you think could be caused by your cholesterol lowering medication?  No    Anxiety   How are you doing with your anxiety since your last visit? Worsened due to  fall and injuries   Are you having other symptoms that might be associated with anxiety? No  Have you had a significant life event? Health Concerns   Are you feeling depressed? Yes:  because she can't do things due to her fall   Do you have any concerns with your use of alcohol or other drugs? No    Social History     Tobacco Use    Smoking status: Never     Passive exposure: Never    Smokeless tobacco: Never   Vaping Use    Vaping status: Never Used   Substance Use Topics    Alcohol use: No    Drug use: No         7/24/2023     8:28 AM 11/27/2023     3:06 PM 11/15/2024     7:52 AM   ANDREI-7 SCORE   Total Score  10 (moderate anxiety) 8 (mild anxiety)   Total Score 7 10 8        Patient-reported         6/5/2023     8:02 AM 11/27/2023     3:05 PM 11/15/2024     7:51 AM   PHQ   PHQ-9 Total Score 0 7 10    Q9: Thoughts of better off dead/self-harm past 2 weeks Not at all Not at all  Not at all        Patient-reported         11/15/2024     7:51 AM   Last PHQ-9   1.  Little interest or pleasure in doing things 2    2.  Feeling down, depressed, or hopeless 1    3.  Trouble falling or staying asleep, or sleeping too much 3    4.  Feeling tired or having little energy 2    5.  Poor appetite or overeating 0    6.  Feeling bad about yourself 1    7.  Trouble concentrating 1    8.  Moving slowly or restless 0    Q9: Thoughts of better off dead/self-harm past 2 weeks 0    PHQ-9 Total Score 10        Patient-reported         11/15/2024     7:52 AM   ANDREI-7    1. Feeling nervous, anxious, or on edge 2    2. Not being able to stop or control worrying 3    3. Worrying too much about different things 2    4. Trouble relaxing 0    5. Being so restless that it is hard to sit still 0    6. Becoming easily annoyed or irritable 1    7. Feeling afraid, as if something awful might happen 0    ANDREI-7 Total Score 8    If you checked any problems, how difficult have they made it for you to do your work, take care of things at home, or get along  "with other people? Very difficult        Patient-reported     Chronic Kidney Disease Follow-up    Do you take any over the counter pain medicine?: Yes  What over the counter medicine are you taking for your pain?:  Tylenol     How often do you take this medicine?:  A few times a week        Review of Systems  Constitutional, HEENT, cardiovascular, pulmonary, GI, , musculoskeletal, neuro, skin, endocrine and psych systems are negative, except as otherwise noted.      Objective    /70 (BP Location: Left arm, Patient Position: Sitting, Cuff Size: Adult Large)   Pulse 62   Temp 98.9  F (37.2  C) (Tympanic)   Resp 16   Ht 1.651 m (5' 5\")   Wt 95.4 kg (210 lb 6 oz)   SpO2 96%   BMI 35.01 kg/m    Body mass index is 35.01 kg/m .  Physical Exam   GENERAL: alert and no distress  EYES: Eyes grossly normal to inspection, PERRL and conjunctivae and sclerae normal  HENT: ear canals and TM's normal, nose and mouth without ulcers or lesions  NECK: no adenopathy, no asymmetry, masses, or scars  RESP: lungs clear to auscultation - no rales, rhonchi or wheezes  CV: regular rate and rhythm, normal S1 S2, no S3 or S4, no murmur, click or rub, no peripheral edema  ABDOMEN: soft, nontender, no hepatosplenomegaly, no masses and bowel sounds normal  MS: no gross musculoskeletal defects noted, no edema  SKIN: no suspicious lesions or rashes  NEURO: Normal strength and tone, mentation intact and speech normal  PSYCH: mentation appears normal, affect normal/bright          Signed Electronically by: Yanique Mar MD    "

## 2024-11-15 ENCOUNTER — OFFICE VISIT (OUTPATIENT)
Dept: FAMILY MEDICINE | Facility: OTHER | Age: 84
End: 2024-11-15
Attending: STUDENT IN AN ORGANIZED HEALTH CARE EDUCATION/TRAINING PROGRAM
Payer: COMMERCIAL

## 2024-11-15 VITALS
RESPIRATION RATE: 16 BRPM | SYSTOLIC BLOOD PRESSURE: 113 MMHG | DIASTOLIC BLOOD PRESSURE: 70 MMHG | OXYGEN SATURATION: 96 % | WEIGHT: 210.38 LBS | TEMPERATURE: 98.9 F | BODY MASS INDEX: 35.05 KG/M2 | HEIGHT: 65 IN | HEART RATE: 62 BPM

## 2024-11-15 DIAGNOSIS — M25.561 CHRONIC PAIN OF RIGHT KNEE: Primary | ICD-10-CM

## 2024-11-15 DIAGNOSIS — N18.31 STAGE 3A CHRONIC KIDNEY DISEASE (H): ICD-10-CM

## 2024-11-15 DIAGNOSIS — G89.29 CHRONIC PAIN OF RIGHT KNEE: Primary | ICD-10-CM

## 2024-11-15 DIAGNOSIS — M25.532 LEFT WRIST PAIN: ICD-10-CM

## 2024-11-15 DIAGNOSIS — I10 ESSENTIAL HYPERTENSION: ICD-10-CM

## 2024-11-15 DIAGNOSIS — E78.5 HYPERLIPIDEMIA LDL GOAL <100: ICD-10-CM

## 2024-11-15 PROBLEM — I47.20 VENTRICULAR TACHYCARDIA (H): Status: ACTIVE | Noted: 2024-11-15

## 2024-11-15 PROCEDURE — G0463 HOSPITAL OUTPT CLINIC VISIT: HCPCS

## 2024-11-15 ASSESSMENT — ANXIETY QUESTIONNAIRES
7. FEELING AFRAID AS IF SOMETHING AWFUL MIGHT HAPPEN: NOT AT ALL
4. TROUBLE RELAXING: NOT AT ALL
GAD7 TOTAL SCORE: 8
8. IF YOU CHECKED OFF ANY PROBLEMS, HOW DIFFICULT HAVE THESE MADE IT FOR YOU TO DO YOUR WORK, TAKE CARE OF THINGS AT HOME, OR GET ALONG WITH OTHER PEOPLE?: VERY DIFFICULT
GAD7 TOTAL SCORE: 8
1. FEELING NERVOUS, ANXIOUS, OR ON EDGE: MORE THAN HALF THE DAYS
3. WORRYING TOO MUCH ABOUT DIFFERENT THINGS: MORE THAN HALF THE DAYS
2. NOT BEING ABLE TO STOP OR CONTROL WORRYING: NEARLY EVERY DAY
GAD7 TOTAL SCORE: 8
7. FEELING AFRAID AS IF SOMETHING AWFUL MIGHT HAPPEN: NOT AT ALL
5. BEING SO RESTLESS THAT IT IS HARD TO SIT STILL: NOT AT ALL
IF YOU CHECKED OFF ANY PROBLEMS ON THIS QUESTIONNAIRE, HOW DIFFICULT HAVE THESE PROBLEMS MADE IT FOR YOU TO DO YOUR WORK, TAKE CARE OF THINGS AT HOME, OR GET ALONG WITH OTHER PEOPLE: VERY DIFFICULT
6. BECOMING EASILY ANNOYED OR IRRITABLE: SEVERAL DAYS

## 2024-11-15 ASSESSMENT — PATIENT HEALTH QUESTIONNAIRE - PHQ9
SUM OF ALL RESPONSES TO PHQ QUESTIONS 1-9: 10
SUM OF ALL RESPONSES TO PHQ QUESTIONS 1-9: 10
10. IF YOU CHECKED OFF ANY PROBLEMS, HOW DIFFICULT HAVE THESE PROBLEMS MADE IT FOR YOU TO DO YOUR WORK, TAKE CARE OF THINGS AT HOME, OR GET ALONG WITH OTHER PEOPLE: NOT DIFFICULT AT ALL

## 2024-11-15 ASSESSMENT — PAIN SCALES - GENERAL: PAINLEVEL_OUTOF10: MODERATE PAIN (4)

## 2024-11-29 ENCOUNTER — HOSPITAL ENCOUNTER (EMERGENCY)
Facility: HOSPITAL | Age: 84
Discharge: HOME | End: 2024-11-29
Attending: NURSE PRACTITIONER | Admitting: NURSE PRACTITIONER
Payer: COMMERCIAL

## 2024-11-29 VITALS
RESPIRATION RATE: 18 BRPM | TEMPERATURE: 98.3 F | DIASTOLIC BLOOD PRESSURE: 78 MMHG | OXYGEN SATURATION: 95 % | HEART RATE: 82 BPM | SYSTOLIC BLOOD PRESSURE: 144 MMHG

## 2024-11-29 DIAGNOSIS — N30.01 ACUTE CYSTITIS WITH HEMATURIA: Primary | ICD-10-CM

## 2024-11-29 LAB
ALBUMIN UR-MCNC: 100 MG/DL
APPEARANCE UR: ABNORMAL
BACTERIA #/AREA URNS HPF: ABNORMAL /HPF
BILIRUB UR QL STRIP: NEGATIVE
COLOR UR AUTO: YELLOW
GLUCOSE UR STRIP-MCNC: NEGATIVE MG/DL
HGB UR QL STRIP: ABNORMAL
KETONES UR STRIP-MCNC: NEGATIVE MG/DL
LEUKOCYTE ESTERASE UR QL STRIP: ABNORMAL
MUCOUS THREADS #/AREA URNS LPF: PRESENT /LPF
NITRATE UR QL: NEGATIVE
PH UR STRIP: 5.5 [PH] (ref 4.7–8)
RBC URINE: >182 /HPF
SP GR UR STRIP: 1.03 (ref 1–1.03)
SQUAMOUS EPITHELIAL: 13 /HPF
TRANSITIONAL EPI: 5 /HPF
UROBILINOGEN UR STRIP-MCNC: NORMAL MG/DL
WBC CLUMPS #/AREA URNS HPF: PRESENT /HPF
WBC URINE: >182 /HPF

## 2024-11-29 PROCEDURE — 81003 URINALYSIS AUTO W/O SCOPE: CPT | Performed by: NURSE PRACTITIONER

## 2024-11-29 PROCEDURE — 99213 OFFICE O/P EST LOW 20 MIN: CPT | Performed by: NURSE PRACTITIONER

## 2024-11-29 PROCEDURE — G0463 HOSPITAL OUTPT CLINIC VISIT: HCPCS

## 2024-11-29 RX ORDER — CIPROFLOXACIN 500 MG/1
500 TABLET, FILM COATED ORAL 2 TIMES DAILY
Qty: 14 TABLET | Refills: 0 | Status: SHIPPED | OUTPATIENT
Start: 2024-11-29 | End: 2024-12-06

## 2024-11-29 ASSESSMENT — ENCOUNTER SYMPTOMS
SHORTNESS OF BREATH: 0
FLANK PAIN: 0
FEVER: 0
DIARRHEA: 0
FREQUENCY: 0
DYSURIA: 1
VOMITING: 0
NAUSEA: 0
HEMATURIA: 0
PSYCHIATRIC NEGATIVE: 1
CHILLS: 0
ABDOMINAL PAIN: 0

## 2024-11-29 ASSESSMENT — ACTIVITIES OF DAILY LIVING (ADL): ADLS_ACUITY_SCORE: 47

## 2024-11-29 NOTE — ED TRIAGE NOTES
Pt presents with c/o having increased burning while voiding   Denies any other symptoms   S/x started today   No otc meds taken today

## 2024-11-29 NOTE — ED PROVIDER NOTES
History     Chief Complaint   Patient presents with    Rule out Urinary Tract Infection     HPI  Dinorah Lim is a 84 year old female who presents to urgent care today ambulatory with complaints of dysuria that started today.  Denies any frequency or hematuria.  Denies any abdominal, pelvic or flank pain.  Denies any genital sores, rashes, redness or irritation.  Denies any vaginal bleeding, discharge or odor.  Denies any fever, chills, nausea, vomiting, diarrhea, shortness of breath or chest pain.  No OTC meds today.  Patient states she is allergic to many different antibiotics, ciprofloxacin does not cause hives and prefers that medication.  No other concerns.    Allergies:  Allergies   Allergen Reactions    Chocolate Hives    Lemon Flavor Hives    Lime [Lime, Sulfurated (Calcium Polysulfide)] Hives    Metal [Staples]     Orange Fruit [Citrus] Hives    Strawberry Extract Hives    Adhesive Tape      Band-aids    Codeine Hives     Patient can tolerate oxycodone & dilaudid    Erythromycin Hives     ERYTHROMYCIN BASE    Food      Tomato, grapefruit, oranges.    Grapefruit [Extra Strength Grapefruit]      GRAPEFRUIT    Morphine Hives     Pt. Reports had hives    Penicillins Hives    Ranitidine GI Disturbance    Sulfa Antibiotics      SULFONAMIDE ANTIBIOTICS     Tomato     Xyzal [Levocetirizine] Hives       Problem List:    Patient Active Problem List    Diagnosis Date Noted    Ventricular tachycardia (H) 11/15/2024     Priority: Medium    Other chest pain 10/20/2021     Priority: Medium    Hiatal hernia 07/28/2021     Priority: Medium    Chronic, continuous use of opioids 05/17/2021     Priority: Medium    Stage 3a chronic kidney disease (H) 03/22/2021     Priority: Medium    COVID-19 virus infection on 10/30/20 11/24/2020     Priority: Medium     10-      Other neutropenia (H) 08/04/2020     Priority: Medium    Paresthesias 02/13/2020     Priority: Medium    Status post total left knee replacement  09/09/2019     Priority: Medium    Aortic valve insufficiency 03/06/2019     Priority: Medium    Mitral regurgitation 03/06/2019     Priority: Medium    Tricuspid valve insufficiency 03/06/2019     Priority: Medium    Diastolic dysfunction grade 1 on 2/21/19 03/06/2019     Priority: Medium    Hypertriglyceridemia 03/06/2019     Priority: Medium    Palpitations 02/13/2019     Priority: Medium    PVC's (premature ventricular contractions) 02/13/2019     Priority: Medium    Atrial ectopy 02/13/2019     Priority: Medium    PSVT (paroxysmal supraventricular tachycardia) (H) 02/13/2019     Priority: Medium    COPD, mild on 9/9/14 02/13/2019     Priority: Medium    Bilateral carotid artery stenosis at less than 50% on 12/1/16 02/13/2019     Priority: Medium    Mixed hyperlipidemia 02/13/2019     Priority: Medium    On statin therapy 02/13/2019     Priority: Medium    Heart murmur 02/13/2019     Priority: Medium    Morbid obesity (H) 06/01/2017     Priority: Medium    ACP (advance care planning) 04/28/2016     Priority: Medium     Advance Care Planning 4/28/2016: ACP Review of Chart / Resources Provided:  Reviewed chart for advance care plan.  Dinorah Lim has no plan or code status on file. Discussed available resources and provided with information. Confirmed code status reflects current choices pending further ACP discussions.  Confirmed/documented legally designated decision makers.  Added by Aditi Gandhi            Anxiety 06/23/2014     Priority: Medium    Lung density on x-ray 07/23/2013     Priority: Medium    Osteoarthritis 06/14/2012     Priority: Medium     Problem list name updated by automated process. Provider to review      Advanced care planning/counseling discussion 01/13/2012     Priority: Medium    Abnormal glucose 08/01/2011     Priority: Medium     Hgb A1c 5.7 on 1/4/2015  Problem list name updated by automated process. Provider to review      Osteoarthritis of right hip 10/07/2004     Priority:  Medium    Urticaria 06/01/2004     Priority: Medium     Problem list name updated by automated process. 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    Obesity, Class II, BMI 35-39.9 1/4/2015    Osteoarthritis of right hip 10/07/2004    Osteoarthrosis 06/14/2012    Otitis externa 10/04/2011    Prediabetes 08/01/2011       Past Surgical History:    Past Surgical History:   Procedure Laterality Date    ARTHROPLASTY KNEE Left 9/9/2019    Procedure: LEFT TOTAL KNEE ARTHROPLASTY S/N;  Surgeon: Trevor Alonzo MD;  [facility]    ARTHROSCOPY KNEE Left 3/21/2019    Procedure: LEFT  KNEE ARTHROSCOPY, partial medial menisectomy;  Surgeon: Esteban Wills MD;  [facility]    CLOSED REDUCTION WRIST Right 1952 x 2    long arm cast (same time as elbow fx)    CLOSED RX ELBOW DISLOCATION Right 1952    CR/long cast of fracture    EXCISE MASS FOOT Left 8/16/2021    Procedure: LEFT ANKLE MASS EXCISION;  Surgeon: Trevor Alonzo MD;  [facility]: [facility]    HC INJ EPIDURAL LUMBAR/SACRAL W/WO CONTRAST Bilateral 5/2013    facet injections; prev 2012    LAPAROSCOPIC CHOLECYSTECTOMY N/A 1/4/2015    Procedure: LAPAROSCOPIC CHOLECYSTECTOMY;  Surgeon: Brittney العلي MD;  Location: [facility]    TONSILLECTOMY      ZC TOTAL KNEE ARTHROPLASTY Left 09/09/2019    Dr Alonzo       Family History:    Family History   Problem Relation Age of Onset    Heart Disease Brother         atrial fibrillation    Atrial fibrillation Brother     Other - See Comments Mother         emphysema    Heart Disease Father 92        CHF     Cancer Paternal Grandfather         lung cancer    Cancer Maternal Uncle         lung cancer    Chronic Obstructive Pulmonary Disease Daughter     Emphysema Daughter     Other - See Comments Daughter         benign breast lesion    Esophagitis Daughter        Social History:  Marital Status:  Single [1]  Social History     Tobacco Use    Smoking status: Never     Passive exposure: Never    Smokeless tobacco: Never   Vaping Use    Vaping status: Never Used   Substance Use Topics    Alcohol use: No    Drug use: No        Medications:    ciprofloxacin (CIPRO) 500 MG tablet  acetaminophen (TYLENOL) 325 MG tablet  Calcium-Magnesium-Vitamin D (CALCIUM 1200+D3 PO)  clonazePAM (KLONOPIN) 0.5 MG tablet  Cranberry 125 MG TABS  diclofenac (VOLTAREN) 1 % topical gel  ferrous sulfate (FEROSUL) 325 (65 Fe) MG tablet  FISH OIL  fluocinonide (LIDEX) 0.05 % external solution  hydrochlorothiazide (HYDRODIURIL) 25 MG tablet  HYDROcodone-acetaminophen (NORCO) 5-325 MG tablet  ipratropium - albuterol 0.5 mg/2.5 mg/3 mL (DUONEB) 0.5-2.5 (3) MG/3ML neb solution  PARoxetine (PAXIL) 40 MG tablet  REFRESH DIGITAL 0.5-1-0.5 % SOLN  simvastatin (ZOCOR) 40 MG tablet  VITAMIN D, CHOLECALCIFEROL, PO      Review of Systems   Constitutional:  Negative for chills and fever.   Respiratory:  Negative for shortness of breath.    Cardiovascular:  Negative for chest pain.   Gastrointestinal:  Negative for abdominal pain, diarrhea, nausea and vomiting.   Genitourinary:  Positive for dysuria. Negative for decreased urine volume, flank pain, frequency, genital sores, hematuria, pelvic pain, vaginal bleeding, vaginal discharge and vaginal pain.   Musculoskeletal:  Negative for gait problem.   Skin:  Negative for rash.   Psychiatric/Behavioral: Negative.       Physical Exam   BP: 144/78  Pulse: 82  Temp: 98.3  F (36.8  C)  Resp: 18  SpO2: 95 %    Physical Exam  Vitals and nursing note reviewed.   Constitutional:       General: She is not in acute  distress.     Appearance: Normal appearance. She is not ill-appearing or toxic-appearing.   Cardiovascular:      Rate and Rhythm: Normal rate and regular rhythm.      Pulses: Normal pulses.      Heart sounds: Normal heart sounds.   Pulmonary:      Effort: Pulmonary effort is normal.      Breath sounds: Normal breath sounds.   Abdominal:      General: Bowel sounds are normal.      Palpations: Abdomen is soft.      Tenderness: There is no abdominal tenderness. There is no right CVA tenderness or left CVA tenderness.   Neurological:      Mental Status: She is alert.   Psychiatric:         Mood and Affect: Mood normal.       ED Course     Procedures    Results for orders placed or performed during the hospital encounter of 11/29/24 (from the past 24 hours)   UA Macroscopic with reflex to Microscopic and Culture    Specimen: Urine, Midstream   Result Value Ref Range    Color Urine Yellow Colorless, Straw, Light Yellow, Yellow    Appearance Urine Cloudy (A) Clear    Glucose Urine Negative Negative mg/dL    Bilirubin Urine Negative Negative    Ketones Urine Negative Negative mg/dL    Specific Gravity Urine 1.027 1.003 - 1.035    Blood Urine Large (A) Negative    pH Urine 5.5 4.7 - 8.0    Protein Albumin Urine 100 (A) Negative mg/dL    Urobilinogen Urine Normal Normal, 2.0 mg/dL    Nitrite Urine Negative Negative    Leukocyte Esterase Urine Large (A) Negative    Bacteria Urine Few (A) None Seen /HPF    WBC Clumps Urine Present (A) None Seen /HPF    Mucus Urine Present (A) None Seen /LPF    RBC Urine >182 (H) <=2 /HPF    WBC Urine >182 (H) <=5 /HPF    Squamous Epithelials Urine 13 (H) <=1 /HPF    Transitional Epithelials Urine 5 (H) <=1 /HPF       Medications - No data to display    Assessments & Plan (with Medical Decision Making)     I have reviewed the nursing notes.    I have reviewed the findings, diagnosis, plan and need for follow up with the patient.  (N30.01) Acute cystitis with hematuria  (primary encounter  diagnosis)  Plan:   Patient ambulatory with a nontoxic appearance.  Patient arrived with complaints of dysuria.  History of multiple UTIs and prefers treatment with ciprofloxacin.  Denies any abdominal, pelvic or flank pain.  No abdominal tenderness or CVA tenderness.    Denies any fever, chills, nausea, vomiting, diarrhea, shortness of breath or chest pain.  UA completed and is contaminated with 13 squamous epithelial cells, did not reflex to culture due to contamination.  Patient does not want to repeat urine at this time, wants to treat based on symptoms.  Patient to take ciprofloxacin as ordered.  Patient encouraged to monitor symptoms and if worsening or not improving recommend following up with primary care provider or return to urgent care/ED.  Patient in agreement treatment plan.    Discharge Medication List as of 11/29/2024  6:24 PM        START taking these medications    Details   ciprofloxacin (CIPRO) 500 MG tablet Take 1 tablet (500 mg) by mouth 2 times daily for 7 days., Disp-14 tablet, R-0, E-Prescribe           Final diagnoses:   Acute cystitis with hematuria     11/29/2024   [facility]       Chelsea Leija, VICKIE  11/29/24 [id number]

## 2024-11-30 NOTE — DISCHARGE INSTRUCTIONS
Ciprofloxacin as ordered  - Take entire course of antibiotic even if you start to feel better.  - Antibiotics can cause stomach upset including nausea and diarrhea. Read your bottle or ask the pharmacist if antibiotic can be taken with food to help prevent nausea. If you have symptoms of diarrhea you can take an over-the-counter probiotic and/or increase foods with probiotics such as yogurt, Kevin, sauerkraut.    Follow-up with primary care provider or return to urgent care/ED with any worsening in condition or additional concerns.

## 2024-12-09 DIAGNOSIS — E78.5 HYPERLIPIDEMIA LDL GOAL <100: ICD-10-CM

## 2024-12-09 RX ORDER — SIMVASTATIN 40 MG
TABLET ORAL
Qty: 90 TABLET | Refills: 0 | Status: SHIPPED | OUTPATIENT
Start: 2024-12-09

## 2024-12-17 ENCOUNTER — TRANSFERRED RECORDS (OUTPATIENT)
Dept: HEALTH INFORMATION MANAGEMENT | Facility: CLINIC | Age: 84
End: 2024-12-17
Payer: COMMERCIAL

## 2025-01-01 ENCOUNTER — HOSPITAL ENCOUNTER (EMERGENCY)
Facility: HOSPITAL | Age: 85
Discharge: HOME | End: 2025-01-01
Attending: NURSE PRACTITIONER
Payer: COMMERCIAL

## 2025-01-01 VITALS
TEMPERATURE: 98.5 F | DIASTOLIC BLOOD PRESSURE: 72 MMHG | RESPIRATION RATE: 18 BRPM | SYSTOLIC BLOOD PRESSURE: 155 MMHG | HEART RATE: 79 BPM | OXYGEN SATURATION: 98 %

## 2025-01-01 DIAGNOSIS — B34.9 ACUTE VIRAL SYNDROME: ICD-10-CM

## 2025-01-01 PROCEDURE — 99213 OFFICE O/P EST LOW 20 MIN: CPT | Performed by: NURSE PRACTITIONER

## 2025-01-01 PROCEDURE — G0463 HOSPITAL OUTPT CLINIC VISIT: HCPCS

## 2025-01-01 PROCEDURE — 87637 SARSCOV2&INF A&B&RSV AMP PRB: CPT | Performed by: NURSE PRACTITIONER

## 2025-01-01 PROCEDURE — 250N000011 HC RX IP 250 OP 636: Performed by: NURSE PRACTITIONER

## 2025-01-01 RX ORDER — ONDANSETRON 4 MG/1
4 TABLET, ORALLY DISINTEGRATING ORAL ONCE
Status: COMPLETED | OUTPATIENT
Start: 2025-01-01 | End: 2025-01-01

## 2025-01-01 RX ADMIN — ONDANSETRON 4 MG: 4 TABLET, ORALLY DISINTEGRATING ORAL at 20:08

## 2025-01-01 ASSESSMENT — ENCOUNTER SYMPTOMS
BLOOD IN STOOL: 0
SHORTNESS OF BREATH: 0
ABDOMINAL PAIN: 0
MYALGIAS: 0
SORE THROAT: 0
HEMATURIA: 0
TROUBLE SWALLOWING: 0
NAUSEA: 1
NECK PAIN: 0
EYES NEGATIVE: 1
VOMITING: 0
WEAKNESS: 0
RHINORRHEA: 0
DIZZINESS: 1
COUGH: 0
DIARRHEA: 0
CHILLS: 0
FEVER: 0
NECK STIFFNESS: 0
HEADACHES: 0
PSYCHIATRIC NEGATIVE: 1

## 2025-01-01 ASSESSMENT — ACTIVITIES OF DAILY LIVING (ADL): ADLS_ACUITY_SCORE: 47

## 2025-01-02 NOTE — ED PROVIDER NOTES
History     Chief Complaint   Patient presents with    URI     HPI  Dinorah Lim is a 84 year old female who presents to urgent care today ambulatory with complaints of congestion, ear pain, nausea and dizziness at times.  Symptoms started 6 days ago.  Denies any current dizziness.  Denies any recent fall, injury or trauma.  Staying hydrated.  No rashes.  No abdominal pain.  Denies any fever or chills.  Recently exposed to COVID by grandchildren, states one of them kissed her when she had COVID over [place].  No OTC medication today.  No other concerns.     Allergies:  Allergies   Allergen Reactions    Chocolate Hives    Lemon Flavoring Agent (Non-Screening) Hives    Lime [Lime, Sulfurated (Calcium Polysulfide)] Hives    Metal [Staples]     Orange Fruit [Citrus] Hives    Strawberry Extract Hives    Adhesive Tape      Band-aids    Codeine Hives     Patient can tolerate oxycodone & dilaudid    Erythromycin Hives     ERYTHROMYCIN BASE    Food      Tomato, grapefruit, oranges.    Grapefruit [Extra Strength Grapefruit]      GRAPEFRUIT    Morphine Hives     Pt. Reports had hives    Penicillins Hives    Ranitidine GI Disturbance    Sulfa Antibiotics      SULFONAMIDE ANTIBIOTICS     Tomato     Xyzal [Levocetirizine] Hives       Problem List:    Patient Active Problem List    Diagnosis Date Noted    Ventricular tachycardia (H) 11/15/2024     Priority: Medium    Other chest pain 10/20/2021     Priority: Medium    Hiatal hernia 07/28/2021     Priority: Medium    Chronic, continuous use of opioids 05/17/2021     Priority: Medium    Stage 3a chronic kidney disease (H) 03/22/2021     Priority: Medium    COVID-19 virus infection on 10/30/20 11/24/2020     Priority: Medium     10-      Other neutropenia (H) 08/04/2020     Priority: Medium    Paresthesias 02/13/2020     Priority: Medium    Status post total left knee replacement 09/09/2019     Priority: Medium    Aortic valve insufficiency 03/06/2019     Priority:  Medium    Mitral regurgitation 03/06/2019     Priority: Medium    Tricuspid valve insufficiency 03/06/2019     Priority: Medium    Diastolic dysfunction grade 1 on 2/21/19 03/06/2019     Priority: Medium    Hypertriglyceridemia 03/06/2019     Priority: Medium    Palpitations 02/13/2019     Priority: Medium    PVC's (premature ventricular contractions) 02/13/2019     Priority: Medium    Atrial ectopy 02/13/2019     Priority: Medium    PSVT (paroxysmal supraventricular tachycardia) (H) 02/13/2019     Priority: Medium    COPD, mild on 9/9/14 02/13/2019     Priority: Medium    Bilateral carotid artery stenosis at less than 50% on 12/1/16 02/13/2019     Priority: Medium    Mixed hyperlipidemia 02/13/2019     Priority: Medium    On statin therapy 02/13/2019     Priority: Medium    Heart murmur 02/13/2019     Priority: Medium    Morbid obesity (H) 06/01/2017     Priority: Medium    ACP (advance care planning) 04/28/2016     Priority: Medium     Advance Care Planning 4/28/2016: ACP Review of Chart / Resources Provided:  Reviewed chart for advance care plan.  Dinorah Lim has no plan or code status on file. Discussed available resources and provided with information. Confirmed code status reflects current choices pending further ACP discussions.  Confirmed/documented legally designated decision makers.  Added by Aditi Gandhi            Anxiety 06/23/2014     Priority: Medium    Lung density on x-ray 07/23/2013     Priority: Medium    Osteoarthritis 06/14/2012     Priority: Medium     Problem list name updated by automated process. Provider to review      Advanced care planning/counseling discussion 01/13/2012     Priority: Medium    Abnormal glucose 08/01/2011     Priority: Medium     Hgb A1c 5.7 on 1/4/2015  Problem list name updated by automated process. Provider to review      Osteoarthritis of right hip 10/07/2004     Priority: Medium    Urticaria 06/01/2004     Priority: Medium     Problem list name updated by  automated process. 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    Obesity, Class II, BMI 35-39.9 1/4/2015    Osteoarthritis of right hip 10/07/2004    Osteoarthrosis 06/14/2012    Otitis externa 10/04/2011    Prediabetes 08/01/2011       Past Surgical History:    Past Surgical History:   Procedure Laterality Date    ARTHROPLASTY KNEE Left 9/9/2019    Procedure: LEFT TOTAL KNEE ARTHROPLASTY S/N;  Surgeon: Trevor Alonzo MD;  [facility]    ARTHROSCOPY KNEE Left 3/21/2019    Procedure: LEFT  KNEE ARTHROSCOPY, partial medial menisectomy;  Surgeon: Esteban iWlls MD;  [facility]    CLOSED REDUCTION WRIST Right 1952 x 2    long arm cast (same time as elbow fx)    CLOSED RX ELBOW DISLOCATION Right 1952    CR/long cast of fracture    EXCISE MASS FOOT Left 8/16/2021    Procedure: LEFT ANKLE MASS EXCISION;  Surgeon: Trevor Alonzo MD;  [facility]    HC INJ EPIDURAL LUMBAR/SACRAL W/WO CONTRAST Bilateral 5/2013    facet injections; prev 2012    LAPAROSCOPIC CHOLECYSTECTOMY N/A 1/4/2015    Procedure: LAPAROSCOPIC CHOLECYSTECTOMY;  Surgeon: Brittney العلي MD;  Location[facility]    TONSILLECTOMY      ZZC TOTAL KNEE ARTHROPLASTY Left 09/09/2019    Dr Alonzo       Family History:    Family History   Problem Relation Age of Onset    Heart Disease Brother         atrial fibrillation    Atrial fibrillation Brother     Other - See Comments Mother         emphysema    Heart Disease Father 92        CHF    Cancer Paternal Grandfather         lung cancer    Cancer Maternal Uncle          lung cancer    Chronic Obstructive Pulmonary Disease Daughter     Emphysema Daughter     Other - See Comments Daughter         benign breast lesion    Esophagitis Daughter        Social History:  Marital Status:  Single [1]  Social History     Tobacco Use    Smoking status: Never     Passive exposure: Never    Smokeless tobacco: Never   Vaping Use    Vaping status: Never Used   Substance Use Topics    Alcohol use: No    Drug use: No        Medications:    acetaminophen (TYLENOL) 325 MG tablet  Calcium-Magnesium-Vitamin D (CALCIUM 1200+D3 PO)  clonazePAM (KLONOPIN) 0.5 MG tablet  Cranberry 125 MG TABS  diclofenac (VOLTAREN) 1 % topical gel  ferrous sulfate (FEROSUL) 325 (65 Fe) MG tablet  FISH OIL  fluocinonide (LIDEX) 0.05 % external solution  hydrochlorothiazide (HYDRODIURIL) 25 MG tablet  HYDROcodone-acetaminophen (NORCO) 5-325 MG tablet  ipratropium - albuterol 0.5 mg/2.5 mg/3 mL (DUONEB) 0.5-2.5 (3) MG/3ML neb solution  PARoxetine (PAXIL) 40 MG tablet  REFRESH DIGITAL 0.5-1-0.5 % SOLN  simvastatin (ZOCOR) 40 MG tablet  VITAMIN D, CHOLECALCIFEROL, PO      Review of Systems   Constitutional:  Negative for chills and fever.   HENT:  Positive for congestion and ear pain. Negative for rhinorrhea, sore throat and trouble swallowing.    Eyes: Negative.    Respiratory:  Negative for cough and shortness of breath.    Cardiovascular:  Negative for chest pain.   Gastrointestinal:  Positive for nausea. Negative for abdominal pain, blood in stool, diarrhea and vomiting.   Genitourinary:  Negative for decreased urine volume and hematuria.   Musculoskeletal:  Negative for gait problem, myalgias, neck pain and neck stiffness.   Skin:  Negative for rash.   Neurological:  Positive for dizziness (denies currently). Negative for weakness and headaches.   Psychiatric/Behavioral: Negative.       Physical Exam   BP: 155/72  Pulse: 79  Temp: 98.5  F (36.9  C)  Resp: 18  SpO2: 98 %    Physical Exam  Vitals and nursing note reviewed.    Constitutional:       General: She is not in acute distress.     Appearance: Normal appearance. She is not ill-appearing or toxic-appearing.   HENT:      Right Ear: Tympanic membrane, ear canal and external ear normal.      Left Ear: Tympanic membrane, ear canal and external ear normal.      Nose: Congestion and rhinorrhea present.      Mouth/Throat:      Mouth: Mucous membranes are moist.      Pharynx: Oropharynx is clear. No oropharyngeal exudate or posterior oropharyngeal erythema.   Eyes:      Extraocular Movements: Extraocular movements intact.      Conjunctiva/sclera: Conjunctivae normal.      Pupils: Pupils are equal, round, and reactive to light.   Cardiovascular:      Rate and Rhythm: Normal rate and regular rhythm.      Pulses: Normal pulses.      Heart sounds: Normal heart sounds.   Pulmonary:      Effort: Pulmonary effort is normal.      Breath sounds: Normal breath sounds.   Abdominal:      General: Bowel sounds are normal. There is no distension.      Palpations: Abdomen is soft.      Tenderness: There is no abdominal tenderness. There is no guarding or rebound.   Musculoskeletal:      Cervical back: Normal range of motion and neck supple. No rigidity or tenderness.   Lymphadenopathy:      Cervical: No cervical adenopathy.   Skin:     General: Skin is warm and dry.      Capillary Refill: Capillary refill takes less than 2 seconds.   Neurological:      General: No focal deficit present.      Mental Status: She is alert.   Psychiatric:         Mood and Affect: Mood normal.       ED Course     Procedures    No results found for this or any previous visit (from the past 24 hours).    Medications   ondansetron (ZOFRAN ODT) ODT tab 4 mg (4 mg Oral $Given 1/1/25 2008)     Assessments & Plan (with Medical Decision Making)     I have reviewed the nursing notes.    I have reviewed the findings, diagnosis, plan and need for follow up with the patient.  (B34.9) Acute viral syndrome  Plan:   Patient ambulatory  with a nontoxic appearance.  Lungs clear throughout.  No signs of otitis media or strep.  Moderate congestion.  Staying hydrated.  No rashes.  No abdominal pain or tenderness or distention.  Denies any BRBPR or hematuria.  Denies any recent fall, injury or trauma.  Exposure to grandchildren with COVID prior to symptom onset.  Ondansetron administered for nausea.  Symptomatic treatment recommendations provided.  Alternate Tylenol and ibuprofen as needed for pain or fever.  Push fluids to stay hydrated.  Warm salt or gargles or honey as needed for sore throat.  Over-the-counter Flonase as needed for congestion.  COVID, influenza and RSV test pending, will notify results via telephone once it finalized.  Recommend following up with PCP in 2 days if no improvement with above.  Follow-up with primary care provider or return to urgent care/ED with any worsening in condition or additional concerns.  Patient in agreement treatment plan.    New Prescriptions    No medications on file     Final diagnoses:   Acute viral syndrome     1/1/2025   [facility]       Chelsea Leija NP  01/01/25 2011

## 2025-01-02 NOTE — ED TRIAGE NOTES
GEOVANNY Leija CNP assessed patient in triage and determined patient Urgent Care appropriate. Will be seen in Urgent Care.

## 2025-01-02 NOTE — ED TRIAGE NOTES
Pt presents with c/o having increased dizziness and nausea   Reports both grand kids are sick and has been around them    Reports increased sinus drainage   No otc med taken today

## 2025-01-14 DIAGNOSIS — F41.9 ANXIETY: ICD-10-CM

## 2025-01-15 RX ORDER — CLONAZEPAM 0.5 MG/1
TABLET ORAL
Qty: 20 TABLET | Refills: 0 | Status: SHIPPED | OUTPATIENT
Start: 2025-01-15

## 2025-01-15 NOTE — TELEPHONE ENCOUNTER
Clonazepam 0.5 mg tab      Last Written Prescription Date:  12-6-24  Last Fill Quantity: 20,   # refills: 0  Last Office Visit: 8-15-24  Future Office visit:    Next 5 appointments (look out 90 days)      Feb 07, 2025 8:00 AM  (Arrive by 7:45 AM)  Annual Wellness Visit with Yanique Mar MD  [facility] [place] ([facility] [place] ) [address]  [place] [address]  [phone]

## 2025-02-07 ENCOUNTER — ANCILLARY PROCEDURE (OUTPATIENT)
Dept: GENERAL RADIOLOGY | Facility: OTHER | Age: 85
End: 2025-02-07
Attending: STUDENT IN AN ORGANIZED HEALTH CARE EDUCATION/TRAINING PROGRAM
Payer: COMMERCIAL

## 2025-02-07 PROCEDURE — 73030 X-RAY EXAM OF SHOULDER: CPT | Mod: TC,RT

## 2025-02-10 ENCOUNTER — TRANSFERRED RECORDS (OUTPATIENT)
Dept: HEALTH INFORMATION MANAGEMENT | Facility: CLINIC | Age: 85
End: 2025-02-10
Payer: COMMERCIAL

## 2025-02-11 ENCOUNTER — TELEPHONE (OUTPATIENT)
Dept: FAMILY MEDICINE | Facility: OTHER | Age: 85
End: 2025-02-11

## 2025-02-11 NOTE — TELEPHONE ENCOUNTER
Moreno Chanel, can you please check on patient's lab results from 2/7/25 ?  She'd like to know if you have concerns or recommendations based on results. Thank you  Obdulia Barfield MA on 2/11/2025 at 1:55 PM

## 2025-02-13 NOTE — TELEPHONE ENCOUNTER
Moreno Wells, please inform her that her kidney function is stable so please tell her not to worry about her kidneys.  As for her calcium, it is elevated and higher than previously although it has been elevated in the past.  Symptoms to watch for would be the following: Urinating large amounts.Excessive thirst.Feeling tired.Frequent hunger.Dry mouth.Weight loss.Blurred vision.

## 2025-02-14 NOTE — TELEPHONE ENCOUNTER
Patient notified of information, stated that she does have some vision concerns and does have blurred vision at times. like when looking at TV she can't read it because it is blurry. She is wondering if there is anything she should do to help with high calcium and if you want her to see an eye doctor.

## 2025-02-17 NOTE — TELEPHONE ENCOUNTER
I would advise a visit to an eye doctor at her earliest convenience to have this looked at.  As for the high calcium, we will continue to monitor this.   PC TO TY RN

## 2025-02-20 ENCOUNTER — TELEPHONE (OUTPATIENT)
Dept: FAMILY MEDICINE | Facility: OTHER | Age: 85
End: 2025-02-20

## 2025-02-20 NOTE — TELEPHONE ENCOUNTER
Form received Health Care Directive ,placed in provider's wall bin.   After form is completed patient would like to be Called to .

## 2025-03-18 ENCOUNTER — DOCUMENTATION ONLY (OUTPATIENT)
Dept: OTHER | Facility: CLINIC | Age: 85
End: 2025-03-18
Payer: COMMERCIAL

## 2025-03-18 DIAGNOSIS — F41.9 ANXIETY: ICD-10-CM

## 2025-03-18 RX ORDER — CLONAZEPAM 0.5 MG/1
TABLET ORAL
Qty: 20 TABLET | Refills: 0 | Status: SHIPPED | OUTPATIENT
Start: 2025-03-18

## 2025-03-18 NOTE — TELEPHONE ENCOUNTER
clonazePAM (KLONOPIN) 0.5 MG       Last Written Prescription Date:  01/15/2025  Last Fill Quantity: 20,   # refills: 0  Last Office Visit: 02/07/2025  Future Office visit:    Next 5 appointments (look out 90 days)      Apr 28, 2025 8:30 AM  (Arrive by 8:15 AM)  Return Visit with Ha Hughes DO  [facility] ([facility] ) [address]  [place] [address]  [phone]     May 07, 2025 8:30 AM  (Arrive by 8:15 AM)  Provider Visit with Yanique Mar MD  [facility] ([facility] ) [address]  [facility] [address]  [phone]             Routing refill request to provider for review/approval because:  Medication is reported/historical

## 2025-04-02 NOTE — PROGRESS NOTES
CHIEF COMPLAINT: The patient presents for Office Visit     HISTORY OF PRESENT ILLNESS: Patient presents for a complete eye exam.     Pt hasn't seen an eye exam in about three years ago. Pt wore contacts into the office today. Pt states that he has been getting headaches since January. Pt states they are mostly due to excertion. Pt states they are mostly in the back of his head. Sometimes he is sensitive to light. The pain does get pretty bad. Pt has a neurology appt on the 22nd. Pt had two MRI's already. They wanted him to come to optometry before he goes to neurology. Sometimes he gets sensitive to light. Overall Va is great with contacts. AWT is 16-18 hrs in a day. Pt wears air optix hyrdoglyde.     Family Hx:     Patient Hx: Myopic astigmatism OD, Myopia OS    OCULAR MEDICATION: None    Tech notes reviewed.    OCT nerve fiber analysis 4/1/2025:  Reason for test:     RIGHT: Normal RNFL - no edema. RNFL layer with average thickness of 86 microns. Signal strength: 7/10  LEFT:  Normal RNFL - no edema. RNFL layer with average thickness of 86 microns. Signal strength: 8/10    ASSESSMENT/PLAN:   1. Screening for disorder of optic nerve and visual pathways/ idiopathic intracranial hypertension  - no idiopathic intracranial hypertension or optic nerve edema noted   - partially empty sella  - no accommodation abnormality   - no ocular cause of headache/ migraine     Patient's assessment and plan discussed in detail with patient.  All questions answered.    RETURN TO CLINIC:  Return in about 1 year (around 4/1/2026) for Annual Eye Exam. Return immediately if pain, redness, or decreased vision or increase symptoms occur.     Subjective     Dinorah Lim is a 80 year old female who presents to clinic today for the following health issues:    HPI         Hyperlipidemia Follow-Up      Are you regularly taking any medication or supplement to lower your cholesterol?   Yes- Simvastatin 40 mg    Are you having muscle aches or other side effects that you think could be caused by your cholesterol lowering medication?  No  She is due for lab    Blood Pressure Follow-up      Do you check your blood pressure regularly outside of the clinic? No     Are you following a low salt diet? Yes    Are your blood pressures ever more than 140 on the top number (systolic) OR more   than 90 on the bottom number (diastolic), for example 140/90? No    She is due in March for follow up with Dr Hughes of her aortic insufficiency with an echo.   She is feeling well with no shortness of breath or chest pain.     Osteoarthritis  Her hips and shoulders are painful. Dr Alonzo is working with her with injections. She uses her hydrocodone sparingly, 1/2 tablet at a time           Review of Systems   Constitutional, HEENT, cardiovascular, pulmonary, gi and gu systems are negative, except as otherwise noted.      Objective    Wt 95.7 kg (211 lb)   BMI 34.58 kg/m    Body mass index is 34.58 kg/m .  Physical Exam   GENERAL: healthy, alert and no distress  NECK: no adenopathy, no asymmetry, masses, or scars and thyroid normal to palpation  RESP: lungs clear to auscultation - no rales, rhonchi or wheezes  CV: regular rate and rhythm, normal S1 S2, no S3 or S4, no murmur, click or rub, no peripheral edema and peripheral pulses strong  MS: no gross musculoskeletal defects noted, no edema  NEURO: Normal strength and tone, mentation intact and speech normal  PSYCH: mentation appears normal, affect normal/bright            Assessment & Plan     Hyperlipidemia LDL goal <100  Due for lab, she will return tomorrow for fasting lab     COPD, 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 She  isn't needing her nebs at this time  Flu shot declined     PSVT (paroxysmal supraventricular tachycardia) (H)  Stable     Primary osteoarthritis involving multiple joints  Per Dr Alonzo, hydrocodone prn     Diastolic dysfunction grade 1 on 2/21/19  Due for follow up with Dr Hughes in March, referral done now   - CARDIOLOGY EVAL ADULT REFERRAL    Aortic valve insufficiency  As above   - CARDIOLOGY EVAL ADULT REFERRAL            Return in about 6 months (around 4/26/2021) for Lab Work, Lipids.    Magaly Sosa MD  [facility]

## 2025-04-15 DIAGNOSIS — E78.5 HYPERLIPIDEMIA LDL GOAL <100: ICD-10-CM

## 2025-04-15 RX ORDER — SIMVASTATIN 40 MG
TABLET ORAL
Qty: 90 TABLET | Refills: 2 | Status: SHIPPED | OUTPATIENT
Start: 2025-04-15

## 2025-04-15 NOTE — TELEPHONE ENCOUNTER
Simvastatin (Zocor) 40 MG tablet  Last Written Prescription Date:  12/09/2024  Last Fill Quantity: 90,   # refills: 0  Last Office Visit: 02/07/2025  Future Office visit:    Next 5 appointments (look out 90 days)      Apr 28, 2025 8:30 AM  (Arrive by 8:15 AM)  [facility] DO  [facility] ([facility] [place] ) [address]  [place] [address]  [phone]     May 07, 2025 8:30 AM  (Arrive by 8:15 AM)  Provider Visit with Yanique Mar MD  [facility] ([facility] [place] ) [address]  [place] [address]  [phone]             Routing refill request to provider for review/approval because:

## 2025-04-19 NOTE — TELEPHONE ENCOUNTER
Problem: Adult Inpatient Plan of Care  Goal: Plan of Care Review  Outcome: Progressing  Goal: Patient-Specific Goal (Individualized)  Outcome: Progressing  Goal: Absence of Hospital-Acquired Illness or Injury  Outcome: Progressing  Goal: Optimal Comfort and Wellbeing  Outcome: Progressing  Goal: Readiness for Transition of Care  Outcome: Progressing     Problem: Bariatric Environmental Safety  Goal: Safety Maintained with Care  Outcome: Progressing     Problem: Skin Injury Risk Increased  Goal: Skin Health and Integrity  Outcome: Progressing     Problem: Infection  Goal: Absence of Infection Signs and Symptoms  Outcome: Progressing     Problem: Fall Injury Risk  Goal: Absence of Fall and Fall-Related Injury  Outcome: Progressing     Problem: UTI (Urinary Tract Infection)  Goal: Improved Infection Symptoms  Outcome: Progressing     Problem: Anxiety  Goal: Anxiety Reduction or Resolution  Outcome: Progressing     Problem: Chronic Kidney Disease  Goal: Optimal Coping with Chronic Illness  Outcome: Progressing  Goal: Electrolyte Balance  Outcome: Progressing  Goal: Fluid Balance  Outcome: Progressing  Goal: Optimal Functional Ability  Outcome: Progressing  Goal: Absence of Anemia Signs and Symptoms  Outcome: Progressing  Goal: Optimal Oral Intake  Outcome: Progressing  Goal: Acceptable Pain Control  Outcome: Progressing  Goal: Minimize Renal Failure Effects  Outcome: Progressing     Problem: Diabetes Comorbidity  Goal: Blood Glucose Level Within Targeted Range  Outcome: Progressing     Problem: Sepsis/Septic Shock  Goal: Optimal Coping  Outcome: Progressing  Goal: Absence of Bleeding  Outcome: Progressing  Goal: Blood Glucose Level Within Targeted Range  Outcome: Progressing  Goal: Absence of Infection Signs and Symptoms  Outcome: Progressing  Goal: Optimal Nutrition Intake  Outcome: Progressing     Problem: Pneumonia  Goal: Fluid Balance  Outcome: Progressing  Goal: Resolution of Infection Signs and  Please schedule patient for date/time: can work into our schedule on 8/26/19 at 8:45, arrive at 8:30     Have patient go to ER/Urgent Care Center. Urgent Care hours are 9:30 am to 8 pm, open 7 days a week. No.    Provider will call patient.No.    Other:     Symptoms  Outcome: Progressing  Goal: Effective Oxygenation and Ventilation  Outcome: Progressing     Problem: Gas Exchange Impaired  Goal: Optimal Gas Exchange  Outcome: Progressing     Problem: Wound  Goal: Optimal Coping  Outcome: Progressing  Goal: Optimal Functional Ability  Outcome: Progressing  Goal: Absence of Infection Signs and Symptoms  Outcome: Progressing  Goal: Improved Oral Intake  Outcome: Progressing  Goal: Optimal Pain Control and Function  Outcome: Progressing  Goal: Skin Health and Integrity  Outcome: Progressing  Goal: Optimal Wound Healing  Outcome: Progressing     Problem: Airway Clearance Ineffective  Goal: Effective Airway Clearance  Outcome: Progressing     Problem: Suicide Risk  Goal: Absence of Self-Harm  Outcome: Progressing

## 2025-04-23 ENCOUNTER — HOSPITAL ENCOUNTER (EMERGENCY)
Facility: HOSPITAL | Age: 85
Discharge: LEFT BEFORE DISCHARGE | End: 2025-04-23
Payer: COMMERCIAL

## 2025-04-23 VITALS
TEMPERATURE: 98.8 F | DIASTOLIC BLOOD PRESSURE: 83 MMHG | WEIGHT: 209.4 LBS | BODY MASS INDEX: 34.85 KG/M2 | SYSTOLIC BLOOD PRESSURE: 130 MMHG | OXYGEN SATURATION: 96 % | RESPIRATION RATE: 18 BRPM | HEART RATE: 60 BPM

## 2025-04-23 ASSESSMENT — ACTIVITIES OF DAILY LIVING (ADL): ADLS_ACUITY_SCORE: 47

## 2025-04-24 NOTE — ED NOTES
Patient requests to leave before seeing a provider, after triage. This charge nurse went to speak to patient to encourage her to stay and patient states that she is not really that sick she just wanted a COVID test. She said even if she could get back shortly, she wanted to go home. Patient did not appear in any distress. Discussed to return if any new/worsening symptoms. Pt. Verbalized understanding.

## 2025-04-24 NOTE — ED TRIAGE NOTES
"\"Here for having nausea off and on for about 1 week.   I am able to eat foods and drink fluids without vomiting.  No diarrhea.  Last bowel movement was at 1600 today and it was formed and brown.  It could maybe related to depression.  I take depression pills.\"        "

## 2025-04-28 ENCOUNTER — OFFICE VISIT (OUTPATIENT)
Dept: CARDIOLOGY | Facility: OTHER | Age: 85
End: 2025-04-28
Attending: INTERNAL MEDICINE
Payer: COMMERCIAL

## 2025-04-28 VITALS
RESPIRATION RATE: 16 BRPM | HEIGHT: 65 IN | OXYGEN SATURATION: 96 % | HEART RATE: 61 BPM | WEIGHT: 208.5 LBS | DIASTOLIC BLOOD PRESSURE: 75 MMHG | TEMPERATURE: 98 F | SYSTOLIC BLOOD PRESSURE: 124 MMHG | BODY MASS INDEX: 34.74 KG/M2

## 2025-04-28 DIAGNOSIS — J44.9 COPD, MILD (H): ICD-10-CM

## 2025-04-28 DIAGNOSIS — I49.3 PVC'S (PREMATURE VENTRICULAR CONTRACTIONS): ICD-10-CM

## 2025-04-28 DIAGNOSIS — E78.1 HYPERTRIGLYCERIDEMIA: ICD-10-CM

## 2025-04-28 DIAGNOSIS — E78.2 MIXED HYPERLIPIDEMIA: ICD-10-CM

## 2025-04-28 DIAGNOSIS — I65.23 BILATERAL CAROTID ARTERY STENOSIS: ICD-10-CM

## 2025-04-28 DIAGNOSIS — R07.89 OTHER CHEST PAIN: ICD-10-CM

## 2025-04-28 DIAGNOSIS — I51.89 DIASTOLIC DYSFUNCTION: Primary | ICD-10-CM

## 2025-04-28 DIAGNOSIS — I34.0 NON-RHEUMATIC MITRAL REGURGITATION: ICD-10-CM

## 2025-04-28 DIAGNOSIS — E66.01 MORBID OBESITY (H): ICD-10-CM

## 2025-04-28 DIAGNOSIS — I49.1 ATRIAL ECTOPY: ICD-10-CM

## 2025-04-28 DIAGNOSIS — I35.1 NONRHEUMATIC AORTIC VALVE INSUFFICIENCY: ICD-10-CM

## 2025-04-28 DIAGNOSIS — R00.2 PALPITATIONS: ICD-10-CM

## 2025-04-28 DIAGNOSIS — I47.20 VENTRICULAR TACHYCARDIA (H): ICD-10-CM

## 2025-04-28 DIAGNOSIS — I36.1 NON-RHEUMATIC TRICUSPID VALVE INSUFFICIENCY: ICD-10-CM

## 2025-04-28 DIAGNOSIS — I47.10 PSVT (PAROXYSMAL SUPRAVENTRICULAR TACHYCARDIA): ICD-10-CM

## 2025-04-28 DIAGNOSIS — R01.1 HEART MURMUR: ICD-10-CM

## 2025-04-28 DIAGNOSIS — N18.31 STAGE 3A CHRONIC KIDNEY DISEASE (H): ICD-10-CM

## 2025-04-28 PROCEDURE — G0463 HOSPITAL OUTPT CLINIC VISIT: HCPCS

## 2025-04-28 PROCEDURE — 93005 ELECTROCARDIOGRAM TRACING: CPT | Performed by: INTERNAL MEDICINE

## 2025-04-28 PROCEDURE — G0463 HOSPITAL OUTPT CLINIC VISIT: HCPCS | Mod: 25

## 2025-04-28 ASSESSMENT — PAIN SCALES - GENERAL: PAINLEVEL_OUTOF10: SEVERE PAIN (10)

## 2025-04-28 NOTE — PATIENT INSTRUCTIONS
Thank you for allowing Dr HEIDI Hughes and our  team to participate in your care. Please call our office at [phone] with scheduling questions or if you need to cancel or change your appointment. With any other questions or concerns you may call cardiology nurse at  [phone].       If you experience chest pain, chest pressure, chest tightness, shortness of breath, fainting, lightheadedness, nausea, vomiting, or other concerning symptoms, please report to the Emergency Department or call 911. These symptoms may be emergent, and best treated in the Emergency Department.

## 2025-04-28 NOTE — PROGRESS NOTES
[facility] HEART CARE   CARDIOLOGY PROGRESS NOTE     Chief Complaint   Patient presents with    Follow Up     6 mos            Diagnosis:  1.  CHF-diastolic.    -Grade 1 on 9/11/2024.  -Grade 2 on 9/12/23.   -Grade 1 on 2/21/19, off diuretics.  2.  Carotid artery dz.   -No significant dz on 2/21/22.   -<50%, US from 8/4/21.  3.  AI.   -Mild/Mod on 9/11/24.  -Moderate on 2/24/22 and 10/11/22.   -Mild/moderate on 1/28/2021.  4.  MR.   -Normal on 9/11/24.  -Trace to mild on 1/28/2021.  5.  TR.   -Trace on 9/11/24.  -Mild on 10/11/22.  6.  COPD-minimal on 9/9/14.   -H/O second hand smoke from mom who smoked 3/day.  1/day. No primary tobacco usage.   7.  Morbid obesity.     -34 on 2/7/2025.  -BMI of 35.  8.  Hyperlipidemia-controlled.  9.  Atrial ectopy.  10.  Palpitations.  11.  PSVT.  12.  PVC's.  13.  On statin therapy.  14.  Hypertriglyceridemia-controlled.  15.  Hiatal hernia.   -Moderate on 3/18/2020 on a CTA of the chest.  16.  COVID-19.   -(+) on 10/30/20.  17.  Vitamin D deficiency.    -17 on 1/28/15.      Assessment/Plan:    1.  CHF: Diastolic in nature.  Noted to have grade 2 on 9/12/2023.  Most recently, grade 1 on 9/11/2024.  Patient is rather stable.  Denying shortness of breath or dyspnea exertion.  No complaints.  No changes.  2.  Hypertension: Now controlled.  Blood pressure today 124/75.  She was previously started on hydrochlorothiazide 25 mg once a day on 10/21/2024.  No changes today.  3.  Carotid artery disease: No significant disease on 2/21/2022.  4.  Hyperlipidemia: Continue Zocor 40 mg at bedtime.  5.  Obesity: Patient working on weight loss.  Reviewed her weights.  She has lost some weight since last seen.  Patient congratulated.  6.  Follow-up on 9/17/2025 with plan for echocardiogram on 9/10/2025.  No changes today.  Patient with no complaints.      Interval history:  See above.    HPI:    She has been concerned with a heart murmur as well as bilateral carotid artery stenosis.  She had  echocardiogram completed on 2/21/19 as well as an ultrasound of the carotids on 2/21/19.  She is here for those results.       Previously, we reviewed her Zio patch results from 12/31/18 which showed rare PVC's, x1 run of NSVT 5 beats, PAC's, and x26 episodes of PSVT lasting up to 20 beats.     She also has a history of mild carotid artery disease at less than 50% with mild plaquing on 12/1/16.  She does not have a personal history of smoking but was around secondhand smoke in the past.  She had a ultrasound of her carotids on 2/20/19 that showed atherosclerotic plaquing in both carotid bifurcations.  There was no significant hemo-dynamic stenosis.     Her echocardiogram from 2/21/19 showed an EF of 65-70%, grade 1 diastolic dysfunction, mild left atrial enlargement, mild to moderate aortic insufficiency, trace to mild tricuspid insufficiency, and trace to mild mitral insufficiency.  She is concerned about her valvular insufficiency.  She will have an echocardiogram in approximately 2 years to follow-up on the mild to moderate aortic insufficiency.      Relevant testing:  Echocardiogram on 9/11/2024:  Left ventricular size, wall motion and function are normal. The ejection  fraction is 60-65%.  Grade I or early diastolic dysfunction.   Right ventricular function, chamber size, wall motion, and thickness are normal.  Mild to moderate aortic insufficiency.  The inferior vena cava is normal.  This study was compared with the study from 1/12/24. No significant change in AI.    ECHO on 9/12/23:  Global and regional left ventricular function is normal with an EF of 60-65%.  Grade II or moderate diastolic dysfunction.  Global right ventricular function is normal.  Mild to moderate mitral insufficiency is present.  Mild to moderate aortic insufficiency is present.  IVC diameter <2.1 cm collapsing >50% with sniff suggests a normal RA pressure of 3 mmHg.  No pericardial effusion is present.  No significant changes  noted.    Zio patch on 10/12/22:  Worn for 12 days and 8 hr's.  After removing artifact, total time was 12 days and 7 hr's. Placed on 10/12/2022 at 11:30 AM and completed on 10/24/2022 at 7:06 PM.  Underlying rhythm was sinus.  Hrt rate ranged from 52 bpm, maximum heart rate of 197 bmp, averaging 68 bmp.  No significant bradycardia, pauses, Mobitz type II or 3rd degree heart block.  No atrial fibrillation on this study.  x132 triggered events and x14 diary entries.  These corresponded to SVE, VE, SVT, and sinus rhythm.  x2 runs of VT lasting up to 6 beats with a maximum heart rate of 160 bmp.    x121 runs of SVT lasting up to 22.6 sec's with a maximum heart rate of 197 bmp.  Rare, <1% of PAC's, atrial couplets, atrial triplets, ventricular couplets, and ventricular triplets.  Occasional PVC's at 2.3%.  + episodes of ventricular bigeminy lasting up to 7.3 sec's.  0 episodes of ventricular trigeminy lasting.     Echocardiogram on 10/11/2022:  Left ventricular size, wall motion and function are normal. The ejection  fraction is 55-60%.  The right ventricle is normal size. Global right ventricular function is normal.  Moderate aortic insufficiency is present.  Right ventricular systolic pressure is 40 mmHg above the right atrial pressure.  IVC diameter <2.1 cm collapsing >50% with sniff suggests a normal RA pressure  of 3 mmHg.    ECHO on 2/24/22:  Left ventricular size, wall motion and function are normal. The ejection fraction is 60-65%.  Global right ventricular function is normal.  Mild to moderate mitral insufficiency is present.  Moderate aortic insufficiency is present.  The inferior vena cava is normal.  No pericardial effusion is present.  Compared to prior imaging on 1/28/2021 There is mildly increased PI, MR and AI.   The subtle change is confirmed on visual inspection.    ECHO on 1/28/21:  No pericardial effusion is present.  Global and regional left ventricular function is normal with an EF of 55-60%.  The  right ventricle is normal size.  Global right ventricular function is normal.  Both atria appear normal.  Mild mitral insufficiency is present.  The aortic valve is tricuspid.  Mild to moderate aortic insufficiency is present.  AI unchanged from previous ECHO 2/21/19  The mean gradient across the aortic valve is 5.1 mmHg.  Trace tricuspid insufficiency is present.  Trace pulmonic insufficiency is present.  The aorta root is normal.    ECHO on 2/21/19:  No pericardial effusion is present.  Global and regional left ventricular function is hyperkinetic with an EF of  65-70%.  Grade I or early diastolic dysfunction.  The right ventricle is normal size.  Global right ventricular function is normal.  Mild left atrial enlargement is present.  Trace to mild mitral insufficiency is present.  The aortic valve is tricuspid.  Mild to moderate aortic insufficiency is present.  Trace to mild tricuspid insufficiency is present.  The peak velocity of the tricuspid regurgitant jet is not obtainable.  The pulmonic valve is normal.  The aorta root is normal.    Zio patch from 12/31/2018:  The enrollment period was from 12/31/18 through 1/7/19.  There were 6 days and 12 hours of analysis time available for review.  The minimum heart rate was 49, average heart rate 66 and maximum heart rate 200 bpm.  The patient has underlying sinus rhythm throughout.  There is no significant bradycardia pauses or heart block seen.  There were very rare isolated PVC's.  There was a 5 beat run of ventricular tachycardia.  With an average heart rate of 113 bpm.  This was the only run.  There are rare PAC's seen.  Less than 1% of total beats.  There were 26 episodes of a supraventricular tachycardia greater than 4 beats.  The longest was for 20 beats with an average heart rate of 108 bpm.  The fastest was for 11 beats with a maximum heart rate of 200 bpm but an average heart rate of 134 bpm.  Review of some of these tracings show that it likely is an  atrial tachycardia  There were 3 triggered events all 3 of these strips do have PAC's seen.  1 of them had a very slower run of supraventricular tachycardia average heart rate of 113 bpm.  There were 3 diary entries with the symptom of skipped irregular beats.  Review of the associated strips show all had sinus rhythm to them had supraventricular beats.    US carotids on 2/21/22:  No ultrasound evidence of hemo-dynamically significant stenosis.   Small volume of calcified plaque again seen in the left internal  carotid artery.          ICD-10-CM    1. Diastolic dysfunction  I51.89 EKG 12-lead complete w/read - (Clinic Performed)      2. Tricuspid valve insufficiency  I36.1       3. Palpitations  R00.2       4. Mitral regurgitation  I34.0       5. Heart murmur  R01.1       6. Bilateral carotid artery stenosis at less than 50% on 12/1/16  I65.23       7. Atrial ectopy  I49.1       8. Aortic valve insufficiency  I35.1       9. PSVT (paroxysmal supraventricular tachycardia)  I47.10       10. PVC's (premature ventricular contractions)  I49.3       11. Ventricular tachycardia (H)  I47.20       12. Stage 3a chronic kidney disease (H)  N18.31       13. Hypertriglyceridemia  E78.1       14. Mixed hyperlipidemia  E78.2       15. Morbid obesity (H)  E66.01       16. COPD, mild on 9/9/14  J44.9       17. 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    Anterior Segment V 4 cm  nodule abutting liver surface by US 1/4/2015     Obesity, Class II, BMI 35-39.9 1/4/2015    BMI 35.9 with comorbidities = MORBID obesity    Osteoarthritis of right hip 10/07/2004    Osteoarthrosis 06/14/2012    Otitis externa 10/04/2011    Prediabetes 08/01/2011       Past Surgical History:   Procedure Laterality Date    ARTHROPLASTY KNEE Left 9/9/2019    Procedure: LEFT TOTAL KNEE ARTHROPLASTY S/N;  Surgeon: Trevor Alonzo MD;  Location: [facility] OR    ARTHROSCOPY KNEE Left 3/21/2019    Procedure: LEFT  KNEE ARTHROSCOPY, partial medial menisectomy;  Surgeon: Esteban Wills MD;  Location: [facility] OR    CLOSED REDUCTION WRIST Right 1952 x 2    long arm cast (same time as elbow fx)    CLOSED RX ELBOW DISLOCATION Right 1952    CR/long cast of fracture    EXCISE MASS FOOT Left 8/16/2021    Procedure: LEFT ANKLE MASS EXCISION;  Surgeon: Trevor Alonzo MD;  Location: [facility] OR    HC INJ EPIDURAL LUMBAR/SACRAL W/WO CONTRAST Bilateral 5/2013    facet injections; prev 2012    LAPAROSCOPIC CHOLECYSTECTOMY N/A 1/4/2015    Procedure: LAPAROSCOPIC CHOLECYSTECTOMY;  Surgeon: Brittney العلي MD;  Location: [facility] OR    TONSILLECTOMY      ZZC TOTAL KNEE ARTHROPLASTY Left 09/09/2019    Dr Alonzo       Allergies   Allergen Reactions    Chocolate Hives    Lemon Flavoring Agent (Non-Screening) Hives    Lime [Lime, Sulfurated (Calcium Polysulfide)] Hives    Metal [Staples]     Orange Fruit [Citrus] Hives    Strawberry Extract Hives    Adhesive Tape      Band-aids    Codeine Hives     Patient can tolerate oxycodone & dilaudid    Erythromycin Hives     ERYTHROMYCIN BASE    Food      Tomato, grapefruit, oranges.    Grapefruit [Extra Strength Grapefruit]      GRAPEFRUIT    Morphine Hives     Pt. Reports had hives    Penicillins Hives    Ranitidine GI Disturbance    Sulfa Antibiotics      SULFONAMIDE ANTIBIOTICS     Tomato     Xyzal [Levocetirizine] Hives       Current Outpatient Medications   Medication Sig Dispense Refill    acetaminophen (TYLENOL)  325 MG tablet Take 325-650 mg by mouth every 6 hours as needed for mild pain      Calcium-Magnesium-Vitamin D (CALCIUM 1200+D3 PO) Take by mouth daily      clonazePAM (KLONOPIN) 0.5 MG tablet TAKE 1/2 (ONE-HALF) TABLET BY MOUTH NIGHTLY AS NEEDED FOR ANXIETY 20 tablet 0    FISH OIL Take 1 capsule by mouth daily       hydrochlorothiazide (HYDRODIURIL) 25 MG tablet Take 1 tablet (25 mg) by mouth daily. 90 tablet 3    ipratropium - albuterol 0.5 mg/2.5 mg/3 mL (DUONEB) 0.5-2.5 (3) MG/3ML neb solution Take 1 vial (3 mLs) by nebulization every 6 hours as needed for shortness of breath / dyspnea or wheezing 3 mL 1    PARoxetine (PAXIL) 40 MG tablet Take 1 tablet by mouth once daily 90 tablet 3    REFRESH DIGITAL 0.5-1-0.5 % SOLN Place 0.5 % into both eyes 3 times daily as needed (as needed)      simvastatin (ZOCOR) 40 MG tablet TAKE 1 TABLET BY MOUTH AT BEDTIME 90 tablet 2    VITAMIN D, CHOLECALCIFEROL, PO Take 2,000 Units by mouth daily      diclofenac (VOLTAREN) 1 % topical gel Apply 2 g topically 4 times daily. (Patient not taking: Reported on 4/28/2025) 150 g 1    fluocinonide (LIDEX) 0.05 % external solution Apply to back of scalp nightly for itch (Patient not taking: Reported on 4/28/2025) 60 mL 3    HYDROcodone-acetaminophen (NORCO) 5-325 MG tablet Take 1 tablet by mouth every 6 hours as needed for moderate to severe pain. (Patient not taking: Reported on 4/28/2025)         Social History     Socioeconomic History    Marital status: Single     Spouse name: Not on file    Number of children: 4    Years of education: 12    Highest education level: Not on file   Occupational History    Occupation: personal care attendant   Tobacco Use    Smoking status: Never     Passive exposure: Never    Smokeless tobacco: Never   Vaping Use    Vaping status: Never Used   Substance and Sexual Activity    Alcohol use: No    Drug use: No    Sexual activity: Never   Other Topics Concern     Service Not Asked    Blood  Transfusions Yes    Caffeine Concern Yes     Comment: >32 oz soda/day    Occupational Exposure Not Asked    Hobby Hazards Not Asked    Sleep Concern Yes     Comment: insomnia    Stress Concern Not Asked    Weight Concern Not Asked    Special Diet Not Asked    Back Care Not Asked    Exercise Not Asked    Bike Helmet Not Asked    Seat Belt Not Asked    Self-Exams Not Asked    Parent/sibling w/ CABG, MI or angioplasty before 65F 55M? No   Social History Narrative    Not on file     Social Drivers of Health     Financial Resource Strain: High Risk (2/7/2025)    Financial Resource Strain     Within the past 12 months, have you or your family members you live with been unable to get utilities (heat, electricity) when it was really needed?: Yes   Food Insecurity: Low Risk  (2/7/2025)    Food Insecurity     Within the past 12 months, did you worry that your food would run out before you got money to buy more?: No     Within the past 12 months, did the food you bought just not last and you didn t have money to get more?: No   Transportation Needs: Low Risk  (2/7/2025)    Transportation Needs     Within the past 12 months, has lack of transportation kept you from medical appointments, getting your medicines, non-medical meetings or appointments, work, or from getting things that you need?: No   Physical Activity: Unknown (2/7/2025)    Exercise Vital Sign     Days of Exercise per Week: 0 days     Minutes of Exercise per Session: Not on file   Stress: Stress Concern Present (2/7/2025)    [employer] [place] [place] [employer] - Occupational Stress Questionnaire     Feeling of Stress : Very much   Social Connections: Unknown (2/7/2025)    Social Connection and Isolation Panel [NHANES]     Frequency of Communication with Friends and Family: Not on file     Frequency of Social Gatherings with Friends and Family: Three times a week     Attends Bahai Services: Not on file     Active Member of Clubs or Organizations: Not on  file     Attends Club or Organization Meetings: Not on file     Marital Status: Not on file   Interpersonal Safety: Low Risk  (2/7/2025)    Interpersonal Safety     Do you feel physically and emotionally safe where you currently live?: Yes     Within the past 12 months, have you been hit, slapped, kicked or otherwise physically hurt by someone?: No     Within the past 12 months, have you been humiliated or emotionally abused in other ways by your partner or ex-partner?: No   Housing Stability: Low Risk  (2/7/2025)    Housing Stability     Do you have housing? : Yes     Are you worried about losing your housing?: No       LAB RESULTS:   Orders Only on 02/10/2021   Component Date Value Ref Range Status    Sodium 02/10/2021 139  133 - 144 mmol/L Final    Potassium 02/10/2021 4.0  3.4 - 5.3 mmol/L Final    Chloride 02/10/2021 108  94 - 109 mmol/L Final    Carbon Dioxide 02/10/2021 30  20 - 32 mmol/L Final    Anion Gap 02/10/2021 1* 3 - 14 mmol/L Final    Glucose 02/10/2021 104* 70 - 99 mg/dL Final    Urea Nitrogen 02/10/2021 15  7 - 30 mg/dL Final    Creatinine 02/10/2021 1.00  0.52 - 1.04 mg/dL Final    GFR Estimate 02/10/2021 54* >60 mL/min/[1.73_m2] Final    GFR Estimate If Black 02/10/2021 62  >60 mL/min/[1.73_m2] Final    Calcium 02/10/2021 9.5  8.5 - 10.1 mg/dL Final    Hemoglobin A1C 02/10/2021 5.0  0 - 5.6 % Final    ALT 02/10/2021 27  0 - 50 U/L Final    AST 02/10/2021 20  0 - 45 U/L Final    Cholesterol 02/10/2021 177  <200 mg/dL Final    Triglycerides 02/10/2021 166* <150 mg/dL Final    HDL Cholesterol 02/10/2021 53  >49 mg/dL Final    LDL Cholesterol Calculated 02/10/2021 91  <100 mg/dL Final    Non HDL Cholesterol 02/10/2021 124  <130 mg/dL Final    Estimated Average Glucose 02/10/2021 97  mg/dL Final        Review of systems: Negative except that which was noted in the HPI.    Physical examination:  /75 (BP Location: Right arm, Patient Position: Sitting, Cuff Size: Adult Regular)   Pulse 61   Temp  "98  F (36.7  C) (Tympanic)   Resp 16   Ht 1.651 m (5' 5\")   Wt 94.6 kg (208 lb 8 oz)   SpO2 96%   BMI 34.70 kg/m      GENERAL APPEARANCE: healthy, alert and no distress  CHEST: lungs clear to auscultation.  CARDIOVASCULAR: regular rhythm, normal S1 with physiologic split S2, no S3 or S4 and no murmur, click or rub  EXTREMITIES: no clubbing, cyanosis but with minimal peripheral edema.    Total time spent on day of visit, including review of tests, obtaining/reviewing separately obtained history, ordering medications/tests/procedures, communicating with PCP/consultants, and documenting in electronic medical record: 20 minutes.              Thank you for allowing me to participate in the care of your patient. Please do not hesitate to contact me if you have any questions.     Ha Hughes, DO        "

## 2025-04-29 LAB
ATRIAL RATE - MUSE: 58 BPM
DIASTOLIC BLOOD PRESSURE - MUSE: NORMAL MMHG
INTERPRETATION ECG - MUSE: NORMAL
P AXIS - MUSE: 49 DEGREES
PR INTERVAL - MUSE: 148 MS
QRS DURATION - MUSE: 78 MS
QT - MUSE: 410 MS
QTC - MUSE: 402 MS
R AXIS - MUSE: 16 DEGREES
SYSTOLIC BLOOD PRESSURE - MUSE: NORMAL MMHG
T AXIS - MUSE: 16 DEGREES
VENTRICULAR RATE- MUSE: 58 BPM

## 2025-05-07 ENCOUNTER — OFFICE VISIT (OUTPATIENT)
Dept: FAMILY MEDICINE | Facility: OTHER | Age: 85
End: 2025-05-07
Attending: STUDENT IN AN ORGANIZED HEALTH CARE EDUCATION/TRAINING PROGRAM
Payer: COMMERCIAL

## 2025-05-07 ENCOUNTER — TELEPHONE (OUTPATIENT)
Dept: CARE COORDINATION | Facility: OTHER | Age: 85
End: 2025-05-07

## 2025-05-07 VITALS
WEIGHT: 209.4 LBS | SYSTOLIC BLOOD PRESSURE: 110 MMHG | DIASTOLIC BLOOD PRESSURE: 60 MMHG | HEART RATE: 61 BPM | OXYGEN SATURATION: 95 % | BODY MASS INDEX: 34.85 KG/M2 | TEMPERATURE: 98.1 F | RESPIRATION RATE: 16 BRPM

## 2025-05-07 DIAGNOSIS — F41.8 MIXED ANXIETY AND DEPRESSIVE DISORDER: ICD-10-CM

## 2025-05-07 DIAGNOSIS — N18.31 STAGE 3A CHRONIC KIDNEY DISEASE (H): ICD-10-CM

## 2025-05-07 DIAGNOSIS — E78.5 HYPERLIPIDEMIA LDL GOAL <100: ICD-10-CM

## 2025-05-07 DIAGNOSIS — L57.0 ACTINIC KERATOSIS: ICD-10-CM

## 2025-05-07 DIAGNOSIS — E66.01 MORBID OBESITY (H): ICD-10-CM

## 2025-05-07 DIAGNOSIS — I10 ESSENTIAL HYPERTENSION: Primary | ICD-10-CM

## 2025-05-07 PROCEDURE — G0463 HOSPITAL OUTPT CLINIC VISIT: HCPCS

## 2025-05-07 RX ORDER — NEOMYCIN SULFATE, POLYMYXIN B SULFATE AND DEXAMETHASONE 3.5; 10000; 1 MG/ML; [USP'U]/ML; MG/ML
SUSPENSION/ DROPS OPHTHALMIC
COMMUNITY
Start: 2025-03-04

## 2025-05-07 RX ORDER — ESCITALOPRAM OXALATE 5 MG/1
5 TABLET ORAL DAILY
Qty: 60 TABLET | Refills: 1 | Status: SHIPPED | OUTPATIENT
Start: 2025-05-07

## 2025-05-07 ASSESSMENT — PATIENT HEALTH QUESTIONNAIRE - PHQ9
10. IF YOU CHECKED OFF ANY PROBLEMS, HOW DIFFICULT HAVE THESE PROBLEMS MADE IT FOR YOU TO DO YOUR WORK, TAKE CARE OF THINGS AT HOME, OR GET ALONG WITH OTHER PEOPLE: NOT DIFFICULT AT ALL
SUM OF ALL RESPONSES TO PHQ QUESTIONS 1-9: 11
SUM OF ALL RESPONSES TO PHQ QUESTIONS 1-9: 11

## 2025-05-07 ASSESSMENT — PAIN SCALES - GENERAL: PAINLEVEL_OUTOF10: MILD PAIN (1)

## 2025-05-07 NOTE — PROGRESS NOTES
"  Assessment & Plan     Essential hypertension  BP 'soft' today, on low end of normal  She is on HCTZ 25 mg daily, tolerating this well.  Sometimes does get weak and dizzy, lower dose to 12.5 mg?  However, I am somewhat concerned about her declining renal function.  It has been subtle but steady.  I question if HCTZ is implicated in this  We will plan to recheck BMP next month when I see her again for medication review    Stage 3a chronic kidney disease (H)  Stable overall but subtle increase in CR over time.  She is     Hyperlipidemia LDL goal <100  Lipids in normal range  Tolerating simvastatin 40 mg well    Morbid obesity (H)  Body mass index is 34.85 kg/m .  Encourage healthy lifestyle and dietary changes to promote some weight loss and to promote good cardiovascular health    Mixed anxiety and depressive disorder  Feeling depressed and anxious  No klonipin for 2 weeks.  Do not recommend taking this any more  We will try low dose lexapro to see if this helps her anxiety and mood and if she can sleep better  She takes a nightly benadryl which was discouraged as it is on BEERs list  - escitalopram (LEXAPRO) 5 MG tablet; Take 1 tablet (5 mg) by mouth daily.    Actinic keratosis  On dorsal left arm.  Has been intermittently crusty.  Has had this for one month  Will apply cryotherapy at next visit since she mentioned this at checkout      BMI  Estimated body mass index is 34.85 kg/m  as calculated from the following:    Height as of 4/28/25: 1.651 m (5' 5\").    Weight as of this encounter: 95 kg (209 lb 6.4 oz).   Weight management plan: Discussed healthy diet and exercise guidelines        Natividad Bill is a 84 year old, presenting for the following health issues:  Depression, Insomnia, and Hypertension      5/7/2025     8:31 AM   Additional Questions   Roomed by Stewart Douglass lpn   Accompanied by self     History of Present Illness       Mental Health Follow-up:  Patient presents to follow-up on " Depression.Patient's depression since last visit has been:  No change  The patient is not having other symptoms associated with depression.      Any significant life events: No  Patient is feeling anxious or having panic attacks.  Patient has no concerns about alcohol or drug use.    Hypertension: She presents for follow up of hypertension.  She does check blood pressure  regularly outside of the clinic. Outpatient blood pressures have not been over 140/90. She follows a low salt diet.     Reason for visit:  Depression, insomnia, high blood pressure    She eats 2-3 servings of fruits and vegetables daily.She consumes 0 sweetened beverage(s) daily.She exercises with enough effort to increase her heart rate 30 to 60 minutes per day.  She exercises with enough effort to increase her heart rate 7 days per week.   She is taking medications regularly.            Review of Systems  Constitutional, HEENT, cardiovascular, pulmonary, GI, , musculoskeletal, neuro, skin, endocrine and psych systems are negative, except as otherwise noted.      Objective    /60 (BP Location: Right arm, Patient Position: Sitting, Cuff Size: Adult Large)   Pulse 61   Temp 98.1  F (36.7  C) (Tympanic)   Resp 16   Wt 95 kg (209 lb 6.4 oz)   SpO2 95%   BMI 34.85 kg/m    Body mass index is 34.85 kg/m .  Physical Exam   GENERAL: alert and no distress  EYES: Eyes grossly normal to inspection, PERRL and conjunctivae and sclerae normal  RESP: lungs clear to auscultation - no rales, rhonchi or wheezes  CV: regular rate and rhythm, normal S1 S2, no S3 or S4, no murmur, click or rub, no peripheral edema  ABDOMEN: soft, nontender, no hepatosplenomegaly, no masses and bowel sounds normal  MS: no gross musculoskeletal defects noted, no edema  SKIN: no suspicious lesions or rashes, erythematous macule on left dorsal forearm consistent with AK  NEURO: Normal strength and tone, mentation intact and speech normal  PSYCH: mentation appears normal,  affect normal/bright            Signed Electronically by: Yanique Mar MD

## 2025-05-07 NOTE — LETTER
To whom it may concern:    My name is Yanique Mar MD and I am the primary care provider for Dinorah Shoemaker ([place]).  Due to advanced osteoarthritis of multiple joints, Thus mobility is significantly impaired.  She needs time and assistance with her ADLs and IADLs.  She needs assistance with bathing, cooking, laundry, cleaning in the house. She was previously receiving 18 hours of assistance weekly.  She received recent communication that this assistance at home would be cut down to 9 hours which is not sufficient to have all of her basic needs met.  I strongly recommend keeping her home assistance time at 18 hours a week due to her medical condition.  Thank you in advance for your time and cooperation.         Yanique Mar MD    Electronically signed

## 2025-05-12 ENCOUNTER — TELEPHONE (OUTPATIENT)
Dept: FAMILY MEDICINE | Facility: OTHER | Age: 85
End: 2025-05-12

## 2025-05-12 NOTE — TELEPHONE ENCOUNTER
Symptom or reason needing to speak to RN: Landy was just put on a new medication and they are making her sick very nausea and upper stomach feels like she is full all the time      Best number to return call: [phone]     Best time to return call: anytime

## 2025-05-26 DIAGNOSIS — F41.9 ANXIETY: ICD-10-CM

## 2025-05-28 RX ORDER — PAROXETINE 40 MG/1
40 TABLET, FILM COATED ORAL DAILY
Qty: 90 TABLET | Refills: 0 | Status: SHIPPED | OUTPATIENT
Start: 2025-05-28

## 2025-05-28 NOTE — TELEPHONE ENCOUNTER
Paxil      Last Written Prescription Date:  3/20/2024  Last Fill Quantity: 90,   # refills: 3  Last Office Visit: 5/07/2025  Future Office visit:    Next 5 appointments (look out 90 days)      Jun 09, 2025 1:00 PM  (Arrive by 12:45 PM)  Provider Visit with Yanique Mar MD  [facility] [place] ([facility] [place] ) [address]  [place] [address]  [phone]

## 2025-06-09 ENCOUNTER — RESULTS FOLLOW-UP (OUTPATIENT)
Dept: FAMILY MEDICINE | Facility: OTHER | Age: 85
End: 2025-06-09

## 2025-06-09 ENCOUNTER — OFFICE VISIT (OUTPATIENT)
Dept: FAMILY MEDICINE | Facility: OTHER | Age: 85
End: 2025-06-09
Attending: STUDENT IN AN ORGANIZED HEALTH CARE EDUCATION/TRAINING PROGRAM
Payer: COMMERCIAL

## 2025-06-09 VITALS
WEIGHT: 210.9 LBS | HEART RATE: 57 BPM | SYSTOLIC BLOOD PRESSURE: 124 MMHG | BODY MASS INDEX: 35.14 KG/M2 | DIASTOLIC BLOOD PRESSURE: 64 MMHG | HEIGHT: 65 IN | RESPIRATION RATE: 16 BRPM | OXYGEN SATURATION: 96 % | TEMPERATURE: 98 F

## 2025-06-09 DIAGNOSIS — G89.29 CHRONIC RIGHT SHOULDER PAIN: ICD-10-CM

## 2025-06-09 DIAGNOSIS — I10 ESSENTIAL HYPERTENSION: Primary | ICD-10-CM

## 2025-06-09 DIAGNOSIS — N18.31 STAGE 3A CHRONIC KIDNEY DISEASE (H): ICD-10-CM

## 2025-06-09 DIAGNOSIS — F41.1 GENERALIZED ANXIETY DISORDER: ICD-10-CM

## 2025-06-09 DIAGNOSIS — M25.511 CHRONIC RIGHT SHOULDER PAIN: ICD-10-CM

## 2025-06-09 LAB
ALBUMIN SERPL BCG-MCNC: 3.9 G/DL (ref 3.5–5.2)
ALP SERPL-CCNC: 102 U/L (ref 40–150)
ALT SERPL W P-5'-P-CCNC: 13 U/L (ref 0–50)
ANION GAP SERPL CALCULATED.3IONS-SCNC: 9 MMOL/L (ref 7–15)
AST SERPL W P-5'-P-CCNC: 19 U/L (ref 0–45)
BILIRUB SERPL-MCNC: 0.7 MG/DL
BUN SERPL-MCNC: 12.4 MG/DL (ref 8–23)
CALCIUM SERPL-MCNC: 10.4 MG/DL (ref 8.8–10.4)
CHLORIDE SERPL-SCNC: 105 MMOL/L (ref 98–107)
CREAT SERPL-MCNC: 1.04 MG/DL (ref 0.51–0.95)
EGFRCR SERPLBLD CKD-EPI 2021: 53 ML/MIN/1.73M2
GLUCOSE SERPL-MCNC: 113 MG/DL (ref 70–99)
HCO3 SERPL-SCNC: 25 MMOL/L (ref 22–29)
POTASSIUM SERPL-SCNC: 3.8 MMOL/L (ref 3.4–5.3)
PROT SERPL-MCNC: 6.5 G/DL (ref 6.4–8.3)
SODIUM SERPL-SCNC: 139 MMOL/L (ref 135–145)

## 2025-06-09 PROCEDURE — 84132 ASSAY OF SERUM POTASSIUM: CPT | Mod: ZL | Performed by: STUDENT IN AN ORGANIZED HEALTH CARE EDUCATION/TRAINING PROGRAM

## 2025-06-09 PROCEDURE — 36415 COLL VENOUS BLD VENIPUNCTURE: CPT | Mod: ZL | Performed by: STUDENT IN AN ORGANIZED HEALTH CARE EDUCATION/TRAINING PROGRAM

## 2025-06-09 PROCEDURE — G0463 HOSPITAL OUTPT CLINIC VISIT: HCPCS

## 2025-06-09 RX ORDER — CLONAZEPAM 0.5 MG/1
0.25 TABLET ORAL DAILY PRN
Qty: 15 TABLET | Refills: 0 | Status: SHIPPED | OUTPATIENT
Start: 2025-06-09

## 2025-06-09 ASSESSMENT — ANXIETY QUESTIONNAIRES
2. NOT BEING ABLE TO STOP OR CONTROL WORRYING: SEVERAL DAYS
7. FEELING AFRAID AS IF SOMETHING AWFUL MIGHT HAPPEN: SEVERAL DAYS
5. BEING SO RESTLESS THAT IT IS HARD TO SIT STILL: NOT AT ALL
1. FEELING NERVOUS, ANXIOUS, OR ON EDGE: MORE THAN HALF THE DAYS
3. WORRYING TOO MUCH ABOUT DIFFERENT THINGS: MORE THAN HALF THE DAYS
GAD7 TOTAL SCORE: 8
6. BECOMING EASILY ANNOYED OR IRRITABLE: SEVERAL DAYS
GAD7 TOTAL SCORE: 8
4. TROUBLE RELAXING: SEVERAL DAYS
7. FEELING AFRAID AS IF SOMETHING AWFUL MIGHT HAPPEN: SEVERAL DAYS
IF YOU CHECKED OFF ANY PROBLEMS ON THIS QUESTIONNAIRE, HOW DIFFICULT HAVE THESE PROBLEMS MADE IT FOR YOU TO DO YOUR WORK, TAKE CARE OF THINGS AT HOME, OR GET ALONG WITH OTHER PEOPLE: SOMEWHAT DIFFICULT
GAD7 TOTAL SCORE: 8
8. IF YOU CHECKED OFF ANY PROBLEMS, HOW DIFFICULT HAVE THESE MADE IT FOR YOU TO DO YOUR WORK, TAKE CARE OF THINGS AT HOME, OR GET ALONG WITH OTHER PEOPLE?: SOMEWHAT DIFFICULT

## 2025-06-09 ASSESSMENT — PAIN SCALES - GENERAL: PAINLEVEL_OUTOF10: NO PAIN (0)

## 2025-06-09 ASSESSMENT — PATIENT HEALTH QUESTIONNAIRE - PHQ9
10. IF YOU CHECKED OFF ANY PROBLEMS, HOW DIFFICULT HAVE THESE PROBLEMS MADE IT FOR YOU TO DO YOUR WORK, TAKE CARE OF THINGS AT HOME, OR GET ALONG WITH OTHER PEOPLE: SOMEWHAT DIFFICULT
SUM OF ALL RESPONSES TO PHQ QUESTIONS 1-9: 7
SUM OF ALL RESPONSES TO PHQ QUESTIONS 1-9: 7

## 2025-06-09 NOTE — PROGRESS NOTES
Assessment & Plan     Essential hypertension  BP is well controlled and in target range   Will repeat kidney function today    Stage 3a chronic kidney disease (H)  Has been trending stable overall  Continue to refrain from NSAIDs as much as possible which she has been doing  Will check kidney function today  - Comprehensive metabolic panel; Future    Generalized anxiety disorder  Anxiety has been worse since being off Klonipin.  She tells me she was breaking her 0.5 mg dose into small pieces and she has not been taking this daily  OK to resume Klonipin only as needed.  She is aware of risks and adverse effects which were reviewed with her again today.   She has been on paxil since 1972 she tells me.  She does not feel that it is working well for her anymore. I do recommend trial of alternative SSRI and SLOW wean off the paxil.  Will send to [facility] to help with this, appreciate it  - clonazePAM (KLONOPIN) 0.5 MG tablet; Take 0.5 tablets (0.25 mg) by mouth daily as needed for anxiety.  - Med Therapy Management Referral    Chronic right shoulder pain  Has evidence of   - Orthopedic  Referral; Future        Subjective   Landy is a 84 year old, presenting for the following health issues:  Medication Follow-up        6/9/2025    12:59 PM   Additional Questions   Roomed by Obdulia shelby   Accompanied by Sri and PCA         6/9/2025    12:59 PM   Patient Reported Additional Medications   Patient reports taking the following new medications None     History of Present Illness       Reason for visit:  Medication check up    She eats 0-1 servings of fruits and vegetables daily.She consumes 0 sweetened beverage(s) daily.She exercises with enough effort to increase her heart rate 10 to 19 minutes per day.  She exercises with enough effort to increase her heart rate 3 or less days per week. She is missing 1 dose(s) of medications per week.      Started Paxil since 1972- has been on this for a long time.  Was given  "buspar and she doesn't remember why she stopped taking this.     Medication Followup of escitalopram (Lexapro) 5 mg:  Taking Medication as prescribed: NO due to side effects  Side Effects:  Nausea, \"felt sick\"  Medication Helping Symptoms:  NO due to reason above            6/9/2025    12:52 PM   Last PHQ-9   1.  Little interest or pleasure in doing things 1   2.  Feeling down, depressed, or hopeless 2   3.  Trouble falling or staying asleep, or sleeping too much 2   4.  Feeling tired or having little energy 1   5.  Poor appetite or overeating 0   6.  Feeling bad about yourself 1   7.  Trouble concentrating 0   8.  Moving slowly or restless 0   Q9: Thoughts of better off dead/self-harm past 2 weeks 0   PHQ-9 Total Score 7        Patient-reported         6/9/2025    12:54 PM   ANDREI-7    1. Feeling nervous, anxious, or on edge 2   2. Not being able to stop or control worrying 1   3. Worrying too much about different things 2   4. Trouble relaxing 1   5. Being so restless that it is hard to sit still 0   6. Becoming easily annoyed or irritable 1   7. Feeling afraid, as if something awful might happen 1   ANDREI-7 Total Score 8    If you checked any problems, how difficult have they made it for you to do your work, take care of things at home, or get along with other people? Somewhat difficult       Patient-reported         Review of Systems  Constitutional, HEENT, cardiovascular, pulmonary, GI, , musculoskeletal, neuro, skin, endocrine and psych systems are negative, except as otherwise noted.      Objective    /64 (BP Location: Right arm, Patient Position: Sitting, Cuff Size: Adult Large)   Pulse 57   Temp 98  F (36.7  C) (Tympanic)   Resp 16   Ht 1.651 m (5' 5\")   Wt 95.7 kg (210 lb 14.4 oz)   SpO2 96%   BMI 35.10 kg/m    Body mass index is 35.1 kg/m .  Physical Exam   GENERAL: alert and no distress  EYES: Eyes grossly normal to inspection, PERRL and conjunctivae and sclerae normal  HENT: ear canals and " TM's normal, nose and mouth without ulcers or lesions  NECK: no adenopathy, no asymmetry, masses, or scars  RESP: lungs clear to auscultation - no rales, rhonchi or wheezes  CV: regular rate and rhythm, normal S1 S2, no S3 or S4, no murmur, click or rub, no peripheral edema  ABDOMEN: soft, nontender, no hepatosplenomegaly, no masses and bowel sounds normal  MS: no gross musculoskeletal defects noted, no edema  SKIN: no suspicious lesions or rashes  NEURO: Normal strength and tone, mentation intact and speech normal  PSYCH: mentation appears normal, affect normal/bright          Signed Electronically by: Yanique Mar MD    Answers submitted by the patient for this visit:  Patient Health Questionnaire (Submitted on 6/9/2025)  If you checked off any problems, how difficult have these problems made it for you to do your work, take care of things at home, or get along with other people?: Somewhat difficult  PHQ9 TOTAL SCORE: 7

## 2025-06-10 ENCOUNTER — TELEPHONE (OUTPATIENT)
Dept: FAMILY MEDICINE | Facility: OTHER | Age: 85
End: 2025-06-10

## 2025-06-10 DIAGNOSIS — F41.1 GENERALIZED ANXIETY DISORDER: Primary | ICD-10-CM

## 2025-06-10 RX ORDER — PAROXETINE 30 MG/1
30 TABLET, FILM COATED ORAL EVERY MORNING
Qty: 30 TABLET | Refills: 0 | Status: SHIPPED | OUTPATIENT
Start: 2025-06-10

## 2025-06-10 NOTE — TELEPHONE ENCOUNTER
I scheduled patient for MTM next opening appointment on 7/18/2025. Patient is also on the wait list for a sooner appointment.     Patient would like to start weaning off of Paxil and would like the instructions on how to do that.   Please contact patient.

## 2025-06-10 NOTE — TELEPHONE ENCOUNTER
I recommend a very slow wean. Would recommend decreasing dose to 30 mg for the next 4 weeks.  Please pend 30 mg dose

## 2025-06-10 NOTE — TELEPHONE ENCOUNTER
Patient was notified of the dose change and will get her Paxil 30 mg at the pharmacy, MD please sign order

## 2025-06-26 PROBLEM — F11.90 CHRONIC, CONTINUOUS USE OF OPIOIDS: Chronic | Status: RESOLVED | Noted: 2021-05-17 | Resolved: 2025-06-26

## 2025-07-03 NOTE — TELEPHONE ENCOUNTER
11:25 AM    Reason for Call: OVERBOOK    Patient is having the following symptoms: ER Follow up Dehydration / dizziness     The patient is requesting an appointment for This week  with Dr. Magaly Sosa    Was an appointment offered for this call?  No    Preferred method for responding to this message: [phone]    If we cannot reach you directly, may we leave a detailed response at the number you provided?  Yes        Verna Quarles       [Home] : at home, [unfilled] , at the time of the visit. [Medical Office: ([facility])___] : at the medical office located in  [Telehealth (audio & video)] : This visit was provided via telehealth using real-time 2-way audio visual technology. [Verbal consent obtained from patient] : the patient, [unfilled] [de-identified] : 33yo F presents for follow up on anxiety. Pt reports anxious during flying and would like something PRN to take prior to vacation. Pt reports no SI/HI.

## 2025-07-05 DIAGNOSIS — F41.1 GENERALIZED ANXIETY DISORDER: ICD-10-CM

## 2025-07-08 RX ORDER — CLONAZEPAM 0.5 MG/1
TABLET ORAL
Qty: 15 TABLET | Refills: 0 | Status: SHIPPED | OUTPATIENT
Start: 2025-07-08

## 2025-07-08 NOTE — TELEPHONE ENCOUNTER
clonazePAM 0.5 MG Oral Tablet       Last Written Prescription Date:  06/09/2025  Last Fill Quantity: 15,   # refills: 0  Last Office Visit: 06/09/2025  Future Office visit:    Next 5 appointments (look out 90 days)      Jul 18, 2025 8:00 AM  [facility] New with Terrence Diaz [facility] ([facility]) [address] [place] [id number]  [phone]     Sep 09, 2025 9:00 AM  (Arrive by 8:45 AM)  Provider Visit with Yanique Mar MD  [facility] ([facility] ) [address] [place] [address]  [phone]     Sep 17, 2025 2:30 PM  (Arrive by 2:15 PM)  Return Visit with Ha Hughes DO  [facility] ([facility] ) [address] [place] [address]  [phone]             Routing refill request to provider for review/approval because:    Rx Protocol Controlled Substance [id number]/05/2025 12:23 PM   Protocol Details Medication is active on med list and the sig matches    Urine drug screeen results on file in past 12 months    Controlled Substance Agreement on file in last 12 months    Auto Fail - Please forward to Provider          Kimberly Boecker, RN

## 2025-07-12 DIAGNOSIS — F41.1 GENERALIZED ANXIETY DISORDER: ICD-10-CM

## 2025-07-14 RX ORDER — PAROXETINE 30 MG/1
30 TABLET, FILM COATED ORAL EVERY MORNING
Qty: 30 TABLET | Refills: 0 | Status: SHIPPED | OUTPATIENT
Start: 2025-07-14 | End: 2025-07-16

## 2025-07-14 NOTE — TELEPHONE ENCOUNTER
Paxil      Last Written Prescription Date:  6.10.25  Last Fill Quantity: #30,   # refills: 0  Last Office Visit: 6.9.25  Future Office visit:    Next 5 appointments (look out 90 days)      Jul 18, 2025 8:00 AM  [facility] ([facility]) [address] [place] [id number]  [phone]     Sep 09, 2025 9:00 AM  (Arrive by 8:45 AM)  Provider Visit with Yanique Mar MD  [facility] ([facility] ) [address] [place] [address]  [phone]     Sep 17, 2025 2:30 PM  (Arrive by 2:15 PM)  Return Visit with Ha Hughes DO  [facility] ([facility] ) [address] [place] [address]  [phone]             Routing refill request to provider for review/approval because:

## 2025-07-16 DIAGNOSIS — F41.1 GENERALIZED ANXIETY DISORDER: ICD-10-CM

## 2025-07-16 RX ORDER — PAROXETINE 30 MG/1
30 TABLET, FILM COATED ORAL EVERY MORNING
Qty: 30 TABLET | Refills: 0 | Status: SHIPPED | OUTPATIENT
Start: 2025-07-16

## 2025-07-16 NOTE — TELEPHONE ENCOUNTER
Paxil 30 MG      Last Written Prescription Date:  07/14/25  Last Fill Quantity: 30,   # refills: 0  Last Office Visit: 06/09/25  Future Office visit:    Next 5 appointments (look out 90 days)      Jul 18, 2025 8:00 AM  [facility] ([facility]) [address] [place] [id number]  [phone]     Sep 09, 2025 9:00 AM  (Arrive by 8:45 AM)  Provider Visit with Yanique Mar MD  [facility] ([facility] ) [address] [place] [address]  [phone]     Sep 17, 2025 2:30 PM  (Arrive by 2:15 PM)  Return Visit with Ha Hughes DO  [facility] ([facility] ) [address] [place] [address]  [phone]             Routing refill request to provider for review/approval because:  SSRIs Protocol Failed    Rerun Protocol (7/16/2025 11:29 AM)    ANDREI-7 score of less than 5 in past 6 months.    Please review last ANDREI-7 score.        11/27/2023     3:06 PM 11/15/2024     7:52 AM 6/9/2025    12:54 PM   ANDREI-7 SCORE   Total Score 10 (moderate anxiety) 8 (mild anxiety) 8 (mild anxiety)   Total Score 10 8  8        Patient-reported

## 2025-08-06 ENCOUNTER — ANCILLARY PROCEDURE (OUTPATIENT)
Dept: GENERAL RADIOLOGY | Facility: OTHER | Age: 85
End: 2025-08-06
Attending: ORTHOPAEDIC SURGERY
Payer: COMMERCIAL

## 2025-08-06 ENCOUNTER — OFFICE VISIT (OUTPATIENT)
Dept: ORTHOPEDICS | Facility: OTHER | Age: 85
End: 2025-08-06
Attending: ORTHOPAEDIC SURGERY
Payer: COMMERCIAL

## 2025-08-06 VITALS
RESPIRATION RATE: 16 BRPM | OXYGEN SATURATION: 98 % | HEART RATE: 68 BPM | DIASTOLIC BLOOD PRESSURE: 68 MMHG | SYSTOLIC BLOOD PRESSURE: 112 MMHG

## 2025-08-06 DIAGNOSIS — M67.919 DISORDER OF BURSAE AND TENDONS IN SHOULDER REGION: Primary | ICD-10-CM

## 2025-08-06 DIAGNOSIS — M25.561 RIGHT KNEE PAIN: ICD-10-CM

## 2025-08-06 DIAGNOSIS — M25.811 IMPINGEMENT OF RIGHT SHOULDER: ICD-10-CM

## 2025-08-06 DIAGNOSIS — M25.511 RIGHT SHOULDER PAIN: ICD-10-CM

## 2025-08-06 DIAGNOSIS — M17.11 ARTHRITIS OF RIGHT KNEE: Primary | ICD-10-CM

## 2025-08-06 DIAGNOSIS — M71.9 DISORDER OF BURSAE AND TENDONS IN SHOULDER REGION: Primary | ICD-10-CM

## 2025-08-06 PROCEDURE — 73030 X-RAY EXAM OF SHOULDER: CPT | Mod: TC,RT

## 2025-08-06 PROCEDURE — 250N000011 HC RX IP 250 OP 636: Performed by: ORTHOPAEDIC SURGERY

## 2025-08-06 PROCEDURE — G0463 HOSPITAL OUTPT CLINIC VISIT: HCPCS | Mod: 25 | Performed by: ORTHOPAEDIC SURGERY

## 2025-08-06 PROCEDURE — 73562 X-RAY EXAM OF KNEE 3: CPT | Mod: 26 | Performed by: RADIOLOGY

## 2025-08-06 PROCEDURE — 20610 DRAIN/INJ JOINT/BURSA W/O US: CPT | Performed by: ORTHOPAEDIC SURGERY

## 2025-08-06 PROCEDURE — 3078F DIAST BP <80 MM HG: CPT | Performed by: ORTHOPAEDIC SURGERY

## 2025-08-06 PROCEDURE — 73030 X-RAY EXAM OF SHOULDER: CPT | Mod: 26 | Performed by: RADIOLOGY

## 2025-08-06 PROCEDURE — 3074F SYST BP LT 130 MM HG: CPT | Performed by: ORTHOPAEDIC SURGERY

## 2025-08-06 PROCEDURE — 73562 X-RAY EXAM OF KNEE 3: CPT | Mod: TC,RT

## 2025-08-06 PROCEDURE — 1126F AMNT PAIN NOTED NONE PRSNT: CPT | Performed by: ORTHOPAEDIC SURGERY

## 2025-08-06 PROCEDURE — 99203 OFFICE O/P NEW LOW 30 MIN: CPT | Performed by: ORTHOPAEDIC SURGERY

## 2025-08-06 RX ORDER — TRIAMCINOLONE ACETONIDE 1 MG/G
CREAM TOPICAL
COMMUNITY
Start: 2025-07-25

## 2025-08-06 RX ORDER — HYDROCORTISONE 25 MG/G
CREAM TOPICAL
COMMUNITY
Start: 2025-07-25

## 2025-08-06 RX ORDER — BETAMETHASONE SODIUM PHOSPHATE AND BETAMETHASONE ACETATE 3; 3 MG/ML; MG/ML
12 INJECTION, SUSPENSION INTRA-ARTICULAR; INTRALESIONAL; INTRAMUSCULAR; SOFT TISSUE ONCE
Status: COMPLETED | OUTPATIENT
Start: 2025-08-06 | End: 2025-08-06

## 2025-08-06 RX ADMIN — BETAMETHASONE SODIUM PHOSPHATE AND BETAMETHASONE ACETATE 12 MG: 3; 3 INJECTION, SUSPENSION INTRA-ARTICULAR; INTRALESIONAL; INTRAMUSCULAR at 16:55

## 2025-08-06 ASSESSMENT — PAIN SCALES - GENERAL: PAINLEVEL_OUTOF10: NO PAIN (0)

## 2025-08-18 PROBLEM — M17.11 ARTHRITIS OF RIGHT KNEE: Status: ACTIVE | Noted: 2025-08-18

## 2025-08-18 PROBLEM — M25.811 IMPINGEMENT OF RIGHT SHOULDER: Status: ACTIVE | Noted: 2025-08-18

## 2025-08-25 ENCOUNTER — OFFICE VISIT (OUTPATIENT)
Dept: ORTHOPEDICS | Facility: OTHER | Age: 85
End: 2025-08-25
Attending: ORTHOPAEDIC SURGERY
Payer: COMMERCIAL

## 2025-08-25 VITALS
BODY MASS INDEX: 34.95 KG/M2 | TEMPERATURE: 98.3 F | WEIGHT: 210 LBS | DIASTOLIC BLOOD PRESSURE: 65 MMHG | OXYGEN SATURATION: 98 % | HEART RATE: 63 BPM | SYSTOLIC BLOOD PRESSURE: 115 MMHG

## 2025-08-25 DIAGNOSIS — M19.011 PRIMARY LOCALIZED OSTEOARTHROSIS OF RIGHT SHOULDER REGION: Primary | ICD-10-CM

## 2025-08-25 PROCEDURE — 3078F DIAST BP <80 MM HG: CPT | Performed by: ORTHOPAEDIC SURGERY

## 2025-08-25 PROCEDURE — 3074F SYST BP LT 130 MM HG: CPT | Performed by: ORTHOPAEDIC SURGERY

## 2025-08-25 PROCEDURE — 1125F AMNT PAIN NOTED PAIN PRSNT: CPT | Performed by: ORTHOPAEDIC SURGERY

## 2025-08-25 PROCEDURE — 20610 DRAIN/INJ JOINT/BURSA W/O US: CPT | Performed by: ORTHOPAEDIC SURGERY

## 2025-08-25 PROCEDURE — 250N000011 HC RX IP 250 OP 636: Performed by: ORTHOPAEDIC SURGERY

## 2025-08-25 PROCEDURE — 99213 OFFICE O/P EST LOW 20 MIN: CPT | Mod: 25 | Performed by: ORTHOPAEDIC SURGERY

## 2025-08-25 PROCEDURE — G0463 HOSPITAL OUTPT CLINIC VISIT: HCPCS | Mod: 25

## 2025-08-25 RX ORDER — BETAMETHASONE SODIUM PHOSPHATE AND BETAMETHASONE ACETATE 3; 3 MG/ML; MG/ML
12 INJECTION, SUSPENSION INTRA-ARTICULAR; INTRALESIONAL; INTRAMUSCULAR; SOFT TISSUE ONCE
Status: COMPLETED | OUTPATIENT
Start: 2025-08-25 | End: 2025-08-25

## 2025-08-25 RX ORDER — DOXYCYCLINE 100 MG/1
CAPSULE ORAL
COMMUNITY
Start: 2025-08-11

## 2025-08-25 RX ADMIN — BETAMETHASONE SODIUM PHOSPHATE AND BETAMETHASONE ACETATE 12 MG: 3; 3 INJECTION, SUSPENSION INTRA-ARTICULAR; INTRALESIONAL; INTRAMUSCULAR at 14:32

## 2025-08-25 ASSESSMENT — PAIN SCALES - GENERAL: PAINLEVEL_OUTOF10: SEVERE PAIN (10)

## (undated) DEVICE — LABEL-STERILE PREPRINTED FOR OR

## (undated) DEVICE — TUBING-ARTHROSCOPY-INFLOW

## (undated) DEVICE — WAND-APOLLO RF 50 MULTIPORT

## (undated) DEVICE — CUFF-DISP STERILE 18IN SINGLE BLADDER

## (undated) DEVICE — BDG-ELASTIC 4 INCH STERILE

## (undated) DEVICE — LIGHT HANDLE COVER

## (undated) DEVICE — BLADE-SAGITTAL 18MM X 90MM X 1.27MM

## (undated) DEVICE — SUCTION TUBE-YANKAUR

## (undated) DEVICE — IRRIGATION-NACL 3000ML (BAG)

## (undated) DEVICE — MARKER-SKIN REG

## (undated) DEVICE — SUTURE-ETHILON 4-0 FS-1 1629H

## (undated) DEVICE — NDL-22G X 1 1/2" NON-SAFETY

## (undated) DEVICE — BDG-ESMARK 6 INCH X 9 FT

## (undated) DEVICE — DRSG-SPONGE STERILE 8 X 4

## (undated) DEVICE — CAUTERY PAD-POLYHESIVE II ADULT

## (undated) DEVICE — COVER-TABLE SHEET

## (undated) DEVICE — IRRIGATION-H2O 1000ML

## (undated) DEVICE — APPLICATOR-CHLORAPREP 26ML TINTED CHG 2%+ 70% IPA-SURGICAL

## (undated) DEVICE — Device

## (undated) DEVICE — BDG-STAT STRIP

## (undated) DEVICE — DRSG-AQUACEL AG 3.5" X 14" SURGICAL

## (undated) DEVICE — BLADE-SAGITTAL DUAL CUT 25MM X 100MM X 1.27MM

## (undated) DEVICE — SYRINGE-ASEPTO IRRIGATION

## (undated) DEVICE — POUCH-INSTRUMENT 2 COMP. 7 X 11IN

## (undated) DEVICE — SPONGE-LAPAROTOMY PADS 18 X 18

## (undated) DEVICE — NDL-18G 1 1/2" NON-SAFETY

## (undated) DEVICE — IRRIGATION-H2O 1000ML BAG

## (undated) DEVICE — PULSE LAVAGE IRRIGATION SYSTEM

## (undated) DEVICE — SUTURE-ETHILON 3-0 PS-2 1669H

## (undated) DEVICE — PACK-KNEE ARTHROSCOPY-CUSTOM

## (undated) DEVICE — PACK-SET UP-CUSTOM

## (undated) DEVICE — CEMENT MIXER-MIXEVAC III

## (undated) DEVICE — CUFF-DISP STERILE 34IN SINGLE BLADDER

## (undated) DEVICE — PACK-KNEE-CUSTOM

## (undated) DEVICE — DRAPE-U DRAPE SPLIT SHEET 72" X 122"

## (undated) DEVICE — GLV-9.0 PROTEXIS PI CLASSIC LF/PF

## (undated) DEVICE — IRRIGATION-NACL 1000ML

## (undated) DEVICE — GOWN-SURG XXL LVL 3 REINFORCED

## (undated) DEVICE — NDL COUNTER-20-40 CT MAGNET/FOAM BLOCK

## (undated) DEVICE — SUTURE-VICRYL 0 CP-1 J467H

## (undated) DEVICE — PACK-BASIN SET-UP

## (undated) DEVICE — GLV-8.0 ORTHO PROTEXIS PI LF/PF

## (undated) DEVICE — CAST PADDING-STERILE 6"

## (undated) DEVICE — BLADE-DISSECTOR AR-8400DS

## (undated) DEVICE — SCD SLEEVE-KNEE REG.

## (undated) DEVICE — NDL-SPINAL 22G X 3.5IN QUINCKE

## (undated) DEVICE — GLV-8.5 PROTEXIS PI CLASSIC LF/PF

## (undated) DEVICE — ICEMAN W/UNIVERSAL WRAP-ON PAD

## (undated) DEVICE — STAPLER-SKIN 35 WIDE STAPLES

## (undated) DEVICE — CAUTERY PENCIL-SMOKE EVACUATION

## (undated) DEVICE — TUBING-SUCTION 20FT

## (undated) DEVICE — BIN-ARTHROSCOPY CART

## (undated) DEVICE — CAST PADDING-STERILE 4"

## (undated) DEVICE — LIGHT HANDLE COVER FOR SKYTRON LIGHTS

## (undated) DEVICE — CAUTERY-MEGADYNE TIP

## (undated) DEVICE — BETADINE 5% STERILE OPHTHALMIC SOLUTION 1 OZ.

## (undated) DEVICE — SENSOR-OXISENSOR II ADULT

## (undated) DEVICE — FOOT PADS-TOTAL KNEE POSITIONER

## (undated) DEVICE — SUTURE-VICRYL 2-0 CP-1 VCP266H

## (undated) DEVICE — NDL-BLUNT FILL 18G 1.5"

## (undated) DEVICE — SYRINGE-30CC LUER LOCK

## (undated) DEVICE — DRAPE-STERI 45X60CM #1010

## (undated) DEVICE — BLADE-SCALPEL #11

## (undated) DEVICE — SUTURE-VICRYL 1 CT-1 POP-OFF J741D

## (undated) DEVICE — DRAPE-EXTREMITY SHEET

## (undated) DEVICE — BLADE-SCALPEL #15

## (undated) DEVICE — GOWN-SURG XL LVL 3 REINFORCED

## (undated) RX ORDER — PROPOFOL 10 MG/ML
INJECTION, EMULSION INTRAVENOUS
Status: DISPENSED
Start: 2019-03-21

## (undated) RX ORDER — PROPOFOL 10 MG/ML
INJECTION, EMULSION INTRAVENOUS
Status: DISPENSED
Start: 2019-09-09

## (undated) RX ORDER — PHENYLEPHRINE HCL IN 0.9% NACL 1 MG/10 ML
SYRINGE (ML) INTRAVENOUS
Status: DISPENSED
Start: 2019-09-09

## (undated) RX ORDER — LIDOCAINE HYDROCHLORIDE 20 MG/ML
INJECTION, SOLUTION EPIDURAL; INFILTRATION; INTRACAUDAL; PERINEURAL
Status: DISPENSED
Start: 2019-03-21

## (undated) RX ORDER — ONDANSETRON 2 MG/ML
INJECTION INTRAMUSCULAR; INTRAVENOUS
Status: DISPENSED
Start: 2019-03-21

## (undated) RX ORDER — FENTANYL CITRATE 50 UG/ML
INJECTION, SOLUTION INTRAMUSCULAR; INTRAVENOUS
Status: DISPENSED
Start: 2019-03-21

## (undated) RX ORDER — LIDOCAINE HYDROCHLORIDE 20 MG/ML
INJECTION, SOLUTION EPIDURAL; INFILTRATION; INTRACAUDAL; PERINEURAL
Status: DISPENSED
Start: 2019-09-09

## (undated) RX ORDER — FENTANYL CITRATE 50 UG/ML
INJECTION, SOLUTION INTRAMUSCULAR; INTRAVENOUS
Status: DISPENSED
Start: 2019-09-09

## (undated) RX ORDER — KETAMINE HCL IN NACL, ISO-OSM 100MG/10ML
SYRINGE (ML) INJECTION
Status: DISPENSED
Start: 2021-08-16

## (undated) RX ORDER — FENTANYL CITRATE 50 UG/ML
INJECTION, SOLUTION INTRAMUSCULAR; INTRAVENOUS
Status: DISPENSED
Start: 2021-08-16